# Patient Record
Sex: FEMALE | NOT HISPANIC OR LATINO | Employment: OTHER | URBAN - METROPOLITAN AREA
[De-identification: names, ages, dates, MRNs, and addresses within clinical notes are randomized per-mention and may not be internally consistent; named-entity substitution may affect disease eponyms.]

---

## 2017-01-06 ENCOUNTER — OFFICE VISIT (OUTPATIENT)
Dept: CARDIOLOGY | Facility: CLINIC | Age: 82
End: 2017-01-06

## 2017-01-06 VITALS
HEART RATE: 50 BPM | DIASTOLIC BLOOD PRESSURE: 82 MMHG | SYSTOLIC BLOOD PRESSURE: 142 MMHG | HEIGHT: 55 IN | BODY MASS INDEX: 42.35 KG/M2 | WEIGHT: 183 LBS

## 2017-01-06 DIAGNOSIS — I25.119 CORONARY ARTERY DISEASE INVOLVING NATIVE CORONARY ARTERY OF NATIVE HEART WITH ANGINA PECTORIS (HCC): ICD-10-CM

## 2017-01-06 DIAGNOSIS — I48.0 PAROXYSMAL ATRIAL FIBRILLATION (HCC): Primary | ICD-10-CM

## 2017-01-06 DIAGNOSIS — E78.49 OTHER HYPERLIPIDEMIA: ICD-10-CM

## 2017-01-06 PROCEDURE — 99214 OFFICE O/P EST MOD 30 MIN: CPT | Performed by: INTERNAL MEDICINE

## 2017-01-06 PROCEDURE — 93000 ELECTROCARDIOGRAM COMPLETE: CPT | Performed by: INTERNAL MEDICINE

## 2017-01-06 RX ORDER — AMIODARONE HYDROCHLORIDE 200 MG/1
200 TABLET ORAL DAILY
COMMUNITY
End: 2017-01-08

## 2017-01-06 NOTE — PROGRESS NOTES
Date of Office Visit: 17  Encounter Provider: Olvin Hernandez MD  Place of Service: [facility] CARDIOLOGY  Patient Name: Olena Myers  :1935      Chief Complaint   Patient presents with   • Coronary Artery Disease     History of Present Illness  HPI Comments: Mrs Myers is a very pleasant 81-year-old female who has a history of coronary artery disease with previous angioplasty bare-metal stent placement of the left anterior descending in  normal left her systolic function.  We saw her 2016 to clear her for shoulder surgery at that time she had a perfusion stress test that was normal.  She had her shoulder surgery in the hospital multiple times the last was with an episode of abrupt loss of vision in her left eye [facility] had a CTA and MRA that showed no real stenosis and no definite stroke however her neurologist felt that she probably did have a cerebrovascular event.  She then had atrial fibrillation.  Had multiple problems with recurrent palpitations despite medical therapy.  She presented to the hospital 1221 with symptomatic rapid atrial fibrillation we upped her beta blocker placed on IV diltiazem still tachycardic and symptomatic she was then placed on amiodarone unfortunately she converted back to sinus rhythm.  Should a little bit of heart failure from all that was short on some diuretics.  She was also seen by pulmonary and found to have severe COPD with her back on some inhalers.  She now comes back in for follow-up.  She's had no further palpitations.  She is still short of breath with activity.  She denies any near-syncope or syncope no orthopnea or PND.  No lower extremity edema.  No chest pain.  Of note she did have a CT scan performed  an elevated d-dimer it showed no evidence of pulmonary embolism since she had a moderate sized pericardial effusion.  But she's again had no fevers or chills and no cough.       Coronary Artery Disease   Pertinent negatives include no dizziness, muscle weakness or weight gain.   Atrial Fibrillation   Symptoms are negative for dizziness and weakness. Past medical history includes atrial fibrillation and CAD.         Past Medical History   Diagnosis Date   • Anemia      IRON DEFICIENCY   • Arthritis    • Asthma    • CAD (coronary artery disease)      HEART STENT   • Dilation of esophagus      DUE TO ULCER   • Gastric ulcer    • GIB (gastrointestinal bleeding)    • Hyperlipidemia    • Hypertension    • Lightheadedness    • LVH (left ventricular hypertrophy)    • Myasthenia    • Osteoporosis    • SOB (shortness of breath)      WALKING         Past Surgical History   Procedure Laterality Date   • Appendectomy     • Coronary angioplasty     • Coronary stent placement     • Hip bipolar replacement Bilateral    • Carpal tunnel release Bilateral    • Joint replacement Bilateral 2010, 2015     HIPS   • Total shoulder arthroplasty w/ distal clavicle excision Left 7/19/2016     Procedure: LT TOTAL SHOULDER REVERSE ARTHROPLASTY;  Surgeon: NAHOMI Solorzano MD;  Location: [facility] OR [facility];  Service:    • Cataract extraction Bilateral          Current Outpatient Prescriptions on File Prior to Visit   Medication Sig Dispense Refill   • albuterol (PROAIR HFA) 108 (90 BASE) MCG/ACT inhaler Inhale 2 puffs Every 6 (Six) Hours As Needed for wheezing or shortness of air.     • aspirin 81 MG EC tablet Take 81 mg by mouth Daily.     • DULoxetine (CYMBALTA) 60 MG capsule Take 60 mg by mouth 2 (two) times a day.     • ezetimibe-simvastatin (VYTORIN) 10-20 MG per tablet Take 1 tablet by mouth every night.     • fesoterodine fumarate (TOVIAZ ER) 8 MG tablet sustained-release 24 hour capsule Take 8 mg by mouth daily.     • Fluticasone Furoate-Vilanterol (BREO ELLIPTA) 100-25 MCG/INH aerosol powder  Inhale 1 puff Daily.     • Fluticasone Furoate-Vilanterol 100-25 MCG/INH aerosol powder  Inhale 1 puff.     • furosemide  (LASIX) 20 MG tablet Take 1 tablet by mouth Daily. 30 tablet 6   • metoprolol succinate XL (TOPROL XL) 25 MG 24 hr tablet Take 1 tablet by mouth 2 (Two) Times a Day. 60 tablet 11   • mycophenolate (CELLCEPT) 500 MG tablet Take 500 mg by mouth 2 (two) times a day. 2 tabs bid     • nitroglycerin (NITROSTAT) 0.4 MG SL tablet Place 0.4 mg under the tongue every 5 (five) minutes as needed for chest pain. Take no more than 3 doses in 15 minutes.     • omeprazole (PriLOSEC) 40 MG capsule Take 40 mg by mouth Daily.     • potassium chloride (K-DUR,KLOR-CON) 10 MEQ CR tablet Take 10 mEq by mouth daily.     • predniSONE (DELTASONE) 10 MG tablet Take 10 mg by mouth Daily.     • pyridostigmine (MESTINON) 180 MG CR tablet Take 180 mg by mouth 2 (Two) Times a Day.     • pyridostigmine (MESTINON) 60 MG tablet Take 120 mg by mouth 3 (Three) Times a Day.     • raloxifene (EVISTA) 60 MG tablet Take 60 mg by mouth daily.     • rivaroxaban (XARELTO) 15 MG tablet Take 1 tablet by mouth Daily With Dinner. 42 tablet 6   • valsartan (DIOVAN) 160 MG tablet Take 160 mg by mouth.     • vitamin D (ERGOCALCIFEROL) 83326 UNITS capsule capsule Take 50,000 Units by mouth Every 7 (Seven) Days. Takes on Wednesdays     • [DISCONTINUED] albuterol (PROVENTIL) (2.5 MG/3ML) 0.083% nebulizer solution Inhale 2.5 mg Every 6 (Six) Hours.     • [DISCONTINUED] amiodarone (PACERONE) 200 MG tablet Take 2 tablets bid for 1 week, then move to 1 tablet daily. (Patient not taking: Reported on 1/6/2017) 49 tablet 5   • [DISCONTINUED] azelastine (OPTIVAR) 0.05 % ophthalmic solution 1 drop 2 (Two) Times a Day.     • [DISCONTINUED] cyclobenzaprine (FLEXERIL) 5 MG tablet Take 5 mg by mouth 3 (Three) Times a Day.     • [DISCONTINUED] predniSONE (DELTASONE) 50 MG tablet Take 50 mg by mouth 1 (One) Time Per Week. Takes on Monday       No current facility-administered medications on file prior to visit.          Social History     Social History   • Marital status:       Spouse name: N/A   • Number of children: N/A   • Years of education: N/A     Occupational History   • Not on file.     Social History Main Topics   • Smoking status: Never Smoker   • Smokeless tobacco: Never Used   • Alcohol use No   • Drug use: No      Comment: caffine   • Sexual activity: Defer     Other Topics Concern   • Not on file     Social History Narrative       Family History   Problem Relation Age of Onset   • Heart disease Mother    • Hyperlipidemia Mother    • Stroke Mother    • Diabetes Mother    • Breast cancer Mother    • Heart disease Father    • Hyperlipidemia Father    • Stroke Father          Review of Systems   Constitution: Negative for decreased appetite, diaphoresis, fever, weakness, malaise/fatigue, weight gain and weight loss.   HENT: Negative for congestion, headaches, hearing loss, nosebleeds and tinnitus.    Eyes: Negative for blurred vision, double vision, vision loss in left eye, vision loss in right eye and visual disturbance.   Cardiovascular:        As noted in HPI   Respiratory:        As noted HPI   Endocrine: Negative for cold intolerance and heat intolerance.   Hematologic/Lymphatic: Negative for bleeding problem. Does not bruise/bleed easily.   Skin: Negative for color change, flushing, itching and rash.   Musculoskeletal: Negative for arthritis, back pain, joint pain, joint swelling, muscle weakness and myalgias.   Gastrointestinal: Negative for bloating, abdominal pain, constipation, diarrhea, dysphagia, heartburn, hematemesis, hematochezia, melena, nausea and vomiting.   Genitourinary: Negative for bladder incontinence, dysuria, frequency, nocturia and urgency.   Neurological: Negative for dizziness, focal weakness, light-headedness, loss of balance, numbness, paresthesias and vertigo.   Psychiatric/Behavioral: Negative for depression, memory loss and substance abuse.       Procedures      ECG 12 Lead  Date/Time: 1/6/2017 12:17 PM  Performed by: VIKTOR  "LESLIE  Authorized by: LESLIE HOANG   Comparison: compared with previous ECG   Similar to previous ECG  Rhythm: sinus rhythm  Rate: normal  T flattening: all  QRS axis: normal                 Objective:      Visit Vitals   • /82 (BP Location: Left arm)   • Pulse 50   • Ht (!) 5\" (12.7 cm)   • Wt 183 lb (83 kg)   • BMI 5,146.52 kg/m2          Physical Exam  Physical Exam   Constitutional: She is oriented to person, place, and time. She appears well-developed and well-nourished. No distress.   HENT:   Head: Normocephalic.   Eyes: Conjunctivae are normal. Pupils are equal, round, and reactive to light. No scleral icterus.   Neck: Normal carotid pulses, no hepatojugular reflux and no JVD present. Carotid bruit is not present. No tracheal deviation, no edema and no erythema present. No thyromegaly present.   Cardiovascular: Normal rate, regular rhythm, S1 normal, S2 normal and intact distal pulses.   No extrasystoles are present. PMI is not displaced.  Exam reveals no gallop, no distant heart sounds and no friction rub.    Murmur heard.   Systolic murmur is present with a grade of 3/6  at the upper left sternal border radiating to the neck  Pulses:       Carotid pulses are 2+ on the right side with bruit, and 2+ on the left side with bruit.       Radial pulses are 2+ on the right side, and 2+ on the left side.        Femoral pulses are 2+ on the right side, and 2+ on the left side.       Dorsalis pedis pulses are 2+ on the right side, and 2+ on the left side.        Posterior tibial pulses are 2+ on the right side, and 2+ on the left side.   Pulmonary/Chest: Effort normal. No respiratory distress. She has no decreased breath sounds. She has no wheezes. She has rhonchi (diffuse especially bases). She has no rales. She exhibits no tenderness.   Abdominal: Soft. Bowel sounds are normal. She exhibits no distension and no mass. There is no hepatosplenomegaly. There is no tenderness. There is no rebound and no " guarding.   Musculoskeletal: She exhibits no edema, tenderness or deformity.   Neurological: She is alert and oriented to person, place, and time.   Skin: Skin is warm and dry. No rash noted. She is not diaphoretic. No cyanosis or erythema. No pallor. Nails show no clubbing.   Psychiatric: She has a normal mood and affect. Her speech is normal and behavior is normal. Judgment and thought content normal.           Assessment:   1. 81-year-old female with history of severe coronary artery disease previous angioplasty bare-metal stent placement of the left anterior descending in 1997 normal left ventricular systolic function.  Normal perfusion stress test in June 2016.  Coronary Artery Disease  Assessment  • The patient has no angina  • On xarelto     Plan  • Lifestyle modifications discussed include adhering to a heart healthy diet, avoidance of tobacco products, maintenance of a healthy weight, medication compliance, regular exercise and regular monitoring of cholesterol and blood pressure   Subjective - Objective  • There has been a previous stent procedure using BMS  She has some dyspnea but no real angina continue the same.     2. Systolic murmur echocardiogram October 2016 showed mild to moderate aortic insufficiency mild/moderate aortic stenosis, mild to moderate mitral insufficiency and mild to moderate tricuspid insufficiency.  We'll continue to follow her clinically.  3. Hypertension blood pressure adequately controlled.  4. Hyperlipidemia on Vytorin and followed in PCP office.  5. Paroxysmal atrial fibrillation.  Atrial Fibrillation and Atrial Flutter  Assessment  • The patient has paroxysmal atrial fibrillation  • This is non-valvular in etiology  • The patient's CHADS2-VASc score is 8  • A [id number] score of 2 or more is considered a high risk for a thromboembolic event  • Rivaroxaban prescribed    Plan  • Attempt to maintain sinus rhythm  • Continue rivaroxaban for antithrombotic therapy, bleeding  issues discussed  • Continue amiodarone for rhythm control   she is much better on amiodarone.  We are going to cut the dose 100 mg a day.  She will see us in follow-up in 3 months if she remains in sinus rhythm we'll try to get her off of the Xarelto     6. Probable TIA. Most likely secondary to atrial fibrillation as outlined above.  7. Myasthenia gravis   8.  Heart failure preserved left ventricular ejection fraction most likely secondary to her atrial fibrillation do not believe that her current dyspnea is due to heart failure think it is most likely secondary to underlying lung disease.  9.  COPD she has a follow-up with pulmonary.         Plan:

## 2017-01-06 NOTE — MR AVS SNAPSHOT
Olena WELDON Myers   1/6/2017 11:40 AM   Office Visit    Dept Phone:  [phone]   Encounter #:  [id number]    Provider:  Olvin Hernandez MD   Department:  [facility] CARDIOLOGY                Your Full Care Plan              Today's Medication Changes          These changes are accurate as of: 1/6/17 12:20 PM.  If you have any questions, ask your nurse or doctor.               Medication(s)that have changed:     amiodarone 200 MG tablet   Commonly known as:  PACERONE   Take 200 mg by mouth Daily.   What changed:  Another medication with the same name was removed. Continue taking this medication, and follow the directions you see here.   Changed by:  Olvin Hernandez MD       predniSONE 10 MG tablet   Commonly known as:  DELTASONE   Take 10 mg by mouth Daily.   What changed:  Another medication with the same name was removed. Continue taking this medication, and follow the directions you see here.       PROAIR  (90 BASE) MCG/ACT inhaler   Generic drug:  albuterol   Inhale 2 puffs Every 6 (Six) Hours As Needed for wheezing or shortness of air.   What changed:  Another medication with the same name was removed. Continue taking this medication, and follow the directions you see here.         Stop taking medication(s)listed here:     azelastine 0.05 % ophthalmic solution   Commonly known as:  OPTIVAR   Stopped by:  Olvin Hernandez MD           cyclobenzaprine 5 MG tablet   Commonly known as:  FLEXERIL   Stopped by:  Olvin Hernandez MD                      Your Updated Medication List          This list is accurate as of: 1/6/17 12:20 PM.  Always use your most recent med list.                amiodarone 200 MG tablet   Commonly known as:  PACERONE       aspirin 81 MG EC tablet       * BREO ELLIPTA 100-25 MCG/INH aerosol powder    Generic drug:  Fluticasone Furoate-Vilanterol       * Fluticasone Furoate-Vilanterol 100-25 MCG/INH aerosol powder        DULoxetine 60 MG capsule   Commonly known as:  CYMBALTA       fesoterodine fumarate 8 MG tablet sustained-release 24 hour capsule   Commonly known as:  TOVIAZ ER       furosemide 20 MG tablet   Commonly known as:  LASIX   Take 1 tablet by mouth Daily.       metoprolol succinate XL 25 MG 24 hr tablet   Commonly known as:  TOPROL XL   Take 1 tablet by mouth 2 (Two) Times a Day.       mycophenolate 500 MG tablet   Commonly known as:  CELLCEPT       nitroglycerin 0.4 MG SL tablet   Commonly known as:  NITROSTAT       omeprazole 40 MG capsule   Commonly known as:  priLOSEC       potassium chloride 10 MEQ CR tablet   Commonly known as:  K-DUR,KLOR-CON       predniSONE 10 MG tablet   Commonly known as:  DELTASONE       PROAIR  (90 BASE) MCG/ACT inhaler   Generic drug:  albuterol       * pyridostigmine 60 MG tablet   Commonly known as:  MESTINON       * pyridostigmine 180 MG CR tablet   Commonly known as:  MESTINON       raloxifene 60 MG tablet   Commonly known as:  EVISTA       rivaroxaban 15 MG tablet   Commonly known as:  XARELTO   Take 1 tablet by mouth Daily With Dinner.       valsartan 160 MG tablet   Commonly known as:  DIOVAN       vitamin D 29628 UNITS capsule capsule   Commonly known as:  ERGOCALCIFEROL       VYTORIN 10-20 MG per tablet   Generic drug:  ezetimibe-simvastatin       * Notice:  This list has 4 medication(s) that are the same as other medications prescribed for you. Read the directions carefully, and ask your doctor or other care provider to review them with you.            We Performed the Following     ECG 12 Lead       You Were Diagnosed With        Codes Comments    Paroxysmal atrial fibrillation    -  Primary ICD-10-CM: I48.0  ICD-9-CM: 427.31     Coronary artery disease involving native coronary artery of native heart with angina pectoris     ICD-10-CM: I25.119  ICD-9-CM: 414.01, 413.9     Other hyperlipidemia     ICD-10-CM: E78.4  ICD-9-CM: 272.4       Instructions     None    Patient  "Instructions History      Upcoming Appointments     Visit Type Date Time Department    FO[facility]    FOLLOW UP 4/19/2017 10:30 AM [facility]      MyChart Signup     Our records indicate that you have declined [facility] sign[facility]. If you would like to sign up for [facility], please email [facility] [email or web address] or call [phone] to obtain an activation code.             Other Info from Your Visit           Your Appointments     Apr 19, 2017 10:30 AM EDT   Follow Up with Olvin Hernandez MD   [facility] CARDIOLOGY (--)    [address]  [facility] [address] [phone]           Arrive 15 minutes prior to appointment.              Allergies     Hydrocodone      Penicillins      Neomycin  Rash      Reason for Visit     Coronary Artery Disease           Vital Signs     Blood Pressure Pulse Height Weight Body Mass Index Smoking Status    142/82 (BP Location: Left arm) 50 5\" (12.7 cm) 183 lb (83 kg) 5,146.52 kg/m2 Never Smoker      Problems and Diagnoses Noted     Coronary heart disease    Atrial fibrillation (irregular heartbeat)    Other hyperlipidemia            "

## 2017-01-08 RX ORDER — AMIODARONE HYDROCHLORIDE 100 MG/1
100 TABLET ORAL DAILY
Qty: 90 TABLET | Refills: 1 | Status: SHIPPED | OUTPATIENT
Start: 2017-01-08 | End: 2017-07-06 | Stop reason: SDUPTHER

## 2017-02-07 ENCOUNTER — HOSPITAL ENCOUNTER (OUTPATIENT)
Dept: CT IMAGING | Facility: HOSPITAL | Age: 82
Discharge: HOME | End: 2017-02-07
Attending: INTERNAL MEDICINE | Admitting: INTERNAL MEDICINE

## 2017-02-07 DIAGNOSIS — J90 PLEURAL EFFUSION: ICD-10-CM

## 2017-02-07 PROCEDURE — 71250 CT THORAX DX C-: CPT

## 2017-03-14 ENCOUNTER — TRANSCRIBE ORDERS (OUTPATIENT)
Dept: ADMINISTRATIVE | Facility: HOSPITAL | Age: 82
End: 2017-03-14

## 2017-03-14 DIAGNOSIS — G45.3 AMAUROSIS FUGAX: Primary | ICD-10-CM

## 2017-03-15 ENCOUNTER — TRANSCRIBE ORDERS (OUTPATIENT)
Dept: ADMINISTRATIVE | Facility: HOSPITAL | Age: 82
End: 2017-03-15

## 2017-03-15 ENCOUNTER — LAB (OUTPATIENT)
Dept: LAB | Facility: HOSPITAL | Age: 82
End: 2017-03-15

## 2017-03-15 DIAGNOSIS — B96.89 GRAM-NEGATIVE BACTERIAL CELLULITIS: ICD-10-CM

## 2017-03-15 DIAGNOSIS — L03.90 GRAM-NEGATIVE BACTERIAL CELLULITIS: ICD-10-CM

## 2017-03-15 DIAGNOSIS — L03.90 GRAM-NEGATIVE BACTERIAL CELLULITIS: Primary | ICD-10-CM

## 2017-03-15 DIAGNOSIS — B96.89 GRAM-NEGATIVE BACTERIAL CELLULITIS: Primary | ICD-10-CM

## 2017-03-15 LAB
BASOPHILS # BLD AUTO: 0.07 10*3/MM3 (ref 0–0.2)
BASOPHILS NFR BLD AUTO: 0.8 % (ref 0–1.5)
CRP SERPL-MCNC: 0.15 MG/DL (ref 0–0.5)
DEPRECATED RDW RBC AUTO: 58.5 FL (ref 37–54)
EOSINOPHIL # BLD AUTO: 0.14 10*3/MM3 (ref 0–0.7)
EOSINOPHIL NFR BLD AUTO: 1.6 % (ref 0.3–6.2)
ERYTHROCYTE [DISTWIDTH] IN BLOOD BY AUTOMATED COUNT: 16.9 % (ref 11.7–13)
ERYTHROCYTE [SEDIMENTATION RATE] IN BLOOD: 65 MM/HR (ref 0–30)
HCT VFR BLD AUTO: 35 % (ref 35.6–45.5)
HGB BLD-MCNC: 11.1 G/DL (ref 11.9–15.5)
IMM GRANULOCYTES # BLD: 0 10*3/MM3 (ref 0–0.03)
IMM GRANULOCYTES NFR BLD: 0 % (ref 0–0.5)
LYMPHOCYTES # BLD AUTO: 0.7 10*3/MM3 (ref 0.9–4.8)
LYMPHOCYTES NFR BLD AUTO: 7.9 % (ref 19.6–45.3)
MCH RBC QN AUTO: 29.8 PG (ref 26.9–32)
MCHC RBC AUTO-ENTMCNC: 31.7 G/DL (ref 32.4–36.3)
MCV RBC AUTO: 94.1 FL (ref 80.5–98.2)
MONOCYTES # BLD AUTO: 0.64 10*3/MM3 (ref 0.2–1.2)
MONOCYTES NFR BLD AUTO: 7.2 % (ref 5–12)
NEUTROPHILS # BLD AUTO: 7.31 10*3/MM3 (ref 1.9–8.1)
NEUTROPHILS NFR BLD AUTO: 82.5 % (ref 42.7–76)
PLATELET # BLD AUTO: 254 10*3/MM3 (ref 140–500)
PMV BLD AUTO: 9.5 FL (ref 6–12)
RBC # BLD AUTO: 3.72 10*6/MM3 (ref 3.9–5.2)
WBC NRBC COR # BLD: 8.86 10*3/MM3 (ref 4.5–10.7)

## 2017-03-15 PROCEDURE — 36415 COLL VENOUS BLD VENIPUNCTURE: CPT

## 2017-03-15 PROCEDURE — 86140 C-REACTIVE PROTEIN: CPT

## 2017-03-15 PROCEDURE — 85025 COMPLETE CBC W/AUTO DIFF WBC: CPT

## 2017-03-15 PROCEDURE — 85652 RBC SED RATE AUTOMATED: CPT

## 2017-03-17 ENCOUNTER — HOSPITAL ENCOUNTER (OUTPATIENT)
Dept: CARDIOLOGY | Facility: HOSPITAL | Age: 82
Discharge: HOME | End: 2017-03-17
Admitting: OPHTHALMOLOGY

## 2017-03-17 DIAGNOSIS — G45.3 AMAUROSIS FUGAX: ICD-10-CM

## 2017-03-17 LAB
BH CV XLRA MEAS LEFT CCA RATIO VEL: 93.2 CM/SEC
BH CV XLRA MEAS LEFT DIST CCA EDV: 12.9 CM/SEC
BH CV XLRA MEAS LEFT DIST CCA PSV: 93.2 CM/SEC
BH CV XLRA MEAS LEFT DIST ICA EDV: -19.3 CM/SEC
BH CV XLRA MEAS LEFT DIST ICA PSV: -88.5 CM/SEC
BH CV XLRA MEAS LEFT ICA RATIO VEL: -88.5 CM/SEC
BH CV XLRA MEAS LEFT ICA/CCA RATIO: -0.95
BH CV XLRA MEAS LEFT MID ICA EDV: -18.2 CM/SEC
BH CV XLRA MEAS LEFT MID ICA PSV: -85.6 CM/SEC
BH CV XLRA MEAS LEFT PROX CCA EDV: 11.7 CM/SEC
BH CV XLRA MEAS LEFT PROX CCA PSV: 93.2 CM/SEC
BH CV XLRA MEAS LEFT PROX ECA EDV: -9.4 CM/SEC
BH CV XLRA MEAS LEFT PROX ECA PSV: -122 CM/SEC
BH CV XLRA MEAS LEFT PROX ICA EDV: 11.7 CM/SEC
BH CV XLRA MEAS LEFT PROX ICA PSV: 69.8 CM/SEC
BH CV XLRA MEAS LEFT PROX SCLA EDV: 12.4 CM/SEC
BH CV XLRA MEAS LEFT PROX SCLA PSV: 142 CM/SEC
BH CV XLRA MEAS LEFT VERTEBRAL A EDV: 14.1 CM/SEC
BH CV XLRA MEAS LEFT VERTEBRAL A PSV: 75.6 CM/SEC
BH CV XLRA MEAS RIGHT CCA RATIO VEL: 82.7 CM/SEC
BH CV XLRA MEAS RIGHT DIST CCA EDV: 15.2 CM/SEC
BH CV XLRA MEAS RIGHT DIST CCA PSV: 82.7 CM/SEC
BH CV XLRA MEAS RIGHT DIST ICA EDV: 20.4 CM/SEC
BH CV XLRA MEAS RIGHT DIST ICA PSV: 101 CM/SEC
BH CV XLRA MEAS RIGHT ICA RATIO VEL: 101 CM/SEC
BH CV XLRA MEAS RIGHT ICA/CCA RATIO: 1.2
BH CV XLRA MEAS RIGHT MID ICA EDV: -17 CM/SEC
BH CV XLRA MEAS RIGHT MID ICA PSV: -69.8 CM/SEC
BH CV XLRA MEAS RIGHT PROX CCA EDV: -9.4 CM/SEC
BH CV XLRA MEAS RIGHT PROX CCA PSV: -119 CM/SEC
BH CV XLRA MEAS RIGHT PROX ECA EDV: -11.8 CM/SEC
BH CV XLRA MEAS RIGHT PROX ECA PSV: -162 CM/SEC
BH CV XLRA MEAS RIGHT PROX ICA EDV: -13.8 CM/SEC
BH CV XLRA MEAS RIGHT PROX ICA PSV: -56.6 CM/SEC
BH CV XLRA MEAS RIGHT PROX SCLA PSV: -81.2 CM/SEC
BH CV XLRA MEAS RIGHT VERTEBRAL A EDV: 9.4 CM/SEC
BH CV XLRA MEAS RIGHT VERTEBRAL A PSV: 48.7 CM/SEC
LEFT ARM BP: NORMAL MMHG
RIGHT ARM BP: NORMAL MMHG

## 2017-03-17 PROCEDURE — 93880 EXTRACRANIAL BILAT STUDY: CPT

## 2017-04-19 ENCOUNTER — OFFICE VISIT (OUTPATIENT)
Dept: CARDIOLOGY | Facility: CLINIC | Age: 82
End: 2017-04-19

## 2017-04-19 VITALS
HEART RATE: 66 BPM | DIASTOLIC BLOOD PRESSURE: 70 MMHG | HEIGHT: 55 IN | WEIGHT: 148 LBS | SYSTOLIC BLOOD PRESSURE: 128 MMHG | BODY MASS INDEX: 34.25 KG/M2

## 2017-04-19 DIAGNOSIS — G45.3 AMAUROSIS FUGAX: ICD-10-CM

## 2017-04-19 DIAGNOSIS — I48.0 PAROXYSMAL ATRIAL FIBRILLATION (HCC): Primary | ICD-10-CM

## 2017-04-19 DIAGNOSIS — I25.119 CORONARY ARTERY DISEASE INVOLVING NATIVE CORONARY ARTERY OF NATIVE HEART WITH ANGINA PECTORIS (HCC): ICD-10-CM

## 2017-04-19 DIAGNOSIS — I10 ESSENTIAL HYPERTENSION: ICD-10-CM

## 2017-04-19 DIAGNOSIS — E78.49 OTHER HYPERLIPIDEMIA: ICD-10-CM

## 2017-04-19 PROCEDURE — 93000 ELECTROCARDIOGRAM COMPLETE: CPT | Performed by: INTERNAL MEDICINE

## 2017-04-19 PROCEDURE — 99214 OFFICE O/P EST MOD 30 MIN: CPT | Performed by: INTERNAL MEDICINE

## 2017-04-19 RX ORDER — VALSARTAN 80 MG/1
80 TABLET ORAL DAILY
COMMUNITY
End: 2018-10-19 | Stop reason: ALTCHOICE

## 2017-04-19 RX ORDER — MOMETASONE FUROATE 50 UG/1
2 SPRAY, METERED NASAL DAILY
COMMUNITY
End: 2021-07-14 | Stop reason: SDUPTHER

## 2017-04-19 NOTE — PROGRESS NOTES
Date of Office Visit: 17  Encounter Provider: Olvin Hernandez MD  Place of Service: [facility] CARDIOLOGY  Patient Name: Olena Myers  :1935      Chief Complaint   Patient presents with   • Coronary Artery Disease   • Atrial Fibrillation     History of Present Illness  HPI Comments: Mrs Myers is a very pleasant 81-year-old female who has a history of coronary artery disease with previous angioplasty bare-metal stent placement of the left anterior descending in  normal left her systolic function.  We saw her 2016 to clear her for shoulder surgery at that time she had a perfusion stress test that was normal.  She had her shoulder surgery in the hospital multiple times the last was with an episode of abrupt loss of vision in her left eye she [facility] was admitted had a CTA and MRA that showed no real stenosis and no definite stroke however her neurologist felt that she probably did have a cerebrovascular event.  She then had atrial fibrillation.  Had multiple problems with recurrent palpitations despite medical therapy.  She presented to the hospital 1221 with symptomatic rapid atrial fibrillation we upped her beta blocker placed on IV diltiazem still tachycardic and symptomatic she was then placed on amiodarone unfortunately she converted back to sinus rhythm.  Should a little bit of heart failure from all that was short on some diuretics.  She was also seen by pulmonary and found to have severe COPD with her back on some inhalers.  She now comes back in for follow-up.  Unfortunately she's had a lot more happen since she was last here she is now on IgG IV for her myasthenia.  She was switched from oxygen at night to CPAP.  She lost the vision in her left eye again completely had carotid ultrasounds performed again that just showed mild bilateral disease.  She had no paralysis paresthesias or dysarthria with that.  She has a part-time sleeping  with his CPAP.  Her biggest complaint is that she does tires easily and is fatigued.  Denies chest pain and pressure.  Does get shortness breath with activity but that's about the same.  Denies orthopnea or PND denies any palpitations, near-syncope or syncope.    Coronary Artery Disease   Pertinent negatives include no dizziness, muscle weakness or weight gain. There is no history of past myocardial infarction.   Atrial Fibrillation   Symptoms are negative for dizziness and weakness. Past medical history includes atrial fibrillation and CAD.         Past Medical History:   Diagnosis Date   • Anemia     IRON DEFICIENCY   • Arthritis    • Asthma    • Atrial fibrillation    • CAD (coronary artery disease)     HEART STENT   • 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   • Gastric ulcer    • GIB (gastrointestinal bleeding)    • Hyperlipidemia    • Hypertension    • Lightheadedness    • LVH (left ventricular hypertrophy)    • Myasthenia    • Osteoporosis    • SOB (shortness of breath)     WALKING         Past Surgical History:   Procedure Laterality Date   • APPENDECTOMY     • CARPAL TUNNEL RELEASE Bilateral    • CATARACT EXTRACTION Bilateral    • CORONARY ANGIOPLASTY     • CORONARY STENT PLACEMENT     • HIP BIPOLAR REPLACEMENT Bilateral    • JOINT REPLACEMENT Bilateral 2010, 2015    HIPS   • TOTAL SHOULDER ARTHROPLASTY W/ DISTAL CLAVICLE EXCISION Left 7/19/2016    Procedure: LT TOTAL SHOULDER REVERSE ARTHROPLASTY;  Surgeon: NAHOMI Solorzano MD;  Location: [facility] OR [facility];  Service:          Current Outpatient Prescriptions on File Prior to Visit   Medication Sig Dispense Refill   • albuterol (PROAIR HFA) 108 (90 BASE) MCG/ACT inhaler Inhale 2 puffs Every 6 (Six) Hours As Needed for wheezing or shortness of air.     • amiodarone (PACERONE) 100 MG tablet Take 1 tablet by mouth Daily. 90 tablet 1   • omeprazole (PriLOSEC) 40 MG capsule Take 40 mg by mouth Daily.     • potassium chloride (K-DUR,KLOR-CON) 10 MEQ CR tablet Take 10 mEq  by mouth daily.     • rivaroxaban (XARELTO) 15 MG tablet Take 1 tablet by mouth Daily With Dinner. 90 tablet 1   • vitamin D (ERGOCALCIFEROL) 87343 UNITS capsule capsule Take 50,000 Units by mouth Every 7 (Seven) Days. Takes on Wednesdays     • DULoxetine (CYMBALTA) 60 MG capsule Take 60 mg by mouth 2 (two) times a day.     • ezetimibe-simvastatin (VYTORIN) 10-20 MG per tablet Take 1 tablet by mouth every night.     • fesoterodine fumarate (TOVIAZ ER) 8 MG tablet sustained-release 24 hour capsule Take 8 mg by mouth daily.     • Fluticasone Furoate-Vilanterol (BREO ELLIPTA) 100-25 MCG/INH aerosol powder  Inhale 1 puff Daily.     • furosemide (LASIX) 20 MG tablet Take 1 tablet by mouth Daily. 30 tablet 6   • metoprolol succinate XL (TOPROL XL) 25 MG 24 hr tablet Take 1 tablet by mouth 2 (Two) Times a Day. 60 tablet 11   • mycophenolate (CELLCEPT) 500 MG tablet Take 500 mg by mouth 2 (two) times a day. 2 tabs bid     • predniSONE (DELTASONE) 10 MG tablet Take 10 mg by mouth 4 (Four) Times a Day.     • pyridostigmine (MESTINON) 180 MG CR tablet Take 180 mg by mouth Daily.     • pyridostigmine (MESTINON) 60 MG tablet Take 60 mg by mouth 3 (Three) Times a Day.     • raloxifene (EVISTA) 60 MG tablet Take 60 mg by mouth daily.     • [DISCONTINUED] Fluticasone Furoate-Vilanterol 100-25 MCG/INH aerosol powder  Inhale 1 puff.     • [DISCONTINUED] nitroglycerin (NITROSTAT) 0.4 MG SL tablet Place 0.4 mg under the tongue every 5 (five) minutes as needed for chest pain. Take no more than 3 doses in 15 minutes.     • [DISCONTINUED] valsartan (DIOVAN) 160 MG tablet Take 160 mg by mouth.       No current facility-administered medications on file prior to visit.          Social History     Social History   • Marital status:      Spouse name: N/A   • Number of children: N/A   • Years of education: N/A     Occupational History   • Not on file.     Social History Main Topics   • Smoking status: Never Smoker   • Smokeless tobacco:  Never Used   • Alcohol use No   • Drug use: No      Comment: caffine   • Sexual activity: Defer     Other Topics Concern   • Not on file     Social History Narrative       Family History   Problem Relation Age of Onset   • Heart disease Mother    • Hyperlipidemia Mother    • Stroke Mother    • Diabetes Mother    • Breast cancer Mother    • Heart disease Father    • Hyperlipidemia Father    • Stroke Father          Review of Systems   Constitution: Positive for malaise/fatigue. Negative for decreased appetite, diaphoresis, fever, weakness, weight gain and weight loss.   HENT: Negative for congestion, headaches, hearing loss, nosebleeds and tinnitus.    Eyes: Negative for blurred vision, double vision, vision loss in left eye, vision loss in right eye and visual disturbance.   Cardiovascular:        As noted in HPI   Respiratory:        As noted HPI   Endocrine: Negative for cold intolerance and heat intolerance.   Hematologic/Lymphatic: Negative for bleeding problem. Does not bruise/bleed easily.   Skin: Negative for color change, flushing, itching and rash.   Musculoskeletal: Negative for arthritis, back pain, joint pain, joint swelling, muscle weakness and myalgias.   Gastrointestinal: Negative for bloating, abdominal pain, constipation, diarrhea, dysphagia, heartburn, hematemesis, hematochezia, melena, nausea and vomiting.   Genitourinary: Negative for bladder incontinence, dysuria, frequency, nocturia and urgency.   Neurological: Negative for dizziness, focal weakness, light-headedness, loss of balance, numbness, paresthesias and vertigo.   Psychiatric/Behavioral: Positive for depression. Negative for memory loss and substance abuse. The patient is nervous/anxious.        Procedures      ECG 12 Lead  Date/Time: 4/19/2017 10:52 AM  Performed by: LESLIE HOANG  Authorized by: LESLIE HOANG   Comparison: compared with previous ECG   Similar to previous ECG  Rhythm: sinus rhythm  Rate: normal  QRS axis:  "normal  Other findings: PRWP               Objective:    /70 (BP Location: Left arm)  Pulse 66  Ht (!) 5\" (12.7 cm)  Wt 148 lb (67.1 kg)  BMI 4,162.21 kg/m2       Physical Exam  Physical Exam   Constitutional: She is oriented to person, place, and time. She appears well-developed and well-nourished. No distress.   IV in right wrist   HENT:   Head: Normocephalic.   Eyes: Conjunctivae are normal. Pupils are equal, round, and reactive to light. No scleral icterus.   Neck: Normal carotid pulses, no hepatojugular reflux and no JVD present. Carotid bruit is not present. No tracheal deviation, no edema and no erythema present. No thyromegaly present.   Cardiovascular: Normal rate, regular rhythm, S1 normal, S2 normal and intact distal pulses.   No extrasystoles are present. PMI is not displaced.  Exam reveals no gallop, no distant heart sounds and no friction rub.    Murmur heard.   Systolic murmur is present with a grade of 3/6  at the upper left sternal border  Pulses:       Carotid pulses are 2+ on the right side with bruit, and 2+ on the left side with bruit.       Radial pulses are 2+ on the right side, and 2+ on the left side.        Femoral pulses are 2+ on the right side, and 2+ on the left side.       Dorsalis pedis pulses are 2+ on the right side, and 2+ on the left side.        Posterior tibial pulses are 2+ on the right side, and 2+ on the left side.   Pulmonary/Chest: Effort normal and breath sounds normal. No respiratory distress. She has no decreased breath sounds. She has no wheezes. She has no rhonchi. She has no rales. She exhibits no tenderness.   Abdominal: Soft. Bowel sounds are normal. She exhibits no distension and no mass. There is no hepatosplenomegaly. There is no tenderness. There is no rebound and no guarding.   Musculoskeletal: She exhibits no edema, tenderness or deformity.   Neurological: She is alert and oriented to person, place, and time.   Skin: Skin is warm and dry. No rash " noted. She is not diaphoretic. No cyanosis or erythema. No pallor. Nails show no clubbing.   Psychiatric: She has a normal mood and affect. Her speech is normal and behavior is normal. Judgment and thought content normal.           Assessment:   1. 81-year-old female with history of severe coronary artery disease previous angioplasty bare-metal stent placement of the left anterior descending in 1997 normal left ventricular systolic function. Normal perfusion stress test in June 2016.  Coronary Artery Disease  Assessment  • The patient has no angina  • On xarelto    Plan  • Lifestyle modifications discussed include adhering to a heart healthy diet, avoidance of tobacco products, maintenance of a healthy weight, medication compliance, regular exercise and regular monitoring of cholesterol and blood pressure    Subjective - Objective  • There has been a previous stent procedure using BMS      She has some dyspnea which is unchanged but no real angina continue the same.      2. Systolic murmur echocardiogram October 2016 showed mild to moderate aortic insufficiency mild/moderate aortic stenosis, mild to moderate mitral insufficiency and mild to moderate tricuspid insufficiency. We'll continue to follow her clinically.  3. Hypertension blood pressure adequately controlled.  4. Hyperlipidemia on Vytorin and followed in PCP office.  5. Paroxysmal atrial fibrillation.  Atrial Fibrillation and Atrial Flutter  Assessment  • The patient has paroxysmal atrial fibrillation  • This is non-valvular in etiology  • The patient's CHADS2-VASc score is 8  • A [id number] score of 2 or more is considered a high risk for a thromboembolic event  • Rivaroxaban prescribed    Plan  • Attempt to maintain sinus rhythm  • Continue rivaroxaban for antithrombotic therapy, bleeding issues discussed  • Continue amiodarone for rhythm control  She seems to be staying in sinus rhythm but with this recent episode of loss of vision would be reluctant  to stop the Xarelto.     6. Probable TIA. Most likely secondary to atrial fibrillation as outlined above.  7. Myasthenia gravis now on IgG  8. Heart failure preserved left ventricular ejection fraction most likely secondary to her atrial fibrillation do not believe that her current dyspnea is due to heart failure think it is most likely secondary to underlying lung disease.  9. COPD/sleep apnea she has a follow-up with pulmonary.   10.  Amaurosis fugax.  With restrictive atrial fibrillation as above would be reluctant to discontinue the Xarelto.  She does have a follow-up appointment with the stroke team at [facility].  11.  Fatigue obviously multiple potential etiologies at this time would not plan any changes         Plan:

## 2017-07-03 ENCOUNTER — TELEPHONE (OUTPATIENT)
Dept: CARDIOLOGY | Facility: CLINIC | Age: 82
End: 2017-07-03

## 2017-07-03 NOTE — TELEPHONE ENCOUNTER
07/03/17  4:44 PM  Olena Myers  1935    Home Phone [phone]   Mobile [phone]     Dr. Holman Scales office calls to report that Ms. Myers recently had abnormal thyroid labs and they started her on synthroid. They are faxing this information to the office.    They wanted you to know since she is on amiodarone in case her medications need to be adjusted.    Kaia HIDALGO RN

## 2017-07-05 ENCOUNTER — TELEPHONE (OUTPATIENT)
Dept: CARDIOLOGY | Facility: CLINIC | Age: 82
End: 2017-07-05

## 2017-07-05 NOTE — TELEPHONE ENCOUNTER
Pt is scheduled for a colonoscopy w/Dr. Neri. He is recommending she hold her Xarelto x2 days. Please advise./amk

## 2017-07-07 RX ORDER — AMIODARONE HYDROCHLORIDE 100 MG/1
TABLET ORAL
Qty: 90 TABLET | Refills: 1 | Status: SHIPPED | OUTPATIENT
Start: 2017-07-07 | End: 2018-01-05 | Stop reason: SDUPTHER

## 2017-10-19 ENCOUNTER — OFFICE VISIT (OUTPATIENT)
Dept: CARDIOLOGY | Facility: CLINIC | Age: 82
End: 2017-10-19

## 2017-10-19 VITALS
SYSTOLIC BLOOD PRESSURE: 122 MMHG | HEIGHT: 55 IN | HEART RATE: 59 BPM | WEIGHT: 150 LBS | DIASTOLIC BLOOD PRESSURE: 78 MMHG | BODY MASS INDEX: 34.71 KG/M2

## 2017-10-19 DIAGNOSIS — I48.0 PAROXYSMAL ATRIAL FIBRILLATION (HCC): ICD-10-CM

## 2017-10-19 DIAGNOSIS — I25.119 CORONARY ARTERY DISEASE INVOLVING NATIVE CORONARY ARTERY OF NATIVE HEART WITH ANGINA PECTORIS (HCC): Primary | ICD-10-CM

## 2017-10-19 DIAGNOSIS — I10 ESSENTIAL HYPERTENSION: ICD-10-CM

## 2017-10-19 DIAGNOSIS — E78.2 MIXED HYPERLIPIDEMIA: ICD-10-CM

## 2017-10-19 PROCEDURE — 93000 ELECTROCARDIOGRAM COMPLETE: CPT | Performed by: INTERNAL MEDICINE

## 2017-10-19 PROCEDURE — 99214 OFFICE O/P EST MOD 30 MIN: CPT | Performed by: INTERNAL MEDICINE

## 2017-10-19 RX ORDER — CEFDINIR 300 MG/1
300 CAPSULE ORAL DAILY
COMMUNITY
End: 2018-10-19

## 2017-10-19 RX ORDER — ROSUVASTATIN CALCIUM 40 MG/1
40 TABLET, COATED ORAL DAILY
COMMUNITY
End: 2019-10-02

## 2017-10-19 RX ORDER — MONTELUKAST SODIUM 10 MG/1
10 TABLET ORAL NIGHTLY
COMMUNITY

## 2017-10-19 RX ORDER — DIPHENOXYLATE HYDROCHLORIDE AND ATROPINE SULFATE 2.5; .025 MG/1; MG/1
1 TABLET ORAL 4 TIMES DAILY PRN
Status: ON HOLD | COMMUNITY
End: 2022-02-16

## 2017-10-19 RX ORDER — LEVOTHYROXINE SODIUM 0.07 MG/1
75 TABLET ORAL DAILY
COMMUNITY
End: 2018-12-26

## 2017-10-19 RX ORDER — ASPIRIN 81 MG/1
81 TABLET ORAL DAILY
COMMUNITY
End: 2022-11-11 | Stop reason: HOSPADM

## 2017-10-19 NOTE — PROGRESS NOTES
Date of Office Visit: 10/19/17  Encounter Provider: Olvin Hernandez MD  Place of Service: [facility] CARDIOLOGY  Patient Name: Olena yMers  :1935      Chief Complaint   Patient presents with   • Coronary Artery Disease   • Atrial Fibrillation     History of Present Illness  HPI Comments: Mrs Myers is a very pleasant 82-year-old female who has a history of coronary artery disease with previous angioplasty bare-metal stent placement of the left anterior descending in  normal left her systolic function.  We saw her 2016 to clear her for shoulder surgery at that time she had a perfusion stress test that was normal.  She had her shoulder surgery in the hospital multiple times the last was with an episode of abrupt loss of vision in her left eye she [facility] was admitted had a CTA and MRA that showed no real stenosis and no definite stroke however her neurologist felt that she probably did have a cerebrovascular event.  She then had atrial fibrillation.  Had multiple problems with recurrent palpitations despite medical therapy.  She presented to the hospital 1221 with symptomatic rapid atrial fibrillation we upped her beta blocker placed on IV diltiazem still tachycardic and symptomatic she was then placed on amiodarone unfortunately she converted back to sinus rhythm.  Should a little bit of heart failure from all that was short on some diuretics.  She was also seen by pulmonary and found to have severe COPD with her back on some inhalers.  She now comes back in for follow-up.  The patient denies chest pain, pressure and heaviness. No shortness of breath, othopnea or PND. No palpitations, near syncope or syncope. No stroke type symptoms like paralysis, paresthesia, abrupt vision loss and dysarthria. No bleeding like blood in the stool or dark stools.   She has had a couple episodes of loss of vision in her left eye she also says that she went to the  eye doctor he saw the hemorrhage there she's had some problems with her thyroid being adjusted.  She is overall she feels she's doing okay.  Things at home are final little bit lonely at times she does live alone is not adjusted and moving to assisted living.    Coronary Artery Disease   Pertinent negatives include no dizziness, muscle weakness or weight gain. There is no history of past myocardial infarction.   Atrial Fibrillation   Symptoms are negative for dizziness and weakness. Past medical history includes atrial fibrillation and CAD.         Past Medical History:   Diagnosis Date   • Anemia     IRON DEFICIENCY   • Arthritis    • Asthma    • Atrial fibrillation    • CAD (coronary artery disease)     HEART STENT   • 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   • Gastric ulcer    • GIB (gastrointestinal bleeding)    • Hyperlipidemia    • Hypertension    • Lightheadedness    • LVH (left ventricular hypertrophy)    • Myasthenia    • Osteoporosis    • SOB (shortness of breath)     WALKING         Past Surgical History:   Procedure Laterality Date   • APPENDECTOMY     • CARPAL TUNNEL RELEASE Bilateral    • CATARACT EXTRACTION Bilateral    • CORONARY ANGIOPLASTY     • CORONARY STENT PLACEMENT     • HIP BIPOLAR REPLACEMENT Bilateral    • JOINT REPLACEMENT Bilateral 2010, 2015    HIPS   • TOTAL SHOULDER ARTHROPLASTY W/ DISTAL CLAVICLE EXCISION Left 7/19/2016    Procedure: LT TOTAL SHOULDER REVERSE ARTHROPLASTY;  Surgeon: NAHOMI Solorzano MD;  Location: [facility];  Service:          Current Outpatient Prescriptions on File Prior to Visit   Medication Sig Dispense Refill   • albuterol (PROAIR HFA) 108 (90 BASE) MCG/ACT inhaler Inhale 2 puffs Every 6 (Six) Hours As Needed for wheezing or shortness of air.     • amiodarone (PACERONE) 100 MG tablet TAKE 1 TABLET DAILY 90 tablet 1   • DULoxetine (CYMBALTA) 60 MG capsule Take 60 mg by mouth 2 (two) times a day.     • Fluticasone Furoate-Vilanterol (BREO ELLIPTA) 100-25 MCG/INH  aerosol powder  Inhale 1 puff Daily.     • metoprolol succinate XL (TOPROL XL) 25 MG 24 hr tablet Take 1 tablet by mouth 2 (Two) Times a Day. 60 tablet 11   • mometasone (NASONEX) 50 MCG/ACT nasal spray 2 sprays into each nostril Daily.     • mycophenolate (CELLCEPT) 500 MG tablet Take 500 mg by mouth Daily. 2 tabs bid      • pyridostigmine (MESTINON) 180 MG CR tablet Take 180 mg by mouth Daily.     • pyridostigmine (MESTINON) 60 MG tablet Take 60 mg by mouth 3 (Three) Times a Day.     • raloxifene (EVISTA) 60 MG tablet Take 60 mg by mouth daily.     • rivaroxaban (XARELTO) 15 MG tablet Take 1 tablet by mouth Daily With Dinner. 90 tablet 1   • valsartan (DIOVAN) 80 MG tablet Take 80 mg by mouth Daily.     • vitamin D (ERGOCALCIFEROL) 12803 UNITS capsule capsule Take 50,000 Units by mouth Every 7 (Seven) Days. Takes on Wednesdays     • [DISCONTINUED] ezetimibe-simvastatin (VYTORIN) 10-20 MG per tablet Take 1 tablet by mouth every night.     • [DISCONTINUED] fesoterodine fumarate (TOVIAZ ER) 8 MG tablet sustained-release 24 hour capsule Take 8 mg by mouth daily.     • [DISCONTINUED] furosemide (LASIX) 20 MG tablet Take 1 tablet by mouth Daily. 30 tablet 6   • [DISCONTINUED] omeprazole (PriLOSEC) 40 MG capsule Take 40 mg by mouth Daily.     • [DISCONTINUED] potassium chloride (K-DUR,KLOR-CON) 10 MEQ CR tablet Take 10 mEq by mouth daily.     • [DISCONTINUED] predniSONE (DELTASONE) 10 MG tablet Take 10 mg by mouth 4 (Four) Times a Day.       No current facility-administered medications on file prior to visit.          Social History     Social History   • Marital status:      Spouse name: N/A   • Number of children: N/A   • Years of education: N/A     Occupational History   • Not on file.     Social History Main Topics   • Smoking status: Never Smoker   • Smokeless tobacco: Never Used   • Alcohol use No   • Drug use: No      Comment: caffine   • Sexual activity: Defer     Other Topics Concern   • Not on file  "    Social History Narrative       Family History   Problem Relation Age of Onset   • Heart disease Mother    • Hyperlipidemia Mother    • Stroke Mother    • Diabetes Mother    • Breast cancer Mother    • Heart disease Father    • Hyperlipidemia Father    • Stroke Father          Review of Systems   Constitution: Positive for malaise/fatigue. Negative for decreased appetite, diaphoresis, fever, weakness, weight gain and weight loss.   HENT: Negative for congestion, hearing loss, nosebleeds and tinnitus.    Eyes: Positive for vision loss in left eye. Negative for blurred vision, double vision, vision loss in right eye and visual disturbance.   Cardiovascular:        As noted in HPI   Respiratory:        As noted HPI   Endocrine: Negative for cold intolerance and heat intolerance.   Hematologic/Lymphatic: Negative for bleeding problem. Does not bruise/bleed easily.   Skin: Negative for color change, flushing, itching and rash.   Musculoskeletal: Positive for joint pain. Negative for arthritis, back pain, joint swelling, muscle weakness and myalgias.   Gastrointestinal: Negative for bloating, abdominal pain, constipation, diarrhea, dysphagia, heartburn, hematemesis, hematochezia, melena, nausea and vomiting.   Genitourinary: Negative for bladder incontinence, dysuria, frequency, nocturia and urgency.   Neurological: Negative for dizziness, focal weakness, headaches, light-headedness, loss of balance, numbness, paresthesias and vertigo.   Psychiatric/Behavioral: Positive for depression. Negative for memory loss and substance abuse.       Procedures      ECG 12 Lead  Date/Time: 10/19/2017 11:23 AM  Performed by: LESLIE HOANG  Authorized by: LESLIE HOANG   Comparison: compared with previous ECG   Similar to previous ECG  Rate: normal  QRS axis: normal                 Objective:    /78 (BP Location: Right arm)  Pulse 59  Ht (!) 5\" (12.7 cm)  Wt 150 lb (68 kg)  BMI 4,218.46 kg/m2       Physical " Exam  Physical Exam   Constitutional: She is oriented to person, place, and time. She appears well-developed and well-nourished. No distress.   HENT:   Head: Normocephalic.   Eyes: Conjunctivae are normal. Pupils are equal, round, and reactive to light. No scleral icterus.   Neck: Normal carotid pulses, no hepatojugular reflux and no JVD present. Carotid bruit is not present. No tracheal deviation, no edema and no erythema present. No thyromegaly present.   Cardiovascular: Normal rate, regular rhythm, S1 normal, S2 normal and intact distal pulses.   No extrasystoles are present. PMI is not displaced.  Exam reveals no gallop, no distant heart sounds and no friction rub.    Murmur heard.   Systolic murmur is present with a grade of 2/6  at the upper right sternal border  Pulses:       Carotid pulses are 2+ on the right side with bruit, and 2+ on the left side with bruit.       Radial pulses are 2+ on the right side, and 2+ on the left side.        Femoral pulses are 2+ on the right side, and 2+ on the left side.       Dorsalis pedis pulses are 2+ on the right side, and 2+ on the left side.        Posterior tibial pulses are 2+ on the right side, and 2+ on the left side.   Pulmonary/Chest: Effort normal and breath sounds normal. No respiratory distress. She has no decreased breath sounds. She has no wheezes. She has no rhonchi. She has no rales. She exhibits no tenderness.   Abdominal: Soft. Bowel sounds are normal. She exhibits no distension and no mass. There is no hepatosplenomegaly. There is no tenderness. There is no rebound and no guarding.   Musculoskeletal: She exhibits no edema, tenderness or deformity.   Neurological: She is alert and oriented to person, place, and time.   Skin: Skin is warm and dry. No rash noted. She is not diaphoretic. No cyanosis or erythema. No pallor. Nails show no clubbing.   Psychiatric: She has a normal mood and affect. Her speech is normal and behavior is normal. Judgment and  thought content normal.           Assessment:   1. 82-year-old female with history of severe coronary artery disease previous angioplasty bare-metal stent placement of the left anterior descending in 1997 normal left ventricular systolic function. Normal perfusion stress test in June 2016.  Coronary Artery Disease  Assessment  • The patient has no angina  • On xarelto    Plan  • Lifestyle modifications discussed include adhering to a heart healthy diet, avoidance of tobacco products, maintenance of a healthy weight, medication compliance, regular exercise and regular monitoring of cholesterol and blood pressure    Subjective - Objective  • There has been a previous stent procedure using BMS      She has some dyspnea which is unchanged but no real angina continue the same.      2. Systolic murmur echocardiogram October 2016 showed mild to moderate aortic insufficiency mild/moderate aortic stenosis, mild to moderate mitral insufficiency and mild to moderate tricuspid insufficiency. We'll continue to follow her clinically.  3. Hypertension blood pressure adequately controlled.  4. Hyperlipidemia on Vytorin and followed in PCP office.  5. Paroxysmal atrial fibrillation.  Atrial Fibrillation and Atrial Flutter  Assessment  • The patient has paroxysmal atrial fibrillation  • This is non-valvular in etiology  • The patient's CHADS2-VASc score is 8  • A [id number] score of 2 or more is considered a high risk for a thromboembolic event  • Rivaroxaban prescribed    Plan  • Attempt to maintain sinus rhythm  • Continue rivaroxaban for antithrombotic therapy, bleeding issues discussed  • Continue amiodarone for rhythm control  She seems to be staying in sinus rhythm.      6. Probable TIA. Most likely secondary to atrial fibrillation as outlined above.  7. Myasthenia gravis now on IgG  8. Heart failure preserved left ventricular ejection fraction most likely secondary to her atrial fibrillation. No recurrence.  9. COPD/sleep  apnea she has a follow-up with pulmonary.   10.  Amaurosis fugax.  With paroxysmal atrial fibrillation as above would be reluctant to discontinue the Xarelto.    11.  Fatigue obviously multiple potential etiologies at this time would not plan any changes          Plan:

## 2018-01-08 RX ORDER — AMIODARONE HYDROCHLORIDE 100 MG/1
TABLET ORAL
Qty: 90 TABLET | Refills: 1 | Status: SHIPPED | OUTPATIENT
Start: 2018-01-08 | End: 2018-07-21 | Stop reason: SDUPTHER

## 2018-07-21 DIAGNOSIS — I48.0 PAROXYSMAL ATRIAL FIBRILLATION (HCC): Primary | ICD-10-CM

## 2018-07-30 RX ORDER — AMIODARONE HYDROCHLORIDE 100 MG/1
TABLET ORAL
Qty: 90 TABLET | Refills: 0 | Status: SHIPPED | OUTPATIENT
Start: 2018-07-30 | End: 2018-08-14 | Stop reason: SDUPTHER

## 2018-07-30 NOTE — TELEPHONE ENCOUNTER
Pt called back stating she is out of med.  Is it ok to order 1 mos supply until pt can get labs and cxr? [facility]

## 2018-07-31 NOTE — TELEPHONE ENCOUNTER
I just received this message today. Ok to refill x1, lov 10/19/17, next ov-10/19/18    Pt is taking Amiodarone 100 mg daily  LFT's-5/22, TSH-1/23 (care everywhere)   Called PCP to verify last CXR- 1/17    Would you like her to have the CXR now, or place the order and have it done prior to her appt in Oct?  Please advise [phone]/amk

## 2018-08-14 ENCOUNTER — HOSPITAL ENCOUNTER (OUTPATIENT)
Dept: GENERAL RADIOLOGY | Facility: HOSPITAL | Age: 83
Discharge: HOME | End: 2018-08-14
Attending: INTERNAL MEDICINE | Admitting: INTERNAL MEDICINE

## 2018-08-14 ENCOUNTER — TELEPHONE (OUTPATIENT)
Dept: CARDIOLOGY | Facility: CLINIC | Age: 83
End: 2018-08-14

## 2018-08-14 PROCEDURE — 71046 X-RAY EXAM CHEST 2 VIEWS: CPT

## 2018-08-14 RX ORDER — AMIODARONE HYDROCHLORIDE 100 MG/1
100 TABLET ORAL DAILY
Qty: 90 TABLET | Refills: 0 | Status: SHIPPED | OUTPATIENT
Start: 2018-08-14 | End: 2018-10-19 | Stop reason: SDUPTHER

## 2018-08-14 NOTE — TELEPHONE ENCOUNTER
Olena Myers  Female, 82 y.o., 1935  PCP:   Manda Keenan MD  Language:   English  Need Interp:   No  Last Weight:   68 kg (150 lb)  Phone:   H: [phone] M: [phone]  Allergies  Hydrocodone  Penicillins  Neomycin  Health Maintenance:   Due  FYI:   None  Primary Ins.:   [employer]  MRN:   [id number]  MyChart:   Code Exp  Pharmacy:   [facility] [address] - [phone] [facility] [phone] FX [[id number]] [facility] [place] [place] - [address] AT [address]AL TO [facility] - [phone] [facility] [phone] FX [[id number]]  Preferred Lab:   None  Next Appt with Me:   10/19/2018  Next Appt Date by Dept:   10/19/2018        PACS Images     Radiology Images   XR Chest 2 View   Order: [id number]   Status:  Final result   Visible to patient:  No (Not Released) Dx:  Paroxysmal atrial fibrillation (CMS/[facility])   Details     Reading Physician Reading Date Result Priority   Kb Alex MD 8/14/2018    Narrative     PA AND LATERAL CHEST X-RAY 08/14/2018     CLINICAL HISTORY: COPD, shortness breath, chronic cough and paroxysmal  atrial fibrillation.     This is correlated to prior chest x-ray 12/20/2016.     FINDINGS: There is total reversed left shoulder arthroplasty prosthesis  in place. The cardiomediastinal silhouette is upper limits of normal.  The pulmonary vasculature is within normal limits. The lungs are clear.  The costophrenic angles are sharp. There are marked compression  fractures seen in the mid thoracic spine.         Impression     No active disease is seen in the chest. There are marked old  compression fractures seen in the mid thoracic spinal and also seen on  prior chest CT 02/07/2017.     This report was finalized on 8/14/2018 2:38 PM by Dr. Kb Alex M.D.            Specimen Collected: 08/14/18 14:33 Last Resulted: 08/14/18 14:38                      Encounter Notes      All notes         Routing History     Priority Sent  On From To Message Type    8/14/2018  2:41 PM Interface, Rad Results Bernard [facility], MD Olvin Results

## 2018-10-19 ENCOUNTER — OFFICE VISIT (OUTPATIENT)
Dept: CARDIOLOGY | Facility: CLINIC | Age: 83
End: 2018-10-19

## 2018-10-19 VITALS
BODY MASS INDEX: 29.84 KG/M2 | SYSTOLIC BLOOD PRESSURE: 134 MMHG | WEIGHT: 152 LBS | DIASTOLIC BLOOD PRESSURE: 74 MMHG | HEIGHT: 60 IN | HEART RATE: 72 BPM

## 2018-10-19 DIAGNOSIS — I10 ESSENTIAL HYPERTENSION: ICD-10-CM

## 2018-10-19 DIAGNOSIS — I65.23 BILATERAL CAROTID ARTERY STENOSIS: ICD-10-CM

## 2018-10-19 DIAGNOSIS — I25.119 CORONARY ARTERY DISEASE INVOLVING NATIVE CORONARY ARTERY OF NATIVE HEART WITH ANGINA PECTORIS (HCC): ICD-10-CM

## 2018-10-19 DIAGNOSIS — I48.0 PAROXYSMAL ATRIAL FIBRILLATION (HCC): Primary | ICD-10-CM

## 2018-10-19 DIAGNOSIS — E78.2 MIXED HYPERLIPIDEMIA: ICD-10-CM

## 2018-10-19 PROCEDURE — 99214 OFFICE O/P EST MOD 30 MIN: CPT | Performed by: INTERNAL MEDICINE

## 2018-10-19 PROCEDURE — 93000 ELECTROCARDIOGRAM COMPLETE: CPT | Performed by: INTERNAL MEDICINE

## 2018-10-19 RX ORDER — METOPROLOL SUCCINATE 25 MG/1
25 TABLET, EXTENDED RELEASE ORAL 2 TIMES DAILY
Qty: 180 TABLET | Refills: 3 | Status: SHIPPED | OUTPATIENT
Start: 2018-10-19

## 2018-10-19 RX ORDER — NITROGLYCERIN 0.4 MG/1
0.4 TABLET SUBLINGUAL
COMMUNITY
Start: 2017-01-03 | End: 2021-01-11 | Stop reason: SDUPTHER

## 2018-10-19 RX ORDER — METOPROLOL SUCCINATE 25 MG/1
TABLET, EXTENDED RELEASE ORAL
COMMUNITY
Start: 2018-01-06 | End: 2018-10-19 | Stop reason: SDUPTHER

## 2018-10-19 RX ORDER — AMIODARONE HYDROCHLORIDE 100 MG/1
100 TABLET ORAL DAILY
Qty: 90 TABLET | Refills: 3 | Status: SHIPPED | OUTPATIENT
Start: 2018-10-19 | End: 2019-11-20 | Stop reason: SDUPTHER

## 2018-10-19 RX ORDER — IRBESARTAN 75 MG/1
75 TABLET ORAL NIGHTLY
COMMUNITY

## 2018-10-19 NOTE — PROGRESS NOTES
Date of Office Visit: 10/19/18  Encounter Provider: Olvin Hernandez MD  Place of Service: [facility] CARDIOLOGY  Patient Name: Olena Myers  :1935  Referral Provider:Olvin Hernandez MD      Chief Complaint   Patient presents with   • Coronary Artery Disease   • Atrial Fibrillation     History of Present Illness  Mrs Myers is a very pleasant 83-year-old female who has a history of coronary artery disease with previous angioplasty bare-metal stent placement of the left anterior descending in  normal left her systolic function.  We saw her 2016 to clear her for shoulder surgery at that time she had a perfusion stress test that was normal.  She had her shoulder surgery in the hospital multiple times the last was with an episode of abrupt loss of vision in her left eye she [facility] had a CTA and MRA that showed no real stenosis and no definite stroke however her neurologist felt that she probably did have a cerebrovascular event.  She then had atrial fibrillation.  Had multiple problems with recurrent palpitations despite medical therapy.  She presented to the hospital  with symptomatic rapid atrial fibrillation we upped her beta blocker placed on IV diltiazem still tachycardic and symptomatic she was then placed on amiodarone  she converted back to sinus rhythm.  Should a little bit of heart failure from all that was short on some diuretics.  She was also seen by pulmonary and found to have severe COPD with her back on some inhalers.  She now comes back in for follow-up.  The patient denies chest pain, pressure and heaviness. No shortness of breath, othopnea or PND. No palpitations, near syncope or syncope. No stroke type symptoms like paralysis, paresthesia, abrupt vision loss and dysarthria. No bleeding like blood in the stool or dark stools.  She did recently have a port placed to receive her IgG.  She's been having a lot of  problems with her right shoulder and may need surgery for that unfortunately 2 of her daughter-in-law's were just diagnosed with breast cancer.  Overall from a heart standpoint she does feel like she's doing well.      Coronary Artery Disease   Pertinent negatives include no dizziness, muscle weakness or weight gain. There is no history of past myocardial infarction.   Atrial Fibrillation   Symptoms are negative for dizziness and weakness. Past medical history includes atrial fibrillation and CAD.         Past Medical History:   Diagnosis Date   • Anemia     IRON DEFICIENCY   • Arthritis    • Asthma    • Atrial fibrillation (CMS/HCC)    • CAD (coronary artery disease)     HEART STENT   • 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   • Gastric ulcer    • GIB (gastrointestinal bleeding)    • Hyperlipidemia    • Hypertension    • Lightheadedness    • LVH (left ventricular hypertrophy)    • Myasthenia (CMS/HCC)    • Osteoporosis    • SOB (shortness of breath)     WALKING         Past Surgical History:   Procedure Laterality Date   • APPENDECTOMY     • CARPAL TUNNEL RELEASE Bilateral    • CATARACT EXTRACTION Bilateral    • CORONARY ANGIOPLASTY     • CORONARY STENT PLACEMENT     • HIP BIPOLAR REPLACEMENT Bilateral    • JOINT REPLACEMENT Bilateral 2010, 2015    HIPS   • TOTAL SHOULDER ARTHROPLASTY W/ DISTAL CLAVICLE EXCISION Left 7/19/2016    Procedure: LT TOTAL SHOULDER REVERSE ARTHROPLASTY;  Surgeon: NAHOMI Solorzano MD;  Location: [facility];  Service:          Current Outpatient Prescriptions on File Prior to Visit   Medication Sig Dispense Refill   • amiodarone (PACERONE) 100 MG tablet Take 1 tablet by mouth Daily. 90 tablet 0   • aspirin 81 MG EC tablet Take 81 mg by mouth Daily.     • diphenoxylate-atropine (LOMOTIL) 2.5-0.025 MG per tablet Take 1 tablet by mouth 4 (Four) Times a Day As Needed for Diarrhea.     • DULoxetine (CYMBALTA) 60 MG capsule Take 60 mg by mouth 2 (two) times a day.     • Fluticasone  Furoate-Vilanterol (BREO ELLIPTA) 100-25 MCG/INH aerosol powder  Inhale 1 puff Daily.     • levothyroxine (SYNTHROID, LEVOTHROID) 75 MCG tablet Take 75 mcg by mouth Daily.     • mometasone (NASONEX) 50 MCG/ACT nasal spray 2 sprays into each nostril Daily.     • montelukast (SINGULAIR) 10 MG tablet Take 10 mg by mouth Every Night.     • mycophenolate (CELLCEPT) 500 MG tablet Take 500 mg by mouth Daily. 2 tabs bid      • raloxifene (EVISTA) 60 MG tablet Take 60 mg by mouth daily.     • rosuvastatin (CRESTOR) 40 MG tablet Take 40 mg by mouth. Take 1/2 tablet daily     • vitamin D (ERGOCALCIFEROL) 98475 UNITS capsule capsule Take 50,000 Units by mouth Every 7 (Seven) Days. Takes on Wednesdays     • albuterol (PROAIR HFA) 108 (90 BASE) MCG/ACT inhaler Inhale 2 puffs Every 6 (Six) Hours As Needed for wheezing or shortness of air.     • pyridostigmine (MESTINON) 180 MG CR tablet Take 180 mg by mouth Daily.     • pyridostigmine (MESTINON) 60 MG tablet Take 60 mg by mouth 3 (Three) Times a Day.     • [DISCONTINUED] cefdinir (OMNICEF) 300 MG capsule Take 300 mg by mouth Daily.     • [DISCONTINUED] rivaroxaban (XARELTO) 15 MG tablet Take 1 tablet by mouth Daily With Dinner. 90 tablet 1   • [DISCONTINUED] valsartan (DIOVAN) 80 MG tablet Take 80 mg by mouth Daily.       No current facility-administered medications on file prior to visit.          Social History     Social History   • Marital status:      Spouse name: N/A   • Number of children: N/A   • Years of education: N/A     Occupational History   • Not on file.     Social History Main Topics   • Smoking status: Never Smoker   • Smokeless tobacco: Never Used   • Alcohol use No   • Drug use: No      Comment: caffine   • Sexual activity: Defer     Other Topics Concern   • Not on file     Social History Narrative   • No narrative on file       Family History   Problem Relation Age of Onset   • Heart disease Mother    • Hyperlipidemia Mother    • Stroke Mother    •  "Diabetes Mother    • Breast cancer Mother    • Heart disease Father    • Hyperlipidemia Father    • Stroke Father          Review of Systems   Constitution: Positive for malaise/fatigue. Negative for decreased appetite, diaphoresis, fever, weakness, weight gain and weight loss.   HENT: Negative for congestion, hearing loss, nosebleeds and tinnitus.    Eyes: Negative for blurred vision, double vision, vision loss in left eye, vision loss in right eye and visual disturbance.   Cardiovascular:        As noted in HPI   Respiratory:        As noted HPI   Endocrine: Negative for cold intolerance and heat intolerance.   Hematologic/Lymphatic: Negative for bleeding problem. Does not bruise/bleed easily.   Skin: Negative for color change, flushing, itching and rash.   Musculoskeletal: Positive for joint pain. Negative for arthritis, back pain, joint swelling, muscle weakness and myalgias.   Gastrointestinal: Negative for bloating, abdominal pain, constipation, diarrhea, dysphagia, heartburn, hematemesis, hematochezia, melena, nausea and vomiting.   Genitourinary: Negative for bladder incontinence, dysuria, frequency, nocturia and urgency.   Neurological: Negative for dizziness, focal weakness, headaches, light-headedness, loss of balance, numbness, paresthesias and vertigo.   Psychiatric/Behavioral: Positive for depression. Negative for memory loss and substance abuse. The patient is nervous/anxious.        Procedures      ECG 12 Lead  Date/Time: 10/19/2018 10:44 AM  Performed by: LESLIE HOANG  Authorized by: LESLIE HOANG   Comparison: compared with previous ECG   Similar to previous ECG  Rhythm: sinus rhythm  Rate: normal  Conduction: 1st degree  QRS axis: normal                  Objective:    Ht 152.4 cm (60\")   Wt 68.9 kg (152 lb)   BMI 29.69 kg/m²        Physical Exam  Physical Exam   Constitutional: She is oriented to person, place, and time. She appears well-developed and well-nourished. No distress. "   HENT:   Head: Normocephalic.   Eyes: Pupils are equal, round, and reactive to light. Conjunctivae are normal. No scleral icterus.   Neck: Normal carotid pulses, no hepatojugular reflux and no JVD present. Carotid bruit is not present. No tracheal deviation, no edema and no erythema present. No thyromegaly present.   Cardiovascular: Normal rate, regular rhythm, S1 normal, S2 normal and intact distal pulses.   No extrasystoles are present. PMI is not displaced.  Exam reveals no gallop, no distant heart sounds and no friction rub.    Murmur heard.   Systolic murmur is present with a grade of 2/6   Pulses:       Carotid pulses are 2+ on the right side with bruit, and 2+ on the left side with bruit.       Radial pulses are 2+ on the right side, and 2+ on the left side.        Femoral pulses are 2+ on the right side, and 2+ on the left side.       Dorsalis pedis pulses are 2+ on the right side, and 2+ on the left side.        Posterior tibial pulses are 2+ on the right side, and 2+ on the left side.   Pulmonary/Chest: Effort normal and breath sounds normal. No respiratory distress. She has no decreased breath sounds. She has no wheezes. She has no rhonchi. She has no rales. She exhibits no tenderness.   Port in left upper chest.   Abdominal: Soft. Bowel sounds are normal. She exhibits no distension and no mass. There is no hepatosplenomegaly. There is no tenderness. There is no rebound and no guarding.   Musculoskeletal: She exhibits no edema, tenderness or deformity.   Neurological: She is alert and oriented to person, place, and time.   Skin: Skin is warm and dry. No rash noted. She is not diaphoretic. No cyanosis or erythema. No pallor. Nails show no clubbing.   Psychiatric: She has a normal mood and affect. Her speech is normal and behavior is normal. Judgment and thought content normal.           Assessment:   1. 82-year-old female with history of severe coronary artery disease previous angioplasty bare-metal stent  placement of the left anterior descending in 1997 normal left ventricular systolic function. Normal perfusion stress test in June 2016.  Coronary Artery Disease  Assessment  • The patient has no angina  • On xarelto    Plan  • Lifestyle modifications discussed include adhering to a heart healthy diet, avoidance of tobacco products, maintenance of a healthy weight, medication compliance, regular exercise and regular monitoring of cholesterol and blood pressure    Subjective - Objective  • There has been a previous stent procedure using BMS      No angina or heart failure she will see us again in follow-up in a year and call back if problems.      2. Systolic murmur echocardiogram October 2016 showed mild to moderate aortic insufficiency mild/moderate aortic stenosis, mild to moderate mitral insufficiency and mild to moderate tricuspid insufficiency. We'll continue to follow her clinically.  If she does need right shoulder surgery would obtain repeat echo cardiac and prior.  3. Hypertension blood pressure adequately controlled.  4. Hyperlipidemia on Vytorin and followed in PCP office.  5. Paroxysmal atrial fibrillation.  Atrial Fibrillation and Atrial Flutter  Assessment  • The patient has paroxysmal atrial fibrillation  • This is non-valvular in etiology  • The patient's CHADS2-VASc score is 8  • A [id number] score of 2 or more is considered a high risk for a thromboembolic event  • Rivaroxaban prescribed    Plan  • Attempt to maintain sinus rhythm  • Continue rivaroxaban for antithrombotic therapy, bleeding issues discussed  • Continue amiodarone for rhythm control  Seems to be remaining in sinus rhythm chest x-ray in August was unremarkable TSH February of this year unremarkable continue the same.      6. Probable TIA. Most likely secondary to atrial fibrillation as outlined above.  7. Myasthenia gravis now on IgG with new port placement.  8. Heart failure preserved left ventricular ejection fraction most likely  secondary to her atrial fibrillation. No recurrence.  9. COPD/sleep apnea she has a follow-up with pulmonary.   10.  Amaurosis fugax.  With paroxysmal atrial fibrillation as above would be reluctant to discontinue the Xarelto.    11.  Carotid stenosis.  Carotid ultrasound in 2016 showed less than 50% bilateral.  Will consider follow-up carotid ultrasound when she returns.  12. Right shoulder with probable need for surgery.  She is Tom's revised moderate risk no cardiac symptoms ECG unchanged had a normal perfusion stress test done 2 years ago if needed would check an echocardiogram prior to her surgery but no other testing or treatment needed.    12/7/18: Echocardiogram showed normal left ventricular systolic function.  Mild to moderate aortic insufficiency, no significant aortic stenosis.  No further cardiovascular evaluation or treatment needed prior to surgery.       Plan:

## 2018-11-20 ENCOUNTER — TELEPHONE (OUTPATIENT)
Dept: CARDIOLOGY | Facility: CLINIC | Age: 83
End: 2018-11-20

## 2018-11-20 DIAGNOSIS — I35.0 NONRHEUMATIC AORTIC VALVE STENOSIS: Primary | ICD-10-CM

## 2018-11-20 NOTE — TELEPHONE ENCOUNTER
Patient notified Dr. Hernandez would like an echo prior to clearing her for surgery.  I informed patient someone from our scheduling department will be contacting her. She verbalized understanding.      Dorothy, can you please call patient and schedule her for an echo?  Dr. Hernandez has put the order in.  Thank you/ ASHOK

## 2018-11-20 NOTE — TELEPHONE ENCOUNTER
Patient called to ask if she will need to have an echo done prior to having shoulder surgery on 1/8/19.  In your note on 10/19/18 you state if needed would check an echo prior to surgery.  Please advise.     Patient's phone number is [phone]/ ASHOK

## 2018-12-06 ENCOUNTER — HOSPITAL ENCOUNTER (OUTPATIENT)
Dept: CARDIOLOGY | Facility: HOSPITAL | Age: 83
Discharge: HOME | End: 2018-12-06
Attending: INTERNAL MEDICINE | Admitting: INTERNAL MEDICINE

## 2018-12-06 VITALS
SYSTOLIC BLOOD PRESSURE: 150 MMHG | OXYGEN SATURATION: 98 % | DIASTOLIC BLOOD PRESSURE: 62 MMHG | HEART RATE: 75 BPM | BODY MASS INDEX: 29.84 KG/M2 | WEIGHT: 152 LBS | HEIGHT: 60 IN

## 2018-12-06 LAB
AORTIC ARCH: 2.7 CM
AORTIC DIMENSIONLESS INDEX: 0.5 (DI)
ASCENDING AORTA: 2.7 CM
BH CV ECHO MEAS - ACS: 1.3 CM
BH CV ECHO MEAS - AI DEC SLOPE: 242 CM/SEC^2
BH CV ECHO MEAS - AI MAX PG: 42.5 MMHG
BH CV ECHO MEAS - AI MAX VEL: 326 CM/SEC
BH CV ECHO MEAS - AI P1/2T: 394.6 MSEC
BH CV ECHO MEAS - AO MAX PG (FULL): 14.5 MMHG
BH CV ECHO MEAS - AO MAX PG: 19.9 MMHG
BH CV ECHO MEAS - AO MEAN PG (FULL): 6 MMHG
BH CV ECHO MEAS - AO MEAN PG: 10 MMHG
BH CV ECHO MEAS - AO ROOT AREA (BSA CORRECTED): 2.1
BH CV ECHO MEAS - AO ROOT AREA: 9.6 CM^2
BH CV ECHO MEAS - AO ROOT DIAM: 3.5 CM
BH CV ECHO MEAS - AO V2 MAX: 223 CM/SEC
BH CV ECHO MEAS - AO V2 MEAN: 145.5 CM/SEC
BH CV ECHO MEAS - AO V2 VTI: 44.2 CM
BH CV ECHO MEAS - AVA(I,A): 2.1 CM^2
BH CV ECHO MEAS - AVA(I,D): 2.1 CM^2
BH CV ECHO MEAS - AVA(V,A): 2 CM^2
BH CV ECHO MEAS - AVA(V,D): 2 CM^2
BH CV ECHO MEAS - BSA(HAYCOCK): 1.7 M^2
BH CV ECHO MEAS - BSA: 1.7 M^2
BH CV ECHO MEAS - BZI_BMI: 29.7 KILOGRAMS/M^2
BH CV ECHO MEAS - BZI_METRIC_HEIGHT: 152.4 CM
BH CV ECHO MEAS - BZI_METRIC_WEIGHT: 68.9 KG
BH CV ECHO MEAS - EDV(CUBED): 236 ML
BH CV ECHO MEAS - EDV(MOD-SP2): 66 ML
BH CV ECHO MEAS - EDV(MOD-SP4): 64 ML
BH CV ECHO MEAS - EDV(TEICH): 192.6 ML
BH CV ECHO MEAS - EF(CUBED): 74.1 %
BH CV ECHO MEAS - EF(MOD-BP): 68 %
BH CV ECHO MEAS - EF(MOD-SP2): 63.6 %
BH CV ECHO MEAS - EF(MOD-SP4): 67.2 %
BH CV ECHO MEAS - EF(TEICH): 64.9 %
BH CV ECHO MEAS - ESV(CUBED): 61.2 ML
BH CV ECHO MEAS - ESV(MOD-SP2): 24 ML
BH CV ECHO MEAS - ESV(MOD-SP4): 21 ML
BH CV ECHO MEAS - ESV(TEICH): 67.5 ML
BH CV ECHO MEAS - FS: 36.2 %
BH CV ECHO MEAS - IVS/LVPW: 1
BH CV ECHO MEAS - IVSD: 0.99 CM
BH CV ECHO MEAS - LAT PEAK E' VEL: 8 CM/SEC
BH CV ECHO MEAS - LV DIASTOLIC VOL/BSA (35-75): 38.5 ML/M^2
BH CV ECHO MEAS - LV MASS(C)D: 255 GRAMS
BH CV ECHO MEAS - LV MASS(C)DI: 153.5 GRAMS/M^2
BH CV ECHO MEAS - LV MAX PG: 5.4 MMHG
BH CV ECHO MEAS - LV MEAN PG: 4 MMHG
BH CV ECHO MEAS - LV SYSTOLIC VOL/BSA (12-30): 12.6 ML/M^2
BH CV ECHO MEAS - LV V1 MAX: 116 CM/SEC
BH CV ECHO MEAS - LV V1 MEAN: 90.6 CM/SEC
BH CV ECHO MEAS - LV V1 VTI: 24.5 CM
BH CV ECHO MEAS - LVIDD: 6.2 CM
BH CV ECHO MEAS - LVIDS: 3.9 CM
BH CV ECHO MEAS - LVLD AP2: 7.2 CM
BH CV ECHO MEAS - LVLD AP4: 6.5 CM
BH CV ECHO MEAS - LVLS AP2: 5.1 CM
BH CV ECHO MEAS - LVLS AP4: 5 CM
BH CV ECHO MEAS - LVOT AREA (M): 3.8 CM^2
BH CV ECHO MEAS - LVOT AREA: 3.8 CM^2
BH CV ECHO MEAS - LVOT DIAM: 2.2 CM
BH CV ECHO MEAS - LVPWD: 0.99 CM
BH CV ECHO MEAS - MED PEAK E' VEL: 6 CM/SEC
BH CV ECHO MEAS - MV A DUR: 0.11 SEC
BH CV ECHO MEAS - MV A MAX VEL: 104 CM/SEC
BH CV ECHO MEAS - MV DEC SLOPE: 368 CM/SEC^2
BH CV ECHO MEAS - MV DEC TIME: 0.27 SEC
BH CV ECHO MEAS - MV E MAX VEL: 104 CM/SEC
BH CV ECHO MEAS - MV E/A: 1
BH CV ECHO MEAS - MV MAX PG: 5.5 MMHG
BH CV ECHO MEAS - MV MEAN PG: 3 MMHG
BH CV ECHO MEAS - MV P1/2T MAX VEL: 119 CM/SEC
BH CV ECHO MEAS - MV P1/2T: 94.7 MSEC
BH CV ECHO MEAS - MV V2 MAX: 117 CM/SEC
BH CV ECHO MEAS - MV V2 MEAN: 77.5 CM/SEC
BH CV ECHO MEAS - MV V2 VTI: 35.4 CM
BH CV ECHO MEAS - MVA P1/2T LCG: 1.8 CM^2
BH CV ECHO MEAS - MVA(P1/2T): 2.3 CM^2
BH CV ECHO MEAS - MVA(VTI): 2.6 CM^2
BH CV ECHO MEAS - PA MAX PG (FULL): 2.7 MMHG
BH CV ECHO MEAS - PA MAX PG: 7.2 MMHG
BH CV ECHO MEAS - PA V2 MAX: 134 CM/SEC
BH CV ECHO MEAS - PULM A REVS DUR: 0.09 SEC
BH CV ECHO MEAS - PULM A REVS VEL: 26.3 CM/SEC
BH CV ECHO MEAS - PULM DIAS VEL: 51.5 CM/SEC
BH CV ECHO MEAS - PULM S/D: 1.3
BH CV ECHO MEAS - PULM SYS VEL: 64.7 CM/SEC
BH CV ECHO MEAS - PVA(V,A): 2.7 CM^2
BH CV ECHO MEAS - PVA(V,D): 2.7 CM^2
BH CV ECHO MEAS - QP/QS: 0.83
BH CV ECHO MEAS - RAP SYSTOLE: 3 MMHG
BH CV ECHO MEAS - RV MAX PG: 4.5 MMHG
BH CV ECHO MEAS - RV MEAN PG: 2 MMHG
BH CV ECHO MEAS - RV V1 MAX: 106 CM/SEC
BH CV ECHO MEAS - RV V1 MEAN: 75.1 CM/SEC
BH CV ECHO MEAS - RV V1 VTI: 22.2 CM
BH CV ECHO MEAS - RVOT AREA: 3.5 CM^2
BH CV ECHO MEAS - RVOT DIAM: 2.1 CM
BH CV ECHO MEAS - RVSP: 25 MMHG
BH CV ECHO MEAS - SI(AO): 256 ML/M^2
BH CV ECHO MEAS - SI(CUBED): 105.3 ML/M^2
BH CV ECHO MEAS - SI(LVOT): 56.1 ML/M^2
BH CV ECHO MEAS - SI(MOD-SP2): 25.3 ML/M^2
BH CV ECHO MEAS - SI(MOD-SP4): 25.9 ML/M^2
BH CV ECHO MEAS - SI(TEICH): 75.3 ML/M^2
BH CV ECHO MEAS - SUP REN AO DIAM: 1.7 CM
BH CV ECHO MEAS - SV(AO): 425.3 ML
BH CV ECHO MEAS - SV(CUBED): 174.9 ML
BH CV ECHO MEAS - SV(LVOT): 93.1 ML
BH CV ECHO MEAS - SV(MOD-SP2): 42 ML
BH CV ECHO MEAS - SV(MOD-SP4): 43 ML
BH CV ECHO MEAS - SV(RVOT): 76.9 ML
BH CV ECHO MEAS - SV(TEICH): 125 ML
BH CV ECHO MEAS - TAPSE (>1.6): 1.7 CM2
BH CV ECHO MEAS - TR MAX VEL: 235 CM/SEC
BH CV ECHO MEASUREMENTS AVERAGE E/E' RATIO: 14.86
BH CV VAS BP RIGHT ARM: NORMAL MMHG
BH CV XLRA - RV BASE: 2.5 CM
BH CV XLRA - TDI S': 6 CM/SEC
LEFT ATRIUM VOLUME INDEX: 39 ML/M2
SINUS: 3.1 CM
STJ: 3.1 CM

## 2018-12-06 PROCEDURE — 93306 TTE W/DOPPLER COMPLETE: CPT

## 2018-12-06 PROCEDURE — 93306 TTE W/DOPPLER COMPLETE: CPT | Performed by: INTERNAL MEDICINE

## 2018-12-07 ENCOUNTER — TELEPHONE (OUTPATIENT)
Dept: CARDIOLOGY | Facility: CLINIC | Age: 83
End: 2018-12-07

## 2018-12-26 ENCOUNTER — APPOINTMENT (OUTPATIENT)
Dept: PREADMISSION TESTING | Facility: HOSPITAL | Age: 83
End: 2018-12-26

## 2018-12-26 ENCOUNTER — HOSPITAL ENCOUNTER (OUTPATIENT)
Dept: GENERAL RADIOLOGY | Facility: HOSPITAL | Age: 83
Discharge: HOME | End: 2018-12-26
Admitting: ORTHOPAEDIC SURGERY

## 2018-12-26 ENCOUNTER — HOSPITAL ENCOUNTER (OUTPATIENT)
Dept: GENERAL RADIOLOGY | Facility: HOSPITAL | Age: 83
Discharge: HOME | End: 2018-12-26

## 2018-12-26 VITALS
TEMPERATURE: 97.5 F | DIASTOLIC BLOOD PRESSURE: 67 MMHG | SYSTOLIC BLOOD PRESSURE: 106 MMHG | BODY MASS INDEX: 29.12 KG/M2 | RESPIRATION RATE: 16 BRPM | HEIGHT: 60 IN | OXYGEN SATURATION: 99 % | WEIGHT: 148.3 LBS | HEART RATE: 66 BPM

## 2018-12-26 LAB
ALBUMIN SERPL-MCNC: 3.7 G/DL (ref 3.5–5.2)
ALBUMIN/GLOB SERPL: 0.9 G/DL
ALP SERPL-CCNC: 71 U/L (ref 39–117)
ALT SERPL W P-5'-P-CCNC: 8 U/L (ref 1–33)
ANION GAP SERPL CALCULATED.3IONS-SCNC: 11.9 MMOL/L
AST SERPL-CCNC: 18 U/L (ref 1–32)
BILIRUB SERPL-MCNC: 0.2 MG/DL (ref 0.1–1.2)
BILIRUB UR QL STRIP: NEGATIVE
BUN BLD-MCNC: 24 MG/DL (ref 8–23)
BUN/CREAT SERPL: 21.8 (ref 7–25)
CALCIUM SPEC-SCNC: 9.8 MG/DL (ref 8.6–10.5)
CHLORIDE SERPL-SCNC: 106 MMOL/L (ref 98–107)
CLARITY UR: CLEAR
CO2 SERPL-SCNC: 22.1 MMOL/L (ref 22–29)
COLOR UR: YELLOW
CREAT BLD-MCNC: 1.1 MG/DL (ref 0.57–1)
DEPRECATED RDW RBC AUTO: 53.4 FL (ref 37–54)
ERYTHROCYTE [DISTWIDTH] IN BLOOD BY AUTOMATED COUNT: 15.4 % (ref 11.7–13)
GFR SERPL CREATININE-BSD FRML MDRD: 48 ML/MIN/1.73
GLOBULIN UR ELPH-MCNC: 4 GM/DL
GLUCOSE BLD-MCNC: 101 MG/DL (ref 65–99)
GLUCOSE UR STRIP-MCNC: NEGATIVE MG/DL
HCT VFR BLD AUTO: 29.8 % (ref 35.6–45.5)
HGB BLD-MCNC: 9.3 G/DL (ref 11.9–15.5)
HGB UR QL STRIP.AUTO: NEGATIVE
KETONES UR QL STRIP: NEGATIVE
LEUKOCYTE ESTERASE UR QL STRIP.AUTO: NEGATIVE
MCH RBC QN AUTO: 29.4 PG (ref 26.9–32)
MCHC RBC AUTO-ENTMCNC: 31.2 G/DL (ref 32.4–36.3)
MCV RBC AUTO: 94.3 FL (ref 80.5–98.2)
NITRITE UR QL STRIP: NEGATIVE
PH UR STRIP.AUTO: <=5 [PH] (ref 5–8)
PLATELET # BLD AUTO: 327 10*3/MM3 (ref 140–500)
PMV BLD AUTO: 8.9 FL (ref 6–12)
POTASSIUM BLD-SCNC: 4.5 MMOL/L (ref 3.5–5.2)
PROT SERPL-MCNC: 7.7 G/DL (ref 6–8.5)
PROT UR QL STRIP: NEGATIVE
RBC # BLD AUTO: 3.16 10*6/MM3 (ref 3.9–5.2)
SODIUM BLD-SCNC: 140 MMOL/L (ref 136–145)
SP GR UR STRIP: 1.03 (ref 1–1.03)
UROBILINOGEN UR QL STRIP: NORMAL
WBC NRBC COR # BLD: 8.97 10*3/MM3 (ref 4.5–10.7)

## 2018-12-26 PROCEDURE — 80053 COMPREHEN METABOLIC PANEL: CPT | Performed by: ORTHOPAEDIC SURGERY

## 2018-12-26 PROCEDURE — 81003 URINALYSIS AUTO W/O SCOPE: CPT | Performed by: ORTHOPAEDIC SURGERY

## 2018-12-26 PROCEDURE — 36415 COLL VENOUS BLD VENIPUNCTURE: CPT

## 2018-12-26 PROCEDURE — 71046 X-RAY EXAM CHEST 2 VIEWS: CPT

## 2018-12-26 PROCEDURE — 73030 X-RAY EXAM OF SHOULDER: CPT

## 2018-12-26 PROCEDURE — 85027 COMPLETE CBC AUTOMATED: CPT | Performed by: ORTHOPAEDIC SURGERY

## 2018-12-26 RX ORDER — LEVOTHYROXINE SODIUM 0.07 MG/1
75 TABLET ORAL DAILY
Status: ON HOLD | COMMUNITY
End: 2021-07-05

## 2018-12-26 NOTE — DISCHARGE INSTRUCTIONS
Take the following medications the morning of surgery with a small sip of water:  BREO, AMIODARONE, METOPROLOL        General Instructions:  • Do not eat solid food after midnight the night before surgery.  • You may drink clear liquids day of surgery but must stop at least one hour before your hospital arrival time.   • It is beneficial for you to have a clear drink that contains carbohydrates the day of surgery.  We suggest a 12 to 20 ounce bottle of Gatorade or Powerade for non-diabetic patients or a 12 to 20 ounce bottle of G2 or Powerade Zero for diabetic patients. (Pediatric patients, are not advised to drink a 12 to 20 ounce carbohydrate drink)    Clear liquids are liquids you can see through.  Nothing red in color.     Plain water                               Sports drinks  Sodas                                   Gelatin (Jell-O)  Fruit juices without pulp such as white grape juice and apple juice  Popsicles that contain no fruit or yogurt  Tea or coffee (no cream or milk added)  Gatorade / Powerade  G2 / Powerade Zero    • Infants may have breast milk up to four hours before surgery.  • Infants drinking formula may drink formula up to six hours before surgery.   • Patients who avoid smoking, chewing tobacco and alcohol for 4 weeks prior to surgery have a reduced risk of post-operative complications.  Quit smoking as many days before surgery as you can.  • Do not smoke, use chewing tobacco or drink alcohol the day of surgery.   • If applicable bring your C-PAP/ BI-PAP machine.  • Bring any papers given to you in the doctor’s office.  • Wear clean comfortable clothes and socks.  • Do not wear contact lenses or make-up.  Bring a case for your glasses.   • Bring crutches or walker if applicable.  • Remove all piercings.  Leave jewelry and any other valuables at home.  • Hair extensions with metal clips must be removed prior to surgery.  • The Pre-Admission Testing nurse will instruct you to bring medications  if unable to obtain an accurate list in Pre-Admission Testing.            Preventing a Surgical Site Infection:  • For 2 to 3 days before surgery, avoid shaving with a razor because the razor can irritate skin and make it easier to develop an infection.    • Any areas of open skin can increase the risk of a post-operative wound infection by allowing bacteria to enter and travel throughout the body.  Notify your surgeon if you have any skin wounds / rashes even if it is not near the expected surgical site.  The area will need assessed to determine if surgery should be delayed until it is healed.  • The night prior to surgery sleep in a clean bed with clean clothing.  Do not allow pets to sleep with you.  • Shower on the morning of surgery using a fresh bar of anti-bacterial soap (such as Dial) and clean washcloth.  Dry with a clean towel and dress in clean clothing.  • Ask your surgeon if you will be receiving antibiotics prior to surgery.  • Make sure you, your family, and all healthcare providers clean their hands with soap and water or an alcohol based hand  before caring for you or your wound.    Day of surgery:  Upon arrival, a Pre-op nurse and Anesthesiologist will review your health history, obtain vital signs, and answer questions you may have.  The only belongings needed at this time will be your home medications and if applicable your C-PAP/BI-PAP machine.  If you are staying overnight your family can leave the rest of your belongings in the car and bring them to your room later.  A Pre-op nurse will start an IV and you may receive medication in preparation for surgery, including something to help you relax.  Your family will be able to see you in the Pre-op area.  While you are in surgery your family should notify the waiting room  if they leave the waiting room area and provide a contact phone number.    Please be aware that surgery does come with discomfort.  We want to make every  effort to control your discomfort so please discuss any uncontrolled symptoms with your nurse.   Your doctor will most likely have prescribed pain medications.      If you are going home after surgery you will receive individualized written care instructions before being discharged.  A responsible adult must drive you to and from the hospital on the day of your surgery and stay with you for 24 hours.    If you are staying overnight following surgery, you will be transported to your hospital room following the recovery period.  [facility] has all private rooms.    You have received a list of surgical assistants for your reference.  If you have any questions please call Pre-Admission Testing at [phone].  Deductibles and co-payments are collected on the day of service. Please be prepared to pay the required co-pay, deductible or deposit on the day of service as defined by your plan.    2% CHLORAHEXIDINE GLUCONATE* CLOTH  Preparing or “prepping” skin before surgery can reduce the risk of infection at the surgical site. To make the process easier, [facility] has chosen disposable cloths moistened with a rinse-free, 2% Chlorhexidine Gluconate (CHG) antiseptic solution. The steps below outline the prepping process and should be carefully followed.        Use the prep cloth on the area that is circled in the diagram             Directions Night before Surgery  1) Shower using a fresh bar of anti-bacterial soap (such as Dial) and clean washcloth.  Use a clean towel to completely dry your skin.  2) Do not use any lotions, oils or creams on your skin.  3) Open the package and remove 1 cloth, wipe your skin for 30 seconds in a circular motion.  Allow to dry for 3 minutes.  4) Repeat #3 with second cloth.  5) Do not touch your eyes, ears, or mouth with the prep cloth.  6) Allow the wet prep solution to air dry.  7) Discard the prep cloth and wash your hands with soap and water.   8) Dress in clean  bed clothes and sleep on fresh clean bed sheets.   9) You may experience some temporary itching after the prep.    Directions Day of Surgery  1) Repeat steps 1,2,3,4,5,6,7, and 9.   2) Dress in clean clothes before coming to the hospital.    BACTROBAN NASAL OINTMENT  There are many germs normally in your nose. Bactroban is an ointment that will help reduce these germs. Please follow these instructions for Bactroban use:      __1ST__The day before surgery in the morning  Date___1/7_____    _2ND___The day before surgery in the evening              Date____1/7____    _3RD___The day of surgery in the morning    Date___1/8_____    **Squirt ½ package of Bactroban Ointment onto a cotton applicator and apply to inside of 1st nostril.  Squirt the remaining Bactroban and apply to the inside of the other nostril.

## 2019-01-07 ENCOUNTER — ANESTHESIA EVENT (OUTPATIENT)
Dept: PERIOP | Facility: HOSPITAL | Age: 84
End: 2019-01-07

## 2019-01-08 ENCOUNTER — ANESTHESIA (OUTPATIENT)
Dept: PERIOP | Facility: HOSPITAL | Age: 84
End: 2019-01-08

## 2019-01-08 ENCOUNTER — HOSPITAL ENCOUNTER (INPATIENT)
Facility: HOSPITAL | Age: 84
LOS: 1 days | Discharge: HOME | End: 2019-01-09
Attending: ORTHOPAEDIC SURGERY | Admitting: ORTHOPAEDIC SURGERY

## 2019-01-08 ENCOUNTER — APPOINTMENT (OUTPATIENT)
Dept: GENERAL RADIOLOGY | Facility: HOSPITAL | Age: 84
End: 2019-01-08

## 2019-01-08 ENCOUNTER — DOCUMENTATION (OUTPATIENT)
Dept: OTHER | Facility: HOSPITAL | Age: 84
End: 2019-01-08

## 2019-01-08 PROBLEM — Z96.611 S/P REVERSE TOTAL SHOULDER ARTHROPLASTY, RIGHT: Status: ACTIVE | Noted: 2019-01-08

## 2019-01-08 PROCEDURE — 63710000001 MYCOPHENOLATE MOFETIL PER 250 MG: Performed by: ORTHOPAEDIC SURGERY

## 2019-01-08 PROCEDURE — 25010000002 PHENYLEPHRINE PER 1 ML: Performed by: NURSE ANESTHETIST, CERTIFIED REGISTERED

## 2019-01-08 PROCEDURE — 0RRJ00Z REPLACEMENT OF RIGHT SHOULDER JOINT WITH REVERSE BALL AND SOCKET SYNTHETIC SUBSTITUTE, OPEN APPROACH: ICD-10-PCS | Performed by: ORTHOPAEDIC SURGERY

## 2019-01-08 PROCEDURE — C9290 INJ, BUPIVACAINE LIPOSOME: HCPCS | Performed by: ORTHOPAEDIC SURGERY

## 2019-01-08 PROCEDURE — 73030 X-RAY EXAM OF SHOULDER: CPT

## 2019-01-08 PROCEDURE — 25010000002 MIDAZOLAM PER 1 MG: Performed by: ANESTHESIOLOGY

## 2019-01-08 PROCEDURE — 25010000003 BUPIVACAINE LIPOSOME 1.3 % SUSPENSION: Performed by: ANESTHESIOLOGY

## 2019-01-08 PROCEDURE — 25010000002 DEXAMETHASONE PER 1 MG: Performed by: NURSE ANESTHETIST, CERTIFIED REGISTERED

## 2019-01-08 PROCEDURE — C1713 ANCHOR/SCREW BN/BN,TIS/BN: HCPCS | Performed by: ORTHOPAEDIC SURGERY

## 2019-01-08 PROCEDURE — C9290 INJ, BUPIVACAINE LIPOSOME: HCPCS | Performed by: ANESTHESIOLOGY

## 2019-01-08 PROCEDURE — 25010000002 ONDANSETRON PER 1 MG: Performed by: NURSE ANESTHETIST, CERTIFIED REGISTERED

## 2019-01-08 PROCEDURE — 25010000002 FENTANYL CITRATE (PF) 100 MCG/2ML SOLUTION: Performed by: ANESTHESIOLOGY

## 2019-01-08 PROCEDURE — 25010000002 VANCOMYCIN PER 500 MG: Performed by: ORTHOPAEDIC SURGERY

## 2019-01-08 PROCEDURE — 25010000003 BUPIVACAINE LIPOSOME 1.3 % SUSPENSION 10 ML VIAL: Performed by: ORTHOPAEDIC SURGERY

## 2019-01-08 PROCEDURE — 63710000001 DIPHENHYDRAMINE PER 50 MG: Performed by: ORTHOPAEDIC SURGERY

## 2019-01-08 PROCEDURE — C1776 JOINT DEVICE (IMPLANTABLE): HCPCS | Performed by: ORTHOPAEDIC SURGERY

## 2019-01-08 PROCEDURE — 25010000002 PROPOFOL 10 MG/ML EMULSION: Performed by: NURSE ANESTHETIST, CERTIFIED REGISTERED

## 2019-01-08 DEVICE — INVERSE/REVERSE SCREW SYSTEM, 4.5-36
Type: IMPLANTABLE DEVICE | Site: SHOULDER | Status: FUNCTIONAL
Brand: INVERSE/REVERSE

## 2019-01-08 DEVICE — IMPLANTABLE DEVICE: Type: IMPLANTABLE DEVICE | Site: SHOULDER | Status: FUNCTIONAL

## 2019-01-08 DEVICE — STEM HUM/SHLDR TRABECULARMETAL REV 14X130MM: Type: IMPLANTABLE DEVICE | Site: SHOULDER | Status: FUNCTIONAL

## 2019-01-08 DEVICE — GLENSPHR TRABECULARMETAL REV 36MM: Type: IMPLANTABLE DEVICE | Site: SHOULDER | Status: FUNCTIONAL

## 2019-01-08 DEVICE — KT SHLDR DRL PIN SPG: Type: IMPLANTABLE DEVICE | Site: SHOULDER | Status: FUNCTIONAL

## 2019-01-08 DEVICE — BASEPLT GLEN TRABECULARMETAL REV 15MM: Type: IMPLANTABLE DEVICE | Site: SHOULDER | Status: FUNCTIONAL

## 2019-01-08 DEVICE — INVERSE/REVERSE SCREW SYSTEM, 4.5-18
Type: IMPLANTABLE DEVICE | Site: SHOULDER | Status: FUNCTIONAL
Brand: INVERSE/REVERSE

## 2019-01-08 DEVICE — INVERSE/REVERSE SCREW SYSTEM, 4.5-33
Type: IMPLANTABLE DEVICE | Site: SHOULDER | Status: FUNCTIONAL
Brand: INVERSE/REVERSE

## 2019-01-08 DEVICE — LINER HUM/SHLDR TRABECULARMETAL REV POLY 60DEG 36MM PLS0: Type: IMPLANTABLE DEVICE | Site: SHOULDER | Status: FUNCTIONAL

## 2019-01-08 RX ORDER — SODIUM CHLORIDE 0.9 % (FLUSH) 0.9 %
1-10 SYRINGE (ML) INJECTION AS NEEDED
Status: DISCONTINUED | OUTPATIENT
Start: 2019-01-08 | End: 2019-01-08 | Stop reason: HOSPADM

## 2019-01-08 RX ORDER — DIAZEPAM 5 MG/1
5 TABLET ORAL EVERY 6 HOURS PRN
Status: DISCONTINUED | OUTPATIENT
Start: 2019-01-08 | End: 2019-01-09 | Stop reason: HOSPADM

## 2019-01-08 RX ORDER — FENTANYL CITRATE 50 UG/ML
50 INJECTION, SOLUTION INTRAMUSCULAR; INTRAVENOUS
Status: DISCONTINUED | OUTPATIENT
Start: 2019-01-08 | End: 2019-01-08 | Stop reason: HOSPADM

## 2019-01-08 RX ORDER — ONDANSETRON 4 MG/1
4 TABLET, FILM COATED ORAL EVERY 6 HOURS PRN
Status: DISCONTINUED | OUTPATIENT
Start: 2019-01-08 | End: 2019-01-09 | Stop reason: HOSPADM

## 2019-01-08 RX ORDER — MIDAZOLAM HYDROCHLORIDE 1 MG/ML
2 INJECTION INTRAMUSCULAR; INTRAVENOUS
Status: DISCONTINUED | OUTPATIENT
Start: 2019-01-08 | End: 2019-01-08 | Stop reason: HOSPADM

## 2019-01-08 RX ORDER — ASPIRIN 81 MG/1
81 TABLET ORAL DAILY
Status: DISCONTINUED | OUTPATIENT
Start: 2019-01-08 | End: 2019-01-09 | Stop reason: HOSPADM

## 2019-01-08 RX ORDER — ZOLPIDEM TARTRATE 5 MG/1
5 TABLET ORAL NIGHTLY PRN
Status: DISCONTINUED | OUTPATIENT
Start: 2019-01-08 | End: 2019-01-09 | Stop reason: HOSPADM

## 2019-01-08 RX ORDER — METOPROLOL SUCCINATE 25 MG/1
25 TABLET, EXTENDED RELEASE ORAL 2 TIMES DAILY
Status: DISCONTINUED | OUTPATIENT
Start: 2019-01-08 | End: 2019-01-09 | Stop reason: HOSPADM

## 2019-01-08 RX ORDER — BUPIVACAINE HYDROCHLORIDE 2.5 MG/ML
INJECTION, SOLUTION EPIDURAL; INFILTRATION; INTRACAUDAL
Status: COMPLETED | OUTPATIENT
Start: 2019-01-08 | End: 2019-01-08

## 2019-01-08 RX ORDER — DIPHENHYDRAMINE HYDROCHLORIDE 50 MG/ML
12.5 INJECTION INTRAMUSCULAR; INTRAVENOUS
Status: DISCONTINUED | OUTPATIENT
Start: 2019-01-08 | End: 2019-01-08 | Stop reason: HOSPADM

## 2019-01-08 RX ORDER — PROMETHAZINE HYDROCHLORIDE 25 MG/1
25 SUPPOSITORY RECTAL ONCE AS NEEDED
Status: DISCONTINUED | OUTPATIENT
Start: 2019-01-08 | End: 2019-01-08 | Stop reason: HOSPADM

## 2019-01-08 RX ORDER — LOSARTAN POTASSIUM 25 MG/1
25 TABLET ORAL
Status: DISCONTINUED | OUTPATIENT
Start: 2019-01-08 | End: 2019-01-08

## 2019-01-08 RX ORDER — FLUMAZENIL 0.1 MG/ML
0.2 INJECTION INTRAVENOUS AS NEEDED
Status: DISCONTINUED | OUTPATIENT
Start: 2019-01-08 | End: 2019-01-08 | Stop reason: HOSPADM

## 2019-01-08 RX ORDER — ROSUVASTATIN CALCIUM 40 MG/1
40 TABLET, COATED ORAL DAILY
Status: DISCONTINUED | OUTPATIENT
Start: 2019-01-08 | End: 2019-01-09 | Stop reason: HOSPADM

## 2019-01-08 RX ORDER — HYDROMORPHONE HYDROCHLORIDE 1 MG/ML
0.5 INJECTION, SOLUTION INTRAMUSCULAR; INTRAVENOUS; SUBCUTANEOUS
Status: DISCONTINUED | OUTPATIENT
Start: 2019-01-08 | End: 2019-01-08 | Stop reason: HOSPADM

## 2019-01-08 RX ORDER — MIDAZOLAM HYDROCHLORIDE 1 MG/ML
1 INJECTION INTRAMUSCULAR; INTRAVENOUS
Status: DISCONTINUED | OUTPATIENT
Start: 2019-01-08 | End: 2019-01-08 | Stop reason: HOSPADM

## 2019-01-08 RX ORDER — HYDROCODONE BITARTRATE AND ACETAMINOPHEN 7.5; 325 MG/1; MG/1
1 TABLET ORAL ONCE AS NEEDED
Status: DISCONTINUED | OUTPATIENT
Start: 2019-01-08 | End: 2019-01-08 | Stop reason: HOSPADM

## 2019-01-08 RX ORDER — ACETAMINOPHEN 650 MG/1
650 SUPPOSITORY RECTAL ONCE AS NEEDED
Status: DISCONTINUED | OUTPATIENT
Start: 2019-01-08 | End: 2019-01-08 | Stop reason: HOSPADM

## 2019-01-08 RX ORDER — LIDOCAINE HYDROCHLORIDE 20 MG/ML
INJECTION, SOLUTION INFILTRATION; PERINEURAL AS NEEDED
Status: DISCONTINUED | OUTPATIENT
Start: 2019-01-08 | End: 2019-01-08 | Stop reason: SURG

## 2019-01-08 RX ORDER — SODIUM CHLORIDE, SODIUM LACTATE, POTASSIUM CHLORIDE, CALCIUM CHLORIDE 600; 310; 30; 20 MG/100ML; MG/100ML; MG/100ML; MG/100ML
9 INJECTION, SOLUTION INTRAVENOUS CONTINUOUS
Status: DISCONTINUED | OUTPATIENT
Start: 2019-01-08 | End: 2019-01-09 | Stop reason: HOSPADM

## 2019-01-08 RX ORDER — MONTELUKAST SODIUM 10 MG/1
10 TABLET ORAL NIGHTLY
Status: DISCONTINUED | OUTPATIENT
Start: 2019-01-08 | End: 2019-01-09 | Stop reason: HOSPADM

## 2019-01-08 RX ORDER — EPHEDRINE SULFATE 50 MG/ML
5 INJECTION, SOLUTION INTRAVENOUS ONCE AS NEEDED
Status: DISCONTINUED | OUTPATIENT
Start: 2019-01-08 | End: 2019-01-08 | Stop reason: HOSPADM

## 2019-01-08 RX ORDER — LIDOCAINE HYDROCHLORIDE 40 MG/ML
SOLUTION TOPICAL
Status: DISPENSED
Start: 2019-01-08 | End: 2019-01-08

## 2019-01-08 RX ORDER — ONDANSETRON 2 MG/ML
4 INJECTION INTRAMUSCULAR; INTRAVENOUS ONCE AS NEEDED
Status: DISCONTINUED | OUTPATIENT
Start: 2019-01-08 | End: 2019-01-08 | Stop reason: HOSPADM

## 2019-01-08 RX ORDER — NALOXONE HCL 0.4 MG/ML
0.4 VIAL (ML) INJECTION
Status: DISCONTINUED | OUTPATIENT
Start: 2019-01-08 | End: 2019-01-09 | Stop reason: HOSPADM

## 2019-01-08 RX ORDER — DIPHENHYDRAMINE HCL 25 MG
25 CAPSULE ORAL
Status: DISCONTINUED | OUTPATIENT
Start: 2019-01-08 | End: 2019-01-08 | Stop reason: HOSPADM

## 2019-01-08 RX ORDER — MORPHINE SULFATE 2 MG/ML
2 INJECTION, SOLUTION INTRAMUSCULAR; INTRAVENOUS EVERY 4 HOURS PRN
Status: DISCONTINUED | OUTPATIENT
Start: 2019-01-08 | End: 2019-01-09 | Stop reason: HOSPADM

## 2019-01-08 RX ORDER — HYDRALAZINE HYDROCHLORIDE 20 MG/ML
5 INJECTION INTRAMUSCULAR; INTRAVENOUS
Status: DISCONTINUED | OUTPATIENT
Start: 2019-01-08 | End: 2019-01-08 | Stop reason: HOSPADM

## 2019-01-08 RX ORDER — ACETAMINOPHEN 325 MG/1
650 TABLET ORAL ONCE AS NEEDED
Status: DISCONTINUED | OUTPATIENT
Start: 2019-01-08 | End: 2019-01-08 | Stop reason: HOSPADM

## 2019-01-08 RX ORDER — FAMOTIDINE 10 MG/ML
20 INJECTION, SOLUTION INTRAVENOUS ONCE
Status: COMPLETED | OUTPATIENT
Start: 2019-01-08 | End: 2019-01-08

## 2019-01-08 RX ORDER — LEVOTHYROXINE SODIUM 88 UG/1
88 TABLET ORAL DAILY
Status: DISCONTINUED | OUTPATIENT
Start: 2019-01-08 | End: 2019-01-09 | Stop reason: HOSPADM

## 2019-01-08 RX ORDER — IRBESARTAN 75 MG/1
75 TABLET ORAL DAILY
Status: DISCONTINUED | OUTPATIENT
Start: 2019-01-08 | End: 2019-01-08 | Stop reason: CLARIF

## 2019-01-08 RX ORDER — ALBUTEROL SULFATE 2.5 MG/3ML
2.5 SOLUTION RESPIRATORY (INHALATION) EVERY 6 HOURS PRN
Status: DISCONTINUED | OUTPATIENT
Start: 2019-01-08 | End: 2019-01-09 | Stop reason: HOSPADM

## 2019-01-08 RX ORDER — OXYCODONE AND ACETAMINOPHEN 10; 325 MG/1; MG/1
1 TABLET ORAL EVERY 4 HOURS PRN
Status: DISCONTINUED | OUTPATIENT
Start: 2019-01-08 | End: 2019-01-09 | Stop reason: HOSPADM

## 2019-01-08 RX ORDER — VANCOMYCIN HYDROCHLORIDE 1 G/200ML
1000 INJECTION, SOLUTION INTRAVENOUS EVERY 12 HOURS
Status: DISCONTINUED | OUTPATIENT
Start: 2019-01-08 | End: 2019-01-09 | Stop reason: HOSPADM

## 2019-01-08 RX ORDER — MYCOPHENOLATE MOFETIL 500 MG/1
500 TABLET ORAL 2 TIMES DAILY
Status: DISCONTINUED | OUTPATIENT
Start: 2019-01-08 | End: 2019-01-09 | Stop reason: HOSPADM

## 2019-01-08 RX ORDER — DIPHENHYDRAMINE HCL 25 MG
25 CAPSULE ORAL EVERY 6 HOURS PRN
Status: DISCONTINUED | OUTPATIENT
Start: 2019-01-08 | End: 2019-01-09 | Stop reason: HOSPADM

## 2019-01-08 RX ORDER — AMIODARONE HYDROCHLORIDE 200 MG/1
100 TABLET ORAL DAILY
Status: DISCONTINUED | OUTPATIENT
Start: 2019-01-08 | End: 2019-01-09 | Stop reason: HOSPADM

## 2019-01-08 RX ORDER — LIDOCAINE HYDROCHLORIDE 10 MG/ML
0.5 INJECTION, SOLUTION EPIDURAL; INFILTRATION; INTRACAUDAL; PERINEURAL ONCE AS NEEDED
Status: DISCONTINUED | OUTPATIENT
Start: 2019-01-08 | End: 2019-01-08 | Stop reason: HOSPADM

## 2019-01-08 RX ORDER — OXYCODONE AND ACETAMINOPHEN 7.5; 325 MG/1; MG/1
1 TABLET ORAL ONCE AS NEEDED
Status: DISCONTINUED | OUTPATIENT
Start: 2019-01-08 | End: 2019-01-08 | Stop reason: HOSPADM

## 2019-01-08 RX ORDER — ONDANSETRON 2 MG/ML
4 INJECTION INTRAMUSCULAR; INTRAVENOUS EVERY 6 HOURS PRN
Status: DISCONTINUED | OUTPATIENT
Start: 2019-01-08 | End: 2019-01-09 | Stop reason: HOSPADM

## 2019-01-08 RX ORDER — PROMETHAZINE HYDROCHLORIDE 25 MG/1
25 TABLET ORAL ONCE AS NEEDED
Status: DISCONTINUED | OUTPATIENT
Start: 2019-01-08 | End: 2019-01-08 | Stop reason: HOSPADM

## 2019-01-08 RX ORDER — LABETALOL HYDROCHLORIDE 5 MG/ML
5 INJECTION, SOLUTION INTRAVENOUS
Status: DISCONTINUED | OUTPATIENT
Start: 2019-01-08 | End: 2019-01-08 | Stop reason: HOSPADM

## 2019-01-08 RX ORDER — LOSARTAN POTASSIUM 25 MG/1
25 TABLET ORAL NIGHTLY
Status: DISCONTINUED | OUTPATIENT
Start: 2019-01-08 | End: 2019-01-09 | Stop reason: HOSPADM

## 2019-01-08 RX ORDER — DEXAMETHASONE SODIUM PHOSPHATE 10 MG/ML
INJECTION INTRAMUSCULAR; INTRAVENOUS AS NEEDED
Status: DISCONTINUED | OUTPATIENT
Start: 2019-01-08 | End: 2019-01-08 | Stop reason: SURG

## 2019-01-08 RX ORDER — DULOXETIN HYDROCHLORIDE 60 MG/1
60 CAPSULE, DELAYED RELEASE ORAL DAILY
Status: DISCONTINUED | OUTPATIENT
Start: 2019-01-08 | End: 2019-01-09 | Stop reason: HOSPADM

## 2019-01-08 RX ORDER — WOUND DRESSING ADHESIVE - LIQUID
LIQUID MISCELLANEOUS AS NEEDED
Status: DISCONTINUED | OUTPATIENT
Start: 2019-01-08 | End: 2019-01-08 | Stop reason: HOSPADM

## 2019-01-08 RX ORDER — BUDESONIDE AND FORMOTEROL FUMARATE DIHYDRATE 80; 4.5 UG/1; UG/1
2 AEROSOL RESPIRATORY (INHALATION) 2 TIMES DAILY
Status: DISCONTINUED | OUTPATIENT
Start: 2019-01-08 | End: 2019-01-09 | Stop reason: HOSPADM

## 2019-01-08 RX ORDER — NITROGLYCERIN 0.4 MG/1
0.4 TABLET SUBLINGUAL
Status: DISCONTINUED | OUTPATIENT
Start: 2019-01-08 | End: 2019-01-09 | Stop reason: HOSPADM

## 2019-01-08 RX ORDER — ONDANSETRON 2 MG/ML
INJECTION INTRAMUSCULAR; INTRAVENOUS AS NEEDED
Status: DISCONTINUED | OUTPATIENT
Start: 2019-01-08 | End: 2019-01-08 | Stop reason: SURG

## 2019-01-08 RX ORDER — PROPOFOL 10 MG/ML
VIAL (ML) INTRAVENOUS AS NEEDED
Status: DISCONTINUED | OUTPATIENT
Start: 2019-01-08 | End: 2019-01-08 | Stop reason: SURG

## 2019-01-08 RX ORDER — NALOXONE HCL 0.4 MG/ML
0.2 VIAL (ML) INJECTION AS NEEDED
Status: DISCONTINUED | OUTPATIENT
Start: 2019-01-08 | End: 2019-01-08 | Stop reason: HOSPADM

## 2019-01-08 RX ORDER — EPHEDRINE SULFATE 50 MG/ML
INJECTION, SOLUTION INTRAVENOUS AS NEEDED
Status: DISCONTINUED | OUTPATIENT
Start: 2019-01-08 | End: 2019-01-08 | Stop reason: SURG

## 2019-01-08 RX ORDER — CLINDAMYCIN PHOSPHATE 900 MG/50ML
900 INJECTION INTRAVENOUS ONCE
Status: DISCONTINUED | OUTPATIENT
Start: 2019-01-08 | End: 2019-01-08

## 2019-01-08 RX ORDER — DIPHENOXYLATE HYDROCHLORIDE AND ATROPINE SULFATE 2.5; .025 MG/1; MG/1
1 TABLET ORAL 4 TIMES DAILY PRN
Status: DISCONTINUED | OUTPATIENT
Start: 2019-01-08 | End: 2019-01-09 | Stop reason: HOSPADM

## 2019-01-08 RX ORDER — PROMETHAZINE HYDROCHLORIDE 25 MG/ML
12.5 INJECTION, SOLUTION INTRAMUSCULAR; INTRAVENOUS ONCE AS NEEDED
Status: DISCONTINUED | OUTPATIENT
Start: 2019-01-08 | End: 2019-01-08 | Stop reason: HOSPADM

## 2019-01-08 RX ORDER — RALOXIFENE HYDROCHLORIDE 60 MG/1
60 TABLET, FILM COATED ORAL NIGHTLY
Status: DISCONTINUED | OUTPATIENT
Start: 2019-01-08 | End: 2019-01-09 | Stop reason: HOSPADM

## 2019-01-08 RX ORDER — PROMETHAZINE HYDROCHLORIDE 25 MG/ML
12.5 INJECTION, SOLUTION INTRAMUSCULAR; INTRAVENOUS EVERY 4 HOURS PRN
Status: DISCONTINUED | OUTPATIENT
Start: 2019-01-08 | End: 2019-01-09 | Stop reason: HOSPADM

## 2019-01-08 RX ORDER — VANCOMYCIN HYDROCHLORIDE 1 G/200ML
1000 INJECTION, SOLUTION INTRAVENOUS ONCE
Status: COMPLETED | OUTPATIENT
Start: 2019-01-08 | End: 2019-01-08

## 2019-01-08 RX ADMIN — SODIUM CHLORIDE, POTASSIUM CHLORIDE, SODIUM LACTATE AND CALCIUM CHLORIDE 9 ML/HR: 600; 310; 30; 20 INJECTION, SOLUTION INTRAVENOUS at 13:37

## 2019-01-08 RX ADMIN — VANCOMYCIN HYDROCHLORIDE 1000 MG: 1 INJECTION, SOLUTION INTRAVENOUS at 11:09

## 2019-01-08 RX ADMIN — FENTANYL CITRATE 50 MCG: 50 INJECTION, SOLUTION INTRAMUSCULAR; INTRAVENOUS at 10:53

## 2019-01-08 RX ADMIN — DULOXETINE HYDROCHLORIDE 60 MG: 60 CAPSULE, DELAYED RELEASE ORAL at 23:00

## 2019-01-08 RX ADMIN — BUDESONIDE AND FORMOTEROL FUMARATE DIHYDRATE 2 PUFF: 80; 4.5 AEROSOL RESPIRATORY (INHALATION) at 22:00

## 2019-01-08 RX ADMIN — ROSUVASTATIN CALCIUM 40 MG: 40 TABLET, FILM COATED ORAL at 15:42

## 2019-01-08 RX ADMIN — BUPIVACAINE 10 ML: 13.3 INJECTION, SUSPENSION, LIPOSOMAL INFILTRATION at 10:38

## 2019-01-08 RX ADMIN — MIDAZOLAM HYDROCHLORIDE 1 MG: 2 INJECTION, SOLUTION INTRAMUSCULAR; INTRAVENOUS at 10:43

## 2019-01-08 RX ADMIN — RIVAROXABAN 20 MG: 20 TABLET, FILM COATED ORAL at 17:20

## 2019-01-08 RX ADMIN — DIPHENHYDRAMINE HYDROCHLORIDE 25 MG: 25 CAPSULE ORAL at 18:41

## 2019-01-08 RX ADMIN — METOPROLOL SUCCINATE 25 MG: 25 TABLET, FILM COATED, EXTENDED RELEASE ORAL at 21:32

## 2019-01-08 RX ADMIN — DEXAMETHASONE SODIUM PHOSPHATE 4 MG: 10 INJECTION INTRAMUSCULAR; INTRAVENOUS at 11:48

## 2019-01-08 RX ADMIN — FAMOTIDINE 20 MG: 10 INJECTION, SOLUTION INTRAVENOUS at 10:23

## 2019-01-08 RX ADMIN — MYCOPHENOLATE MOFETIL 500 MG: 500 TABLET, FILM COATED ORAL at 21:33

## 2019-01-08 RX ADMIN — OXYCODONE HYDROCHLORIDE AND ACETAMINOPHEN 1 TABLET: 10; 325 TABLET ORAL at 14:40

## 2019-01-08 RX ADMIN — LIDOCAINE HYDROCHLORIDE 60 MG: 20 INJECTION, SOLUTION INFILTRATION; PERINEURAL at 11:30

## 2019-01-08 RX ADMIN — SODIUM CHLORIDE, POTASSIUM CHLORIDE, SODIUM LACTATE AND CALCIUM CHLORIDE 9 ML/HR: 600; 310; 30; 20 INJECTION, SOLUTION INTRAVENOUS at 09:31

## 2019-01-08 RX ADMIN — VANCOMYCIN HYDROCHLORIDE 1000 MG: 1 INJECTION, SOLUTION INTRAVENOUS at 23:11

## 2019-01-08 RX ADMIN — BUPIVACAINE HYDROCHLORIDE 5 ML: 2.5 INJECTION, SOLUTION EPIDURAL; INFILTRATION; INTRACAUDAL; PERINEURAL at 10:38

## 2019-01-08 RX ADMIN — ONDANSETRON 4 MG: 2 INJECTION INTRAMUSCULAR; INTRAVENOUS at 12:35

## 2019-01-08 RX ADMIN — RALOXIFENE HYDROCHLORIDE 60 MG: 60 TABLET, FILM COATED ORAL at 21:32

## 2019-01-08 RX ADMIN — EPHEDRINE SULFATE 10 MG: 50 INJECTION INTRAMUSCULAR; INTRAVENOUS; SUBCUTANEOUS at 11:40

## 2019-01-08 RX ADMIN — ASPIRIN 81 MG: 81 TABLET, DELAYED RELEASE ORAL at 15:41

## 2019-01-08 RX ADMIN — EPHEDRINE SULFATE 10 MG: 50 INJECTION INTRAMUSCULAR; INTRAVENOUS; SUBCUTANEOUS at 12:08

## 2019-01-08 RX ADMIN — PHENYLEPHRINE HYDROCHLORIDE 0.5 MCG/KG/MIN: 10 INJECTION, SOLUTION INTRAMUSCULAR; INTRAVENOUS; SUBCUTANEOUS at 11:41

## 2019-01-08 RX ADMIN — MONTELUKAST SODIUM 10 MG: 10 TABLET, FILM COATED ORAL at 21:32

## 2019-01-08 RX ADMIN — PROPOFOL 200 MG: 10 INJECTION, EMULSION INTRAVENOUS at 11:30

## 2019-01-08 RX ADMIN — LEVOTHYROXINE SODIUM 88 MCG: 88 TABLET ORAL at 16:35

## 2019-01-08 RX ADMIN — OXYCODONE HYDROCHLORIDE AND ACETAMINOPHEN 1 TABLET: 10; 325 TABLET ORAL at 23:11

## 2019-01-08 RX ADMIN — OXYCODONE HYDROCHLORIDE AND ACETAMINOPHEN 1 TABLET: 10; 325 TABLET ORAL at 18:41

## 2019-01-08 RX ADMIN — MIDAZOLAM HYDROCHLORIDE 1 MG: 2 INJECTION, SOLUTION INTRAMUSCULAR; INTRAVENOUS at 10:45

## 2019-01-08 NOTE — PERIOPERATIVE NURSING NOTE
Dr. Solorzano informed of large bruise noted on right shoulder.  Stated he would evaluate before we proceed with regional block and surgery.

## 2019-01-08 NOTE — ANESTHESIA POSTPROCEDURE EVALUATION
Patient: Olena Myers    Procedure Summary     Date:  01/08/19 Room / Location:   [facility] [address] [place] [address] [facility]    Anesthesia Start:  1123 Anesthesia Stop:  1246    Procedure:  RIGHT TOTAL SHOULDER REVERSE ARTHROPLASTY (Right Shoulder) Diagnosis:      Surgeon:  Jovanny Solorzano MD Provider:  Wellington Kraus MD    Anesthesia Type:  general with block ASA Status:  3          Anesthesia Type: general with block  Last vitals  BP   141/75 (01/08/19 1300)   Temp   36.6 °C (97.8 °F) (01/08/19 0859)   Pulse   64 (01/08/19 1300)   Resp   16 (01/08/19 1300)     SpO2   100 % (01/08/19 1300)     Post Anesthesia Care and Evaluation    Patient location during evaluation: bedside  Patient participation: complete - patient participated  Level of consciousness: awake  Pain score: 2  Pain management: adequate  Airway patency: patent  Anesthetic complications: No anesthetic complications    Cardiovascular status: acceptable  Respiratory status: acceptable  Hydration status: acceptable    Comments: /75   Pulse 64   Temp 36.6 °C (97.8 °F) (Oral)   Resp 16   Wt 67.3 kg (148 lb 5.9 oz)   SpO2 100%   BMI 28.98 kg/m²

## 2019-01-08 NOTE — ANESTHESIA PROCEDURE NOTES
ANESTHESIA INTUBATION  Urgency: elective    Airway not difficult    General Information and Staff    Patient location during procedure: OR  Anesthesiologist: Wellington Kraus MD  CRNA: Enrique Richter CRNA    Indications and Patient Condition  Indications for airway management: airway protection    Preoxygenated: yes  MILS maintained throughout  Mask difficulty assessment: 2 - vent by mask + OA or adjuvant +/- NMBA    Final Airway Details  Final airway type: endotracheal airway      Successful airway: ETT  Cuffed: yes   Successful intubation technique: direct laryngoscopy  Facilitating devices/methods: intubating stylet  Endotracheal tube insertion site: oral  Blade: Carpio  Blade size: 2  ETT size (mm): 7.0  Cormack-Lehane Classification: grade I - full view of glottis  Placement verified by: chest auscultation and capnometry   Measured from: lips  ETT to lips (cm): 21  Number of attempts at approach: 1    Additional Comments  No damage. Atraumatic.

## 2019-01-08 NOTE — PLAN OF CARE
Problem: Patient Care Overview  Goal: Plan of Care Review  Outcome: Ongoing (interventions implemented as appropriate)   01/08/19 7246   Coping/Psychosocial   Plan of Care Reviewed With patient   Plan of Care Review   Progress improving   OTHER   Outcome Summary pain under control with block. patient educatede about htn and htn medications. patient educated to [facility] for asssistance. plan to get up today with assistance     Goal: Individualization and Mutuality  Outcome: Ongoing (interventions implemented as appropriate)    Goal: Discharge Needs Assessment  Outcome: Ongoing (interventions implemented as appropriate)    Goal: Interprofessional Rounds/Family Conf  Outcome: Ongoing (interventions implemented as appropriate)      Problem: Shoulder Arthroplasty (Adult)  Goal: Signs and Symptoms of Listed Potential Problems Will be Absent, Minimized or Managed (Shoulder Arthroplasty)  Outcome: Ongoing (interventions implemented as appropriate)    Goal: Anesthesia/Sedation Recovery  Outcome: Outcome(s) achieved Date Met: 01/08/19      Problem: Fall Risk (Adult)  Goal: Identify Related Risk Factors and Signs and Symptoms  Outcome: Outcome(s) achieved Date Met: 01/08/19    Goal: Absence of Fall  Outcome: Ongoing (interventions implemented as appropriate)

## 2019-01-08 NOTE — BRIEF OP NOTE
TOTAL SHOULDER REVERSE ARTHROPLASTY  Progress Note    Olena Myers  1/8/2019    Pre-op Diagnosis:   Rt rct, djd, bone loss       Post-Op Diagnosis Codes:   same    Procedure/CPT® Codes:      Procedure(s):  RIGHT TOTAL SHOULDER REVERSE ARTHROPLASTY    Surgeon(s):  Jovanny Solorzano MD    Anesthesia: General with Block    Staff:   Circulator: Ajit Sosa RN; Ghazala Gonzalez RN  Scrub Person: Chante Galindo  Vendor Representative: Majo Moise  Assistant: Jovanna Stewart    Estimated Blood Loss: minimal    Urine Voided: * No values recorded between 1/8/2019 11:23 AM and 1/8/2019 12:30 PM *    Specimens:                None      Drains:      Findings: above    Complications: none known      TYE Solorzano MD     Date: 1/8/2019  Time: 12:30 PM

## 2019-01-08 NOTE — ANESTHESIA PROCEDURE NOTES
Peripheral Block    Pre-sedation assessment completed: 1/8/2019 10:38 AM    Patient reassessed immediately prior to procedure    Patient location during procedure: holding area  Start time: 1/8/2019 10:38 AM  Stop time: 1/8/2019 10:52 AM  Reason for block: at surgeon's request and post-op pain management  Performed by  Anesthesiologist: Wellington Kraus MD  Preanesthetic Checklist  Completed: patient identified, site marked, surgical consent, pre-op evaluation, timeout performed, IV checked, risks and benefits discussed and monitors and equipment checked  Prep:  Pt Position: sitting  Sterile barriers:cap, gloves, mask and sterile barriers  Prep: ChloraPrep  Patient monitoring: blood pressure monitoring, continuous pulse oximetry and EKG  Procedure  Sedation:yes  Performed under: MAC  Guidance:ultrasound guided  ULTRASOUND INTERPRETATION. Using ultrasound guidance a 20 G gauge needle was placed in close proximity to the nerve, at which point, under ultrasound guidance anesthetic was injected in the area of the nerve and spread of the anesthesia was seen on ultrasound in close proximity thereto.  There were no abnormalities seen on ultrasound; a digital image was taken; and the patient tolerated the procedure with no complications. Images:still images obtained    Laterality:right  Block Type:interscalene  Injection Technique:single-shot  Needle Type:echogenic  Needle Gauge:20 G  Resistance on Injection: none  Medications Used: bupivacaine liposome (EXPAREL) 1.3 % injection, 10 mL  bupivacaine PF (MARCAINE) 0.25 % injection, 5 mL  Post Assessment  Injection Assessment: negative aspiration for heme, no paresthesia on injection and incremental injection  Patient Tolerance:comfortable throughout block  Complications:no

## 2019-01-08 NOTE — ANESTHESIA PREPROCEDURE EVALUATION
Anesthesia Evaluation     Patient summary reviewed and Nursing notes reviewed   NPO Solid Status: > 8 hours  NPO Liquid Status: > 2 hours           Airway   Mallampati: II  no difficulty expected  Dental - normal exam   (+) edentulous and upper dentures    Pulmonary     breath sounds clear to auscultation  (+) COPD, asthma, shortness of breath, sleep apnea,   Cardiovascular     ECG reviewed  Rhythm: regular  Rate: normal    (+) hypertension, CAD, cardiac stents Drug eluting stent dysrhythmias Atrial Fib, murmur, hyperlipidemia,     ROS comment: · (grade II w/high LAP) consistent with pseudonormalization.  · Left atrial cavity size is mild-to-moderately dilated.  · The aortic valve is abnormal in structure. The valve exhibits sclerosis. Mild to moderate aortic valve regurgitation is present.           Neuro/Psych  (+) TIA, numbness,       ROS Comment: Myasthenia Gravis  GI/Hepatic/Renal/Endo      Musculoskeletal     Abdominal    Substance History      OB/GYN          Other   (+) arthritis                   Anesthesia Plan    ASA 3     general with block     intravenous induction   Anesthetic plan, all risks, benefits, and alternatives have been provided, discussed and informed consent has been obtained with: patient.

## 2019-01-09 VITALS
TEMPERATURE: 97.9 F | RESPIRATION RATE: 16 BRPM | DIASTOLIC BLOOD PRESSURE: 50 MMHG | HEIGHT: 60 IN | WEIGHT: 148.37 LBS | SYSTOLIC BLOOD PRESSURE: 103 MMHG | BODY MASS INDEX: 29.13 KG/M2 | HEART RATE: 70 BPM | OXYGEN SATURATION: 90 %

## 2019-01-09 LAB
ANION GAP SERPL CALCULATED.3IONS-SCNC: 10.7 MMOL/L
BASOPHILS # BLD AUTO: 0.03 10*3/MM3 (ref 0–0.2)
BASOPHILS NFR BLD AUTO: 0.5 % (ref 0–1.5)
BUN BLD-MCNC: 16 MG/DL (ref 8–23)
BUN/CREAT SERPL: 17.6 (ref 7–25)
CALCIUM SPEC-SCNC: 9 MG/DL (ref 8.6–10.5)
CHLORIDE SERPL-SCNC: 104 MMOL/L (ref 98–107)
CO2 SERPL-SCNC: 21.3 MMOL/L (ref 22–29)
CREAT BLD-MCNC: 0.91 MG/DL (ref 0.57–1)
DEPRECATED RDW RBC AUTO: 54.5 FL (ref 37–54)
EOSINOPHIL # BLD AUTO: 0 10*3/MM3 (ref 0–0.7)
EOSINOPHIL NFR BLD AUTO: 0 % (ref 0.3–6.2)
ERYTHROCYTE [DISTWIDTH] IN BLOOD BY AUTOMATED COUNT: 15.6 % (ref 11.7–13)
GFR SERPL CREATININE-BSD FRML MDRD: 59 ML/MIN/1.73
GLUCOSE BLD-MCNC: 119 MG/DL (ref 65–99)
HCT VFR BLD AUTO: 24.6 % (ref 35.6–45.5)
HGB BLD-MCNC: 7.6 G/DL (ref 11.9–15.5)
IMM GRANULOCYTES # BLD AUTO: 0.01 10*3/MM3 (ref 0–0.03)
IMM GRANULOCYTES NFR BLD AUTO: 0.2 % (ref 0–0.5)
LYMPHOCYTES # BLD AUTO: 0.59 10*3/MM3 (ref 0.9–4.8)
LYMPHOCYTES NFR BLD AUTO: 9.1 % (ref 19.6–45.3)
MCH RBC QN AUTO: 29.6 PG (ref 26.9–32)
MCHC RBC AUTO-ENTMCNC: 30.9 G/DL (ref 32.4–36.3)
MCV RBC AUTO: 95.7 FL (ref 80.5–98.2)
MONOCYTES # BLD AUTO: 0.61 10*3/MM3 (ref 0.2–1.2)
MONOCYTES NFR BLD AUTO: 9.4 % (ref 5–12)
NEUTROPHILS # BLD AUTO: 5.25 10*3/MM3 (ref 1.9–8.1)
NEUTROPHILS NFR BLD AUTO: 81 % (ref 42.7–76)
PLATELET # BLD AUTO: 220 10*3/MM3 (ref 140–500)
PMV BLD AUTO: 9.4 FL (ref 6–12)
POTASSIUM BLD-SCNC: 4.4 MMOL/L (ref 3.5–5.2)
RBC # BLD AUTO: 2.57 10*6/MM3 (ref 3.9–5.2)
SODIUM BLD-SCNC: 136 MMOL/L (ref 136–145)
WBC NRBC COR # BLD: 6.48 10*3/MM3 (ref 4.5–10.7)

## 2019-01-09 PROCEDURE — 63710000001 MYCOPHENOLATE MOFETIL PER 250 MG: Performed by: ORTHOPAEDIC SURGERY

## 2019-01-09 PROCEDURE — 93010 ELECTROCARDIOGRAM REPORT: CPT | Performed by: INTERNAL MEDICINE

## 2019-01-09 PROCEDURE — 93005 ELECTROCARDIOGRAM TRACING: CPT | Performed by: ORTHOPAEDIC SURGERY

## 2019-01-09 PROCEDURE — 63710000001 DIPHENHYDRAMINE PER 50 MG: Performed by: ORTHOPAEDIC SURGERY

## 2019-01-09 PROCEDURE — 80048 BASIC METABOLIC PNL TOTAL CA: CPT | Performed by: ORTHOPAEDIC SURGERY

## 2019-01-09 PROCEDURE — 85025 COMPLETE CBC W/AUTO DIFF WBC: CPT | Performed by: ORTHOPAEDIC SURGERY

## 2019-01-09 RX ORDER — SODIUM CHLORIDE 0.9 % (FLUSH) 0.9 %
20 SYRINGE (ML) INJECTION AS NEEDED
Status: DISCONTINUED | OUTPATIENT
Start: 2019-01-09 | End: 2019-01-09 | Stop reason: HOSPADM

## 2019-01-09 RX ORDER — SODIUM CHLORIDE 0.9 % (FLUSH) 0.9 %
10 SYRINGE (ML) INJECTION AS NEEDED
Status: DISCONTINUED | OUTPATIENT
Start: 2019-01-09 | End: 2019-01-09 | Stop reason: HOSPADM

## 2019-01-09 RX ORDER — SODIUM CHLORIDE 0.9 % (FLUSH) 0.9 %
10 SYRINGE (ML) INJECTION EVERY 12 HOURS SCHEDULED
Status: DISCONTINUED | OUTPATIENT
Start: 2019-01-09 | End: 2019-01-09 | Stop reason: HOSPADM

## 2019-01-09 RX ADMIN — Medication 500 UNITS: at 13:30

## 2019-01-09 RX ADMIN — ASPIRIN 81 MG: 81 TABLET, DELAYED RELEASE ORAL at 08:41

## 2019-01-09 RX ADMIN — ROSUVASTATIN CALCIUM 40 MG: 40 TABLET, FILM COATED ORAL at 08:41

## 2019-01-09 RX ADMIN — OXYCODONE HYDROCHLORIDE AND ACETAMINOPHEN 1 TABLET: 10; 325 TABLET ORAL at 08:41

## 2019-01-09 RX ADMIN — OXYCODONE HYDROCHLORIDE AND ACETAMINOPHEN 1 TABLET: 10; 325 TABLET ORAL at 13:30

## 2019-01-09 RX ADMIN — LOSARTAN POTASSIUM 25 MG: 25 TABLET, FILM COATED ORAL at 00:42

## 2019-01-09 RX ADMIN — DIPHENHYDRAMINE HYDROCHLORIDE 25 MG: 25 CAPSULE ORAL at 00:42

## 2019-01-09 RX ADMIN — MYCOPHENOLATE MOFETIL 500 MG: 500 TABLET, FILM COATED ORAL at 08:41

## 2019-01-09 RX ADMIN — OXYCODONE HYDROCHLORIDE AND ACETAMINOPHEN 1 TABLET: 10; 325 TABLET ORAL at 04:02

## 2019-01-09 RX ADMIN — DIPHENHYDRAMINE HYDROCHLORIDE 25 MG: 25 CAPSULE ORAL at 14:00

## 2019-01-09 RX ADMIN — AMIODARONE HYDROCHLORIDE 100 MG: 200 TABLET ORAL at 08:41

## 2019-01-09 RX ADMIN — LEVOTHYROXINE SODIUM 88 MCG: 88 TABLET ORAL at 08:41

## 2019-01-09 RX ADMIN — Medication 10 ML: at 13:30

## 2019-01-09 RX ADMIN — DIPHENHYDRAMINE HYDROCHLORIDE 25 MG: 25 CAPSULE ORAL at 08:41

## 2019-01-09 NOTE — DISCHARGE SUMMARY
Orthopedic Discharge Summary      Patient: Olena Myers      YOB: 1935    Medical Record Number: [id number]    Attending Physician: No att. providers found  Consulting Physician(s):   Date of Admission: 1/8/2019  8:13 AM  Date of Discharge: 1/9/2019      Patient Active Problem List   Diagnosis   • Pre-operative cardiovascular examination   • S/p reverse total shoulder arthroplasty   • Myasthenia gravis (CMS/HCC)   • Paroxysmal atrial fibrillation (CMS/HCC)   • HX: long term anticoagulant use   • Essential hypertension   • CAD (coronary artery disease)   • Atrial fibrillation (CMS/HCC)   • S/P reverse total shoulder arthroplasty, right         Allergies:   Allergies   Allergen Reactions   • Neomycin Anaphylaxis   • Penicillins Anaphylaxis   • Hydrocodone Itching and Rash       Current Medications:     Discharge Medications      Changes to Medications      Instructions Start Date   amiodarone 100 MG tablet  Commonly known as:  PACERONE  What changed:  when to take this  Notes to patient:  1/10/19   100 mg, Oral, Daily      rivaroxaban 20 MG tablet  Commonly known as:  XARELTO  What changed:  additional instructions  Notes to patient:  1/9/19 PM   20 mg, Oral, Daily With Dinner         Continue These Medications      Instructions Start Date   aspirin 81 MG EC tablet  Notes to patient:  1/10/19   81 mg, Oral, Daily      BREO ELLIPTA 100-25 MCG/INH aerosol powder   Generic drug:  Fluticasone Furoate-Vilanterol  Notes to patient:  1/10/19    1 puff, Inhalation, Every Morning      diphenoxylate-atropine 2.5-0.025 MG per tablet  Commonly known as:  LOMOTIL   1 tablet, Oral, 4 Times Daily PRN      DULoxetine 60 MG capsule  Commonly known as:  CYMBALTA  Notes to patient:  1/9/19 PM   60 mg, Oral, 2 Times Daily      irbesartan 75 MG tablet  Commonly known as:  AVAPRO  Notes to patient:  1/9/19 PM   75 mg, Oral, Nightly      levothyroxine 88 MCG tablet  Commonly known as:  SYNTHROID, LEVOTHROID  Notes to  patient:  1/10/19   88 mcg, Oral, Daily      metoprolol succinate XL 25 MG 24 hr tablet  Commonly known as:  TOPROL-XL  Notes to patient:  1/9/19 PM   25 mg, Oral, 2 Times Daily      mometasone 50 MCG/ACT nasal spray  Commonly known as:  NASONEX  Notes to patient:  1/10/19   2 sprays, Nasal, Daily      montelukast 10 MG tablet  Commonly known as:  SINGULAIR  Notes to patient:  1/9/19 PM   10 mg, Oral, Nightly      mycophenolate 500 MG tablet  Commonly known as:  CELLCEPT  Notes to patient:  1/9/19 PM   500 mg, Oral, 2 Times Daily, 2 tabs bid      nitroglycerin 0.4 MG SL tablet  Commonly known as:  NITROSTAT   0.4 mg, Sublingual, Every 5 Minutes PRN      PROAIR  (90 Base) MCG/ACT inhaler  Generic drug:  albuterol sulfate HFA   2 puffs, Inhalation, Every 6 Hours PRN      raloxifene 60 MG tablet  Commonly known as:  EVISTA  Notes to patient:  1/9/19 PM   60 mg, Oral, Nightly      rosuvastatin 40 MG tablet  Commonly known as:  CRESTOR  Notes to patient:  1/10/19   40 mg, Oral, Daily, Take 1/2 tablet daily      vitamin D 36767 units capsule capsule  Commonly known as:  ERGOCALCIFEROL  Notes to patient:  Continue to take as previously prescribed.   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   • History of gastric ulcer    • History of GI bleed    • [place] (hard of hearing)    • Hyperlipidemia    • Hypertension    • Lightheadedness    • LVH (left ventricular hypertrophy)    • MG (myasthenia gravis) (CMS/[facility])    • Mini  stroke (CMS/[facility]) 10/2016, 3/2017   • OA (osteoarthritis)    • Osteoporosis    • Sleep apnea     USES CPAP   • SOB (shortness of breath)     WALKING        Past Surgical History:   Procedure Laterality Date   • APPENDECTOMY     • CARPAL TUNNEL RELEASE Bilateral    • CATARACT EXTRACTION Bilateral    • CORONARY STENT PLACEMENT     • TOTAL HIP ARTHROPLASTY Bilateral    • TOTAL SHOULDER ARTHROPLASTY W/ DISTAL CLAVICLE EXCISION Left 7/19/2016    Procedure: LT TOTAL SHOULDER REVERSE ARTHROPLASTY;  Surgeon: NAHOMI Solorzano MD;  Location: [facility] [place] [facility] [place];  Service:    • TOTAL SHOULDER ARTHROPLASTY W/ DISTAL CLAVICLE EXCISION Right 1/8/2019    Procedure: RIGHT TOTAL SHOULDER REVERSE ARTHROPLASTY;  Surgeon: Jovanny Solorzano MD;  Location: [facility] [place] [facility] [place];  Service: Orthopedics        Social History     Occupational History   • Not on file   Tobacco Use   • Smoking status: Never Smoker   • Smokeless tobacco: Never Used   Substance and Sexual Activity   • Alcohol use: No   • Drug use: No     Comment: caffine   • Sexual activity: Defer      Social History     Social History Narrative   • Not on file        Family History   Problem Relation Age of Onset   • Heart disease Mother    • Hyperlipidemia Mother    • Stroke Mother    • Diabetes Mother    • Breast cancer Mother    • Heart disease Father    • Hyperlipidemia Father    • Stroke Father    • Malig Hyperthermia Neg Hx          Physical Exam: 83 y.o. female  Awake and alert. Sitting up in the chair. Denies any dizziness or lightheadedness.  Pain managed. Still has some numbness from the block. Good pulses and capp refill.  Sling in place. Dressing dry and intact.  Discussed rehab and postop care. Patient concerned about getting to therapy- transportation.  Will assess for home therapy for a couple of weeks.    Hospital Course:  83 y.o. female admitted to [facility] to services of No att. providers found with S/P reverse total shoulder arthroplasty, right [Z96.611] on  1/8/2019. Antibiotic and VTE prophylaxis were per SCIP protocols. Post-operatively the patient transferred to the post-operative floor where the patient underwent mobilization therapy that included active as well as passive ROM exercises. Opioids were titrated to achieve appropriate pain management to allow for participation in mobilization exercises. Vital signs are now stable. The incision is intact without signs or symptoms of infection. Operative extremity neurovascular status remains intact.   Appropriate education re: incision care, activity levels, medications, and follow up visits was completed and all questions were answered. The patient is now deemed stable for discharge to Home.      DIAGNOSTIC TESTS:   hgb decreased to 7.6  Asymptomatic   preop 9.3      Discharge and Follow up Instructions:     Total Shoulder Joint Replacement Discharge Instructions:    I. ACTIVITIES:  1. Exercises:  ? Complete exercise program as taught by the hospital physical therapist 2 times per day  ? Wear sling when in bed and when out of bed (except when doing exercises)  ? Exercise program will be advanced by the physical therapist  ? During the day be up ambulating every 2 hours (while awake) for short distances  ? Complete the ankle pump exercises at least 10 times per hour (while awake)  ? Elevate operative arm when in bed and for at least 30 minutes during the day.   ? Use cold packs 20-30 minutes approximately 5 times per day. This should be done before and after completing your exercises and at any time you are experiencing pain/ stiffness in your operative extremity.      2. Activities of Daily Living:  ? No tub baths, hot tubs, or swimming pools for 4 weeks  ? May shower and let water run over the incision on post-operative day #5 if no drainage. Do not scrub or rub the incision. Simply let the water run over the incision and pat dry.    II. Restrictions  ? No pushing, pulling, lifting, or weight bearing on operative  extremity.  ? Continue to follow shoulder precautions as instructed by hospital physical therapist  ? Your surgeon will discuss with you when you will be able to drive again.  ? First week stay inside on even terrain. May go up and down stairs one stair at a time utilizing the hand rail  ? After one week, you may venture outside.    III. Precautions:  ? Everyone that comes near you should wash their hands  ? No elective dental, genital-urinary, or colon procedures or surgical procedures for 12 weeks after surgery unless absolutely necessary.  ?  If dental work or surgical procedure is deemed absolutely necessary, you will need to contact your surgeon as you will need to take antibiotics 1 hour prior to any dental work (including teeth cleanings).  ? Please discuss with your surgeon prophylactic antibiotics as the length of time this intervention will be necessary for you varies with each patient’s health history and situation.  ? Avoid sick people. If you must be around someone who is ill, they should wear a mask.  ? Avoid visits to the Emergency Room or Urgent Care unless you are having a life threatening event.     IV. INCISION CARE:  ? Wash your hands prior to dressing changes  ? Change the dressing as needed to keep incision clean and dry. Utilize dry gauze and paper tape. Avoid touching the side of the gauze that goes against the incision with your hands.  ? No creams or ointments to the incision  ? May remove dressing once the incision is free of drainage  ? Do not touch or pick at the incision  ? Check incision every day and notify surgeon immediately if any of the following signs or symptoms are noted:  o Increase in redness  o Increase in swelling around the incision and of the entire extremity  o Increase in pain  o Drainage oozing from the incision  o Pulling apart of the edges of the incision  o Increase in overall body temperature (greater than 100.5 degrees)  ? Your surgeon will instruct you regarding  suture or staple removal    V. Medications:     1. Stool Softeners: You will be at greater risk of constipation after surgery due to being less mobile and the pain medications.   ? Take stool softeners as instructed by your surgeon while on pain medications. Over the counter Colace 100 mg 1-2 capsules twice daily.   ? If stools become too loose or too frequent, please decreases the dosage or stop the stool softener.  ? If constipation occurs despite use of stool softeners, you are to continue the stool softeners and add a laxative (Milk of Magnesia 1 ounce daily as needed)  ? Drink plenty of fluids, and eat fruits and vegetables during your recovery time    2. Pain Medications utilized after surgery are narcotics and the law requires that the following information be given to all patients that are prescribed narcotics:  ? CLASSIFICATION: Pain medications are called Opioids and are narcotics  ? LEGALITIES: It is illegal to share narcotics with others and to drive within 24 hours of taking narcotics  ? POTENTIAL SIDE EFFECTS: Potential side effects of opioids include: nausea, vomiting, itching, dizziness, drowsiness, dry mouth, constipation, and difficulty urinating.  ?  POTENTIAL ADVERSE EFFECTS:   o Opioid tolerance can develop with use of pain medications and this simply means that it requires more and more of the medication to control pain; however, this is seen more in patients that use opioids for longer periods of time.  o Opioid dependence can develop with use of Opioids and this simply means that to stop the medication can cause withdrawal symptoms; however, this is seen with patients that use Opioids for longer periods of time.  o Opioid addiction can develop with use of Opioids and the incidence of this is very unlikely in patients who take the medications as ordered and stop the medications as instructed.  o Opioid overdose can be dangerous, but is unlikely when the medication is taken as ordered and  stopped when ordered. It is important not to mix opioids with alcohol or with and type of sedative such as Benadryl as this can lead to over sedation and respiratory difficulty.  ? DOSAGE:   o Pain medications will need to be taken consistently for the first week to decrease pain and promote adequate pain relief and participation in physical therapy.  o After the initial surgical pain begins to resolve, you may begin to decrease the pain medication. By the end of 6 weeks, you should be off of pain medications.  o Refills will not be given by the office during evening hours, on weekends, or after 6 weeks post-op.  o To seek refills on pain medications during the initial 6 week post-operative period, you must call the office 48 hours in advance to request the refill. The office will then notify you when to  the prescription. DO NOT wait until you are out of the medication to request a refill.    V. FOLLOW-UP VISITS:  ? You will need to follow up in the office with your surgeon in  10-14 days. Please call this number  to schedule this appointment.  ? You will need to  follow up with your primary care physician in 4 weeks.  ? If you have any concerns or suspected complications prior to your follow up visit, please call your surgeons office. Do not wait until your appointment time if you suspect complications. These will need to be addressed in the office promptly.    Recommend follow up with pcp regarding low hgb.    Date: 1/9/19  MILA Sanchez

## 2019-01-09 NOTE — PROGRESS NOTES
Continued Stay Note  [facility]     Patient Name: Olena Myers  MRN: [id number]  Today's Date: 1/9/2019    Admit Date: 1/8/2019    Discharge Plan     Row Name 01/09/19 1403       Plan    Final Discharge Disposition Code  01 - home or self-care    Final Note  Pt d/c'ed home with no needs.        Discharge Codes    No documentation.       Expected Discharge Date and Time     Expected Discharge Date Expected Discharge Time    Jan 9, 2019             Deedee Pagan RN

## 2019-01-09 NOTE — SIGNIFICANT NOTE
01/09/19 1105   Rehab Time/Intention   Evaluation Not Performed other (see comments)  (Instructed pt. in TSR protocol 5-6 times per day (HEP given in written, pictorial format for home use).  Educated pt. on proper UE sling positioning for optimal support. Pt. with no further questions/concerns regarding mobility or HEP. Will sign off.)   Rehab Treatment   Discipline physical therapist

## 2019-01-09 NOTE — PLAN OF CARE
Problem: Patient Care Overview  Goal: Plan of Care Review  Outcome: Ongoing (interventions implemented as appropriate)   01/09/19 9513   Coping/Psychosocial   Plan of Care Reviewed With patient   Plan of Care Review   Progress improving   OTHER   Outcome Summary Pt is a post op of a rt reverse total shoulder. Dressing is intact. Small amount of drainage noted. Pt continues with the bulky dressing. Pt still has some numbness to the arm. Pt is a little worried that the arm is still numb especially with her history of myasthenia gravis. Tried to reassure pt that the block will wear off. Pt ambulated to the bathroom with an assist x1. Pt has a implanted port to the left upper chest that has been accessed. Pt continues with the arm in a sling and propped up on a apillow. Pt educated on the importance of monitoring blood pressures related to comorbidity of HTN. Pt voiced understanding. Pt plans to be discharged in the AM. Pt is resting at this time, will continue to monitor.     Goal: Individualization and Mutuality  Outcome: Ongoing (interventions implemented as appropriate)    Goal: Discharge Needs Assessment  Outcome: Ongoing (interventions implemented as appropriate)    Goal: Interprofessional Rounds/Family Conf  Outcome: Ongoing (interventions implemented as appropriate)      Problem: Shoulder Arthroplasty (Adult)  Goal: Signs and Symptoms of Listed Potential Problems Will be Absent, Minimized or Managed (Shoulder Arthroplasty)  Outcome: Ongoing (interventions implemented as appropriate)      Problem: Fall Risk (Adult)  Goal: Absence of Fall  Outcome: Ongoing (interventions implemented as appropriate)      Problem: Hypertensive Disease/Crisis (Arterial) (Adult)  Goal: Signs and Symptoms of Listed Potential Problems Will be Absent, Minimized or Managed (Hypertensive Disease/Crisis)  Outcome: Ongoing (interventions implemented as appropriate)

## 2019-04-16 ENCOUNTER — HOSPITAL ENCOUNTER (OUTPATIENT)
Dept: OTHER | Facility: HOSPITAL | Age: 84
Discharge: HOME | End: 2019-04-16

## 2019-04-16 LAB
ALBUMIN SERPL-MCNC: 3.9 G/DL (ref 3.5–5)
ALBUMIN/GLOB SERPL: 1.2 {RATIO} (ref 1.4–2.6)
ALP SERPL-CCNC: 65 U/L (ref 43–160)
ALT SERPL-CCNC: 6 U/L (ref 10–40)
ANION GAP SERPL CALC-SCNC: 19 MMOL/L (ref 8–19)
AST SERPL-CCNC: 17 U/L (ref 15–50)
BASOPHILS # BLD AUTO: 0.04 10*3/UL (ref 0–0.2)
BASOPHILS NFR BLD AUTO: 0.5 % (ref 0–3)
BILIRUB SERPL-MCNC: 0.21 MG/DL (ref 0.2–1.3)
BUN SERPL-MCNC: 14 MG/DL (ref 5–25)
BUN/CREAT SERPL: 16 {RATIO} (ref 6–20)
CALCIUM SERPL-MCNC: 9 MG/DL (ref 8.7–10.4)
CHLORIDE SERPL-SCNC: 105 MMOL/L (ref 99–111)
CONV ABS IMM GRAN: 0.03 10*3/UL (ref 0–0.2)
CONV CO2: 21 MMOL/L (ref 22–32)
CONV IMMATURE GRAN: 0.4 % (ref 0–1.8)
CONV TOTAL PROTEIN: 7.1 G/DL (ref 6.3–8.2)
CREAT UR-MCNC: 0.88 MG/DL (ref 0.5–0.9)
CRP SERPL-MCNC: 10 MG/L (ref 0–5)
DEPRECATED RDW RBC AUTO: 53.7 FL (ref 36.4–46.3)
EOSINOPHIL # BLD AUTO: 0.02 10*3/UL (ref 0–0.7)
EOSINOPHIL # BLD AUTO: 0.3 % (ref 0–7)
ERYTHROCYTE [DISTWIDTH] IN BLOOD BY AUTOMATED COUNT: 14.9 % (ref 11.7–14.4)
ERYTHROCYTE [SEDIMENTATION RATE] IN BLOOD: 72 MM/H (ref 0–30)
GFR SERPLBLD BASED ON 1.73 SQ M-ARVRAT: >60 ML/MIN/{1.73_M2}
GLOBULIN UR ELPH-MCNC: 3.2 G/DL (ref 2–3.5)
GLUCOSE SERPL-MCNC: 104 MG/DL (ref 65–99)
HBA1C MFR BLD: 8.3 G/DL (ref 12–16)
HCT VFR BLD AUTO: 27.5 % (ref 37–47)
LYMPHOCYTES # BLD AUTO: 0.45 10*3/UL (ref 1–5)
MCH RBC QN AUTO: 29.6 PG (ref 27–31)
MCHC RBC AUTO-ENTMCNC: 30.2 G/DL (ref 33–37)
MCV RBC AUTO: 98.2 FL (ref 81–99)
MONOCYTES # BLD AUTO: 0.54 10*3/UL (ref 0.2–1.2)
MONOCYTES NFR BLD AUTO: 7 % (ref 3–10)
NEUTROPHILS # BLD AUTO: 6.68 10*3/UL (ref 2–8)
NEUTROPHILS NFR BLD AUTO: 86 % (ref 30–85)
NRBC CBCN: 0 % (ref 0–0.7)
OSMOLALITY SERPL CALC.SUM OF ELEC: 291 MOSM/KG (ref 273–304)
PLATELET # BLD AUTO: 230 10*3/UL (ref 130–400)
PMV BLD AUTO: 10.5 FL (ref 9.4–12.3)
POTASSIUM SERPL-SCNC: 4.7 MMOL/L (ref 3.5–5.3)
RBC # BLD AUTO: 2.8 10*6/UL (ref 4.2–5.4)
SODIUM SERPL-SCNC: 140 MMOL/L (ref 135–147)
VARIANT LYMPHS NFR BLD MANUAL: 5.8 % (ref 20–45)
WBC # BLD AUTO: 7.76 10*3/UL (ref 4.8–10.8)

## 2019-10-02 ENCOUNTER — OFFICE VISIT (OUTPATIENT)
Dept: CARDIOLOGY | Facility: CLINIC | Age: 84
End: 2019-10-02

## 2019-10-02 ENCOUNTER — HOSPITAL ENCOUNTER (OUTPATIENT)
Dept: CARDIOLOGY | Facility: HOSPITAL | Age: 84
Discharge: HOME | End: 2019-10-02
Attending: INTERNAL MEDICINE | Admitting: INTERNAL MEDICINE

## 2019-10-02 VITALS
DIASTOLIC BLOOD PRESSURE: 70 MMHG | HEIGHT: 60 IN | SYSTOLIC BLOOD PRESSURE: 130 MMHG | BODY MASS INDEX: 28.7 KG/M2 | WEIGHT: 146.2 LBS | HEART RATE: 73 BPM

## 2019-10-02 DIAGNOSIS — I65.23 BILATERAL CAROTID ARTERY STENOSIS: ICD-10-CM

## 2019-10-02 DIAGNOSIS — I35.0 NONRHEUMATIC AORTIC VALVE STENOSIS: ICD-10-CM

## 2019-10-02 DIAGNOSIS — I25.119 CORONARY ARTERY DISEASE INVOLVING NATIVE CORONARY ARTERY OF NATIVE HEART WITH ANGINA PECTORIS (HCC): Primary | ICD-10-CM

## 2019-10-02 DIAGNOSIS — I10 ESSENTIAL HYPERTENSION: ICD-10-CM

## 2019-10-02 DIAGNOSIS — I48.0 PAROXYSMAL ATRIAL FIBRILLATION (HCC): ICD-10-CM

## 2019-10-02 DIAGNOSIS — E78.2 MIXED HYPERLIPIDEMIA: ICD-10-CM

## 2019-10-02 LAB
BH CV XLRA MEAS LEFT DIST CCA EDV: 13.2 CM/SEC
BH CV XLRA MEAS LEFT DIST CCA PSV: 61.8 CM/SEC
BH CV XLRA MEAS LEFT DIST ICA EDV: -21.1 CM/SEC
BH CV XLRA MEAS LEFT DIST ICA PSV: -60.6 CM/SEC
BH CV XLRA MEAS LEFT ICA/CCA RATIO: 1.3
BH CV XLRA MEAS LEFT MID CCA EDV: 12.4 CM/SEC
BH CV XLRA MEAS LEFT MID CCA PSV: 61.3 CM/SEC
BH CV XLRA MEAS LEFT MID ICA EDV: -26.8 CM/SEC
BH CV XLRA MEAS LEFT MID ICA PSV: -83 CM/SEC
BH CV XLRA MEAS LEFT PROX CCA EDV: 11.1 CM/SEC
BH CV XLRA MEAS LEFT PROX CCA PSV: 62 CM/SEC
BH CV XLRA MEAS LEFT PROX ECA PSV: -82.3 CM/SEC
BH CV XLRA MEAS LEFT PROX ICA EDV: -13 CM/SEC
BH CV XLRA MEAS LEFT PROX ICA PSV: -44.3 CM/SEC
BH CV XLRA MEAS LEFT PROX SCLA PSV: 86.8 CM/SEC
BH CV XLRA MEAS LEFT VERTEBRAL A PSV: -46.6 CM/SEC
BH CV XLRA MEAS RIGHT DIST CCA EDV: 14.1 CM/SEC
BH CV XLRA MEAS RIGHT DIST CCA PSV: 56.6 CM/SEC
BH CV XLRA MEAS RIGHT DIST ICA EDV: -16.3 CM/SEC
BH CV XLRA MEAS RIGHT DIST ICA PSV: -66.7 CM/SEC
BH CV XLRA MEAS RIGHT ICA/CCA RATIO: 1.3
BH CV XLRA MEAS RIGHT MID CCA EDV: 12.1 CM/SEC
BH CV XLRA MEAS RIGHT MID CCA PSV: 48.5 CM/SEC
BH CV XLRA MEAS RIGHT MID ICA EDV: -18 CM/SEC
BH CV XLRA MEAS RIGHT MID ICA PSV: -70.3 CM/SEC
BH CV XLRA MEAS RIGHT PROX CCA EDV: 12.1 CM/SEC
BH CV XLRA MEAS RIGHT PROX CCA PSV: 53 CM/SEC
BH CV XLRA MEAS RIGHT PROX ECA PSV: -93.5 CM/SEC
BH CV XLRA MEAS RIGHT PROX ICA EDV: -15.4 CM/SEC
BH CV XLRA MEAS RIGHT PROX ICA PSV: -64.5 CM/SEC
BH CV XLRA MEAS RIGHT PROX SCLA PSV: 75.6 CM/SEC
BH CV XLRA MEAS RIGHT VERTEBRAL A PSV: -31.4 CM/SEC

## 2019-10-02 PROCEDURE — 99214 OFFICE O/P EST MOD 30 MIN: CPT | Performed by: INTERNAL MEDICINE

## 2019-10-02 PROCEDURE — 93880 EXTRACRANIAL BILAT STUDY: CPT

## 2019-10-02 PROCEDURE — 93880 EXTRACRANIAL BILAT STUDY: CPT | Performed by: INTERNAL MEDICINE

## 2019-10-02 PROCEDURE — 93000 ELECTROCARDIOGRAM COMPLETE: CPT | Performed by: INTERNAL MEDICINE

## 2019-10-02 RX ORDER — L. ACIDOPHILUS/L.BULGARICUS 1MM CELL
1 TABLET ORAL DAILY
COMMUNITY
Start: 2019-08-27 | End: 2021-07-14

## 2019-10-02 RX ORDER — AZELASTINE 1 MG/ML
2 SPRAY, METERED NASAL TAKE AS DIRECTED
COMMUNITY
Start: 2019-08-27 | End: 2019-10-02

## 2019-10-02 RX ORDER — PRAVASTATIN SODIUM 10 MG
10 TABLET ORAL DAILY
COMMUNITY
Start: 2019-09-05 | End: 2020-09-04

## 2019-10-02 NOTE — PROGRESS NOTES
Date of Office Visit: 10/02/19  Encounter Provider: Olvin Hernandez MD  Place of Service: [facility] CARDIOLOGY  Patient Name: Olena Myers  :1935  Referral Provider:No ref. provider found      Chief Complaint   Patient presents with   • Follow-up     History of Present Illness  Mrs Myers is a very pleasant 84-year-old female who has a history of coronary artery disease with previous angioplasty bare-metal stent placement of the left anterior descending in  normal left her systolic function.  We saw her 2016 to clear her for shoulder surgery at that time she had a perfusion stress test that was normal.  She had her shoulder surgery in the hospital multiple times the last was with an episode of abrupt loss of vision in her left eye she [facility] was admitted had a CTA and MRA that showed no real stenosis and no definite stroke however her neurologist felt that she probably did have a cerebrovascular event.  She then had atrial fibrillation.  Had multiple problems with recurrent palpitations despite medical therapy.  She presented to the hospital  with symptomatic rapid atrial fibrillation we upped her beta blocker placed on IV diltiazem still tachycardic and symptomatic she was then placed on amiodarone  she converted back to sinus rhythm.  Should a little bit of heart failure from all that was short on some diuretics.  She was also seen by pulmonary and found to have severe COPD with her back on some inhalers.  She now comes back in for follow-up.  She did have her shoulder surgery and seems to be doing better from that standpoint.  She does have a lot of diarrhea which is being evaluated.  But she denies chest pain or pressure.  No shortness of breath, orthopnea, or PND.  No palpitations, near-syncope or syncope.  She does have some fatigue but again overall feels like she is doing reasonably well.      Coronary Artery Disease   Pertinent  negatives include no dizziness, muscle weakness or weight gain. There is no history of past myocardial infarction.   Atrial Fibrillation   Symptoms are negative for dizziness and weakness. Past medical history includes atrial fibrillation and CAD.         Past Medical History:   Diagnosis Date   • Anemia     IRON DEFICIENCY   • Arthritis    • Asthma    • Atrial fibrillation (CMS/Formerly [facility])    • CAD (coronary artery disease)     HEART STENT   • COPD (chronic obstructive pulmonary disease) (CMS/Formerly [facility])    • 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   • History of gastric ulcer    • History of GI bleed    • [place] (hard of hearing)    • Hyperlipidemia    • Hypertension    • Lightheadedness    • LVH (left ventricular hypertrophy)    • MG (myasthenia gravis) (CMS/[facility])    • Mini stroke (CMS/Formerly [facility]) 10/2016, 3/2017   • OA (osteoarthritis)    • Osteoporosis    • Sleep apnea     USES CPAP   • SOB (shortness of breath)     WALKING         Past Surgical History:   Procedure Laterality Date   • APPENDECTOMY     • CARPAL TUNNEL RELEASE Bilateral    • CATARACT EXTRACTION Bilateral    • CORONARY STENT PLACEMENT     • TOTAL HIP ARTHROPLASTY Bilateral    • TOTAL SHOULDER ARTHROPLASTY W/ DISTAL CLAVICLE EXCISION Left 7/19/2016    Procedure: LT TOTAL SHOULDER REVERSE ARTHROPLASTY;  Surgeon: NAHOMI Solorzano MD;  Location[place] [facility] [place] [facility] [place]  Service:    • TOTAL SHOULDER ARTHROPLASTY W/ DISTAL CLAVICLE EXCISION Right 1/8/2019    Procedure: RIGHT TOTAL SHOULDER REVERSE ARTHROPLASTY;  Surgeon: Jovanny Solorzano MD;  Location: [facility] [place] [facility] [place];  Service: Orthopedics         Current Outpatient Medications on File Prior to Visit   Medication Sig Dispense Refill   • acidophilus (FLORANEX) tablet tablet Take 1 tablet by mouth 2 (Two) Times a Day.     • amiodarone (PACERONE) 100 MG tablet Take 1 tablet by mouth Daily. (Patient taking differently: Take 100 mg by mouth Every Morning.) 90 tablet 3   • aspirin 81 MG EC tablet Take 81 mg by mouth Daily.     •  diphenoxylate-atropine (LOMOTIL) 2.5-0.025 MG per tablet Take 1 tablet by mouth 4 (Four) Times a Day As Needed for Diarrhea.     • DULoxetine (CYMBALTA) 60 MG capsule Take 60 mg by mouth 2 (two) times a day.     • Fluticasone Furoate-Vilanterol (BREO ELLIPTA) 100-25 MCG/INH aerosol powder  Inhale 1 puff Every Morning.     • immune globulin, human, (PRIVIGEN) 20 GM/200ML solution infusion Infuse  into a venous catheter 1 (One) Time. Every 5 weeks     • irbesartan (AVAPRO) 75 MG tablet Take 75 mg by mouth Every Night.     • levothyroxine (SYNTHROID, LEVOTHROID) 88 MCG tablet Take 88 mcg by mouth Daily.     • metoprolol succinate XL (TOPROL-XL) 25 MG 24 hr tablet Take 1 tablet by mouth 2 (Two) Times a Day. 180 tablet 3   • mometasone (NASONEX) 50 MCG/ACT nasal spray 2 sprays into each nostril Daily.     • montelukast (SINGULAIR) 10 MG tablet Take 10 mg by mouth Every Night.     • mycophenolate (CELLCEPT) 500 MG tablet Take 500 mg by mouth 2 (Two) Times a Day. 2 tabs bid      • nitroglycerin (NITROSTAT) 0.4 MG SL tablet Place 0.4 mg under the tongue Every 5 (Five) Minutes As Needed for Chest Pain.     • pravastatin (PRAVACHOL) 10 MG tablet Take 10 mg by mouth Daily.     • raloxifene (EVISTA) 60 MG tablet Take 60 mg by mouth Every Night.     • rivaroxaban (XARELTO) 20 MG tablet Take 1 tablet by mouth Daily With Dinner. 90 tablet 3   • vitamin D (ERGOCALCIFEROL) 88426 UNITS capsule capsule Take 50,000 Units by mouth Every 7 (Seven) Days. Takes on Wednesdays     • [DISCONTINUED] azelastine (ASTELIN) 0.1 % nasal spray 2 sprays into the nostril(s) as directed by provider Take As Directed.     • albuterol (PROAIR HFA) 108 (90 BASE) MCG/ACT inhaler Inhale 2 puffs Every 6 (Six) Hours As Needed for wheezing or shortness of air.     • [DISCONTINUED] oxyCODONE-acetaminophen (PERCOCET)  MG per tablet Take 1-2 tablets by mouth Every 4 to 6 Hours As Needed. 50 tablet 0   • [DISCONTINUED] rosuvastatin (CRESTOR) 40 MG tablet Take  40 mg by mouth Daily. Take 1/2 tablet daily        No current facility-administered medications on file prior to visit.          Social History     Socioeconomic History   • Marital status:      Spouse name: Not on file   • Number of children: Not on file   • Years of education: Not on file   • Highest education level: Not on file   Tobacco Use   • Smoking status: Never Smoker   • Smokeless tobacco: Never Used   Substance and Sexual Activity   • Alcohol use: No   • Drug use: No     Comment: caffine   • Sexual activity: Defer   Lifestyle   • Physical activity:     Days per week: Patient refused     Minutes per session: Patient refused   • Stress: Not on file       Family History   Problem Relation Age of Onset   • Heart disease Mother    • Hyperlipidemia Mother    • Stroke Mother    • Diabetes Mother    • Breast cancer Mother    • Heart disease Father    • Hyperlipidemia Father    • Stroke Father    • Malig Hyperthermia Neg Hx          Review of Systems   Constitution: Negative for decreased appetite, diaphoresis, fever, weakness, malaise/fatigue, weight gain and weight loss.   HENT: Negative for congestion, hearing loss, nosebleeds and tinnitus.    Eyes: Negative for blurred vision, double vision, vision loss in left eye, vision loss in right eye and visual disturbance.   Cardiovascular:        As noted in HPI   Respiratory:        As noted HPI   Endocrine: Negative for cold intolerance and heat intolerance.   Hematologic/Lymphatic: Negative for bleeding problem. Does not bruise/bleed easily.   Skin: Negative for color change, flushing, itching and rash.   Musculoskeletal: Negative for arthritis, back pain, joint pain, joint swelling, muscle weakness and myalgias.   Gastrointestinal: Positive for diarrhea. Negative for bloating, abdominal pain, constipation, dysphagia, heartburn, hematemesis, hematochezia, melena, nausea and vomiting.   Genitourinary: Negative for bladder incontinence, dysuria, frequency,  "nocturia and urgency.   Neurological: Negative for dizziness, focal weakness, headaches, light-headedness, loss of balance, numbness, paresthesias and vertigo.   Psychiatric/Behavioral: Negative for depression, memory loss and substance abuse.       Procedures      ECG 12 Lead  Date/Time: 10/2/2019 10:24 AM  Performed by: Olvin Hernandez MD  Authorized by: Olvin Hernandez MD   Comparison: compared with previous ECG   Comparison to previous ECG: First degree AV block is new.  Rhythm: sinus rhythm  Ectopy: infrequent PVCs  Rate: normal  QRS axis: normal                  Objective:    /70 (BP Location: Right arm)   Pulse 73   Ht 152.4 cm (60\")   Wt 66.3 kg (146 lb 3.2 oz)   BMI 28.55 kg/m²        Physical Exam  Physical Exam   Constitutional: She is oriented to person, place, and time. She appears well-developed and well-nourished. No distress.   HENT:   Head: Normocephalic.   Eyes: Conjunctivae are normal. Pupils are equal, round, and reactive to light. No scleral icterus.   Neck: Normal carotid pulses, no hepatojugular reflux and no JVD present. Carotid bruit is not present. No tracheal deviation, no edema and no erythema present. No thyromegaly present.   Cardiovascular: Normal rate, regular rhythm, S1 normal, S2 normal and intact distal pulses.  No extrasystoles are present. PMI is not displaced. Exam reveals no gallop, no distant heart sounds and no friction rub.   Murmur heard.   Systolic murmur is present with a grade of 2/6 at the upper right sternal border.  Pulses:       Carotid pulses are 2+ on the right side with bruit, and 2+ on the left side with bruit.       Radial pulses are 2+ on the right side, and 2+ on the left side.        Femoral pulses are 2+ on the right side, and 2+ on the left side.       Dorsalis pedis pulses are 2+ on the right side, and 2+ on the left side.        Posterior tibial pulses are 2+ on the right side, and 2+ on the left side.   Pulmonary/Chest: Effort normal and " breath sounds normal. No respiratory distress. She has no decreased breath sounds. She has no wheezes. She has no rhonchi. She has no rales. She exhibits no tenderness.   Abdominal: Soft. Bowel sounds are normal. She exhibits no distension and no mass. There is no hepatosplenomegaly. There is no tenderness. There is no rebound and no guarding.   Musculoskeletal: She exhibits no edema, tenderness or deformity.   Neurological: She is alert and oriented to person, place, and time.   Skin: Skin is warm and dry. No rash noted. She is not diaphoretic. No cyanosis or erythema. No pallor. Nails show no clubbing.   Psychiatric: She has a normal mood and affect. Her speech is normal and behavior is normal. Judgment and thought content normal.           Assessment:   1. 82-year-old female with history of severe coronary artery disease previous angioplasty bare-metal stent placement of the left anterior descending in 1997 normal left ventricular systolic function. Normal perfusion stress test in June 2016.  Coronary Artery Disease  Assessment  • The patient has no angina  • On xarelto     Plan  • Lifestyle modifications discussed include adhering to a heart healthy diet, avoidance of tobacco products, maintenance of a healthy weight, medication compliance, regular exercise and regular monitoring of cholesterol and blood pressure     Subjective - Objective  • There has been a previous stent procedure using BMS   No angina or heart failure she will see us again in follow-up in a year and call back if problems.      2. Valvular Heart disease.  echocardiogram October 2018 showed mild to moderate aortic insufficiency mild/moderate aortic stenosis, mild to moderate mitral insufficiency and mild to moderate tricuspid insufficiency. We'll continue to follow her clinically.    3. Hypertension blood pressure adequately controlled.  4. Hyperlipidemia on Vytorin and followed in PCP office.  5. Paroxysmal atrial fibrillation.  Atrial  Fibrillation and Atrial Flutter  Assessment  • The patient has paroxysmal atrial fibrillation  • This is non-valvular in etiology  • The patient's CHADS2-VASc score is 8  • A [id number] score of 2 or more is considered a high risk for a thromboembolic event  • Rivaroxaban prescribed     Plan  • Attempt to maintain sinus rhythm  • Continue rivaroxaban for antithrombotic therapy, bleeding issues discussed  • Continue amiodarone for rhythm control  TSH and liver studies done August 2019 normal chest x-ray December 2018 unremarkable.      6. Probable TIA. Most likely secondary to atrial fibrillation as outlined above.  7. Myasthenia gravis now on IgG with new port placement.  8. Heart failure preserved left ventricular ejection fraction most likely secondary to her atrial fibrillation. No recurrence.  9. COPD/sleep apnea she has a follow-up with pulmonary.   10.  Amaurosis fugax.  With paroxysmal atrial fibrillation as above would be reluctant to discontinue the Xarelto.    11.  Carotid stenosis.  Carotid ultrasound in Oct 2019 showed mild bilateral disease unchanged.           Plan:

## 2019-11-20 RX ORDER — AMIODARONE HYDROCHLORIDE 100 MG/1
100 TABLET ORAL DAILY
Qty: 90 TABLET | Refills: 2 | Status: SHIPPED | OUTPATIENT
Start: 2019-11-20 | End: 2020-07-29

## 2020-01-20 ENCOUNTER — TELEPHONE (OUTPATIENT)
Dept: CARDIOLOGY | Facility: CLINIC | Age: 85
End: 2020-01-20

## 2020-01-20 NOTE — TELEPHONE ENCOUNTER
Faxed letter to att: Hannah Albert at [phone]([facility])    Phone# [phone]/ignacio

## 2020-07-29 RX ORDER — AMIODARONE HYDROCHLORIDE 100 MG/1
TABLET ORAL
Qty: 90 TABLET | Refills: 0 | Status: SHIPPED | OUTPATIENT
Start: 2020-07-29 | End: 2020-10-08 | Stop reason: SDUPTHER

## 2020-10-08 ENCOUNTER — OFFICE VISIT (OUTPATIENT)
Dept: CARDIOLOGY | Facility: CLINIC | Age: 85
End: 2020-10-08

## 2020-10-08 ENCOUNTER — HOSPITAL ENCOUNTER (OUTPATIENT)
Dept: GENERAL RADIOLOGY | Facility: HOSPITAL | Age: 85
Discharge: HOME | End: 2020-10-08
Admitting: INTERNAL MEDICINE

## 2020-10-08 VITALS
SYSTOLIC BLOOD PRESSURE: 130 MMHG | HEIGHT: 60 IN | BODY MASS INDEX: 29.06 KG/M2 | HEART RATE: 74 BPM | WEIGHT: 148 LBS | DIASTOLIC BLOOD PRESSURE: 70 MMHG

## 2020-10-08 DIAGNOSIS — I25.119 CORONARY ARTERY DISEASE INVOLVING NATIVE CORONARY ARTERY OF NATIVE HEART WITH ANGINA PECTORIS (HCC): ICD-10-CM

## 2020-10-08 DIAGNOSIS — E78.2 MIXED HYPERLIPIDEMIA: ICD-10-CM

## 2020-10-08 DIAGNOSIS — I10 ESSENTIAL HYPERTENSION: ICD-10-CM

## 2020-10-08 DIAGNOSIS — I48.0 PAROXYSMAL ATRIAL FIBRILLATION (HCC): ICD-10-CM

## 2020-10-08 DIAGNOSIS — I35.0 NONRHEUMATIC AORTIC VALVE STENOSIS: Primary | ICD-10-CM

## 2020-10-08 DIAGNOSIS — I65.23 BILATERAL CAROTID ARTERY STENOSIS: ICD-10-CM

## 2020-10-08 PROCEDURE — 71046 X-RAY EXAM CHEST 2 VIEWS: CPT

## 2020-10-08 PROCEDURE — 99214 OFFICE O/P EST MOD 30 MIN: CPT | Performed by: INTERNAL MEDICINE

## 2020-10-08 PROCEDURE — 93000 ELECTROCARDIOGRAM COMPLETE: CPT | Performed by: INTERNAL MEDICINE

## 2020-10-08 RX ORDER — AZELASTINE 1 MG/ML
2 SPRAY, METERED NASAL 2 TIMES DAILY
COMMUNITY

## 2020-10-08 RX ORDER — COLESEVELAM 180 1/1
1250 TABLET ORAL 2 TIMES DAILY
COMMUNITY
Start: 2020-10-06 | End: 2022-02-18 | Stop reason: HOSPADM

## 2020-10-08 RX ORDER — UBIDECARENONE 75 MG
50 CAPSULE ORAL DAILY
Status: ON HOLD | COMMUNITY
End: 2022-02-18 | Stop reason: SDUPTHER

## 2020-10-08 RX ORDER — AMIODARONE HYDROCHLORIDE 100 MG/1
100 TABLET ORAL DAILY
Qty: 90 TABLET | Refills: 3 | Status: SHIPPED | OUTPATIENT
Start: 2020-10-08 | End: 2020-10-09

## 2020-10-08 RX ORDER — BEPOTASTINE BESILATE 15 MG/ML
2 SOLUTION/ DROPS OPHTHALMIC 2 TIMES DAILY PRN
Status: ON HOLD | COMMUNITY
Start: 2020-05-21 | End: 2022-02-16

## 2020-10-08 RX ORDER — PANTOPRAZOLE SODIUM 40 MG/1
40 TABLET, DELAYED RELEASE ORAL DAILY
COMMUNITY

## 2020-10-08 RX ORDER — PRAVASTATIN SODIUM 20 MG
10 TABLET ORAL DAILY
COMMUNITY
Start: 2020-10-03 | End: 2021-10-03

## 2020-10-08 NOTE — PROGRESS NOTES
Date of Office Visit: 10/08/20  Encounter Provider: Olvin Hernandez MD  Place of Service: [facility] CARDIOLOGY  Patient Name: Olena Myers  :1935  Referral Provider:Olvin Hernandez MD      Chief Complaint   Patient presents with   • Coronary Artery Disease     History of Present Illness  Mrs Myers is a very pleasant 85-year-old female who has a history of coronary artery disease with previous angioplasty bare-metal stent placement of the left anterior descending in  normal left her systolic function.  We saw her 2016 to clear her for shoulder surgery at that time she had a perfusion stress test that was normal.  She had her shoulder surgery in the hospital multiple times the last was with an episode of abrupt loss of vision in her left eye she [facility] was [facility] had a CTA and MRA that showed no real stenosis and no definite stroke however her neurologist felt that she probably did have a cerebrovascular event.  She then had atrial fibrillation.  Had multiple problems with recurrent palpitations despite medical therapy.  She presented to the hospital  with symptomatic rapid atrial fibrillation we upped her beta blocker placed on IV diltiazem still tachycardic and symptomatic she was then placed on amiodarone  she converted back to sinus rhythm.  Should a little bit of heart failure from all that was short on some diuretics.  She was also seen by pulmonary and found to have severe COPD with her back on some inhalers.  She now comes back in for follow-up.  She has been doing reasonably well she does get some shortness of breath with exertional activity but no orthopnea or PND.  She also has some fatigue.  She just got a stationary bike and is going to start riding that.  But denies palpitations, near-syncope or syncope.  Denies any abrupt loss of vision, paralysis, paresthesia, or dysarthria.  No blood in her stool or black tarry stools.   She feels like she is doing reasonably well.    Coronary Artery Disease  Pertinent negatives include no dizziness, muscle weakness or weight gain. There is no history of past myocardial infarction.   Atrial Fibrillation  Symptoms are negative for dizziness and weakness. Past medical history includes atrial fibrillation and CAD.         Past Medical History:   Diagnosis Date   • Anemia     IRON DEFICIENCY   • Arthritis    • Asthma    • Atrial fibrillation (CMS/[facility])    • CAD (coronary artery disease)     HEART STENT   • COPD (chronic obstructive pulmonary disease) (CMS/[facility])    • 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   • History of gastric ulcer    • History of GI bleed    • [place] (hard of hearing)    • Hyperlipidemia    • Hypertension    • Lightheadedness    • LVH (left ventricular hypertrophy)    • MG (myasthenia gravis) (CMS/[facility])    • Mini stroke (CMS/[facility]) 10/2016, 3/2017   • OA (osteoarthritis)    • Osteoporosis    • Sleep apnea     USES CPAP   • SOB (shortness of breath)     WALKING         Past Surgical History:   Procedure Laterality Date   • APPENDECTOMY     • CARPAL TUNNEL RELEASE Bilateral    • CATARACT EXTRACTION Bilateral    • CORONARY STENT PLACEMENT     • TOTAL HIP ARTHROPLASTY Bilateral    • TOTAL SHOULDER ARTHROPLASTY W/ DISTAL CLAVICLE EXCISION Left 7/19/2016    Procedure: LT TOTAL SHOULDER REVERSE ARTHROPLASTY;  Surgeon: NAHOMI Solorzano MD;  Location: [facility] OR [facility];  Service:    • TOTAL SHOULDER ARTHROPLASTY W/ DISTAL CLAVICLE EXCISION Right 1/8/2019    Procedure: RIGHT TOTAL SHOULDER REVERSE ARTHROPLASTY;  Surgeon: Jovanny Solorzano MD;  Location: [facility] OR [facility];  Service: Orthopedics         Current Outpatient Medications on File Prior to Visit   Medication Sig Dispense Refill   • acidophilus (FLORANEX) tablet tablet Take 1 tablet by mouth 2 (Two) Times a Day.     • albuterol (PROAIR HFA) 108 (90 BASE) MCG/ACT inhaler Inhale 2 puffs Every 6 (Six) Hours As Needed for wheezing or shortness of air.      • aspirin 81 MG EC tablet Take 81 mg by mouth Daily.     • azelastine (ASTELIN) 0.1 % nasal spray 2 sprays into the nostril(s) as directed by provider 2 (Two) Times a Day. Use in each nostril as directed     • Bepotastine Besilate (Bepreve) 1.5 % solution Take As Directed.     • colesevelam (WELCHOL) 625 MG tablet Take 1,875 mg by mouth 2 (two) times a day.     • diphenoxylate-atropine (LOMOTIL) 2.5-0.025 MG per tablet Take 1 tablet by mouth 4 (Four) Times a Day As Needed for Diarrhea.     • DULoxetine (CYMBALTA) 60 MG capsule Take 60 mg by mouth 2 (two) times a day.     • Epoetin Miguel (EPOGEN IJ) Every 14 (Fourteen) Days.     • Fluticasone Furoate-Vilanterol (BREO ELLIPTA) 100-25 MCG/INH aerosol powder  Inhale 1 puff Every Morning.     • immune globulin, human, (PRIVIGEN) 20 GM/200ML solution infusion Infuse  into a venous catheter 1 (One) Time. Every 5 weeks     • irbesartan (AVAPRO) 75 MG tablet Take 75 mg by mouth Every Night.     • levothyroxine (SYNTHROID, LEVOTHROID) 75 MCG tablet Take 75 mcg by mouth Daily.     • metoprolol succinate XL (TOPROL-XL) 25 MG 24 hr tablet Take 1 tablet by mouth 2 (Two) Times a Day. 180 tablet 3   • mometasone (NASONEX) 50 MCG/ACT nasal spray 2 sprays into each nostril Daily.     • montelukast (SINGULAIR) 10 MG tablet Take 10 mg by mouth Every Night.     • mycophenolate (CELLCEPT) 500 MG tablet Take 500 mg by mouth 2 (Two) Times a Day. 2 tabs bid      • nitroglycerin (NITROSTAT) 0.4 MG SL tablet Place 0.4 mg under the tongue Every 5 (Five) Minutes As Needed for Chest Pain.     • PACERONE 100 MG tablet TAKE ONE TABLET BY MOUTH DAILY 90 tablet 0   • pantoprazole (PROTONIX) 20 MG EC tablet Take 20 mg by mouth Daily.     • pravastatin (PRAVACHOL) 20 MG tablet Take 10 mg by mouth Daily.     • raloxifene (EVISTA) 60 MG tablet Take 60 mg by mouth Every Night.     • rivaroxaban (XARELTO) 20 MG tablet Take 1 tablet by mouth Daily With Dinner. 90 tablet 3   • vitamin B-12 (CYANOCOBALAMIN)  100 MCG tablet Take 50 mcg by mouth Daily.     • vitamin D (ERGOCALCIFEROL) 00279 UNITS capsule capsule Take 50,000 Units by mouth Every 7 (Seven) Days. Takes on Wednesdays       No current facility-administered medications on file prior to visit.          Social History     Socioeconomic History   • Marital status:      Spouse name: Not on file   • Number of children: Not on file   • Years of education: Not on file   • Highest education level: Not on file   Tobacco Use   • Smoking status: Never Smoker   • Smokeless tobacco: Never Used   Substance and Sexual Activity   • Alcohol use: No     Comment: Daily caffeine use   • Drug use: No     Comment: caffine   • Sexual activity: Defer   Lifestyle   • Physical activity     Days per week: Patient refused     Minutes per session: Patient refused   • Stress: Not on file       Family History   Problem Relation Age of Onset   • Heart disease Mother    • Hyperlipidemia Mother    • Stroke Mother    • Diabetes Mother    • Breast cancer Mother    • Heart disease Father    • Hyperlipidemia Father    • Stroke Father    • Malig Hyperthermia Neg Hx          Review of Systems   Constitution: Negative for decreased appetite, diaphoresis, fever, malaise/fatigue, weight gain and weight loss.   HENT: Negative for congestion, hearing loss, nosebleeds and tinnitus.    Eyes: Negative for blurred vision, double vision, vision loss in left eye, vision loss in right eye and visual disturbance.   Cardiovascular:        As noted in HPI   Respiratory:        As noted HPI   Endocrine: Negative for cold intolerance and heat intolerance.   Hematologic/Lymphatic: Negative for bleeding problem. Does not bruise/bleed easily.   Skin: Negative for color change, flushing, itching and rash.   Musculoskeletal: Negative for arthritis, back pain, joint pain, joint swelling, muscle weakness and myalgias.   Gastrointestinal: Negative for bloating, abdominal pain, constipation, diarrhea, dysphagia,  "heartburn, hematemesis, hematochezia, melena, nausea and vomiting.   Genitourinary: Negative for bladder incontinence, dysuria, frequency, nocturia and urgency.   Neurological: Negative for dizziness, focal weakness, headaches, light-headedness, loss of balance, numbness, paresthesias, vertigo and weakness.   Psychiatric/Behavioral: Negative for depression, memory loss and substance abuse.       Procedures      ECG 12 Lead    Date/Time: 10/8/2020 12:49 PM  Performed by: Olvin Hernandez MD  Authorized by: Olvin Hernandez MD   Comparison: compared with previous ECG   Similar to previous ECG  Rhythm: sinus rhythm  Rate: normal  Conduction: left anterior fascicular block and 1st degree AV block  QRS axis: left                  Objective:    /70   Pulse 74   Ht 152.4 cm (60\")   Wt 67.1 kg (148 lb)   BMI 28.90 kg/m²        Physical Exam  Vitals signs reviewed.   Constitutional:       General: Not in acute distress.     Appearance: Well-developed. Not diaphoretic.   Eyes:      General: No scleral icterus.     Conjunctiva/sclera: Conjunctivae normal.      Pupils: Pupils are equal, round, and reactive to light.   HENT:      Head: Normocephalic.   Neck:      Musculoskeletal: No edema or erythema.      Thyroid: No thyromegaly.      Vascular: Normal carotid pulses. No carotid bruit, hepatojugular reflux or JVD.      Trachea: No tracheal deviation.   Pulmonary:      Effort: Pulmonary effort is normal. No respiratory distress.      Breath sounds: Normal breath sounds. No decreased breath sounds. No wheezing. No rhonchi. No rales.   Chest:      Chest wall: Not tender to palpatation.   Cardiovascular:      PMI at left midclavicular line. Normal rate. Regular rhythm. S1 with normal intensity. S2 with normal intensity.      Murmurs: There is a grade 2/6 systolic murmur at the URSB, radiating to the neck.      No gallop. No click. No rub.   Pulses:     Carotid: with bruit bilaterally.  Edema:     Peripheral edema " absent.   Abdominal:      General: Bowel sounds are normal. There is no distension.      Palpations: Abdomen is soft. There is no abdominal mass.      Tenderness: There is no abdominal tenderness. There is no guarding or rebound.   Musculoskeletal:         General: No tenderness or deformity.      Extremities: No clubbing present.  Skin:     General: Skin is warm and dry. There is no cyanosis.      Coloration: Skin is not pale.      Findings: No erythema or rash.   Neurological:      Mental Status: Alert and oriented to person, place, and time.   Psychiatric:         Speech: Speech normal.         Behavior: Behavior normal.         Thought Content: Thought content normal.         Judgment: Judgment normal.             Assessment:   1. 85-year-old female with history of severe coronary artery disease previous angioplasty bare-metal stent placement of the left anterior descending in 1997 normal left ventricular systolic function. Normal perfusion stress test in June 2016.  No angina or heart failure continue the same see her again in follow-up in 1 year and call if problems.  2. Valvular Heart disease.  echocardiogram October 2018 showed mild to moderate aortic insufficiency mild/moderate aortic stenosis, mild to moderate mitral insufficiency and mild to moderate tricuspid insufficiency. We'll continue to follow her clinically.    3. Hypertension blood pressure adequately controlled.  4. Hyperlipidemia al off therapy per PCP.    5. Paroxysmal atrial fibrillation.  The patient's CHADS2-VASc score is 8.  On amiodarone and Xarelto.  Remaining in sinus rhythm.  She has a slightly elevated TSH at 5.2 followed by PCP.  Her liver studies were normal.  She is not had a chest x-ray worsening her for a chest x-ray today if that is unremarkable we will see her in a year.  6. Probable TIA. Most likely secondary to atrial fibrillation as outlined above.  7. Myasthenia gravis now on IgG with new port placement.  8. Heart failure  preserved left ventricular ejection fraction most likely secondary to her atrial fibrillation. No recurrence.  9. COPD/sleep apnea she has a follow-up with pulmonary.   10.  Amaurosis fugax.  With paroxysmal atrial fibrillation as above would be reluctant to discontinue the Xarelto.    11.  Carotid stenosis.  Carotid ultrasound in Oct 2019 showed mild bilateral disease unchanged.  When she returns in a year we will recheck a carotid ultrasound.         Plan:

## 2020-10-09 ENCOUNTER — TELEPHONE (OUTPATIENT)
Dept: CARDIOLOGY | Facility: CLINIC | Age: 85
End: 2020-10-09

## 2020-10-09 NOTE — TELEPHONE ENCOUNTER
Reading Physician Reading Date Result Priority   Kenneth Pretty Jr., MD  [phone] 10/8/2020       Narrative & Impression     TWO VIEWS OF THE CHEST     HISTORY: 85-year-old female with a history of amiodarone therapy  undergoing follow-up evaluation.     FINDINGS: PA and lateral chest radiographs were obtained. Comparison is  made to a chest radiograph dated 12/26/2018. The lungs are normal in  volume and are clear. There is mild interstitial prominence in both lung  bases. Moderate atheromatous calcification of the aortic arch is noted.  Osteopenia is noted. A left internal jugular Mediport catheter is  positioned with the tip in the SVC at the level of the cavoatrial  junction.  Evidence of prior bilateral glenohumeral joint arthroplasty  is noted. Stable rounded thoracic spine kyphosis is noted.     IMPRESSION:  There has been interval development of a slightly greater  degree of mild interstitial opacification at both lung bases which may  represent an element of interstitial fibrosis. This may be seen in the  setting of amiodarone use.     This report was finalized on 10/8/2020 8:49 PM by Dr. Kenneth Pretty,

## 2021-01-05 ENCOUNTER — HOSPITAL ENCOUNTER (EMERGENCY)
Facility: HOSPITAL | Age: 86
Discharge: HOME | End: 2021-01-05
Attending: EMERGENCY MEDICINE | Admitting: EMERGENCY MEDICINE

## 2021-01-05 ENCOUNTER — APPOINTMENT (OUTPATIENT)
Dept: GENERAL RADIOLOGY | Facility: HOSPITAL | Age: 86
End: 2021-01-05

## 2021-01-05 VITALS
TEMPERATURE: 98.8 F | SYSTOLIC BLOOD PRESSURE: 134 MMHG | DIASTOLIC BLOOD PRESSURE: 57 MMHG | HEART RATE: 110 BPM | OXYGEN SATURATION: 95 % | RESPIRATION RATE: 16 BRPM

## 2021-01-05 DIAGNOSIS — M94.0 COSTOCHONDRITIS: Primary | ICD-10-CM

## 2021-01-05 LAB
ALBUMIN SERPL-MCNC: 3.3 G/DL (ref 3.5–5.2)
ALBUMIN/GLOB SERPL: 1 G/DL
ALP SERPL-CCNC: 136 U/L (ref 39–117)
ALT SERPL W P-5'-P-CCNC: 19 U/L (ref 1–33)
ANION GAP SERPL CALCULATED.3IONS-SCNC: 11.3 MMOL/L (ref 5–15)
AST SERPL-CCNC: 50 U/L (ref 1–32)
B PARAPERT DNA SPEC QL NAA+PROBE: NOT DETECTED
B PERT DNA SPEC QL NAA+PROBE: NOT DETECTED
BASOPHILS # BLD AUTO: 0.01 10*3/MM3 (ref 0–0.2)
BASOPHILS NFR BLD AUTO: 0.2 % (ref 0–1.5)
BILIRUB SERPL-MCNC: 0.5 MG/DL (ref 0–1.2)
BUN SERPL-MCNC: 16 MG/DL (ref 8–23)
BUN/CREAT SERPL: 16.7 (ref 7–25)
C PNEUM DNA NPH QL NAA+NON-PROBE: NOT DETECTED
CALCIUM SPEC-SCNC: 8.5 MG/DL (ref 8.6–10.5)
CHLORIDE SERPL-SCNC: 105 MMOL/L (ref 98–107)
CO2 SERPL-SCNC: 18.7 MMOL/L (ref 22–29)
CREAT SERPL-MCNC: 0.96 MG/DL (ref 0.57–1)
DEPRECATED RDW RBC AUTO: 45.7 FL (ref 37–54)
EOSINOPHIL # BLD AUTO: 0 10*3/MM3 (ref 0–0.4)
EOSINOPHIL NFR BLD AUTO: 0 % (ref 0.3–6.2)
ERYTHROCYTE [DISTWIDTH] IN BLOOD BY AUTOMATED COUNT: 14.7 % (ref 12.3–15.4)
FLUAV SUBTYP SPEC NAA+PROBE: NOT DETECTED
FLUBV RNA ISLT QL NAA+PROBE: NOT DETECTED
GFR SERPL CREATININE-BSD FRML MDRD: 56 ML/MIN/1.73
GLOBULIN UR ELPH-MCNC: 3.2 GM/DL
GLUCOSE SERPL-MCNC: 106 MG/DL (ref 65–99)
HADV DNA SPEC NAA+PROBE: NOT DETECTED
HCOV 229E RNA SPEC QL NAA+PROBE: NOT DETECTED
HCOV HKU1 RNA SPEC QL NAA+PROBE: NOT DETECTED
HCOV NL63 RNA SPEC QL NAA+PROBE: NOT DETECTED
HCOV OC43 RNA SPEC QL NAA+PROBE: NOT DETECTED
HCT VFR BLD AUTO: 32.1 % (ref 34–46.6)
HGB BLD-MCNC: 10 G/DL (ref 12–15.9)
HMPV RNA NPH QL NAA+NON-PROBE: NOT DETECTED
HOLD SPECIMEN: NORMAL
HOLD SPECIMEN: NORMAL
HPIV1 RNA SPEC QL NAA+PROBE: NOT DETECTED
HPIV2 RNA SPEC QL NAA+PROBE: NOT DETECTED
HPIV3 RNA NPH QL NAA+PROBE: NOT DETECTED
HPIV4 P GENE NPH QL NAA+PROBE: NOT DETECTED
IMM GRANULOCYTES # BLD AUTO: 0.05 10*3/MM3 (ref 0–0.05)
IMM GRANULOCYTES NFR BLD AUTO: 0.9 % (ref 0–0.5)
LYMPHOCYTES # BLD AUTO: 0.04 10*3/MM3 (ref 0.7–3.1)
LYMPHOCYTES NFR BLD AUTO: 0.8 % (ref 19.6–45.3)
M PNEUMO IGG SER IA-ACNC: NOT DETECTED
MCH RBC QN AUTO: 26.8 PG (ref 26.6–33)
MCHC RBC AUTO-ENTMCNC: 31.2 G/DL (ref 31.5–35.7)
MCV RBC AUTO: 86.1 FL (ref 79–97)
MONOCYTES # BLD AUTO: 0.01 10*3/MM3 (ref 0.1–0.9)
MONOCYTES NFR BLD AUTO: 0.2 % (ref 5–12)
NEUTROPHILS NFR BLD AUTO: 5.21 10*3/MM3 (ref 1.7–7)
NEUTROPHILS NFR BLD AUTO: 97.9 % (ref 42.7–76)
NRBC BLD AUTO-RTO: 0 /100 WBC (ref 0–0.2)
NT-PROBNP SERPL-MCNC: 1482 PG/ML (ref 0–1800)
PLATELET # BLD AUTO: 145 10*3/MM3 (ref 140–450)
PMV BLD AUTO: 10.8 FL (ref 6–12)
POTASSIUM SERPL-SCNC: 3.4 MMOL/L (ref 3.5–5.2)
PROT SERPL-MCNC: 6.5 G/DL (ref 6–8.5)
QT INTERVAL: 332 MS
RBC # BLD AUTO: 3.73 10*6/MM3 (ref 3.77–5.28)
RHINOVIRUS RNA SPEC NAA+PROBE: NOT DETECTED
RSV RNA NPH QL NAA+NON-PROBE: NOT DETECTED
SARS-COV-2 RNA NPH QL NAA+NON-PROBE: NOT DETECTED
SODIUM SERPL-SCNC: 135 MMOL/L (ref 136–145)
TROPONIN T SERPL-MCNC: <0.01 NG/ML (ref 0–0.03)
WBC # BLD AUTO: 5.32 10*3/MM3 (ref 3.4–10.8)
WHOLE BLOOD HOLD SPECIMEN: NORMAL
WHOLE BLOOD HOLD SPECIMEN: NORMAL

## 2021-01-05 PROCEDURE — 80053 COMPREHEN METABOLIC PANEL: CPT | Performed by: PHYSICIAN ASSISTANT

## 2021-01-05 PROCEDURE — 84484 ASSAY OF TROPONIN QUANT: CPT | Performed by: PHYSICIAN ASSISTANT

## 2021-01-05 PROCEDURE — 85025 COMPLETE CBC W/AUTO DIFF WBC: CPT | Performed by: PHYSICIAN ASSISTANT

## 2021-01-05 PROCEDURE — 71045 X-RAY EXAM CHEST 1 VIEW: CPT

## 2021-01-05 PROCEDURE — 93005 ELECTROCARDIOGRAM TRACING: CPT

## 2021-01-05 PROCEDURE — 0202U NFCT DS 22 TRGT SARS-COV-2: CPT | Performed by: PHYSICIAN ASSISTANT

## 2021-01-05 PROCEDURE — 99283 EMERGENCY DEPT VISIT LOW MDM: CPT

## 2021-01-05 PROCEDURE — 93010 ELECTROCARDIOGRAM REPORT: CPT | Performed by: INTERNAL MEDICINE

## 2021-01-05 PROCEDURE — 83880 ASSAY OF NATRIURETIC PEPTIDE: CPT | Performed by: PHYSICIAN ASSISTANT

## 2021-01-05 PROCEDURE — 93005 ELECTROCARDIOGRAM TRACING: CPT | Performed by: EMERGENCY MEDICINE

## 2021-01-05 NOTE — ED PROVIDER NOTES
EMERGENCY DEPARTMENT ENCOUNTER    Room Number:  08/08  Date seen:  1/5/2021  Time seen: 17:21 EST  PCP: Mnada Keenan MD  Historian: Patient    HPI:  Chief complaint: Chest pain  A complete HPI/ROS/PMH/PSH/SH/FH are unobtainable due to: None  Context:Olena Myers is a 85 y.o. female, hx of myasthenia gravis, who presents to the ED with c/o putting down her [place] decorations yesterday when she developed right-sided chest pain last night when she was in bed.  She describes it as a dull pain that has been constant since then, she thinks it is due to her taking down her [place] decorations yesterday.  Patient said it occasionally radiates to her right shoulder.  Patient denies nausea, vomiting, cough.  In addition, patient has a history of COPD and says that her chronic shortness of breath is usual for, denies any worsening shortness of breath.  In addition, patient said that she had a temperature of 100.4 earlier today.    Patient was placed in face mask in first look. Patient was wearing facemask when I entered the room and throughout our encounter. I wore full protective equipment throughout this patient encounter including a face mask, goggles, and gloves. Hand hygiene was performed before donning protective equipment and after removal when leaving the room.      MEDICAL RECORD REVIEW      ALLERGIES  Neomycin, Penicillins, Hydrocodone, and Rosuvastatin    PAST MEDICAL HISTORY  Active Ambulatory Problems     Diagnosis Date Noted   • Pre-operative cardiovascular examination 06/10/2016   • S/p reverse total shoulder arthroplasty 07/19/2016   • Myasthenia gravis (CMS/[facility]) 11/23/2016   • Paroxysmal atrial fibrillation (CMS/[facility]) 11/23/2016   • HX: long term anticoagulant use 11/23/2016   • Essential hypertension 11/23/2016   • CAD (coronary artery disease) 11/23/2016   • Atrial fibrillation (CMS/[facility]) 12/21/2016   • S/P reverse total shoulder arthroplasty, right 01/08/2019     Resolved Ambulatory Problems      Diagnosis Date Noted   • Pneumonia 11/22/2016   • Loculated left pleural effusion 11/27/2016   • Rhinovirus infection 11/27/2016     Past Medical History:   Diagnosis Date   • Anemia    • Arthritis    • Asthma    • COPD (chronic obstructive pulmonary disease) (CMS/[facility])    • Dilation of esophagus    • History of gastric ulcer    • History of GI bleed    • [place] (hard of hearing)    • Hyperlipidemia    • Hypertension    • Lightheadedness    • LVH (left ventricular hypertrophy)    • MG (myasthenia gravis) (CMS/HCC)    • Mini stroke (CMS/HCC) 10/2016, 3/2017   • OA (osteoarthritis)    • Osteoporosis    • Sleep apnea    • SOB (shortness of breath)        PAST SURGICAL HISTORY  Past Surgical History:   Procedure Laterality Date   • APPENDECTOMY     • CARPAL TUNNEL RELEASE Bilateral    • CATARACT EXTRACTION Bilateral    • CORONARY STENT PLACEMENT     • TOTAL HIP ARTHROPLASTY Bilateral    • TOTAL SHOULDER ARTHROPLASTY W/ DISTAL CLAVICLE EXCISION Left 7/19/2016    Procedure: LT TOTAL SHOULDER REVERSE ARTHROPLASTY;  Surgeon: NAHOMI Solorzano MD;  Location: [facility] OR [facility] [place];  Service:    • TOTAL SHOULDER ARTHROPLASTY W/ DISTAL CLAVICLE EXCISION Right 1/8/2019    Procedure: RIGHT TOTAL SHOULDER REVERSE ARTHROPLASTY;  Surgeon: Jovanny Solorzano MD;  Location: [facility] OR [facility] [place];  Service: Orthopedics       FAMILY HISTORY  Family History   Problem Relation Age of Onset   • Heart disease Mother    • Hyperlipidemia Mother    • Stroke Mother    • Diabetes Mother    • Breast cancer Mother    • Heart disease Father    • Hyperlipidemia Father    • Stroke Father    • Malig Hyperthermia Neg Hx        SOCIAL HISTORY  Social History     Socioeconomic History   • Marital status:      Spouse name: Not on file   • Number of children: Not on file   • Years of education: Not on file   • Highest education level: Not on file   Tobacco Use   • Smoking status: Never Smoker   • Smokeless tobacco: Never Used   Substance and Sexual Activity   • Alcohol  use: No     Comment: Daily caffeine use   • Drug use: No     Comment: caffine   • Sexual activity: Defer   Lifestyle   • Physical activity     Days per week: Patient refused     Minutes per session: Patient refused   • Stress: Not on file       REVIEW OF SYSTEMS  Review of Systems    All systems reviewed and negative except for those discussed in HPI.     PHYSICAL EXAM    ED Triage Vitals   Temp Heart Rate Resp BP SpO2   01/05/21 1558 01/05/21 1558 01/05/21 1558 01/05/21 1558 01/05/21 1605   98.8 °F (37.1 °C) 92 22 133/98 93 %      Temp src Heart Rate Source Patient Position BP Location FiO2 (%)   01/05/21 1558 -- -- -- --   Tympanic         Physical Exam    I have reviewed the triage vital signs and nursing notes.      GENERAL: not distressed  HENT: nares patent  EYES: no scleral icterus  NECK: no ROM limitations  CV: regular rhythm, regular rate  RESPIRATORY: normal effort; clear to auscultation bilaterally  ABDOMEN: soft; nontender  MUSCULOSKELETAL: Reproducible right-sided chest tenderness  NEURO: alert, moves all extremities, follows commands  SKIN: warm, dry    LAB RESULTS  Recent Results (from the past 24 hour(s))   ECG 12 Lead    Collection Time: 01/05/21  4:24 PM   Result Value Ref Range    QT Interval 332 ms   Light Blue Top    Collection Time: 01/05/21  5:16 PM   Result Value Ref Range    Extra Tube hold for add-on    Green Top (Gel)    Collection Time: 01/05/21  5:16 PM   Result Value Ref Range    Extra Tube Hold for add-ons.    Lavender Top    Collection Time: 01/05/21  5:16 PM   Result Value Ref Range    Extra Tube hold for add-on    Gold Top - SST    Collection Time: 01/05/21  5:16 PM   Result Value Ref Range    Extra Tube Hold for add-ons.    Comprehensive Metabolic Panel    Collection Time: 01/05/21  5:16 PM    Specimen: Blood   Result Value Ref Range    Glucose 106 (H) 65 - 99 mg/dL    BUN 16 8 - 23 mg/dL    Creatinine 0.96 0.57 - 1.00 mg/dL    Sodium 135 (L) 136 - 145 mmol/L    Potassium 3.4 (L)  3.5 - 5.2 mmol/L    Chloride 105 98 - 107 mmol/L    CO2 18.7 (L) 22.0 - 29.0 mmol/L    Calcium 8.5 (L) 8.6 - 10.5 mg/dL    Total Protein 6.5 6.0 - 8.5 g/dL    Albumin 3.30 (L) 3.50 - 5.20 g/dL    ALT (SGPT) 19 1 - 33 U/L    AST (SGOT) 50 (H) 1 - 32 U/L    Alkaline Phosphatase 136 (H) 39 - 117 U/L    Total Bilirubin 0.5 0.0 - 1.2 mg/dL    eGFR Non African Amer 56 (L) >60 mL/min/1.73    Globulin 3.2 gm/dL    A/G Ratio 1.0 g/dL    BUN/Creatinine Ratio 16.7 7.0 - 25.0    Anion Gap 11.3 5.0 - 15.0 mmol/L   Troponin    Collection Time: 01/05/21  5:16 PM    Specimen: Blood   Result Value Ref Range    Troponin T <0.010 0.000 - 0.030 ng/mL   BNP    Collection Time: 01/05/21  5:16 PM    Specimen: Blood   Result Value Ref Range    proBNP 1,482.0 0.0-1,800.0 pg/mL   CBC Auto Differential    Collection Time: 01/05/21  5:16 PM    Specimen: Blood   Result Value Ref Range    WBC 5.32 3.40 - 10.80 10*3/mm3    RBC 3.73 (L) 3.77 - 5.28 10*6/mm3    Hemoglobin 10.0 (L) 12.0 - 15.9 g/dL    Hematocrit 32.1 (L) 34.0 - 46.6 %    MCV 86.1 79.0 - 97.0 fL    MCH 26.8 26.6 - 33.0 pg    MCHC 31.2 (L) 31.5 - 35.7 g/dL    RDW 14.7 12.3 - 15.4 %    RDW-SD 45.7 37.0 - 54.0 fl    MPV 10.8 6.0 - 12.0 fL    Platelets 145 140 - 450 10*3/mm3    Neutrophil % 97.9 (H) 42.7 - 76.0 %    Lymphocyte % 0.8 (L) 19.6 - 45.3 %    Monocyte % 0.2 (L) 5.0 - 12.0 %    Eosinophil % 0.0 (L) 0.3 - 6.2 %    Basophil % 0.2 0.0 - 1.5 %    Immature Grans % 0.9 (H) 0.0 - 0.5 %    Neutrophils, Absolute 5.21 1.70 - 7.00 10*3/mm3    Lymphocytes, Absolute 0.04 (L) 0.70 - 3.10 10*3/mm3    Monocytes, Absolute 0.01 (L) 0.10 - 0.90 10*3/mm3    Eosinophils, Absolute 0.00 0.00 - 0.40 10*3/mm3    Basophils, Absolute 0.01 0.00 - 0.20 10*3/mm3    Immature Grans, Absolute 0.05 0.00 - 0.05 10*3/mm3    nRBC 0.0 0.0 - 0.2 /100 WBC   Respiratory Panel PCR w/COVID-19(SARS-CoV-2) BISI/TREY/DAVI/PAD/COR/MAD/MARY In-House, NP Swab in [facility], 3-4 HR TAT - Swab, Nasopharynx    Collection Time: 01/05/21   5:53 PM    Specimen: Nasopharynx; Swab   Result Value Ref Range    ADENOVIRUS, PCR Not Detected Not Detected    Coronavirus 229E Not Detected Not Detected    Coronavirus HKU1 Not Detected Not Detected    Coronavirus NL63 Not Detected Not Detected    Coronavirus OC43 Not Detected Not Detected    COVID19 Not Detected Not Detected - Ref. Range    Human Metapneumovirus Not Detected Not Detected    Human Rhinovirus/Enterovirus Not Detected Not Detected    Influenza A PCR Not Detected Not Detected    Influenza B PCR Not Detected Not Detected    Parainfluenza Virus 1 Not Detected Not Detected    Parainfluenza Virus 2 Not Detected Not Detected    Parainfluenza Virus 3 Not Detected Not Detected    Parainfluenza Virus 4 Not Detected Not Detected    RSV, PCR Not Detected Not Detected    Bordetella pertussis pcr Not Detected Not Detected    Bordetella parapertussis PCR Not Detected Not Detected    Chlamydophila pneumoniae PCR Not Detected Not Detected    Mycoplasma pneumo by PCR Not Detected Not Detected         RADIOLOGY RESULTS  XR Chest 1 View   Final Result            PROGRESS, DATA ANALYSIS, CONSULTS AND MEDICAL DECISION MAKING  All labs have been independently reviewed by me.  All radiology studies have been reviewed by me and discussed with radiologist dictating the report. Discussion below represents my analysis of pertinent findings related to patient's condition, differential diagnosis, treatment plan and final disposition.     ED Course as of Jan 05 2300   Tue Jan 05, 2021   1950 The EKG does show Q waves in lead III and aVF.  Does not show a STEMI.  Patient's chest pain is reproducible.  The chest pain started last night and the troponin today is negative.  The chest discomfort started after she was taking out [place] decorations.  Believe this is more musculoskeletal.  I did inform her of the EKG and advised her to follow-up with her cardiologist.  She says that she is to see Dr. Hernandez but was recently  informed that he is retiring and was given information for a new cardiologist which she has a contact info for.  I educated her to return to the ER with any concerning or worsening symptoms.  She expressed understanding.    [SS]      ED Course User Index  [SS] Clau Bassett PA-C       The differential diagnosis include but are not limited to ACS, costochondritis, fracture.         Reviewed pt's history and workup with Dr. Guy.  After a bedside evaluation, Dr. Guy agrees with the plan of care.    (FOR DISCHARGE)The patient's history, physical exam, and lab findings were discussed with the physician, who also performed a face to face history and physical exam.  I discussed all results and noted any abnormalities with patient.  Discussed absoute need to recheck abnormalities with their family physician.  I answered any of the patient's questions.  Discussed plan for discharge, as there is no emergent indication for admission.  Pt is agreeable and understands need for follow up and repeat testing.  Pt is aware that discharge does not mean that nothing is wrong but it indicates no emergency is present and they must continue care with their family physician.  Pt is discharged with instructions to follow up with primary care doctor to have their blood pressure rechecked.       Disposition vitals:  /57   Pulse 110   Temp 98.8 °F (37.1 °C) (Tympanic)   Resp 16   SpO2 95%       DIAGNOSIS  Final diagnoses:   Costochondritis       FOLLOW UP   Nghia Mayberry III, MD  [address] [id number] [phone]    Call in 1 day  For close follow-up on your chest pain    [facility] Emergency Department  [address] [facility] [address] [phone]    As needed, If symptoms worsen         Clau Bassett PA-C  01/05/21 1538

## 2021-01-06 ENCOUNTER — TELEPHONE (OUTPATIENT)
Dept: CARDIOLOGY | Facility: CLINIC | Age: 86
End: 2021-01-06

## 2021-01-06 NOTE — ED NOTES
Pt alert and oriented x 4. Educated on discharge instructions. PT verbalized understanding. PT escorted out in wheel chair. Pt in no distress at this time.          Shanda Meza, RN  01/05/21 2033

## 2021-01-06 NOTE — DISCHARGE INSTRUCTIONS
Please take Tylenol for your pain.  Follow-up with Dr. Nghia Mayberry tomorrow or your cardiologist for a follow-up on your chest pain.  Return to the ER if you develop any concerning or worsening symptoms.

## 2021-01-06 NOTE — TELEPHONE ENCOUNTER
Patient called to report she was seen in the ER last night.  She had cxr and labs.  She has an appt with you on Monday 1/11/21.  She would like to make this a telephone visit if possible.  Please advise. / ASHOK     Patient's phone number is [phone]

## 2021-01-06 NOTE — ED PROVIDER NOTES
Pt presents to the ED c/o  reproducible right-sided chest pain which started yesterday after she was reaching up in her closet to put away [place] decorations.  She is chronically short of breath, this is not worse recently.  Palpation aggravates the pain.  No fevers or chills.  No nausea or vomiting.     On exam,   Awake, alert, no acute distress  Lungs-clear to auscultation bilaterally  CV-regular rhythm and rate     Plan: COVID-19 testing is negative.  Troponin is normal.  BMP is normal.  EKG performed at 1624 and interpreted by me shows sinus tachycardia with a heart rate of 112 bpm.  KY intervals, are unremarkable.  There are Q waves in lead III and aVF.  There are nonspecific ST-T changes.  No significant change when compared to 10/8/2020.    Chest pain does not sound cardiac in nature.  Her troponin is negative.  Pain is reproducible with palpation is started after stretching to reach up in her closet.  She does have new Q waves inferiorly-this can be followed as an outpatient.    Appropriate PPE was worn by myself and the patient throughout entire interaction.       Attestation:  The DAYAMI and I have discussed this patient's history, physical exam, and treatment plan.  I have reviewed the documentation and personally had a face to face interaction with the patient. I affirm the documentation and agree with the treatment and plan.  The attached note describes my personal findings.            Jaquan Guy MD  01/05/21 1943

## 2021-01-06 NOTE — TELEPHONE ENCOUNTER
Called patient to inform visit changed to telephone visit rather than in office.  She verbalized understanding.  She will try to borrow a BP cuff in order to obtain her BP that morning.   I changed appt type in Epic./ ASHOK

## 2021-01-11 ENCOUNTER — OFFICE VISIT (OUTPATIENT)
Dept: CARDIOLOGY | Facility: CLINIC | Age: 86
End: 2021-01-11

## 2021-01-11 VITALS — WEIGHT: 144 LBS | BODY MASS INDEX: 28.27 KG/M2 | HEIGHT: 60 IN

## 2021-01-11 DIAGNOSIS — I25.119 CORONARY ARTERY DISEASE INVOLVING NATIVE CORONARY ARTERY OF NATIVE HEART WITH ANGINA PECTORIS (HCC): Primary | ICD-10-CM

## 2021-01-11 DIAGNOSIS — E78.2 MIXED HYPERLIPIDEMIA: ICD-10-CM

## 2021-01-11 DIAGNOSIS — I65.23 BILATERAL CAROTID ARTERY STENOSIS: ICD-10-CM

## 2021-01-11 DIAGNOSIS — I10 ESSENTIAL HYPERTENSION: ICD-10-CM

## 2021-01-11 DIAGNOSIS — I48.0 PAROXYSMAL ATRIAL FIBRILLATION (HCC): ICD-10-CM

## 2021-01-11 PROCEDURE — 99441 PR PHYS/QHP TELEPHONE EVALUATION 5-10 MIN: CPT | Performed by: NURSE PRACTITIONER

## 2021-01-11 RX ORDER — NITROGLYCERIN 0.4 MG/1
0.4 TABLET SUBLINGUAL
Qty: 20 TABLET | Refills: 1 | Status: ON HOLD | OUTPATIENT
Start: 2021-01-11 | End: 2022-02-16

## 2021-01-11 NOTE — PROGRESS NOTES
Date of Office Visit: 21  Encounter Provider: MILA Macario  Place of Service: [facility]  Patient Name: Olena Myers  :1935    Chief Complaint   Patient presents with   • Paroxysmal atrial fibrillation   • Coronary artery disease involving native coronary artery of    • Nonrheumatic aortic valve stenosis   • Shortness of Breath   • Palpitations   • Follow-up   :     HPI: Olena Myers is a 85 y.o. female  with history of hypertension, hyperlipidemia, carotid artery stenosis, paroxysmal atrial fibrillation, aortic valve stenosis, and coronary artery disease.      She has been followed by Dr. Olvin Hernandez.  I will visit with her for the first time today and have reviewed her medical record.    She has history of previous angioplasty and bare-metal stent of the LAD.  Normal left ventricular systolic function at that time.  She had a follow-up perfusion stress test for surgery clearance and that was normal.  She then had abrupt loss of vision in her left eye.  She had a CTA in [facility] which showed no real stenoses.  Neurology felt that she had a cerebrovascular event.  Was later found to have atrial fibrillation and placed on beta-blocker.  She converted with amiodarone but then had some increased COPD and evidence of fibrosis so amiodarone was later stopped.    She then was treated for costochondritis in the emergency department 2021.  She had no further chest pain the following day.  We visit today via telephone.  She gets infusion for myasthenia gravis every 5 weeks.  This weekend when she had her infusion her blood pressure was checked by the nurse and it was 110/66 reportedly.  She had issues with frequent diarrhea for several years.  She said prior GI evaluation and was told everything okay.  She takes Lomotil as needed.  She has shortness of breath on exertion which is stable which she relates to chronic lung disease.  She has  "occasional palpitations no near syncope or syncope, dizziness or lightheadedness.  She denies edema.  She denies any further chest pain since the emergency department visit.  She has chronic fatigue unchanged.  Overall, she reports doing well.    Allergies   Allergen Reactions   • Neomycin Anaphylaxis   • Penicillins Anaphylaxis   • Hydrocodone Itching and Rash   • Rosuvastatin Other (See Comments)     Muscle cramps           Family and social history reviewed.     ROS  All other systems were reviewed and are negative          Objective:     Vitals:    01/11/21 1431   Weight: 65.3 kg (144 lb)   Height: 152.4 cm (60\")     Body mass index is 28.12 kg/m².    PHYSICAL EXAM:  Physical Exam    Procedures      Current Outpatient Medications   Medication Sig Dispense Refill   • acidophilus (FLORANEX) tablet tablet Take 1 tablet by mouth 2 (Two) Times a Day.     • albuterol (PROAIR HFA) 108 (90 BASE) MCG/ACT inhaler Inhale 2 puffs Every 6 (Six) Hours As Needed for wheezing or shortness of air.     • aspirin 81 MG EC tablet Take 81 mg by mouth Daily.     • azelastine (ASTELIN) 0.1 % nasal spray 2 sprays into the nostril(s) as directed by provider 2 (Two) Times a Day. Use in each nostril as directed     • Bepotastine Besilate (Bepreve) 1.5 % solution Take As Directed.     • colesevelam (WELCHOL) 625 MG tablet Take 1,875 mg by mouth 2 (two) times a day.     • diphenoxylate-atropine (LOMOTIL) 2.5-0.025 MG per tablet Take 1 tablet by mouth 4 (Four) Times a Day As Needed for Diarrhea.     • DULoxetine (CYMBALTA) 60 MG capsule Take 60 mg by mouth 2 (two) times a day.     • Epoetin Miguel (EPOGEN IJ) Every 14 (Fourteen) Days.     • Fluticasone Furoate-Vilanterol (BREO ELLIPTA) 100-25 MCG/INH aerosol powder  Inhale 1 puff Every Morning.     • immune globulin, human, (PRIVIGEN) 20 GM/200ML solution infusion Infuse  into a venous catheter 1 (One) Time. Every 5 weeks     • irbesartan (AVAPRO) 75 MG tablet Take 75 mg by mouth Every Night.  "    • levothyroxine (SYNTHROID, LEVOTHROID) 75 MCG tablet Take 75 mcg by mouth Daily.     • metoprolol succinate XL (TOPROL-XL) 25 MG 24 hr tablet Take 1 tablet by mouth 2 (Two) Times a Day. 180 tablet 3   • mometasone (NASONEX) 50 MCG/ACT nasal spray 2 sprays into each nostril Daily.     • montelukast (SINGULAIR) 10 MG tablet Take 10 mg by mouth Every Night.     • mycophenolate (CELLCEPT) 500 MG tablet Take 1,000 mg by mouth 2 (Two) Times a Day. 2 tabs bid      • nitroglycerin (NITROSTAT) 0.4 MG SL tablet Place 1 tablet under the tongue Every 5 (Five) Minutes As Needed for Chest Pain. 20 tablet 1   • pantoprazole (PROTONIX) 20 MG EC tablet Take 20 mg by mouth Daily.     • pravastatin (PRAVACHOL) 20 MG tablet Take 10 mg by mouth Daily.     • raloxifene (EVISTA) 60 MG tablet Take 60 mg by mouth Every Night.     • rivaroxaban (XARELTO) 20 MG tablet Take 1 tablet by mouth Daily With Dinner. 90 tablet 3   • vitamin B-12 (CYANOCOBALAMIN) 100 MCG tablet Take 50 mcg by mouth Daily.     • vitamin D (ERGOCALCIFEROL) 89973 UNITS capsule capsule Take 50,000 Units by mouth Every 7 (Seven) Days. Takes on Wednesdays       No current facility-administered medications for this visit.      Assessment:       Diagnosis Plan   1. Coronary artery disease involving native coronary artery of native heart with angina pectoris (CMS/HCC)     2. Paroxysmal atrial fibrillation (CMS/HCC)     3. Essential hypertension     4. Mixed hyperlipidemia     5. Bilateral carotid artery stenosis          No orders of the defined types were placed in this encounter.        Plan:     1. 85-year-old female with history of severe coronary artery disease and previous bare-metal stent and angioplasty of the LAD in 1997.  Last stress test June 2016 was negative for ischemia  2.  Hypertension blood pressure 110/66 over the weekend  3.  Hyperlipidemia.  She is no longer on statin therapy per PCP  4.  History of  aortic stenoses with mild to moderate  regurgitation-last echo was October 2018 need to consider follow-up when she returns  5.  Paroxysmal atrial fibrillation.  She has a [id number] score of 8.  She is anticoagulated with Xarelto.  Had fibrotic changes on the lungs with amiodarone so that was discontinued  6 history of COPD follows with pulmonary  7.  Carotid artery stenosis she is to have follow-up duplex and she returns  8.  History of probable TIA  9.  Myasthenia gravis follows with neurology    This patient has consented to a telehealth visit via telephone. The visit was scheduled as a telephone visit to comply with patient safety concerns in accordance with CDC recommendations.  All vitals recorded within this visit are reported by the patient.  I spent  15  minutes in total including but not limited to the 8 minutes spent in direct conversation with this patient.           Keep follow-up appointment scheduled October 2021 and call with any questions or concerns          It has been a pleasure to participate in this patient's care.      Thank you,  MILA Macario      **I used Dragon to dictate this note:**

## 2021-07-05 ENCOUNTER — HOSPITAL ENCOUNTER (INPATIENT)
Facility: HOSPITAL | Age: 86
LOS: 5 days | Discharge: HOME HEALTH | End: 2021-07-10
Attending: EMERGENCY MEDICINE | Admitting: STUDENT IN AN ORGANIZED HEALTH CARE EDUCATION/TRAINING PROGRAM

## 2021-07-05 ENCOUNTER — APPOINTMENT (OUTPATIENT)
Dept: CARDIOLOGY | Facility: HOSPITAL | Age: 86
End: 2021-07-05

## 2021-07-05 ENCOUNTER — APPOINTMENT (OUTPATIENT)
Dept: GENERAL RADIOLOGY | Facility: HOSPITAL | Age: 86
End: 2021-07-05

## 2021-07-05 DIAGNOSIS — N39.0 ACUTE UTI: Primary | ICD-10-CM

## 2021-07-05 DIAGNOSIS — E87.1 HYPONATREMIA: ICD-10-CM

## 2021-07-05 DIAGNOSIS — D64.9 ANEMIA, UNSPECIFIED TYPE: ICD-10-CM

## 2021-07-05 DIAGNOSIS — K92.2 GASTROINTESTINAL HEMORRHAGE, UNSPECIFIED GASTROINTESTINAL HEMORRHAGE TYPE: ICD-10-CM

## 2021-07-05 PROBLEM — R06.00 DYSPNEA: Status: ACTIVE | Noted: 2021-07-05

## 2021-07-05 PROBLEM — I50.33 ACUTE ON CHRONIC DIASTOLIC CHF (CONGESTIVE HEART FAILURE) (HCC): Status: ACTIVE | Noted: 2021-07-05

## 2021-07-05 PROBLEM — A41.9 SEPSIS (HCC): Status: ACTIVE | Noted: 2021-07-05

## 2021-07-05 LAB
ALBUMIN SERPL-MCNC: 3.6 G/DL (ref 3.5–5.2)
ALBUMIN/GLOB SERPL: 1.5 G/DL
ALP SERPL-CCNC: 64 U/L (ref 39–117)
ALT SERPL W P-5'-P-CCNC: 9 U/L (ref 1–33)
ANION GAP SERPL CALCULATED.3IONS-SCNC: 12.3 MMOL/L (ref 5–15)
AST SERPL-CCNC: 14 U/L (ref 1–32)
BACTERIA UR QL AUTO: ABNORMAL /HPF
BASOPHILS # BLD AUTO: 0.04 10*3/MM3 (ref 0–0.2)
BASOPHILS NFR BLD AUTO: 0.3 % (ref 0–1.5)
BILIRUB SERPL-MCNC: 0.6 MG/DL (ref 0–1.2)
BILIRUB UR QL STRIP: NEGATIVE
BUN SERPL-MCNC: 11 MG/DL (ref 8–23)
BUN/CREAT SERPL: 12.4 (ref 7–25)
CALCIUM SPEC-SCNC: 8.8 MG/DL (ref 8.6–10.5)
CHLORIDE SERPL-SCNC: 91 MMOL/L (ref 98–107)
CHLORIDE UR-SCNC: 38 MMOL/L
CLARITY UR: ABNORMAL
CO2 SERPL-SCNC: 19.7 MMOL/L (ref 22–29)
COLOR UR: YELLOW
CREAT SERPL-MCNC: 0.89 MG/DL (ref 0.57–1)
CREAT UR-MCNC: 59.7 MG/DL
D DIMER PPP FEU-MCNC: 3 MCGFEU/ML (ref 0–0.49)
D-LACTATE SERPL-SCNC: 0.8 MMOL/L (ref 0.5–2)
DEPRECATED RDW RBC AUTO: 42.5 FL (ref 37–54)
EOSINOPHIL # BLD AUTO: 0 10*3/MM3 (ref 0–0.4)
EOSINOPHIL NFR BLD AUTO: 0 % (ref 0.3–6.2)
ERYTHROCYTE [DISTWIDTH] IN BLOOD BY AUTOMATED COUNT: 13.5 % (ref 12.3–15.4)
EXPIRATION DATE: ABNORMAL
FECAL OCCULT BLOOD SCREEN, POC: POSITIVE
FERRITIN SERPL-MCNC: 390 NG/ML (ref 13–150)
GFR SERPL CREATININE-BSD FRML MDRD: 61 ML/MIN/1.73
GFR SERPL CREATININE-BSD FRML MDRD: 73 ML/MIN/1.73
GLOBULIN UR ELPH-MCNC: 2.4 GM/DL
GLUCOSE SERPL-MCNC: 131 MG/DL (ref 65–99)
GLUCOSE UR STRIP-MCNC: NEGATIVE MG/DL
GRAN CASTS URNS QL MICRO: ABNORMAL /LPF
HCT VFR BLD AUTO: 26.4 % (ref 34–46.6)
HCT VFR BLD AUTO: 26.7 % (ref 34–46.6)
HGB BLD-MCNC: 8.5 G/DL (ref 12–15.9)
HGB BLD-MCNC: 8.7 G/DL (ref 12–15.9)
HGB UR QL STRIP.AUTO: ABNORMAL
HYALINE CASTS UR QL AUTO: ABNORMAL /LPF
IMM GRANULOCYTES # BLD AUTO: 0.09 10*3/MM3 (ref 0–0.05)
IMM GRANULOCYTES NFR BLD AUTO: 0.6 % (ref 0–0.5)
IRON 24H UR-MRATE: 21 MCG/DL (ref 37–145)
IRON SATN MFR SERPL: 7 % (ref 20–50)
KETONES UR QL STRIP: NEGATIVE
LEUKOCYTE ESTERASE UR QL STRIP.AUTO: ABNORMAL
LYMPHOCYTES # BLD AUTO: 0.38 10*3/MM3 (ref 0.7–3.1)
LYMPHOCYTES NFR BLD AUTO: 2.6 % (ref 19.6–45.3)
Lab: 164
MCH RBC QN AUTO: 28.9 PG (ref 26.6–33)
MCHC RBC AUTO-ENTMCNC: 33 G/DL (ref 31.5–35.7)
MCV RBC AUTO: 87.7 FL (ref 79–97)
MONOCYTES # BLD AUTO: 1.13 10*3/MM3 (ref 0.1–0.9)
MONOCYTES NFR BLD AUTO: 7.7 % (ref 5–12)
NEGATIVE CONTROL: NEGATIVE
NEUTROPHILS NFR BLD AUTO: 13 10*3/MM3 (ref 1.7–7)
NEUTROPHILS NFR BLD AUTO: 88.8 % (ref 42.7–76)
NITRITE UR QL STRIP: POSITIVE
NRBC BLD AUTO-RTO: 0 /100 WBC (ref 0–0.2)
NT-PROBNP SERPL-MCNC: 2029 PG/ML (ref 0–1800)
OSMOLALITY SERPL: 260 MOSM/KG (ref 280–301)
OSMOLALITY UR: 255 MOSM/KG
PH UR STRIP.AUTO: <=5 [PH] (ref 5–8)
PHOSPHATE SERPL-MCNC: 2.1 MG/DL (ref 2.5–4.5)
PLATELET # BLD AUTO: 215 10*3/MM3 (ref 140–450)
PMV BLD AUTO: 9.1 FL (ref 6–12)
POSITIVE CONTROL: POSITIVE
POTASSIUM SERPL-SCNC: 3.5 MMOL/L (ref 3.5–5.2)
PROCALCITONIN SERPL-MCNC: 0.9 NG/ML (ref 0–0.25)
PROT SERPL-MCNC: 6 G/DL (ref 6–8.5)
PROT UR QL STRIP: ABNORMAL
QT INTERVAL: 316 MS
RBC # BLD AUTO: 3.01 10*6/MM3 (ref 3.77–5.28)
RBC # UR: ABNORMAL /HPF
REF LAB TEST METHOD: ABNORMAL
SARS-COV-2 RNA RESP QL NAA+PROBE: NOT DETECTED
SODIUM SERPL-SCNC: 123 MMOL/L (ref 136–145)
SODIUM SERPL-SCNC: 126 MMOL/L (ref 136–145)
SP GR UR STRIP: 1.01 (ref 1–1.03)
SQUAMOUS #/AREA URNS HPF: ABNORMAL /HPF
T4 FREE SERPL-MCNC: 1.14 NG/DL (ref 0.93–1.7)
TIBC SERPL-MCNC: 320 MCG/DL (ref 298–536)
TRANSFERRIN SERPL-MCNC: 215 MG/DL (ref 200–360)
TROPONIN T SERPL-MCNC: <0.01 NG/ML (ref 0–0.03)
TSH SERPL DL<=0.05 MIU/L-ACNC: 0.68 UIU/ML (ref 0.27–4.2)
URATE SERPL-MCNC: 5 MG/DL (ref 2.4–5.7)
UROBILINOGEN UR QL STRIP: ABNORMAL
WBC # BLD AUTO: 14.64 10*3/MM3 (ref 3.4–10.8)
WBC UR QL AUTO: ABNORMAL /HPF

## 2021-07-05 PROCEDURE — 84300 ASSAY OF URINE SODIUM: CPT | Performed by: INTERNAL MEDICINE

## 2021-07-05 PROCEDURE — 93306 TTE W/DOPPLER COMPLETE: CPT | Performed by: INTERNAL MEDICINE

## 2021-07-05 PROCEDURE — 82570 ASSAY OF URINE CREATININE: CPT | Performed by: INTERNAL MEDICINE

## 2021-07-05 PROCEDURE — 36415 COLL VENOUS BLD VENIPUNCTURE: CPT | Performed by: NURSE PRACTITIONER

## 2021-07-05 PROCEDURE — 84484 ASSAY OF TROPONIN QUANT: CPT | Performed by: NURSE PRACTITIONER

## 2021-07-05 PROCEDURE — 63710000001 MYCOPHENOLATE MOFETIL PER 250 MG: Performed by: STUDENT IN AN ORGANIZED HEALTH CARE EDUCATION/TRAINING PROGRAM

## 2021-07-05 PROCEDURE — 84550 ASSAY OF BLOOD/URIC ACID: CPT | Performed by: NURSE PRACTITIONER

## 2021-07-05 PROCEDURE — 80053 COMPREHEN METABOLIC PANEL: CPT | Performed by: NURSE PRACTITIONER

## 2021-07-05 PROCEDURE — 84443 ASSAY THYROID STIM HORMONE: CPT | Performed by: INTERNAL MEDICINE

## 2021-07-05 PROCEDURE — 85025 COMPLETE CBC W/AUTO DIFF WBC: CPT | Performed by: NURSE PRACTITIONER

## 2021-07-05 PROCEDURE — 84145 PROCALCITONIN (PCT): CPT | Performed by: NURSE PRACTITIONER

## 2021-07-05 PROCEDURE — 87150 DNA/RNA AMPLIFIED PROBE: CPT | Performed by: NURSE PRACTITIONER

## 2021-07-05 PROCEDURE — 85018 HEMOGLOBIN: CPT | Performed by: NURSE PRACTITIONER

## 2021-07-05 PROCEDURE — 84439 ASSAY OF FREE THYROXINE: CPT | Performed by: STUDENT IN AN ORGANIZED HEALTH CARE EDUCATION/TRAINING PROGRAM

## 2021-07-05 PROCEDURE — 93005 ELECTROCARDIOGRAM TRACING: CPT | Performed by: NURSE PRACTITIONER

## 2021-07-05 PROCEDURE — 82436 ASSAY OF URINE CHLORIDE: CPT | Performed by: INTERNAL MEDICINE

## 2021-07-05 PROCEDURE — 25010000002 MORPHINE PER 10 MG: Performed by: NURSE PRACTITIONER

## 2021-07-05 PROCEDURE — 83880 ASSAY OF NATRIURETIC PEPTIDE: CPT | Performed by: NURSE PRACTITIONER

## 2021-07-05 PROCEDURE — 87040 BLOOD CULTURE FOR BACTERIA: CPT | Performed by: NURSE PRACTITIONER

## 2021-07-05 PROCEDURE — 71045 X-RAY EXAM CHEST 1 VIEW: CPT

## 2021-07-05 PROCEDURE — 87186 SC STD MICRODIL/AGAR DIL: CPT | Performed by: NURSE PRACTITIONER

## 2021-07-05 PROCEDURE — 82728 ASSAY OF FERRITIN: CPT | Performed by: NURSE PRACTITIONER

## 2021-07-05 PROCEDURE — 84100 ASSAY OF PHOSPHORUS: CPT | Performed by: STUDENT IN AN ORGANIZED HEALTH CARE EDUCATION/TRAINING PROGRAM

## 2021-07-05 PROCEDURE — 85379 FIBRIN DEGRADATION QUANT: CPT | Performed by: NURSE PRACTITIONER

## 2021-07-05 PROCEDURE — U0003 INFECTIOUS AGENT DETECTION BY NUCLEIC ACID (DNA OR RNA); SEVERE ACUTE RESPIRATORY SYNDROME CORONAVIRUS 2 (SARS-COV-2) (CORONAVIRUS DISEASE [COVID-19]), AMPLIFIED PROBE TECHNIQUE, MAKING USE OF HIGH THROUGHPUT TECHNOLOGIES AS DESCRIBED BY CMS-2020-01-R: HCPCS | Performed by: NURSE PRACTITIONER

## 2021-07-05 PROCEDURE — 25010000002 ONDANSETRON PER 1 MG: Performed by: NURSE PRACTITIONER

## 2021-07-05 PROCEDURE — 83605 ASSAY OF LACTIC ACID: CPT | Performed by: NURSE PRACTITIONER

## 2021-07-05 PROCEDURE — 93306 TTE W/DOPPLER COMPLETE: CPT

## 2021-07-05 PROCEDURE — 81001 URINALYSIS AUTO W/SCOPE: CPT | Performed by: NURSE PRACTITIONER

## 2021-07-05 PROCEDURE — 99284 EMERGENCY DEPT VISIT MOD MDM: CPT

## 2021-07-05 PROCEDURE — 93010 ELECTROCARDIOGRAM REPORT: CPT | Performed by: INTERNAL MEDICINE

## 2021-07-05 PROCEDURE — 83540 ASSAY OF IRON: CPT | Performed by: NURSE PRACTITIONER

## 2021-07-05 PROCEDURE — 83930 ASSAY OF BLOOD OSMOLALITY: CPT | Performed by: INTERNAL MEDICINE

## 2021-07-05 PROCEDURE — 87086 URINE CULTURE/COLONY COUNT: CPT | Performed by: STUDENT IN AN ORGANIZED HEALTH CARE EDUCATION/TRAINING PROGRAM

## 2021-07-05 PROCEDURE — 84466 ASSAY OF TRANSFERRIN: CPT | Performed by: NURSE PRACTITIONER

## 2021-07-05 PROCEDURE — 83935 ASSAY OF URINE OSMOLALITY: CPT | Performed by: INTERNAL MEDICINE

## 2021-07-05 PROCEDURE — 84295 ASSAY OF SERUM SODIUM: CPT | Performed by: INTERNAL MEDICINE

## 2021-07-05 PROCEDURE — P9612 CATHETERIZE FOR URINE SPEC: HCPCS

## 2021-07-05 PROCEDURE — 85014 HEMATOCRIT: CPT | Performed by: NURSE PRACTITIONER

## 2021-07-05 PROCEDURE — 82270 OCCULT BLOOD FECES: CPT | Performed by: NURSE PRACTITIONER

## 2021-07-05 RX ORDER — RALOXIFENE HYDROCHLORIDE 60 MG/1
60 TABLET, FILM COATED ORAL NIGHTLY
Status: DISCONTINUED | OUTPATIENT
Start: 2021-07-05 | End: 2021-07-10 | Stop reason: HOSPADM

## 2021-07-05 RX ORDER — SODIUM CHLORIDE 0.9 % (FLUSH) 0.9 %
10 SYRINGE (ML) INJECTION EVERY 12 HOURS SCHEDULED
Status: DISCONTINUED | OUTPATIENT
Start: 2021-07-05 | End: 2021-07-10 | Stop reason: HOSPADM

## 2021-07-05 RX ORDER — ONDANSETRON 2 MG/ML
4 INJECTION INTRAMUSCULAR; INTRAVENOUS EVERY 6 HOURS PRN
Status: DISCONTINUED | OUTPATIENT
Start: 2021-07-05 | End: 2021-07-10 | Stop reason: HOSPADM

## 2021-07-05 RX ORDER — SODIUM CHLORIDE 9 MG/ML
75 INJECTION, SOLUTION INTRAVENOUS CONTINUOUS
Status: DISCONTINUED | OUTPATIENT
Start: 2021-07-05 | End: 2021-07-05

## 2021-07-05 RX ORDER — SODIUM CHLORIDE 0.9 % (FLUSH) 0.9 %
10 SYRINGE (ML) INJECTION AS NEEDED
Status: DISCONTINUED | OUTPATIENT
Start: 2021-07-05 | End: 2021-07-10 | Stop reason: HOSPADM

## 2021-07-05 RX ORDER — PRAVASTATIN SODIUM 10 MG
10 TABLET ORAL DAILY
Status: DISCONTINUED | OUTPATIENT
Start: 2021-07-05 | End: 2021-07-06

## 2021-07-05 RX ORDER — LOSARTAN POTASSIUM 25 MG/1
25 TABLET ORAL
Status: DISCONTINUED | OUTPATIENT
Start: 2021-07-05 | End: 2021-07-10 | Stop reason: HOSPADM

## 2021-07-05 RX ORDER — PANTOPRAZOLE SODIUM 40 MG/10ML
40 INJECTION, POWDER, LYOPHILIZED, FOR SOLUTION INTRAVENOUS
Status: DISCONTINUED | OUTPATIENT
Start: 2021-07-05 | End: 2021-07-07

## 2021-07-05 RX ORDER — METOPROLOL SUCCINATE 25 MG/1
25 TABLET, EXTENDED RELEASE ORAL 2 TIMES DAILY
Status: DISCONTINUED | OUTPATIENT
Start: 2021-07-05 | End: 2021-07-10 | Stop reason: HOSPADM

## 2021-07-05 RX ORDER — MYCOPHENOLATE MOFETIL 500 MG/1
1000 TABLET ORAL 2 TIMES DAILY
Status: DISCONTINUED | OUTPATIENT
Start: 2021-07-05 | End: 2021-07-10 | Stop reason: HOSPADM

## 2021-07-05 RX ORDER — SODIUM CHLORIDE 9 MG/ML
75 INJECTION, SOLUTION INTRAVENOUS CONTINUOUS
Status: DISCONTINUED | OUTPATIENT
Start: 2021-07-05 | End: 2021-07-06

## 2021-07-05 RX ORDER — ERGOCALCIFEROL 1.25 MG/1
50000 CAPSULE ORAL
Status: DISCONTINUED | OUTPATIENT
Start: 2021-07-05 | End: 2021-07-10 | Stop reason: HOSPADM

## 2021-07-05 RX ORDER — DULOXETIN HYDROCHLORIDE 60 MG/1
60 CAPSULE, DELAYED RELEASE ORAL 2 TIMES DAILY
Status: DISCONTINUED | OUTPATIENT
Start: 2021-07-05 | End: 2021-07-10 | Stop reason: HOSPADM

## 2021-07-05 RX ORDER — NITROGLYCERIN 0.4 MG/1
0.4 TABLET SUBLINGUAL
Status: DISCONTINUED | OUTPATIENT
Start: 2021-07-05 | End: 2021-07-10 | Stop reason: HOSPADM

## 2021-07-05 RX ORDER — ACETAMINOPHEN 500 MG
1000 TABLET ORAL ONCE
Status: COMPLETED | OUTPATIENT
Start: 2021-07-05 | End: 2021-07-05

## 2021-07-05 RX ORDER — DULOXETIN HYDROCHLORIDE 60 MG/1
60 CAPSULE, DELAYED RELEASE ORAL 2 TIMES DAILY
COMMUNITY

## 2021-07-05 RX ORDER — MORPHINE SULFATE 2 MG/ML
4 INJECTION, SOLUTION INTRAMUSCULAR; INTRAVENOUS ONCE
Status: COMPLETED | OUTPATIENT
Start: 2021-07-05 | End: 2021-07-05

## 2021-07-05 RX ORDER — UBIDECARENONE 75 MG
50 CAPSULE ORAL DAILY
Status: DISCONTINUED | OUTPATIENT
Start: 2021-07-05 | End: 2021-07-10 | Stop reason: HOSPADM

## 2021-07-05 RX ORDER — ASPIRIN 81 MG/1
81 TABLET ORAL NIGHTLY
Status: DISCONTINUED | OUTPATIENT
Start: 2021-07-05 | End: 2021-07-10 | Stop reason: HOSPADM

## 2021-07-05 RX ORDER — L.ACID,PARA/B.BIFIDUM/S.THERM 8B CELL
1 CAPSULE ORAL DAILY
Status: DISCONTINUED | OUTPATIENT
Start: 2021-07-05 | End: 2021-07-10 | Stop reason: HOSPADM

## 2021-07-05 RX ORDER — DIPHENOXYLATE HYDROCHLORIDE AND ATROPINE SULFATE 2.5; .025 MG/1; MG/1
1 TABLET ORAL 4 TIMES DAILY PRN
Status: DISCONTINUED | OUTPATIENT
Start: 2021-07-05 | End: 2021-07-10 | Stop reason: HOSPADM

## 2021-07-05 RX ORDER — LEVOTHYROXINE SODIUM 0.1 MG/1
75 TABLET ORAL DAILY
COMMUNITY
Start: 2021-07-14 | End: 2021-10-06 | Stop reason: DRUGHIGH

## 2021-07-05 RX ORDER — LEVOTHYROXINE SODIUM 0.07 MG/1
75 TABLET ORAL DAILY
Status: DISCONTINUED | OUTPATIENT
Start: 2021-07-05 | End: 2021-07-10 | Stop reason: HOSPADM

## 2021-07-05 RX ORDER — ONDANSETRON 2 MG/ML
4 INJECTION INTRAMUSCULAR; INTRAVENOUS ONCE
Status: COMPLETED | OUTPATIENT
Start: 2021-07-05 | End: 2021-07-05

## 2021-07-05 RX ADMIN — PRAVASTATIN SODIUM 10 MG: 10 TABLET ORAL at 21:33

## 2021-07-05 RX ADMIN — ONDANSETRON 4 MG: 2 INJECTION INTRAMUSCULAR; INTRAVENOUS at 11:14

## 2021-07-05 RX ADMIN — SODIUM CHLORIDE 75 ML/HR: 9 INJECTION, SOLUTION INTRAVENOUS at 21:39

## 2021-07-05 RX ADMIN — Medication 1 CAPSULE: at 21:32

## 2021-07-05 RX ADMIN — LEVOTHYROXINE SODIUM 75 MCG: 0.07 TABLET ORAL at 21:32

## 2021-07-05 RX ADMIN — LOSARTAN POTASSIUM 25 MG: 25 TABLET, FILM COATED ORAL at 21:32

## 2021-07-05 RX ADMIN — PANTOPRAZOLE SODIUM 40 MG: 40 INJECTION, POWDER, FOR SOLUTION INTRAVENOUS at 16:43

## 2021-07-05 RX ADMIN — ACETAMINOPHEN 1000 MG: 500 TABLET, FILM COATED ORAL at 09:35

## 2021-07-05 RX ADMIN — AZTREONAM 2 G: 2 INJECTION, POWDER, LYOPHILIZED, FOR SOLUTION INTRAMUSCULAR; INTRAVENOUS at 11:24

## 2021-07-05 RX ADMIN — AZTREONAM 1 G: 1 INJECTION, POWDER, LYOPHILIZED, FOR SOLUTION INTRAMUSCULAR; INTRAVENOUS at 16:43

## 2021-07-05 RX ADMIN — DULOXETINE HYDROCHLORIDE 60 MG: 60 CAPSULE, DELAYED RELEASE ORAL at 21:32

## 2021-07-05 RX ADMIN — SODIUM CHLORIDE, POTASSIUM CHLORIDE, SODIUM LACTATE AND CALCIUM CHLORIDE 1000 ML: 600; 310; 30; 20 INJECTION, SOLUTION INTRAVENOUS at 09:31

## 2021-07-05 RX ADMIN — SODIUM CHLORIDE, PRESERVATIVE FREE 10 ML: 5 INJECTION INTRAVENOUS at 21:33

## 2021-07-05 RX ADMIN — MYCOPHENOLATE MOFETIL 1000 MG: 500 TABLET ORAL at 21:33

## 2021-07-05 RX ADMIN — ASPIRIN 81 MG: 81 TABLET, COATED ORAL at 21:33

## 2021-07-05 RX ADMIN — METOPROLOL SUCCINATE 25 MG: 25 TABLET, EXTENDED RELEASE ORAL at 21:33

## 2021-07-05 RX ADMIN — ERGOCALCIFEROL 50000 UNITS: 1.25 CAPSULE ORAL at 21:32

## 2021-07-05 RX ADMIN — RALOXIFENE 60 MG: 60 TABLET ORAL at 21:38

## 2021-07-05 RX ADMIN — MORPHINE SULFATE 4 MG: 2 INJECTION, SOLUTION INTRAMUSCULAR; INTRAVENOUS at 11:13

## 2021-07-05 RX ADMIN — Medication 50 MCG: at 21:33

## 2021-07-05 NOTE — ED TRIAGE NOTES
Pt called ems for slipping down in her chair - she called twice.  The 2nd time ems noted that she had wet herself and it had a strong odor.  She had temp 100.4.  Pt has no complaints    Patient was placed in face mask during first look triage.  Patient was wearing a face mask throughout encounter.  I wore personal protective equipment throughout the encounter.  Hand hygiene was performed before and after patient encounter.

## 2021-07-05 NOTE — H&P
Patient Name:  Olena Myers  YOB: 1935  MRN:  [id number]  Admit Date:  7/5/2021  Patient Care Team:  Manda Keenan MD as PCP - General (General Practice)  Olvin Hernandez MD as Consulting Physician (Cardiology)      Subjective   History Present Illness     Chief Complaint   Patient presents with   • Fever       Ms. Myers is a 85 y.o.with a history of HTN, HLD, carotid artery stenosis, paroxysmal atrial fibrillation on Eliquis, history of TIA, aortic valve stenosis, CAD prior angioplasty and bare-metal stent of LAD 1997, myasthenia gravis, COPD that presents to [facility] with family for altered mental status and fever.  She is alert and oriented x3 but a vague historian but family is at bedside to assist with history.  Tmax at home 102 and 101.4 in the ER.  Her main complaint is generalized weakness but no focal weakness.  Initially she denies dysuria but then states that she has some discomfort with urination and feels that she has to pee all the time but then will not produce urine.  She denies pelvic pain, abdominal pain, flank pain.  Also reports chronic shortness of breath and cough that has been worse in the last few days.  She denies chest pain.  Shortness of breath is worse with exertion.      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   • History of gastric ulcer    • History of GI bleed    • Circle (hard of hearing)    • Hyperlipidemia    • Hypertension    • Lightheadedness    • LVH (left ventricular hypertrophy)    • MG (myasthenia gravis) (CMS/[facility])    • Mini stroke  (CMS/[facility]) 10/2016, 3/2017   • OA (osteoarthritis)    • Osteoporosis    • Sleep apnea     USES CPAP   • SOB (shortness of breath)     WALKING     Past Surgical History:   Procedure Laterality Date   • APPENDECTOMY     • CARPAL TUNNEL RELEASE Bilateral    • CATARACT EXTRACTION Bilateral    • CORONARY STENT PLACEMENT     • TOTAL HIP ARTHROPLASTY Bilateral    • TOTAL SHOULDER ARTHROPLASTY W/ DISTAL CLAVICLE EXCISION Left 7/19/2016    Procedure: LT TOTAL SHOULDER REVERSE ARTHROPLASTY;  Surgeon: NAHOMI Solorzano MD;  Location: [facility] OR [facility];  Service:    • TOTAL SHOULDER ARTHROPLASTY W/ DISTAL CLAVICLE EXCISION Right 1/8/2019    Procedure: RIGHT TOTAL SHOULDER REVERSE ARTHROPLASTY;  Surgeon: Jovanny Solorzano MD;  Location: [facility] OR [facility];  Service: Orthopedics     Family History   Problem Relation Age of Onset   • Heart disease Mother    • Hyperlipidemia Mother    • Stroke Mother    • Diabetes Mother    • Breast cancer Mother    • Heart disease Father    • Hyperlipidemia Father    • Stroke Father    • Malig Hyperthermia Neg Hx      Social History     Tobacco Use   • Smoking status: Never Smoker   • Smokeless tobacco: Never Used   Substance Use Topics   • Alcohol use: No     Comment: Daily caffeine use   • Drug use: No     Comment: caffine     No current facility-administered medications on file prior to encounter.     Current Outpatient Medications on File Prior to Encounter   Medication Sig Dispense Refill   • acidophilus (FLORANEX) tablet tablet Take 1 tablet by mouth 2 (Two) Times a Day.     • albuterol (PROAIR HFA) 108 (90 BASE) MCG/ACT inhaler Inhale 2 puffs Every 6 (Six) Hours As Needed for wheezing or shortness of air.     • aspirin 81 MG EC tablet Take 81 mg by mouth Daily.     • azelastine (ASTELIN) 0.1 % nasal spray 2 sprays into the nostril(s) as directed by provider 2 (Two) Times a Day. Use in each nostril as directed     • Bepotastine Besilate (Bepreve) 1.5 % solution Take As Directed.     • colesevelam  (WELCHOL) 625 MG tablet Take 1,875 mg by mouth 2 (two) times a day.     • diphenoxylate-atropine (LOMOTIL) 2.5-0.025 MG per tablet Take 1 tablet by mouth 4 (Four) Times a Day As Needed for Diarrhea.     • DULoxetine (CYMBALTA) 60 MG capsule Take 60 mg by mouth 2 (two) times a day.     • Epoetin Miguel (EPOGEN IJ) Every 14 (Fourteen) Days.     • Fluticasone Furoate-Vilanterol (BREO ELLIPTA) 100-25 MCG/INH aerosol powder  Inhale 1 puff Every Morning.     • immune globulin, human, (PRIVIGEN) 20 GM/200ML solution infusion Infuse  into a venous catheter 1 (One) Time. Every 5 weeks     • irbesartan (AVAPRO) 75 MG tablet Take 75 mg by mouth Every Night.     • levothyroxine (SYNTHROID, LEVOTHROID) 75 MCG tablet Take 75 mcg by mouth Daily.     • metoprolol succinate XL (TOPROL-XL) 25 MG 24 hr tablet Take 1 tablet by mouth 2 (Two) Times a Day. 180 tablet 3   • mometasone (NASONEX) 50 MCG/ACT nasal spray 2 sprays into each nostril Daily.     • montelukast (SINGULAIR) 10 MG tablet Take 10 mg by mouth Every Night.     • mycophenolate (CELLCEPT) 500 MG tablet Take 1,000 mg by mouth 2 (Two) Times a Day. 2 tabs bid      • nitroglycerin (NITROSTAT) 0.4 MG SL tablet Place 1 tablet under the tongue Every 5 (Five) Minutes As Needed for Chest Pain. 20 tablet 1   • pantoprazole (PROTONIX) 20 MG EC tablet Take 20 mg by mouth Daily.     • pravastatin (PRAVACHOL) 20 MG tablet Take 10 mg by mouth Daily.     • raloxifene (EVISTA) 60 MG tablet Take 60 mg by mouth Every Night.     • rivaroxaban (XARELTO) 20 MG tablet Take 1 tablet by mouth Daily With Dinner. 90 tablet 3   • vitamin B-12 (CYANOCOBALAMIN) 100 MCG tablet Take 50 mcg by mouth Daily.     • vitamin D (ERGOCALCIFEROL) 63118 UNITS capsule capsule Take 50,000 Units by mouth Every 7 (Seven) Days. Takes on Wednesdays       Allergies   Allergen Reactions   • Neomycin Anaphylaxis   • Penicillins Anaphylaxis   • Hydrocodone Itching and Rash   • Rosuvastatin Other (See Comments)     Muscle  cramps       Objective    Objective     Vital Signs  Temp:  [99.2 °F (37.3 °C)-101.4 °F (38.6 °C)] 99.2 °F (37.3 °C)  Heart Rate:  [95-98] 95  Resp:  [18-20] 20  BP: (109-144)/(81-88) 109/81  SpO2:  [94 %-98 %] 94 %  on   ;   Device (Oxygen Therapy): room air  There is no height or weight on file to calculate BMI.    Physical Exam    Results Review:  I reviewed the patient's new clinical results.  I reviewed the patient's new imaging results and agree with the interpretation.  I reviewed the patient's other test results and agree with the interpretation  I personally viewed and interpreted the patient's EKG/Telemetry data  Discussed with ED provider.    Lab Results (last 24 hours)     Procedure Component Value Units Date/Time    CBC & Differential [[id number]]  (Abnormal) Collected: 07/05/21 0929    Specimen: Blood Updated: 07/05/21 0945    Narrative:      The following orders were created for panel order CBC & Differential.  Procedure                               Abnormality         Status                     ---------                               -----------         ------                     CBC Auto Differential[[id number]]        Abnormal            Final result                 Please view results for these tests on the individual orders.    Comprehensive Metabolic Panel [[id number]]  (Abnormal) Collected: 07/05/21 0929    Specimen: Blood Updated: 07/05/21 1004     Glucose 131 mg/dL      BUN 11 mg/dL      Creatinine 0.89 mg/dL      Sodium 123 mmol/L      Potassium 3.5 mmol/L      Chloride 91 mmol/L      CO2 19.7 mmol/L      Calcium 8.8 mg/dL      Total Protein 6.0 g/dL      Albumin 3.60 g/dL      ALT (SGPT) 9 U/L      AST (SGOT) 14 U/L      Alkaline Phosphatase 64 U/L      Total Bilirubin 0.6 mg/dL      eGFR Non African Amer 61 mL/min/1.73      eGFR  African Amer 73 mL/min/1.73      Globulin 2.4 gm/dL      A/G Ratio 1.5 g/dL      BUN/Creatinine Ratio 12.4     Anion Gap 12.3 mmol/L     Narrative:      GFR Normal  ">60  Chronic Kidney Disease <60  Kidney Failure <15      Procalcitonin [[id number]]  (Abnormal) Collected: 07/05/21 0929    Specimen: Blood Updated: 07/05/21 1017     Procalcitonin 0.90 ng/mL     Narrative:      As a Marker for Sepsis (Non-Neonates):     1. <0.5 ng/mL represents a low risk of severe sepsis and/or septic shock.  2. >2 ng/mL represents a high risk of severe sepsis and/or septic shock.    As a Marker for Lower Respiratory Tract Infections that require antibiotic therapy:  PCT on Admission     Antibiotic Therapy             6-12 Hrs later  >0.5                          Strongly Recommended            >0.25 - <0.5             Recommended  0.1 - 0.25                  Discouraged                       Remeasure/reassess PCT  <0.1                         Strongly Discouraged         Remeasure/reassess PCT      As 28 day mortality risk marker: \"Change in Procalcitonin Result\" (>80% or <=80%) if Day 0 (or Day 1) and Day 4 values are available. Refer to http://www.[facility]-pct-calculator.com/    Change in PCT <=80 %   A decrease of PCT levels below or equal to 80% defines a positive change in PCT test result representing a higher risk for 28-day all-cause mortality of patients diagnosed with severe sepsis or septic shock.    Change in PCT >80 %   A decrease of PCT levels of more than 80% defines a negative change in PCT result representing a lower risk for 28-day all-cause mortality of patients diagnosed with severe sepsis or septic shock.              Results may be falsely decreased if patient taking Biotin.     Lactic Acid, Plasma [[id number]]  (Normal) Collected: 07/05/21 0929    Specimen: Blood Updated: 07/05/21 1002     Lactate 0.8 mmol/L     Blood Culture - Blood, Arm, Left [[id number]] Collected: 07/05/21 0929    Specimen: Blood from Arm, Left Updated: 07/05/21 0935    CBC Auto Differential [[id number]]  (Abnormal) Collected: 07/05/21 0929    Specimen: Blood Updated: 07/05/21 0945     WBC 14.64 10*3/mm3      " RBC 3.01 10*6/mm3      Hemoglobin 8.7 g/dL      Hematocrit 26.4 %      MCV 87.7 fL      MCH 28.9 pg      MCHC 33.0 g/dL      RDW 13.5 %      RDW-SD 42.5 fl      MPV 9.1 fL      Platelets 215 10*3/mm3      Neutrophil % 88.8 %      Lymphocyte % 2.6 %      Monocyte % 7.7 %      Eosinophil % 0.0 %      Basophil % 0.3 %      Immature Grans % 0.6 %      Neutrophils, Absolute 13.00 10*3/mm3      Lymphocytes, Absolute 0.38 10*3/mm3      Monocytes, Absolute 1.13 10*3/mm3      Eosinophils, Absolute 0.00 10*3/mm3      Basophils, Absolute 0.04 10*3/mm3      Immature Grans, Absolute 0.09 10*3/mm3      nRBC 0.0 /100 WBC     Ferritin [[id number]]  (Abnormal) Collected: 07/05/21 0929    Specimen: Blood Updated: 07/05/21 1408     Ferritin 390.00 ng/mL     Narrative:      Results may be falsely decreased if patient taking Biotin.      Iron Profile [[id number]]  (Abnormal) Collected: 07/05/21 0929    Specimen: Blood Updated: 07/05/21 1403     Iron 21 mcg/dL      Iron Saturation 7 %      Transferrin 215 mg/dL      TIBC 320 mcg/dL     BNP [[id number]]  (Abnormal) Collected: 07/05/21 0929    Specimen: Blood Updated: 07/05/21 1429     proBNP 2,029.0 pg/mL     Narrative:      Among patients with dyspnea, NT-proBNP is highly sensitive for the detection of acute congestive heart failure. In addition NT-proBNP of <300 pg/ml effectively rules out acute congestive heart failure with 99% negative predictive value.    Results may be falsely decreased if patient taking Biotin.      Uric Acid [[id number]]  (Normal) Collected: 07/05/21 0929    Specimen: Blood Updated: 07/05/21 1430     Uric Acid 5.0 mg/dL     TSH [[id number]] Collected: 07/05/21 0929    Specimen: Blood Updated: 07/05/21 1439    COVID PRE-OP / PRE-PROCEDURE SCREENING ORDER (NO ISOLATION) - Swab, Nasopharynx [[id number]]  (Normal) Collected: 07/05/21 0939    Specimen: Swab from Nasopharynx Updated: 07/05/21 1036    Narrative:      The following orders were created for panel order COVID  PRE-OP / PRE-PROCEDURE SCREENING ORDER (NO ISOLATION) - Swab, Nasopharynx.  Procedure                               Abnormality         Status                     ---------                               -----------         ------                     COVID-19,[facility]HOUSE...[[id number]]  Normal              Final result                 Please view results for these tests on the individual orders.    COVID-19,[facility]HOU[facility] CEPHEID/STEVEN NP SWAB IN TRANSPORT MEDIA 8-12 HR TAT - Swab, Nasopharynx [[id number]]  (Normal) Collected: 07/05/21 0939    Specimen: Swab from Nasopharynx Updated: 07/05/21 1036     COVID19 Not Detected    Narrative:      Fact sheet for providers: https://www.fda.gov/media/578155/download     Fact sheet for patients: https://www.fda.gov/media/122316/download    Urinalysis With Microscopic If Indicated (No Culture) - Urine, Catheter In/Out [[id number]]  (Abnormal) Collected: 07/05/21 1020    Specimen: Urine, Catheter In/Out Updated: 07/05/21 1059     Color, UA Yellow     Appearance, UA Cloudy     pH, UA <=5.0     Specific Gravity, UA 1.009     Glucose, UA Negative     Ketones, UA Negative     Bilirubin, UA Negative     Blood, UA Small (1+)     Protein, UA 30 mg/dL (1+)     Leuk Esterase, UA Moderate (2+)     Nitrite, UA Positive     Urobilinogen, UA 0.2 E.U./dL    Urinalysis, Microscopic Only - Urine, Catheter In/Out [[id number]]  (Abnormal) Collected: 07/05/21 1020    Specimen: Urine, Catheter In/Out Updated: 07/05/21 1059     RBC, UA None Seen /HPF      WBC, UA 13-20 /HPF      Bacteria, UA 2+ /HPF      Squamous Epithelial Cells, UA 0-2 /HPF      Hyaline Casts, UA None Seen /LPF      Granular Casts, UA 0-2 /LPF      Methodology Manual Light Microscopy    Osmolality, Urine - Urine, Catheter In/Out [[id number]] Collected: 07/05/21 1020    Specimen: Urine, Catheter In/Out Updated: 07/05/21 1445    Creatinine, Urine, Random - Urine, Catheter In/Out [[id number]] Collected: 07/05/21 1020    Specimen:  Urine, Catheter In/Out Updated: 07/05/21 1444    Chloride, Urine, Random - Urine, Catheter In/Out [[id number]] Collected: 07/05/21 1020    Specimen: Urine, Catheter In/Out Updated: 07/05/21 1444    POCT Occult Blood Stool [[id number]]  (Abnormal) Collected: 07/05/21 1036    Specimen: Stool from Per Rectum Updated: 07/05/21 1037     Fecal Occult Blood Positive     Lot Number [id number]     Expiration Date 11/30/2021     Positive Control Positive     Negative Control Negative    Blood Culture - Blood, Arm, Left [[id number]] Collected: 07/05/21 1107    Specimen: Blood from Arm, Left Updated: 07/05/21 1111          Imaging Results (Last 24 Hours)     Procedure Component Value Units Date/Time    XR Chest 1 View [[id number]] Collected: 07/05/21 0953     Updated: 07/05/21 1020    Narrative:      ONE VIEW PORTABLE CHEST     HISTORY: Fever and weakness.     FINDINGS: The lungs are well-expanded and clear. There is mild  cardiomegaly unchanged from 01/05/2021. A left-sided MediPort catheter  ends in the SVC without change.     This report was finalized on 7/5/2021 10:17 AM by Dr. Crow Solomon M.D.             Results for orders placed in visit on 11/20/18    Adult Transthoracic Echo Complete W/ Cont if Necessary Per Protocol    Interpretation Summary  · Left ventricular systolic function is normal. Calculated EF = 68%. Estimated EF was in agreement with the calculated EF. Normal left ventricular cavity size and wall thickness noted. All left ventricular wall segments contract normally. Left ventricular diastolic dysfunction is noted (grade II w/high LAP) consistent with pseudonormalization.  · Left atrial cavity size is mild-to-moderately dilated.  · The aortic valve is abnormal in structure. The valve exhibits sclerosis. Mild to moderate aortic valve regurgitation is present.      No orders to display        Assessment/Plan     Active Hospital Problems    Diagnosis  POA   • **Sepsis (CMS/HCC) [A41.9]  Yes   • Acute UTI [N39.0]  Yes    • Dyspnea [R06.00]  Unknown   • Acute on chronic diastolic CHF (congestive heart failure) (CMS/[facility]) [I50.33]  Unknown   • COPD (chronic obstructive pulmonary disease) (CMS/[facility]) [J44.9]  Yes   • Hyponatremia [E87.1]  Yes   • Metabolic encephalopathy [G93.41]  Yes   • Sleep apnea [G47.30]  Yes   • Normocytic anemia [D64.9]  Yes   • Atrial fibrillation (CMS/[facility]) [I48.91]  Yes   • CAD (coronary artery disease) [I25.10]  Yes   • Essential hypertension [I10]  Yes   • HX: long term anticoagulant use [Z92.29]  Not Applicable      Resolved Hospital Problems   No resolved problems to display.       Ms. Myers is a 85 y.o. admitted with fever, confusion, urinary frequency, dyspnea, hyponatremia, weakness, and anemia.      Sepsis/ Metabolic encephalopathy/ hyponatremia   Etiology of symptoms not clearly defined and likely multifactorial with hyponatremia, sepsis, dCHF, myasthenia gravis, anemia.  WBC 14.64, Procalcitonin 0.90, lactic acid normal, blood cultures are pending. UA positive with very mild urinary complaints.  Chest x-ray unrevealing but BNP is elevated and complains of dyspnea.  Patient given Azactam in the ER.  Penicillin allergy with anaphylaxis. No wheezing on exam with very mild increase work of breathing. Stable on room air w/ mild tachycardia  Na is 123, consult nephrology to assist with electrolytes and volume management. Check urine studies   Urinalysis positive. Await Urine and blood culture pending. Continue antibiotics (PCN allergy)    Diastolic CHF, atrial fibrillation, valvular disease, CAD, carotid artery stenosis  Dyspnea but denies CP. BP labile in ER.   Cardiology consult.   angioplasty bare-metal stent placement of the left anterior descending in 1997.  Last echo 2018 above. afib rate low 100s.   Check troponin, EKG, echocardiogram. Holding xarelto. daily weights IOs.   Check CT chest given dyspnea. Renal function stable. She is on xarelto   Duonbs and continue home breo ellipta     Normocytic  anemia   On OAC, No overt bleeding but occult stool postitive.  Hemoglobin baseline mid 10s currently 8.7. check anemia study and hold Xarelto.   She reports colonoscopy 2 yrs ago with Dr. Neri. Hx GERD but no PUD or worsening GI symptoms. Followed by hematology Dr. Mancia, previously prescribed epogen  PPI IV BID      Myasthenia gravis/ immunocompromised   followed by [facility] neurology. She is on cellcept and privigen  Weakness is generalized but infection could be precipitating a crisis. Monitor closely and consider specialist consult pending clinical course and workup.     I discussed the patient's findings and my recommendations with patient, family, nursing staff and Dr. Sahu.    Await home medication reconciliation by RN staff to continue home meds.   VTE Prophylaxis - SCDs.  Code Status - Full code.       MILA Gamboa  [facility]  07/05/21  14:49 EDT

## 2021-07-05 NOTE — PROGRESS NOTES
Pharmacokinetic Consult - Aztreonam Dosing    Olena Myers is a 85 y.o. female who is on day 1 pharmacy to dose aztreonam for sepsis.  Pharmacy dosing aztreonam per MILA Jones's request.   Other antimicrobials: N/A      Relvant clinical data and objective history reviewed:        There is no height or weight on file to calculate BMI.     She has a past medical history of Anemia, Arthritis, Asthma, Atrial fibrillation (CMS/[facility]), CAD (coronary artery disease), COPD (chronic obstructive pulmonary disease) (CMS/[facility]), Dilation of esophagus, History of gastric ulcer, History of GI bleed, [place] (hard of hearing), Hyperlipidemia, Hypertension, Lightheadedness, LVH (left ventricular hypertrophy), MG (myasthenia gravis) (CMS/[facility]), Mini stroke (CMS/[facility]) (10/2016, 3/2017), OA (osteoarthritis), Osteoporosis, Sleep apnea, and SOB (shortness of breath).    Allergies as of 07/05/2021 - Reviewed 07/05/2021   Allergen Reaction Noted   • Neomycin Anaphylaxis 07/12/2016   • Penicillins Anaphylaxis 06/10/2016   • Hydrocodone Itching and Rash 11/18/2016   • Rosuvastatin Other (See Comments) 08/28/2019     Vital Signs (last 24 hours)       07/04 0700  -  07/05 0659 07/05 0700  -  07/05 1538   Most Recent    Temp (°F)   98.3 -  101.4     98.3 (36.8)    Heart Rate   95 -  116     116    Resp   18 -  20     18    BP   109/81 -  176/95     176/95    SpO2 (%)   94 -  98     94        CrCl cannot be calculated (Unknown ideal weight.).  Results from last 7 days   Lab Units 07/05/21  0929   CREATININE mg/dL 0.89     Results from last 7 days   Lab Units 07/05/21  0929   WBC 10*3/mm3 14.64*     There is no height or weight on file to calculate BMI.    Baseline culture/source/susceptibility:   Microbiology Results (last 10 days)     Procedure Component Value - Date/Time    COVID PRE-OP / PRE-PROCEDURE SCREENING ORDER (NO ISOLATION) - Swab, Nasopharynx [[id number]]  (Normal) Collected: 07/05/21 0939    Lab Status: Final result Specimen:  Swab from Nasopharynx Updated: 07/05/21 1036    Narrative:      The following orders were created for panel order COVID PRE-OP / PRE-PROCEDURE SCREENING ORDER (NO ISOLATION) - Swab, Nasopharynx.  Procedure                               Abnormality         Status                     ---------                               -----------         ------                     COVID-19,[facility]...[[id number]]  Normal              Final result                 Please view results for these tests on the individual orders.    COVID-19,[facility] CEPHEID/STEVEN NP SWAB IN TRANSPORT MEDIA 8-12 HR TAT - Swab, Nasopharynx [[id number]]  (Normal) Collected: 07/05/21 0939    Lab Status: Final result Specimen: Swab from Nasopharynx Updated: 07/05/21 1036     COVID19 Not Detected    Narrative:      Fact sheet for providers: https://www.fda.gov/media/082744/download     Fact sheet for patients: https://www.fda.gov/media/272480/download            Imaging:  XR Chest 1 View    Result Date: 7/5/2021  ONE VIEW PORTABLE CHEST  HISTORY: Fever and weakness.  FINDINGS: The lungs are well-expanded and clear. There is mild cardiomegaly unchanged from 01/05/2021. A left-sided MediPort catheter ends in the SVC without change.  This report was finalized on 7/5/2021 10:17 AM by Dr. Crow Solomon M.D.        Anti-Infectives (From admission, onward)    Ordered     Dose/Rate Route Frequency Start Stop    07/05/21 1116  aztreonam (AZACTAM) 2 g/100 mL 0.9% NS (mbp)     Ordering Provider: Kaia Balderrama APRN    2 g  over 30 Minutes Intravenous Once 07/05/21 1118 07/05/21 1204         · Patient has received her first dose of aztreonam 2 gram in the ED at 1116. Patient presented to the ED today with c/o generalized weakness for several days. Noted tMax of 102 at home, chronic cough and nausea.   · WBC 14.64  · Procal 0.9  · UA with +2 bacteria, 0-2 squamous cells, nitrite positive so likely a gram negative bug.   · Tmax 101.4 this morning in  ED and 98.3 now s/p 1000 mg of APAP and one dose of aztreonam.   Assessment/Plan  • Aztreonam 1 gram IV q8h per [facility] dosing guidelines as estimated CrCl 41.2 mL/min at this time  • Second dose to start 6 hours after the initial 30 minute infusion and will run over 4 hours to optimize T>JULIEN. Subsequent doses will segue to be administered q8h  • Pharmacy will continue to follow daily while on aztreonam and adjust as needed.     Abdiaziz Frausto, PharmD  15:38 EDT

## 2021-07-05 NOTE — ED PROVIDER NOTES
EMERGENCY DEPARTMENT ENCOUNTER    Room Number:  03/03  Date of encounter:  7/5/2021  PCP: Manda Keenan MD  Historian: Patient and son      PPE    Patient was placed in face mask in first look. Patient was wearing facemask when I entered the room and throughout our encounter. I wore full protective equipment throughout this patient encounter including a face mask, and gloves. Hand hygiene was performed before donning protective equipment and after removal when leaving the room.        HPI:  Chief Complaint: Generalized weakness  A complete HPI/ROS/PMH/PSH/SH/FH are unobtainable due to: Nothing    Context: Olena Myers is a 85 y.o. female who arrives to the ED via private vehicle with her son at bedside.  Patient presents with c/o feeling weak and rundown for the past couple of days.   Patient also complains of having a temp of 102 at home, chronic cough, nausea.  Patient son states that he was with her yesterday and noticed her to be slightly confused.  He states his other brother was there this morning and stated the same thing.  Patient denies dysuria, vomiting, diarrhea, chest pain, shortness of breath.  Patient just states that she is still weak to the point she can get out of her chair and get anything done.  Patient states that nothing makes the symptoms better and nothing worsens symptoms.          PAST MEDICAL HISTORY  Active Ambulatory Problems     Diagnosis Date Noted   • Pre-operative cardiovascular examination 06/10/2016   • S/p reverse total shoulder arthroplasty 07/19/2016   • Myasthenia gravis (CMS/HCC) 11/23/2016   • Paroxysmal atrial fibrillation (CMS/HCC) 11/23/2016   • HX: long term anticoagulant use 11/23/2016   • Essential hypertension 11/23/2016   • CAD (coronary artery disease) 11/23/2016   • Atrial fibrillation (CMS/HCC) 12/21/2016   • S/P reverse total shoulder arthroplasty, right 01/08/2019     Resolved Ambulatory Problems     Diagnosis Date Noted   • Pneumonia 11/22/2016   • Loculated  left pleural effusion 11/27/2016   • Rhinovirus infection 11/27/2016     Past Medical History:   Diagnosis Date   • Anemia    • Arthritis    • Asthma    • COPD (chronic obstructive pulmonary disease) (CMS/Formerly [facility])    • Dilation of esophagus    • History of gastric ulcer    • History of GI bleed    • [place] (hard of hearing)    • Hyperlipidemia    • Hypertension    • Lightheadedness    • LVH (left ventricular hypertrophy)    • MG (myasthenia gravis) (CMS/Formerly [facility])    • Mini stroke (CMS/[facility]ly [facility]) 10/2016, 3/2017   • OA (osteoarthritis)    • Osteoporosis    • Sleep apnea    • SOB (shortness of breath)          PAST SURGICAL HISTORY  Past Surgical History:   Procedure Laterality Date   • APPENDECTOMY     • CARPAL TUNNEL RELEASE Bilateral    • CATARACT EXTRACTION Bilateral    • CORONARY STENT PLACEMENT     • TOTAL HIP ARTHROPLASTY Bilateral    • TOTAL SHOULDER ARTHROPLASTY W/ DISTAL CLAVICLE EXCISION Left 7/19/2016    Procedure: LT TOTAL SHOULDER REVERSE ARTHROPLASTY;  Surgeon: NAHOMI Solorzano MD;  Location: [facility];  Service:    • TOTAL SHOULDER ARTHROPLASTY W/ DISTAL CLAVICLE EXCISION Right 1/8/2019    Procedure: RIGHT TOTAL SHOULDER REVERSE ARTHROPLASTY;  Surgeon: Jovanny Solorzano MD;  Location: [facility];  Service: Orthopedics         FAMILY HISTORY  Family History   Problem Relation Age of Onset   • Heart disease Mother    • Hyperlipidemia Mother    • Stroke Mother    • Diabetes Mother    • Breast cancer Mother    • Heart disease Father    • Hyperlipidemia Father    • Stroke Father    • Malig Hyperthermia Neg Hx          SOCIAL HISTORY  Social History     Socioeconomic History   • Marital status:      Spouse name: Not on file   • Number of children: Not on file   • Years of education: Not on file   • Highest education level: Not on file   Tobacco Use   • Smoking status: Never Smoker   • Smokeless tobacco: Never Used   Substance and Sexual Activity   • Alcohol use: No     Comment: Daily caffeine use   • Drug use:  No     Comment: caffine   • Sexual activity: Defer         ALLERGIES  Neomycin, Penicillins, Hydrocodone, and Rosuvastatin        REVIEW OF SYSTEMS  Review of Systems     All systems reviewed and negative except for those discussed in HPI.        PHYSICAL EXAM    ED Triage Vitals [07/05/21 0735]   Temp Heart Rate Resp BP SpO2   (!) 101.4 °F (38.6 °C) 98 18 144/88 98 %       Physical Exam  GENERAL: Ill-appearing, non-toxic appearing, not distressed  HENT: normocephalic, atraumatic, dry mucous membranes  EYES: no scleral icterus, PERRL  CV: regular rhythm, regular rate, no murmur  RESPIRATORY: normal effort, CTAB  ABDOMEN: soft, normal bowel sounds, nontender  Rectal exam performed.  Chaperone present throughout exam, ISMAEL Talley  External exam normal.  No visualized external hemorrhoids, no palpated internal hemorrhoids.  No visible anal fissures.  Heme positive, brown stool noted.  passed.    MUSCULOSKELETAL: no deformity  NEURO: alert, moves all extremities, follows commands, mental status normal/baseline  SKIN: warm, dry, no rash   Psych: Appropriate mood and affect  Nursing notes and vital signs reviewed      LAB RESULTS  Recent Results (from the past 24 hour(s))   Comprehensive Metabolic Panel    Collection Time: 07/05/21  9:29 AM    Specimen: Blood   Result Value Ref Range    Glucose 131 (H) 65 - 99 mg/dL    BUN 11 8 - 23 mg/dL    Creatinine 0.89 0.57 - 1.00 mg/dL    Sodium 123 (L) 136 - 145 mmol/L    Potassium 3.5 3.5 - 5.2 mmol/L    Chloride 91 (L) 98 - 107 mmol/L    CO2 19.7 (L) 22.0 - 29.0 mmol/L    Calcium 8.8 8.6 - 10.5 mg/dL    Total Protein 6.0 6.0 - 8.5 g/dL    Albumin 3.60 3.50 - 5.20 g/dL    ALT (SGPT) 9 1 - 33 U/L    AST (SGOT) 14 1 - 32 U/L    Alkaline Phosphatase 64 39 - 117 U/L    Total Bilirubin 0.6 0.0 - 1.2 mg/dL    eGFR Non African Amer 61 >60 mL/min/1.73    eGFR  African Amer 73 >60 mL/min/1.73    Globulin 2.4 gm/dL    A/G Ratio 1.5 g/dL    BUN/Creatinine Ratio 12.4 7.0 -  25.0    Anion Gap 12.3 5.0 - 15.0 mmol/L   Procalcitonin    Collection Time: 07/05/21  9:29 AM    Specimen: Blood   Result Value Ref Range    Procalcitonin 0.90 (H) 0.00 - 0.25 ng/mL   Lactic Acid, Plasma    Collection Time: 07/05/21  9:29 AM    Specimen: Blood   Result Value Ref Range    Lactate 0.8 0.5 - 2.0 mmol/L   CBC Auto Differential    Collection Time: 07/05/21  9:29 AM    Specimen: Blood   Result Value Ref Range    WBC 14.64 (H) 3.40 - 10.80 10*3/mm3    RBC 3.01 (L) 3.77 - 5.28 10*6/mm3    Hemoglobin 8.7 (L) 12.0 - 15.9 g/dL    Hematocrit 26.4 (L) 34.0 - 46.6 %    MCV 87.7 79.0 - 97.0 fL    MCH 28.9 26.6 - 33.0 pg    MCHC 33.0 31.5 - 35.7 g/dL    RDW 13.5 12.3 - 15.4 %    RDW-SD 42.5 37.0 - 54.0 fl    MPV 9.1 6.0 - 12.0 fL    Platelets 215 140 - 450 10*3/mm3    Neutrophil % 88.8 (H) 42.7 - 76.0 %    Lymphocyte % 2.6 (L) 19.6 - 45.3 %    Monocyte % 7.7 5.0 - 12.0 %    Eosinophil % 0.0 (L) 0.3 - 6.2 %    Basophil % 0.3 0.0 - 1.5 %    Immature Grans % 0.6 (H) 0.0 - 0.5 %    Neutrophils, Absolute 13.00 (H) 1.70 - 7.00 10*3/mm3    Lymphocytes, Absolute 0.38 (L) 0.70 - 3.10 10*3/mm3    Monocytes, Absolute 1.13 (H) 0.10 - 0.90 10*3/mm3    Eosinophils, Absolute 0.00 0.00 - 0.40 10*3/mm3    Basophils, Absolute 0.04 0.00 - 0.20 10*3/mm3    Immature Grans, Absolute 0.09 (H) 0.00 - 0.05 10*3/mm3    nRBC 0.0 0.0 - 0.2 /100 WBC   COVID-19,[facility] IN-HOUSE CEPHEID/STEVEN NP SWAB IN TRANSPORT MEDIA 8-12 HR TAT - Swab, Nasopharynx    Collection Time: 07/05/21  9:39 AM    Specimen: Nasopharynx; Swab   Result Value Ref Range    COVID19 Not Detected Not Detected - Ref. Range   Urinalysis With Microscopic If Indicated (No Culture) - Urine, Catheter In/Out    Collection Time: 07/05/21 10:20 AM    Specimen: Urine, Catheter In/Out   Result Value Ref Range    Color, UA Yellow Yellow, Straw    Appearance, UA Cloudy (A) Clear    pH, UA <=5.0 5.0 - 8.0    Specific Gravity, UA 1.009 1.005 - 1.030    Glucose, UA Negative Negative     Ketones, UA Negative Negative    Bilirubin, UA Negative Negative    Blood, UA Small (1+) (A) Negative    Protein, UA 30 mg/dL (1+) (A) Negative    Leuk Esterase, UA Moderate (2+) (A) Negative    Nitrite, UA Positive (A) Negative    Urobilinogen, UA 0.2 E.U./dL 0.2 - 1.0 E.U./dL   Urinalysis, Microscopic Only - Urine, Catheter In/Out    Collection Time: 07/05/21 10:20 AM    Specimen: Urine, Catheter In/Out   Result Value Ref Range    RBC, UA None Seen None Seen, 0-2 /HPF    WBC, UA 13-20 (A) None Seen, 0-2 /HPF    Bacteria, UA 2+ (A) None Seen /HPF    Squamous Epithelial Cells, UA 0-2 None Seen, 0-2 /HPF    Hyaline Casts, UA None Seen None Seen /LPF    Granular Casts, UA 0-2 None Seen /LPF    Methodology Manual Light Microscopy    POCT Occult Blood Stool    Collection Time: 07/05/21 10:36 AM    Specimen: Per Rectum; Stool   Result Value Ref Range    Fecal Occult Blood Positive (A) Negative    Lot Number [id number]     Expiration Date 11/30/2021     Positive Control Positive Positive    Negative Control Negative Negative       Ordered the above labs and independently reviewed the results.      RADIOLOGY  XR Chest 1 View    Result Date: 7/5/2021  ONE VIEW PORTABLE CHEST  HISTORY: Fever and weakness.  FINDINGS: The lungs are well-expanded and clear. There is mild cardiomegaly unchanged from 01/05/2021. A left-sided MediPort catheter ends in the SVC without change.  This report was finalized on 7/5/2021 10:17 AM by Dr. Crow Solomon M.D.        I ordered the above noted radiological studies and viewed the images on the PACS system.           MEDICAL RECORD REVIEW  Medical records reviewed in epic, patient was last in this ER in January 2021 for chest pain at that time.      PROCEDURES    Procedures        DIFFERENTIAL DIAGNOSIS  Differential Diagnosis for Weakness include but not limited to the following:  Generalized weakness, CVA, TIA, Acute MI, GI Bleed, UTI, Sepsis,       PROGRESS, DATA ANALYSIS, CONSULTS, AND MEDICAL  DECISION MAKING        ED Course as of Jul 05 1214   Mon Jul 05, 2021   0911 Discussed pertinent information from history and physical exam with patient.  Discussed differential diagnosis and plan for ED evaluation/work-up and treatment including labs, IV fluids, Tylenol to treat the temperature and a chest x-ray.  All questions answered.  Patient is agreeable with this plan.        [MS]   0946 WBC(!): 14.64 [MS]   0946 Hemoglobin(!): 8.7 [MS]   0946 Hematocrit(!): 26.4 [MS]   1007 Sodium(!): 123 [MS]   1026 Reviewed pt's history and workup with Dr. Melendez.  After a bedside evaluation, they agree with the plan of care.          [MS]   1026 Procalcitonin(!): 0.90 [MS]   1026 Patient's hemoglobin 1 month ago was 10.3, and is currently 8.7.  When asked patient about black stools she did say she has noticed them recently.  Rectal exam was performed.    [MS]   1106 Nitrite, UA(!): Positive [MS]   1107 WBC, UA(!): 13-20 [MS]   1107 Bacteria, UA(!): 2+ [MS]   1211 Consult Note    Discussed care with ALISHA Ahn  Reviewed patient's history, exam, results and need for admission secondary to urinary tract infection, anemia, hyponatremia, GI bleed  Dr. Sahu accepts the patient to be admitted to telemetry inpatient bed.        [MS]      ED Course User Index  [MS] Kaia Balderrama, APRN     ADMISSION    Discussed treatment plan and reason for admission with pt/family and admitting physician.  Pt/family voiced understanding of the plan for admission for further testing/treatment as needed.      DIAGNOSIS  Final diagnoses:   Acute UTI   Anemia, unspecified type   Hyponatremia   Gastrointestinal hemorrhage, unspecified gastrointestinal hemorrhage type         MEDICATIONS GIVEN IN ED    Medications   sodium chloride 0.9 % flush 10 mL (has no administration in time range)   acetaminophen (TYLENOL) tablet 1,000 mg (1,000 mg Oral Given 7/5/21 0935)   morphine injection 4 mg (4 mg Intravenous Given 7/5/21 1113)    ondansetron (ZOFRAN) injection 4 mg (4 mg Intravenous Given 7/5/21 1114)   aztreonam (AZACTAM) 2 g/100 mL 0.9% NS (mbp) (0 g Intravenous Stopped 7/5/21 1204)           COURSE & MEDICAL DECISION MAKING  Any/All labs and Any/All Imaging studies that were ordered were reviewed and are noted above.  Results were reviewed/discussed with the patient and they were also made aware of online access.    Pt also made aware that some labs, such as cultures, will not be resulted during ER visit and follow up with PMD is necessary.        Kaia Balderrama, APRN  07/05/21 7802

## 2021-07-05 NOTE — ED PROVIDER NOTES
Pt presents to the ED c/o  generalized weakness for the last couple of days.  Her son also reports that she has been a little confused.  Her speech has been normal but she was not really making sense last night.  In addition she had a fever of 102 at home today.  She denies dysuria.  She is currently complaining of a headache without associated sensitivity to light, neck pain, neck stiffness.     On exam,   Awake and alert, no acute distress.  Normal work of breathing.  Abdomen soft, nondistended, nontender throughout.     Plan: Labs remarkable for hyponatremia, acute on chronic anemia, mild leukocytosis.  Urinalysis is pending however I suspect she may have urinary tract infection.  Anticipate admission for correction of her laboratory abnormalities that are contributing to her weakness and also for treatment of presumed urinary tract infection.      I wore a mask, face shield, and gloves during this patient encounter.  Patient also wearing a surgical mask.  Hand hygeine performed before and after seeing the patient.     Attestation:  The DAYAMI and I have discussed this patient's history, physical exam, and treatment plan.  I have reviewed the documentation and personally had a face to face interaction with the patient. I affirm the documentation and agree with the treatment and plan.  The attached note describes my personal findings.            Osmar Melendez MD  07/05/21 1030

## 2021-07-05 NOTE — PLAN OF CARE
Continue GLP1 as above. Continue SGLT2 as above. Discussed dietary modification, refer to DM education   Goal Outcome Evaluation:     86 yo female admitted today with UTI, hyponatremia and anemia. Occult blood positive. Echo and CT angiogram ordered r/t SOA. Voiding via Pure[place]. Hypertensive on arrival to floor with systolic in 170s. Room air. A&Ox4 with some intermittent confusion. IV abx initiated.

## 2021-07-06 ENCOUNTER — APPOINTMENT (OUTPATIENT)
Dept: CT IMAGING | Facility: HOSPITAL | Age: 86
End: 2021-07-06

## 2021-07-06 LAB
ANION GAP SERPL CALCULATED.3IONS-SCNC: 10 MMOL/L (ref 5–15)
AORTIC DIMENSIONLESS INDEX: 0.6 (DI)
BACTERIA BLD CULT: ABNORMAL
BH CV ECHO MEAS - ACS: 1.3 CM
BH CV ECHO MEAS - AI DEC SLOPE: 216 CM/SEC^2
BH CV ECHO MEAS - AI MAX PG: 40.7 MMHG
BH CV ECHO MEAS - AI MAX VEL: 319 CM/SEC
BH CV ECHO MEAS - AI P1/2T: 432.6 MSEC
BH CV ECHO MEAS - AO MAX PG (FULL): 11.3 MMHG
BH CV ECHO MEAS - AO MAX PG: 16.5 MMHG
BH CV ECHO MEAS - AO MEAN PG (FULL): 6 MMHG
BH CV ECHO MEAS - AO MEAN PG: 8 MMHG
BH CV ECHO MEAS - AO ROOT AREA (BSA CORRECTED): 1.5
BH CV ECHO MEAS - AO ROOT AREA: 4.5 CM^2
BH CV ECHO MEAS - AO ROOT DIAM: 2.4 CM
BH CV ECHO MEAS - AO V2 MAX: 203 CM/SEC
BH CV ECHO MEAS - AO V2 MEAN: 129 CM/SEC
BH CV ECHO MEAS - AO V2 VTI: 26.3 CM
BH CV ECHO MEAS - AVA(I,A): 2.2 CM^2
BH CV ECHO MEAS - AVA(I,D): 2.2 CM^2
BH CV ECHO MEAS - AVA(V,A): 1.9 CM^2
BH CV ECHO MEAS - AVA(V,D): 1.9 CM^2
BH CV ECHO MEAS - BSA(HAYCOCK): 1.7 M^2
BH CV ECHO MEAS - BSA: 1.6 M^2
BH CV ECHO MEAS - BZI_BMI: 28.1 KILOGRAMS/M^2
BH CV ECHO MEAS - BZI_METRIC_HEIGHT: 152.4 CM
BH CV ECHO MEAS - BZI_METRIC_WEIGHT: 65.3 KG
BH CV ECHO MEAS - EDV(CUBED): 54.9 ML
BH CV ECHO MEAS - EDV(MOD-SP2): 51 ML
BH CV ECHO MEAS - EDV(MOD-SP4): 51 ML
BH CV ECHO MEAS - EDV(TEICH): 62 ML
BH CV ECHO MEAS - EF(CUBED): 74.8 %
BH CV ECHO MEAS - EF(MOD-BP): 69.5 %
BH CV ECHO MEAS - EF(MOD-SP2): 70.6 %
BH CV ECHO MEAS - EF(MOD-SP4): 72.5 %
BH CV ECHO MEAS - EF(TEICH): 67.5 %
BH CV ECHO MEAS - ESV(CUBED): 13.8 ML
BH CV ECHO MEAS - ESV(MOD-SP2): 15 ML
BH CV ECHO MEAS - ESV(MOD-SP4): 14 ML
BH CV ECHO MEAS - ESV(TEICH): 20.2 ML
BH CV ECHO MEAS - FS: 36.8 %
BH CV ECHO MEAS - IVS/LVPW: 0.83
BH CV ECHO MEAS - IVSD: 1 CM
BH CV ECHO MEAS - LAT PEAK E' VEL: 9 CM/SEC
BH CV ECHO MEAS - LV DIASTOLIC VOL/BSA (35-75): 31.4 ML/M^2
BH CV ECHO MEAS - LV MASS(C)D: 134.7 GRAMS
BH CV ECHO MEAS - LV MASS(C)DI: 82.9 GRAMS/M^2
BH CV ECHO MEAS - LV MAX PG: 5.2 MMHG
BH CV ECHO MEAS - LV MEAN PG: 2 MMHG
BH CV ECHO MEAS - LV SYSTOLIC VOL/BSA (12-30): 8.6 ML/M^2
BH CV ECHO MEAS - LV V1 MAX: 114 CM/SEC
BH CV ECHO MEAS - LV V1 MEAN: 69.1 CM/SEC
BH CV ECHO MEAS - LV V1 VTI: 16.7 CM
BH CV ECHO MEAS - LVIDD: 3.8 CM
BH CV ECHO MEAS - LVIDS: 2.4 CM
BH CV ECHO MEAS - LVLD AP2: 6.8 CM
BH CV ECHO MEAS - LVLD AP4: 6.7 CM
BH CV ECHO MEAS - LVLS AP2: 4.6 CM
BH CV ECHO MEAS - LVLS AP4: 5.3 CM
BH CV ECHO MEAS - LVOT AREA (M): 3.5 CM^2
BH CV ECHO MEAS - LVOT AREA: 3.5 CM^2
BH CV ECHO MEAS - LVOT DIAM: 2.1 CM
BH CV ECHO MEAS - LVPWD: 1.2 CM
BH CV ECHO MEAS - MED PEAK E' VEL: 5.1 CM/SEC
BH CV ECHO MEAS - MV A MAX VEL: 119 CM/SEC
BH CV ECHO MEAS - MV DEC SLOPE: 454 CM/SEC^2
BH CV ECHO MEAS - MV DEC TIME: 264 SEC
BH CV ECHO MEAS - MV E MAX VEL: 96.8 CM/SEC
BH CV ECHO MEAS - MV E/A: 0.81
BH CV ECHO MEAS - MV MAX PG: 6 MMHG
BH CV ECHO MEAS - MV MEAN PG: 3 MMHG
BH CV ECHO MEAS - MV P1/2T MAX VEL: 114 CM/SEC
BH CV ECHO MEAS - MV P1/2T: 73.5 MSEC
BH CV ECHO MEAS - MV V2 MAX: 122 CM/SEC
BH CV ECHO MEAS - MV V2 MEAN: 84 CM/SEC
BH CV ECHO MEAS - MV V2 VTI: 22.6 CM
BH CV ECHO MEAS - MVA P1/2T LCG: 1.9 CM^2
BH CV ECHO MEAS - MVA(P1/2T): 3 CM^2
BH CV ECHO MEAS - MVA(VTI): 2.6 CM^2
BH CV ECHO MEAS - PA ACC TIME: 0.09 SEC
BH CV ECHO MEAS - PA MAX PG (FULL): 1.6 MMHG
BH CV ECHO MEAS - PA MAX PG: 10.1 MMHG
BH CV ECHO MEAS - PA PR(ACCEL): 39.9 MMHG
BH CV ECHO MEAS - PA V2 MAX: 159 CM/SEC
BH CV ECHO MEAS - PVA(V,A): 4.2 CM^2
BH CV ECHO MEAS - PVA(V,D): 4.2 CM^2
BH CV ECHO MEAS - QP/QS: 1.6
BH CV ECHO MEAS - RAP SYSTOLE: 8 MMHG
BH CV ECHO MEAS - RV MAX PG: 8.5 MMHG
BH CV ECHO MEAS - RV MEAN PG: 3 MMHG
BH CV ECHO MEAS - RV V1 MAX: 146 CM/SEC
BH CV ECHO MEAS - RV V1 MEAN: 83.2 CM/SEC
BH CV ECHO MEAS - RV V1 VTI: 20.9 CM
BH CV ECHO MEAS - RVOT AREA: 4.5 CM^2
BH CV ECHO MEAS - RVOT DIAM: 2.4 CM
BH CV ECHO MEAS - RVSP: 35 MMHG
BH CV ECHO MEAS - SI(AO): 73.3 ML/M^2
BH CV ECHO MEAS - SI(CUBED): 25.3 ML/M^2
BH CV ECHO MEAS - SI(LVOT): 35.6 ML/M^2
BH CV ECHO MEAS - SI(MOD-SP2): 22.2 ML/M^2
BH CV ECHO MEAS - SI(MOD-SP4): 22.8 ML/M^2
BH CV ECHO MEAS - SI(TEICH): 25.7 ML/M^2
BH CV ECHO MEAS - SV(AO): 119 ML
BH CV ECHO MEAS - SV(CUBED): 41 ML
BH CV ECHO MEAS - SV(LVOT): 57.8 ML
BH CV ECHO MEAS - SV(MOD-SP2): 36 ML
BH CV ECHO MEAS - SV(MOD-SP4): 37 ML
BH CV ECHO MEAS - SV(RVOT): 94.5 ML
BH CV ECHO MEAS - SV(TEICH): 41.8 ML
BH CV ECHO MEAS - TAPSE (>1.6): 1.5 CM
BH CV ECHO MEAS - TR MAX VEL: 261 CM/SEC
BH CV ECHO MEASUREMENTS AVERAGE E/E' RATIO: 13.73
BH CV XLRA - TDI S': 15.1 CM/SEC
BOTTLE TYPE: ABNORMAL
BUN SERPL-MCNC: 8 MG/DL (ref 8–23)
BUN/CREAT SERPL: 9.4 (ref 7–25)
CALCIUM SPEC-SCNC: 8.5 MG/DL (ref 8.6–10.5)
CHLORIDE SERPL-SCNC: 98 MMOL/L (ref 98–107)
CO2 SERPL-SCNC: 22 MMOL/L (ref 22–29)
CREAT SERPL-MCNC: 0.85 MG/DL (ref 0.57–1)
DEPRECATED RDW RBC AUTO: 42.8 FL (ref 37–54)
ERYTHROCYTE [DISTWIDTH] IN BLOOD BY AUTOMATED COUNT: 13.3 % (ref 12.3–15.4)
FOLATE SERPL-MCNC: 9.12 NG/ML (ref 4.78–24.2)
GFR SERPL CREATININE-BSD FRML MDRD: 64 ML/MIN/1.73
GFR SERPL CREATININE-BSD FRML MDRD: 77 ML/MIN/1.73
GLUCOSE SERPL-MCNC: 109 MG/DL (ref 65–99)
HCT VFR BLD AUTO: 26.4 % (ref 34–46.6)
HGB BLD-MCNC: 8.5 G/DL (ref 12–15.9)
LEFT ATRIUM VOLUME INDEX: 45 ML/M2
LV EF 2D ECHO EST: 70 %
MAXIMAL PREDICTED HEART RATE: 137 BPM
MCH RBC QN AUTO: 28.3 PG (ref 26.6–33)
MCHC RBC AUTO-ENTMCNC: 32.2 G/DL (ref 31.5–35.7)
MCV RBC AUTO: 88 FL (ref 79–97)
PLATELET # BLD AUTO: 185 10*3/MM3 (ref 140–450)
PMV BLD AUTO: 9 FL (ref 6–12)
POTASSIUM SERPL-SCNC: 3.7 MMOL/L (ref 3.5–5.2)
RBC # BLD AUTO: 3 10*6/MM3 (ref 3.77–5.28)
SINUS: 3.2 CM
SODIUM SERPL-SCNC: 123 MMOL/L (ref 136–145)
SODIUM SERPL-SCNC: 127 MMOL/L (ref 136–145)
SODIUM SERPL-SCNC: 130 MMOL/L (ref 136–145)
SODIUM UR-SCNC: 42 MMOL/L
STRESS TARGET HR: 116 BPM
VIT B12 BLD-MCNC: >2000 PG/ML (ref 211–946)
WBC # BLD AUTO: 11.95 10*3/MM3 (ref 3.4–10.8)

## 2021-07-06 PROCEDURE — 85027 COMPLETE CBC AUTOMATED: CPT | Performed by: NURSE PRACTITIONER

## 2021-07-06 PROCEDURE — 80048 BASIC METABOLIC PNL TOTAL CA: CPT | Performed by: INTERNAL MEDICINE

## 2021-07-06 PROCEDURE — 0 IOPAMIDOL PER 1 ML: Performed by: STUDENT IN AN ORGANIZED HEALTH CARE EDUCATION/TRAINING PROGRAM

## 2021-07-06 PROCEDURE — 71275 CT ANGIOGRAPHY CHEST: CPT

## 2021-07-06 PROCEDURE — 99221 1ST HOSP IP/OBS SF/LOW 40: CPT | Performed by: NURSE PRACTITIONER

## 2021-07-06 PROCEDURE — 84295 ASSAY OF SERUM SODIUM: CPT | Performed by: INTERNAL MEDICINE

## 2021-07-06 PROCEDURE — 25010000002 FUROSEMIDE PER 20 MG: Performed by: INTERNAL MEDICINE

## 2021-07-06 PROCEDURE — 63710000001 MYCOPHENOLATE MOFETIL PER 250 MG: Performed by: STUDENT IN AN ORGANIZED HEALTH CARE EDUCATION/TRAINING PROGRAM

## 2021-07-06 PROCEDURE — 25010000003 CEFTRIAXONE PER 250 MG: Performed by: STUDENT IN AN ORGANIZED HEALTH CARE EDUCATION/TRAINING PROGRAM

## 2021-07-06 PROCEDURE — 82607 VITAMIN B-12: CPT | Performed by: NURSE PRACTITIONER

## 2021-07-06 PROCEDURE — 94640 AIRWAY INHALATION TREATMENT: CPT

## 2021-07-06 PROCEDURE — 94799 UNLISTED PULMONARY SVC/PX: CPT

## 2021-07-06 PROCEDURE — 82746 ASSAY OF FOLIC ACID SERUM: CPT | Performed by: NURSE PRACTITIONER

## 2021-07-06 PROCEDURE — 36415 COLL VENOUS BLD VENIPUNCTURE: CPT | Performed by: INTERNAL MEDICINE

## 2021-07-06 RX ORDER — POTASSIUM CHLORIDE 1.5 G/1.77G
40 POWDER, FOR SOLUTION ORAL AS NEEDED
Status: DISCONTINUED | OUTPATIENT
Start: 2021-07-06 | End: 2021-07-10 | Stop reason: HOSPADM

## 2021-07-06 RX ORDER — PREDNISONE 10 MG/1
10 TABLET ORAL DAILY
COMMUNITY
End: 2022-02-18 | Stop reason: HOSPADM

## 2021-07-06 RX ORDER — PRAVASTATIN SODIUM 10 MG
10 TABLET ORAL NIGHTLY
Status: DISCONTINUED | OUTPATIENT
Start: 2021-07-06 | End: 2021-07-10 | Stop reason: HOSPADM

## 2021-07-06 RX ORDER — MAGNESIUM SULFATE HEPTAHYDRATE 40 MG/ML
2 INJECTION, SOLUTION INTRAVENOUS AS NEEDED
Status: DISCONTINUED | OUTPATIENT
Start: 2021-07-06 | End: 2021-07-10 | Stop reason: HOSPADM

## 2021-07-06 RX ORDER — FUROSEMIDE 10 MG/ML
20 INJECTION INTRAMUSCULAR; INTRAVENOUS ONCE
Status: COMPLETED | OUTPATIENT
Start: 2021-07-06 | End: 2021-07-06

## 2021-07-06 RX ORDER — CEFTRIAXONE SODIUM 2 G/50ML
2 INJECTION, SOLUTION INTRAVENOUS EVERY 24 HOURS
Status: DISCONTINUED | OUTPATIENT
Start: 2021-07-06 | End: 2021-07-10 | Stop reason: HOSPADM

## 2021-07-06 RX ORDER — POTASSIUM CHLORIDE 750 MG/1
40 TABLET, FILM COATED, EXTENDED RELEASE ORAL AS NEEDED
Status: DISCONTINUED | OUTPATIENT
Start: 2021-07-06 | End: 2021-07-10 | Stop reason: HOSPADM

## 2021-07-06 RX ORDER — MAGNESIUM SULFATE HEPTAHYDRATE 40 MG/ML
4 INJECTION, SOLUTION INTRAVENOUS AS NEEDED
Status: DISCONTINUED | OUTPATIENT
Start: 2021-07-06 | End: 2021-07-10 | Stop reason: HOSPADM

## 2021-07-06 RX ORDER — ALBUTEROL SULFATE 2.5 MG/3ML
2.5 SOLUTION RESPIRATORY (INHALATION) EVERY 6 HOURS PRN
Status: DISCONTINUED | OUTPATIENT
Start: 2021-07-06 | End: 2021-07-10 | Stop reason: HOSPADM

## 2021-07-06 RX ORDER — ACETAMINOPHEN 325 MG/1
650 TABLET ORAL EVERY 6 HOURS PRN
Status: DISCONTINUED | OUTPATIENT
Start: 2021-07-06 | End: 2021-07-10 | Stop reason: HOSPADM

## 2021-07-06 RX ADMIN — PANTOPRAZOLE SODIUM 40 MG: 40 INJECTION, POWDER, FOR SOLUTION INTRAVENOUS at 08:22

## 2021-07-06 RX ADMIN — RALOXIFENE 60 MG: 60 TABLET ORAL at 21:27

## 2021-07-06 RX ADMIN — RIVAROXABAN 20 MG: 20 TABLET, FILM COATED ORAL at 17:14

## 2021-07-06 RX ADMIN — MYCOPHENOLATE MOFETIL 1000 MG: 500 TABLET ORAL at 09:28

## 2021-07-06 RX ADMIN — IOPAMIDOL 85 ML: 755 INJECTION, SOLUTION INTRAVENOUS at 00:40

## 2021-07-06 RX ADMIN — MYCOPHENOLATE MOFETIL 1000 MG: 500 TABLET ORAL at 21:27

## 2021-07-06 RX ADMIN — METOPROLOL SUCCINATE 25 MG: 25 TABLET, EXTENDED RELEASE ORAL at 09:28

## 2021-07-06 RX ADMIN — CEFTRIAXONE SODIUM 2 G: 2 INJECTION, SOLUTION INTRAVENOUS at 17:14

## 2021-07-06 RX ADMIN — PANTOPRAZOLE SODIUM 40 MG: 40 INJECTION, POWDER, FOR SOLUTION INTRAVENOUS at 17:14

## 2021-07-06 RX ADMIN — SODIUM CHLORIDE, PRESERVATIVE FREE 10 ML: 5 INJECTION INTRAVENOUS at 15:01

## 2021-07-06 RX ADMIN — DULOXETINE HYDROCHLORIDE 60 MG: 60 CAPSULE, DELAYED RELEASE ORAL at 09:27

## 2021-07-06 RX ADMIN — METOPROLOL SUCCINATE 25 MG: 25 TABLET, EXTENDED RELEASE ORAL at 21:26

## 2021-07-06 RX ADMIN — AZTREONAM 1 G: 1 INJECTION, POWDER, LYOPHILIZED, FOR SOLUTION INTRAMUSCULAR; INTRAVENOUS at 02:18

## 2021-07-06 RX ADMIN — LOSARTAN POTASSIUM 25 MG: 25 TABLET, FILM COATED ORAL at 09:27

## 2021-07-06 RX ADMIN — LEVOTHYROXINE SODIUM 75 MCG: 0.07 TABLET ORAL at 08:22

## 2021-07-06 RX ADMIN — ALBUTEROL SULFATE 2.5 MG: 2.5 SOLUTION RESPIRATORY (INHALATION) at 12:34

## 2021-07-06 RX ADMIN — AZTREONAM 2 G: 2 INJECTION, POWDER, LYOPHILIZED, FOR SOLUTION INTRAMUSCULAR; INTRAVENOUS at 09:59

## 2021-07-06 RX ADMIN — ACETAMINOPHEN 650 MG: 325 TABLET, FILM COATED ORAL at 21:25

## 2021-07-06 RX ADMIN — FUROSEMIDE 20 MG: 10 INJECTION, SOLUTION INTRAMUSCULAR; INTRAVENOUS at 09:39

## 2021-07-06 RX ADMIN — DULOXETINE HYDROCHLORIDE 60 MG: 60 CAPSULE, DELAYED RELEASE ORAL at 21:26

## 2021-07-06 RX ADMIN — ACETAMINOPHEN 650 MG: 325 TABLET, FILM COATED ORAL at 15:00

## 2021-07-06 RX ADMIN — ASPIRIN 81 MG: 81 TABLET, COATED ORAL at 21:26

## 2021-07-06 RX ADMIN — PRAVASTATIN SODIUM 10 MG: 10 TABLET ORAL at 21:27

## 2021-07-06 RX ADMIN — SODIUM CHLORIDE, PRESERVATIVE FREE 10 ML: 5 INJECTION INTRAVENOUS at 21:27

## 2021-07-06 NOTE — CONSULTS
[facility]                                                                                             Nephrology Initial Consult Note    Patient Identification:  Name: Olena Myers MRN: [id number]  Age: 85 y.o. : 1935  Sex: female  Date:2021    Requesting Physician: As per consult order.  Reason for Consultation: Hyponatremia  Information from:patient/ family/ chart      History of Present Illness: This is a 85 y.o. year old female with no prior history of hyponatremia or CKD, history of hypertension aortic stenosis coronary artery disease hyperlipidemia with previous coronary stent placement.  Previous echo showed normal EF, diastolic dysfunction aortic valve disease.  Also history of myasthenia gravis presented to the ER yesterday with weakness cough nausea some mild confusion along with fevers work-up significant for UTI worsening anemia and hyponatremia with sodium 123.  She was started on IV fluids serial labs were obtained and her sodium is up to 130 today.  She still does not feel well but is alert and no obvious confusion, she is oriented x3.  Remains on IV antibiotics does complain of some mild dyspnea, CT chest showed no PE but some cardiomegaly and possible developing CHF is seen.    The following medical history and medications personally reviewed by me:    Problem List:   Patient Active Problem List    Diagnosis    • *Sepsis (CMS/[facility], located within [facility]) [A41.9]    • Acute UTI [N39.0]    • Dyspnea [R06.00]    • Acute on chronic diastolic CHF (congestive heart failure) (CMS/[facility], located within [facility]) [I50.33]    • COPD (chronic obstructive pulmonary disease) (CMS/[facility], located within [facility]) [J44.9]    • Hyponatremia [E87.1]    • Metabolic encephalopathy [G93.41]    • Sleep apnea [G47.30]    • Normocytic anemia [D64.9]    • S/P reverse total shoulder arthroplasty, right [Z96.611]    • Atrial fibrillation (CMS/[facility], located within [facility]) [I48.91]    • Myasthenia gravis  (CMS/HCC) [G70.00]    • Paroxysmal atrial fibrillation (CMS/HCC) [I48.0]    • HX: long term anticoagulant use [Z92.29]    • Essential hypertension [I10]    • CAD (coronary artery disease) [I25.10]    • S/p reverse total shoulder arthroplasty [Z96.619]    • Pre-operative cardiovascular examination [Z01.810]        Past Medical History:  Past Medical History:   Diagnosis Date   • Anemia     IRON DEFICIENCY   • Arthritis    • Asthma    • Atrial fibrillation (CMS/HCC)    • CAD (coronary artery disease)     HEART STENT   • COPD (chronic obstructive pulmonary disease) (CMS/HCC)    • 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   • History of gastric ulcer    • History of GI bleed    • [place] (hard of hearing)    • Hyperlipidemia    • Hypertension    • Lightheadedness    • LVH (left ventricular hypertrophy)    • MG (myasthenia gravis) (CMS/HCC)    • Mini stroke (CMS/HCC) 10/2016, 3/2017   • OA (osteoarthritis)    • Osteoporosis    • Sleep apnea     USES CPAP   • SOB (shortness of breath)     WALKING       Past Surgical History:  Past Surgical History:   Procedure Laterality Date   • APPENDECTOMY     • CARPAL TUNNEL RELEASE Bilateral    • CATARACT EXTRACTION Bilateral    • CORONARY STENT PLACEMENT     • TOTAL HIP ARTHROPLASTY Bilateral    • TOTAL SHOULDER ARTHROPLASTY W/ DISTAL CLAVICLE EXCISION Left 7/19/2016    Procedure: LT TOTAL SHOULDER REVERSE ARTHROPLASTY;  Surgeon: NAHOMI Solorzano MD;  Location: [facility] OR [facility] [place];  Service:    • TOTAL SHOULDER ARTHROPLASTY W/ DISTAL CLAVICLE EXCISION Right 1/8/2019    Procedure: RIGHT TOTAL SHOULDER REVERSE ARTHROPLASTY;  Surgeon: Jovanny Solorzano MD;  Location: [facility] OR [facility] [place];  Service: Orthopedics        Home Meds:   Medications Prior to Admission   Medication Sig Dispense Refill Last Dose   • acidophilus (FLORANEX) tablet tablet Take 1 tablet by mouth Daily.   7/4/2021 at Unknown time   • aspirin 81 MG EC tablet Take 81 mg by mouth Every Night.   7/4/2021 at Unknown time   •  diphenoxylate-atropine (LOMOTIL) 2.5-0.025 MG per tablet Take 1 tablet by mouth 4 (Four) Times a Day As Needed for Diarrhea.   Past Month at Unknown time   • DULoxetine (CYMBALTA) 60 MG capsule Take 60 mg by mouth 2 (Two) Times a Day.   7/4/2021 at Unknown time   • irbesartan (AVAPRO) 75 MG tablet Take 75 mg by mouth Every Night.   7/4/2021 at Unknown time   • levothyroxine (SYNTHROID, LEVOTHROID) 100 MCG tablet Take 75 mcg by mouth Daily.   7/4/2021 at Unknown time   • metoprolol succinate XL (TOPROL-XL) 25 MG 24 hr tablet Take 1 tablet by mouth 2 (Two) Times a Day. 180 tablet 3 7/4/2021 at Unknown time   • montelukast (SINGULAIR) 10 MG tablet Take 10 mg by mouth Every Night.   7/4/2021 at Unknown time   • mycophenolate (CELLCEPT) 500 MG tablet Take 1,000 mg by mouth 2 (Two) Times a Day. 2 tabs bid    7/4/2021 at Unknown time   • pantoprazole (PROTONIX) 20 MG EC tablet Take 20 mg by mouth Daily.   7/4/2021 at Unknown time   • pravastatin (PRAVACHOL) 20 MG tablet Take 10 mg by mouth Daily.   7/4/2021 at Unknown time   • raloxifene (EVISTA) 60 MG tablet Take 60 mg by mouth Every Night.   7/4/2021 at Unknown time   • rivaroxaban (XARELTO) 20 MG tablet Take 1 tablet by mouth Daily With Dinner. 90 tablet 3 Past Week at Unknown time   • vitamin B-12 (CYANOCOBALAMIN) 100 MCG tablet Take 50 mcg by mouth Daily.   7/4/2021 at Unknown time   • vitamin D (ERGOCALCIFEROL) 16639 UNITS capsule capsule Take 50,000 Units by mouth Every 7 (Seven) Days. Takes on Wednesdays 7/4/2021 at Unknown time   • albuterol (PROAIR HFA) 108 (90 BASE) MCG/ACT inhaler Inhale 2 puffs Every 6 (Six) Hours As Needed for wheezing or shortness of air.      • azelastine (ASTELIN) 0.1 % nasal spray 2 sprays into the nostril(s) as directed by provider 2 (Two) Times a Day. Use in each nostril as directed      • Bepotastine Besilate (Bepreve) 1.5 % solution Take As Directed.      • colesevelam (WELCHOL) 625 MG tablet Take 1,875 mg by mouth 2 (two) times a  day.      • Epoetin Miguel (EPOGEN IJ) Every 14 (Fourteen) Days.      • Fluticasone Furoate-Vilanterol (BREO ELLIPTA) 100-25 MCG/INH aerosol powder  Inhale 1 puff Every Morning.      • immune globulin, human, (PRIVIGEN) 20 GM/200ML solution infusion Infuse  into a venous catheter 1 (One) Time. Every 5 weeks      • mometasone (NASONEX) 50 MCG/ACT nasal spray 2 sprays into each nostril Daily.      • nitroglycerin (NITROSTAT) 0.4 MG SL tablet Place 1 tablet under the tongue Every 5 (Five) Minutes As Needed for Chest Pain. 20 tablet 1        Current Meds:   Current Facility-Administered Medications   Medication Dose Route Frequency Provider Last Rate Last Admin   • aspirin EC tablet 81 mg  81 mg Oral Nightly Wilbert Sahu MD   81 mg at 07/05/21 2133   • aztreonam (AZACTAM) 2 g/100 mL 0.9% NS (mbp)  2 g Intravenous Q8H Antonia Carpio APRN       • diphenoxylate-atropine (LOMOTIL) 2.5-0.025 MG per tablet 1 tablet  1 tablet Oral 4x Daily PRN Wilbert Sahu MD       • DULoxetine (CYMBALTA) DR capsule 60 mg  60 mg Oral BID Wilbert Sahu MD   60 mg at 07/05/21 2132   • furosemide (LASIX) injection 20 mg  20 mg Intravenous Once Yovany Mccormick MD       • lactobacillus acidophilus (RISAQUAD) capsule 1 capsule  1 capsule Oral Daily Wilbert Sahu MD   1 capsule at 07/05/21 2132   • levothyroxine (SYNTHROID, LEVOTHROID) tablet 75 mcg  75 mcg Oral Daily Wilbert Sahu MD   75 mcg at 07/06/21 0822   • losartan (COZAAR) tablet 25 mg  25 mg Oral Q24H Wilbert Sahu MD   25 mg at 07/05/21 2132   • metoprolol succinate XL (TOPROL-XL) 24 hr tablet 25 mg  25 mg Oral BID Wilbert Sahu MD   25 mg at 07/05/21 2133   • mycophenolate (CELLCEPT) tablet 1,000 mg  1,000 mg Oral BID Wilbert Sahu MD   1,000 mg at 07/05/21 2133   • nitroglycerin (NITROSTAT) SL tablet 0.4 mg  0.4 mg Sublingual Q5 Min PRN Antonia Carpio, APRN       • ondansetron (ZOFRAN) injection 4 mg  4 mg Intravenous Q6H PRN Antonia Carpio, APRN       •  pantoprazole (PROTONIX) injection 40 mg  40 mg Intravenous BID AC Antonia Carpio APRN   40 mg at 07/06/21 0822   • Pharmacy Consult   Does not apply Continuous PRN Antonai Carpio APRN       • pravastatin (PRAVACHOL) tablet 10 mg  10 mg Oral Daily Wilbert Sahu MD   10 mg at 07/05/21 2133   • raloxifene (EVISTA) tablet 60 mg  60 mg Oral Nightly Wilbert Sahu MD   60 mg at 07/05/21 2138   • sodium chloride 0.9 % flush 10 mL  10 mL Intravenous PRN Kaia Balderrama APRN       • sodium chloride 0.9 % flush 10 mL  10 mL Intravenous Q12H Antonia Carpio APRN   10 mL at 07/05/21 2133   • sodium chloride 0.9 % flush 10 mL  10 mL Intravenous PRN Antonia Carpio APRN       • vitamin B-12 (CYANOCOBALAMIN) tablet 50 mcg  50 mcg Oral Daily Wilbert Sahu MD   50 mcg at 07/05/21 2133   • vitamin D (ERGOCALCIFEROL) capsule 50,000 Units  50,000 Units Oral Q7 Days Wilbert Sahu MD   50,000 Units at 07/05/21 2132       Allergies:  Allergies   Allergen Reactions   • Neomycin Anaphylaxis   • Penicillins Anaphylaxis   • Hydrocodone Itching and Rash   • Rosuvastatin Other (See Comments)     Muscle cramps       Social History:   Social History     Socioeconomic History   • Marital status:      Spouse name: Not on file   • Number of children: Not on file   • Years of education: Not on file   • Highest education level: Not on file   Tobacco Use   • Smoking status: Never Smoker   • Smokeless tobacco: Never Used   Substance and Sexual Activity   • Alcohol use: No     Comment: Daily caffeine use   • Drug use: No     Comment: caffine   • Sexual activity: Defer        Family History:  Family History   Problem Relation Age of Onset   • Heart disease Mother    • Hyperlipidemia Mother    • Stroke Mother    • Diabetes Mother    • Breast cancer Mother    • Heart disease Father    • Hyperlipidemia Father    • Stroke Father    • Malig Hyperthermia Neg Hx         Review of Systems: as per HPI, in addition:    General:   "    Complains of weakness / fatigue,                       No fevers / chills                       no weight loss  HEENT:       no dysphagia / odynophagia  Neck:           normal range of motion, no swelling  Respiratory: no cough / congestion                     Mild shortness of air                       No wheezing  CV:              No chest pain                       No palpitations  Abdomen/GI: no nausea / vomiting                      No diarrhea / constipation                      No abdominal pain  :             no dysuria / urinary frequency                       No urgency, normal output  Endocrine:   no polyuria / polydipsia,                      No heat or cold intolerance  Skin:           no rashes or skin breakdown   Vascular:   No edema                     No claudication  Psych:        no depression/ anxiety  Neuro:        no focal weakness, no seizures  Musculoskeletal: no joint pain or deformities      Physical Exam:  Vitals:   Temp (24hrs), Av.1 °F (37.3 °C), Min:97.4 °F (36.3 °C), Max:100.4 °F (38 °C)    /68 (BP Location: Left arm, Patient Position: Lying)   Pulse 90   Temp 97.4 °F (36.3 °C) (Oral)   Resp 16   Ht 152.4 cm (60\")   Wt 67.6 kg (149 lb)   SpO2 94%   BMI 29.10 kg/m²   Intake/Output:     Intake/Output Summary (Last 24 hours) at 2021 0915  Last data filed at 2021 0826  Gross per 24 hour   Intake 2100 ml   Output --   Net 2100 ml        Wt Readings from Last 1 Encounters:   21 1628 67.6 kg (149 lb)   21 1507 68 kg (149 lb 14.6 oz)       Exam:    General Appearance:  Awake, alert, oriented x3, no acute distress  Mildly ill appearing   Head and Face:  Normocephalic, atraumatic, mucus membranes moist, oropharynx clear   Eyes:  No icterus, pupils equal round and reactive to light, extraocular movements intact    ENMT: Moist mucosa, tongue symmetric    Neck: Supple  no jugular venous distention  no thyromegaly   Pulmonary:  Respiratory effort: " Normal  Auscultation of lungs: Clear bilaterally  No wheezes  No rhonchi  Good air movement, good expansion, diminished at the bases   Chest wall:  No tenderness or deformity   Cardiovascular:  Auscultation of the heart: Normal rhythm, no murmurs  Trace to 1+ edema of bilateral lower extremities   Abdomen:  Abdomen: soft, non-tender, normal bowel sounds all four quadrants, no masses   Liver and spleen: no hepatosplenomegaly   Musculoskeletal: Digits and nails: normal  Normal range of motion  No joint swelling or gross deformities    Skin: Skin inspection: color normal, no visible rashes or lesions  Skin palpation: texture, turgor normal, no palpable lesions   Lymphatic:  no cervical lymphadenopathy    Psychiatric: Judgement and insight: normal  Orientation to person place and time: normal  Mood and affect: normal       DATA:  Radiology and Labs:  The following labs independently reviewed by me, additional AM labs ordered  Old records independently reviewed showing normal baseline creatinine, history of diastolic CHF  The following radiologic studies independently viewed by me, findings CT chest as described above  Interval notes, chart personally reviewed by me.  I have reviewed and summarized old records as detailed above  Plan of care discussed with patient  New problems include severe hyponatremia with symptoms on presentation      Risk/ complexity of medical care/ medical decision making: Moderate  Chronic illness with severe exacerbation or progression      Labs:   Recent Results (from the past 24 hour(s))   Comprehensive Metabolic Panel    Collection Time: 07/05/21  9:29 AM    Specimen: Blood   Result Value Ref Range    Glucose 131 (H) 65 - 99 mg/dL    BUN 11 8 - 23 mg/dL    Creatinine 0.89 0.57 - 1.00 mg/dL    Sodium 123 (L) 136 - 145 mmol/L    Potassium 3.5 3.5 - 5.2 mmol/L    Chloride 91 (L) 98 - 107 mmol/L    CO2 19.7 (L) 22.0 - 29.0 mmol/L    Calcium 8.8 8.6 - 10.5 mg/dL    Total Protein 6.0 6.0 - 8.5  g/dL    Albumin 3.60 3.50 - 5.20 g/dL    ALT (SGPT) 9 1 - 33 U/L    AST (SGOT) 14 1 - 32 U/L    Alkaline Phosphatase 64 39 - 117 U/L    Total Bilirubin 0.6 0.0 - 1.2 mg/dL    eGFR Non African Amer 61 >60 mL/min/1.73    eGFR  African Amer 73 >60 mL/min/1.73    Globulin 2.4 gm/dL    A/G Ratio 1.5 g/dL    BUN/Creatinine Ratio 12.4 7.0 - 25.0    Anion Gap 12.3 5.0 - 15.0 mmol/L   Procalcitonin    Collection Time: 07/05/21  9:29 AM    Specimen: Blood   Result Value Ref Range    Procalcitonin 0.90 (H) 0.00 - 0.25 ng/mL   Lactic Acid, Plasma    Collection Time: 07/05/21  9:29 AM    Specimen: Blood   Result Value Ref Range    Lactate 0.8 0.5 - 2.0 mmol/L   CBC Auto Differential    Collection Time: 07/05/21  9:29 AM    Specimen: Blood   Result Value Ref Range    WBC 14.64 (H) 3.40 - 10.80 10*3/mm3    RBC 3.01 (L) 3.77 - 5.28 10*6/mm3    Hemoglobin 8.7 (L) 12.0 - 15.9 g/dL    Hematocrit 26.4 (L) 34.0 - 46.6 %    MCV 87.7 79.0 - 97.0 fL    MCH 28.9 26.6 - 33.0 pg    MCHC 33.0 31.5 - 35.7 g/dL    RDW 13.5 12.3 - 15.4 %    RDW-SD 42.5 37.0 - 54.0 fl    MPV 9.1 6.0 - 12.0 fL    Platelets 215 140 - 450 10*3/mm3    Neutrophil % 88.8 (H) 42.7 - 76.0 %    Lymphocyte % 2.6 (L) 19.6 - 45.3 %    Monocyte % 7.7 5.0 - 12.0 %    Eosinophil % 0.0 (L) 0.3 - 6.2 %    Basophil % 0.3 0.0 - 1.5 %    Immature Grans % 0.6 (H) 0.0 - 0.5 %    Neutrophils, Absolute 13.00 (H) 1.70 - 7.00 10*3/mm3    Lymphocytes, Absolute 0.38 (L) 0.70 - 3.10 10*3/mm3    Monocytes, Absolute 1.13 (H) 0.10 - 0.90 10*3/mm3    Eosinophils, Absolute 0.00 0.00 - 0.40 10*3/mm3    Basophils, Absolute 0.04 0.00 - 0.20 10*3/mm3    Immature Grans, Absolute 0.09 (H) 0.00 - 0.05 10*3/mm3    nRBC 0.0 0.0 - 0.2 /100 WBC   Ferritin    Collection Time: 07/05/21  9:29 AM    Specimen: Blood   Result Value Ref Range    Ferritin 390.00 (H) 13.00 - 150.00 ng/mL   Iron Profile    Collection Time: 07/05/21  9:29 AM    Specimen: Blood   Result Value Ref Range    Iron 21 (L) 37 - 145 mcg/dL     Iron Saturation 7 (L) 20 - 50 %    Transferrin 215 200 - 360 mg/dL    TIBC 320 298 - 536 mcg/dL   BNP    Collection Time: 07/05/21  9:29 AM    Specimen: Blood   Result Value Ref Range    proBNP 2,029.0 (H) 0.0-1,800.0 pg/mL   Uric Acid    Collection Time: 07/05/21  9:29 AM    Specimen: Blood   Result Value Ref Range    Uric Acid 5.0 2.4 - 5.7 mg/dL   TSH    Collection Time: 07/05/21  9:29 AM    Specimen: Blood   Result Value Ref Range    TSH 0.679 0.270 - 4.200 uIU/mL   Troponin    Collection Time: 07/05/21  9:29 AM    Specimen: Blood   Result Value Ref Range    Troponin T <0.010 0.000 - 0.030 ng/mL   COVID-19,[facility] IN-HOUSE CEPHEID/STEVEN NP SWAB IN TRANSPORT MEDIA 8-12 HR TAT - Swab, Nasopharynx    Collection Time: 07/05/21  9:39 AM    Specimen: Nasopharynx; Swab   Result Value Ref Range    COVID19 Not Detected Not Detected - Ref. Range   Urinalysis With Microscopic If Indicated (No Culture) - Urine, Catheter In/Out    Collection Time: 07/05/21 10:20 AM    Specimen: Urine, Catheter In/Out   Result Value Ref Range    Color, UA Yellow Yellow, Straw    Appearance, UA Cloudy (A) Clear    pH, UA <=5.0 5.0 - 8.0    Specific Gravity, UA 1.009 1.005 - 1.030    Glucose, UA Negative Negative    Ketones, UA Negative Negative    Bilirubin, UA Negative Negative    Blood, UA Small (1+) (A) Negative    Protein, UA 30 mg/dL (1+) (A) Negative    Leuk Esterase, UA Moderate (2+) (A) Negative    Nitrite, UA Positive (A) Negative    Urobilinogen, UA 0.2 E.U./dL 0.2 - 1.0 E.U./dL   Urinalysis, Microscopic Only - Urine, Catheter In/Out    Collection Time: 07/05/21 10:20 AM    Specimen: Urine, Catheter In/Out   Result Value Ref Range    RBC, UA None Seen None Seen, 0-2 /HPF    WBC, UA 13-20 (A) None Seen, 0-2 /HPF    Bacteria, UA 2+ (A) None Seen /HPF    Squamous Epithelial Cells, UA 0-2 None Seen, 0-2 /HPF    Hyaline Casts, UA None Seen None Seen /LPF    Granular Casts, UA 0-2 None Seen /LPF    Methodology Manual Light Microscopy     Osmolality, Urine - Urine, Catheter In/Out    Collection Time: 07/05/21 10:20 AM    Specimen: Urine, Catheter In/Out   Result Value Ref Range    Osmolality, Urine 255 mOsm/kg   Creatinine, Urine, Random - Urine, Catheter In/Out    Collection Time: 07/05/21 10:20 AM    Specimen: Urine, Catheter In/Out   Result Value Ref Range    Creatinine, Urine 59.7 mg/dL   Chloride, Urine, Random - Urine, Catheter In/Out    Collection Time: 07/05/21 10:20 AM    Specimen: Urine, Catheter In/Out   Result Value Ref Range    Chloride, Urine 38 mmol/L   POCT Occult Blood Stool    Collection Time: 07/05/21 10:36 AM    Specimen: Per Rectum; Stool   Result Value Ref Range    Fecal Occult Blood Positive (A) Negative    Lot Number [id number]     Expiration Date 11/30/2021     Positive Control Positive Positive    Negative Control Negative Negative   Blood Culture - Blood, Arm, Left    Collection Time: 07/05/21 11:07 AM    Specimen: Arm, Left; Blood   Result Value Ref Range    Blood Culture Abnormal Stain (C)     Gram Stain Anaerobic Bottle Gram negative bacilli (C)     Gram Stain Aerobic Bottle Gram negative bacilli (C)    Blood Culture ID, PCR - Blood, Arm, Left    Collection Time: 07/05/21 11:07 AM    Specimen: Arm, Left; Blood   Result Value Ref Range    BCID, PCR Escherichia coli. Identification by BCID PCR. (C) Negative by BCID PCR. Culture to Follow.    BOTTLE TYPE Anaerobic Bottle    ECG 12 Lead    Collection Time: 07/05/21  3:21 PM   Result Value Ref Range    QT Interval 316 ms   Adult Transthoracic Echo Complete W/ Cont if Necessary Per Protocol    Collection Time: 07/05/21  4:28 PM   Result Value Ref Range    BSA 1.6 m^2    IVSd 1.0 cm    LVIDd 3.8 cm    LVIDs 2.4 cm    LVPWd 1.2 cm    IVS/LVPW 0.83     FS 36.8 %    EDV(Teich) 62.0 ml    ESV(Teich) 20.2 ml    EF(Teich) 67.5 %    EDV(cubed) 54.9 ml    ESV(cubed) 13.8 ml    EF(cubed) 74.8 %    LV mass(C)d 134.7 grams    LV mass(C)dI 82.9 grams/m^2    SV(Teich) 41.8 ml    SI(Teich) 25.7  ml/m^2    SV(cubed) 41.0 ml    SI(cubed) 25.3 ml/m^2    Ao root diam 2.4 cm    Ao root area 4.5 cm^2    ACS 1.3 cm    LVOT diam 2.1 cm    LVOT area 3.5 cm^2    LVOT area(traced) 3.5 cm^2    RVOT diam 2.4 cm    RVOT area 4.5 cm^2    LVLd ap4 6.7 cm    EDV(MOD-sp4) 51.0 ml    LVLs ap4 5.3 cm    ESV(MOD-sp4) 14.0 ml    EF(MOD-sp4) 72.5 %    LVLd ap2 6.8 cm    EDV(MOD-sp2) 51.0 ml    LVLs ap2 4.6 cm    ESV(MOD-sp2) 15.0 ml    EF(MOD-sp2) 70.6 %    SV(MOD-sp4) 37.0 ml    SI(MOD-sp4) 22.8 ml/m^2    SV(MOD-sp2) 36.0 ml    SI(MOD-sp2) 22.2 ml/m^2    Ao root area (BSA corrected) 1.5     LV Workman Vol (BSA corrected) 31.4 ml/m^2    LV Sys Vol (BSA corrected) 8.6 ml/m^2    EF(MOD-bp) 69.5 %    MV E max seven 96.8 cm/sec    MV A max seven 119.0 cm/sec    MV E/A 0.81     MV V2 max 122.0 cm/sec    MV max PG 6.0 mmHg    MV V2 mean 84.0 cm/sec    MV mean PG 3.0 mmHg    MV V2 VTI 22.6 cm    MVA(VTI) 2.6 cm^2    MV P1/2t max seven 114.0 cm/sec    MV P1/2t 73.5 msec    MVA(P1/2t) 3.0 cm^2    MV dec slope 454.0 cm/sec^2    MV dec time 264 sec    Ao pk seven 203.0 cm/sec    Ao max PG 16.5 mmHg    Ao max PG (full) 11.3 mmHg    Ao V2 mean 129.0 cm/sec    Ao mean PG 8.0 mmHg    Ao mean PG (full) 6.0 mmHg    Ao V2 VTI 26.3 cm    BEATA(I,A) 2.2 cm^2    BEATA(I,D) 2.2 cm^2    BEATA(V,A) 1.9 cm^2    BEATA(V,D) 1.9 cm^2    AI max seven 319.0 cm/sec    AI max PG 40.7 mmHg    AI dec slope 216.0 cm/sec^2    AI P1/2t 432.6 msec    LV V1 max PG 5.2 mmHg    LV V1 mean PG 2.0 mmHg    LV V1 max 114.0 cm/sec    LV V1 mean 69.1 cm/sec    LV V1 VTI 16.7 cm    SV(Ao) 119.0 ml    SI(Ao) 73.3 ml/m^2    SV(LVOT) 57.8 ml    SV(RVOT) 94.5 ml    SI(LVOT) 35.6 ml/m^2    PA V2 max 159.0 cm/sec    PA max PG 10.1 mmHg    PA max PG (full) 1.6 mmHg    BH CV ECHO NICHOLAS - PVA(V,A) 4.2 cm^2    BH CV ECHO NICHOLAS - PVA(V,D) 4.2 cm^2    PA acc time 0.09 sec    RV V1 max PG 8.5 mmHg    RV V1 mean PG 3.0 mmHg    RV V1 max 146.0 cm/sec    RV V1 mean 83.2 cm/sec    RV V1 VTI 20.9 cm    TR max seven  261.0 cm/sec    PA pr(Accel) 39.9 mmHg    Qp/Qs 1.6     MVA P1/2T LCG 1.9 cm^2    Lat Peak E' Brayden 9.0 cm/sec    Med Peak E' Brayden 5.1 cm/sec    RV S' 15.1 cm/sec     CV ECHO NICHOLAS - BZI_BMI 28.1 kilograms/m^2     CV ECHO NICHOLAS - BSA(HAYCOCK) 1.7 m^2     CV ECHO NICHOLAS - BZI_METRIC_WEIGHT 65.3 kg     CV ECHO NICHOLAS - BZI_METRIC_HEIGHT 152.4 cm    Avg E/e' ratio 13.73     Target HR (85%) 116 bpm    Max. Pred. HR (100%) 137 bpm    Sinus 3.2 cm    Dimensionless Index 0.60 (DI)    LA Volume Index 45.0 mL/m2    RAP systole 8 mmHg    RVSP(TR) 35 mmHg    TAPSE (>1.6) 1.50 cm   D-dimer, Quantitative    Collection Time: 07/05/21  8:41 PM    Specimen: Blood   Result Value Ref Range    D-Dimer, Quantitative 3.00 (H) 0.00 - 0.49 MCGFEU/mL   Sodium    Collection Time: 07/05/21  8:41 PM    Specimen: Blood   Result Value Ref Range    Sodium 126 (L) 136 - 145 mmol/L   Osmolality, Serum    Collection Time: 07/05/21  8:41 PM    Specimen: Blood   Result Value Ref Range    Osmolality 260 (L) 280 - 301 mOsm/kg   Hemoglobin & Hematocrit, Blood    Collection Time: 07/05/21  8:41 PM    Specimen: Blood   Result Value Ref Range    Hemoglobin 8.5 (L) 12.0 - 15.9 g/dL    Hematocrit 26.7 (L) 34.0 - 46.6 %   T4, Free    Collection Time: 07/05/21  8:41 PM    Specimen: Blood   Result Value Ref Range    Free T4 1.14 0.93 - 1.70 ng/dL   Phosphorus    Collection Time: 07/05/21  8:41 PM    Specimen: Blood   Result Value Ref Range    Phosphorus 2.1 (L) 2.5 - 4.5 mg/dL   Sodium, Urine, Random - Urine, Random Void    Collection Time: 07/05/21  9:51 PM    Specimen: Urine, Random Void   Result Value Ref Range    Sodium, Urine 42 mmol/L   Sodium    Collection Time: 07/06/21 12:05 AM    Specimen: Blood   Result Value Ref Range    Sodium 123 (L) 136 - 145 mmol/L   Vitamin B12    Collection Time: 07/06/21  7:59 AM    Specimen: Blood   Result Value Ref Range    Vitamin B-12 >2,000 (H) 211 - 946 pg/mL   Folate    Collection Time: 07/06/21  7:59 AM    Specimen:  Blood   Result Value Ref Range    Folate 9.12 4.78 - 24.20 ng/mL   CBC (No Diff)    Collection Time: 07/06/21  8:00 AM    Specimen: Blood   Result Value Ref Range    WBC 11.95 (H) 3.40 - 10.80 10*3/mm3    RBC 3.00 (L) 3.77 - 5.28 10*6/mm3    Hemoglobin 8.5 (L) 12.0 - 15.9 g/dL    Hematocrit 26.4 (L) 34.0 - 46.6 %    MCV 88.0 79.0 - 97.0 fL    MCH 28.3 26.6 - 33.0 pg    MCHC 32.2 31.5 - 35.7 g/dL    RDW 13.3 12.3 - 15.4 %    RDW-SD 42.8 37.0 - 54.0 fl    MPV 9.0 6.0 - 12.0 fL    Platelets 185 140 - 450 10*3/mm3   Basic Metabolic Panel    Collection Time: 07/06/21  8:00 AM    Specimen: Blood   Result Value Ref Range    Glucose 109 (H) 65 - 99 mg/dL    BUN 8 8 - 23 mg/dL    Creatinine 0.85 0.57 - 1.00 mg/dL    Sodium 130 (L) 136 - 145 mmol/L    Potassium 3.7 3.5 - 5.2 mmol/L    Chloride 98 98 - 107 mmol/L    CO2 22.0 22.0 - 29.0 mmol/L    Calcium 8.5 (L) 8.6 - 10.5 mg/dL    eGFR  African Amer 77 >60 mL/min/1.73    eGFR Non African Amer 64 >60 mL/min/1.73    BUN/Creatinine Ratio 9.4 7.0 - 25.0    Anion Gap 10.0 5.0 - 15.0 mmol/L       Radiology:  Imaging Results (Last 24 Hours)     Procedure Component Value Units Date/Time    CT Angiogram Chest With & Without Contrast [[id number]] Collected: 07/06/21 0207     Updated: 07/06/21 0207    Narrative:        Patient: ANN ANN  Time Out: 02:06  Exam(s): CTA CHEST W WO Contrast     EXAM:    CT Angiography Chest Without and With Intravenous Contrast    CLINICAL HISTORY:     Reason for exam: PE suspected, low intermediate prob, neg D-dimer.    TECHNIQUE:    Axial computed tomographic angiography images of the chest without and   with intravenous contrast.  CTDI is 14.80 mGy and DLP is 533.80 mGy-cm.    This CT exam was performed according to the principle of ALARA (As Low As   Reasonably Achievable) by using one or more of the following dose   reduction techniques: automated exposure control, adjustment of the mA   and or kV according to patient size, and or use of iterative    reconstruction technique.    3D and MIP reconstructed images were created and reviewed.    COMPARISON:    February 7, 2017    FINDINGS:    Pulmonary arteries:  The pulmonary arterial tree is well-opacified with   contrast.  No pulmonary emboli are identified.    Aorta:  The thoracic aorta is heavily calcified but nondilated.  There   is no aneurysm or dissection.    Lungs:  See below.      Pleural space:  There is a small right pleural effusion measuring 1.9   cm with a small amount of bibasilar atelectasis, 1 greatest on the right.    No pneumothorax.    Heart:  The heart is moderately enlarged.  Severe coronary   calcification is present.  No pericardial effusion.  No evidence of RV   dysfunction.    Bones joints:  Severe compression fracture with vertebra plana at T6,   unchanged.  There is also a compression fracture T4, unchanged.  No   dislocation.    Soft tissues:  Unremarkable.    Lymph nodes:  Unremarkable.  No enlarged lymph nodes.    IMPRESSION:       Cardiomegaly and severe calcification of the thoracic aorta.  No aneurysm   or dissection.    No evidence of pulmonary embolism.    Small right pleural effusion and mild bibasilar atelectasis.  Mild CHF   cannot be excluded.    Chronic compression fractures in the upper thoracic spine at T4 and T6,   unchanged.    Impression:          Electronically signed by Yovany Espinosa MD on 07-06-21 at 0206               ASSESSMENT:   Acute hyponatremia, symptomatic initially but improved overnight.  Current sodium 130.  Was started on IV fluids and does appear mildly volume overloaded today on exam with history of diastolic CHF    Sepsis (CMS/HCC)    HX: long term anticoagulant use    Essential hypertension    CAD (coronary artery disease)    Atrial fibrillation (CMS/HCC)    Acute UTI    COPD (chronic obstructive pulmonary disease) (CMS/HCC)    Hyponatremia    Metabolic encephalopathy    Sleep apnea    Normocytic anemia    Dyspnea    Acute on chronic diastolic CHF  (congestive heart failure) (CMS/[facility])        PLAN:   Sodium level has improved overnight, currently asymptomatic  IV fluids were discontinued earlier today  Does look mildly volume overloaded with history of diastolic CHF  We will give Lasix IV x1 and start fluid restriction  Urine osmolality was only 255 so this does not really look like SIADH at this point, electrolytes were also not prerenal appearing    Continue to monitor electrolytes and volume closely  I appreciate the consult request, please call if any questions      Yovany Mccormick M.D  [facility]  Office phone number: [phone]  Answering service phone number: [phone]        7/6/2021        Dictation via Dragon dictation software

## 2021-07-06 NOTE — PROGRESS NOTES
Discharge Planning Assessment  [facility]     Patient Name: Olena Myers  MRN: [id number]  Today's Date: 7/6/2021    Admit Date: 7/5/2021    Discharge Needs Assessment     Row Name 07/06/21 1631       Living Environment    Lives With  alone    Current Living Arrangements  home/apartment/condo    Primary Care Provided by  self    Provides Primary Care For  no one    Family Caregiver if Needed  child(lorenza), adult    Quality of Family Relationships  supportive       Resource/Environmental Concerns    Resource/Environmental Concerns  none       Transition Planning    Patient/Family Anticipates Transition to  home;inpatient rehabilitation facility    Patient/Family Anticipated Services at Transition  none    Transportation Anticipated  family or friend will provide       Discharge Needs Assessment    Equipment Currently Used at Home  cpap;respiratory supplies;cane, straight    Provided Post Acute Provider List?  Yes    Post Acute Provider List  Nursing Home    Provided Post Acute Provider Quality & Resource List?  Yes    Post Acute Provider Quality and Resource List  Nursing Home    Delivered To  Patient;Support Person    Method of Delivery  In person        Discharge Plan     Row Name 07/06/21 1632       Plan    Plan  Assessing for Home vs SNF    Plan Comments  Spoke with pt and her son at bed side to screen for DCP/needs.  Pt confirmed that she does reside at [facility] alone. Pt denies ever using HH in the past but has been to [place] [facility] and [place] in the past.  Pt stated that MD had mention to her this morning that she will probably need a SNF stay at [facility].  Pt stated that she would like referrals made to [facility] and [place].  Referral placed in [facility].  Pt also currently on O2 and reports that she is not on O2 at home.  CCP will follow up on SNF referrals and PT eval to finialize DCP.        Continued Care and Services - Admitted Since 7/5/2021     Destination     Service Provider Request  Status Selected Services Address Phone Fax Patient Preferred    [facility]  Pending - Request Sent N/A [address] [facility] [address] [phone] --    [facility]  Pending - Request Sent N/A [address] [facility] [address] [phone] --                Demographic Summary     Row Name 07/06/21 1630       General Information    Admission Type  inpatient    Arrived From  home    Referral Source  admission list    Reason for Consult  discharge planning    Preferred Language  English        Functional Status     Row Name 07/06/21 1631       Functional Status    Usual Activity Tolerance  moderate    Current Activity Tolerance  fair       Functional Status, IADL    Medications  independent    Meal Preparation  independent    Housekeeping  independent    Laundry  independent    Shopping  independent       Mental Status    General Appearance WDL  WDL       Mental Status Summary    Recent Changes in Mental Status/Cognitive Functioning  no changes        Psychosocial    No documentation.       Abuse/Neglect    No documentation.       Legal    No documentation.       Substance Abuse    No documentation.       Patient Forms    No documentation.           DANITA Giraldo

## 2021-07-06 NOTE — PROGRESS NOTES
"Pharmacokinetic Consult - Aztreonam Dosing    Olena Myers is a 85 y.o. female who is on day 2 pharmacy to dose aztreonam for sepsis.  Pharmacy dosing aztreonam per MILA Jones's request.   Other antimicrobials: N/A      Relvant clinical data and objective history reviewed:  152.4 cm (60\")  67.6 kg (149 lb)  Body mass index is 29.1 kg/m².     She has a past medical history of Anemia, Arthritis, Asthma, Atrial fibrillation (CMS/[facility]), CAD (coronary artery disease), COPD (chronic obstructive pulmonary disease) (CMS/[facility]), Dilation of esophagus, History of gastric ulcer, History of GI bleed, [place] (hard of hearing), Hyperlipidemia, Hypertension, Lightheadedness, LVH (left ventricular hypertrophy), MG (myasthenia gravis) (CMS/[facility]), Mini stroke (CMS/[facility]) (10/2016, 3/2017), OA (osteoarthritis), Osteoporosis, Sleep apnea, and SOB (shortness of breath).    Allergies as of 07/05/2021 - Reviewed 07/05/2021   Allergen Reaction Noted   • Neomycin Anaphylaxis 07/12/2016   • Penicillins Anaphylaxis 06/10/2016   • Hydrocodone Itching and Rash 11/18/2016   • Rosuvastatin Other (See Comments) 08/28/2019     Vital Signs (last 24 hours)       07/04 0700  -  07/05 0659 07/05 0700  -  07/05 1538   Most Recent    Temp (°F)   98.3 -  101.4     98.3 (36.8)    Heart Rate   95 -  116     116    Resp   18 -  20     18    BP   109/81 -  176/95     176/95    SpO2 (%)   94 -  98     94        Estimated Creatinine Clearance: 43 mL/min (by C-G formula based on SCr of 0.85 mg/dL).  Results from last 7 days   Lab Units 07/06/21  0800 07/05/21  0929   CREATININE mg/dL 0.85 0.89     Results from last 7 days   Lab Units 07/06/21  0800 07/05/21  0929   WBC 10*3/mm3 11.95* 14.64*     Body mass index is 29.1 kg/m².    Baseline culture/source/susceptibility:   Microbiology Results (last 10 days)     Procedure Component Value - Date/Time    Blood Culture - Blood, Arm, Left [[id number]]  (Abnormal) Collected: 07/05/21 1107    Lab Status: Preliminary " result Specimen: Blood from Arm, Left Updated: 07/06/21 0027     Blood Culture Abnormal Stain     Gram Stain Anaerobic Bottle Gram negative bacilli      Aerobic Bottle Gram negative bacilli    Blood Culture ID, PCR - Blood, Arm, Left [[id number]]  (Abnormal) Collected: 07/05/21 1107    Lab Status: Final result Specimen: Blood from Arm, Left Updated: 07/06/21 0124     BCID, PCR Escherichia coli. Identification by BCID PCR.     BOTTLE TYPE Anaerobic Bottle    COVID PRE-OP / PRE-PROCEDURE SCREENING ORDER (NO ISOLATION) - Swab, Nasopharynx [[id number]]  (Normal) Collected: 07/05/21 0939    Lab Status: Final result Specimen: Swab from Nasopharynx Updated: 07/05/21 1036    Narrative:      The following orders were created for panel order COVID PRE-OP / PRE-PROCEDURE SCREENING ORDER (NO ISOLATION) - Swab, Nasopharynx.  Procedure                               Abnormality         Status                     ---------                               -----------         ------                     COVID-19,[facility] IN-HOUSE...[[id number]]  Normal              Final result                 Please view results for these tests on the individual orders.    COVID-19,[facility]-HOUSE CEPHEID/STEVEN NP SWAB IN TRANSPORT MEDIA 8-12 HR TAT - Swab, Nasopharynx [[id number]]  (Normal) Collected: 07/05/21 0939    Lab Status: Final result Specimen: Swab from Nasopharynx Updated: 07/05/21 1036     COVID19 Not Detected    Narrative:      Fact sheet for providers: https://www.fda.gov/media/272066/download     Fact sheet for patients: https://www.fda.gov/media/118485/download            Imaging:  XR Chest 1 View    Result Date: 7/5/2021  ONE VIEW PORTABLE CHEST  HISTORY: Fever and weakness.  FINDINGS: The lungs are well-expanded and clear. There is mild cardiomegaly unchanged from 01/05/2021. A left-sided MediPort catheter ends in the SVC without change.  This report was finalized on 7/5/2021 10:17 AM by Dr. Crow Solomon M.D.        Anti-Infectives (From  admission, onward)    Ordered     Dose/Rate Route Frequency Start Stop    07/06/21 0802  aztreonam (AZACTAM) 2 g/100 mL 0.9% NS (mbp)     Ordering Provider: Antonia Carpio APRN    2 g  over 4 Hours Intravenous Every 8 Hours 07/06/21 0930 07/10/21 1729    07/05/21 1116  aztreonam (AZACTAM) 2 g/100 mL 0.9% NS (mbp)     Ordering Provider: Kaia Balderrama APRN    2 g  over 30 Minutes Intravenous Once 07/05/21 1118 07/05/21 1204         · WBC 14.64 --> 11.95  · 7/5/21 Procal 0.9   · UA with +2 bacteria, 0-2 squamous cells, nitrite positive so likely a gram negative bug.   · 7/5/21 Tmax 101.4; down to 97.4  · Noted positive blood culture this morning growing E.Coli as identified by BCID    Assessment/Plan  • Optimize Aztreonam dosing to 2 gram IV q8h per [facility] dosing guidelines as estimated CrCl 43 mL/min at this time  • Administer Aztreonam over 4 hours to maximize T>JULIEN  • Pharmacy will continue to follow daily while on aztreonam and adjust as needed.     Abdiaziz Frausto, PharmD  15:38 EDT

## 2021-07-06 NOTE — PLAN OF CARE
Goal Outcome Evaluation:  Plan of Care Reviewed With: patient           Outcome Summary: VSS. Complains of pain in her side., treated with pillows and positioning.  STAT CT done to rule out PE, results in chart. Put in 1L of O2 while sleeping. Given IV antibiotics. Positive Blood Cultures and positive occult stool.  WCM.

## 2021-07-06 NOTE — DISCHARGE PLACEMENT REQUEST
"Olena Myers (85 y.o. Female)     Date of Birth Social Security Number Address Home Phone MRN    1935  [address]  [facility] [id number] [phone] [id number]    Orthodoxy Marital Status          Gnosticism        Admission Date Admission Type Admitting Provider Attending Provider Department, Room/Bed    7/5/21 Emergency Wilbert Sahu MD Shah, Aakash, MD [address], [address]1    Discharge Date Discharge Disposition Discharge Destination                       Attending Provider: Wilbert Sahu MD    Allergies: Neomycin, Penicillins, Hydrocodone, Rosuvastatin    Isolation: None   Infection: None   Code Status: CPR    Ht: 152.4 cm (60\")   Wt: 67.6 kg (149 lb)    Admission Cmt: None   Principal Problem: Sepsis (CMS/HCC) [A41.9]                 Active Insurance as of 7/5/2021     Primary Coverage     Payor Plan Insurance Group Employer/Plan Group    [facility] MEDICARE REPLACEMENT [facility] MEDICARE REPLACEMENT [id number]     Payor Plan Address Payor Plan Phone Number Payor Plan Fax Number Effective Dates    [address] [address]   3/1/2018 - None Entered    [facility] [id number]       Subscriber Name Subscriber Birth Date Member ID       OLENA MYERS 1935 [id number]           Secondary Coverage     Payor Plan Insurance Group Employer/Plan Group    HUMANA HUMANA  SUP              [id number]     Payor Plan Address Payor Plan Phone Number Payor Plan Fax Number Effective Dates    [address] [address]   1/1/2013 - None Entered    [facility] [id number]       Subscriber Name Subscriber Birth Date Member ID       OLENA MYERS 1935 [id number]                 Emergency Contacts      (Rel.) Home Phone Work Phone Mobile Phone    Nazanin Pierson (Sister) [phone] -- [phone]    Olvin Myers (Son) [phone] -- [phone]    Teddy Myers (Son) [phone] -- --            "

## 2021-07-06 NOTE — PROGRESS NOTES
Name: Olena Myers ADMIT: 2021   : 1935  PCP: Manda Keenan MD    MRN: [id number] LOS: 1 days   AGE/SEX: 85 y.o. female  ROOM: [facility]     Subjective   Subjective   No acute events overnight.  Patient denies any signs of GI bleeding.  Denies hemoptysis as well.    Review of Systems   Constitutional: Positive for chills and fatigue. Negative for fever.   Respiratory: Negative.    Cardiovascular: Negative.    Gastrointestinal: Negative for blood in stool and diarrhea.   Genitourinary: Negative for difficulty urinating.        Objective   Objective   Vital Signs  Temp:  [97.4 °F (36.3 °C)-100.4 °F (38 °C)] 97.4 °F (36.3 °C)  Heart Rate:  [] 95  Resp:  [16-30] 16  BP: (118-176)/(55-95) 118/68  SpO2:  [93 %-94 %] 93 %  on  Flow (L/min):  [1] 1;   Device (Oxygen Therapy): nasal cannula  Body mass index is 29.1 kg/m².  Physical Exam  Constitutional:       Appearance: Normal appearance.      Comments: Chronically ill-appearing   HENT:      Head: Normocephalic and atraumatic.   Cardiovascular:      Rate and Rhythm: Normal rate and regular rhythm.   Pulmonary:      Effort: Pulmonary effort is normal. No respiratory distress.   Abdominal:      General: There is no distension.      Palpations: Abdomen is soft.      Tenderness: There is no abdominal tenderness.   Musculoskeletal:         General: No swelling.   Skin:     General: Skin is warm and dry.   Neurological:      General: No focal deficit present.      Mental Status: She is alert and oriented to person, place, and time.         Results Review     I reviewed the patient's new clinical results.  Results from last 7 days   Lab Units 21  0800 21  0929   WBC 10*3/mm3 11.95*  --  14.64*   HEMOGLOBIN g/dL 8.5* 8.5* 8.7*   PLATELETS 10*3/mm3 185  --  215     Results from last 7 days   Lab Units 21  0800 21  0005 21  0929   SODIUM mmol/L 130* 123* 126* 123*   POTASSIUM mmol/L 3.7  --   --  3.5    CHLORIDE mmol/L 98  --   --  91*   CO2 mmol/L 22.0  --   --  19.7*   BUN mg/dL 8  --   --  11   CREATININE mg/dL 0.85  --   --  0.89   GLUCOSE mg/dL 109*  --   --  131*   Estimated Creatinine Clearance: 43 mL/min (by C-G formula based on SCr of 0.85 mg/dL).  Results from last 7 days   Lab Units 07/05/21  0929   ALBUMIN g/dL 3.60   BILIRUBIN mg/dL 0.6   ALK PHOS U/L 64   AST (SGOT) U/L 14   ALT (SGPT) U/L 9     Results from last 7 days   Lab Units 07/06/21  0800 07/05/21 2041 07/05/21  0929   CALCIUM mg/dL 8.5*  --  8.8   ALBUMIN g/dL  --   --  3.60   PHOSPHORUS mg/dL  --  2.1*  --      Results from last 7 days   Lab Units 07/05/21  0929   PROCALCITONIN ng/mL 0.90*   LACTATE mmol/L 0.8     COVID19   Date Value Ref Range Status   07/05/2021 Not Detected Not Detected - Ref. Range Final   01/05/2021 Not Detected Not Detected - Ref. Range Final     No results found for: HGBA1C, POCGLU    Adult Transthoracic Echo Complete W/ Cont if Necessary Per Protocol  · Calculated left ventricular EF = 69.5% Estimated left ventricular EF =   70% Estimated left ventricular EF was in agreement with the calculated   left ventricular EF. Left ventricular systolic function is normal. The   left ventricular cavity is small in size. Left ventricular wall thickness   is consistent with moderate concentric hypertrophy. All left ventricular   wall segments contract normally. Left ventricular diastolic function is   consistent with (grade II w/high LAP) pseudonormalization.  · Left atrial volume is severely increased. The right atrial cavity is   moderate to severely dilated.  · There is severe calcification of the aortic valve. The aortic valve   appears trileaflet. Mild to moderate aortic valve regurgitation is   present. No aortic valve stenosis is present.  · No mitral valve regurgitation or significant stenosis is present. Severe   mitral annular calcification is present. There is mild, bileaflet mitral   valve thickening present.  ·  Mild tricuspid valve regurgitation is present. Calculated right   ventricular systolic pressure from tricuspid regurgitation is 35 mmHg.   There is no significant pulmonary hypertension     CT Angiogram Chest With & Without Contrast  Narrative: Patient: ANN ANN  Time Out: 02:06  Exam(s): CTA CHEST W WO Contrast     EXAM:    CT Angiography Chest Without and With Intravenous Contrast    CLINICAL HISTORY:     Reason for exam: PE suspected, low intermediate prob, neg D-dimer.    TECHNIQUE:    Axial computed tomographic angiography images of the chest without and   with intravenous contrast.  CTDI is 14.80 mGy and DLP is 533.80 mGy-cm.    This CT exam was performed according to the principle of ALARA (As Low As   Reasonably Achievable) by using one or more of the following dose   reduction techniques: automated exposure control, adjustment of the mA   and or kV according to patient size, and or use of iterative   reconstruction technique.    3D and MIP reconstructed images were created and reviewed.    COMPARISON:    February 7, 2017    FINDINGS:    Pulmonary arteries:  The pulmonary arterial tree is well-opacified with   contrast.  No pulmonary emboli are identified.    Aorta:  The thoracic aorta is heavily calcified but nondilated.  There   is no aneurysm or dissection.    Lungs:  See below.      Pleural space:  There is a small right pleural effusion measuring 1.9   cm with a small amount of bibasilar atelectasis, 1 greatest on the right.    No pneumothorax.    Heart:  The heart is moderately enlarged.  Severe coronary   calcification is present.  No pericardial effusion.  No evidence of RV   dysfunction.    Bones joints:  Severe compression fracture with vertebra plana at T6,   unchanged.  There is also a compression fracture T4, unchanged.  No   dislocation.    Soft tissues:  Unremarkable.    Lymph nodes:  Unremarkable.  No enlarged lymph nodes.    IMPRESSION:       Cardiomegaly and severe calcification of the  thoracic aorta.  No aneurysm   or dissection.    No evidence of pulmonary embolism.    Small right pleural effusion and mild bibasilar atelectasis.  Mild CHF   cannot be excluded.    Chronic compression fractures in the upper thoracic spine at T4 and T6,   unchanged.  Impression: Electronically signed by Yovany Espinosa MD on 07-06-21 at 0206    Scheduled Medications  aspirin, 81 mg, Oral, Nightly  cefTRIAXone, 2 g, Intravenous, Q24H  DULoxetine, 60 mg, Oral, BID  lactobacillus acidophilus, 1 capsule, Oral, Daily  levothyroxine, 75 mcg, Oral, Daily  losartan, 25 mg, Oral, Q24H  metoprolol succinate XL, 25 mg, Oral, BID  mycophenolate, 1,000 mg, Oral, BID  pantoprazole, 40 mg, Intravenous, BID AC  pravastatin, 10 mg, Oral, Nightly  raloxifene, 60 mg, Oral, Nightly  sodium chloride, 10 mL, Intravenous, Q12H  vitamin B-12, 50 mcg, Oral, Daily  vitamin D, 50,000 Units, Oral, Q7 Days    Infusions   Diet  Diet Regular; Cardiac, Daily Fluid Restriction; 1500 mL Fluid Per Day       Assessment/Plan     Active Hospital Problems    Diagnosis  POA   • **Sepsis (CMS/[facility]) [A41.9]  Yes   • Acute UTI [N39.0]  Yes   • Dyspnea [R06.00]  Unknown   • Acute on chronic diastolic CHF (congestive heart failure) (CMS/[facility]) [I50.33]  Unknown   • COPD (chronic obstructive pulmonary disease) (CMS/[facility]) [J44.9]  Yes   • Hyponatremia [E87.1]  Yes   • Metabolic encephalopathy [G93.41]  Yes   • Sleep apnea [G47.30]  Yes   • Normocytic anemia [D64.9]  Yes   • Atrial fibrillation (CMS/[facility]) [I48.91]  Yes   • CAD (coronary artery disease) [I25.10]  Yes   • Essential hypertension [I10]  Yes   • HX: long term anticoagulant use [Z92.29]  Not Applicable      Resolved Hospital Problems   No resolved problems to display.       85 y.o. female admitted with Sepsis (CMS/HCC).    Sepsis  Metabolic encephalopathy  UA with UTI. On Aztreonam due to hx anaphalaxis with penicillins, however has tolerated Kephlex in the past, so will transition to ceftriaxone   CXR  clear  CT chest ordered to eval for PE. No PE identified, but small pleural effusions noted      Generalized weakness/fatigue  TSH, Ft4 wnl  Phos a little low, will replace    Hyponatremia  Check urine Osm, Serum Osm, Urine Na  Endorses poor appetite over last several weeks  Drastically improved overnight, patient is asymptomatic        Diastolic heart failure  Atrial Fibrillation  Coronary artery disease  HTN  Cardiology consulted  Resume home Bb, ARB  IV Lasix x1 fluid restriction started by nephrology     Anemia  Hb stable. No signs of overt bleeding.   Anemia studies consistent with JOSÉ IMGUEL + ACD  Was previously prescribed Epogen     Myasthenia gravis  Currently on CellCept and privigen at home     Xarelto (home med) for DVT prophylaxis.  Full code.  Discussed with patient.  Anticipate discharge to SNU facility later this week.      Wilbert Sahu MD  [facility]  07/06/21  14:03 EDT    Patient was wearing facemask when I entered the room and throughout our encounter.  I wore protective equipment throughout this patient encounter including a face mask, gloves and protective eyewear.  Hand hygiene was performed before donning protective equipment and after removal when leaving the room.

## 2021-07-06 NOTE — PLAN OF CARE
Goal Outcome Evaluation:     84 yo female admitted 7/5 with UTI, hyponatremia and anemia. Na improved, Hgb stable. CT angiogram negative for PE, shows pleural effusion. IV lasix given. Voiding via [place]. Pt still with shortness of air, wheezing; albuterol PRN ordered. 1L O2 NC PRN. VS stable, A&Ox4. IV abx continued.

## 2021-07-06 NOTE — CONSULTS
Patient Name: Olena Myers  :1935  85 y.o.    Date of Admission: 2021  Date of Consultation:  21  Encounter Provider: Keanu Araujo RN  Place of Service: [facility]  Referring Provider: Wilbert Sahu MD  Patient Care Team:  Manda Keenan MD as PCP - General (General Practice)  Olvin Hernandez MD as Consulting Physician (Cardiology)      Chief complaint: Generalized weakness    Reason for consult: Dyspnea on Exertion    History of Present Illness:    Ms. Myers is an 85 year old female patient who previously followed with Dr. Hernandez with a history of hypertension, hyperlipidemia, carotid artery stenosis, PAF, aortic valve stenosis and coronary artery disease. She has history of previous angioplasty and bare-metal stent to the LAD. She had a follow-up perfusion stress test for surgery clearance and that was normal.  She then had abrupt loss of vision in her left eye.  She had a CTA in [facility] which showed no real stenoses.  Neurology felt that she had a cerebrovascular event.  Was later found to have atrial fibrillation and placed on beta-blocker.  She converted with amiodarone but then had some increased COPD and evidence of fibrosis so amiodarone was later stopped. Her last echo of 2018 showed EF 68%, grade II w/high LAP left ventricular diastolic dysfunction, abnormal aortic valve with sclerosis and mild to moderate regurgitation.     I last saw her in telehealth visit on 2021 in follow up to ED visit on 2021 for costochondritis. She has chronic shortness of breath on exertion which she attributes to her chronic lung disease. She is anticoagulated with Xarelto. She has myasthenia gravis and gets infusions every 5 weeks per her neurologist.     She presented to the [facility] ED on 2021 with complaints of weakness, cough, nausea, confusion and fever. ED workup revealed positive sepsis screen with a  UTI, possible GI bleed with hemoglobin 8.7 and positive occult stool, and hyponatremia with sodium 123. Blood cultures have shown positive for E. Coli. She was started on IV antibiotics and admitted for further evaluation.     We are being asked to see the patient due to her dyspnea on exertion, GI bleed on OAC and cardiac history. Troponin negative, proBNP 2029.0, TSH 0.679, elevated D-dimer 3.00, CTA chest negative for PE but did show cardiomegaly, severe calcification of the thoracic aorta, small right pleural effusion and mild bibasilar atelectasis. Repeat echocardiogram has been performed and is pending read.     Previous Cardiac Testing:     Echocardiogram 07/05/2021 pending    Echocardiogram 12/06/2018:  · Left ventricular systolic function is normal. Calculated EF = 68%. Estimated EF was in agreement with the calculated EF. Normal left ventricular cavity size and wall thickness noted. All left ventricular wall segments contract normally. Left ventricular diastolic dysfunction is noted (grade II w/high LAP) consistent with pseudonormalization.  · Left atrial cavity size is mild-to-moderately dilated.  · The aortic valve is abnormal in structure. The valve exhibits sclerosis. Mild to moderate aortic valve regurgitation is present.    Stress Test 06/17/2016:  · Myocardial perfusion imaging indicates a normal myocardial perfusion study with no evidence of ischemia.  · Left ventricular ejection fraction is hyperdynamic (Calculated EF > 70%).  · There is no prior study available for comparison          Past Medical History:   Diagnosis Date   • Anemia     IRON DEFICIENCY   • Arthritis    • Asthma    • Atrial fibrillation (CMS/HCC)    • CAD (coronary artery disease)     HEART STENT   • COPD (chronic obstructive pulmonary disease) (CMS/[facility])    • 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   • History of gastric ulcer    • History of GI bleed    • Robinson (hard of hearing)    • Hyperlipidemia    • Hypertension    •  Lightheadedness    • LVH (left ventricular hypertrophy)    • MG (myasthenia gravis) (CMS/[facility])    • Mini stroke (CMS/[facility]) 10/2016, 3/2017   • OA (osteoarthritis)    • Osteoporosis    • Sleep apnea     USES CPAP   • SOB (shortness of breath)     WALKING       Past Surgical History:   Procedure Laterality Date   • APPENDECTOMY     • CARPAL TUNNEL RELEASE Bilateral    • CATARACT EXTRACTION Bilateral    • CORONARY STENT PLACEMENT     • TOTAL HIP ARTHROPLASTY Bilateral    • TOTAL SHOULDER ARTHROPLASTY W/ DISTAL CLAVICLE EXCISION Left 7/19/2016    Procedure: LT TOTAL SHOULDER REVERSE ARTHROPLASTY;  Surgeon: NAHOMI Solorzano MD;  [facility]: [facility] OR [facility];  Service:    • TOTAL SHOULDER ARTHROPLASTY W/ DISTAL CLAVICLE EXCISION Right 1/8/2019    Procedure: RIGHT TOTAL SHOULDER REVERSE ARTHROPLASTY;  Surgeon: Jovanny Solorzano MD;  Location: [facility] OR [facility];  Service: Orthopedics         Prior to Admission medications    Medication Sig Start Date End Date Taking? Authorizing Provider   acidophilus (FLORANEX) tablet tablet Take 1 tablet by mouth Daily. 8/27/19  Yes Jacque Colón MD   aspirin 81 MG EC tablet Take 81 mg by mouth Every Night.   Yes Jacque Colón MD   diphenoxylate-atropine (LOMOTIL) 2.5-0.025 MG per tablet Take 1 tablet by mouth 4 (Four) Times a Day As Needed for Diarrhea.   Yes Jacque Colón MD   DULoxetine (CYMBALTA) 60 MG capsule Take 60 mg by mouth 2 (Two) Times a Day.   Yes Jacque Colón MD   irbesartan (AVAPRO) 75 MG tablet Take 75 mg by mouth Every Night. 8/13/18  Yes Jacque Colón MD   levothyroxine (SYNTHROID, LEVOTHROID) 100 MCG tablet Take 75 mcg by mouth Daily.   Yes Jacque Colón MD   metoprolol succinate XL (TOPROL-XL) 25 MG 24 hr tablet Take 1 tablet by mouth 2 (Two) Times a Day. 10/19/18  Yes Olvin Hernandez MD   montelukast (SINGULAIR) 10 MG tablet Take 10 mg by mouth Every Night.   Yes Jacque Colón MD   mycophenolate (CELLCEPT) 500 MG tablet  Take 1,000 mg by mouth 2 (Two) Times a Day. 2 tabs bid    Yes Jacque Colón MD   pantoprazole (PROTONIX) 20 MG EC tablet Take 20 mg by mouth Daily.   Yes Jacque Colón MD   pravastatin (PRAVACHOL) 20 MG tablet Take 10 mg by mouth Daily. 10/3/20 10/3/21 Yes Jacque Colón MD   raloxifene (EVISTA) 60 MG tablet Take 60 mg by mouth Every Night.   Yes Jacque Colón MD   rivaroxaban (XARELTO) 20 MG tablet Take 1 tablet by mouth Daily With Dinner. 1/11/21  Yes Aimee Montalvo APRN   vitamin B-12 (CYANOCOBALAMIN) 100 MCG tablet Take 50 mcg by mouth Daily.   Yes Jacque Colón MD   vitamin D (ERGOCALCIFEROL) 72917 UNITS capsule capsule Take 50,000 Units by mouth Every 7 (Seven) Days. Takes on Wednesdays   Yes Jacque Colón MD   albuterol (PROAIR HFA) 108 (90 BASE) MCG/ACT inhaler Inhale 2 puffs Every 6 (Six) Hours As Needed for wheezing or shortness of air. 5/4/16   Jacque Colón MD   azelastine (ASTELIN) 0.1 % nasal spray 2 sprays into the nostril(s) as directed by provider 2 (Two) Times a Day. Use in each nostril as directed    Jacque Colón MD   Bepotastine Besilate (Bepreve) 1.5 % solution Take As Directed. 5/21/20   Jacque Colón MD   colesevelam (WELCHOL) 625 MG tablet Take 1,875 mg by mouth 2 (two) times a day. 10/6/20 9/26/22  Jacque Colón MD   Epoetin Miguel (EPOGEN IJ) Every 14 (Fourteen) Days.    Jacque Colón MD   Fluticasone Furoate-Vilanterol (BREO ELLIPTA) 100-25 MCG/INH aerosol powder  Inhale 1 puff Every Morning.    Jacque Colón MD   immune globulin, human, (PRIVIGEN) 20 GM/200ML solution infusion Infuse  into a venous catheter 1 (One) Time. Every 5 weeks    Jacque Colón MD   mometasone (NASONEX) 50 MCG/ACT nasal spray 2 sprays into each nostril Daily.    Jacque Colón MD   nitroglycerin (NITROSTAT) 0.4 MG SL tablet Place 1 tablet under the tongue Every 5 (Five) Minutes As Needed for Chest Pain.  "1/11/21   Aimee Montalvo APRN       Allergies   Allergen Reactions   • Neomycin Anaphylaxis   • Penicillins Anaphylaxis   • Hydrocodone Itching and Rash   • Rosuvastatin Other (See Comments)     Muscle cramps       Social History     Socioeconomic History   • Marital status:      Spouse name: Not on file   • Number of children: Not on file   • Years of education: Not on file   • Highest education level: Not on file   Tobacco Use   • Smoking status: Never Smoker   • Smokeless tobacco: Never Used   Substance and Sexual Activity   • Alcohol use: No     Comment: Daily caffeine use   • Drug use: No     Comment: caffine   • Sexual activity: Defer       Family History   Problem Relation Age of Onset   • Heart disease Mother    • Hyperlipidemia Mother    • Stroke Mother    • Diabetes Mother    • Breast cancer Mother    • Heart disease Father    • Hyperlipidemia Father    • Stroke Father    • Malig Hyperthermia Neg Hx        REVIEW OF SYSTEMS:   All systems reviewed.  Pertinent positives identified in HPI.  All other systems are negative.      Objective:     Vitals:    07/05/21 1507 07/05/21 1628 07/05/21 2004 07/05/21 2308   BP: 176/95 176/95 146/67 140/55   BP Location: Left arm  Right arm Right arm   Patient Position: Lying  Sitting Lying   Pulse: 116  120 99   Resp: 18  26 (!) 30   Temp: 98.3 °F (36.8 °C)  100 °F (37.8 °C) 100.4 °F (38 °C)   TempSrc: Oral  Oral Oral   SpO2:   94% 94%   Weight: 68 kg (149 lb 14.6 oz) 67.6 kg (149 lb)     Height:  152.4 cm (60\")       Body mass index is 29.1 kg/m².    General Appearance:    Alert, cooperative, in no acute distress   Head:    Normocephalic, without obvious abnormality, atraumatic   Eyes:            Lids and lashes normal, conjunctivae and sclerae normal, no icterus, no pallor, corneas clear, PERRLA   Ears:    Ears appear intact with no abnormalities noted   Throat:   No oral lesions, no thrush, oral mucosa moist   Neck:   No adenopathy, supple, trachea midline, no " thyromegaly, no carotid bruit, no JVD   Back:     No kyphosis present, no scoliosis present, no skin lesions, erythema or scars, no tenderness to percussion or palpation, range of motion normal   Lungs:     Clear to auscultation, respirations regular, even and unlabored    Heart:    Regular rhythm and normal rate, normal S1 and S2, no murmur, no gallop, no rub, no click   Chest Wall:    No abnormalities observed   Abdomen:     Normal bowel sounds, no masses, no organomegaly, soft, nontender, nondistended, no guarding, no rebound  tenderness   Extremities:   Moves all extremities well, no edema, no cyanosis, no redness   Pulses:   Pulses palpable and equal bilaterally. Normal radial, carotid, femoral, dorsalis pedis and posterior tibial pulses bilaterally. Normal abdominal aorta   Skin:  Psychiatric:   No bleeding, bruising or rash    Alert and oriented x 3, normal mood and affect   Lab Review:     Results from last 7 days   Lab Units 07/06/21  0005 07/05/21  0929   SODIUM mmol/L 123* 123*   POTASSIUM mmol/L  --  3.5   CHLORIDE mmol/L  --  91*   CO2 mmol/L  --  19.7*   BUN mg/dL  --  11   CREATININE mg/dL  --  0.89   CALCIUM mg/dL  --  8.8   BILIRUBIN mg/dL  --  0.6   ALK PHOS U/L  --  64   ALT (SGPT) U/L  --  9   AST (SGOT) U/L  --  14   GLUCOSE mg/dL  --  131*     Results from last 7 days   Lab Units 07/05/21  0929   TROPONIN T ng/mL <0.010     Results from last 7 days   Lab Units 07/05/21 2041 07/05/21  0929   WBC 10*3/mm3  --  14.64*   HEMOGLOBIN g/dL 8.5* 8.7*   HEMATOCRIT % 26.7* 26.4*   PLATELETS 10*3/mm3  --  215                            Admission EKG 07/05/2021:      Previous EKG 01/05/2021:      I personally viewed and interpreted the patient's EKG/Telemetry data.        Assessment and Plan:     1. 85-year-old female with history of severe coronary artery disease and previous bare-metal stent and angioplasty of the LAD in 1997.  Last stress test June 2016 was negative for ischemia  2.  Hypertension  follow blood pressure   3.  Hyperlipidemia.  She is no longer on statin therapy per PCP  4.  History of  aortic stenoses with mild to moderate regurgitation-last echo was October 2018  repeat echo this admission pending final interpretation  5.  Paroxysmal atrial fibrillation.  She has a [id number] score of 8 high risk for embolic event .  Xarelto currently on hold but would like to resume this ASAP.   6. history of COPD/ fibrosis-  She had recent PNA. Follows with pulmonary outpatient. Off amio for lung disease  7.  Carotid artery stenosis she is to have follow-up duplex and she returns  8.  acute CHF- probnp elevated. She has some wheezing. Will stop IV fluids   9.  Myasthenia gravis follows with neurology  10. Aortic plaque  11. Sepsis- per admitting   12. Anemia- GI  Following. Occult stool was positive. Hematology following for IV Venofer. If hemoglobin drops may consider repeat EGD    -I have stopped IV fluids. She may benefit from low dose IV lasix but will hold off for now and follow her.  Heme occult was positive. GI is following. receiving IV Venofer      Keanu Araujo RN  07/06/21  07:08 EDT

## 2021-07-07 LAB
ANION GAP SERPL CALCULATED.3IONS-SCNC: 10.8 MMOL/L (ref 5–15)
BACTERIA SPEC AEROBE CULT: NO GROWTH
BUN SERPL-MCNC: 10 MG/DL (ref 8–23)
BUN/CREAT SERPL: 12.8 (ref 7–25)
CALCIUM SPEC-SCNC: 8.4 MG/DL (ref 8.6–10.5)
CHLORIDE SERPL-SCNC: 98 MMOL/L (ref 98–107)
CO2 SERPL-SCNC: 24.2 MMOL/L (ref 22–29)
CREAT SERPL-MCNC: 0.78 MG/DL (ref 0.57–1)
DEPRECATED RDW RBC AUTO: 43.1 FL (ref 37–54)
ERYTHROCYTE [DISTWIDTH] IN BLOOD BY AUTOMATED COUNT: 13.4 % (ref 12.3–15.4)
GFR SERPL CREATININE-BSD FRML MDRD: 71 ML/MIN/1.73
GFR SERPL CREATININE-BSD FRML MDRD: 85 ML/MIN/1.73
GLUCOSE SERPL-MCNC: 90 MG/DL (ref 65–99)
HCT VFR BLD AUTO: 27.8 % (ref 34–46.6)
HGB BLD-MCNC: 8.7 G/DL (ref 12–15.9)
MCH RBC QN AUTO: 27.5 PG (ref 26.6–33)
MCHC RBC AUTO-ENTMCNC: 31.3 G/DL (ref 31.5–35.7)
MCV RBC AUTO: 88 FL (ref 79–97)
PLATELET # BLD AUTO: 202 10*3/MM3 (ref 140–450)
PMV BLD AUTO: 9.3 FL (ref 6–12)
POTASSIUM SERPL-SCNC: 3.5 MMOL/L (ref 3.5–5.2)
RBC # BLD AUTO: 3.16 10*6/MM3 (ref 3.77–5.28)
SODIUM SERPL-SCNC: 129 MMOL/L (ref 136–145)
SODIUM SERPL-SCNC: 133 MMOL/L (ref 136–145)
WBC # BLD AUTO: 9.11 10*3/MM3 (ref 3.4–10.8)

## 2021-07-07 PROCEDURE — 63710000001 PREDNISONE PER 5 MG: Performed by: STUDENT IN AN ORGANIZED HEALTH CARE EDUCATION/TRAINING PROGRAM

## 2021-07-07 PROCEDURE — 99232 SBSQ HOSP IP/OBS MODERATE 35: CPT | Performed by: INTERNAL MEDICINE

## 2021-07-07 PROCEDURE — 85027 COMPLETE CBC AUTOMATED: CPT | Performed by: STUDENT IN AN ORGANIZED HEALTH CARE EDUCATION/TRAINING PROGRAM

## 2021-07-07 PROCEDURE — 97110 THERAPEUTIC EXERCISES: CPT

## 2021-07-07 PROCEDURE — 94799 UNLISTED PULMONARY SVC/PX: CPT

## 2021-07-07 PROCEDURE — 97162 PT EVAL MOD COMPLEX 30 MIN: CPT

## 2021-07-07 PROCEDURE — 80048 BASIC METABOLIC PNL TOTAL CA: CPT | Performed by: INTERNAL MEDICINE

## 2021-07-07 PROCEDURE — 25010000003 CEFTRIAXONE PER 250 MG: Performed by: STUDENT IN AN ORGANIZED HEALTH CARE EDUCATION/TRAINING PROGRAM

## 2021-07-07 PROCEDURE — 63710000001 MYCOPHENOLATE MOFETIL PER 250 MG: Performed by: STUDENT IN AN ORGANIZED HEALTH CARE EDUCATION/TRAINING PROGRAM

## 2021-07-07 RX ORDER — FERROUS SULFATE 325(65) MG
325 TABLET ORAL
Status: DISCONTINUED | OUTPATIENT
Start: 2021-07-08 | End: 2021-07-10 | Stop reason: HOSPADM

## 2021-07-07 RX ORDER — PANTOPRAZOLE SODIUM 40 MG/1
40 TABLET, DELAYED RELEASE ORAL
Status: DISCONTINUED | OUTPATIENT
Start: 2021-07-07 | End: 2021-07-10 | Stop reason: HOSPADM

## 2021-07-07 RX ORDER — PREDNISONE 10 MG/1
10 TABLET ORAL
Status: DISCONTINUED | OUTPATIENT
Start: 2021-07-07 | End: 2021-07-10 | Stop reason: HOSPADM

## 2021-07-07 RX ADMIN — MYCOPHENOLATE MOFETIL 1000 MG: 500 TABLET ORAL at 19:54

## 2021-07-07 RX ADMIN — PREDNISONE 10 MG: 10 TABLET ORAL at 17:15

## 2021-07-07 RX ADMIN — LOSARTAN POTASSIUM 25 MG: 25 TABLET, FILM COATED ORAL at 09:25

## 2021-07-07 RX ADMIN — PRAVASTATIN SODIUM 10 MG: 10 TABLET ORAL at 19:54

## 2021-07-07 RX ADMIN — ACETAMINOPHEN 650 MG: 325 TABLET, FILM COATED ORAL at 12:31

## 2021-07-07 RX ADMIN — ACETAMINOPHEN 650 MG: 325 TABLET, FILM COATED ORAL at 19:54

## 2021-07-07 RX ADMIN — SODIUM CHLORIDE, PRESERVATIVE FREE 10 ML: 5 INJECTION INTRAVENOUS at 19:54

## 2021-07-07 RX ADMIN — Medication 2 PACKET: at 12:31

## 2021-07-07 RX ADMIN — RALOXIFENE 60 MG: 60 TABLET ORAL at 19:54

## 2021-07-07 RX ADMIN — SODIUM CHLORIDE, PRESERVATIVE FREE 10 ML: 5 INJECTION INTRAVENOUS at 06:11

## 2021-07-07 RX ADMIN — METOPROLOL SUCCINATE 25 MG: 25 TABLET, EXTENDED RELEASE ORAL at 19:54

## 2021-07-07 RX ADMIN — ALBUTEROL SULFATE 2.5 MG: 2.5 SOLUTION RESPIRATORY (INHALATION) at 13:54

## 2021-07-07 RX ADMIN — ASPIRIN 81 MG: 81 TABLET, COATED ORAL at 19:54

## 2021-07-07 RX ADMIN — DULOXETINE HYDROCHLORIDE 60 MG: 60 CAPSULE, DELAYED RELEASE ORAL at 19:54

## 2021-07-07 RX ADMIN — ALBUTEROL SULFATE 2.5 MG: 2.5 SOLUTION RESPIRATORY (INHALATION) at 19:28

## 2021-07-07 RX ADMIN — ACETAMINOPHEN 650 MG: 325 TABLET, FILM COATED ORAL at 06:10

## 2021-07-07 RX ADMIN — LEVOTHYROXINE SODIUM 75 MCG: 0.07 TABLET ORAL at 08:17

## 2021-07-07 RX ADMIN — PANTOPRAZOLE SODIUM 40 MG: 40 INJECTION, POWDER, FOR SOLUTION INTRAVENOUS at 06:11

## 2021-07-07 RX ADMIN — DULOXETINE HYDROCHLORIDE 60 MG: 60 CAPSULE, DELAYED RELEASE ORAL at 09:25

## 2021-07-07 RX ADMIN — PANTOPRAZOLE SODIUM 40 MG: 40 TABLET, DELAYED RELEASE ORAL at 17:15

## 2021-07-07 RX ADMIN — CEFTRIAXONE SODIUM 2 G: 2 INJECTION, SOLUTION INTRAVENOUS at 17:15

## 2021-07-07 RX ADMIN — SODIUM CHLORIDE, PRESERVATIVE FREE 10 ML: 5 INJECTION INTRAVENOUS at 08:17

## 2021-07-07 RX ADMIN — METOPROLOL SUCCINATE 25 MG: 25 TABLET, EXTENDED RELEASE ORAL at 09:25

## 2021-07-07 RX ADMIN — RIVAROXABAN 20 MG: 20 TABLET, FILM COATED ORAL at 17:15

## 2021-07-07 RX ADMIN — MYCOPHENOLATE MOFETIL 1000 MG: 500 TABLET ORAL at 09:25

## 2021-07-07 RX ADMIN — Medication 50 MCG: at 09:28

## 2021-07-07 NOTE — PLAN OF CARE
Goal Outcome Evaluation:  Plan of Care Reviewed With: patient           Outcome Summary: VSS. Back pain treated with PO meds, with good result. No signs of distress, afrebrile. Patient's demeanor was calm and cooperative. Fluid restriction in place (1500 ml). Overall, an uneventful shift. WCM

## 2021-07-07 NOTE — PROGRESS NOTES
Name: Olena Myers ADMIT: 2021   : 1935  PCP: Manda Keenan MD    MRN: [id number] LOS: 2 days   AGE/SEX: 85 y.o. female  ROOM: [facility]     Subjective   Subjective   No acute events overnight.  States that she has a cough and is wondering whether or not she should see her pulmonologist.  She is been afebrile and on room air.  Hemoglobin stable    Review of Systems   Constitutional: Negative for chills, fatigue and fever.   Respiratory: Negative.    Cardiovascular: Negative.    Gastrointestinal: Negative for blood in stool and diarrhea.   Genitourinary: Negative for difficulty urinating.        Objective   Objective   Vital Signs  Temp:  [98.2 °F (36.8 °C)-98.8 °F (37.1 °C)] 98.7 °F (37.1 °C)  Heart Rate:  [83-95] 83  Resp:  [16-22] 22  BP: (109-128)/(52-68) 128/68  SpO2:  [92 %-96 %] 96 %  on  Flow (L/min):  [1] 1;   Device (Oxygen Therapy): nasal cannula  Body mass index is 29.1 kg/m².  Physical Exam  Constitutional:       Appearance: Normal appearance.      Comments: Chronically ill-appearing   HENT:      Head: Normocephalic and atraumatic.   Cardiovascular:      Rate and Rhythm: Normal rate and regular rhythm.   Pulmonary:      Effort: Pulmonary effort is normal. No respiratory distress.      Comments: Occasional wheezes  Abdominal:      General: There is no distension.      Palpations: Abdomen is soft.      Tenderness: There is no abdominal tenderness.   Musculoskeletal:         General: No swelling.   Skin:     General: Skin is warm and dry.   Neurological:      General: No focal deficit present.      Mental Status: She is alert and oriented to person, place, and time.         Results Review     I reviewed the patient's new clinical results.  Results from last 7 days   Lab Units 21  0607 21  0800 21  2041 21  0929   WBC 10*3/mm3 9.11 11.95*  --  14.64*   HEMOGLOBIN g/dL 8.7* 8.5* 8.5* 8.7*   PLATELETS 10*3/mm3 202 185  --  215     Results from last 7 days   Lab Units  07/07/21  0607 07/06/21  2349 07/06/21  1607 07/06/21  0800 07/05/21  0929   SODIUM mmol/L 133* 129* 127* 130* 123*   POTASSIUM mmol/L 3.5  --   --  3.7 3.5   CHLORIDE mmol/L 98  --   --  98 91*   CO2 mmol/L 24.2  --   --  22.0 19.7*   BUN mg/dL 10  --   --  8 11   CREATININE mg/dL 0.78  --   --  0.85 0.89   GLUCOSE mg/dL 90  --   --  109* 131*   Estimated Creatinine Clearance: 45.7 mL/min (by C-G formula based on SCr of 0.78 mg/dL).  Results from last 7 days   Lab Units 07/05/21  0929   ALBUMIN g/dL 3.60   BILIRUBIN mg/dL 0.6   ALK PHOS U/L 64   AST (SGOT) U/L 14   ALT (SGPT) U/L 9     Results from last 7 days   Lab Units 07/07/21  0607 07/06/21  0800 07/05/21 2041 07/05/21  0929   CALCIUM mg/dL 8.4* 8.5*  --  8.8   ALBUMIN g/dL  --   --   --  3.60   PHOSPHORUS mg/dL  --   --  2.1*  --      Results from last 7 days   Lab Units 07/05/21  0929   PROCALCITONIN ng/mL 0.90*   LACTATE mmol/L 0.8     COVID19   Date Value Ref Range Status   07/05/2021 Not Detected Not Detected - Ref. Range Final   01/05/2021 Not Detected Not Detected - Ref. Range Final     No results found for: HGBA1C, POCGLU    Adult Transthoracic Echo Complete W/ Cont if Necessary Per Protocol  · Calculated left ventricular EF = 69.5% Estimated left ventricular EF =   70% Estimated left ventricular EF was in agreement with the calculated   left ventricular EF. Left ventricular systolic function is normal. The   left ventricular cavity is small in size. Left ventricular wall thickness   is consistent with moderate concentric hypertrophy. All left ventricular   wall segments contract normally. Left ventricular diastolic function is   consistent with (grade II w/high LAP) pseudonormalization.  · Left atrial volume is severely increased. The right atrial cavity is   moderate to severely dilated.  · There is severe calcification of the aortic valve. The aortic valve   appears trileaflet. Mild to moderate aortic valve regurgitation is   present. No aortic  valve stenosis is present.  · No mitral valve regurgitation or significant stenosis is present. Severe   mitral annular calcification is present. There is mild, bileaflet mitral   valve thickening present.  · Mild tricuspid valve regurgitation is present. Calculated right   ventricular systolic pressure from tricuspid regurgitation is 35 mmHg.   There is no significant pulmonary hypertension     CT Angiogram Chest With & Without Contrast  Narrative: Patient: ANN ANN  Time Out: 02:06  Exam(s): CTA CHEST W WO Contrast     EXAM:    CT Angiography Chest Without and With Intravenous Contrast    CLINICAL HISTORY:     Reason for exam: PE suspected, low intermediate prob, neg D-dimer.    TECHNIQUE:    Axial computed tomographic angiography images of the chest without and   with intravenous contrast.  CTDI is 14.80 mGy and DLP is 533.80 mGy-cm.    This CT exam was performed according to the principle of ALARA (As Low As   Reasonably Achievable) by using one or more of the following dose   reduction techniques: automated exposure control, adjustment of the mA   and or kV according to patient size, and or use of iterative   reconstruction technique.    3D and MIP reconstructed images were created and reviewed.    COMPARISON:    February 7, 2017    FINDINGS:    Pulmonary arteries:  The pulmonary arterial tree is well-opacified with   contrast.  No pulmonary emboli are identified.    Aorta:  The thoracic aorta is heavily calcified but nondilated.  There   is no aneurysm or dissection.    Lungs:  See below.      Pleural space:  There is a small right pleural effusion measuring 1.9   cm with a small amount of bibasilar atelectasis, 1 greatest on the right.    No pneumothorax.    Heart:  The heart is moderately enlarged.  Severe coronary   calcification is present.  No pericardial effusion.  No evidence of RV   dysfunction.    Bones joints:  Severe compression fracture with vertebra plana at T6,   unchanged.  There is also a  compression fracture T4, unchanged.  No   dislocation.    Soft tissues:  Unremarkable.    Lymph nodes:  Unremarkable.  No enlarged lymph nodes.    IMPRESSION:       Cardiomegaly and severe calcification of the thoracic aorta.  No aneurysm   or dissection.    No evidence of pulmonary embolism.    Small right pleural effusion and mild bibasilar atelectasis.  Mild CHF   cannot be excluded.    Chronic compression fractures in the upper thoracic spine at T4 and T6,   unchanged.  Impression: Electronically signed by Yovany Espinosa MD on 07-06-21 at 0206    Scheduled Medications  aspirin, 81 mg, Oral, Nightly  cefTRIAXone, 2 g, Intravenous, Q24H  DULoxetine, 60 mg, Oral, BID  lactobacillus acidophilus, 1 capsule, Oral, Daily  levothyroxine, 75 mcg, Oral, Daily  losartan, 25 mg, Oral, Q24H  metoprolol succinate XL, 25 mg, Oral, BID  mycophenolate, 1,000 mg, Oral, BID  pantoprazole, 40 mg, Intravenous, BID AC  pravastatin, 10 mg, Oral, Nightly  predniSONE, 10 mg, Oral, Daily With Breakfast  raloxifene, 60 mg, Oral, Nightly  rivaroxaban, 20 mg, Oral, Daily With Dinner  sodium chloride, 10 mL, Intravenous, Q12H  vitamin B-12, 50 mcg, Oral, Daily  vitamin D, 50,000 Units, Oral, Q7 Days    Infusions   Diet  Diet Regular; Cardiac, Daily Fluid Restriction; 1500 mL Fluid Per Day       Assessment/Plan     Active Hospital Problems    Diagnosis  POA   • **Sepsis (CMS/[facility]) [A41.9]  Yes   • Acute UTI [N39.0]  Yes   • Dyspnea [R06.00]  Unknown   • Acute on chronic diastolic CHF (congestive heart failure) (CMS/[facility]) [I50.33]  Unknown   • COPD (chronic obstructive pulmonary disease) (CMS/[facility]) [J44.9]  Yes   • Hyponatremia [E87.1]  Yes   • Metabolic encephalopathy [G93.41]  Yes   • Sleep apnea [G47.30]  Yes   • Normocytic anemia [D64.9]  Yes   • Atrial fibrillation (CMS/[facility]) [I48.91]  Yes   • CAD (coronary artery disease) [I25.10]  Yes   • Essential hypertension [I10]  Yes   • HX: long term anticoagulant use [Z92.29]  Not Applicable       Resolved Hospital Problems   No resolved problems to display.       85 y.o. female admitted with Sepsis (CMS/HCC).    Sepsis  Metabolic encephalopathy  UA with UTI. On Aztreonam due to hx anaphalaxis with penicillins, however has tolerated Kephlex in the past, so will transition to ceftriaxone   CXR clear  CT chest ordered to eval for PE. No PE identified, but small pleural effusions noted  Encephalopathy resolved, now oriented x3  We will continue treating with IV antibiotics while she is in the hospital     Generalized weakness/fatigue  TSH, Ft4 wnl  Phos a little low, will replace    Hyponatremia  Check urine Osm, Serum Osm, Urine Na  Endorses poor appetite over last several weeks  Drastically improved overnight, patient is asymptomatic     Diastolic heart failure  Atrial Fibrillation  Coronary artery disease  HTN  Cardiology consulted  Resume home Bb, ARB  IV Lasix x1,  fluid restriction started by nephrology     Anemia  Hb stable. No signs of overt bleeding.   Anemia studies consistent with JOSÉ MIGUEL + ACD  Was previously prescribed Epogen     Myasthenia gravis  Currently on CellCept and privigen at home     Xarelto (home med) for DVT prophylaxis.  Full code.  Discussed with patient.  Anticipate discharge to SNU facility later this week.      Wilbert Sahu MD  [facility]  07/07/21  12:15 EDT    Patient was wearing facemask when I entered the room and throughout our encounter.  I wore protective equipment throughout this patient encounter including a face mask, gloves and protective eyewear.  Hand hygiene was performed before donning protective equipment and after removal when leaving the room.

## 2021-07-07 NOTE — PROGRESS NOTES
"[facility]    Patient Name: Olena Myers  :1935  85 y.o.  LOS: 2  Encounter Provider: Carlin Zhang Jr, MD      Patient Care Team:  Manda Keenan MD as PCP - General (General Practice)  Olvin Hernandez MD as Consulting Physician (Cardiology)    Chief Complaint: Follow-up GI bleed, aortic stenosis, paroxysmal atrial fibrillation    Interval History: No acute issues overnight.  Echo images pending.       Objective   Vital Signs  Temp:  [98.2 °F (36.8 °C)-98.8 °F (37.1 °C)] 98.7 °F (37.1 °C)  Heart Rate:  [83-84] 83  Resp:  [16-22] 22  BP: (109-128)/(52-68) 128/68    Intake/Output Summary (Last 24 hours) at 2021 1317  Last data filed at 2021 1000  Gross per 24 hour   Intake 1220 ml   Output 500 ml   Net 720 ml     Flowsheet Rows      First Filed Value   Admission Height  152.4 cm (60\") Documented at 2021 1628   Admission Weight  68 kg (149 lb 14.6 oz) Documented at 2021 1507            Vitals reviewed.   Constitutional:       Appearance: Healthy appearance. Not in distress.   Neck:      Vascular: No JVR. JVD normal.   Pulmonary:      Effort: Pulmonary effort is normal.      Breath sounds: No wheezing. No rhonchi. No rales.      Comments: Minimal bibasilar crackles  Chest:      Chest wall: Not tender to palpatation.   Cardiovascular:      PMI at left midclavicular line. Normal rate. Regular rhythm. Normal S1. Normal S2.      Murmurs: There is a systolic murmur.      No gallop. No click. No rub.   Pulses:     Intact distal pulses.   Edema:     Peripheral edema absent.   Abdominal:      General: Bowel sounds are normal.      Palpations: Abdomen is soft.      Tenderness: There is no abdominal tenderness.   Musculoskeletal: Normal range of motion.         General: No tenderness. Skin:     General: Skin is warm and dry.   Neurological:      General: No focal deficit present.      Mental Status: Alert and oriented to person, place and time.           Pertinent Test " Results:  Results from last 7 days   Lab Units 07/07/21  0607 07/06/21  2349 07/06/21  1607 07/06/21  0800 07/06/21  0005 07/05/21 2041 07/05/21  0929   SODIUM mmol/L 133* 129* 127* 130* 123* 126* 123*   POTASSIUM mmol/L 3.5  --   --  3.7  --   --  3.5   CHLORIDE mmol/L 98  --   --  98  --   --  91*   CO2 mmol/L 24.2  --   --  22.0  --   --  19.7*   BUN mg/dL 10  --   --  8  --   --  11   CREATININE mg/dL 0.78  --   --  0.85  --   --  0.89   GLUCOSE mg/dL 90  --   --  109*  --   --  131*   CALCIUM mg/dL 8.4*  --   --  8.5*  --   --  8.8   AST (SGOT) U/L  --   --   --   --   --   --  14   ALT (SGPT) U/L  --   --   --   --   --   --  9     Results from last 7 days   Lab Units 07/05/21  0929   TROPONIN T ng/mL <0.010     Results from last 7 days   Lab Units 07/07/21  0607 07/06/21  0800 07/05/21 2041 07/05/21  0929   WBC 10*3/mm3 9.11 11.95*  --  14.64*   HEMOGLOBIN g/dL 8.7* 8.5* 8.5* 8.7*   HEMATOCRIT % 27.8* 26.4* 26.7* 26.4*   PLATELETS 10*3/mm3 202 185  --  215                   Invalid input(s): LDLCALC  Results from last 7 days   Lab Units 07/05/21  0929   PROBNP pg/mL 2,029.0*     Results from last 7 days   Lab Units 07/05/21  0929   TSH uIU/mL 0.679           Medication Review:   aspirin, 81 mg, Oral, Nightly  cefTRIAXone, 2 g, Intravenous, Q24H  DULoxetine, 60 mg, Oral, BID  lactobacillus acidophilus, 1 capsule, Oral, Daily  levothyroxine, 75 mcg, Oral, Daily  losartan, 25 mg, Oral, Q24H  metoprolol succinate XL, 25 mg, Oral, BID  mycophenolate, 1,000 mg, Oral, BID  pantoprazole, 40 mg, Oral, BID AC  pravastatin, 10 mg, Oral, Nightly  predniSONE, 10 mg, Oral, Daily With Breakfast  raloxifene, 60 mg, Oral, Nightly  rivaroxaban, 20 mg, Oral, Daily With Dinner  sodium chloride, 10 mL, Intravenous, Q12H  vitamin B-12, 50 mcg, Oral, Daily  vitamin D, 50,000 Units, Oral, Q7 Days              Assessment/Plan   1. Coronary artery disease and previous bare-metal stent and angioplasty of the LAD in 1997.  Last stress  test June 2016 was negative for ischemia  2.  Hypertension  well controlled.  Sodium restricted diet.  3.  History of  aortic stenoses with mild to moderate regurgitation-last echo was October 2018  repeat echo this admission   5.  Paroxysmal atrial fibrillation.  She has a [id number] score of 8 high risk for embolic event .    Back on rivaroxaban.  Monitor closely.  6. history of COPD/ fibrosis-  She had recent PNA. Follows with pulmonary outpatient. Off amio for lung disease  7.  Carotid artery stenosis she is to have follow-up duplex and she returns  8.  CHF- probnp elevated. She has some wheezing. Will stop IV fluids   9.  Myasthenia gravis follows with neurology  10. Aortic plaque  11. Sepsis- per admitting   12. Anemia- acute on chronic.  Hemoccult positive.  History of ulcerative esophagitis and peptic ulcer disease.  Monitor closely on anticoagulation.  She is on erythropoietin as outpatient and follows with [facility].  She has received IV Venofer in the past currently on oral iron replacement    Carlin Zhang Jr, MD  [facility]  07/07/21  13:17 EDT

## 2021-07-07 NOTE — PROGRESS NOTES
LOS: 2 days     Chief Complaint/ Reason for encounter: Hyponatremia  Chief Complaint   Patient presents with   • Fever         Subjective   No complaints feels well  Denies shortness of breath or chest pain  Good appetite with no nausea vomiting  No edema  She states she feels much better today      Medical history reviewed:  History of Present Illness    Subjective    History taken from: Patient and chart    Vital Signs  Temp:  [98.3 °F (36.8 °C)-98.8 °F (37.1 °C)] 98.5 °F (36.9 °C)  Heart Rate:  [77-84] 80  Resp:  [20-22] 20  BP: (112-128)/(56-68) 112/63       Wt Readings from Last 1 Encounters:   07/05/21 1628 67.6 kg (149 lb)   07/05/21 1507 68 kg (149 lb 14.6 oz)       Objective    Objective:  General Appearance:  Comfortable, well-appearing, in no acute distress and not in pain.  Awake, alert, oriented  HEENT: Mucous membranes moist, no injury, oropharynx clear  Lungs:  Normal effort and normal respiratory rate.  Breath sounds clear to auscultation.  No  respiratory distress.  No rales, decreased breath sounds or rhonchi.    Heart: Normal rate.  Regular rhythm.  S1 normal.  No murmur.   Abdomen: Abdomen is soft.  Bowel sounds are normal, no abdominal tenderness.  There is no rebound or guarding  Extremities: Normal range of motion.  No edema of bilateral lower extremities, distal pulses intact  Neurological: No focal motor or sensory deficits, pupils reactive  Skin:  Warm and dry.  No rash or cyanosis.       Results Review:    Intake/Output:     Intake/Output Summary (Last 24 hours) at 7/7/2021 1531  Last data filed at 7/7/2021 1300  Gross per 24 hour   Intake 1220 ml   Output 500 ml   Net 720 ml         DATA:  Radiology and Labs:  The following labs independently reviewed by me. Additional labs ordered for tomorrow a.m.  Interval notes, chart personally reviewed by me.   Old records independently reviewed showing normal baseline creatinine and sodium levels  The following radiologic studies independently  viewed by me, findings CT angiogram showed cardiomegaly severe aortic calcification no aneurysm no pulmonary embolus small pleural effusion mild CHF  New problems include anemia  Discussed with patient/family at bedside    Risk/ complexity of medical care/ medical decision making moderate risk    Labs:   Recent Results (from the past 24 hour(s))   Sodium    Collection Time: 07/06/21  4:07 PM    Specimen: Blood   Result Value Ref Range    Sodium 127 (L) 136 - 145 mmol/L   Sodium    Collection Time: 07/06/21 11:49 PM    Specimen: Blood   Result Value Ref Range    Sodium 129 (L) 136 - 145 mmol/L   Basic Metabolic Panel    Collection Time: 07/07/21  6:07 AM    Specimen: Blood   Result Value Ref Range    Glucose 90 65 - 99 mg/dL    BUN 10 8 - 23 mg/dL    Creatinine 0.78 0.57 - 1.00 mg/dL    Sodium 133 (L) 136 - 145 mmol/L    Potassium 3.5 3.5 - 5.2 mmol/L    Chloride 98 98 - 107 mmol/L    CO2 24.2 22.0 - 29.0 mmol/L    Calcium 8.4 (L) 8.6 - 10.5 mg/dL    eGFR  African Amer 85 >60 mL/min/1.73    eGFR Non African Amer 71 >60 mL/min/1.73    BUN/Creatinine Ratio 12.8 7.0 - 25.0    Anion Gap 10.8 5.0 - 15.0 mmol/L   CBC (No Diff)    Collection Time: 07/07/21  6:07 AM    Specimen: Blood   Result Value Ref Range    WBC 9.11 3.40 - 10.80 10*3/mm3    RBC 3.16 (L) 3.77 - 5.28 10*6/mm3    Hemoglobin 8.7 (L) 12.0 - 15.9 g/dL    Hematocrit 27.8 (L) 34.0 - 46.6 %    MCV 88.0 79.0 - 97.0 fL    MCH 27.5 26.6 - 33.0 pg    MCHC 31.3 (L) 31.5 - 35.7 g/dL    RDW 13.4 12.3 - 15.4 %    RDW-SD 43.1 37.0 - 54.0 fl    MPV 9.3 6.0 - 12.0 fL    Platelets 202 140 - 450 10*3/mm3       Radiology:  Imaging Results (Last 24 Hours)     ** No results found for the last 24 hours. **             Medications have been reviewed:  Current Facility-Administered Medications   Medication Dose Route Frequency Provider Last Rate Last Admin   • acetaminophen (TYLENOL) tablet 650 mg  650 mg Oral Q6H PRN Wilbert Sahu MD   650 mg at 07/07/21 1231   • albuterol  (PROVENTIL) nebulizer solution 0.083% 2.5 mg/3mL  2.5 mg Nebulization Q6H PRN Wilbert Sahu MD   2.5 mg at 07/07/21 1354   • aspirin EC tablet 81 mg  81 mg Oral Nightly Wilbert Sahu MD   81 mg at 07/06/21 2126   • cefTRIAXone (ROCEPHIN) IVPB 2 g  2 g Intravenous Q24H Wilbert Sahu  mL/hr at 07/06/21 1714 2 g at 07/06/21 1714   • diphenoxylate-atropine (LOMOTIL) 2.5-0.025 MG per tablet 1 tablet  1 tablet Oral 4x Daily PRN Wilbert Sahu MD       • DULoxetine (CYMBALTA) DR capsule 60 mg  60 mg Oral BID Wilbert Sahu MD   60 mg at 07/07/21 0925   • lactobacillus acidophilus (RISAQUAD) capsule 1 capsule  1 capsule Oral Daily Wilbert Sahu MD   1 capsule at 07/05/21 2132   • levothyroxine (SYNTHROID, LEVOTHROID) tablet 75 mcg  75 mcg Oral Daily Wilbert Sahu MD   75 mcg at 07/07/21 0817   • losartan (COZAAR) tablet 25 mg  25 mg Oral Q24H Wilbert Sahu MD   25 mg at 07/07/21 0925   • Magnesium Sulfate 2 gram Bolus, followed by 8 gram infusion (total Mg dose 10 grams)- Mg less than or equal to 1mg/dL  2 g Intravenous PRN Wilbert Sahu MD        Or   • Magnesium Sulfate 2 gram / 50mL Infusion (GIVE X 3 BAGS TO EQUAL 6GM TOTAL DOSE) - Mg 1.1 - 1.5 mg/dl  2 g Intravenous PRN Wilbert Sahu MD        Or   • Magnesium Sulfate 4 gram infusion- Mg 1.6-1.9 mg/dL  4 g Intravenous PRN Wilbert Sahu MD       • metoprolol succinate XL (TOPROL-XL) 24 hr tablet 25 mg  25 mg Oral BID Wilbert Sahu MD   25 mg at 07/07/21 0925   • mycophenolate (CELLCEPT) tablet 1,000 mg  1,000 mg Oral BID Wilbert Sahu MD   1,000 mg at 07/07/21 0925   • nitroglycerin (NITROSTAT) SL tablet 0.4 mg  0.4 mg Sublingual Q5 Min PRN Antonia Carpio APRN       • ondansetron (ZOFRAN) injection 4 mg  4 mg Intravenous Q6H PRN Antonia Carpio APRN       • pantoprazole (PROTONIX) EC tablet 40 mg  40 mg Oral BID AC Wilbert Sahu MD       • potassium & sodium phosphates (PHOS-NAK) 280-160-250 MG packet - for Phosphorus less than 1.25 mg/dL  2 packet Oral  Q6H PRN Wilbert Sahu MD        Or   • potassium & sodium phosphates (PHOS-NAK) 280-160-250 MG packet - for Phosphorus 1.25 - 2.5 mg/dL  2 packet Oral Q6H PRN Wilbert Sahu MD   2 packet at 07/07/21 1231   • potassium chloride (K-DUR,KLOR-CON) ER tablet 40 mEq  40 mEq Oral PRN Wilbert Sahu MD       • potassium chloride (KLOR-CON) packet 40 mEq  40 mEq Oral PRN Wilbert Sahu MD       • pravastatin (PRAVACHOL) tablet 10 mg  10 mg Oral Nightly Wilbert Sahu MD   10 mg at 07/06/21 2127   • predniSONE (DELTASONE) tablet 10 mg  10 mg Oral Daily With Breakfast Wilbert Sahu MD       • raloxifene (EVISTA) tablet 60 mg  60 mg Oral Nightly Wilbert Sahu MD   60 mg at 07/06/21 2127   • rivaroxaban (XARELTO) tablet 20 mg  20 mg Oral Daily With Dinner Wilbert Sahu MD   20 mg at 07/06/21 1714   • sodium chloride 0.9 % flush 10 mL  10 mL Intravenous PRN Kaia Balderrama, APRN       • sodium chloride 0.9 % flush 10 mL  10 mL Intravenous Q12H Antonia Carpio APRN   10 mL at 07/07/21 0817   • sodium chloride 0.9 % flush 10 mL  10 mL Intravenous PRN Antonia Carpio APRN   10 mL at 07/07/21 0611   • vitamin B-12 (CYANOCOBALAMIN) tablet 50 mcg  50 mcg Oral Daily Wilbert Sahu MD   50 mcg at 07/07/21 0928   • vitamin D (ERGOCALCIFEROL) capsule 50,000 Units  50,000 Units Oral Q7 Days Wilbert Sahu MD   50,000 Units at 07/05/21 2132       ASSESSMENT:  Acute hyponatremia, symptomatic initially but improved overnight.  Current sodium 133.    She looks euvolemic on exam, hold further diuretics at this time  Sepsis, improved with antibiotics    HX: long term anticoagulant use  UTI    Essential hypertension    CAD (coronary artery disease)    Atrial fibrillation (CMS/HCC)    Acute UTI    COPD (chronic obstructive pulmonary disease) (CMS/HCC)    Hyponatremia    Metabolic encephalopathy    Sleep apnea    Normocytic anemia with some iron deficiency    Dyspnea    Acute on chronic diastolic CHF (congestive heart failure)  (CMS/[facility])  Hypophosphatemia        PLAN:   Sodium level continues to slowly improve and is approaching her baseline levels  She looks euvolemic on exam today  Hold additional diuretics at this time  Maintain current fluid restriction  No evidence of SIADH  Recheck electrolyte panel in the morning  IV antibiotics per medicine team     Continue to monitor electrolytes and volume closely    Yovany Mccormick MD   [facility]   Office phone number: [phone]  Answering service phone number: [phone]    07/07/21  15:31 EDT    Dictation performed using Dragon dictation software

## 2021-07-07 NOTE — PLAN OF CARE
Goal Outcome Evaluation:      84 yo female admitted 7/5 with UTI, hyponatremia and anemia. Na improved, Hgb stable. Voiding via [place]. Pt still with some shortness of air, wheezing; albuterol PRN. 1L O2 NC PRN. VS stable, A&Ox4. IV abx continued. Plan to discharge to SNF when placement secured.

## 2021-07-07 NOTE — THERAPY EVALUATION
Patient Name: Olena Myers  : 1935    MRN: [id number]                              Today's Date: 2021       Admit Date: 2021    Visit Dx:     ICD-10-CM ICD-9-CM   1. Acute UTI  N39.0 599.0   2. Anemia, unspecified type  D64.9 285.9   3. Hyponatremia  E87.1 276.1   4. Gastrointestinal hemorrhage, unspecified gastrointestinal hemorrhage type  K92.2 578.9     Patient Active Problem List   Diagnosis   • Pre-operative cardiovascular examination   • S/p reverse total shoulder arthroplasty   • Myasthenia gravis (CMS/HCC)   • Paroxysmal atrial fibrillation (CMS/[facility])   • HX: long term anticoagulant use   • Essential hypertension   • CAD (coronary artery disease)   • Atrial fibrillation (CMS/[facility])   • S/P reverse total shoulder arthroplasty, right   • Acute UTI   • COPD (chronic obstructive pulmonary disease) (CMS/[facility])   • Hyponatremia   • Metabolic encephalopathy   • Sleep apnea   • Normocytic anemia   • Sepsis (CMS/[facility])   • Dyspnea   • Acute on chronic diastolic CHF (congestive heart failure) (CMS/[facility])     Past Medical History:   Diagnosis Date   • Anemia     IRON DEFICIENCY   • Arthritis    • Asthma    • Atrial fibrillation (CMS/[facility])    • CAD (coronary artery disease)     HEART STENT   • COPD (chronic obstructive pulmonary disease) (CMS/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)    • Mini stroke (CMS/[facility]) 10/2016, 3/2017   • OA (osteoarthritis)    • Osteoporosis    • Sleep apnea     USES CPAP   • SOB (shortness of breath)     WALKING     Past Surgical History:   Procedure Laterality Date   • APPENDECTOMY     • CARPAL TUNNEL RELEASE Bilateral    • CATARACT EXTRACTION Bilateral    • CORONARY STENT PLACEMENT     • TOTAL HIP ARTHROPLASTY Bilateral    • TOTAL SHOULDER ARTHROPLASTY W/ DISTAL  CLAVICLE EXCISION Left 7/19/2016    Procedure: LT TOTAL SHOULDER REVERSE ARTHROPLASTY;  Surgeon: NAHOMI Solorzano MD;  Location: [facility] [place] [facility] [place];  Service:    • TOTAL SHOULDER ARTHROPLASTY W/ DISTAL CLAVICLE EXCISION Right 1/8/2019    Procedure: RIGHT TOTAL SHOULDER REVERSE ARTHROPLASTY;  Surgeon: Jovanny Solorzano MD;  Location: [facility] [place] [facility] [place];  Service: Orthopedics     General Information     Row Name 07/07/21 1153          Physical Therapy Time and Intention    Document Type  evaluation  -DJ     Mode of Treatment  individual therapy;physical therapy  -DJ     Row Name 07/07/21 1153          General Information    Patient Profile Reviewed  yes  -DJ     Prior Level of Function  independent:;ADL's;community mobility;all household mobility  -DJ     Existing Precautions/Restrictions  fall  -DJ     Barriers to Rehab  medically complex  -DJ     Row Name 07/07/21 1153          Living Environment    Lives With  alone  -DJ     Row Name 07/07/21 1153          Home Main Entrance    Number of Stairs, Main Entrance  none  -DJ     Row Name 07/07/21 1153          Stairs Within Home, Primary    Number of Stairs, Within Home, Primary  none  -DJ     Row Name 07/07/21 1153          Cognition    Orientation Status (Cognition)  oriented x 3 Pleasant and cooperative  -DJ     Row Name 07/07/21 1153          Safety Issues, Functional Mobility    Impairments Affecting Function (Mobility)  balance;endurance/activity tolerance;strength  -DJ     Comment, Safety Issues/Impairments (Mobility)  gt belt, grippy socks  -DJ       User Key  (r) = Recorded By, (t) = Taken By, (c) = Cosigned By    Initials Name Provider Type    DJ Carol Montejo, PT Physical Therapist        Mobility     Row Name 07/07/21 1155          Bed Mobility    Bed Mobility  bed mobility (all) activities  -DJ     All Activities, [place] (Bed Mobility)  not tested  -DJ     Comment (Bed Mobility)  Pt UIC  -DJ     Row Name 07/07/21 1155          Transfers    Comment (Transfers)   sit/stand from recliner  -DJ     Row Name 07/07/21 1155          Bed-Chair Transfer    Bed-Chair [place] (Transfers)  not tested  -DJ     Row Name 07/07/21 1155          Sit-Stand Transfer    Sit-Stand [place] (Transfers)  minimum assist (75% patient effort);verbal cues  -DJ     Assistive Device (Sit-Stand Transfers)  walker, front-wheeled  -DJ     Row Name 07/07/21 1155          Gait/Stairs (Locomotion)    [place] Level (Gait)  contact guard;verbal cues  -DJ     Assistive Device (Gait)  walker, front-wheeled  -DJ     Distance in Feet (Gait)  100'  -DJ     Deviations/Abnormal Patterns (Gait)  festinating/shuffling;gait speed decreased;stride length decreased;michael decreased  -DJ     Bilateral Gait Deviations  forward flexed posture  -DJ     [place] Level (Stairs)  not tested  -DJ     Comment (Gait/Stairs)  110' slowly and cautiously, fatigues quickly due to myastheia gravis, flexed posture, fair balance. Uses cane at home  -DJ       User Key  (r) = Recorded By, (t) = Taken By, (c) = Cosigned By    Initials Name Provider Type    Carol Lewis, PT Physical Therapist        Obj/Interventions     Row Name 07/07/21 1158          Range of Motion Comprehensive    Comment, General Range of Motion  R sh AROM limited, grossly 75% nl passively  -DJ     Row Name 07/07/21 1158          Strength Comprehensive (MMT)    Comment, General Manual Muscle Testing (MMT) Assessment  R sh <3/5, elsewhere extremities grossly 4-/5  -DJ     Row Name 07/07/21 1158          Motor Skills    Motor Skills  functional endurance  -DJ     Functional Endurance  fatigues quickly with amb short distances  -DJ     Therapeutic Exercise  other (see comments)  -DJ     Row Name 07/07/21 1158          Balance    Balance Assessment  standing static balance  -DJ     Static Standing Balance  mild impairment;supported;standing  -DJ     Row Name 07/07/21 1158          Sensory Assessment (Somatosensory)    Sensory Assessment (Somatosensory)   not tested  -DJ       User Key  (r) = Recorded By, (t) = Taken By, (c) = Cosigned By    Initials Name Provider Type    Carol Lewis, PT Physical Therapist        Goals/Plan     Row Name 07/07/21 1207          Bed Mobility Goal 1 (PT)    Activity/Assistive Device (Bed Mobility Goal 1, PT)  sit to supine;supine to sit  -DJ     [place] Level/Cues Needed (Bed Mobility Goal 1, PT)  supervision required;verbal cues required  -DJ     Time Frame (Bed Mobility Goal 1, PT)  1 week  -DJ     Row Name 07/07/21 1207          Transfer Goal 1 (PT)    Activity/Assistive Device (Transfer Goal 1, PT)  sit-to-stand/stand-to-sit  -DJ     [place] Level/Cues Needed (Transfer Goal 1, PT)  supervision required;verbal cues required  -DJ     Time Frame (Transfer Goal 1, PT)  1 week  -DJ     [facility] Name 07/07/21 1207          Gait Training Goal 1 (PT)    Activity/Assistive Device (Gait Training Goal 1, PT)  gait (walking locomotion);assistive device use;improve balance and speed;increase endurance/gait distance;increase energy conservation;walker, rolling  -DJ     [place] Level (Gait Training Goal 1, PT)  supervision required  -DJ     Distance (Gait Training Goal 1, PT)  300'  -DJ     Time Frame (Gait Training Goal 1, PT)  1 week  -DJ     Row Name 07/07/21 1207          Patient Education Goal (PT)    Activity (Patient Education Goal, PT)  HEP  -DJ     [place]/Cues/Accuracy (Memory Goal 2, PT)  demonstrates adequately;verbalizes understanding  -DJ     Time Frame (Patient Education Goal, PT)  short term goal (STG);4 days  -DJ       User Key  (r) = Recorded By, (t) = Taken By, (c) = Cosigned By    Initials Name Provider Type    Carol Lewis, PT Physical Therapist        Clinical Impression     Row Name 07/07/21 1159          Pain    Additional Documentation  Pain Scale: Numbers Pre/Post-Treatment (Group)  -DJ     Row Name 07/07/21 1159          Pain Scale: Numbers Pre/Post-Treatment    Pretreatment Pain Rating  0/10 - no  pain  -DJ     Pre/Posttreatment Pain Comment  c/c is fatigue and cough  -DJ     Row Name 07/07/21 1159          Plan of Care Review    Plan of Care Reviewed With  patient  -DJ     Outcome Summary  84yo white female admitted 7/5/21 due to generalized weakness. Found to have UTI and sepsis. PMH Includes hbp, myesthenia gravis, long use of blood thinners, metabolic brain disease, coronary heart disease, a fib, sleep apnea, SOB, heart failure, R sh replacement. PLOF: Pt lives alone at em with 0 GHAZALA, was independent [facility] ADLs and used a cane for mobility. She is primariy limited at this time by generalized weakness and decreased activity tolerance that limit her functional mobility. Today, she was sittin [facility] - she req min A for sit/stand from recliner. she was able to amb 100' slowly with r wx and CGA with flexed posture and fair balance. She fatigues quickly with amb. She would certainy benefit from skilled PT for ther ex, gt/transfer training, bed mobility, balance, and endurance as appropriate. Recommend SNF prior to returning home.  -DJ     Row Name 07/07/21 1159          Therapy Assessment/Plan (PT)    Patient/Family Therapy Goals Statement (PT)  eventual return home  -DJ     Rehab Potential (PT)  good, to achieve stated therapy goals  -DJ     Criteria for Skilled Interventions Met (PT)  yes;meets criteria;skilled treatment is necessary  -DJ     Row Name 07/07/21 1159          Vital Signs    O2 Delivery Pre Treatment  supplemental O2  -DJ     O2 Delivery Intra Treatment  room air  -DJ     O2 Delivery Post Treatment  supplemental O2  -DJ     Pre Patient Position  Sitting  -DJ     Intra Patient Position  Standing  -DJ     Post Patient Position  Sitting  -DJ     Row Name 07/07/21 1159          Positioning and Restraints    Pre-Treatment Position  sitting in chair/recliner  -DJ     Post Treatment Position  chair  -DJ     In Chair  reclined;call light within reach;encouraged to call for assist;exit alarm on  -DJ        User Key  (r) = Recorded By, (t) = Taken By, (c) = Cosigned By    Initials Name Provider Type    Carol Lewis PT Physical Therapist        Outcome Measures     Row Name 07/07/21 1208          How much help from another person do you currently need...    Turning from your back to your side while in flat bed without using bedrails?  3  -DJ     Moving from lying on back to sitting on the side of a flat bed without bedrails?  3  -DJ     Moving to and from a bed to a chair (including a wheelchair)?  3  -DJ     Standing up from a chair using your arms (e.g., wheelchair, bedside chair)?  3  -DJ     Climbing 3-5 steps with a railing?  2  -DJ     To walk in hospital room?  3  -DJ     AM-PAC 6 Clicks Score (PT)  17  -DJ     Row Name 07/07/21 1208          Functional Assessment    Outcome Measure Options  AM-PAC 6 Clicks Basic Mobility (PT)  -DJ       User Key  (r) = Recorded By, (t) = Taken By, (c) = Cosigned By    Initials Name Provider Type    Carol Lewis PT Physical Therapist        Physical Therapy Education                 Title: PT OT SLP Therapies (In Progress)     Topic: Physical Therapy (In Progress)     Point: Mobility training (Done)     Learning Progress Summary           Patient Acceptance, E, VU by CESAR at 7/7/2021 1208                   Point: Home exercise program (Not Started)     Learner Progress:  Not documented in this visit.          Point: Body mechanics (Done)     Learning Progress Summary           Patient Acceptance, E, VU by CESAR at 7/7/2021 1208                   Point: Precautions (Done)     Learning Progress Summary           Patient Acceptance, E, VU by  at 7/7/2021 1208                               User Key     Initials Effective Dates Name Provider Type Discipline    CESAR 10/25/19 -  Carol Montejo PT Physical Therapist PT              PT Recommendation and Plan  Planned Therapy Interventions (PT): balance training, bed mobility training, gait training, home exercise program,  strengthening, postural re-education, patient/family education, transfer training  Plan of Care Reviewed With: patient  Outcome Summary: 86yo white female admitted 7/5/21 due to generalized weakness. Found to have UTI and sepsis. PMH Includes hbp, myesthenia gravis, long use of blood thinners, metabolic brain disease, coronary heart disease, a fib, sleep apnea, SOB, heart failure, R sh replacement. PLOF: Pt lives alone at em with 0 GHAZALA, was independent iw ADLs and used a cane for mobility. She is primariy limited at this time by generalized weakness and decreased activity tolerance that limit her functional mobility. Today, she was sittin [facility] - she req min A for sit/stand from recliner. she was able to amb 100' slowly with r wx and CGA with flexed posture and fair balance. She fatigues quickly with amb. She would certainy benefit from skilled PT for ther ex, gt/transfer training, bed mobility, balance, and endurance as appropriate. Recommend SNF prior to returning home.     Time Calculation:   PT Charges     Row Name 07/07/21 1209             Time Calculation    Start Time  1025  -DJ      Stop Time  1048  -DJ      Time Calculation (min)  23 min  -DJ      PT Non-Billable Time (min)  15 min  -DJ      PT Received On  07/07/21  -DJ      PT - Next Appointment  07/08/21  -DJ      PT Goal Re-Cert Due Date  07/14/21  -DJ        User Key  (r) = Recorded By, (t) = Taken By, (c) = Cosigned By    Initials Name Provider Type    Carol Lewis PT Physical Therapist        Therapy Charges for Today     Code Description Service Date Service Provider Modifiers Qty    29278523566 HC PT EVAL MOD COMPLEXITY 2 7/7/2021 Carol Montejo, PT GP 1    83310322909 HC PT THER PROC EA 15 MIN 7/7/2021 Carol Montejo, PT GP 2          PT G-Codes  Outcome Measure Options: AM-PAC 6 Clicks Basic Mobility (PT)  AM-PAC 6 Clicks Score (PT): 17    Carol Montejo PT  7/7/2021

## 2021-07-07 NOTE — PLAN OF CARE
Goal Outcome Evaluation:  Plan of Care Reviewed With: patient           Outcome Summary: 86yo white female admitted 7/5/21 due to generalized weakness. Found to have UTI and sepsis. PMH Includes hbp, myesthenia gravis, long use of blood thinners, metabolic brain disease, coronary heart disease, a fib, sleep apnea, SOB, heart failure, R sh replacement. PLOF: Pt lives alone at [facility] with 0 GHAZALA, was independent [facility] ADLs and used a cane for mobility. She is primariy limited at this time by generalized weakness and decreased activity tolerance that limit her functional mobility. Today, she was sittin [facility] - she req min A for sit/stand from recliner. she was able to amb 100' slowly with r wx and CGA with flexed posture and fair balance. She fatigues quickly with amb. She would certainy benefit from skilled PT for ther ex, gt/transfer training, bed mobility, balance, and endurance as appropriate. Recommend SNF prior to returning home.  Patient was intermittently wearing a face mask during this therapy encounter. Therapist used appropriate personal protective equipment including eye protection, mask, and gloves.  Mask used was standard procedure mask. Appropriate PPE was worn during the entire therapy session. Hand hygiene was completed before and after therapy session. Patient is not in enhanced droplet precautions.

## 2021-07-08 LAB
ALBUMIN SERPL-MCNC: 3 G/DL (ref 3.5–5.2)
ANION GAP SERPL CALCULATED.3IONS-SCNC: 9.8 MMOL/L (ref 5–15)
BUN SERPL-MCNC: 11 MG/DL (ref 8–23)
BUN/CREAT SERPL: 15.3 (ref 7–25)
CALCIUM SPEC-SCNC: 8.6 MG/DL (ref 8.6–10.5)
CHLORIDE SERPL-SCNC: 101 MMOL/L (ref 98–107)
CO2 SERPL-SCNC: 25.2 MMOL/L (ref 22–29)
CREAT SERPL-MCNC: 0.72 MG/DL (ref 0.57–1)
GFR SERPL CREATININE-BSD FRML MDRD: 77 ML/MIN/1.73
GFR SERPL CREATININE-BSD FRML MDRD: 94 ML/MIN/1.73
GLUCOSE SERPL-MCNC: 103 MG/DL (ref 65–99)
PHOSPHATE SERPL-MCNC: 3.3 MG/DL (ref 2.5–4.5)
POTASSIUM SERPL-SCNC: 3.9 MMOL/L (ref 3.5–5.2)
SODIUM SERPL-SCNC: 136 MMOL/L (ref 136–145)

## 2021-07-08 PROCEDURE — 63710000001 MYCOPHENOLATE MOFETIL PER 250 MG: Performed by: STUDENT IN AN ORGANIZED HEALTH CARE EDUCATION/TRAINING PROGRAM

## 2021-07-08 PROCEDURE — 80069 RENAL FUNCTION PANEL: CPT | Performed by: INTERNAL MEDICINE

## 2021-07-08 PROCEDURE — 63710000001 PREDNISONE PER 5 MG: Performed by: STUDENT IN AN ORGANIZED HEALTH CARE EDUCATION/TRAINING PROGRAM

## 2021-07-08 PROCEDURE — 97530 THERAPEUTIC ACTIVITIES: CPT

## 2021-07-08 PROCEDURE — 94799 UNLISTED PULMONARY SVC/PX: CPT

## 2021-07-08 PROCEDURE — 99232 SBSQ HOSP IP/OBS MODERATE 35: CPT | Performed by: INTERNAL MEDICINE

## 2021-07-08 PROCEDURE — 25010000003 CEFTRIAXONE PER 250 MG: Performed by: STUDENT IN AN ORGANIZED HEALTH CARE EDUCATION/TRAINING PROGRAM

## 2021-07-08 RX ORDER — CEFDINIR 300 MG/1
300 CAPSULE ORAL 2 TIMES DAILY
Qty: 6 CAPSULE | Refills: 0 | Status: SHIPPED | OUTPATIENT
Start: 2021-07-08 | End: 2021-07-10 | Stop reason: HOSPADM

## 2021-07-08 RX ORDER — ALBUTEROL SULFATE 2.5 MG/3ML
2.5 SOLUTION RESPIRATORY (INHALATION)
Status: DISCONTINUED | OUTPATIENT
Start: 2021-07-08 | End: 2021-07-10 | Stop reason: HOSPADM

## 2021-07-08 RX ADMIN — MYCOPHENOLATE MOFETIL 1000 MG: 500 TABLET ORAL at 08:06

## 2021-07-08 RX ADMIN — SODIUM CHLORIDE, PRESERVATIVE FREE 10 ML: 5 INJECTION INTRAVENOUS at 08:06

## 2021-07-08 RX ADMIN — METOPROLOL SUCCINATE 25 MG: 25 TABLET, EXTENDED RELEASE ORAL at 20:05

## 2021-07-08 RX ADMIN — DULOXETINE HYDROCHLORIDE 60 MG: 60 CAPSULE, DELAYED RELEASE ORAL at 20:05

## 2021-07-08 RX ADMIN — FERROUS SULFATE TAB 325 MG (65 MG ELEMENTAL FE) 325 MG: 325 (65 FE) TAB at 08:06

## 2021-07-08 RX ADMIN — SODIUM CHLORIDE, PRESERVATIVE FREE 10 ML: 5 INJECTION INTRAVENOUS at 20:06

## 2021-07-08 RX ADMIN — CEFTRIAXONE SODIUM 2 G: 2 INJECTION, SOLUTION INTRAVENOUS at 17:37

## 2021-07-08 RX ADMIN — PRAVASTATIN SODIUM 10 MG: 10 TABLET ORAL at 20:06

## 2021-07-08 RX ADMIN — MYCOPHENOLATE MOFETIL 1000 MG: 500 TABLET ORAL at 20:05

## 2021-07-08 RX ADMIN — ACETAMINOPHEN 650 MG: 325 TABLET, FILM COATED ORAL at 05:53

## 2021-07-08 RX ADMIN — LOSARTAN POTASSIUM 25 MG: 25 TABLET, FILM COATED ORAL at 08:06

## 2021-07-08 RX ADMIN — METOPROLOL SUCCINATE 25 MG: 25 TABLET, EXTENDED RELEASE ORAL at 08:06

## 2021-07-08 RX ADMIN — ALBUTEROL SULFATE 2.5 MG: 2.5 SOLUTION RESPIRATORY (INHALATION) at 15:31

## 2021-07-08 RX ADMIN — ALBUTEROL SULFATE 2.5 MG: 2.5 SOLUTION RESPIRATORY (INHALATION) at 20:26

## 2021-07-08 RX ADMIN — PANTOPRAZOLE SODIUM 40 MG: 40 TABLET, DELAYED RELEASE ORAL at 08:06

## 2021-07-08 RX ADMIN — Medication 50 MCG: at 08:05

## 2021-07-08 RX ADMIN — PREDNISONE 10 MG: 10 TABLET ORAL at 08:06

## 2021-07-08 RX ADMIN — RALOXIFENE 60 MG: 60 TABLET ORAL at 20:05

## 2021-07-08 RX ADMIN — DULOXETINE HYDROCHLORIDE 60 MG: 60 CAPSULE, DELAYED RELEASE ORAL at 08:06

## 2021-07-08 RX ADMIN — LEVOTHYROXINE SODIUM 75 MCG: 0.07 TABLET ORAL at 08:06

## 2021-07-08 RX ADMIN — RIVAROXABAN 20 MG: 20 TABLET, FILM COATED ORAL at 17:37

## 2021-07-08 RX ADMIN — PANTOPRAZOLE SODIUM 40 MG: 40 TABLET, DELAYED RELEASE ORAL at 17:37

## 2021-07-08 RX ADMIN — ASPIRIN 81 MG: 81 TABLET, COATED ORAL at 20:05

## 2021-07-08 NOTE — PLAN OF CARE
Goal Outcome Evaluation:    Problem: Adult Inpatient Plan of Care  Goal: Plan of Care Review  Outcome: Ongoing, Progressing  Flowsheets (Taken 7/8/2021 1616)  Progress: improving  Plan of Care Reviewed With: patient  Outcome Summary: Vitals stable. Up to chair. Antibiotics continued. +cough. Possible d/c to SNF tomorrow. Family visited. Will continue to monitor.

## 2021-07-08 NOTE — THERAPY TREATMENT NOTE
Patient Name: Olena Myers  : 1935    MRN: [id number]                              Today's Date: 2021       Admit Date: 2021    Visit Dx:     ICD-10-CM ICD-9-CM   1. Acute UTI  N39.0 599.0   2. Anemia, unspecified type  D64.9 285.9   3. Hyponatremia  E87.1 276.1   4. Gastrointestinal hemorrhage, unspecified gastrointestinal hemorrhage type  K92.2 578.9     Patient Active Problem List   Diagnosis   • Pre-operative cardiovascular examination   • S/p reverse total shoulder arthroplasty   • Myasthenia gravis (CMS/HCC)   • Paroxysmal atrial fibrillation (CMS/Formerly [facility])   • HX: long term anticoagulant use   • Essential hypertension   • CAD (coronary artery disease)   • Atrial fibrillation (CMS/Formerly [facility])   • S/P reverse total shoulder arthroplasty, right   • Acute UTI   • COPD (chronic obstructive pulmonary disease) (CMS/Formerly [facility])   • Hyponatremia   • Metabolic encephalopathy   • Sleep apnea   • Normocytic anemia   • Sepsis (CMS/Formerly [facility])   • Dyspnea   • Acute on chronic diastolic CHF (congestive heart failure) (CMS/Formerly [facility])     Past Medical History:   Diagnosis Date   • Anemia     IRON DEFICIENCY   • Arthritis    • Asthma    • Atrial fibrillation (CMS/Formerly [facility])    • CAD (coronary artery disease)     HEART STENT   • COPD (chronic obstructive pulmonary disease) (CMS/Formerly [facility])    • 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   • History of gastric ulcer    • History of GI bleed    • [place] (hard of hearing)    • Hyperlipidemia    • Hypertension    • Lightheadedness    • LVH (left ventricular hypertrophy)    • MG (myasthenia gravis) (CMS/[facility]ly [facility])    • Mini stroke (CMS/[facility]ly [facility]) 10/2016, 3/2017   • OA (osteoarthritis)    • Osteoporosis    • Sleep apnea     USES CPAP   • SOB (shortness of breath)     WALKING     Past Surgical History:   Procedure Laterality Date   • APPENDECTOMY     • CARPAL TUNNEL RELEASE Bilateral    • CATARACT EXTRACTION Bilateral    • CORONARY STENT PLACEMENT     • TOTAL HIP ARTHROPLASTY Bilateral    • TOTAL SHOULDER ARTHROPLASTY W/ DISTAL  CLAVICLE EXCISION Left 7/19/2016    Procedure: LT TOTAL SHOULDER REVERSE ARTHROPLASTY;  Surgeon: NAHOMI Solorzano MD;  Location: [facility] OR [facility] [place];  Service:    • TOTAL SHOULDER ARTHROPLASTY W/ DISTAL CLAVICLE EXCISION Right 1/8/2019    Procedure: RIGHT TOTAL SHOULDER REVERSE ARTHROPLASTY;  Surgeon: Jovanny Solorzano MD;  Location: [facility] OR [facility] [place];  Service: Orthopedics     General Information     Row Name 07/08/21 1433          Physical Therapy Time and Intention    Document Type  therapy note (daily note)  -CF     Mode of Treatment  physical therapy;individual therapy  -CF     Row Name 07/08/21 1433          General Information    Patient Profile Reviewed  yes  -CF     Existing Precautions/Restrictions  fall  -CF     Row Name 07/08/21 1433          Cognition    Orientation Status (Cognition)  oriented x 4  -CF     Row Name 07/08/21 1433          Safety Issues, Functional Mobility    Impairments Affecting Function (Mobility)  balance;endurance/activity tolerance;strength;shortness of breath  -CF       User Key  (r) = Recorded By, (t) = Taken By, (c) = Cosigned By    Initials Name Provider Type    CF Jessica iTan PT Physical Therapist        Mobility     Row Name 07/08/21 1507          Bed Mobility    Comment (Bed Mobility)  NT [facility] at arrival and departure  -CF     Row Name 07/08/21 1507          Sit-Stand Transfer    Sit-Stand [place] (Transfers)  verbal cues;contact guard;minimum assist (75% patient effort);1 person assist;1 person to manage equipment  -CF     Assistive Device (Sit-Stand Transfers)  walker, front-wheeled  -CF     Row Name 07/08/21 1507          Gait/Stairs (Locomotion)    Pierce Level (Gait)  contact guard;verbal cues;1 person to manage equipment  -CF     Assistive Device (Gait)  walker, front-wheeled  -CF     Distance in Feet (Gait)  80' x2 reps  -CF     Deviations/Abnormal Patterns (Gait)  festinating/shuffling;gait speed decreased;stride length decreased;michael decreased  -CF      Bilateral Gait Deviations  forward flexed posture  -CF     Comment (Gait/Stairs)  Limited by fatigue, slow pace.  -CF       User Key  (r) = Recorded By, (t) = Taken By, (c) = Cosigned By    Initials Name Provider Type    Jessica Slater, ABI Physical Therapist        Obj/Interventions    No documentation.       Goals/Plan    No documentation.       Clinical Impression     Row Name 07/08/21 1509          Pain    Additional Documentation  Pain Scale: Numbers Pre/Post-Treatment (Group)  -CF     Row Name 07/08/21 1509          Pain Scale: Numbers Pre/Post-Treatment    Pretreatment Pain Rating  0/10 - no pain  -CF     Posttreatment Pain Rating  0/10 - no pain  -CF     Row Name 07/08/21 1509          Plan of Care Review    Plan of Care Reviewed With  patient;grandchild(lorenza)  -CF     Outcome Summary  Pt up in chair at arrival and agreeable to PT treatment. Pt increased ambulation distance but continues to be limited by fatigue and feeling short of breath. Pt amb without O2 and O2 sats reading 90% afterwards. Plan is to d/c to SNF once medically cleared.  -CF     Row Name 07/08/21 1509          Vital Signs    O2 Delivery Pre Treatment  supplemental O2 1L  -CF     Intra SpO2 (%)  90  -CF     O2 Delivery Intra Treatment  room air  -CF     Post SpO2 (%)  91  -CF     O2 Delivery Post Treatment  supplemental O2  -CF     Row Name 07/08/21 1509          Positioning and Restraints    Pre-Treatment Position  sitting in chair/recliner  -CF     Post Treatment Position  chair  -CF     In Chair  sitting;call light within reach;encouraged to call for assist;exit alarm on;with family/caregiver;notified nsg  -CF       User Key  (r) = Recorded By, (t) = Taken By, (c) = Cosigned By    Initials Name Provider Type    Jessica Slater, ABI Physical Therapist        Outcome Measures     Row Name 07/08/21 1511          How much help from another person do you currently need...    Turning from your back to your side while in flat bed  without using bedrails?  3  -CF     Moving from lying on back to sitting on the side of a flat bed without bedrails?  3  -CF     Moving to and from a bed to a chair (including a wheelchair)?  3  -CF     Standing up from a chair using your arms (e.g., wheelchair, bedside chair)?  3  -CF     Climbing 3-5 steps with a railing?  2  -CF     To walk in hospital room?  3  -CF     AM-PAC 6 Clicks Score (PT)  17  -CF     Row Name 07/08/21 1511          Functional Assessment    Outcome Measure Options  AM-PAC 6 Clicks Basic Mobility (PT)  -CF       User Key  (r) = Recorded By, (t) = Taken By, (c) = Cosigned By    Initials Name Provider Type    CF Jessica Tian, PT Physical Therapist        Physical Therapy Education                 Title: PT OT SLP Therapies (In Progress)     Topic: Physical Therapy (In Progress)     Point: Mobility training (Done)     Learning Progress Summary           Patient Acceptance, E, VU by CF at 7/8/2021 1511    Acceptance, E, VU by DJ at 7/7/2021 1208                   Point: Home exercise program (Not Started)     Learner Progress:  Not documented in this visit.          Point: Body mechanics (Done)     Learning Progress Summary           Patient Acceptance, E, VU by CF at 7/8/2021 1511    Acceptance, E, VU by DJ at 7/7/2021 1208                   Point: Precautions (Done)     Learning Progress Summary           Patient Acceptance, E, VU by DJ at 7/7/2021 1208                               User Key     Initials Effective Dates Name Provider Type Discipline    DJ 10/25/19 -  Carol Montejo, PT Physical Therapist PT     06/16/21 -  Jessica Tian, ABI Physical Therapist PT              PT Recommendation and Plan     Plan of Care Reviewed With: patient, grandchild(lorenza)  Outcome Summary: Pt up in chair at arrival and agreeable to PT treatment. Pt increased ambulation distance but continues to be limited by fatigue and feeling short of breath. Pt amb without O2 and O2 sats reading 90% afterwards.  Plan is to d/c to SNF once medically cleared.     Time Calculation:   PT Charges     Row Name 07/08/21 1433             Time Calculation    Start Time  1334  -CF      Stop Time  1348  -CF      Time Calculation (min)  14 min  -CF      PT Received On  07/08/21  -CF      PT - Next Appointment  07/09/21  -CF         Timed Charges    82481 - PT Therapeutic Activity Minutes  14  -CF         Total Minutes    Timed Charges Total Minutes  14  -CF       Total Minutes  14  -CF        User Key  (r) = Recorded By, (t) = Taken By, (c) = Cosigned By    Initials Name Provider Type    CF Jessica Tian, PT Physical Therapist        Therapy Charges for Today     Code Description Service Date Service Provider Modifiers Qty    16557867635 HC PT THERAPEUTIC ACT EA 15 MIN 7/8/2021 Jessica Tian, PT GP 1    50107507176 HC PT THER SUPP EA 15 MIN 7/8/2021 Jessica Tian, PT GP 1    03802716236 HC PT THER SUPP EA 15 MIN 7/8/2021 Jessica Tian, PT GP 1          PT G-Codes  Outcome Measure Options: AM-PAC 6 Clicks Basic Mobility (PT)  AM-PAC 6 Clicks Score (PT): 17    Jessica Tian PT  7/8/2021

## 2021-07-08 NOTE — PROGRESS NOTES
"[facility]    Patient Name: Olena Myers  :1935  85 y.o.  LOS: 3  Encounter Provider: Carlin Zhang Jr, MD      Patient Care Team:  Manda Keenan MD as PCP - General (General Practice)  Olvin Hernandez MD as Consulting Physician (Cardiology)    Chief Complaint: Follow-up GI bleed, aortic regurgitation, paroxysmal atrial fibrillation    Interval History: No acute issues overnight.  Echocardiographic images reviewed.       Objective   Vital Signs  Temp:  [97.7 °F (36.5 °C)-98.2 °F (36.8 °C)] 97.7 °F (36.5 °C)  Heart Rate:  [77-91] 77  Resp:  [16-20] 16  BP: (112-143)/(58-84) 143/84    Intake/Output Summary (Last 24 hours) at 2021 1424  Last data filed at 2021 0806  Gross per 24 hour   Intake 420 ml   Output 700 ml   Net -280 ml     Flowsheet Rows      First Filed Value   Admission Height  152.4 cm (60\") Documented at 2021 1628   Admission Weight  68 kg (149 lb 14.6 oz) Documented at 2021 1507            Physical Exam  Vitals reviewed.   Constitutional:       Appearance: Healthy appearance. Not in distress.   Neck:      Vascular: No JVR. JVD normal.   Pulmonary:      Effort: Pulmonary effort is normal.      Breath sounds: No wheezing. No rhonchi. No rales.      Comments: Minimal bibasilar crackles  Chest:      Chest wall: Not tender to palpatation.   Cardiovascular:      PMI at left midclavicular line. Normal rate. Regular rhythm. Normal S1. Normal S2.      Murmurs: There is a systolic murmur.      No gallop. No click. No rub.   Pulses:     Intact distal pulses.   Edema:     Peripheral edema absent.   Abdominal:      General: Bowel sounds are normal.      Palpations: Abdomen is soft.      Tenderness: There is no abdominal tenderness.   Musculoskeletal: Normal range of motion.         General: No tenderness. Skin:     General: Skin is warm and dry.   Neurological:      General: No focal deficit present.      Mental Status: Alert and oriented to person, place and " time.     Physical exam was reviewed, updated and amended when necessary.    Pertinent Test Results:  Results from last 7 days   Lab Units 07/08/21  0458 07/07/21  0607 07/06/21  2349 07/06/21  1607 07/06/21  0800 07/06/21  0005 07/05/21 2041 07/05/21  0929   SODIUM mmol/L 136 133* 129* 127* 130* 123* 126* 123*   POTASSIUM mmol/L 3.9 3.5  --   --  3.7  --   --  3.5   CHLORIDE mmol/L 101 98  --   --  98  --   --  91*   CO2 mmol/L 25.2 24.2  --   --  22.0  --   --  19.7*   BUN mg/dL 11 10  --   --  8  --   --  11   CREATININE mg/dL 0.72 0.78  --   --  0.85  --   --  0.89   GLUCOSE mg/dL 103* 90  --   --  109*  --   --  131*   CALCIUM mg/dL 8.6 8.4*  --   --  8.5*  --   --  8.8   AST (SGOT) U/L  --   --   --   --   --   --   --  14   ALT (SGPT) U/L  --   --   --   --   --   --   --  9     Results from last 7 days   Lab Units 07/05/21  0929   TROPONIN T ng/mL <0.010     Results from last 7 days   Lab Units 07/07/21  0607 07/06/21  0800 07/05/21 2041 07/05/21  0929   WBC 10*3/mm3 9.11 11.95*  --  14.64*   HEMOGLOBIN g/dL 8.7* 8.5* 8.5* 8.7*   HEMATOCRIT % 27.8* 26.4* 26.7* 26.4*   PLATELETS 10*3/mm3 202 185  --  215                   Invalid input(s): LDLCALC  Results from last 7 days   Lab Units 07/05/21  0929   PROBNP pg/mL 2,029.0*     Results from last 7 days   Lab Units 07/05/21  0929   TSH uIU/mL 0.679           Medication Review:   aspirin, 81 mg, Oral, Nightly  cefTRIAXone, 2 g, Intravenous, Q24H  DULoxetine, 60 mg, Oral, BID  ferrous sulfate, 325 mg, Oral, Daily With Breakfast  lactobacillus acidophilus, 1 capsule, Oral, Daily  levothyroxine, 75 mcg, Oral, Daily  losartan, 25 mg, Oral, Q24H  metoprolol succinate XL, 25 mg, Oral, BID  mycophenolate, 1,000 mg, Oral, BID  pantoprazole, 40 mg, Oral, BID AC  pravastatin, 10 mg, Oral, Nightly  predniSONE, 10 mg, Oral, Daily With Breakfast  raloxifene, 60 mg, Oral, Nightly  rivaroxaban, 20 mg, Oral, Daily With Dinner  sodium chloride, 10 mL, Intravenous,  Q12H  vitamin B-12, 50 mcg, Oral, Daily  vitamin D, 50,000 Units, Oral, Q7 Days              Assessment/Plan   1. Coronary artery disease and previous bare-metal stent and angioplasty of the LAD in 1997.  Last stress test June 2016 was negative for ischemia  2.  Hypertension fairly well controlled.  Sodium restricted diet.  3.  History of  aortic stenoses with mild to moderate regurgitation-last echo was October 2018  repeat echo this admission with normal left-ventricular ejection fraction grade 2 diastolic dysfunction.  Severely calcified valve with no significant stenosis but mild to moderate aortic regurgitation similar from previous.  5.  Paroxysmal atrial fibrillation.  She has a [id number] score of 8 high risk for embolic event .  Back on rivaroxaban.  Monitor closely.  6. history of COPD/ fibrosis-  She had recent PNA. Follows with pulmonary outpatient. Off amio for lung disease  7.  Carotid artery stenosis she is to have follow-up duplex as outpatient  8.  CHF- probnp elevated.  Fairly euvolemic.  Hyponatremia resolved.  Holding diuretics.  9.  Myasthenia gravis follows with neurology  10. Aortic plaque  11. Sepsis- per admitting   12. Anemia - acute on chronic.  Hemoccult positive.  History of ulcerative esophagitis and peptic ulcer disease.  Monitor closely on anticoagulation.  She is on erythropoietin as outpatient and follows with [place] [facility].  She has received IV Venofer in the past currently on oral iron replacement.    Carlin Zhang Jr, MD  [facility]  07/08/21  14:24 EDT

## 2021-07-08 NOTE — PLAN OF CARE
Goal Outcome Evaluation:  Plan of Care Reviewed With: patient, grandchild(lorenza)           Outcome Summary: Pt up in chair at arrival and agreeable to PT treatment. Pt increased ambulation distance but continues to be limited by fatigue and feeling short of breath. Pt amb without O2 and O2 sats reading 90% afterwards. Plan is to d/c to SNF once medically cleared.    Patient was intermittently wearing a face mask during this therapy encounter. Therapist used appropriate personal protective equipment including eye protection, mask, and gloves.  Mask used was standard procedure mask. Appropriate PPE was worn during the entire therapy session. Hand hygiene was completed before and after therapy session. Patient is not in enhanced droplet precautions.

## 2021-07-08 NOTE — PLAN OF CARE
Goal Outcome Evaluation:  Plan of Care Reviewed With: patient        Progress: no change  Outcome Summary: no acute changes overnight. pt rested well. requiring 1L NC when sleeping. awaiting d/c planning. VSS. continue to monitor

## 2021-07-08 NOTE — DISCHARGE SUMMARY
Patient Name: Olena Myers  : 1935  MRN: [id number]    Date of Admission: 2021  Date of Discharge:  2021  Primary Care Physician: Manda Keenan MD      Chief Complaint:   Fever      Discharge Diagnoses     Active Hospital Problems    Diagnosis  POA   • **Sepsis (CMS/[facility]) [A41.9]  Yes   • Acute UTI [N39.0]  Yes   • Dyspnea [R06.00]  Unknown   • Acute on chronic diastolic CHF (congestive heart failure) (CMS/[facility]) [I50.33]  Unknown   • COPD (chronic obstructive pulmonary disease) (CMS/[facility]) [J44.9]  Yes   • Hyponatremia [E87.1]  Yes   • Metabolic encephalopathy [G93.41]  Yes   • Sleep apnea [G47.30]  Yes   • Normocytic anemia [D64.9]  Yes   • Atrial fibrillation (CMS/[facility]) [I48.91]  Yes   • CAD (coronary artery disease) [I25.10]  Yes   • Essential hypertension [I10]  Yes   • HX: long term anticoagulant use [Z92.29]  Not Applicable      Resolved Hospital Problems   No resolved problems to display.        Hospital Course       This is an 85-year-old female with a past medical history of paroxysmal atrial fibrillation on Xarelto, myasthenia gravis, CAD status post PCI, history of TIA, carotid artery stenosis who presented to the hospital with altered mental status and fever, T-max at home was 102.    She also endorses some dysuria as well as shortness of air and cough that has been worsening last several days.  A UA showed a urinary tract infection.  Chest x-ray was clear.  CT angiogram was done to evaluate for pulmonary embolism, which was not detected.  She was treated for urine tract infection and her fatigue and lethargy and weakness significantly improved.    PT/OT recommend SNF and placement is pending.       Day of Discharge     Physical Exam:  Temp:  [97.7 °F (36.5 °C)-98.2 °F (36.8 °C)] 97.7 °F (36.5 °C)  Heart Rate:  [77-91] 77  Resp:  [16-20] 16  BP: (112-143)/(58-84) 143/84  Body mass index is 30.27 kg/m².  Physical Exam  General: Alert and oriented x3, no acute distress.  HEENT:  Normocephalic, atraumatic  Eyes: PERRL, EOMI, anicteric sclera  Lungs: Clear to auscultation bilaterally, no crackles or wheezes  CV: Regular rate and rhythm, no murmurs rubs or gallops  Abdomen: Soft, nontender, nondistended.  Normoactive bowel sounds  Extremities: No significant peripheral edema  Skin: Clean/dry/intact, no rashes  Neuro: Cranial nerves II through XII intact, no gross focal neurological deficits appreciated  Psych: Appropriate mood and affect    Consultants     Consult Orders (all) (From admission, onward)     Start     Ordered    07/05/21 1428  Inpatient Nephrology Consult  Once     Specialty:  Nephrology  Provider:  Yovany Mccormick MD    07/05/21 1428    07/05/21 1407  Inpatient Cardiology Consult  Once     Specialty:  Cardiology  Provider:  Nghia Mayberry III, MD    07/05/21 1409    07/05/21 1117  [facility] (on-call MD unless specified) Details  Once     Specialty:  Hospitalist  Provider:  (Not yet assigned)    07/05/21 1116              Procedures     Imaging Results (All)     Procedure Component Value Units Date/Time    CT Angiogram Chest With & Without Contrast [[id number]] Collected: 07/06/21 0207     Updated: 07/06/21 0207    Narrative:        Patient: ANN ANN  Time Out: 02:06  Exam(s): CTA CHEST W WO Contrast     EXAM:    CT Angiography Chest Without and With Intravenous Contrast    CLINICAL HISTORY:     Reason for exam: PE suspected, low intermediate prob, neg D-dimer.    TECHNIQUE:    Axial computed tomographic angiography images of the chest without and   with intravenous contrast.  CTDI is 14.80 mGy and DLP is 533.80 mGy-cm.    This CT exam was performed according to the principle of ALARA (As Low As   Reasonably Achievable) by using one or more of the following dose   reduction techniques: automated exposure control, adjustment of the mA   and or kV according to patient size, and or use of iterative   reconstruction technique.    3D and MIP reconstructed images were created and  reviewed.    COMPARISON:    February 7, 2017    FINDINGS:    Pulmonary arteries:  The pulmonary arterial tree is well-opacified with   contrast.  No pulmonary emboli are identified.    Aorta:  The thoracic aorta is heavily calcified but nondilated.  There   is no aneurysm or dissection.    Lungs:  See below.      Pleural space:  There is a small right pleural effusion measuring 1.9   cm with a small amount of bibasilar atelectasis, 1 greatest on the right.    No pneumothorax.    Heart:  The heart is moderately enlarged.  Severe coronary   calcification is present.  No pericardial effusion.  No evidence of RV   dysfunction.    Bones joints:  Severe compression fracture with vertebra plana at T6,   unchanged.  There is also a compression fracture T4, unchanged.  No   dislocation.    Soft tissues:  Unremarkable.    Lymph nodes:  Unremarkable.  No enlarged lymph nodes.    IMPRESSION:       Cardiomegaly and severe calcification of the thoracic aorta.  No aneurysm   or dissection.    No evidence of pulmonary embolism.    Small right pleural effusion and mild bibasilar atelectasis.  Mild CHF   cannot be excluded.    Chronic compression fractures in the upper thoracic spine at T4 and T6,   unchanged.    Impression:          Electronically signed by Yovany Espinosa MD on 07-06-21 at 0206    XR Chest 1 View [[id number]] Collected: 07/05/21 0953     Updated: 07/05/21 1020    Narrative:      ONE VIEW PORTABLE CHEST     HISTORY: Fever and weakness.     FINDINGS: The lungs are well-expanded and clear. There is mild  cardiomegaly unchanged from 01/05/2021. A left-sided MediPort catheter  ends in the SVC without change.     This report was finalized on 7/5/2021 10:17 AM by Dr. Crow Solomon M.D.             Pertinent Labs     Results from last 7 days   Lab Units 07/07/21  0607 07/06/21  0800 07/05/21  2041 07/05/21  0929   WBC 10*3/mm3 9.11 11.95*  --  14.64*   HEMOGLOBIN g/dL 8.7* 8.5* 8.5* 8.7*   PLATELETS 10*3/mm3 202 185  --   215     Results from last 7 days   Lab Units 07/08/21 0458 07/07/21  0607 07/06/21  2349 07/06/21  1607 07/06/21  0800 07/05/21  0929   SODIUM mmol/L 136 133* 129* 127* 130* 123*   POTASSIUM mmol/L 3.9 3.5  --   --  3.7 3.5   CHLORIDE mmol/L 101 98  --   --  98 91*   CO2 mmol/L 25.2 24.2  --   --  22.0 19.7*   BUN mg/dL 11 10  --   --  8 11   CREATININE mg/dL 0.72 0.78  --   --  0.85 0.89   GLUCOSE mg/dL 103* 90  --   --  109* 131*   Estimated Creatinine Clearance: 46.6 mL/min (by C-G formula based on SCr of 0.72 mg/dL).  Results from last 7 days   Lab Units 07/08/21 0458 07/05/21  0929   ALBUMIN g/dL 3.00* 3.60   BILIRUBIN mg/dL  --  0.6   ALK PHOS U/L  --  64   AST (SGOT) U/L  --  14   ALT (SGPT) U/L  --  9     Results from last 7 days   Lab Units 07/08/21 0458 07/07/21  0607 07/06/21  0800 07/05/21 2041 07/05/21  0929   CALCIUM mg/dL 8.6 8.4* 8.5*  --  8.8   ALBUMIN g/dL 3.00*  --   --   --  3.60   PHOSPHORUS mg/dL 3.3  --   --  2.1*  --        Results from last 7 days   Lab Units 07/05/21 2041 07/05/21  0929   TROPONIN T ng/mL  --  <0.010   PROBNP pg/mL  --  2,029.0*   D DIMER QUANT MCGFEU/mL 3.00*  --      Results from last 7 days   Lab Units 07/05/21 2151 07/05/21  1020 07/05/21  0929   SODIUM UR mmol/L 42  --   --    CREATININE UR mg/dL  --  59.7  --    CHLORIDE UR mmol/L  --  38  --    OSMOLALITY UR mOsm/kg  --  255  --    URIC ACID mg/dL  --   --  5.0         Invalid input(s): LDLCALC  Results from last 7 days   Lab Units 07/05/21  2151 07/05/21  1107 07/05/21  0929   BLOODCX   --  Escherichia coli* No growth at 3 days   URINECX  No growth  --   --    BCIDPCR   --  Escherichia coli. Identification by BCID PCR.*  --        Test Results Pending at Discharge     Pending Labs     Order Current Status    Blood Culture - Blood, Arm, Left Preliminary result          Discharge Details        Discharge Medications      ASK your doctor about these medications      Instructions Start Date   acidophilus tablet  tablet   1 tablet, Oral, Daily      aspirin 81 MG EC tablet   81 mg, Oral, Nightly      azelastine 0.1 % nasal spray  Commonly known as: ASTELIN   2 sprays, Nasal, 2 Times Daily, Use in each nostril as directed       Bepreve 1.5 % solution  Generic drug: Bepotastine Besilate   Take As Directed      Breo Ellipta 100-25 MCG/INH inhaler  Generic drug: Fluticasone Furoate-Vilanterol   1 puff, Inhalation, Every Morning      colesevelam 625 MG tablet  Commonly known as: WELCHOL   1,875 mg, Oral, 2 times daily      diphenoxylate-atropine 2.5-0.025 MG per tablet  Commonly known as: LOMOTIL   1 tablet, Oral, 4 Times Daily PRN      DULoxetine 60 MG capsule  Commonly known as: CYMBALTA  Ask about: Which instructions should I use?   60 mg, Oral, 2 Times Daily      EPOGEN IJ   Every 14 Days      irbesartan 75 MG tablet  Commonly known as: AVAPRO   75 mg, Oral, Nightly      levothyroxine 100 MCG tablet  Commonly known as: SYNTHROID, LEVOTHROID  Ask about: Which instructions should I use?   75 mcg, Oral, Daily      metoprolol succinate XL 25 MG 24 hr tablet  Commonly known as: TOPROL-XL   25 mg, Oral, 2 Times Daily      mometasone 50 MCG/ACT nasal spray  Commonly known as: NASONEX   2 sprays, Nasal, Daily      montelukast 10 MG tablet  Commonly known as: SINGULAIR   10 mg, Oral, Nightly      mycophenolate 500 MG tablet  Commonly known as: CELLCEPT   1,000 mg, Oral, 2 Times Daily, 2 tabs bid      nitroglycerin 0.4 MG SL tablet  Commonly known as: NITROSTAT   0.4 mg, Sublingual, Every 5 Minutes PRN      pantoprazole 20 MG EC tablet  Commonly known as: PROTONIX   20 mg, Oral, Daily      pravastatin 20 MG tablet  Commonly known as: PRAVACHOL   10 mg, Oral, Daily      predniSONE 10 MG tablet  Commonly known as: DELTASONE   10 mg, Oral, Daily      Privigen 20 GM/200ML solution infusion  Generic drug: immune globulin (human)   Intravenous, Once, Every 5 weeks       ProAir  (90 Base) MCG/ACT inhaler  Generic drug: albuterol sulfate  HFA   2 puffs, Inhalation, Every 6 Hours PRN      raloxifene 60 MG tablet  Commonly known as: EVISTA   60 mg, Oral, Nightly      rivaroxaban 20 MG tablet  Commonly known as: XARELTO   20 mg, Oral, Daily With Dinner      vitamin B-12 100 MCG tablet  Commonly known as: CYANOCOBALAMIN   50 mcg, Oral, Daily      vitamin D 1.25 MG (87711 UT) capsule capsule  Commonly known as: ERGOCALCIFEROL   50,000 Units, Oral, Every 7 Days, Takes on Wednesdays             Allergies   Allergen Reactions   • Neomycin Anaphylaxis   • Penicillins Swelling and Rash     Patient reports tolerating cephalexin on multiple occasions, most recently in November 2020. Tolerated ceftriaxone July 2021.  Patient reports that initial reaction to penicillin was 30-40 years ago, but she was unsure which penicillin drug it was.    • Hydrocodone Itching and Rash   • Rosuvastatin Other (See Comments)     Muscle cramps         Discharge Disposition: FLAKO      Discharge Diet:  Diet Order   Procedures   • Diet Regular; Cardiac       Discharge Activity: as tolerated     Notify your primary care provider if you experience chest pain, difficulty breathing, fevers/chills, nausea or vomiting, bleeding in stool, excessive diarrhea, numbness or weakness or tingling in any part of your body.        CODE STATUS:    Code Status and Medical Interventions:   Ordered at: 07/05/21 1443     Code Status:    CPR     Medical Interventions (Level of Support Prior to Arrest):    Full       Future Appointments   Date Time Provider Department Center   10/6/2021 10:00 AM [facility] ECHO/VAS BACK [facility]   10/6/2021 11:00 AM Aimee Montalvo APRN [facility]       Time Spent on Discharge:  Greater than 30 minutes      Wilbert Sahu MD  [facility]  07/08/21  14:31 EDT

## 2021-07-08 NOTE — PROGRESS NOTES
LOS: 3 days     Chief Complaint/ Reason for encounter: Hyponatremia  Chief Complaint   Patient presents with   • Fever         Subjective   No complaints feels well  Denies shortness of breath or chest pain  Good appetite with no nausea vomiting  No edema  She states she feels much better today      Medical history reviewed:  History of Present Illness    Subjective    History taken from: Patient and chart    Vital Signs  Temp:  [97.7 °F (36.5 °C)-98.2 °F (36.8 °C)] 97.7 °F (36.5 °C)  Heart Rate:  [77-91] 77  Resp:  [16-20] 16  BP: (112-143)/(58-84) 143/84       Wt Readings from Last 1 Encounters:   07/08/21 0626 70.3 kg (154 lb 15.7 oz)   07/05/21 1628 67.6 kg (149 lb)   07/05/21 1507 68 kg (149 lb 14.6 oz)       Objective    Objective:  General Appearance:  Comfortable, well-appearing, in no acute distress and not in pain.  Awake, alert, oriented  HEENT: Mucous membranes moist, no injury, oropharynx clear  Lungs:  Normal effort and normal respiratory rate.  Breath sounds clear to auscultation.  No  respiratory distress.  No rales, decreased breath sounds or rhonchi.    Heart: Normal rate.  Regular rhythm.  S1 normal.  No murmur.   Abdomen: Abdomen is soft.  Bowel sounds are normal, no abdominal tenderness.  There is no rebound or guarding  Extremities: Normal range of motion.  No edema of bilateral lower extremities, distal pulses intact  Neurological: No focal motor or sensory deficits, pupils reactive  Skin:  Warm and dry.  No rash or cyanosis.       Results Review:    Intake/Output:     Intake/Output Summary (Last 24 hours) at 7/8/2021 1356  Last data filed at 7/8/2021 0806  Gross per 24 hour   Intake 420 ml   Output 700 ml   Net -280 ml         DATA:  Radiology and Labs:  The following labs independently reviewed by me. Additional labs ordered for tomorrow a.m.  Interval notes, chart personally reviewed by me.   Old records independently reviewed showing normal baseline creatinine and sodium levels  The  following radiologic studies independently viewed by me, findings CT angiogram showed cardiomegaly severe aortic calcification no aneurysm no pulmonary embolus small pleural effusion mild CHF  New problems include anemia  Discussed with patient/family at bedside    Risk/ complexity of medical care/ medical decision making moderate risk    Labs:   Recent Results (from the past 24 hour(s))   Renal Function Panel    Collection Time: 07/08/21  4:58 AM    Specimen: Blood   Result Value Ref Range    Glucose 103 (H) 65 - 99 mg/dL    BUN 11 8 - 23 mg/dL    Creatinine 0.72 0.57 - 1.00 mg/dL    Sodium 136 136 - 145 mmol/L    Potassium 3.9 3.5 - 5.2 mmol/L    Chloride 101 98 - 107 mmol/L    CO2 25.2 22.0 - 29.0 mmol/L    Calcium 8.6 8.6 - 10.5 mg/dL    Albumin 3.00 (L) 3.50 - 5.20 g/dL    Phosphorus 3.3 2.5 - 4.5 mg/dL    Anion Gap 9.8 5.0 - 15.0 mmol/L    BUN/Creatinine Ratio 15.3 7.0 - 25.0    eGFR Non African Amer 77 >60 mL/min/1.73    eGFR  African Amer 94 >60 mL/min/1.73       Radiology:  Imaging Results (Last 24 Hours)     ** No results found for the last 24 hours. **             Medications have been reviewed:  Current Facility-Administered Medications   Medication Dose Route Frequency Provider Last Rate Last Admin   • acetaminophen (TYLENOL) tablet 650 mg  650 mg Oral Q6H PRN Wilbert Sahu MD   650 mg at 07/08/21 0553   • albuterol (PROVENTIL) nebulizer solution 0.083% 2.5 mg/3mL  2.5 mg Nebulization Q6H PRN Wilbert Sahu MD   2.5 mg at 07/07/21 1928   • aspirin EC tablet 81 mg  81 mg Oral Nightly Wilbert Sahu MD   81 mg at 07/07/21 1954   • cefTRIAXone (ROCEPHIN) IVPB 2 g  2 g Intravenous Q24H Wilbert Sahu  mL/hr at 07/07/21 1715 2 g at 07/07/21 1715   • diphenoxylate-atropine (LOMOTIL) 2.5-0.025 MG per tablet 1 tablet  1 tablet Oral 4x Daily PRN Wilbert Sahu MD       • DULoxetine (CYMBALTA) DR capsule 60 mg  60 mg Oral BID Wilbert Sahu MD   60 mg at 07/08/21 0806   • ferrous sulfate tablet 325 mg  325 mg  Oral Daily With Breakfast Yovany Mccormick MD   325 mg at 07/08/21 0806   • lactobacillus acidophilus (RISAQUAD) capsule 1 capsule  1 capsule Oral Daily Wilbert Sahu MD   1 capsule at 07/05/21 2132   • levothyroxine (SYNTHROID, LEVOTHROID) tablet 75 mcg  75 mcg Oral Daily Wilbert Sahu MD   75 mcg at 07/08/21 0806   • losartan (COZAAR) tablet 25 mg  25 mg Oral Q24H Wilbert Sahu MD   25 mg at 07/08/21 0806   • Magnesium Sulfate 2 gram Bolus, followed by 8 gram infusion (total Mg dose 10 grams)- Mg less than or equal to 1mg/dL  2 g Intravenous PRN Wilbert Sahu MD        Or   • Magnesium Sulfate 2 gram / 50mL Infusion (GIVE X 3 BAGS TO EQUAL 6GM TOTAL DOSE) - Mg 1.1 - 1.5 mg/dl  2 g Intravenous PRN Wilbert Sahu MD        Or   • Magnesium Sulfate 4 gram infusion- Mg 1.6-1.9 mg/dL  4 g Intravenous PRN Wilbert Sahu MD       • metoprolol succinate XL (TOPROL-XL) 24 hr tablet 25 mg  25 mg Oral BID Wilbert Sahu MD   25 mg at 07/08/21 0806   • mycophenolate (CELLCEPT) tablet 1,000 mg  1,000 mg Oral BID Wilbert Sahu MD   1,000 mg at 07/08/21 0806   • nitroglycerin (NITROSTAT) SL tablet 0.4 mg  0.4 mg Sublingual Q5 Min PRN Antnoia Carpio APRN       • ondansetron (ZOFRAN) injection 4 mg  4 mg Intravenous Q6H PRN Antonia Carpio APRN       • pantoprazole (PROTONIX) EC tablet 40 mg  40 mg Oral BID AC Wilbert Sahu MD   40 mg at 07/08/21 0806   • potassium & sodium phosphates (PHOS-NAK) 280-160-250 MG packet - for Phosphorus less than 1.25 mg/dL  2 packet Oral Q6H PRN Wilbert Sahu MD        Or   • potassium & sodium phosphates (PHOS-NAK) 280-160-250 MG packet - for Phosphorus 1.25 - 2.5 mg/dL  2 packet Oral Q6H PRN Wlibert Sahu MD   2 packet at 07/07/21 1231   • potassium chloride (K-DUR,KLOR-CON) ER tablet 40 mEq  40 mEq Oral PRN Wilbert Sahu MD       • potassium chloride (KLOR-CON) packet 40 mEq  40 mEq Oral PRN Wilbert Sahu MD       • pravastatin (PRAVACHOL) tablet 10 mg  10 mg Oral Nightly Adelina  MD Wilbert   10 mg at 07/07/21 1954   • predniSONE (DELTASONE) tablet 10 mg  10 mg Oral Daily With Breakfast Wilbert Sahu MD   10 mg at 07/08/21 0806   • raloxifene (EVISTA) tablet 60 mg  60 mg Oral Nightly Wilbert Sahu MD   60 mg at 07/07/21 1954   • rivaroxaban (XARELTO) tablet 20 mg  20 mg Oral Daily With Dinner Wilbert Sahu MD   20 mg at 07/07/21 1715   • sodium chloride 0.9 % flush 10 mL  10 mL Intravenous PRN Kaia Balderrama, APRN       • sodium chloride 0.9 % flush 10 mL  10 mL Intravenous Q12H Antonia Carpio APRN   10 mL at 07/08/21 0806   • sodium chloride 0.9 % flush 10 mL  10 mL Intravenous PRN Antonia Carpio APRN   10 mL at 07/07/21 0611   • vitamin B-12 (CYANOCOBALAMIN) tablet 50 mcg  50 mcg Oral Daily Wilbert Sahu MD   50 mcg at 07/08/21 0805   • vitamin D (ERGOCALCIFEROL) capsule 50,000 Units  50,000 Units Oral Q7 Days Wilbert Sahu MD   50,000 Units at 07/05/21 2132       ASSESSMENT:  Acute hyponatremia, improved, euvolemic.  Stable today  Sepsis, improved with antibiotics    HX: long term anticoagulant use  UTI    Essential hypertension    CAD (coronary artery disease)    Atrial fibrillation (CMS/[facility])    Acute UTI    COPD (chronic obstructive pulmonary disease) (CMS/[facility])    Hyponatremia    Metabolic encephalopathy    Sleep apnea    Normocytic anemia with some iron deficiency    Dyspnea    Acute on chronic diastolic CHF (congestive heart failure) (CMS/[facility])  Hypophosphatemia        PLAN:   Sodium level continues to improve and is back within normal range today, 136  Euvolemic on exam, no need for diuretics  Can discontinue fluid restriction    Recheck electrolyte panel in the morning  IV antibiotics per medicine team  Await rehab bed.  Stable for discharge anytime from renal standpoint      Yovany Mccormick MD   [facility]   Office phone number: [phone]  Answering service phone number: [phone]    07/08/21  13:56 EDT    Dictation performed using Dragon  dictation software

## 2021-07-08 NOTE — PROGRESS NOTES
Continued Stay Note  [facility]     Patient Name: Olena Myers  MRN: [id number]  Today's Date: 7/8/2021    Admit Date: 7/5/2021    Discharge Plan     Row Name 07/08/21 1420       Plan    [facility]    Plan Comments  Spoke with pt this morning who is agreeable to [facility] for skilled care at [place].  Call placed to Gretchen with [employer] who stated pt is approved pending insurnace precert which has been started today. Pt's transportation to [facility] will be pending O2 need upon D/C.  Pt was not on O2 at home PTA and would need medical transport service if O2 is required for transport.         Discharge Codes    No documentation.             DANITA Giraldo

## 2021-07-09 PROBLEM — Z79.52 CURRENT CHRONIC USE OF SYSTEMIC STEROIDS: Chronic | Status: ACTIVE | Noted: 2021-07-09

## 2021-07-09 PROBLEM — B96.20 E COLI BACTEREMIA: Status: ACTIVE | Noted: 2021-07-09

## 2021-07-09 PROBLEM — I50.32 CHRONIC DIASTOLIC CHF (CONGESTIVE HEART FAILURE) (HCC): Chronic | Status: ACTIVE | Noted: 2021-07-09

## 2021-07-09 PROBLEM — R19.5 HEME POSITIVE STOOL: Status: ACTIVE | Noted: 2021-07-09

## 2021-07-09 PROBLEM — R78.81 E COLI BACTEREMIA: Status: ACTIVE | Noted: 2021-07-09

## 2021-07-09 PROBLEM — D50.9 IRON DEFICIENCY ANEMIA: Status: ACTIVE | Noted: 2021-07-09

## 2021-07-09 LAB
ALBUMIN SERPL-MCNC: 3.1 G/DL (ref 3.5–5.2)
ANION GAP SERPL CALCULATED.3IONS-SCNC: 7.9 MMOL/L (ref 5–15)
BACTERIA SPEC AEROBE CULT: ABNORMAL
BUN SERPL-MCNC: 16 MG/DL (ref 8–23)
BUN/CREAT SERPL: 21.1 (ref 7–25)
CALCIUM SPEC-SCNC: 8.9 MG/DL (ref 8.6–10.5)
CHLORIDE SERPL-SCNC: 103 MMOL/L (ref 98–107)
CO2 SERPL-SCNC: 26.1 MMOL/L (ref 22–29)
CREAT SERPL-MCNC: 0.76 MG/DL (ref 0.57–1)
GFR SERPL CREATININE-BSD FRML MDRD: 73 ML/MIN/1.73
GFR SERPL CREATININE-BSD FRML MDRD: 88 ML/MIN/1.73
GLUCOSE SERPL-MCNC: 97 MG/DL (ref 65–99)
GRAM STN SPEC: ABNORMAL
GRAM STN SPEC: ABNORMAL
ISOLATED FROM: ABNORMAL
PHOSPHATE SERPL-MCNC: 3.1 MG/DL (ref 2.5–4.5)
POTASSIUM SERPL-SCNC: 3.9 MMOL/L (ref 3.5–5.2)
SODIUM SERPL-SCNC: 137 MMOL/L (ref 136–145)

## 2021-07-09 PROCEDURE — 97110 THERAPEUTIC EXERCISES: CPT

## 2021-07-09 PROCEDURE — 94799 UNLISTED PULMONARY SVC/PX: CPT

## 2021-07-09 PROCEDURE — 99232 SBSQ HOSP IP/OBS MODERATE 35: CPT | Performed by: INTERNAL MEDICINE

## 2021-07-09 PROCEDURE — 80069 RENAL FUNCTION PANEL: CPT | Performed by: INTERNAL MEDICINE

## 2021-07-09 PROCEDURE — 99222 1ST HOSP IP/OBS MODERATE 55: CPT | Performed by: INTERNAL MEDICINE

## 2021-07-09 PROCEDURE — 87040 BLOOD CULTURE FOR BACTERIA: CPT | Performed by: HOSPITALIST

## 2021-07-09 PROCEDURE — 25010000003 CEFTRIAXONE PER 250 MG: Performed by: INTERNAL MEDICINE

## 2021-07-09 PROCEDURE — 63710000001 PREDNISONE PER 5 MG: Performed by: STUDENT IN AN ORGANIZED HEALTH CARE EDUCATION/TRAINING PROGRAM

## 2021-07-09 PROCEDURE — 63710000001 MYCOPHENOLATE MOFETIL PER 250 MG: Performed by: STUDENT IN AN ORGANIZED HEALTH CARE EDUCATION/TRAINING PROGRAM

## 2021-07-09 RX ADMIN — SODIUM CHLORIDE, PRESERVATIVE FREE 10 ML: 5 INJECTION INTRAVENOUS at 08:40

## 2021-07-09 RX ADMIN — ALBUTEROL SULFATE 2.5 MG: 2.5 SOLUTION RESPIRATORY (INHALATION) at 15:31

## 2021-07-09 RX ADMIN — ACETAMINOPHEN 650 MG: 325 TABLET, FILM COATED ORAL at 23:26

## 2021-07-09 RX ADMIN — CEFTRIAXONE SODIUM 2 G: 2 INJECTION, SOLUTION INTRAVENOUS at 17:16

## 2021-07-09 RX ADMIN — ACETAMINOPHEN 650 MG: 325 TABLET, FILM COATED ORAL at 02:48

## 2021-07-09 RX ADMIN — DULOXETINE HYDROCHLORIDE 60 MG: 60 CAPSULE, DELAYED RELEASE ORAL at 08:39

## 2021-07-09 RX ADMIN — DULOXETINE HYDROCHLORIDE 60 MG: 60 CAPSULE, DELAYED RELEASE ORAL at 20:48

## 2021-07-09 RX ADMIN — METOPROLOL SUCCINATE 25 MG: 25 TABLET, EXTENDED RELEASE ORAL at 08:39

## 2021-07-09 RX ADMIN — PRAVASTATIN SODIUM 10 MG: 10 TABLET ORAL at 20:48

## 2021-07-09 RX ADMIN — FERROUS SULFATE TAB 325 MG (65 MG ELEMENTAL FE) 325 MG: 325 (65 FE) TAB at 08:40

## 2021-07-09 RX ADMIN — ASPIRIN 81 MG: 81 TABLET, COATED ORAL at 20:48

## 2021-07-09 RX ADMIN — LEVOTHYROXINE SODIUM 75 MCG: 0.07 TABLET ORAL at 08:39

## 2021-07-09 RX ADMIN — ACETAMINOPHEN 650 MG: 325 TABLET, FILM COATED ORAL at 14:15

## 2021-07-09 RX ADMIN — ALBUTEROL SULFATE 2.5 MG: 2.5 SOLUTION RESPIRATORY (INHALATION) at 19:29

## 2021-07-09 RX ADMIN — RALOXIFENE 60 MG: 60 TABLET ORAL at 20:48

## 2021-07-09 RX ADMIN — MYCOPHENOLATE MOFETIL 1000 MG: 500 TABLET ORAL at 20:48

## 2021-07-09 RX ADMIN — ALBUTEROL SULFATE 2.5 MG: 2.5 SOLUTION RESPIRATORY (INHALATION) at 06:54

## 2021-07-09 RX ADMIN — PANTOPRAZOLE SODIUM 40 MG: 40 TABLET, DELAYED RELEASE ORAL at 08:40

## 2021-07-09 RX ADMIN — PANTOPRAZOLE SODIUM 40 MG: 40 TABLET, DELAYED RELEASE ORAL at 17:16

## 2021-07-09 RX ADMIN — SODIUM CHLORIDE, PRESERVATIVE FREE 10 ML: 5 INJECTION INTRAVENOUS at 20:48

## 2021-07-09 RX ADMIN — RIVAROXABAN 20 MG: 20 TABLET, FILM COATED ORAL at 17:21

## 2021-07-09 RX ADMIN — METOPROLOL SUCCINATE 25 MG: 25 TABLET, EXTENDED RELEASE ORAL at 20:48

## 2021-07-09 RX ADMIN — Medication 1 CAPSULE: at 08:39

## 2021-07-09 RX ADMIN — PREDNISONE 10 MG: 10 TABLET ORAL at 08:39

## 2021-07-09 RX ADMIN — MYCOPHENOLATE MOFETIL 1000 MG: 500 TABLET ORAL at 08:40

## 2021-07-09 RX ADMIN — Medication 50 MCG: at 08:39

## 2021-07-09 RX ADMIN — LOSARTAN POTASSIUM 25 MG: 25 TABLET, FILM COATED ORAL at 08:40

## 2021-07-09 NOTE — PLAN OF CARE
Problem: Adult Inpatient Plan of Care  Goal: Plan of Care Review  Outcome: Ongoing, Progressing  Flowsheets (Taken 7/9/2021 1655)  Progress: improving  Plan of Care Reviewed With: patient  Outcome Summary: VSS, room air, up with assist x1, encouraged patient to consider not using purewick and to use BSC, ID and GI consulted today, continue IV abx.  Goal: Patient-Specific Goal (Individualized)  Outcome: Ongoing, Progressing  Goal: Absence of Hospital-Acquired Illness or Injury  Outcome: Ongoing, Progressing  Intervention: Identify and Manage Fall Risk  Recent Flowsheet Documentation  Taken 7/9/2021 1600 by Kylie Simmons RN  Safety Promotion/Fall Prevention:   clutter free environment maintained   assistive device/personal items within reach   activity supervised   fall prevention program maintained   nonskid shoes/slippers when out of bed   room organization consistent   safety round/check completed  Taken 7/9/2021 1356 by Kylie Simmons RN  Safety Promotion/Fall Prevention:   clutter free environment maintained   assistive device/personal items within reach   activity supervised   fall prevention program maintained   nonskid shoes/slippers when out of bed   room organization consistent   safety round/check completed  Taken 7/9/2021 1213 by Kylie Simmons RN  Safety Promotion/Fall Prevention:   assistive device/personal items within reach   activity supervised   clutter free environment maintained  Taken 7/9/2021 1000 by Kylie Simmons RN  Safety Promotion/Fall Prevention:   clutter free environment maintained   assistive device/personal items within reach   activity supervised   nonskid shoes/slippers when out of bed   room organization consistent   safety round/check completed  Taken 7/9/2021 0800 by Kylie Simmons RN  Safety Promotion/Fall Prevention:   clutter free environment maintained   assistive device/personal items within reach   activity supervised   fall prevention program  maintained   nonskid shoes/slippers when out of bed   room organization consistent   safety round/check completed  Intervention: Prevent Skin Injury  Recent Flowsheet Documentation  Taken 7/9/2021 1415 by Kylie Simmons RN  Body Position: supine  Goal: Optimal Comfort and Wellbeing  Outcome: Ongoing, Progressing  Goal: Readiness for Transition of Care  Outcome: Ongoing, Progressing     Problem: Skin Injury Risk Increased  Goal: Skin Health and Integrity  Outcome: Ongoing, Progressing  Intervention: Optimize Skin Protection  Recent Flowsheet Documentation  Taken 7/9/2021 1415 by Kylie Simmons RN  Head of Bed (HOB): HOB at 30-45 degrees  Taken 7/9/2021 0800 by Kylie Simmons RN  Pressure Reduction Techniques: frequent weight shift encouraged     Problem: Fall Injury Risk  Goal: Absence of Fall and Fall-Related Injury  Outcome: Ongoing, Progressing  Intervention: Promote Injury-Free Environment  Recent Flowsheet Documentation  Taken 7/9/2021 1600 by Kylie Simmons RN  Safety Promotion/Fall Prevention:   clutter free environment maintained   assistive device/personal items within reach   activity supervised   fall prevention program maintained   nonskid shoes/slippers when out of bed   room organization consistent   safety round/check completed  Taken 7/9/2021 1356 by Kylie Simmons RN  Safety Promotion/Fall Prevention:   clutter free environment maintained   assistive device/personal items within reach   activity supervised   fall prevention program maintained   nonskid shoes/slippers when out of bed   room organization consistent   safety round/check completed  Taken 7/9/2021 1213 by Kylie Simmons RN  Safety Promotion/Fall Prevention:   assistive device/personal items within reach   activity supervised   clutter free environment maintained  Taken 7/9/2021 1000 by Kylie Simmons RN  Safety Promotion/Fall Prevention:   clutter free environment maintained   assistive device/personal  items within reach   activity supervised   nonskid shoes/slippers when out of bed   room organization consistent   safety round/check completed  Taken 7/9/2021 0800 by Kylie Simmons RN  Safety Promotion/Fall Prevention:   clutter free environment maintained   assistive device/personal items within reach   activity supervised   fall prevention program maintained   nonskid shoes/slippers when out of bed   room organization consistent   safety round/check completed   Goal Outcome Evaluation:  Plan of Care Reviewed With: patient        Progress: improving  Outcome Summary: VSS, room air, up with assist x1, encouraged patient to consider not using purewick and to use BSC, ID and GI consulted today, continue IV abx.

## 2021-07-09 NOTE — CONSULTS
[facility]  Initial Inpatient Consult Note    Referring Provider: Dr Marquez    Reason for Consultation: Anemia    Subjective     History of present illness:    85 y.o. female admitted with E. coli bacteremia secondary to UTI.  GI consultation has been requested for evaluation of anemia.  Patient appears to have long standing iron deficiency anemia, followed [facility].  Her baseline hemoglobin seems to be around 10-1/2 over the last year or so noted to have a hemoglobin of 8.7 on admission which has been stable.  She denies any evidence of overt GI bleeding.  Last colonoscopy 2017 with diverticulosis  Last EGD 2020 with mild esophagitis and hiatal hernia.  Patient on Xarelto for hx of Afib.  Also has hx of myasthenia gravis.      Past Medical History:  Past Medical History:   Diagnosis Date   • Anemia     IRON DEFICIENCY   • Arthritis    • Asthma    • Atrial fibrillation (CMS/[facility])    • CAD (coronary artery disease)     HEART STENT   • COPD (chronic obstructive pulmonary disease) (CMS/[facility])    • 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   • History of gastric ulcer    • History of GI bleed    • [place] (hard of hearing)    • Hyperlipidemia    • Hypertension    • Lightheadedness    • LVH (left ventricular hypertrophy)    • MG (myasthenia gravis) (CMS/HCC)    • Mini stroke (CMS/HCC) 10/2016, 3/2017   • OA (osteoarthritis)    • Osteoporosis    • Sleep apnea     USES CPAP   • SOB (shortness of breath)     WALKING     Past Surgical History:  Past Surgical History:   Procedure Laterality Date   • APPENDECTOMY     • CARPAL TUNNEL RELEASE Bilateral    • CATARACT EXTRACTION Bilateral    • CORONARY STENT PLACEMENT     • TOTAL HIP ARTHROPLASTY Bilateral    • TOTAL SHOULDER ARTHROPLASTY W/ DISTAL CLAVICLE EXCISION Left 7/19/2016    Procedure: LT TOTAL SHOULDER REVERSE ARTHROPLASTY;  Surgeon: NAHOMI Solorzano MD;  [facility]: [facility] [place];  Service:    • TOTAL SHOULDER ARTHROPLASTY W/ DISTAL  CLAVICLE EXCISION Right 1/8/2019    Procedure: RIGHT TOTAL SHOULDER REVERSE ARTHROPLASTY;  Surgeon: Jovanny Solorzano MD;  [place] [facility] [place] [facility] [place];  Service: Orthopedics      Social History:   Social History     Tobacco Use   • Smoking status: Never Smoker   • Smokeless tobacco: Never Used   Substance Use Topics   • Alcohol use: No     Comment: Daily caffeine use      Family History:  Family History   Problem Relation Age of Onset   • Heart disease Mother    • Hyperlipidemia Mother    • Stroke Mother    • Diabetes Mother    • Breast cancer Mother    • Heart disease Father    • Hyperlipidemia Father    • Stroke Father    • [place] Hyperthermia Neg Hx        Home Meds:  Medications Prior to Admission   Medication Sig Dispense Refill Last Dose   • acidophilus (FLORANEX) tablet tablet Take 1 tablet by mouth Daily.   7/4/2021 at Unknown time   • aspirin 81 MG EC tablet Take 81 mg by mouth Every Night.   7/4/2021 at Unknown time   • diphenoxylate-atropine (LOMOTIL) 2.5-0.025 MG per tablet Take 1 tablet by mouth 4 (Four) Times a Day As Needed for Diarrhea.   Past Month at Unknown time   • DULoxetine (CYMBALTA) 60 MG capsule Take 60 mg by mouth 2 (Two) Times a Day.   7/4/2021 at Unknown time   • irbesartan (AVAPRO) 75 MG tablet Take 75 mg by mouth Every Night.   7/4/2021 at Unknown time   • levothyroxine (SYNTHROID, LEVOTHROID) 100 MCG tablet Take 75 mcg by mouth Daily.   7/4/2021 at Unknown time   • metoprolol succinate XL (TOPROL-XL) 25 MG 24 hr tablet Take 1 tablet by mouth 2 (Two) Times a Day. 180 tablet 3 7/4/2021 at Unknown time   • montelukast (SINGULAIR) 10 MG tablet Take 10 mg by mouth Every Night.   7/4/2021 at Unknown time   • mycophenolate (CELLCEPT) 500 MG tablet Take 1,000 mg by mouth 2 (Two) Times a Day. 2 tabs bid    7/4/2021 at Unknown time   • pantoprazole (PROTONIX) 20 MG EC tablet Take 20 mg by mouth Daily.   7/4/2021 at Unknown time   • pravastatin (PRAVACHOL) 20 MG tablet Take 10 mg by mouth Daily.    7/4/2021 at Unknown time   • predniSONE (DELTASONE) 10 MG tablet Take 10 mg by mouth Daily.      • raloxifene (EVISTA) 60 MG tablet Take 60 mg by mouth Every Night.   7/4/2021 at Unknown time   • rivaroxaban (XARELTO) 20 MG tablet Take 1 tablet by mouth Daily With Dinner. 90 tablet 3 Past Week at Unknown time   • vitamin B-12 (CYANOCOBALAMIN) 100 MCG tablet Take 50 mcg by mouth Daily.   7/4/2021 at Unknown time   • vitamin D (ERGOCALCIFEROL) 42280 UNITS capsule capsule Take 50,000 Units by mouth Every 7 (Seven) Days. Takes on Wednesdays 7/4/2021 at Unknown time   • albuterol (PROAIR HFA) 108 (90 BASE) MCG/ACT inhaler Inhale 2 puffs Every 6 (Six) Hours As Needed for wheezing or shortness of air.      • azelastine (ASTELIN) 0.1 % nasal spray 2 sprays into the nostril(s) as directed by provider 2 (Two) Times a Day. Use in each nostril as directed      • Bepotastine Besilate (Bepreve) 1.5 % solution Take As Directed.      • colesevelam (WELCHOL) 625 MG tablet Take 1,875 mg by mouth 2 (two) times a day.      • Fluticasone Furoate-Vilanterol (BREO ELLIPTA) 100-25 MCG/INH aerosol powder  Inhale 1 puff Every Morning.      • immune globulin, human, (PRIVIGEN) 20 GM/200ML solution infusion Infuse  into a venous catheter 1 (One) Time. Every 5 weeks      • mometasone (NASONEX) 50 MCG/ACT nasal spray 2 sprays into each nostril Daily.      • nitroglycerin (NITROSTAT) 0.4 MG SL tablet Place 1 tablet under the tongue Every 5 (Five) Minutes As Needed for Chest Pain. 20 tablet 1      Current Meds:   albuterol, 2.5 mg, Nebulization, TID - RT  aspirin, 81 mg, Oral, Nightly  cefTRIAXone, 2 g, Intravenous, Q24H  DULoxetine, 60 mg, Oral, BID  ferrous sulfate, 325 mg, Oral, Daily With Breakfast  lactobacillus acidophilus, 1 capsule, Oral, Daily  levothyroxine, 75 mcg, Oral, Daily  losartan, 25 mg, Oral, Q24H  metoprolol succinate XL, 25 mg, Oral, BID  mycophenolate, 1,000 mg, Oral, BID  pantoprazole, 40 mg, Oral, BID AC  pravastatin, 10  mg, Oral, Nightly  predniSONE, 10 mg, Oral, Daily With Breakfast  raloxifene, 60 mg, Oral, Nightly  rivaroxaban, 20 mg, Oral, Daily With Dinner  sodium chloride, 10 mL, Intravenous, Q12H  vitamin B-12, 50 mcg, Oral, Daily  vitamin D, 50,000 Units, Oral, Q7 Days      Allergies:  Allergies   Allergen Reactions   • Neomycin Anaphylaxis   • Penicillins Swelling and Rash     Patient reports tolerating cephalexin on multiple occasions, most recently in November 2020. Tolerated ceftriaxone July 2021.  Patient reports that initial reaction to penicillin was 30-40 years ago, but she was unsure which penicillin drug it was.    • Hydrocodone Itching and Rash   • Rosuvastatin Other (See Comments)     Muscle cramps     Review of Systems  All systems were reviewed and negative except for:  Constitution:  positive for fatigue     Objective     Vital Signs  Temp:  [97.5 °F (36.4 °C)-97.8 °F (36.6 °C)] 97.5 °F (36.4 °C)  Heart Rate:  [78-86] 82  Resp:  [16] 16  BP: (125-164)/(55-75) 138/59  Physical Exam:  General Appearance:     Alert, cooperative, in no acute distress   Head:    Normocephalic, without obvious abnormality, atraumatic   Eyes:            Lids and lashes normal, conjunctivae and sclerae normal, no icterus   Throat:   No oral lesions, no thrush, oral mucosa moist   Neck:   No adenopathy, supple, trachea midline, no thyromegaly, no carotid bruit, no JVD   Lungs:     Clear to auscultation,respirations regular, even and            unlabored    Heart:    Regular rhythm and normal rate, normal S1 and S2, no        murmur, no gallop, no rub, no click   Chest Wall:    No abnormalities observed   Abdomen:     Normal bowel sounds, no masses, no organomegaly, soft     nontender, nondistended, no guarding, no rebound                 tenderness   Rectal:     Deferred   Extremities:   no edema, no cyanosis, no redness   Skin:   No bleeding, bruising or rash   Lymph nodes:   No palpable adenopathy   Psychiatric:  Judgement and  insight: normal   Orientation to person place and time: normal   Mood and affect: normal   Results Review:   I reviewed the patient's new clinical results.    Results from last 7 days   Lab Units 07/07/21  0607 07/06/21  0800 07/05/21 2041 07/05/21  0929   WBC 10*3/mm3 9.11 11.95*  --  14.64*   HEMOGLOBIN g/dL 8.7* 8.5* 8.5* 8.7*   HEMATOCRIT % 27.8* 26.4* 26.7* 26.4*   PLATELETS 10*3/mm3 202 185  --  215     Results from last 7 days   Lab Units 07/09/21  0433 07/08/21  0458 07/07/21  0607 07/05/21 2041 07/05/21  0929   SODIUM mmol/L 137 136 133*  --  123*   POTASSIUM mmol/L 3.9 3.9 3.5   < > 3.5   CHLORIDE mmol/L 103 101 98   < > 91*   CO2 mmol/L 26.1 25.2 24.2   < > 19.7*   BUN mg/dL 16 11 10   < > 11   CREATININE mg/dL 0.76 0.72 0.78   < > 0.89   CALCIUM mg/dL 8.9 8.6 8.4*   < > 8.8   BILIRUBIN mg/dL  --   --   --   --  0.6   ALK PHOS U/L  --   --   --   --  64   ALT (SGPT) U/L  --   --   --   --  9   AST (SGOT) U/L  --   --   --   --  14   GLUCOSE mg/dL 97 103* 90   < > 131*    < > = values in this interval not displayed.         No results found for: LIPASE    Radiology:  CT Angiogram Chest With & Without Contrast   Final Result         Electronically signed by Yovany Espinosa MD on 07-06-21 at 0206      XR Chest 1 View   Final Result          Assessment/Plan   Assessment:   1.  Anemia, chronic  2.  Afib on Xarelto  3.  E coli bacteremia    Plan:   This patient has longstanding iron deficiency anemia previously followed by the [place] [facility] at has not been seen by them in some time.  She does have an element of iron deficiency by labs.  She has no evidence of overt bleeding.  I would consider an infusion of IV iron.  Would recommend she complete therapy for her E. coli bacteremia and she could probably follow-up with Dr. Neri as an outpatient to determine if she needs to have updated endoscopic evaluation although again her anemia seems to be quite chronic in nature.  In the absence of overt  bleeding I do not see an indication to discontinue her Xarelto at this time.    I discussed the patients findings and my recommendations with patient.         Charlie Barton M.D.  [facility]  [address] [place] [address]  Office: [phone]

## 2021-07-09 NOTE — PROGRESS NOTES
"[facility]    Patient Name: Olena Myers  :1935  85 y.o.  LOS: 4  Encounter Provider: Carlin Zhang Jr, MD      Patient Care Team:  Manda Keenan MD as PCP - General (General Practice)  Olvin Hernandez MD as Consulting Physician (Cardiology)    Chief Complaint: Follow-up acute on chronic anemia, Hemoccult positive, aortic regurgitation, paroxysmal atrial fibrillation    Interval History: No acute issues overnight.  Echocardiogram reviewed yesterday.       Objective   Vital Signs  Temp:  [97.6 °F (36.4 °C)-98.2 °F (36.8 °C)] 97.8 °F (36.6 °C)  Heart Rate:  [78-86] 80  Resp:  [16] 16  BP: (124-164)/(55-75) 164/75    Intake/Output Summary (Last 24 hours) at 2021 1234  Last data filed at 2021 0734  Gross per 24 hour   Intake 1120 ml   Output 800 ml   Net 320 ml     Flowsheet Rows      First Filed Value   Admission Height  152.4 cm (60\") Documented at 2021 1628   Admission Weight  68 kg (149 lb 14.6 oz) Documented at 2021 1507            Physical Exam  Vitals reviewed.   Constitutional:       Appearance: Healthy appearance. Not in distress.   Neck:      Vascular: No JVR. JVD normal.   Pulmonary:      Effort: Pulmonary effort is normal.      Breath sounds: No wheezing. No rhonchi. No rales.      Comments: Minimal bibasilar crackles  Chest:      Chest wall: Not tender to palpatation.   Cardiovascular:      PMI at left midclavicular line. Normal rate. Regular rhythm. Normal S1. Normal S2.      Murmurs: There is a systolic murmur.      No gallop. No click. No rub.   Pulses:     Intact distal pulses.   Edema:     Peripheral edema absent.   Abdominal:      General: Bowel sounds are normal.      Palpations: Abdomen is soft.      Tenderness: There is no abdominal tenderness.   Musculoskeletal: Normal range of motion.         General: No tenderness. Skin:     General: Skin is warm and dry.   Neurological:      General: No focal deficit present.      Mental Status: Alert and " oriented to person, place and time.     Physical exam was reviewed, updated and amended when necessary.    Pertinent Test Results:  Results from last 7 days   Lab Units 07/09/21  0433 07/08/21  0458 07/07/21  0607 07/06/21  2349 07/06/21  1607 07/06/21  0800 07/06/21  0005 07/05/21  0929   SODIUM mmol/L 137 136 133* 129* 127* 130* 123* 123*   POTASSIUM mmol/L 3.9 3.9 3.5  --   --  3.7  --  3.5   CHLORIDE mmol/L 103 101 98  --   --  98  --  91*   CO2 mmol/L 26.1 25.2 24.2  --   --  22.0  --  19.7*   BUN mg/dL 16 11 10  --   --  8  --  11   CREATININE mg/dL 0.76 0.72 0.78  --   --  0.85  --  0.89   GLUCOSE mg/dL 97 103* 90  --   --  109*  --  131*   CALCIUM mg/dL 8.9 8.6 8.4*  --   --  8.5*  --  8.8   AST (SGOT) U/L  --   --   --   --   --   --   --  14   ALT (SGPT) U/L  --   --   --   --   --   --   --  9     Results from last 7 days   Lab Units 07/05/21  0929   TROPONIN T ng/mL <0.010     Results from last 7 days   Lab Units 07/07/21  0607 07/06/21  0800 07/05/21  2041 07/05/21  0929   WBC 10*3/mm3 9.11 11.95*  --  14.64*   HEMOGLOBIN g/dL 8.7* 8.5* 8.5* 8.7*   HEMATOCRIT % 27.8* 26.4* 26.7* 26.4*   PLATELETS 10*3/mm3 202 185  --  215                   Invalid input(s): LDLCALC  Results from last 7 days   Lab Units 07/05/21  0929   PROBNP pg/mL 2,029.0*     Results from last 7 days   Lab Units 07/05/21  0929   TSH uIU/mL 0.679           Medication Review:   albuterol, 2.5 mg, Nebulization, TID - RT  aspirin, 81 mg, Oral, Nightly  cefTRIAXone, 2 g, Intravenous, Q24H  DULoxetine, 60 mg, Oral, BID  ferrous sulfate, 325 mg, Oral, Daily With Breakfast  lactobacillus acidophilus, 1 capsule, Oral, Daily  levothyroxine, 75 mcg, Oral, Daily  losartan, 25 mg, Oral, Q24H  metoprolol succinate XL, 25 mg, Oral, BID  mycophenolate, 1,000 mg, Oral, BID  pantoprazole, 40 mg, Oral, BID AC  pravastatin, 10 mg, Oral, Nightly  predniSONE, 10 mg, Oral, Daily With Breakfast  raloxifene, 60 mg, Oral, Nightly  rivaroxaban, 20 mg, Oral,  Daily With Dinner  sodium chloride, 10 mL, Intravenous, Q12H  vitamin B-12, 50 mcg, Oral, Daily  vitamin D, 50,000 Units, Oral, Q7 Days              Assessment/Plan   1. Coronary artery disease and previous bare-metal stent and angioplasty of the LAD in 1997.  Last stress test June 2016 was negative for ischemia  2.  Hypertension fairly well controlled.  Sodium restricted diet.  3.  History of mild to moderate aortic regurgitation-last echo was October 2018  repeat echo this admission with normal left-ventricular ejection fraction grade 2 diastolic dysfunction.  Severely calcified valve with no significant stenosis but mild to moderate aortic regurgitation similar from previous.  5.  Paroxysmal atrial fibrillation.  She has a [id number] score of 8 high risk for embolic event .  On xarelto. Monitor closely.  6. history of COPD/ fibrosis-  She had recent PNA. Follows with pulmonary outpatient. Off amio for lung disease  7.  Carotid artery stenosis she is to have follow-up duplex as outpatient  8.  CHF- probnp elevated.  Fairly euvolemic.  Hyponatremia resolved.  Holding diuretics.  9.  Myasthenia gravis follows with neurology  10. Aortic plaque  11. Sepsis-E. coli bacteremia.  Antibiotics per admitting   12. Anemia - acute on chronic.  Hemoccult positive.  History of ulcerative esophagitis and peptic ulcer disease.  Monitor closely on anticoagulation.  She is on erythropoietin as outpatient and follows with [facility].  She has received IV Venofer in the past currently on oral iron replacement.    Carlin Zhang Jr, MD  [facility]  07/09/21  12:34 EDT

## 2021-07-09 NOTE — THERAPY TREATMENT NOTE
Patient Name: Olena Myers  : 1935    MRN: [id number]                              Today's Date: 2021       Admit Date: 2021    Visit Dx:     ICD-10-CM ICD-9-CM   1. Acute UTI  N39.0 599.0   2. Anemia, unspecified type  D64.9 285.9   3. Hyponatremia  E87.1 276.1   4. Gastrointestinal hemorrhage, unspecified gastrointestinal hemorrhage type  K92.2 578.9     Patient Active Problem List   Diagnosis   • Pre-operative cardiovascular examination   • S/p reverse total shoulder arthroplasty   • Myasthenia gravis (CMS/HCC)   • Paroxysmal atrial fibrillation (CMS/[facility])   • HX: long term anticoagulant use   • Essential hypertension   • CAD (coronary artery disease)   • Atrial fibrillation (CMS/[facility])   • S/P reverse total shoulder arthroplasty, right   • Acute UTI   • COPD (chronic obstructive pulmonary disease) (CMS/[facility])   • Hyponatremia   • Metabolic encephalopathy   • Sleep apnea   • Normocytic anemia   • Sepsis (CMS/[facility])   • Dyspnea   • Acute on chronic diastolic CHF (congestive heart failure) (CMS/[facility])     Past Medical History:   Diagnosis Date   • Anemia     IRON DEFICIENCY   • Arthritis    • Asthma    • Atrial fibrillation (CMS/[facility])    • CAD (coronary artery disease)     HEART STENT   • COPD (chronic obstructive pulmonary disease) (CMS/[facility])    • 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   • History of gastric ulcer    • History of GI bleed    • [place] (hard of hearing)    • Hyperlipidemia    • Hypertension    • Lightheadedness    • LVH (left ventricular hypertrophy)    • MG (myasthenia gravis) (CMS/[facility])    • Mini stroke (CMS/[facility]) 10/2016, 3/2017   • OA (osteoarthritis)    • Osteoporosis    • Sleep apnea     USES CPAP   • SOB (shortness of breath)     WALKING     Past Surgical History:   Procedure Laterality Date   • APPENDECTOMY     • CARPAL TUNNEL RELEASE Bilateral    • CATARACT EXTRACTION Bilateral    • CORONARY STENT PLACEMENT     • TOTAL HIP ARTHROPLASTY Bilateral    • TOTAL SHOULDER ARTHROPLASTY W/ DISTAL  CLAVICLE EXCISION Left 7/19/2016    Procedure: LT TOTAL SHOULDER REVERSE ARTHROPLASTY;  Surgeon: NAHOMI Solorzano MD;  Location: [facility] OR [facility];  Service:    • TOTAL SHOULDER ARTHROPLASTY W/ DISTAL CLAVICLE EXCISION Right 1/8/2019    Procedure: RIGHT TOTAL SHOULDER REVERSE ARTHROPLASTY;  Surgeon: Jovanny Solorzano MD;  Location: [facility] OR [facility];  Service: Orthopedics     General Information     Row Name 07/09/21 1001          Physical Therapy Time and Intention    Document Type  therapy note (daily note)  -     Mode of Treatment  physical therapy  -     Row Name 07/09/21 1001          General Information    Existing Precautions/Restrictions  fall  -     Row Name 07/09/21 1001          Cognition    Orientation Status (Cognition)  oriented x 4  -       User Key  (r) = Recorded By, (t) = Taken By, (c) = Cosigned By    Initials Name Provider Type     Katrin Carpio PTA Physical Therapy Assistant        Mobility     Row Name 07/09/21 1002          Bed Mobility    Comment (Bed Mobility)  up in chair  -     Row Name 07/09/21 1002          Sit-Stand Transfer    Sit-Stand [place] (Transfers)  contact guard  -     Assistive Device (Sit-Stand Transfers)  walker, front-wheeled  -     Row Name 07/09/21 1002          Gait/Stairs (Locomotion)    [place] Level (Gait)  contact guard  -     Assistive Device (Gait)  walker, front-wheeled  -     Distance in Feet (Gait)  80ft x2  -     Deviations/Abnormal Patterns (Gait)  michael decreased;stride length decreased  -     Bilateral Gait Deviations  forward flexed posture  -     Comment (Gait/Stairs)  limited d/t SOA  -       User Key  (r) = Recorded By, (t) = Taken By, (c) = Cosigned By    Initials Name Provider Type     Katrin Carpio PTA Physical Therapy Assistant        Obj/Interventions     Row Name 07/09/21 1003          Motor Skills    Therapeutic Exercise  -- seated AP, LAQ, marches, hip abd/add x10 reps  -       User Key  (r) =  Recorded By, (t) = Taken By, (c) = Cosigned By    Initials Name Provider Type    Katrin Sanchez PTA Physical Therapy Assistant        Goals/Plan    No documentation.       Clinical Impression     [facility] Name 07/09/21 1003          Pain    Additional Documentation  Pain Scale: Numbers Pre/Post-Treatment (Group)  -SM     Row Name 07/09/21 1003          Pain Scale: Numbers Pre/Post-Treatment    Pretreatment Pain Rating  0/10 - no pain  -     Posttreatment Pain Rating  0/10 - no pain  -SM     Row Name 07/09/21 1003          Positioning and Restraints    Pre-Treatment Position  sitting in chair/recliner  -     Post Treatment Position  chair  -SM     In Chair  reclined;call light within reach;encouraged to call for assist;exit alarm on  -       User Key  (r) = Recorded By, (t) = Taken By, (c) = Cosigned By    Initials Name Provider Type    Katrin Sanchez PTA Physical Therapy Assistant        Outcome Measures     [facility] Name 07/09/21 1004          How much help from another person do you currently need...    Turning from your back to your side while in flat bed without using bedrails?  3  -SM     Moving from lying on back to sitting on the side of a flat bed without bedrails?  3  -SM     Moving to and from a bed to a chair (including a wheelchair)?  3  -SM     Standing up from a chair using your arms (e.g., wheelchair, bedside chair)?  3  -SM     Climbing 3-5 steps with a railing?  2  -SM     To walk in hospital room?  3  -SM     AM-PAC 6 Clicks Score (PT)  17  -SM     Row Name 07/09/21 1004          Functional Assessment    Outcome Measure Options  AM-PAC 6 Clicks Basic Mobility (PT)  -       User Key  (r) = Recorded By, (t) = Taken By, (c) = Cosigned By    Initials Name Provider Type    Katrin Sanchez PTA Physical Therapy Assistant        Physical Therapy Education                 Title: PT OT SLP Therapies (Done)     Topic: Physical Therapy (Done)     Point: Mobility training (Done)      Learning Progress Summary           Patient Acceptance, E,TB,D, VU,NR by  at 7/9/2021 1004    Acceptance, E, VU by CF at 7/8/2021 1511    Acceptance, E, VU by  at 7/7/2021 1208                   Point: Home exercise program (Done)     Learning Progress Summary           Patient Acceptance, E,TB,D, VU,NR by  at 7/9/2021 1004                   Point: Body mechanics (Done)     Learning Progress Summary           Patient Acceptance, E,TB,D, VU,NR by  at 7/9/2021 1004    Acceptance, E, VU by CF at 7/8/2021 1511    Acceptance, E, VU by DJ at 7/7/2021 1208                   Point: Precautions (Done)     Learning Progress Summary           Patient Acceptance, E,TB,D, VU,NR by  at 7/9/2021 1004    Acceptance, E, VU by  at 7/7/2021 1208                               User Key     Initials Effective Dates Name Provider Type Discipline     03/07/18 -  Katrin Carpio PTA Physical Therapy Assistant PT     10/25/19 -  Carol Montejo, PT Physical Therapist PT     06/16/21 -  Jessica Tian, PT Physical Therapist PT              PT Recommendation and Plan     Plan of Care Reviewed With: patient  Progress: improving  Outcome Summary: Pt tolerated treatment well this date. Ambulated 80ft x2 w/ Rw and CGA. Limited d/t SOA and fatigue. Encouraged pt to ambulate w/ nsg during the day.     Time Calculation:   PT Charges     Row Name 07/09/21 1006             Time Calculation    Start Time  0920  -      Stop Time  0943  -      Time Calculation (min)  23 min  -      PT Received On  07/09/21  -      PT - Next Appointment  07/10/21  -        User Key  (r) = Recorded By, (t) = Taken By, (c) = Cosigned By    Initials Name Provider Type     Katrin Carpio PTA Physical Therapy Assistant        Therapy Charges for Today     Code Description Service Date Service Provider Modifiers Qty    [id number] HC PT THER PROC EA 15 MIN 7/9/2021 Katrin Carpio PTA GP 2          PT G-Codes  Outcome Measure  Options: AM-PAC 6 Clicks Basic Mobility (PT)  AM-PAC 6 Clicks Score (PT): 17    Katrin Carpio, PTA  7/9/2021

## 2021-07-09 NOTE — PROGRESS NOTES
Continued Stay Note  [facility]     Patient Name: Olena Myers  MRN: [id number]  Today's Date: 7/9/2021    Admit Date: 7/5/2021    Discharge Plan     Row Name 07/09/21 1320       Plan    Plan  [place] skilled vs Home if feels strong enough    Plan Comments  Followe dup with pt this A.M. Pt stated that she is still interested in going to [facility] at [place] however if she feels strong enough at DC she would prefer to go directly home.  CCP will follow pt's progress to asssit with DCP.        Discharge Codes    No documentation.             DANITA Giraldo

## 2021-07-09 NOTE — PLAN OF CARE
Goal Outcome Evaluation:  Plan of Care Reviewed With: patient        Progress: improving  Outcome Summary: no acute changes overnight. plan for d/c to SNF. VSS. continue to monitor

## 2021-07-09 NOTE — PROGRESS NOTES
Name: Olena Myers ADMIT: 2021   : 1935  PCP: Manda Keenan MD    MRN: [id number] LOS: 4 days   AGE/SEX: 85 y.o. female  ROOM: [facility]/     Subjective   Subjective   Pt feeling okay today. Tolerating diet. Voiding well. No SOA or CP. She has chronically loose stool that is at baseline.     Review of Systems   Constitutional: Positive for fatigue. Negative for appetite change, chills, diaphoresis and fever.   HENT: Negative for congestion, ear pain, nosebleeds, rhinorrhea, sore throat, trouble swallowing and voice change.    Eyes: Negative for pain and visual disturbance.   Respiratory: Negative for cough, choking, chest tightness, shortness of breath and stridor.    Cardiovascular: Negative for chest pain, palpitations and leg swelling.   Gastrointestinal: Positive for diarrhea. Negative for abdominal pain, blood in stool, nausea and vomiting.   Endocrine: Negative for cold intolerance and heat intolerance.   Genitourinary: Negative for decreased urine volume, difficulty urinating, dysuria and hematuria.   Musculoskeletal: Negative for back pain and neck pain.   Skin: Negative for color change, pallor, rash and wound.   Allergic/Immunologic: Negative for environmental allergies and food allergies.   Neurological: Negative for tremors, seizures, syncope, facial asymmetry, speech difficulty and headaches.   Hematological: Negative for adenopathy. Does not bruise/bleed easily.   Psychiatric/Behavioral: Negative for behavioral problems, confusion and hallucinations.        Objective   Objective   Vital Signs  Temp:  [97.6 °F (36.4 °C)-98.2 °F (36.8 °C)] 97.8 °F (36.6 °C)  Heart Rate:  [78-86] 80  Resp:  [16] 16  BP: (124-164)/(55-75) 164/75  SpO2:  [90 %-100 %] 93 %  on  Flow (L/min):  [1] 1;   Device (Oxygen Therapy): nasal cannula  Body mass index is 30.18 kg/m².  Physical Exam  Vitals and nursing note reviewed.   Constitutional:       General: She is not in acute distress.     Appearance: She is  obese. She is ill-appearing (chronically). She is not toxic-appearing or diaphoretic.   HENT:      Head: Normocephalic and atraumatic.      Right Ear: External ear normal.      Left Ear: External ear normal.      Nose: Nose normal.      Mouth/Throat:      Mouth: Mucous membranes are moist.      Pharynx: Oropharynx is clear.   Eyes:      General: No scleral icterus.        Right eye: No discharge.         Left eye: No discharge.      Conjunctiva/sclera: Conjunctivae normal.   Cardiovascular:      Rate and Rhythm: Normal rate and regular rhythm.      Pulses: Normal pulses.   Pulmonary:      Effort: Pulmonary effort is normal. No respiratory distress.      Breath sounds: Normal breath sounds.   Abdominal:      General: Bowel sounds are normal. There is no distension.      Palpations: Abdomen is soft.      Tenderness: There is no abdominal tenderness.   Musculoskeletal:         General: No swelling or deformity. Normal range of motion.      Cervical back: Neck supple. No rigidity.   Lymphadenopathy:      Cervical: No cervical adenopathy.   Skin:     General: Skin is warm and dry.      Capillary Refill: Capillary refill takes less than 2 seconds.      Coloration: Skin is not jaundiced.      Findings: No rash.   Neurological:      General: No focal deficit present.      Mental Status: She is alert and oriented to person, place, and time. Mental status is at baseline.      Cranial Nerves: No cranial nerve deficit.      Coordination: Coordination normal.   Psychiatric:         Mood and Affect: Mood normal.         Behavior: Behavior normal.         Thought Content: Thought content normal.         Results Review     I reviewed the patient's new clinical results.  Results from last 7 days   Lab Units 07/07/21  0607 07/06/21  0800 07/05/21  2041 07/05/21  0929   WBC 10*3/mm3 9.11 11.95*  --  14.64*   HEMOGLOBIN g/dL 8.7* 8.5* 8.5* 8.7*   PLATELETS 10*3/mm3 202 185  --  215     Results from last 7 days   Lab Units  07/09/21  0433 07/08/21  0458 07/07/21  0607 07/06/21  2349 07/06/21  0800   SODIUM mmol/L 137 136 133* 129* 130*   POTASSIUM mmol/L 3.9 3.9 3.5  --  3.7   CHLORIDE mmol/L 103 101 98  --  98   CO2 mmol/L 26.1 25.2 24.2  --  22.0   BUN mg/dL 16 11 10  --  8   CREATININE mg/dL 0.76 0.72 0.78  --  0.85   GLUCOSE mg/dL 97 103* 90  --  109*   Estimated Creatinine Clearance: 46.5 mL/min (by C-G formula based on SCr of 0.76 mg/dL).  Results from last 7 days   Lab Units 07/09/21 0433 07/08/21 0458 07/05/21  0929   ALBUMIN g/dL 3.10* 3.00* 3.60   BILIRUBIN mg/dL  --   --  0.6   ALK PHOS U/L  --   --  64   AST (SGOT) U/L  --   --  14   ALT (SGPT) U/L  --   --  9     Results from last 7 days   Lab Units 07/09/21 0433 07/08/21  0458 07/07/21  0607 07/06/21  0800 07/05/21 2041 07/05/21  0929   CALCIUM mg/dL 8.9 8.6 8.4* 8.5*  --  8.8   ALBUMIN g/dL 3.10* 3.00*  --   --   --  3.60   PHOSPHORUS mg/dL 3.1 3.3  --   --  2.1*  --      Results from last 7 days   Lab Units 07/05/21  0929   PROCALCITONIN ng/mL 0.90*   LACTATE mmol/L 0.8     COVID19   Date Value Ref Range Status   07/05/2021 Not Detected Not Detected - Ref. Range Final   01/05/2021 Not Detected Not Detected - Ref. Range Final     No results found for: HGBA1C, POCGLU    Adult Transthoracic Echo Complete W/ Cont if Necessary Per Protocol  · Calculated left ventricular EF = 69.5% Estimated left ventricular EF =   70% Estimated left ventricular EF was in agreement with the calculated   left ventricular EF. Left ventricular systolic function is normal. The   left ventricular cavity is small in size. Left ventricular wall thickness   is consistent with moderate concentric hypertrophy. All left ventricular   wall segments contract normally. Left ventricular diastolic function is   consistent with (grade II w/high LAP) pseudonormalization.  · Left atrial volume is severely increased. The right atrial cavity is   moderate to severely dilated.  · There is severe calcification  of the aortic valve. The aortic valve   appears trileaflet. Mild to moderate aortic valve regurgitation is   present. No aortic valve stenosis is present.  · No mitral valve regurgitation or significant stenosis is present. Severe   mitral annular calcification is present. There is mild, bileaflet mitral   valve thickening present.  · Mild tricuspid valve regurgitation is present. Calculated right   ventricular systolic pressure from tricuspid regurgitation is 35 mmHg.   There is no significant pulmonary hypertension     CT Angiogram Chest With & Without Contrast  Narrative: Patient: ANN ANN  Time Out: 02:06  Exam(s): CTA CHEST W WO Contrast     EXAM:    CT Angiography Chest Without and With Intravenous Contrast    CLINICAL HISTORY:     Reason for exam: PE suspected, low intermediate prob, neg D-dimer.    TECHNIQUE:    Axial computed tomographic angiography images of the chest without and   with intravenous contrast.  CTDI is 14.80 mGy and DLP is 533.80 mGy-cm.    This CT exam was performed according to the principle of ALARA (As Low As   Reasonably Achievable) by using one or more of the following dose   reduction techniques: automated exposure control, adjustment of the mA   and or kV according to patient size, and or use of iterative   reconstruction technique.    3D and MIP reconstructed images were created and reviewed.    COMPARISON:    February 7, 2017    FINDINGS:    Pulmonary arteries:  The pulmonary arterial tree is well-opacified with   contrast.  No pulmonary emboli are identified.    Aorta:  The thoracic aorta is heavily calcified but nondilated.  There   is no aneurysm or dissection.    Lungs:  See below.      Pleural space:  There is a small right pleural effusion measuring 1.9   cm with a small amount of bibasilar atelectasis, 1 greatest on the right.    No pneumothorax.    Heart:  The heart is moderately enlarged.  Severe coronary   calcification is present.  No pericardial effusion.  No  evidence of RV   dysfunction.    Bones joints:  Severe compression fracture with vertebra plana at T6,   unchanged.  There is also a compression fracture T4, unchanged.  No   dislocation.    Soft tissues:  Unremarkable.    Lymph nodes:  Unremarkable.  No enlarged lymph nodes.    IMPRESSION:       Cardiomegaly and severe calcification of the thoracic aorta.  No aneurysm   or dissection.    No evidence of pulmonary embolism.    Small right pleural effusion and mild bibasilar atelectasis.  Mild CHF   cannot be excluded.    Chronic compression fractures in the upper thoracic spine at T4 and T6,   unchanged.  Impression: Electronically signed by Yovany Espinosa MD on 07-06-21 at 0206    Scheduled Medications  albuterol, 2.5 mg, Nebulization, TID - RT  aspirin, 81 mg, Oral, Nightly  cefTRIAXone, 2 g, Intravenous, Q24H  DULoxetine, 60 mg, Oral, BID  ferrous sulfate, 325 mg, Oral, Daily With Breakfast  lactobacillus acidophilus, 1 capsule, Oral, Daily  levothyroxine, 75 mcg, Oral, Daily  losartan, 25 mg, Oral, Q24H  metoprolol succinate XL, 25 mg, Oral, BID  mycophenolate, 1,000 mg, Oral, BID  pantoprazole, 40 mg, Oral, BID AC  pravastatin, 10 mg, Oral, Nightly  predniSONE, 10 mg, Oral, Daily With Breakfast  raloxifene, 60 mg, Oral, Nightly  rivaroxaban, 20 mg, Oral, Daily With Dinner  sodium chloride, 10 mL, Intravenous, Q12H  vitamin B-12, 50 mcg, Oral, Daily  vitamin D, 50,000 Units, Oral, Q7 Days    Infusions   Diet  Diet Regular; Cardiac       Assessment/Plan     Active Hospital Problems    Diagnosis  POA   • **Acute UTI [N39.0]  Yes   • Heme positive stool [R19.5]  Yes   • E coli bacteremia [R78.81, B96.20]  Yes   • Iron deficiency anemia [D50.9]  Yes   • Current chronic use of systemic steroids [Z79.52]  Not Applicable   • Sepsis (CMS/HCC) [A41.9]  Yes   • Dyspnea [R06.00]  Yes   • Acute on chronic diastolic CHF (congestive heart failure) (CMS/HCC) [I50.33]  Yes   • COPD (chronic obstructive pulmonary disease)  (CMS/[facility]) [J44.9]  Yes   • Hyponatremia [E87.1]  Yes   • Metabolic encephalopathy [G93.41]  Yes   • Sleep apnea [G47.30]  Yes   • CAD (coronary artery disease) [I25.10]  Yes   • Essential hypertension [I10]  Yes   • HX: long term anticoagulant use [Z92.29]  Not Applicable   • Paroxysmal atrial fibrillation (CMS/[facility]) [I48.0]  Yes   • Myasthenia gravis (CMS/[facility]) [G70.00]  Yes      Resolved Hospital Problems   No resolved problems to display.       84yo woman who presented to ER with c/o weakness, confusion, and temp of 102. Admitted to our service with diagnosis of sepsis and acute UTI.    Sepsis/UTI/Ecoli bacteremia  -continue Rocephin  -urine culture neg  -Echo okay  -repeat blood cultures today  -ask ID to see  -?CT A&P  -overall much improved  -chronically on steroids for years    Metabolic Encephalopathy  -due to sepsis  -resolved, at baseline now    Iron deficiency anemia with heme+ stool, chronic AC for AFib  -will ask GI to see here  -Hgb stable here on AC  -had C/S 2 years ago with Dr. Neri that was reportedly normal    Hyponatremia  -appreciate Renal attention to pt  -improved after a dose of Lasix and fluid restriction  -Na+ now normal/stable off fluid restriction    Acute on chronic diastolic CHF  -present on admission with elevated BNP and some evidence of volume excess on chest imaging  -at baseline now  -appreciate Card attention to pt    PAF on Xarelto  -currently in NSR on Metoprolol    Myasthenia Gravis  -continue home meds of Cellcept and Prednisone    COPD  -on Breo Ellipta at home  -continue Albuterol nebs here, scheduled and PRN    HypoT4  -TSH wnl  -continue L-T4    Xarelto (home med) for DVT prophylaxis.  Full code.  Discussed with patient and CCP. D/w Dr. Zhang.  Anticipate discharge to SNU facility, timing yet to be determined. Waiting on precert.      Carlin Marquez MD  [facility]  07/09/21  12:31 EDT

## 2021-07-09 NOTE — CONSULTS
Referring Provider: Wilbert Sahu MD  [address] [facility]   [address]  [place] [address]  Reason for Consultation: E. coli bacteremia    Subjective   History of present illness: This is a 89-year-old female with myasthenia gravis, stroke, COPD, coronary artery disease and hypertension who was admitted on July 5 with fever.  The patient reported generalized malaise for the past couple of days prior to admission.  Per the family she was a little bit more confused than normal.  The day prior to admission she did have a fever of 102.  She reported some dysuria and urinary hesitancy.  Admission blood work revealed a white blood cell count of 14,000 with a procalcitonin of 0.9.  Chest x-ray was unremarkable.  Urinalysis showed 13-20 white blood cells with 2+ bacteria.  She was empirically started on aztreonam for presumed urinary tract infection.  She was switched to ceftriaxone the next day.  Urine cultures came back negative.  Admission blood cultures grew E. coli in 1 out of 2 sets.  Currently the patient states she feels a lot better.  She denies any more urinary symptoms.  She denies any abdominal pain nausea or vomiting.  Patient states she has chronic diarrhea for many years and uses Lomotil.  She reports up to 5 loose bowel movements per day.  She states in the hospital she has not had this problem and reports normal bowel movements.  She is tolerating antibiotics without a rash    Past Medical History:   Diagnosis Date   • Anemia     IRON DEFICIENCY   • Arthritis    • Atrial fibrillation (CMS/[facility])    • CAD (coronary artery disease)    • COPD (chronic obstructive pulmonary disease) (CMS/[facility])    • Hyperlipidemia    • Hypertension    • MG (myasthenia gravis) (CMS/[facility])    • stroke (CMS/[facility]) 10/2016, 3/2017   • OA (osteoarthritis)    • Sleep apnea        Past Surgical History:   Procedure Laterality Date   • APPENDECTOMY     • CARPAL TUNNEL RELEASE Bilateral    • CATARACT EXTRACTION Bilateral    • CORONARY STENT  PLACEMENT     • TOTAL HIP ARTHROPLASTY Bilateral    • TOTAL SHOULDER ARTHROPLASTY W/ DISTAL CLAVICLE EXCISION Left 7/19/2016    Procedure: LT TOTAL SHOULDER REVERSE ARTHROPLASTY;  Surgeon: NAHOMI Solorzano MD;  Location: [facility] OR [facility] [place];  Service:    • TOTAL SHOULDER ARTHROPLASTY W/ DISTAL CLAVICLE EXCISION Right 1/8/2019        reports that she has never smoked. She has never used smokeless tobacco. She reports that she does not drink alcohol and does not use drugs.    family history includes Breast cancer in her mother; Diabetes in her mother; Heart disease in her father and mother; Hyperlipidemia in her father and mother; Stroke in her father and mother.    Allergies   Allergen Reactions   • Neomycin Anaphylaxis   • Penicillins Swelling and Rash     Patient reports tolerating cephalexin on multiple occasions, most recently in November 2020. Tolerated ceftriaxone July 2021.  Patient reports that initial reaction to penicillin was 30-40 years ago, but she was unsure which penicillin drug it was.    • Hydrocodone Itching and Rash   • Rosuvastatin Other (See Comments)     Muscle cramps       Medication:  Antibiotics:  Ceftriaxone 2 g IV every 24 hours  Please refer to the medical record for a full medication list    Review of Systems  Pertinent items are noted in HPI, all other systems reviewed and negative    Objective   Vital Signs   Temp:  [97.6 °F (36.4 °C)-98.2 °F (36.8 °C)] 97.8 °F (36.6 °C)  Heart Rate:  [78-86] 80  Resp:  [16] 16  BP: (124-164)/(55-75) 164/75    Physical Exam:   General: In no acute distress  HEENT: Normocephalic, atraumatic, PERRL,no scleral icterus. Oropharynx is clear and moist  Neck: Supple, trachea is midline  Cardiovascular: Normal rate, regular rhythm, normal S1 and S2, no murmurs, rubs, or gallops   Respiratory: Lungs are clear to auscultation bilaterally, no wheezing  GI: Abdomen is soft, nontender, nondistended, positive bowel sounds bilaterally  Musculoskeletal: no edema, tenderness or  deformity  Skin: No rashes   Extremities: No E/C/C  Neurological: Alert and oriented, moving all 4 extremities  Psychiatric: Normal mood and affect     Results Review:   I reviewed the patient's new clinical results.  I reviewed the patient's new imaging results and agree with the interpretation.    Lab Results   Component Value Date    WBC 9.11 07/07/2021    HGB 8.7 (L) 07/07/2021    HCT 27.8 (L) 07/07/2021    MCV 88.0 07/07/2021     07/07/2021       Lab Results   Component Value Date    GLUCOSE 97 07/09/2021    BUN 16 07/09/2021    CREATININE 0.76 07/09/2021    EGFRIFNONA 73 07/09/2021    EGFRIFAFRI 88 07/09/2021    BCR 21.1 07/09/2021    CO2 26.1 07/09/2021    CALCIUM 8.9 07/09/2021    ALBUMIN 3.10 (L) 07/09/2021    LABIL2 1.7 05/12/2021    AST 14 07/05/2021    ALT 9 07/05/2021     Procalcitonin 0.09  Urinalysis positive protein 13-20 white blood cells 2+ bacteria    Microbiology:  7/5 UCx neg  7/5 BCx E coli 1/2 7/5 COVID neg    Radiology:  Admission chest x-ray personally reviewed by me shows cardiomegaly.  Left-sided port in place.  No infiltrates or pleural effusion    CT of the chest personally viewed by me shows no evidence of PE.  Right-sided pleural effusion is small.  No infiltrates.  Chronic compression fractures in T4 and T6.  Remain unchanged.    Assessment/Plan   Fever  E. coli septicemia  Leukocytosis  Myasthenia gravis complicating above    Source of E. coli is most likely  (even though her urine cultures were negative) given the patient's urinary symptoms.  She does not have any abdominal complaints therefore I do not feel strongly obtaining a CAT scan of the abdomen and pelvis her chronic diarrhea will need to be worked up.  She defervesced quickly with resolution of her fever and return of her white blood cell count to normal.  I would like to obtain repeat blood cultures x2 today.  The patient can be discharged on a 10-day course of antibiotics with an antibiotic stop date of 1 July  15.  Continue ceftriaxone 2 g IV every 24 hours while in the hospital.  At discharge can transition to Levaquin 750 mg p.o. daily to complete the 10-day course.  QT interval on EKG from July 5 is 316    Thank you for this consult.  We will follow-up her repeat blood cultures.  Please call us with further questions or concerns    I discussed the patient's findings and my recommendations with patient and nursing staff

## 2021-07-09 NOTE — PLAN OF CARE
Goal Outcome Evaluation:  Plan of Care Reviewed With: patient        Progress: improving  Outcome Summary: Pt tolerated treatment well this date. Ambulated 80ft x2 w/ Rw and CGA. Limited d/t SOA and fatigue. Encouraged pt to ambulate w/ nsg during the day.

## 2021-07-09 NOTE — PROGRESS NOTES
"   LOS: 4 days     Chief Complaint/ Reason for encounter: Hyponatremia  Chief Complaint   Patient presents with   • Fever         Subjective   No complaints feels well  Denies shortness of breath or chest pain  Good appetite with no nausea vomiting  No edema  She states she feels much better today      Medical history reviewed:  History of Present Illness    Subjective    History taken from: Patient and chart    Vital Signs  Temp:  [97.6 °F (36.4 °C)-98.2 °F (36.8 °C)] 97.8 °F (36.6 °C)  Heart Rate:  [78-86] 80  Resp:  [16] 16  BP: (124-164)/(55-75) 164/75       Wt Readings from Last 1 Encounters:   07/09/21 0544 70.1 kg (154 lb 8.7 oz)   07/08/21 0626 70.3 kg (154 lb 15.7 oz)   07/05/21 1628 67.6 kg (149 lb)   07/05/21 1507 68 kg (149 lb 14.6 oz)       Objective:  Vital signs: (most recent): Blood pressure 164/75, pulse 80, temperature 97.8 °F (36.6 °C), temperature source Oral, resp. rate 16, height 152.4 cm (60\"), weight 70.1 kg (154 lb 8.7 oz), SpO2 93 %.  Fever.              Objective:  General Appearance:  Comfortable, well-appearing, in no acute distress and not in pain.  Awake, alert, oriented  HEENT: Mucous membranes moist, no injury, oropharynx clear  Lungs:  Normal effort and normal respiratory rate.  Breath sounds clear to auscultation.  No  respiratory distress.  No rales, decreased breath sounds or rhonchi.    Heart: Normal rate.  Regular rhythm.  S1 normal.  No murmur.   Abdomen: Abdomen is soft.  Bowel sounds are normal, no abdominal tenderness.  There is no rebound or guarding  Extremities: Normal range of motion.  No edema of bilateral lower extremities, distal pulses intact  Neurological: No focal motor or sensory deficits, pupils reactive  Skin:  Warm and dry.  No rash or cyanosis.       Results Review:    Intake/Output:     Intake/Output Summary (Last 24 hours) at 7/9/2021 1209  Last data filed at 7/9/2021 0734  Gross per 24 hour   Intake 1120 ml   Output 800 ml   Net 320 ml "         DATA:  Radiology and Labs:  The following labs independently reviewed by me. Additional labs ordered for tomorrow a.m.  Interval notes, chart personally reviewed by me.   Old records independently reviewed showing normal baseline creatinine and sodium levels  The following radiologic studies independently viewed by me, findings CT angiogram showed cardiomegaly severe aortic calcification no aneurysm no pulmonary embolus small pleural effusion mild CHF  New problems include anemia  Discussed with patient/family at bedside    Risk/ complexity of medical care/ medical decision making moderate risk    Labs:   Recent Results (from the past 24 hour(s))   Renal Function Panel    Collection Time: 07/09/21  4:33 AM    Specimen: Blood   Result Value Ref Range    Glucose 97 65 - 99 mg/dL    BUN 16 8 - 23 mg/dL    Creatinine 0.76 0.57 - 1.00 mg/dL    Sodium 137 136 - 145 mmol/L    Potassium 3.9 3.5 - 5.2 mmol/L    Chloride 103 98 - 107 mmol/L    CO2 26.1 22.0 - 29.0 mmol/L    Calcium 8.9 8.6 - 10.5 mg/dL    Albumin 3.10 (L) 3.50 - 5.20 g/dL    Phosphorus 3.1 2.5 - 4.5 mg/dL    Anion Gap 7.9 5.0 - 15.0 mmol/L    BUN/Creatinine Ratio 21.1 7.0 - 25.0    eGFR Non African Amer 73 >60 mL/min/1.73    eGFR  African Amer 88 >60 mL/min/1.73       Radiology:  Imaging Results (Last 24 Hours)     ** No results found for the last 24 hours. **             Medications have been reviewed:  Current Facility-Administered Medications   Medication Dose Route Frequency Provider Last Rate Last Admin   • acetaminophen (TYLENOL) tablet 650 mg  650 mg Oral Q6H PRN Wilbert Sahu MD   650 mg at 07/09/21 0248   • albuterol (PROVENTIL) nebulizer solution 0.083% 2.5 mg/3mL  2.5 mg Nebulization Q6H PRN Wilbert Sahu MD   2.5 mg at 07/08/21 1531   • albuterol (PROVENTIL) nebulizer solution 0.083% 2.5 mg/3mL  2.5 mg Nebulization TID - RT Wilbert Sahu MD   2.5 mg at 07/09/21 0654   • aspirin EC tablet 81 mg  81 mg Oral Nightly Wilbert Sahu MD   81 mg  at 07/08/21 2005   • cefTRIAXone (ROCEPHIN) IVPB 2 g  2 g Intravenous Q24H Wilbert Sahu  mL/hr at 07/08/21 1737 2 g at 07/08/21 1737   • diphenoxylate-atropine (LOMOTIL) 2.5-0.025 MG per tablet 1 tablet  1 tablet Oral 4x Daily PRN Wilbert aShu MD       • DULoxetine (CYMBALTA) DR capsule 60 mg  60 mg Oral BID Wilbert Sahu MD   60 mg at 07/09/21 0839   • ferrous sulfate tablet 325 mg  325 mg Oral Daily With Breakfast Yovany Mccormick MD   325 mg at 07/09/21 0840   • lactobacillus acidophilus (RISAQUAD) capsule 1 capsule  1 capsule Oral Daily Wilbert Sahu MD   1 capsule at 07/09/21 0839   • levothyroxine (SYNTHROID, LEVOTHROID) tablet 75 mcg  75 mcg Oral Daily Wilbert Sahu MD   75 mcg at 07/09/21 0839   • losartan (COZAAR) tablet 25 mg  25 mg Oral Q24H Wilbert Sahu MD   25 mg at 07/09/21 0840   • Magnesium Sulfate 2 gram Bolus, followed by 8 gram infusion (total Mg dose 10 grams)- Mg less than or equal to 1mg/dL  2 g Intravenous PRN Wilbert Sahu MD        Or   • Magnesium Sulfate 2 gram / 50mL Infusion (GIVE X 3 BAGS TO EQUAL 6GM TOTAL DOSE) - Mg 1.1 - 1.5 mg/dl  2 g Intravenous PRN Wilbert Sahu MD        Or   • Magnesium Sulfate 4 gram infusion- Mg 1.6-1.9 mg/dL  4 g Intravenous PRN Wilbert Sahu MD       • metoprolol succinate XL (TOPROL-XL) 24 hr tablet 25 mg  25 mg Oral BID Wilbert Sahu MD   25 mg at 07/09/21 0839   • mycophenolate (CELLCEPT) tablet 1,000 mg  1,000 mg Oral BID Wilbert Sahu MD   1,000 mg at 07/09/21 0840   • nitroglycerin (NITROSTAT) SL tablet 0.4 mg  0.4 mg Sublingual Q5 Min PRN Antonia Carpio APRN       • ondansetron (ZOFRAN) injection 4 mg  4 mg Intravenous Q6H PRN Antonia Carpio APRN       • pantoprazole (PROTONIX) EC tablet 40 mg  40 mg Oral BID AC Wilbert Sahu MD   40 mg at 07/09/21 0840   • potassium & sodium phosphates (PHOS-NAK) 280-160-250 MG packet - for Phosphorus less than 1.25 mg/dL  2 packet Oral Q6H PRN Wilbert Sahu MD        Or   • potassium &  sodium phosphates (PHOS-NAK) 280-160-250 MG packet - for Phosphorus 1.25 - 2.5 mg/dL  2 packet Oral Q6H PRN Wilbert Sahu MD   2 packet at 07/07/21 1231   • potassium chloride (K-DUR,KLOR-CON) ER tablet 40 mEq  40 mEq Oral PRN Wilbert Sahu MD       • potassium chloride (KLOR-CON) packet 40 mEq  40 mEq Oral PRN Wilbert Sahu MD       • pravastatin (PRAVACHOL) tablet 10 mg  10 mg Oral Nightly Wilbert Sahu MD   10 mg at 07/08/21 2006   • predniSONE (DELTASONE) tablet 10 mg  10 mg Oral Daily With Breakfast Wilbert Sahu MD   10 mg at 07/09/21 0839   • raloxifene (EVISTA) tablet 60 mg  60 mg Oral Nightly Wilbert Sahu MD   60 mg at 07/08/21 2005   • rivaroxaban (XARELTO) tablet 20 mg  20 mg Oral Daily With Dinner Wilbert Sahu MD   20 mg at 07/08/21 1737   • sodium chloride 0.9 % flush 10 mL  10 mL Intravenous PRN Kaia Balderrama, APRN       • sodium chloride 0.9 % flush 10 mL  10 mL Intravenous Q12H Antonia Carpio APRN   10 mL at 07/09/21 0840   • sodium chloride 0.9 % flush 10 mL  10 mL Intravenous PRN Antonia Carpio APRN   10 mL at 07/07/21 0611   • vitamin B-12 (CYANOCOBALAMIN) tablet 50 mcg  50 mcg Oral Daily Wilbert Sahu MD   50 mcg at 07/09/21 0839   • vitamin D (ERGOCALCIFEROL) capsule 50,000 Units  50,000 Units Oral Q7 Days Wilbert Sahu MD   50,000 Units at 07/05/21 2132       ASSESSMENT:  Acute hyponatremia, improved, euvolemic.  Stable today  Sepsis, improved with antibiotics    HX: long term anticoagulant use  UTI    Essential hypertension    CAD (coronary artery disease)    Atrial fibrillation (CMS/HCC)    Acute UTI    COPD (chronic obstructive pulmonary disease) (CMS/[facility])    Hyponatremia    Metabolic encephalopathy    Sleep apnea    Normocytic anemia with some iron deficiency    Dyspnea    Acute on chronic diastolic CHF (congestive heart failure) (CMS/[facility])  Hypophosphatemia        PLAN:   Sodium level continues to improve and is back within normal range today, 137  Euvolemic on exam,  no need for diuretics  Sodium stable off fluid restriction which was stopped yesterday  Electrolytes and volume otherwise stable    IV antibiotics per medicine team  Await rehab bed.  Stable for discharge anytime from renal standpoint      Yovany Mccormick MD   [facility]   Office phone number: [phone]  Answering service phone number: [phone]    07/09/21  12:09 EDT    Dictation performed using Dragon dictation software

## 2021-07-10 VITALS
TEMPERATURE: 97.3 F | DIASTOLIC BLOOD PRESSURE: 77 MMHG | RESPIRATION RATE: 16 BRPM | OXYGEN SATURATION: 98 % | HEIGHT: 60 IN | WEIGHT: 154.1 LBS | HEART RATE: 80 BPM | SYSTOLIC BLOOD PRESSURE: 138 MMHG | BODY MASS INDEX: 30.25 KG/M2

## 2021-07-10 LAB
ALBUMIN SERPL-MCNC: 3.2 G/DL (ref 3.5–5.2)
ALBUMIN/GLOB SERPL: 1.2 G/DL
ALP SERPL-CCNC: 63 U/L (ref 39–117)
ALT SERPL W P-5'-P-CCNC: 8 U/L (ref 1–33)
ANION GAP SERPL CALCULATED.3IONS-SCNC: 11.8 MMOL/L (ref 5–15)
AST SERPL-CCNC: 15 U/L (ref 1–32)
BACTERIA SPEC AEROBE CULT: NORMAL
BASOPHILS # BLD AUTO: 0.04 10*3/MM3 (ref 0–0.2)
BASOPHILS NFR BLD AUTO: 0.5 % (ref 0–1.5)
BILIRUB SERPL-MCNC: <0.2 MG/DL (ref 0–1.2)
BUN SERPL-MCNC: 15 MG/DL (ref 8–23)
BUN/CREAT SERPL: 18.5 (ref 7–25)
CALCIUM SPEC-SCNC: 9.1 MG/DL (ref 8.6–10.5)
CHLORIDE SERPL-SCNC: 102 MMOL/L (ref 98–107)
CO2 SERPL-SCNC: 25.2 MMOL/L (ref 22–29)
CREAT SERPL-MCNC: 0.81 MG/DL (ref 0.57–1)
DEPRECATED RDW RBC AUTO: 45.9 FL (ref 37–54)
EOSINOPHIL # BLD AUTO: 0.12 10*3/MM3 (ref 0–0.4)
EOSINOPHIL NFR BLD AUTO: 1.5 % (ref 0.3–6.2)
ERYTHROCYTE [DISTWIDTH] IN BLOOD BY AUTOMATED COUNT: 13.4 % (ref 12.3–15.4)
GFR SERPL CREATININE-BSD FRML MDRD: 68 ML/MIN/1.73
GFR SERPL CREATININE-BSD FRML MDRD: 82 ML/MIN/1.73
GLOBULIN UR ELPH-MCNC: 2.6 GM/DL
GLUCOSE SERPL-MCNC: 87 MG/DL (ref 65–99)
HCT VFR BLD AUTO: 28.5 % (ref 34–46.6)
HGB BLD-MCNC: 8.9 G/DL (ref 12–15.9)
IMM GRANULOCYTES # BLD AUTO: 0.12 10*3/MM3 (ref 0–0.05)
IMM GRANULOCYTES NFR BLD AUTO: 1.5 % (ref 0–0.5)
LYMPHOCYTES # BLD AUTO: 0.91 10*3/MM3 (ref 0.7–3.1)
LYMPHOCYTES NFR BLD AUTO: 11.3 % (ref 19.6–45.3)
MAGNESIUM SERPL-MCNC: 2.3 MG/DL (ref 1.6–2.4)
MCH RBC QN AUTO: 28.8 PG (ref 26.6–33)
MCHC RBC AUTO-ENTMCNC: 31.2 G/DL (ref 31.5–35.7)
MCV RBC AUTO: 92.2 FL (ref 79–97)
MONOCYTES # BLD AUTO: 0.33 10*3/MM3 (ref 0.1–0.9)
MONOCYTES NFR BLD AUTO: 4.1 % (ref 5–12)
NEUTROPHILS NFR BLD AUTO: 6.56 10*3/MM3 (ref 1.7–7)
NEUTROPHILS NFR BLD AUTO: 81.1 % (ref 42.7–76)
NRBC BLD AUTO-RTO: 0 /100 WBC (ref 0–0.2)
PHOSPHATE SERPL-MCNC: 3 MG/DL (ref 2.5–4.5)
PLATELET # BLD AUTO: 383 10*3/MM3 (ref 140–450)
PMV BLD AUTO: 8.8 FL (ref 6–12)
POTASSIUM SERPL-SCNC: 4 MMOL/L (ref 3.5–5.2)
PROT SERPL-MCNC: 5.8 G/DL (ref 6–8.5)
RBC # BLD AUTO: 3.09 10*6/MM3 (ref 3.77–5.28)
SODIUM SERPL-SCNC: 139 MMOL/L (ref 136–145)
WBC # BLD AUTO: 8.08 10*3/MM3 (ref 3.4–10.8)

## 2021-07-10 PROCEDURE — 94799 UNLISTED PULMONARY SVC/PX: CPT

## 2021-07-10 PROCEDURE — 84100 ASSAY OF PHOSPHORUS: CPT | Performed by: INTERNAL MEDICINE

## 2021-07-10 PROCEDURE — 83735 ASSAY OF MAGNESIUM: CPT | Performed by: HOSPITALIST

## 2021-07-10 PROCEDURE — 63710000001 PREDNISONE PER 5 MG: Performed by: STUDENT IN AN ORGANIZED HEALTH CARE EDUCATION/TRAINING PROGRAM

## 2021-07-10 PROCEDURE — 80053 COMPREHEN METABOLIC PANEL: CPT | Performed by: HOSPITALIST

## 2021-07-10 PROCEDURE — 99232 SBSQ HOSP IP/OBS MODERATE 35: CPT | Performed by: NURSE PRACTITIONER

## 2021-07-10 PROCEDURE — 97530 THERAPEUTIC ACTIVITIES: CPT

## 2021-07-10 PROCEDURE — 63710000001 MYCOPHENOLATE MOFETIL PER 250 MG: Performed by: STUDENT IN AN ORGANIZED HEALTH CARE EDUCATION/TRAINING PROGRAM

## 2021-07-10 PROCEDURE — 85025 COMPLETE CBC W/AUTO DIFF WBC: CPT | Performed by: HOSPITALIST

## 2021-07-10 RX ORDER — LANOLIN ALCOHOL/MO/W.PET/CERES
325 CREAM (GRAM) TOPICAL EVERY OTHER DAY
Qty: 30 TABLET | Refills: 0 | Status: SHIPPED | OUTPATIENT
Start: 2021-07-10 | End: 2021-10-06 | Stop reason: ALTCHOICE

## 2021-07-10 RX ORDER — LEVOFLOXACIN 750 MG/1
750 TABLET ORAL DAILY
Qty: 5 TABLET | Refills: 0 | Status: SHIPPED | OUTPATIENT
Start: 2021-07-10 | End: 2021-07-15

## 2021-07-10 RX ADMIN — MYCOPHENOLATE MOFETIL 1000 MG: 500 TABLET ORAL at 08:44

## 2021-07-10 RX ADMIN — ALBUTEROL SULFATE 2.5 MG: 2.5 SOLUTION RESPIRATORY (INHALATION) at 07:24

## 2021-07-10 RX ADMIN — PANTOPRAZOLE SODIUM 40 MG: 40 TABLET, DELAYED RELEASE ORAL at 05:19

## 2021-07-10 RX ADMIN — FERROUS SULFATE TAB 325 MG (65 MG ELEMENTAL FE) 325 MG: 325 (65 FE) TAB at 08:45

## 2021-07-10 RX ADMIN — DULOXETINE HYDROCHLORIDE 60 MG: 60 CAPSULE, DELAYED RELEASE ORAL at 08:45

## 2021-07-10 RX ADMIN — LOSARTAN POTASSIUM 25 MG: 25 TABLET, FILM COATED ORAL at 08:45

## 2021-07-10 RX ADMIN — LEVOTHYROXINE SODIUM 75 MCG: 0.07 TABLET ORAL at 08:45

## 2021-07-10 RX ADMIN — SODIUM CHLORIDE, PRESERVATIVE FREE 10 ML: 5 INJECTION INTRAVENOUS at 08:45

## 2021-07-10 RX ADMIN — PREDNISONE 10 MG: 10 TABLET ORAL at 08:45

## 2021-07-10 RX ADMIN — METOPROLOL SUCCINATE 25 MG: 25 TABLET, EXTENDED RELEASE ORAL at 08:45

## 2021-07-10 RX ADMIN — Medication 50 MCG: at 08:45

## 2021-07-10 NOTE — PROGRESS NOTES
"CC: paf    Interval History: no events. Sitting in chair. She has discharge orders.       Vital Signs  Temp:  [97.3 °F (36.3 °C)-98.1 °F (36.7 °C)] 97.3 °F (36.3 °C)  Heart Rate:  [78-84] 80  Resp:  [16] 16  BP: (129-144)/(63-93) 138/77    Intake/Output Summary (Last 24 hours) at 7/10/2021 1431  Last data filed at 7/10/2021 0856  Gross per 24 hour   Intake 830 ml   Output 1300 ml   Net -470 ml     Flowsheet Rows      First Filed Value   Admission Height  152.4 cm (60\") Documented at 07/05/2021 1628   Admission Weight  68 kg (149 lb 14.6 oz) Documented at 07/05/2021 1507          PHYSICAL EXAM:  General: No acute distress  Resp:NL Rate, unlabored, clear  CV:NL rate and rhythm, NL PMI, Nl S1 and S2, no Murmur, no gallop, no rub, No JVD. Normal pedal pulses  ABD:Nl sounds, no masses or tenderness, nondistended, no guarding or rebound  Neuro: alert,cooperative and oriented  Extr: No edema or cyanosis, moves all extremities      Results Review:    Results from last 7 days   Lab Units 07/10/21  0304   SODIUM mmol/L 139   POTASSIUM mmol/L 4.0   CHLORIDE mmol/L 102   CO2 mmol/L 25.2   BUN mg/dL 15   CREATININE mg/dL 0.81   GLUCOSE mg/dL 87   CALCIUM mg/dL 9.1     Results from last 7 days   Lab Units 07/05/21  0929   TROPONIN T ng/mL <0.010     Results from last 7 days   Lab Units 07/10/21  0304   WBC 10*3/mm3 8.08   HEMOGLOBIN g/dL 8.9*   HEMATOCRIT % 28.5*   PLATELETS 10*3/mm3 383             Results from last 7 days   Lab Units 07/10/21  0304   MAGNESIUM mg/dL 2.3         I reviewed the patient's new clinical results.  I personally viewed and interpreted the patient's EKG/Telemetry data        Medication Review:   Meds reviewed         Assessment/Plan  1. Coronary artery disease and previous bare-metal stent and angioplasty of the LAD in 1997.  Last stress test June 2016 was negative for ischemia  2.  Hypertension fairly well controlled.  Sodium restricted diet.  3.  History of mild to moderate aortic regurgitation-last " echo was October 2018  repeat echo this admission with normal left-ventricular ejection fraction grade 2 diastolic dysfunction.  Severely calcified valve with no significant stenosis but mild to moderate aortic regurgitation similar from previous.  5.  Paroxysmal atrial fibrillation.  She has a [id number] score of 8 high risk for embolic event .  On xarelto. Monitor closely.  6. history of COPD/ fibrosis-  She had recent PNA. Follows with pulmonary outpatient. Off amio for lung disease  7.  Carotid artery stenosis she is to have follow-up duplex as outpatient  8.  CHF- probnp elevated.  Fairly euvolemic.  Hyponatremia resolved.    9.  Myasthenia gravis follows with neurology  10. Aortic plaque  11. Sepsis-E. coli bacteremia.  Antibiotics per admitting   12. Anemia - acute on chronic.  Hemoccult positive.  History of ulcerative esophagitis and peptic ulcer disease.  Monitor closely on anticoagulation.  She is on erythropoietin as outpatient and follows with [place] [facility].  She has received IV Venofer in the past currently on oral iron replacement.       May resume diuretic at discharge.     MILA Macario  07/10/21  14:31 EDT

## 2021-07-10 NOTE — THERAPY TREATMENT NOTE
Patient Name: Olena Myers  : 1935    MRN: [id number]                              Today's Date: 7/10/2021       Admit Date: 2021    Visit Dx:     ICD-10-CM ICD-9-CM   1. Acute UTI  N39.0 599.0   2. Anemia, unspecified type  D64.9 285.9   3. Hyponatremia  E87.1 276.1   4. Gastrointestinal hemorrhage, unspecified gastrointestinal hemorrhage type  K92.2 578.9     Patient Active Problem List   Diagnosis   • Pre-operative cardiovascular examination   • S/p reverse total shoulder arthroplasty   • Myasthenia gravis (CMS/HCC)   • Paroxysmal atrial fibrillation (CMS/[facility])   • HX: long term anticoagulant use   • Essential hypertension   • CAD (coronary artery disease)   • Atrial fibrillation (CMS/[facility])   • S/P reverse total shoulder arthroplasty, right   • Acute UTI   • COPD (chronic obstructive pulmonary disease) (CMS/[facility])   • Hyponatremia   • Metabolic encephalopathy   • Sleep apnea   • Sepsis (CMS/[facility])   • Dyspnea   • Acute on chronic diastolic CHF (congestive heart failure) (CMS/[facility])   • Chronic diastolic CHF (congestive heart failure) (CMS/[facility])   • Heme positive stool   • E coli bacteremia   • Iron deficiency anemia   • Current chronic use of systemic steroids     Past Medical History:   Diagnosis Date   • Anemia     IRON DEFICIENCY   • Arthritis    • Asthma    • Atrial fibrillation (CMS/[facility])    • CAD (coronary artery disease)     HEART STENT   • COPD (chronic obstructive pulmonary disease) (CMS/[facility])    • 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   • History of gastric ulcer    • History of GI bleed    • [place] (hard of hearing)    • Hyperlipidemia    • Hypertension    • Lightheadedness    • LVH (left ventricular hypertrophy)    • MG (myasthenia gravis) (CMS/[facility])    • Mini stroke (CMS/[facility]) 10/2016, 3/2017   • OA (osteoarthritis)    • Osteoporosis    • Sleep apnea     USES CPAP   • SOB (shortness of breath)     WALKING     Past Surgical History:   Procedure Laterality Date   • APPENDECTOMY     • CARPAL TUNNEL RELEASE  Bilateral    • CATARACT EXTRACTION Bilateral    • CORONARY STENT PLACEMENT     • TOTAL HIP ARTHROPLASTY Bilateral    • TOTAL SHOULDER ARTHROPLASTY W/ DISTAL CLAVICLE EXCISION Left 7/19/2016    Procedure: LT TOTAL SHOULDER REVERSE ARTHROPLASTY;  Surgeon: NAHOMI Solorzano MD;  [place]  [facility] [place] [facility] [place]:    • TOTAL SHOULDER ARTHROPLASTY W/ DISTAL CLAVICLE EXCISION Right 1/8/2019    Procedure: RIGHT TOTAL SHOULDER REVERSE ARTHROPLASTY;  Surgeon: Jovanny Solorzano MD;  Location:  [facility];  Service: Orthopedics     General Information     Row Name 07/10/21 1341          Physical Therapy Time and Intention    Document Type  therapy note (daily note)  -     Mode of Treatment  physical therapy;individual therapy  -     Row Name 07/10/21 1341          General Information    Existing Precautions/Restrictions  fall  -     Row Name 07/10/21 1341          Cognition    Orientation Status (Cognition)  oriented x 4  -     Row Name 07/10/21 1341          Safety Issues, Functional Mobility    Impairments Affecting Function (Mobility)  balance;endurance/activity tolerance;strength;shortness of breath  -       User Key  (r) = Recorded By, (t) = Taken By, (c) = Cosigned By    Initials Name Provider Type     Swetha Hurst Physical Therapist        Mobility     Row Name 07/10/21 1341          Bed Mobility    Bed Mobility  bed mobility (all) activities  -     All Activities, [place] (Bed Mobility)  not tested  -     Comment (Bed Mobility)  [facility]  -     Row Name 07/10/21 1341          Sit-Stand Transfer    Sit-Stand [place] (Transfers)  standby assist  [facility]     Assistive Device (Sit-Stand Transfers)  walker, front-wheeled  -     Row Name 07/10/21 1341          Gait/Stairs (Locomotion)    [place] Level (Gait)  contact guard;standby assist  [facility]     Assistive Device (Gait)  walker, front-wheeled  -     Distance in Feet (Gait)  80ft + 80ft  -     Deviations/Abnormal Patterns (Gait)  michael  decreased;stride length decreased  -     Bilateral Gait Deviations  forward flexed posture  -     [place] Level (Stairs)  not tested  -     Comment (Gait/Stairs)  Pt tolerated ambulation well today with minimal complaints of fatigue/SOA. Pt required 1 brief standing rest break.  -       User Key  (r) = Recorded By, (t) = Taken By, (c) = Cosigned By    Initials Name Provider Type     Swetha Hurst Physical Therapist        Obj/Interventions     Row Name 07/10/21 1343          Motor Skills    Therapeutic Exercise  -- 10 reps B AP/LAQ/seated marches  -       User Key  (r) = Recorded By, (t) = Taken By, (c) = Cosigned By    Initials Name Provider Type     Swetha Hurst Physical Therapist        Goals/Plan    No documentation.       Clinical Impression     [facility] Name 07/10/21 1342          Pain    Additional Documentation  Pain Scale: Numbers Pre/Post-Treatment (Group)  [facility] Name 07/10/21 6678          Pain Scale: Numbers Pre/Post-Treatment    Pretreatment Pain Rating  0/10 - no pain  -     Posttreatment Pain Rating  0/10 - no pain  -     Row Name 07/10/21 134          Plan of Care Review    Plan of Care Reviewed With  patient  -     Progress  improving  -     Outcome Summary  Pt agreeable to PT today and tolerated all activity well. Pt completed bed mobility and STS, both with SBA. Ambulated a total of 160ft with a brief standing rest break after 80ft d/t minimal SOA. However, pt did not complain of fatigue during ambulation and demonstrated improved overall gait mechanics. PT discussed removal of purewick with pt as she is able to get up an ambulate with little assist - this would increase pt activity, RN aware. Pt will continue to benefit from skilled PT to improve strength and endurance in order to progress gait distance and improve overall functional mobility. PT recommending D/C home with HHPT.  -     Row Name 07/10/21 1347          Positioning and Restraints    Pre-Treatment  Position  sitting in chair/recliner  -     Post Treatment Position  chair  -BH     In Chair  reclined;call light within reach;encouraged to call for assist;exit alarm on  -       User Key  (r) = Recorded By, (t) = Taken By, (c) = Cosigned By    Initials Name Provider Type    Swetha Pearce Physical Therapist        Outcome Measures     Row Name 07/10/21 1346          How much help from another person do you currently need...    Turning from your back to your side while in flat bed without using bedrails?  3  -BH     Moving from lying on back to sitting on the side of a flat bed without bedrails?  3  -BH     Moving to and from a bed to a chair (including a wheelchair)?  3  -BH     Standing up from a chair using your arms (e.g., wheelchair, bedside chair)?  3  -BH     Climbing 3-5 steps with a railing?  3  -BH     To walk in hospital room?  3  -BH     AM-PAC 6 Clicks Score (PT)  18  -     Row Name 07/10/21 1346          Functional Assessment    Outcome Measure Options  AM-PAC 6 Clicks Basic Mobility (PT)  -       User Key  (r) = Recorded By, (t) = Taken By, (c) = Cosigned By    Initials Name Provider Type    Swetha Pearce Physical Therapist        Physical Therapy Education                 Title: PT OT SLP Therapies (Done)     Topic: Physical Therapy (Done)     Point: Mobility training (Done)     Learning Progress Summary           Patient Acceptance, E,TB,D, VU,NR by  at 7/10/2021 1346    Acceptance, E,TB,D, VU,NR by  at 7/9/2021 1004    Acceptance, E, VU by CF at 7/8/2021 1511    Acceptance, E, VU by DJ at 7/7/2021 1208                   Point: Home exercise program (Done)     Learning Progress Summary           Patient Acceptance, E,TB,D, VU,NR by  at 7/10/2021 1346    Acceptance, E,TB,D, VU,NR by  at 7/9/2021 1004                   Point: Body mechanics (Done)     Learning Progress Summary           Patient Acceptance, E,TB,D, VU,NR by  at 7/10/2021 1346    Acceptance, E,TB,D, VU,NR by   at 7/9/2021 1004    Acceptance, E, VU by  at 7/8/2021 1511    Acceptance, E, VU by  at 7/7/2021 1208                   Point: Precautions (Done)     Learning Progress Summary           Patient Acceptance, E,TB,D, VU,NR by  at 7/10/2021 1346    Acceptance, E,TB,D, VU,NR by  at 7/9/2021 1004    Acceptance, E, VU by  at 7/7/2021 1208                               User Key     Initials Effective Dates Name Provider Type Discipline     03/07/18 -  Katrin Carpio, PTA Physical Therapy Assistant PT     10/25/19 -  Carol Montejo, PT Physical Therapist PT     06/16/21 -  Jessica Tian, PT Physical Therapist PT     05/10/21 -  Swetha Hurst Physical Therapist PT              PT Recommendation and Plan     Plan of Care Reviewed With: patient  Progress: improving  Outcome Summary: Pt agreeable to PT today and tolerated all activity well. Pt completed bed mobility and STS, both with SBA. Ambulated a total of 160ft with a brief standing rest break after 80ft d/t minimal SOA. However, pt did not complain of fatigue during ambulation and demonstrated improved overall gait mechanics. PT discussed removal of purewick with pt as she is able to get up an ambulate with little assist - this would increase pt activity, RN aware. Pt will continue to benefit from skilled PT to improve strength and endurance in order to progress gait distance and improve overall functional mobility. PT recommending D/C home with HHPT.     Time Calculation:   PT Charges     Row Name 07/10/21 1347             Time Calculation    Start Time  0931  -      Stop Time  0949  -      Time Calculation (min)  18 min  -      PT Received On  07/10/21  -      PT - Next Appointment  07/12/21  -         Time Calculation- PT    Total Timed Code Minutes- PT  18 minute(s)  -         Timed Charges    90058 - Gait Training Minutes   8  -      35470 - PT Therapeutic Activity Minutes  10  -         Total Minutes    Timed Charges Total  Minutes  18  -BH       Total Minutes  18  -BH        User Key  (r) = Recorded By, (t) = Taken By, (c) = Cosigned By    Initials Name Provider Type     Swetha Hurst Physical Therapist        Therapy Charges for Today     Code Description Service Date Service Provider Modifiers Qty    58717193658 HC PT THERAPEUTIC ACT EA 15 MIN 7/10/2021 Swetha Hurst GP 1          PT G-Codes  Outcome Measure Options: AM-PAC 6 Clicks Basic Mobility (PT)  AM-PAC 6 Clicks Score (PT): 18    SWETHA HURST  7/10/2021

## 2021-07-10 NOTE — PLAN OF CARE
Goal Outcome Evaluation:  Plan of Care Reviewed With: patient        Progress: improving  Outcome Summary: Pt agreeable to PT today and tolerated all activity well. Pt completed bed mobility and STS, both with SBA. Ambulated a total of 160ft with a brief standing rest break after 80ft d/t minimal SOA. However, pt did not complain of fatigue during ambulation and demonstrated improved overall gait mechanics. PT discussed removal of purewick with pt as she is able to get up an ambulate with little assist - this would increase pt activity, RN aware. Pt will continue to benefit from skilled PT to improve strength and endurance in order to progress gait distance and improve overall functional mobility. PT recommending D/C home with HHPT.    Patient was intermittently wearing a face mask during this therapy encounter. Therapist used appropriate personal protective equipment including eye protection, mask, and gloves.  Mask used was standard procedure mask. Appropriate PPE was worn during the entire therapy session. Hand hygiene was completed before and after therapy session. Patient is not in enhanced droplet precautions.

## 2021-07-10 NOTE — PLAN OF CARE
Goal Outcome Evaluation:  Plan of Care Reviewed With: patient        Progress: improving  Outcome Summary: Vitals stable. pt discharged to home with HH

## 2021-07-10 NOTE — PROGRESS NOTES
[facility]     Progress Note    Patient Name: Olena Myers  : 1935  MRN: [id number]  Primary Care Physician:  Manda Keenan MD  Date of admission: 2021    Subjective   Subjective     Chief Complaint: hyponatremia    History of Present Illness  Patient with hyponatremia    Review of Systems    Objective   Objective     Vitals:   Temp:  [97.4 °F (36.3 °C)-98.1 °F (36.7 °C)] 98.1 °F (36.7 °C)  Heart Rate:  [78-84] 78  Resp:  [16] 16  BP: (129-144)/(59-93) 144/63    Physical Exam   General Appearance:  In no acute distress.    HEENT: Normal HEENT exam.     Extremities: .  There is no deformity, effusion or dependent edema.    Neurological: Patient is alert .    Skin:  Warm and dry.  No rash.     Result Review    Result Review:  I have personally reviewed the results from the time of this admission to 7/10/2021 09:21 EDT and agree with these findings:  []  Laboratory  []  Microbiology  []  Radiology  []  EKG/Telemetry   []  Cardiology/Vascular   []  Pathology  []  Old records  []  Other:      Assessment/Plan   Assessment / Plan     Brief Patient Summary:  Olena Myers is a 85 y.o. female who has hyponatremia    Active Hospital Problems:  Active Hospital Problems    Diagnosis    • **Acute UTI    • Heme positive stool    • E coli bacteremia    • Iron deficiency anemia    • Current chronic use of systemic steroids    • Sepsis (CMS/HCC)    • Dyspnea    • Acute on chronic diastolic CHF (congestive heart failure) (CMS/HCC)    • COPD (chronic obstructive pulmonary disease) (CMS/HCC)    • Hyponatremia    • Metabolic encephalopathy    • Sleep apnea      USES CPAP     • CAD (coronary artery disease)    • Essential hypertension    • HX: long term anticoagulant use    • Paroxysmal atrial fibrillation (CMS/HCC)    • Myasthenia gravis (CMS/HCC)      Plan:   1. Hyponatremia  2. UTI  3. Copd  4. A. fib    Patient seen and examined. Labs reviewed. Renal function and sodium at goal. Tolerating po. Off fluid restriction.  Will follow as needed    Electronically signed by Aga Barbosa MD, 07/10/21, 9:21 AM EDT.

## 2021-07-10 NOTE — DISCHARGE SUMMARY
Patient Name: Olena Myers  : 1935  MRN: [id number]    Date of Admission: 2021  Date of Discharge:  7/10/2021  Primary Care Physician: Manda Keenan MD      Chief Complaint:   Fever      Discharge Diagnoses     Active Hospital Problems    Diagnosis  POA   • **Acute UTI [N39.0]  Yes   • Heme positive stool [R19.5]  Yes   • E coli bacteremia [R78.81, B96.20]  Yes   • Iron deficiency anemia [D50.9]  Yes   • Current chronic use of systemic steroids [Z79.52]  Not Applicable   • Sepsis (CMS/[facility]) [A41.9]  Yes   • Dyspnea [R06.00]  Yes   • Acute on chronic diastolic CHF (congestive heart failure) (CMS/[facility]) [I50.33]  Yes   • COPD (chronic obstructive pulmonary disease) (CMS/[facility]) [J44.9]  Yes   • Hyponatremia [E87.1]  Yes   • Metabolic encephalopathy [G93.41]  Yes   • Sleep apnea [G47.30]  Yes   • CAD (coronary artery disease) [I25.10]  Yes   • Essential hypertension [I10]  Yes   • HX: long term anticoagulant use [Z92.29]  Not Applicable   • Paroxysmal atrial fibrillation (CMS/[facility]) [I48.0]  Yes   • Myasthenia gravis (CMS/[facility]) [G70.00]  Yes      Resolved Hospital Problems   No resolved problems to display.        Hospital Course     86yo woman who presented to ER with c/o weakness, confusion, and temp of 102. Admitted to our service with diagnosis of sepsis and acute UTI. Please see below for details of admission:     Sepsis/UTI/Ecoli bacteremia  -treated with Rocephin here  -urine culture neg  -Echo okay  -repeat blood cultures NGTD  -asked ID to see regarding bacteremia, appreciate Dr. Hurst's attention to pt, she recommends home on oral LQN to complete 10d total abx course  -overall much improved     Metabolic Encephalopathy  -due to sepsis  -resolved, at baseline now     Iron deficiency anemia with heme+ stool, chronic AC for AFib  -asked GI to see here  -Hgb stable here on AC  -had C/S 2 years ago with Dr. Neri that was reportedly normal  -given all the above and no evidence of active bleeding GI has  recommended pt f/u with Dr. Neri as outpt to discuss need for re-evaluation of colon     Hyponatremia  -appreciate Renal attention to pt  -improved after a dose of Lasix and fluid restriction  -Na+ now normal/stable off fluid restriction     Acute on chronic diastolic CHF  -present on admission with elevated BNP and some evidence of volume excess on chest imaging  -at baseline now  -appreciate Card attention to pt     PAF on Xarelto  -currently in NSR on Metoprolol     Myasthenia Gravis  -continue home meds of Cellcept and Prednisone     COPD  -on Breo Ellipta at home  -continue Albuterol nebs here, scheduled and PRN  -no O2 requirement     HypoT4  -TSH wnl  -continue L-T4     Xarelto (home med) for DVT prophylaxis here.  Full code confirmed.  Discussed with patient and Dr. Zhang.  Anticipated discharge to SNU facility however PT notes that pt is much improved and should be okay to go home at dc today. Pt prefers this as we are still waiting on precert for rehab.    Day of Discharge     Subjective:  Pt feeling okay today. Tolerating diet. Voiding well. No SOA or CP. She has chronically loose stool that is at baseline. Eager to go home. PT told her this AM that she has progressed nicely and is safe for dc home rather than [facility] (RN confirms).    Physical Exam:  Temp:  [97.4 °F (36.3 °C)-98.1 °F (36.7 °C)] 98.1 °F (36.7 °C)  Heart Rate:  [78-84] 78  Resp:  [16] 16  BP: (129-144)/(63-93) 144/63  Body mass index is 30.1 kg/m².  Physical Exam  Vitals and nursing note reviewed.   Constitutional:       General: She is not in acute distress.     Appearance: She is obese. She is not toxic-appearing or diaphoretic.   HENT:      Head: Normocephalic and atraumatic.      Right Ear: External ear normal.      Left Ear: External ear normal.      Nose: Nose normal.      Mouth/Throat:      Mouth: Mucous membranes are moist.      Pharynx: Oropharynx is clear.   Eyes:      General: No scleral icterus.        Right eye: No discharge.          Left eye: No discharge.      Conjunctiva/sclera: Conjunctivae normal.   Cardiovascular:      Rate and Rhythm: Normal rate and regular rhythm.      Pulses: Normal pulses.   Pulmonary:      Effort: Pulmonary effort is normal. No respiratory distress.      Breath sounds: Normal breath sounds.   Abdominal:      General: Bowel sounds are normal. There is no distension.      Palpations: Abdomen is soft.      Tenderness: There is no abdominal tenderness.   Musculoskeletal:         General: No swelling or deformity. Normal range of motion.      Cervical back: Neck supple. No rigidity.   Lymphadenopathy:      Cervical: No cervical adenopathy.   Skin:     General: Skin is warm and dry.      Capillary Refill: Capillary refill takes less than 2 seconds.      Coloration: Skin is not jaundiced.      Findings: No rash.   Neurological:      General: No focal deficit present.      Mental Status: She is alert and oriented to person, place, and time. Mental status is at baseline.      Cranial Nerves: No cranial nerve deficit.      Coordination: Coordination normal.   Psychiatric:         Mood and Affect: Mood normal.         Behavior: Behavior normal.         Thought Content: Thought content normal.         Consultants     Consult Orders (all) (From admission, onward)     Start     Ordered    07/09/21 1238  Inpatient Gastroenterology Consult  Once     Specialty:  Gastroenterology  Provider:  Charlie Barton MD    07/09/21 1238    07/09/21 1222  Inpatient Infectious Diseases Consult  Once     Specialty:  Infectious Diseases  Provider:  Yannick Lee MD    07/09/21 1221    07/05/21 1428  Inpatient Nephrology Consult  Once     Specialty:  Nephrology  Provider:  Yovany Mccormick MD    07/05/21 1428    07/05/21 1407  Inpatient Cardiology Consult  Once     Specialty:  Cardiology  Provider:  Nghia Mayberry III, MD    07/05/21 1409    07/05/21 1117  [facility] (on-call MD unless specified) Details  Once      Specialty:  Hospitalist  Provider:  (Not yet assigned)    07/05/21 1116              Procedures     * Surgery not found *      Imaging Results (All)     Procedure Component Value Units Date/Time    CT Angiogram Chest With & Without Contrast [[id number]] Collected: 07/06/21 0207     Updated: 07/06/21 0207    Narrative:        Patient: ANN ANN  Time Out: 02:06  Exam(s): CTA CHEST W WO Contrast     EXAM:    CT Angiography Chest Without and With Intravenous Contrast    CLINICAL HISTORY:     Reason for exam: PE suspected, low intermediate prob, neg D-dimer.    TECHNIQUE:    Axial computed tomographic angiography images of the chest without and   with intravenous contrast.  CTDI is 14.80 mGy and DLP is 533.80 mGy-cm.    This CT exam was performed according to the principle of ALARA (As Low As   Reasonably Achievable) by using one or more of the following dose   reduction techniques: automated exposure control, adjustment of the mA   and or kV according to patient size, and or use of iterative   reconstruction technique.    3D and MIP reconstructed images were created and reviewed.    COMPARISON:    February 7, 2017    FINDINGS:    Pulmonary arteries:  The pulmonary arterial tree is well-opacified with   contrast.  No pulmonary emboli are identified.    Aorta:  The thoracic aorta is heavily calcified but nondilated.  There   is no aneurysm or dissection.    Lungs:  See below.      Pleural space:  There is a small right pleural effusion measuring 1.9   cm with a small amount of bibasilar atelectasis, 1 greatest on the right.    No pneumothorax.    Heart:  The heart is moderately enlarged.  Severe coronary   calcification is present.  No pericardial effusion.  No evidence of RV   dysfunction.    Bones joints:  Severe compression fracture with vertebra plana at T6,   unchanged.  There is also a compression fracture T4, unchanged.  No   dislocation.    Soft tissues:  Unremarkable.    Lymph nodes:  Unremarkable.  No  enlarged lymph nodes.    IMPRESSION:       Cardiomegaly and severe calcification of the thoracic aorta.  No aneurysm   or dissection.    No evidence of pulmonary embolism.    Small right pleural effusion and mild bibasilar atelectasis.  Mild CHF   cannot be excluded.    Chronic compression fractures in the upper thoracic spine at T4 and T6,   unchanged.    Impression:          Electronically signed by Yovany Espinosa MD on 07-06-21 at 0206    XR Chest 1 View [[id number]] Collected: 07/05/21 0953     Updated: 07/05/21 1020    Narrative:      ONE VIEW PORTABLE CHEST     HISTORY: Fever and weakness.     FINDINGS: The lungs are well-expanded and clear. There is mild  cardiomegaly unchanged from 01/05/2021. A left-sided MediPort catheter  ends in the SVC without change.     This report was finalized on 7/5/2021 10:17 AM by Dr. Crow Solomon M.D.           Results for orders placed during the hospital encounter of 10/02/19    Duplex Carotid Ultrasound CAR    Interpretation Summary  · Right ICA Prox: Imaging indicates 1%-15% stenosis.  · Left ICA Prox: Imaging indicates 1-15% stenosis.    Results for orders placed during the hospital encounter of 07/05/21    Adult Transthoracic Echo Complete W/ Cont if Necessary Per Protocol    Interpretation Summary  · Calculated left ventricular EF = 69.5% Estimated left ventricular EF = 70% Estimated left ventricular EF was in agreement with the calculated left ventricular EF. Left ventricular systolic function is normal. The left ventricular cavity is small in size. Left ventricular wall thickness is consistent with moderate concentric hypertrophy. All left ventricular wall segments contract normally. Left ventricular diastolic function is consistent with (grade II w/high LAP) pseudonormalization.  · Left atrial volume is severely increased. The right atrial cavity is moderate to severely dilated.  · There is severe calcification of the aortic valve. The aortic valve appears  trileaflet. Mild to moderate aortic valve regurgitation is present. No aortic valve stenosis is present.  · No mitral valve regurgitation or significant stenosis is present. Severe mitral annular calcification is present. There is mild, bileaflet mitral valve thickening present.  · Mild tricuspid valve regurgitation is present. Calculated right ventricular systolic pressure from tricuspid regurgitation is 35 mmHg. There is no significant pulmonary hypertension    Pertinent Labs     Results from last 7 days   Lab Units 07/10/21  0304 07/07/21  0607 07/06/21  0800 07/05/21 2041 07/05/21  0929   WBC 10*3/mm3 8.08 9.11 11.95*  --  14.64*   HEMOGLOBIN g/dL 8.9* 8.7* 8.5* 8.5* 8.7*   PLATELETS 10*3/mm3 383 202 185  --  215     Results from last 7 days   Lab Units 07/10/21  0304 07/09/21  0433 07/08/21  0458 07/07/21  0607   SODIUM mmol/L 139 137 136 133*   POTASSIUM mmol/L 4.0 3.9 3.9 3.5   CHLORIDE mmol/L 102 103 101 98   CO2 mmol/L 25.2 26.1 25.2 24.2   BUN mg/dL 15 16 11 10   CREATININE mg/dL 0.81 0.76 0.72 0.78   GLUCOSE mg/dL 87 97 103* 90   Estimated Creatinine Clearance: 45.9 mL/min (by C-G formula based on SCr of 0.81 mg/dL).  Results from last 7 days   Lab Units 07/10/21  0304 07/09/21  0433 07/08/21  0458 07/05/21  0929   ALBUMIN g/dL 3.20* 3.10* 3.00* 3.60   BILIRUBIN mg/dL <0.2  --   --  0.6   ALK PHOS U/L 63  --   --  64   AST (SGOT) U/L 15  --   --  14   ALT (SGPT) U/L 8  --   --  9     Results from last 7 days   Lab Units 07/10/21  0304 07/09/21 0433 07/08/21 0458 07/07/21  0607 07/05/21 2041 07/05/21  0929   CALCIUM mg/dL 9.1 8.9 8.6 8.4*  --  8.8   ALBUMIN g/dL 3.20* 3.10* 3.00*  --   --  3.60   MAGNESIUM mg/dL 2.3  --   --   --   --   --    PHOSPHORUS mg/dL 3.0 3.1 3.3  --  2.1*  --        Results from last 7 days   Lab Units 07/05/21 2041 07/05/21  0929   TROPONIN T ng/mL  --  <0.010   PROBNP pg/mL  --  2,029.0*   D DIMER QUANT MCGFEU/mL 3.00*  --      Results from last 7 days   Lab Units  07/05/21 2151 07/05/21  1020 07/05/21  0929   SODIUM UR mmol/L 42  --   --    CREATININE UR mg/dL  --  59.7  --    CHLORIDE UR mmol/L  --  38  --    OSMOLALITY UR mOsm/kg  --  255  --    URIC ACID mg/dL  --   --  5.0         Invalid input(s): LDLCALC  Results from last 7 days   Lab Units 07/05/21 2151 07/05/21  1107 07/05/21  0929   BLOODCX   --  Escherichia coli* No growth at 5 days   URINECX  No growth  --   --    BCIDPCR   --  Escherichia coli. Identification by BCID PCR.*  --      Results from last 7 days   Lab Units 07/05/21  0939   COVID19  Not Detected       Test Results Pending at Discharge     Pending Labs     Order Current Status    Blood Culture - Blood, Arm, Left In process    Blood Culture - Blood, Arm, Right In process          Discharge Details        Discharge Medications      New Medications      Instructions Start Date   ferrous sulfate 325 (65 FE) MG tablet   325 mg, Oral, Every Other Day      levoFLOXacin 750 MG tablet  Commonly known as: Levaquin   750 mg, Oral, Daily         Continue These Medications      Instructions Start Date   acidophilus tablet tablet   1 tablet, Oral, Daily      aspirin 81 MG EC tablet   81 mg, Oral, Nightly      azelastine 0.1 % nasal spray  Commonly known as: ASTELIN   2 sprays, Nasal, 2 Times Daily, Use in each nostril as directed       Bepreve 1.5 % solution  Generic drug: Bepotastine Besilate   Take As Directed      Breo Ellipta 100-25 MCG/INH inhaler  Generic drug: Fluticasone Furoate-Vilanterol   1 puff, Inhalation, Every Morning      colesevelam 625 MG tablet  Commonly known as: WELCHOL   1,875 mg, Oral, 2 times daily      diphenoxylate-atropine 2.5-0.025 MG per tablet  Commonly known as: LOMOTIL   1 tablet, Oral, 4 Times Daily PRN      DULoxetine 60 MG capsule  Commonly known as: CYMBALTA   60 mg, Oral, 2 Times Daily      irbesartan 75 MG tablet  Commonly known as: AVAPRO   75 mg, Oral, Nightly      levothyroxine 100 MCG tablet  Commonly known as: SYNTHROID,  LEVOTHROID   75 mcg, Oral, Daily      metoprolol succinate XL 25 MG 24 hr tablet  Commonly known as: TOPROL-XL   25 mg, Oral, 2 Times Daily      mometasone 50 MCG/ACT nasal spray  Commonly known as: NASONEX   2 sprays, Nasal, Daily      montelukast 10 MG tablet  Commonly known as: SINGULAIR   10 mg, Oral, Nightly      mycophenolate 500 MG tablet  Commonly known as: CELLCEPT   1,000 mg, Oral, 2 Times Daily, 2 tabs bid      nitroglycerin 0.4 MG SL tablet  Commonly known as: NITROSTAT   0.4 mg, Sublingual, Every 5 Minutes PRN      pantoprazole 20 MG EC tablet  Commonly known as: PROTONIX   20 mg, Oral, Daily      pravastatin 20 MG tablet  Commonly known as: PRAVACHOL   10 mg, Oral, Daily      predniSONE 10 MG tablet  Commonly known as: DELTASONE   10 mg, Oral, Daily      Privigen 20 GM/200ML solution infusion  Generic drug: immune globulin (human)   Intravenous, Once, Every 5 weeks       ProAir  (90 Base) MCG/ACT inhaler  Generic drug: albuterol sulfate HFA   2 puffs, Inhalation, Every 6 Hours PRN      raloxifene 60 MG tablet  Commonly known as: EVISTA   60 mg, Oral, Nightly      rivaroxaban 20 MG tablet  Commonly known as: XARELTO   20 mg, Oral, Daily With Dinner      vitamin B-12 100 MCG tablet  Commonly known as: CYANOCOBALAMIN   50 mcg, Oral, Daily      vitamin D 1.25 MG (64425 UT) capsule capsule  Commonly known as: ERGOCALCIFEROL   50,000 Units, Oral, Every 7 Days, Takes on Wednesdays             Allergies   Allergen Reactions   • Neomycin Anaphylaxis   • Penicillins Swelling and Rash     Patient reports tolerating cephalexin on multiple occasions, most recently in November 2020. Tolerated ceftriaxone July 2021.  Patient reports that initial reaction to penicillin was 30-40 years ago, but she was unsure which penicillin drug it was.    • Hydrocodone Itching and Rash   • Rosuvastatin Other (See Comments)     Muscle cramps         Discharge Disposition:  Home or Self Care    Discharge Diet:  Diet Order    Procedures   • Diet Regular; Cardiac       Discharge Activity:   as tolerated    CODE STATUS:    Code Status and Medical Interventions:   Ordered at: 07/05/21 1443     Code Status:    CPR     Medical Interventions (Level of Support Prior to Arrest):    Full       Future Appointments   Date Time Provider Department Center   10/6/2021 10:00 AM [facility] ECHO/VAS BACK [facility] ECHO [facility]   10/6/2021 11:00 AM Katia, Aimee, APRN [facility]     Additional Instructions for the Follow-ups that You Need to Schedule     Discharge Follow-up with PCP   As directed       Currently Documented PCP:    Manda Keenan MD    PCP Phone Number:    [phone]     Follow Up Details: Dr. Keenan (PCP) in 1 week         Discharge Follow-up with Specified Provider: Dr. Neri (); 2 Weeks   As directed      To: Dr. Neri ()    Follow Up: 2 Weeks         Discharge Follow-up with Specified Provider: Katia NP ([facility]); 1 Month   As directed      To: Katia NP ([facility])    Follow Up: 1 Month           Follow-up Information     Manda Keenan MD .    Specialty: Pediatrics  Why: Dr. Keenan (PCP) in 1 week  Contact information:  [address] [place] [address]  [phone]                   Additional Instructions for the Follow-ups that You Need to Schedule     Discharge Follow-up with PCP   As directed       Currently Documented PCP:    Manda Keenan MD    PCP Phone Number:    [phone]     Follow Up Details: Dr. Keenan (PCP) in 1 week         Discharge Follow-up with Specified Provider: Dr. Neri (); 2 Weeks   As directed      To: Dr. Neri ()    Follow Up: 2 Weeks         Discharge Follow-up with Specified Provider: Katia NP ([employer] [employer]); 1 Month   As directed      To: Katia NP ([facility])    Follow Up: 1 Month           Time Spent on Discharge:  Greater than 30 minutes      Carlin Marquez MD  [facility]  07/10/21  13:38 EDT

## 2021-07-11 ENCOUNTER — READMISSION MANAGEMENT (OUTPATIENT)
Dept: CALL CENTER | Facility: HOSPITAL | Age: 86
End: 2021-07-11

## 2021-07-11 NOTE — OUTREACH NOTE
Prep Survey      Responses   [facility] facility patient discharged from?  [facility]   Is LACE score < 7 ?  No   Emergency Room discharge w/ pulse ox?  No   Eligibility  Readm Mgmt   Discharge diagnosis  Sepsis/UTI/Ecoli bacteremia   Does the patient have one of the following disease processes/diagnoses(primary or secondary)?  Sepsis   Does the patient have Home health ordered?  Yes   What is the Home health agency?   SNF vs Home but not listed   Is there a DME ordered?  No   Prep survey completed?  Yes          Mellisa Nguyen RN

## 2021-07-12 NOTE — CASE MANAGEMENT/SOCIAL WORK
Case Management Discharge Note      Final Note: Home with [facility] to see. Transport by private auto.    Provided Post Acute Provider List?: Yes  Post Acute Provider List: Nursing Home  Provided Post Acute Provider Quality & Resource List?: Yes  Post Acute Provider Quality and Resource List: Nursing Home  Delivered To: Patient, Support Person  Method of Delivery: In person    Selected Continued Care - Discharged on 7/10/2021 Admission date: 7/5/2021 - Discharge disposition: Home or Self Care    Destination    No services have been selected for the patient.              Durable Medical Equipment    No services have been selected for the patient.              Dialysis/Infusion    No services have been selected for the patient.              Home Medical Care Coordination complete    Service Provider Selected Services Address Phone Fax Patient Preferred    [facility]  Home Health Services [address] [place] [id number] [phone] --          Therapy    No services have been selected for the patient.              Community Resources    No services have been selected for the patient.              Community & DME    No services have been selected for the patient.                  Transportation Services  Private: Car    Final Discharge Disposition Code: 06 - home with home health care

## 2021-07-13 ENCOUNTER — TRANSCRIBE ORDERS (OUTPATIENT)
Dept: HOME HEALTH SERVICES | Facility: HOME HEALTHCARE | Age: 86
End: 2021-07-13

## 2021-07-13 ENCOUNTER — HOME HEALTH ADMISSION (OUTPATIENT)
Dept: HOME HEALTH SERVICES | Facility: HOME HEALTHCARE | Age: 86
End: 2021-07-13

## 2021-07-13 DIAGNOSIS — I50.43 ACUTE ON CHRONIC COMBINED SYSTOLIC AND DIASTOLIC CHF (CONGESTIVE HEART FAILURE) (HCC): Primary | ICD-10-CM

## 2021-07-13 DIAGNOSIS — N39.0 URINARY TRACT INFECTION WITHOUT HEMATURIA, SITE UNSPECIFIED: ICD-10-CM

## 2021-07-14 ENCOUNTER — HOME CARE VISIT (OUTPATIENT)
Dept: HOME HEALTH SERVICES | Facility: HOME HEALTHCARE | Age: 86
End: 2021-07-14

## 2021-07-14 ENCOUNTER — READMISSION MANAGEMENT (OUTPATIENT)
Dept: CALL CENTER | Facility: HOSPITAL | Age: 86
End: 2021-07-14

## 2021-07-14 VITALS
WEIGHT: 148 LBS | HEART RATE: 84 BPM | TEMPERATURE: 97.3 F | DIASTOLIC BLOOD PRESSURE: 66 MMHG | SYSTOLIC BLOOD PRESSURE: 122 MMHG | HEIGHT: 60 IN | RESPIRATION RATE: 20 BRPM | OXYGEN SATURATION: 97 % | BODY MASS INDEX: 29.06 KG/M2

## 2021-07-14 LAB
BACTERIA SPEC AEROBE CULT: NORMAL
BACTERIA SPEC AEROBE CULT: NORMAL

## 2021-07-14 PROCEDURE — G0299 HHS/HOSPICE OF RN EA 15 MIN: HCPCS

## 2021-07-14 NOTE — OUTREACH NOTE
Case Management Call Center Follow-up      Responses   [facility] Call Center Tracking Reason?  HH Delay: [facility]   Has the Call Center Case Management Follow-up issue been resolved?  Yes   Follow-up Comments  Call placed to [facility] to discuss.  They state that pt will be seen today and they will call her to discuss.  Call placed later to pt to make sure this had occurred and she reports that they have called and will be there this afternoon.  ISMAEL Morley RN    7/14/2021, 12:15 EDT

## 2021-07-14 NOTE — OUTREACH NOTE
Sepsis Week 1 Survey      Responses   [facility] patient discharged from?  [facility]   Does the patient have one of the following disease processes/diagnoses(primary or secondary)?  Sepsis   Week 1 attempt successful?  Yes   Call start time  1105   Call end time  1109   Discharge diagnosis  Sepsis/UTI/Ecoli bacteremia   What is the Home health agency?   [facility] called her Monday but she has not heard from them again.    Has home health visited the patient within 72 hours of discharge?  No   Home health interventions  Other [emailed CM]   Psychosocial issues?  No   Comments  Pt c/o fatigue but reports no dysuria, fever or other issues at this time. She is aggravated that HH said that they would be here within 48 hrs but did not show up.    Did the patient receive a copy of their discharge instructions?  Yes   Nursing interventions  Reviewed instructions with patient   What is the patient's perception of their health status since discharge?  Improving   Nursing interventions  Nurse provided patient education   Is the patient/caregiver able to teach back Sepsis?  S - Shivering,fever or very cold, P - Pale or discolored skin, I -   I feel like I might die-a feeling of hopelessness, E - Extreme pain or generalized discomfort (worst ever,especially abdomen), S - Sleepy, difficult to arouse,confused, S - Short of breath   Nursing interventions  Nurse provided patient education   Is patient/caregiver able to teach back steps to recovery at home?  Set small, achievable goals for return to baseline health, Rest and regain strength, Eat a balanced diet   Is the patient/caregiver able to teach back signs and symptoms of worsening condition:  Fever, Rapid heart rate (>90), Altered mental status(confusion/coma)   Is the patient/caregiver able to teach back the hierarchy of who to call/visit for symptoms/problems? PCP, Specialist, Home health nurse, Urgent Care, ED, 911  Yes   Week 1 call completed?  Yes          Rhona OLIVAS  ISMAEL Javier

## 2021-07-15 ENCOUNTER — HOME CARE VISIT (OUTPATIENT)
Dept: HOME HEALTH SERVICES | Facility: HOME HEALTHCARE | Age: 86
End: 2021-07-15

## 2021-07-15 VITALS
SYSTOLIC BLOOD PRESSURE: 116 MMHG | HEART RATE: 87 BPM | TEMPERATURE: 97.4 F | OXYGEN SATURATION: 91 % | DIASTOLIC BLOOD PRESSURE: 62 MMHG | RESPIRATION RATE: 20 BRPM

## 2021-07-15 PROCEDURE — G0151 HHCP-SERV OF PT,EA 15 MIN: HCPCS

## 2021-07-15 NOTE — HOME HEALTH
Patient up walking with walker upon arrival. Reported getting dc'd from [facility] with UTI on 7/10/21. Patient denies pain complaints.    Skilled Physical Therapy necessary to address decreased functional mobility, limited ambulation, muscle weakness from recent hospitalization for UTI. Without skilled physical therapy, patient at risk for falls, increased burden of care, rehospitalization.     PLAN FOR NEXT VISIT:  --Continue progressing home exercise program to improve muscle weakness  --Continue gait training to improve ambulation tolerance

## 2021-07-16 ENCOUNTER — HOME CARE VISIT (OUTPATIENT)
Dept: HOME HEALTH SERVICES | Facility: HOME HEALTHCARE | Age: 86
End: 2021-07-16

## 2021-07-16 VITALS
OXYGEN SATURATION: 100 % | TEMPERATURE: 95.7 F | DIASTOLIC BLOOD PRESSURE: 58 MMHG | SYSTOLIC BLOOD PRESSURE: 116 MMHG | RESPIRATION RATE: 20 BRPM | HEART RATE: 88 BPM

## 2021-07-16 PROCEDURE — G0299 HHS/HOSPICE OF RN EA 15 MIN: HCPCS

## 2021-07-17 NOTE — PROGRESS NOTES
Enter Query Response Below      Query Response: Unable to determine              If applicable, please update the problem list.           Patient: Olena Myers        : 1935  Account: [id number]           Admit Date:          Options to Respond to Query:    1. Access the Encounter     a. From the To-Do Side bar, click Respond With Note.     b. Click New Note     c. Answer query within the yellow box.                d. Update the Problem List if applicable.     Dr. Sahu,    Patient with documented metabolic encephalopathy. Patient's sons noticed yesterday and the morning of admission patient to be slightly confused. During the physical exam per ED MD patient noted to be alert, moving all extremities, following commands and mental status normal/baseline. GCS 15 per nursing flowsheet throughout hospital stay. Patient diagnosed with hyponatremia, sepsis, diastolic CHF, myasthenia gravis and anemia. Treated with IV fluids, antibiotics and monitoring neuro status and labs.    After study, was metabolic encephalopathy clinically supported during this admission?    Yes, please include additional clinical indicators:____________  No  Other- specify________  Unable to determine       By submitting this query, we are merely seeking further clarification of documentation to accurately reflect all conditions that you are monitoring, evaluating, treating or that extend the hospitalization or utilize additional resources of care. Please utilize your independent clinical judgment when addressing the question(s) above.     This query and your response, once completed, will be entered into the legal medical record.    Sincerely,  [email or web address]  Clinical Documentation Integrity Program

## 2021-07-19 ENCOUNTER — HOME CARE VISIT (OUTPATIENT)
Dept: HOME HEALTH SERVICES | Facility: HOME HEALTHCARE | Age: 86
End: 2021-07-19

## 2021-07-19 VITALS
HEART RATE: 72 BPM | OXYGEN SATURATION: 96 % | TEMPERATURE: 96.5 F | SYSTOLIC BLOOD PRESSURE: 116 MMHG | RESPIRATION RATE: 20 BRPM | DIASTOLIC BLOOD PRESSURE: 68 MMHG

## 2021-07-19 PROCEDURE — G0155 HHCP-SVS OF CSW,EA 15 MIN: HCPCS

## 2021-07-19 PROCEDURE — G0299 HHS/HOSPICE OF RN EA 15 MIN: HCPCS

## 2021-07-19 NOTE — HOME HEALTH
DANITA duvall with patient. Patient interested in meal delivery, Spoke with  at [employer] in [facility]. There is a 2 week period before being able to further assess and start. Provided patient information on [employer] for information on lunch program. Provided resources [employer] for additional care in home. Patient has medical alert and reports that [employer] is grocery shopping and assisting. Provided information and contact information on [employer] program for transportation to medical appts.

## 2021-07-20 ENCOUNTER — HOME CARE VISIT (OUTPATIENT)
Dept: HOME HEALTH SERVICES | Facility: HOME HEALTHCARE | Age: 86
End: 2021-07-20

## 2021-07-20 VITALS
SYSTOLIC BLOOD PRESSURE: 122 MMHG | OXYGEN SATURATION: 94 % | DIASTOLIC BLOOD PRESSURE: 62 MMHG | TEMPERATURE: 96.8 F | HEART RATE: 85 BPM

## 2021-07-20 PROCEDURE — G0151 HHCP-SERV OF PT,EA 15 MIN: HCPCS

## 2021-07-22 ENCOUNTER — HOME CARE VISIT (OUTPATIENT)
Dept: HOME HEALTH SERVICES | Facility: HOME HEALTHCARE | Age: 86
End: 2021-07-22

## 2021-07-22 VITALS
OXYGEN SATURATION: 97 % | RESPIRATION RATE: 20 BRPM | TEMPERATURE: 97.7 F | HEART RATE: 80 BPM | DIASTOLIC BLOOD PRESSURE: 60 MMHG | SYSTOLIC BLOOD PRESSURE: 118 MMHG

## 2021-07-22 PROCEDURE — G0299 HHS/HOSPICE OF RN EA 15 MIN: HCPCS

## 2021-07-23 ENCOUNTER — HOME CARE VISIT (OUTPATIENT)
Dept: HOME HEALTH SERVICES | Facility: HOME HEALTHCARE | Age: 86
End: 2021-07-23

## 2021-07-27 ENCOUNTER — HOME CARE VISIT (OUTPATIENT)
Dept: HOME HEALTH SERVICES | Facility: HOME HEALTHCARE | Age: 86
End: 2021-07-27

## 2021-07-27 VITALS
HEART RATE: 92 BPM | OXYGEN SATURATION: 98 % | SYSTOLIC BLOOD PRESSURE: 130 MMHG | TEMPERATURE: 97.7 F | RESPIRATION RATE: 18 BRPM | DIASTOLIC BLOOD PRESSURE: 68 MMHG

## 2021-07-27 PROCEDURE — G0151 HHCP-SERV OF PT,EA 15 MIN: HCPCS

## 2021-07-27 NOTE — HOME HEALTH
Patient walking inside to door upon arrival. Reported feeling like she is getting stronger    ASSESSMENT: Patient progressing towards goals, planned discharge next visit.     PLAN FOR NEXT VISIT  --Ensure independence with home exercise program  --Ensure independence with ambulation   --Ensure independence with transfers

## 2021-07-28 ENCOUNTER — HOME CARE VISIT (OUTPATIENT)
Dept: HOME HEALTH SERVICES | Facility: HOME HEALTHCARE | Age: 86
End: 2021-07-28

## 2021-07-28 VITALS
SYSTOLIC BLOOD PRESSURE: 124 MMHG | OXYGEN SATURATION: 96 % | HEART RATE: 88 BPM | DIASTOLIC BLOOD PRESSURE: 60 MMHG | TEMPERATURE: 96.9 F

## 2021-07-28 PROCEDURE — G0299 HHS/HOSPICE OF RN EA 15 MIN: HCPCS

## 2021-07-29 ENCOUNTER — HOME CARE VISIT (OUTPATIENT)
Dept: HOME HEALTH SERVICES | Facility: HOME HEALTHCARE | Age: 86
End: 2021-07-29

## 2021-07-29 VITALS
OXYGEN SATURATION: 93 % | TEMPERATURE: 97.4 F | RESPIRATION RATE: 18 BRPM | DIASTOLIC BLOOD PRESSURE: 58 MMHG | SYSTOLIC BLOOD PRESSURE: 106 MMHG | HEART RATE: 87 BPM

## 2021-07-29 PROCEDURE — G0151 HHCP-SERV OF PT,EA 15 MIN: HCPCS

## 2021-07-29 NOTE — HOME HEALTH
Patient up answering door upon arrival. Reported feeling mild pain in left knee knowing the rain was coming

## 2021-08-03 ENCOUNTER — HOME CARE VISIT (OUTPATIENT)
Dept: HOME HEALTH SERVICES | Facility: HOME HEALTHCARE | Age: 86
End: 2021-08-03

## 2021-10-06 ENCOUNTER — OFFICE VISIT (OUTPATIENT)
Dept: CARDIOLOGY | Facility: CLINIC | Age: 86
End: 2021-10-06

## 2021-10-06 ENCOUNTER — HOSPITAL ENCOUNTER (OUTPATIENT)
Dept: CARDIOLOGY | Facility: HOSPITAL | Age: 86
Discharge: HOME | End: 2021-10-06
Admitting: INTERNAL MEDICINE

## 2021-10-06 VITALS
DIASTOLIC BLOOD PRESSURE: 64 MMHG | HEART RATE: 79 BPM | BODY MASS INDEX: 31.41 KG/M2 | HEIGHT: 60 IN | SYSTOLIC BLOOD PRESSURE: 120 MMHG | WEIGHT: 160 LBS

## 2021-10-06 DIAGNOSIS — I35.1 NONRHEUMATIC AORTIC VALVE INSUFFICIENCY: ICD-10-CM

## 2021-10-06 DIAGNOSIS — I65.23 BILATERAL CAROTID ARTERY STENOSIS: ICD-10-CM

## 2021-10-06 DIAGNOSIS — I48.0 PAROXYSMAL ATRIAL FIBRILLATION (HCC): Primary | ICD-10-CM

## 2021-10-06 DIAGNOSIS — I10 ESSENTIAL HYPERTENSION: ICD-10-CM

## 2021-10-06 PROCEDURE — 93880 EXTRACRANIAL BILAT STUDY: CPT | Performed by: INTERNAL MEDICINE

## 2021-10-06 PROCEDURE — 99214 OFFICE O/P EST MOD 30 MIN: CPT | Performed by: NURSE PRACTITIONER

## 2021-10-06 PROCEDURE — 93880 EXTRACRANIAL BILAT STUDY: CPT

## 2021-10-06 RX ORDER — LEVOTHYROXINE SODIUM 88 UG/1
88 TABLET ORAL DAILY
COMMUNITY

## 2021-10-06 NOTE — PROGRESS NOTES
Date of Office Visit: 10/06/21  Encounter Provider: MILA Macario  Place of Service: [facility] CARDIOLOGY  Patient Name: Olena Myers  :1935    Chief Complaint   Patient presents with   • Coronary artery disease involving native coronary artery of    • Nonrheumatic aortic valve stenosis   • Paroxysmal atrial fibrillation (   • Edema   • Shortness of Breath   • Follow-up   :     HPI: Olena Myers is a 86 y.o. female  with history of hypertension, hyperlipidemia, carotid artery stenosis, paroxysmal atrial fibrillation, aortic valve stenosis, and coronary artery disease.        She has been followed by Dr. Olvin Hernandez.  I will visit with her for the first time today and have reviewed her medical record.     She has history of previous angioplasty and bare-metal stent of the LAD.  Normal left ventricular systolic function at that time.  She had a follow-up perfusion stress test for surgery clearance and that was normal.  She then had abrupt loss of vision in her left eye.  She had a CTA in [facility] which showed no real stenoses.  Neurology felt that she had a cerebrovascular event.  Was later found to have atrial fibrillation and placed on beta-blocker.  She converted with amiodarone but then had some increased COPD and evidence of fibrosis so amiodarone was later stopped.  In 2021 she had positive occult stool, nausea, cough, confusion and fever.  She was diagnosed with UTI and blood cultures were positive for E. coli.  She treated with antibiotics.         She presents today for reassessment.  She reports fatigue which is chronic for she believes is from myasthenia gravis.  Had some left knee and left ankle swelling on occasion but no bilateral lower extremity swelling.  She denies chest pain tightness pressure, palpitation, near-syncope or syncope.  She ambulates with a cane.  She is compliant with CPAP.      Allergies   Allergen Reactions   •  "Neomycin Anaphylaxis   • Penicillins Swelling and Rash     Patient reports tolerating cephalexin on multiple occasions, most recently in November 2020. Tolerated ceftriaxone July 2021.  Patient reports that initial reaction to penicillin was 30-40 years ago, but she was unsure which penicillin drug it was.    • Hydrocodone Itching and Rash   • Rosuvastatin Other (See Comments)     Muscle cramps           Family and social history reviewed.     ROS  All other systems were reviewed and are negative          Objective:     Vitals:    10/06/21 1044   BP: 120/64   BP Location: Left arm   Patient Position: Sitting   Pulse: 79   Weight: 72.6 kg (160 lb)   Height: 152.4 cm (60\")     Body mass index is 31.25 kg/m².    PHYSICAL EXAM:  Physical Exam    Procedures      Current Outpatient Medications   Medication Sig Dispense Refill   • albuterol (PROAIR HFA) 108 (90 BASE) MCG/ACT inhaler Inhale 2 puffs Every 6 (Six) Hours As Needed for wheezing or shortness of air.     • aspirin 81 MG EC tablet Take 81 mg by mouth Every Night.     • azelastine (ASTELIN) 0.1 % nasal spray 2 sprays into the nostril(s) as directed by provider 2 (Two) Times a Day. Use in each nostril as directed     • busPIRone (BUSPAR) 10 MG tablet Take 10 mg by mouth 2 (Two) Times a Day.     • colesevelam (WELCHOL) 625 MG tablet Take 1,250 mg by mouth 2 (two) times a day.     • diphenoxylate-atropine (LOMOTIL) 2.5-0.025 MG per tablet Take 1 tablet by mouth 4 (Four) Times a Day As Needed for Diarrhea.     • DULoxetine (CYMBALTA) 60 MG capsule Take 60 mg by mouth 2 (Two) Times a Day.     • Fluticasone-Umeclidin-Vilant (Trelegy Ellipta) 100-62.5-25 MCG/INH inhaler Inhale 1 puff Daily.     • irbesartan (AVAPRO) 75 MG tablet Take 75 mg by mouth Every Night.     • levothyroxine (SYNTHROID, LEVOTHROID) 88 MCG tablet Take 88 mcg by mouth Daily.     • metoprolol succinate XL (TOPROL-XL) 25 MG 24 hr tablet Take 1 tablet by mouth 2 (Two) Times a Day. 180 tablet 3   • " montelukast (SINGULAIR) 10 MG tablet Take 10 mg by mouth Every Night.     • mycophenolate (CELLCEPT) 500 MG tablet Take 1,000 mg by mouth 2 (Two) Times a Day. 2 tabs bid      • nitroglycerin (NITROSTAT) 0.4 MG SL tablet Place 1 tablet under the tongue Every 5 (Five) Minutes As Needed for Chest Pain. 20 tablet 1   • pantoprazole (PROTONIX) 20 MG EC tablet Take 40 mg by mouth Daily.     • predniSONE (DELTASONE) 10 MG tablet Take 10 mg by mouth Daily.     • raloxifene (EVISTA) 60 MG tablet Take 60 mg by mouth Every Night.     • rivaroxaban (XARELTO) 20 MG tablet Take 1 tablet by mouth Daily With Dinner. 90 tablet 3   • triamcinolone (KENALOG) 0.1 % ointment Apply 1 application topically to the appropriate area as directed 2 (Two) Times a Day.     • vitamin B-12 (CYANOCOBALAMIN) 100 MCG tablet Take 50 mcg by mouth Daily.     • vitamin D (ERGOCALCIFEROL) 44837 UNITS capsule capsule Take 50,000 Units by mouth Every 7 (Seven) Days. Takes on Wednesdays     • Bepotastine Besilate (Bepreve) 1.5 % solution Administer 2 drops to both eyes 2 (Two) Times a Day As Needed.       No current facility-administered medications for this visit.     Assessment:       Diagnosis Plan   1. Paroxysmal atrial fibrillation (HCC)     2. Bilateral carotid artery stenosis     3. Essential hypertension     4. Nonrheumatic aortic valve insufficiency          No orders of the defined types were placed in this encounter.        Plan:    1. 86-year-old female with history of severe coronary artery disease and previous bare-metal stent and angioplasty of the LAD in 1997.  Last stress test June 2016 was negative for ischemia  2.  Hypertension blood pressure 110/66 over the weekend  3.  Hyperlipidemia.  She is no longer on statin therapy per PCP  4.  History of  aortic stenoses with mild to moderate regurgitation-last echo 07/2021 unchanged from prior  5.  Paroxysmal atrial fibrillation.  She has a [id number] score of 8.  She is anticoagulated with  Xarelto.  Had fibrotic changes on the lungs with amiodarone so that was discontinued  6 history of COPD follows with pulmonary  7.  Carotid artery stenosis she is to have follow-up duplex and she returns  8.  History of probable TIA  9.  Myasthenia gravis follows with neurology  10.  She has aortic plaque  11.  Status post E. coli sepsis July 2021  12.  Chronic anemia with history of ulcerative esophagitis and peptic ulcer disease.  Follows with [facility]  13.  Diastolic congestive heart failure secondary to diastolic dysfunction and valvular heart disease.  She appears euvolemic on examination today.                It has been a pleasure to participate in this patient's care.      Thank you,  MILA Macario      **I used Dragon to dictate this note:**

## 2021-10-07 LAB
BH CV XLRA MEAS LEFT DIST CCA EDV: 15.9 CM/SEC
BH CV XLRA MEAS LEFT DIST CCA PSV: 56.8 CM/SEC
BH CV XLRA MEAS LEFT DIST ICA EDV: -27.8 CM/SEC
BH CV XLRA MEAS LEFT DIST ICA PSV: -92.5 CM/SEC
BH CV XLRA MEAS LEFT ICA/CCA RATIO: 1.6
BH CV XLRA MEAS LEFT MID CCA EDV: 20.8 CM/SEC
BH CV XLRA MEAS LEFT MID CCA PSV: 71.3 CM/SEC
BH CV XLRA MEAS LEFT MID ICA EDV: -19.2 CM/SEC
BH CV XLRA MEAS LEFT MID ICA PSV: -73.4 CM/SEC
BH CV XLRA MEAS LEFT PROX ECA PSV: -75.4 CM/SEC
BH CV XLRA MEAS LEFT PROX ICA EDV: -18.5 CM/SEC
BH CV XLRA MEAS LEFT PROX ICA PSV: -68.7 CM/SEC
BH CV XLRA MEAS LEFT PROX SCLA PSV: 75 CM/SEC
BH CV XLRA MEAS LEFT VERTEBRAL A PSV: -33.7 CM/SEC
BH CV XLRA MEAS RIGHT DIST CCA EDV: 16.2 CM/SEC
BH CV XLRA MEAS RIGHT DIST CCA PSV: 65.9 CM/SEC
BH CV XLRA MEAS RIGHT DIST ICA EDV: -22 CM/SEC
BH CV XLRA MEAS RIGHT DIST ICA PSV: -77.7 CM/SEC
BH CV XLRA MEAS RIGHT ICA/CCA RATIO: 1.18
BH CV XLRA MEAS RIGHT MID CCA EDV: 17.4 CM/SEC
BH CV XLRA MEAS RIGHT MID CCA PSV: 80.8 CM/SEC
BH CV XLRA MEAS RIGHT MID ICA EDV: -15.8 CM/SEC
BH CV XLRA MEAS RIGHT MID ICA PSV: -53.8 CM/SEC
BH CV XLRA MEAS RIGHT PROX ECA PSV: -116.1 CM/SEC
BH CV XLRA MEAS RIGHT PROX ICA EDV: -12.5 CM/SEC
BH CV XLRA MEAS RIGHT PROX ICA PSV: -49.2 CM/SEC
BH CV XLRA MEAS RIGHT PROX SCLA PSV: 135 CM/SEC
BH CV XLRA MEAS RIGHT VERTEBRAL A PSV: -47.9 CM/SEC

## 2021-10-11 ENCOUNTER — TELEPHONE (OUTPATIENT)
Dept: CARDIOLOGY | Facility: CLINIC | Age: 86
End: 2021-10-11

## 2021-10-11 NOTE — TELEPHONE ENCOUNTER
Patient calling for the results of the Carotid U/S done 10/06/21.  Patient can be reached at [phone]./ ASHOK

## 2021-10-11 NOTE — TELEPHONE ENCOUNTER
Patient notified of results and verbalized understanding    Pt will need a 1yr fu, please call  Mally Ellington RN  Triage nurse

## 2022-02-15 ENCOUNTER — HOSPITAL ENCOUNTER (OUTPATIENT)
Facility: HOSPITAL | Age: 87
Setting detail: OBSERVATION
Discharge: HOME | End: 2022-02-18
Attending: EMERGENCY MEDICINE | Admitting: HOSPITALIST

## 2022-02-15 ENCOUNTER — APPOINTMENT (OUTPATIENT)
Dept: GENERAL RADIOLOGY | Facility: HOSPITAL | Age: 87
End: 2022-02-15

## 2022-02-15 DIAGNOSIS — R09.02 HYPOXIA: ICD-10-CM

## 2022-02-15 DIAGNOSIS — B34.8 RHINOVIRUS INFECTION: ICD-10-CM

## 2022-02-15 DIAGNOSIS — J44.1 COPD WITH ACUTE EXACERBATION: Primary | ICD-10-CM

## 2022-02-15 LAB
ALBUMIN SERPL-MCNC: 4.1 G/DL (ref 3.5–5.2)
ALBUMIN/GLOB SERPL: 1.8 G/DL
ALP SERPL-CCNC: 59 U/L (ref 39–117)
ALT SERPL W P-5'-P-CCNC: 15 U/L (ref 1–33)
ANION GAP SERPL CALCULATED.3IONS-SCNC: 10.7 MMOL/L (ref 5–15)
AST SERPL-CCNC: 22 U/L (ref 1–32)
B PARAPERT DNA SPEC QL NAA+PROBE: NOT DETECTED
B PERT DNA SPEC QL NAA+PROBE: NOT DETECTED
BASOPHILS # BLD AUTO: 0.03 10*3/MM3 (ref 0–0.2)
BASOPHILS NFR BLD AUTO: 0.3 % (ref 0–1.5)
BILIRUB SERPL-MCNC: 0.2 MG/DL (ref 0–1.2)
BUN SERPL-MCNC: 15 MG/DL (ref 8–23)
BUN/CREAT SERPL: 14.7 (ref 7–25)
C PNEUM DNA NPH QL NAA+NON-PROBE: NOT DETECTED
CALCIUM SPEC-SCNC: 9.7 MG/DL (ref 8.6–10.5)
CHLORIDE SERPL-SCNC: 104 MMOL/L (ref 98–107)
CO2 SERPL-SCNC: 23.3 MMOL/L (ref 22–29)
CREAT SERPL-MCNC: 1.02 MG/DL (ref 0.57–1)
DEPRECATED RDW RBC AUTO: 46.5 FL (ref 37–54)
EOSINOPHIL # BLD AUTO: 0 10*3/MM3 (ref 0–0.4)
EOSINOPHIL NFR BLD AUTO: 0 % (ref 0.3–6.2)
ERYTHROCYTE [DISTWIDTH] IN BLOOD BY AUTOMATED COUNT: 14.2 % (ref 12.3–15.4)
FLUAV SUBTYP SPEC NAA+PROBE: NOT DETECTED
FLUBV RNA ISLT QL NAA+PROBE: NOT DETECTED
GFR SERPL CREATININE-BSD FRML MDRD: 52 ML/MIN/1.73
GFR SERPL CREATININE-BSD FRML MDRD: 63 ML/MIN/1.73
GLOBULIN UR ELPH-MCNC: 2.3 GM/DL
GLUCOSE SERPL-MCNC: 127 MG/DL (ref 65–99)
HADV DNA SPEC NAA+PROBE: NOT DETECTED
HCOV 229E RNA SPEC QL NAA+PROBE: NOT DETECTED
HCOV HKU1 RNA SPEC QL NAA+PROBE: NOT DETECTED
HCOV NL63 RNA SPEC QL NAA+PROBE: NOT DETECTED
HCOV OC43 RNA SPEC QL NAA+PROBE: NOT DETECTED
HCT VFR BLD AUTO: 31 % (ref 34–46.6)
HGB BLD-MCNC: 9.7 G/DL (ref 12–15.9)
HMPV RNA NPH QL NAA+NON-PROBE: NOT DETECTED
HPIV1 RNA ISLT QL NAA+PROBE: NOT DETECTED
HPIV2 RNA SPEC QL NAA+PROBE: NOT DETECTED
HPIV3 RNA NPH QL NAA+PROBE: NOT DETECTED
HPIV4 P GENE NPH QL NAA+PROBE: NOT DETECTED
IMM GRANULOCYTES # BLD AUTO: 0.08 10*3/MM3 (ref 0–0.05)
IMM GRANULOCYTES NFR BLD AUTO: 0.7 % (ref 0–0.5)
LYMPHOCYTES # BLD AUTO: 0.44 10*3/MM3 (ref 0.7–3.1)
LYMPHOCYTES NFR BLD AUTO: 4 % (ref 19.6–45.3)
M PNEUMO IGG SER IA-ACNC: NOT DETECTED
MCH RBC QN AUTO: 28.4 PG (ref 26.6–33)
MCHC RBC AUTO-ENTMCNC: 31.3 G/DL (ref 31.5–35.7)
MCV RBC AUTO: 90.6 FL (ref 79–97)
MONOCYTES # BLD AUTO: 0.52 10*3/MM3 (ref 0.1–0.9)
MONOCYTES NFR BLD AUTO: 4.8 % (ref 5–12)
NEUTROPHILS NFR BLD AUTO: 9.82 10*3/MM3 (ref 1.7–7)
NEUTROPHILS NFR BLD AUTO: 90.2 % (ref 42.7–76)
NRBC BLD AUTO-RTO: 0 /100 WBC (ref 0–0.2)
NT-PROBNP SERPL-MCNC: 622 PG/ML (ref 0–1800)
PLATELET # BLD AUTO: 319 10*3/MM3 (ref 140–450)
PMV BLD AUTO: 8.8 FL (ref 6–12)
POTASSIUM SERPL-SCNC: 4.5 MMOL/L (ref 3.5–5.2)
PROCALCITONIN SERPL-MCNC: 0.21 NG/ML (ref 0–0.25)
PROT SERPL-MCNC: 6.4 G/DL (ref 6–8.5)
QT INTERVAL: 352 MS
RBC # BLD AUTO: 3.42 10*6/MM3 (ref 3.77–5.28)
RHINOVIRUS RNA SPEC NAA+PROBE: DETECTED
RSV RNA NPH QL NAA+NON-PROBE: NOT DETECTED
SARS-COV-2 RNA NPH QL NAA+NON-PROBE: NOT DETECTED
SODIUM SERPL-SCNC: 138 MMOL/L (ref 136–145)
TROPONIN T SERPL-MCNC: <0.01 NG/ML (ref 0–0.03)
WBC NRBC COR # BLD: 10.89 10*3/MM3 (ref 3.4–10.8)

## 2022-02-15 PROCEDURE — 93010 ELECTROCARDIOGRAM REPORT: CPT | Performed by: INTERNAL MEDICINE

## 2022-02-15 PROCEDURE — 93005 ELECTROCARDIOGRAM TRACING: CPT | Performed by: EMERGENCY MEDICINE

## 2022-02-15 PROCEDURE — 84145 PROCALCITONIN (PCT): CPT | Performed by: EMERGENCY MEDICINE

## 2022-02-15 PROCEDURE — 94799 UNLISTED PULMONARY SVC/PX: CPT

## 2022-02-15 PROCEDURE — 99284 EMERGENCY DEPT VISIT MOD MDM: CPT

## 2022-02-15 PROCEDURE — 0202U NFCT DS 22 TRGT SARS-COV-2: CPT | Performed by: EMERGENCY MEDICINE

## 2022-02-15 PROCEDURE — 94640 AIRWAY INHALATION TREATMENT: CPT

## 2022-02-15 PROCEDURE — 84484 ASSAY OF TROPONIN QUANT: CPT | Performed by: EMERGENCY MEDICINE

## 2022-02-15 PROCEDURE — 85025 COMPLETE CBC W/AUTO DIFF WBC: CPT | Performed by: EMERGENCY MEDICINE

## 2022-02-15 PROCEDURE — 96375 TX/PRO/DX INJ NEW DRUG ADDON: CPT

## 2022-02-15 PROCEDURE — 25010000002 METHYLPREDNISOLONE PER 125 MG: Performed by: EMERGENCY MEDICINE

## 2022-02-15 PROCEDURE — 80053 COMPREHEN METABOLIC PANEL: CPT | Performed by: EMERGENCY MEDICINE

## 2022-02-15 PROCEDURE — G0378 HOSPITAL OBSERVATION PER HR: HCPCS

## 2022-02-15 PROCEDURE — 83880 ASSAY OF NATRIURETIC PEPTIDE: CPT | Performed by: EMERGENCY MEDICINE

## 2022-02-15 PROCEDURE — 71045 X-RAY EXAM CHEST 1 VIEW: CPT

## 2022-02-15 RX ORDER — SODIUM CHLORIDE 0.9 % (FLUSH) 0.9 %
10 SYRINGE (ML) INJECTION AS NEEDED
Status: DISCONTINUED | OUTPATIENT
Start: 2022-02-15 | End: 2022-02-18 | Stop reason: HOSPADM

## 2022-02-15 RX ORDER — PRAVASTATIN SODIUM 20 MG
20 TABLET ORAL NIGHTLY
COMMUNITY

## 2022-02-15 RX ORDER — METHYLPREDNISOLONE SODIUM SUCCINATE 125 MG/2ML
125 INJECTION, POWDER, LYOPHILIZED, FOR SOLUTION INTRAMUSCULAR; INTRAVENOUS ONCE
Status: COMPLETED | OUTPATIENT
Start: 2022-02-15 | End: 2022-02-15

## 2022-02-15 RX ORDER — IPRATROPIUM BROMIDE AND ALBUTEROL SULFATE 2.5; .5 MG/3ML; MG/3ML
3 SOLUTION RESPIRATORY (INHALATION) ONCE
Status: COMPLETED | OUTPATIENT
Start: 2022-02-15 | End: 2022-02-15

## 2022-02-15 RX ADMIN — IPRATROPIUM BROMIDE AND ALBUTEROL SULFATE 3 ML: .5; 3 SOLUTION RESPIRATORY (INHALATION) at 17:27

## 2022-02-15 RX ADMIN — METHYLPREDNISOLONE SODIUM SUCCINATE 125 MG: 125 INJECTION, POWDER, FOR SOLUTION INTRAMUSCULAR; INTRAVENOUS at 17:25

## 2022-02-15 NOTE — ED NOTES
Ambulated pt 02 stayed at 90% without 02 but dropped to 86% once sat down but back to 98% back in bed     Kacie Salamanca, PCT  02/15/22 [id number]

## 2022-02-15 NOTE — ED PROVIDER NOTES
EMERGENCY DEPARTMENT ENCOUNTER    Room Number:  112/1  Date of encounter:  2/16/2022  PCP: Manda Keenan MD  Historian: Patient      HPI:  Chief Complaint: Shortness of breath  A complete HPI/ROS/PMH/PSH/SH/FH are unobtainable due to: Nothing    Context: Olena Myers is a 86 y.o. female who presents to the ED c/o moderate severity shortness of breath is been worsening over the last 2 to 3 days.  Patient's had a nonproductive cough, fever and chills, and thought maybe it was allergies.  She went to the [facility] prior to this and was referred to the ER because of the severity of her dyspnea.  No other work-up was done.    Patient has history of COPD but is not oxygen dependent.  She does take 10 mg of prednisone daily but is for another condition.  She is also been using her nebulizer treatments with little effect      PAST MEDICAL HISTORY  Active Ambulatory Problems     Diagnosis Date Noted   • Pre-operative cardiovascular examination 06/10/2016   • S/p reverse total shoulder arthroplasty 07/19/2016   • Myasthenia gravis (HCC) 11/23/2016   • Paroxysmal atrial fibrillation ([facility]) 11/23/2016   • HX: long term anticoagulant use 11/23/2016   • Essential hypertension 11/23/2016   • CAD (coronary artery disease) 11/23/2016   • Atrial fibrillation ([facility]) 12/21/2016   • S/P reverse total shoulder arthroplasty, right 01/08/2019   • Acute UTI 07/05/2021   • COPD (chronic obstructive pulmonary disease) ([facility])    • Hyponatremia    • Metabolic encephalopathy    • Sleep apnea    • Sepsis ([facility]) 07/05/2021   • Dyspnea 07/05/2021   • Acute on chronic diastolic CHF (congestive heart failure) ([facility]) 07/05/2021   • Chronic diastolic CHF (congestive heart failure) ([facility]) 07/09/2021   • Heme positive stool 07/09/2021   • E coli bacteremia 07/09/2021   • Iron deficiency anemia 07/09/2021   • Current chronic use of systemic steroids 07/09/2021     Resolved Ambulatory Problems     Diagnosis Date Noted   • Pneumonia 11/22/2016   •  Loculated left pleural effusion 11/27/2016   • Rhinovirus infection 11/27/2016     Past Medical History:   Diagnosis Date   • Anemia    • Arthritis    • Asthma    • Dilation of esophagus    • Frequent urinary tract infections    • History of gastric ulcer    • History of GI bleed    • Jackson (hard of hearing)    • Hyperlipidemia    • Hypertension    • Lightheadedness    • LVH (left ventricular hypertrophy)    • MG (myasthenia gravis) ([facility])    • Mini stroke ([facility]) 10/2016, 3/2017   • OA (osteoarthritis)    • Osteoporosis    • SOB (shortness of breath)          PAST SURGICAL HISTORY  Past Surgical History:   Procedure Laterality Date   • APPENDECTOMY     • CARPAL TUNNEL RELEASE Bilateral    • CATARACT EXTRACTION Bilateral    • CORONARY STENT PLACEMENT     • TOTAL HIP ARTHROPLASTY Bilateral    • TOTAL SHOULDER ARTHROPLASTY W/ DISTAL CLAVICLE EXCISION Left 7/19/2016    Procedure: LT TOTAL SHOULDER REVERSE ARTHROPLASTY;  Surgeon: NAHOMI Solorzano MD;  Location: [facility];  Service:    • TOTAL SHOULDER ARTHROPLASTY W/ DISTAL CLAVICLE EXCISION Right 1/8/2019    Procedure: RIGHT TOTAL SHOULDER REVERSE ARTHROPLASTY;  Surgeon: Jovanny Solorzano MD;  [place] [facility] [place] [facility] [place]  Service: Orthopedics         FAMILY HISTORY  Family History   Problem Relation Age of Onset   • Heart disease Mother    • Hyperlipidemia Mother    • Stroke Mother    • Diabetes Mother    • Breast cancer Mother    • Heart disease Father    • Hyperlipidemia Father    • Stroke Father    • Malig Hyperthermia Neg Hx          SOCIAL HISTORY  Social History     Socioeconomic History   • Marital status:    Tobacco Use   • Smoking status: Never Smoker   • Smokeless tobacco: Never Used   • Tobacco comment: caffeine - coffee and coke caff. free, tea    Substance and Sexual Activity   • Alcohol use: No   • Drug use: No     Comment: caffine   • Sexual activity: Defer         ALLERGIES  Neomycin, Penicillins, Hydrocodone, and Rosuvastatin        REVIEW OF  SYSTEMS  Review of Systems     All systems reviewed and negative except for those discussed in HPI.       PHYSICAL EXAM    I have reviewed the triage vital signs and nursing notes.    ED Triage Vitals [02/15/22 1631]   Temp Heart Rate Resp BP SpO2   98 °F (36.7 °C) 82 16 142/72 94 %      Temp src Heart Rate Source Patient Position BP Location FiO2 (%)   -- -- -- -- --       Physical Exam  GENERAL: Awake and alert, mild respiratory distress  HENT: nares patent  EYES: no scleral icterus  CV: regular rhythm, regular rate  RESPIRATORY: Tachypneic, diffuse inspiratory and expiratory wheezes with diminished air exchange.  There is no accessory muscle use at this time, but I would say mild distress  ABDOMEN: soft  MUSCULOSKELETAL: no deformity, no calf tenderness or pedal edema  NEURO: alert, moves all extremities, follows commands  SKIN: warm, dry        LAB RESULTS  Recent Results (from the past 24 hour(s))   Comprehensive Metabolic Panel    Collection Time: 02/15/22  4:59 PM    Specimen: Blood   Result Value Ref Range    Glucose 127 (H) 65 - 99 mg/dL    BUN 15 8 - 23 mg/dL    Creatinine 1.02 (H) 0.57 - 1.00 mg/dL    Sodium 138 136 - 145 mmol/L    Potassium 4.5 3.5 - 5.2 mmol/L    Chloride 104 98 - 107 mmol/L    CO2 23.3 22.0 - 29.0 mmol/L    Calcium 9.7 8.6 - 10.5 mg/dL    Total Protein 6.4 6.0 - 8.5 g/dL    Albumin 4.10 3.50 - 5.20 g/dL    ALT (SGPT) 15 1 - 33 U/L    AST (SGOT) 22 1 - 32 U/L    Alkaline Phosphatase 59 39 - 117 U/L    Total Bilirubin 0.2 0.0 - 1.2 mg/dL    eGFR Non African Amer 52 (L) >60 mL/min/1.73    eGFR  African Amer 63 >60 mL/min/1.73    Globulin 2.3 gm/dL    A/G Ratio 1.8 g/dL    BUN/Creatinine Ratio 14.7 7.0 - 25.0    Anion Gap 10.7 5.0 - 15.0 mmol/L   Troponin    Collection Time: 02/15/22  4:59 PM    Specimen: Blood   Result Value Ref Range    Troponin T <0.010 0.000 - 0.030 ng/mL   BNP    Collection Time: 02/15/22  4:59 PM    Specimen: Blood   Result Value Ref Range    proBNP 622.0 0.0-1,800.0  pg/mL   Procalcitonin    Collection Time: 02/15/22  4:59 PM    Specimen: Blood   Result Value Ref Range    Procalcitonin 0.21 0.00 - 0.25 ng/mL   CBC Auto Differential    Collection Time: 02/15/22  4:59 PM    Specimen: Blood   Result Value Ref Range    WBC 10.89 (H) 3.40 - 10.80 10*3/mm3    RBC 3.42 (L) 3.77 - 5.28 10*6/mm3    Hemoglobin 9.7 (L) 12.0 - 15.9 g/dL    Hematocrit 31.0 (L) 34.0 - 46.6 %    MCV 90.6 79.0 - 97.0 fL    MCH 28.4 26.6 - 33.0 pg    MCHC 31.3 (L) 31.5 - 35.7 g/dL    RDW 14.2 12.3 - 15.4 %    RDW-SD 46.5 37.0 - 54.0 fl    MPV 8.8 6.0 - 12.0 fL    Platelets 319 140 - 450 10*3/mm3    Neutrophil % 90.2 (H) 42.7 - 76.0 %    Lymphocyte % 4.0 (L) 19.6 - 45.3 %    Monocyte % 4.8 (L) 5.0 - 12.0 %    Eosinophil % 0.0 (L) 0.3 - 6.2 %    Basophil % 0.3 0.0 - 1.5 %    Immature Grans % 0.7 (H) 0.0 - 0.5 %    Neutrophils, Absolute 9.82 (H) 1.70 - 7.00 10*3/mm3    Lymphocytes, Absolute 0.44 (L) 0.70 - 3.10 10*3/mm3    Monocytes, Absolute 0.52 0.10 - 0.90 10*3/mm3    Eosinophils, Absolute 0.00 0.00 - 0.40 10*3/mm3    Basophils, Absolute 0.03 0.00 - 0.20 10*3/mm3    Immature Grans, Absolute 0.08 (H) 0.00 - 0.05 10*3/mm3    nRBC 0.0 0.0 - 0.2 /100 WBC   ECG 12 Lead    Collection Time: 02/15/22  4:59 PM   Result Value Ref Range    QT Interval 352 ms   Respiratory Panel PCR w/COVID-19(SARS-CoV-2) BISI/TREY/DAVI/PAD/COR/MAD/MARY In-House, NP Swab in UTM/VTM, 3-4 HR TAT - Swab, Nasopharynx    Collection Time: 02/15/22  5:00 PM    Specimen: Nasopharynx; Swab   Result Value Ref Range    ADENOVIRUS, PCR Not Detected Not Detected    Coronavirus 229E Not Detected Not Detected    Coronavirus HKU1 Not Detected Not Detected    Coronavirus NL63 Not Detected Not Detected    Coronavirus OC43 Not Detected Not Detected    COVID19 Not Detected Not Detected - Ref. Range    Human Metapneumovirus Not Detected Not Detected    Human Rhinovirus/Enterovirus Detected (A) Not Detected    Influenza A PCR Not Detected Not Detected    Influenza B  PCR Not Detected Not Detected    Parainfluenza Virus 1 Not Detected Not Detected    Parainfluenza Virus 2 Not Detected Not Detected    Parainfluenza Virus 3 Not Detected Not Detected    Parainfluenza Virus 4 Not Detected Not Detected    RSV, PCR Not Detected Not Detected    Bordetella pertussis pcr Not Detected Not Detected    Bordetella parapertussis PCR Not Detected Not Detected    Chlamydophila pneumoniae PCR Not Detected Not Detected    Mycoplasma pneumo by PCR Not Detected Not Detected   CBC (No Diff)    Collection Time: 02/16/22  5:04 AM    Specimen: Blood   Result Value Ref Range    WBC 13.17 (H) 3.40 - 10.80 10*3/mm3    RBC 3.25 (L) 3.77 - 5.28 10*6/mm3    Hemoglobin 9.1 (L) 12.0 - 15.9 g/dL    Hematocrit 28.6 (L) 34.0 - 46.6 %    MCV 88.0 79.0 - 97.0 fL    MCH 28.0 26.6 - 33.0 pg    MCHC 31.8 31.5 - 35.7 g/dL    RDW 14.2 12.3 - 15.4 %    RDW-SD 45.6 37.0 - 54.0 fl    MPV 8.9 6.0 - 12.0 fL    Platelets 285 140 - 450 10*3/mm3   Basic Metabolic Panel    Collection Time: 02/16/22  5:04 AM    Specimen: Blood   Result Value Ref Range    Glucose 137 (H) 65 - 99 mg/dL    BUN 14 8 - 23 mg/dL    Creatinine 0.96 0.57 - 1.00 mg/dL    Sodium 140 136 - 145 mmol/L    Potassium 4.4 3.5 - 5.2 mmol/L    Chloride 106 98 - 107 mmol/L    CO2 19.0 (L) 22.0 - 29.0 mmol/L    Calcium 9.2 8.6 - 10.5 mg/dL    eGFR  African Amer 67 >60 mL/min/1.73    eGFR Non African Amer 55 (L) >60 mL/min/1.73    BUN/Creatinine Ratio 14.6 7.0 - 25.0    Anion Gap 15.0 5.0 - 15.0 mmol/L       Ordered the above labs and independently reviewed the results.        RADIOLOGY  XR Chest 1 View    Result Date: 2/15/2022  PORTABLE CHEST 02/15/2022 AT 5:00 PM  CLINICAL HISTORY: Dyspnea  The lungs appear fairly well-expanded and appear free of infiltrates. The heart is moderately enlarged. The pulmonary vasculature is within normal limits. A Mediport device is in place in satisfactory position in the left internal jugular vein. A left shoulder arthroplasty is  partially imaged.  IMPRESSIONS: Moderate cardiomegaly. No evidence of acute disease within the chest.  This report was finalized on 2/15/2022 5:17 PM by Dr. Jacob Baldwin M.D.        I ordered the above noted radiological studies. Reviewed by me and discussed with radiologist.  See dictation for official radiology interpretation.      PROCEDURES    Procedures      MEDICATIONS GIVEN IN ER    Medications   sodium chloride 0.9 % flush 10 mL (has no administration in time range)   nitroglycerin (NITROSTAT) SL tablet 0.4 mg (has no administration in time range)   sodium chloride 0.9 % flush 10 mL (10 mL Intravenous Given 2/16/22 0906)   sodium chloride 0.9 % flush 10 mL (has no administration in time range)   aspirin EC tablet 81 mg (has no administration in time range)   busPIRone (BUSPAR) tablet 10 mg (10 mg Oral Given 2/16/22 1108)   DULoxetine (CYMBALTA) DR capsule 60 mg (60 mg Oral Given 2/16/22 1108)   losartan (COZAAR) tablet 25 mg (25 mg Oral Given 2/16/22 0049)   levothyroxine (SYNTHROID, LEVOTHROID) tablet 88 mcg (88 mcg Oral Given 2/16/22 0905)   metoprolol succinate XL (TOPROL-XL) 24 hr tablet 25 mg (25 mg Oral Given 2/16/22 1108)   montelukast (SINGULAIR) tablet 10 mg (10 mg Oral Given 2/16/22 0049)   pantoprazole (PROTONIX) EC tablet 40 mg (40 mg Oral Given 2/16/22 0906)   pravastatin (PRAVACHOL) tablet 20 mg (20 mg Oral Given 2/16/22 0905)   raloxifene (EVISTA) tablet 60 mg (60 mg Oral Not Given 2/16/22 0053)   rivaroxaban (XARELTO) tablet 20 mg (has no administration in time range)   ipratropium-albuterol (DUO-NEB) nebulizer solution 3 mL (3 mL Nebulization Given 2/16/22 1032)   methylPREDNISolone sodium succinate (SOLU-Medrol) injection 60 mg (60 mg Intravenous Given 2/16/22 1000)   mycophenolate (CELLCEPT) tablet 500 mg (has no administration in time range)   methylPREDNISolone sodium succinate (SOLU-Medrol) injection 125 mg (125 mg Intravenous Given 2/15/22 1279)   ipratropium-albuterol (DUO-NEB)  nebulizer solution 3 mL (3 mL Nebulization Given 2/15/22 1727)         PROGRESS, DATA ANALYSIS, CONSULTS, AND MEDICAL DECISION MAKING    All labs have been independently reviewed by me.  All radiology studies have been reviewed by me and discussed with radiologist dictating the report.   EKG's independently viewed and interpreted by me.  Discussion below represents my analysis of pertinent findings related to patient's condition, differential diagnosis, treatment plan and final disposition.        ED Course as of 02/16/22 1150   Wed Feb 16, 2022   1148 CBC shows mild leukocytosis and chronic appearing anemia compared to previous.  Chemistries unremarkable [DP]   1149 Troponin and proBNP are normal, and along with an unremarkable EKG makes cardiac etiology unlikely [DP]   1149 Procalcitonin negative so I do not think pneumonia is high on the list [DP]   1149 Chest x-ray showed no obvious focal infiltrates with some chronic interstitial changes.  Respiratory viral panel was positive for human rhinovirus. [DP]   1149 EKG on arrival  Normal sinus rhythm  Normal QRS and QT  No acute ST segment changes are seen to suggest ischemia in this case, and there is no significant deviation from the most recent previous dated October 6, 2021 [DP]   1149 Patient did receive a dose of IV Solu-Medrol and DuoNeb which helped her somewhat.  She was breathing fairly comfortably at rest, but when she got up to just walk around the room she became very tachypneic and tachycardic along with dropping her O2 sats into the mid to upper 80s on room air. [DP]   1150 At this point I decided to admit the patient to the observation unit.  I spoke with the nurse practitioner who agrees to accept the patient [DP]      ED Course User Index  [DP] Kush Szymanski MD           PPE: The patient wore a surgical mask throughout the entire patient encounter. I wore an N95.    AS OF 11:50 EST VITALS:    BP - 99/56  HR - 94  TEMP - 98.5 °F (36.9 °C)  (Oral)  O2 SATS - 92%        DIAGNOSIS  Final diagnoses:   COPD with acute exacerbation (HCC)   Rhinovirus infection   Hypoxia         DISPOSITION  Discharge           Kush Szymanski MD  02/16/22 [id number]

## 2022-02-15 NOTE — ED TRIAGE NOTES
Patient to ER via ems for SOA. Patient was walking to her Dr's office to get an allergy shot when she became sSOA and went into the [facility] instead. Patient was given breathing treatment and oxygen by [facility] before EMS arrival Patient is 90-92 on 2L. Patient was recently dx with COPD. This RN in PPE patient wearing mask.

## 2022-02-16 PROBLEM — E66.9 OBESITY (BMI 30-39.9): Status: ACTIVE | Noted: 2022-02-16

## 2022-02-16 LAB
ANION GAP SERPL CALCULATED.3IONS-SCNC: 15 MMOL/L (ref 5–15)
BUN SERPL-MCNC: 14 MG/DL (ref 8–23)
BUN/CREAT SERPL: 14.6 (ref 7–25)
CALCIUM SPEC-SCNC: 9.2 MG/DL (ref 8.6–10.5)
CHLORIDE SERPL-SCNC: 106 MMOL/L (ref 98–107)
CO2 SERPL-SCNC: 19 MMOL/L (ref 22–29)
CREAT SERPL-MCNC: 0.96 MG/DL (ref 0.57–1)
DEPRECATED RDW RBC AUTO: 45.6 FL (ref 37–54)
ERYTHROCYTE [DISTWIDTH] IN BLOOD BY AUTOMATED COUNT: 14.2 % (ref 12.3–15.4)
GFR SERPL CREATININE-BSD FRML MDRD: 55 ML/MIN/1.73
GFR SERPL CREATININE-BSD FRML MDRD: 67 ML/MIN/1.73
GLUCOSE SERPL-MCNC: 137 MG/DL (ref 65–99)
HCT VFR BLD AUTO: 28.6 % (ref 34–46.6)
HGB BLD-MCNC: 9.1 G/DL (ref 12–15.9)
MCH RBC QN AUTO: 28 PG (ref 26.6–33)
MCHC RBC AUTO-ENTMCNC: 31.8 G/DL (ref 31.5–35.7)
MCV RBC AUTO: 88 FL (ref 79–97)
PLATELET # BLD AUTO: 285 10*3/MM3 (ref 140–450)
PMV BLD AUTO: 8.9 FL (ref 6–12)
POTASSIUM SERPL-SCNC: 4.4 MMOL/L (ref 3.5–5.2)
RBC # BLD AUTO: 3.25 10*6/MM3 (ref 3.77–5.28)
SODIUM SERPL-SCNC: 140 MMOL/L (ref 136–145)
WBC NRBC COR # BLD: 13.17 10*3/MM3 (ref 3.4–10.8)

## 2022-02-16 PROCEDURE — 94799 UNLISTED PULMONARY SVC/PX: CPT

## 2022-02-16 PROCEDURE — 63710000001 MYCOPHENOLATE MOFETIL PER 250 MG: Performed by: STUDENT IN AN ORGANIZED HEALTH CARE EDUCATION/TRAINING PROGRAM

## 2022-02-16 PROCEDURE — 85027 COMPLETE CBC AUTOMATED: CPT | Performed by: NURSE PRACTITIONER

## 2022-02-16 PROCEDURE — G0378 HOSPITAL OBSERVATION PER HR: HCPCS

## 2022-02-16 PROCEDURE — 25010000002 METHYLPREDNISOLONE PER 125 MG: Performed by: STUDENT IN AN ORGANIZED HEALTH CARE EDUCATION/TRAINING PROGRAM

## 2022-02-16 PROCEDURE — 94760 N-INVAS EAR/PLS OXIMETRY 1: CPT

## 2022-02-16 PROCEDURE — 96376 TX/PRO/DX INJ SAME DRUG ADON: CPT

## 2022-02-16 PROCEDURE — 80048 BASIC METABOLIC PNL TOTAL CA: CPT | Performed by: NURSE PRACTITIONER

## 2022-02-16 PROCEDURE — 94761 N-INVAS EAR/PLS OXIMETRY MLT: CPT

## 2022-02-16 PROCEDURE — 63710000001 PREDNISONE PER 1 MG: Performed by: NURSE PRACTITIONER

## 2022-02-16 PROCEDURE — 25010000002 CEFTRIAXONE PER 250 MG: Performed by: INTERNAL MEDICINE

## 2022-02-16 PROCEDURE — 96365 THER/PROPH/DIAG IV INF INIT: CPT

## 2022-02-16 RX ORDER — RALOXIFENE HYDROCHLORIDE 60 MG/1
60 TABLET, FILM COATED ORAL NIGHTLY
Status: DISCONTINUED | OUTPATIENT
Start: 2022-02-16 | End: 2022-02-18 | Stop reason: HOSPADM

## 2022-02-16 RX ORDER — METOPROLOL SUCCINATE 25 MG/1
25 TABLET, EXTENDED RELEASE ORAL 2 TIMES DAILY
Status: DISCONTINUED | OUTPATIENT
Start: 2022-02-16 | End: 2022-02-18 | Stop reason: HOSPADM

## 2022-02-16 RX ORDER — IPRATROPIUM BROMIDE AND ALBUTEROL SULFATE 2.5; .5 MG/3ML; MG/3ML
3 SOLUTION RESPIRATORY (INHALATION)
Status: DISCONTINUED | OUTPATIENT
Start: 2022-02-16 | End: 2022-02-18 | Stop reason: HOSPADM

## 2022-02-16 RX ORDER — LOSARTAN POTASSIUM 25 MG/1
25 TABLET ORAL NIGHTLY
Status: DISCONTINUED | OUTPATIENT
Start: 2022-02-16 | End: 2022-02-18 | Stop reason: HOSPADM

## 2022-02-16 RX ORDER — DULOXETIN HYDROCHLORIDE 60 MG/1
60 CAPSULE, DELAYED RELEASE ORAL 2 TIMES DAILY
Status: DISCONTINUED | OUTPATIENT
Start: 2022-02-16 | End: 2022-02-18 | Stop reason: HOSPADM

## 2022-02-16 RX ORDER — NITROGLYCERIN 0.4 MG/1
0.4 TABLET SUBLINGUAL
Status: DISCONTINUED | OUTPATIENT
Start: 2022-02-16 | End: 2022-02-18 | Stop reason: HOSPADM

## 2022-02-16 RX ORDER — PANTOPRAZOLE SODIUM 40 MG/1
40 TABLET, DELAYED RELEASE ORAL DAILY
Status: DISCONTINUED | OUTPATIENT
Start: 2022-02-16 | End: 2022-02-18 | Stop reason: HOSPADM

## 2022-02-16 RX ORDER — PRAVASTATIN SODIUM 20 MG
20 TABLET ORAL DAILY
Status: DISCONTINUED | OUTPATIENT
Start: 2022-02-16 | End: 2022-02-18 | Stop reason: HOSPADM

## 2022-02-16 RX ORDER — MONTELUKAST SODIUM 10 MG/1
10 TABLET ORAL NIGHTLY
Status: DISCONTINUED | OUTPATIENT
Start: 2022-02-16 | End: 2022-02-18 | Stop reason: HOSPADM

## 2022-02-16 RX ORDER — LEVOTHYROXINE SODIUM 88 UG/1
88 TABLET ORAL DAILY
Status: DISCONTINUED | OUTPATIENT
Start: 2022-02-16 | End: 2022-02-18 | Stop reason: HOSPADM

## 2022-02-16 RX ORDER — MYCOPHENOLATE MOFETIL 500 MG/1
500 TABLET ORAL EVERY 12 HOURS SCHEDULED
Status: DISCONTINUED | OUTPATIENT
Start: 2022-02-16 | End: 2022-02-18 | Stop reason: HOSPADM

## 2022-02-16 RX ORDER — PREDNISONE 20 MG/1
10 TABLET ORAL DAILY
Status: DISCONTINUED | OUTPATIENT
Start: 2022-02-16 | End: 2022-02-16

## 2022-02-16 RX ORDER — ASPIRIN 81 MG/1
81 TABLET ORAL NIGHTLY
Status: DISCONTINUED | OUTPATIENT
Start: 2022-02-16 | End: 2022-02-18 | Stop reason: HOSPADM

## 2022-02-16 RX ORDER — SODIUM CHLORIDE 0.9 % (FLUSH) 0.9 %
10 SYRINGE (ML) INJECTION EVERY 12 HOURS SCHEDULED
Status: DISCONTINUED | OUTPATIENT
Start: 2022-02-16 | End: 2022-02-18 | Stop reason: HOSPADM

## 2022-02-16 RX ORDER — SODIUM CHLORIDE 0.9 % (FLUSH) 0.9 %
10 SYRINGE (ML) INJECTION AS NEEDED
Status: DISCONTINUED | OUTPATIENT
Start: 2022-02-16 | End: 2022-02-18 | Stop reason: HOSPADM

## 2022-02-16 RX ORDER — METHYLPREDNISOLONE SODIUM SUCCINATE 125 MG/2ML
60 INJECTION, POWDER, LYOPHILIZED, FOR SOLUTION INTRAMUSCULAR; INTRAVENOUS EVERY 8 HOURS
Status: DISCONTINUED | OUTPATIENT
Start: 2022-02-16 | End: 2022-02-17

## 2022-02-16 RX ORDER — BUSPIRONE HYDROCHLORIDE 10 MG/1
10 TABLET ORAL 2 TIMES DAILY
Status: DISCONTINUED | OUTPATIENT
Start: 2022-02-16 | End: 2022-02-18 | Stop reason: HOSPADM

## 2022-02-16 RX ORDER — METHYLPREDNISOLONE SODIUM SUCCINATE 125 MG/2ML
60 INJECTION, POWDER, LYOPHILIZED, FOR SOLUTION INTRAMUSCULAR; INTRAVENOUS EVERY 12 HOURS
Status: DISCONTINUED | OUTPATIENT
Start: 2022-02-16 | End: 2022-02-16

## 2022-02-16 RX ORDER — IPRATROPIUM BROMIDE AND ALBUTEROL SULFATE 2.5; .5 MG/3ML; MG/3ML
3 SOLUTION RESPIRATORY (INHALATION) EVERY 4 HOURS PRN
Status: DISCONTINUED | OUTPATIENT
Start: 2022-02-16 | End: 2022-02-18 | Stop reason: HOSPADM

## 2022-02-16 RX ADMIN — LOSARTAN POTASSIUM 25 MG: 25 TABLET, FILM COATED ORAL at 00:49

## 2022-02-16 RX ADMIN — IPRATROPIUM BROMIDE AND ALBUTEROL SULFATE 3 ML: 2.5; .5 SOLUTION RESPIRATORY (INHALATION) at 10:32

## 2022-02-16 RX ADMIN — DULOXETINE HYDROCHLORIDE 60 MG: 60 CAPSULE, DELAYED RELEASE ORAL at 11:08

## 2022-02-16 RX ADMIN — BUSPIRONE HYDROCHLORIDE 10 MG: 10 TABLET ORAL at 00:49

## 2022-02-16 RX ADMIN — RIVAROXABAN 20 MG: 20 TABLET, FILM COATED ORAL at 18:06

## 2022-02-16 RX ADMIN — METHYLPREDNISOLONE SODIUM SUCCINATE 60 MG: 125 INJECTION, POWDER, FOR SOLUTION INTRAMUSCULAR; INTRAVENOUS at 18:06

## 2022-02-16 RX ADMIN — MYCOPHENOLATE MOFETIL 500 MG: 500 TABLET ORAL at 13:09

## 2022-02-16 RX ADMIN — PANTOPRAZOLE SODIUM 40 MG: 40 TABLET, DELAYED RELEASE ORAL at 09:06

## 2022-02-16 RX ADMIN — SODIUM CHLORIDE, PRESERVATIVE FREE 10 ML: 5 INJECTION INTRAVENOUS at 09:06

## 2022-02-16 RX ADMIN — SODIUM CHLORIDE, PRESERVATIVE FREE 10 ML: 5 INJECTION INTRAVENOUS at 00:52

## 2022-02-16 RX ADMIN — LOSARTAN POTASSIUM 25 MG: 25 TABLET, FILM COATED ORAL at 20:24

## 2022-02-16 RX ADMIN — CEFTRIAXONE 1 G: 1 INJECTION, POWDER, FOR SOLUTION INTRAMUSCULAR; INTRAVENOUS at 21:57

## 2022-02-16 RX ADMIN — BUSPIRONE HYDROCHLORIDE 10 MG: 10 TABLET ORAL at 11:08

## 2022-02-16 RX ADMIN — MYCOPHENOLATE MOFETIL 500 MG: 500 TABLET ORAL at 21:57

## 2022-02-16 RX ADMIN — RALOXIFENE 60 MG: 60 TABLET ORAL at 21:57

## 2022-02-16 RX ADMIN — ASPIRIN 81 MG: 81 TABLET, COATED ORAL at 20:24

## 2022-02-16 RX ADMIN — METOPROLOL SUCCINATE 25 MG: 25 TABLET, EXTENDED RELEASE ORAL at 11:08

## 2022-02-16 RX ADMIN — DULOXETINE HYDROCHLORIDE 60 MG: 60 CAPSULE, DELAYED RELEASE ORAL at 00:49

## 2022-02-16 RX ADMIN — MONTELUKAST SODIUM 10 MG: 10 TABLET, FILM COATED ORAL at 00:49

## 2022-02-16 RX ADMIN — SODIUM CHLORIDE, PRESERVATIVE FREE 10 ML: 5 INJECTION INTRAVENOUS at 20:26

## 2022-02-16 RX ADMIN — LEVOTHYROXINE SODIUM 88 MCG: 0.09 TABLET ORAL at 09:05

## 2022-02-16 RX ADMIN — MONTELUKAST SODIUM 10 MG: 10 TABLET, FILM COATED ORAL at 20:24

## 2022-02-16 RX ADMIN — PRAVASTATIN SODIUM 20 MG: 40 TABLET ORAL at 09:05

## 2022-02-16 RX ADMIN — IPRATROPIUM BROMIDE AND ALBUTEROL SULFATE 3 ML: 2.5; .5 SOLUTION RESPIRATORY (INHALATION) at 20:04

## 2022-02-16 RX ADMIN — PREDNISONE 10 MG: 20 TABLET ORAL at 09:05

## 2022-02-16 RX ADMIN — METOPROLOL SUCCINATE 25 MG: 25 TABLET, EXTENDED RELEASE ORAL at 00:49

## 2022-02-16 RX ADMIN — METHYLPREDNISOLONE SODIUM SUCCINATE 60 MG: 125 INJECTION, POWDER, FOR SOLUTION INTRAMUSCULAR; INTRAVENOUS at 10:00

## 2022-02-16 NOTE — CASE MANAGEMENT/SOCIAL WORK
Discharge Planning Assessment  [facility]     Patient Name: Olena Myers  MRN: [id number]  Today's Date: 2/15/2022    Admit Date: 2/15/2022     Discharge Needs Assessment     Row Name 02/15/22 2028       Living Environment    Lives With alone    Current Living Arrangements home/apartment/condo    Primary Care Provided by self    Provides Primary Care For no one    Family Caregiver if Needed child(lorenza), adult; sibling(s)    Quality of Family Relationships helpful; involved; supportive    Able to Return to Prior Arrangements yes    Living Arrangement Comments Patient resides alone in private residence. Denies evironmental concerns.       Resource/Environmental Concerns    Resource/Environmental Concerns none    Transportation Concerns car, none       Transition Planning    Patient/Family Anticipates Transition to home    Patient/Family Anticipated Services at Transition none    Transportation Anticipated family or friend will provide       Discharge Needs Assessment    Readmission Within the Last 30 Days no previous admission in last 30 days    Equipment Currently Used at Home none    Concerns to be Addressed no discharge needs identified; denies needs/concerns at this time    Anticipated Changes Related to Illness none    Equipment Needed After Discharge none               Discharge Plan     Row Name 02/15/22 2029       Plan    Plan Patient plans to return home upon discharge, where she lives alone in private residence. Denies environmental concerns. Owns/uses no DME. Son can provide transportation upon DC. No DC needs identified.    Patient/Family in Agreement with Plan yes    Plan Comments No DC needs identified. Can arrange own transportation. Home without services.              Continued Care and Services - Admitted Since 2/15/2022    Coordination has not been started for this encounter.          Demographic Summary     Row Name 02/15/22 2027       General Information    Admission Type observation    Arrived  From home    Required Notices Provided Observation Status Notice    Expected Length of Stay (LOS) Less than 24 hours. Admitted to ED Observation Unit.    Referral Source admission list; emergency department    Reason for Consult discharge planning    Preferred Language English     Used During This Interaction no    General Information Comments Facesheet verified. Role of CCP explained. Appropriate PPE worn at all times.       Contact Information    Permission Granted to Share Info With family/designee    Contact Information Obtained for other (see comments)    Contact Information Comments Verified emergency contact information.               Functional Status     Row Name 02/15/22 2028       Functional Status    Usual Activity Tolerance moderate    Current Activity Tolerance fair       Functional Status, IADL    Medications independent    Meal Preparation independent    Housekeeping independent    Laundry independent    Shopping independent       Mental Status    General Appearance WDL WDL       Mental Status Summary    Recent Changes in Mental Status/Cognitive Functioning no changes               Psychosocial     Row Name 02/15/22 2028       Values/Beliefs    Spiritual, Cultural Beliefs, Oriental orthodox Practices, Values that Affect Care no       Behavior WDL    Behavior WDL WDL       Emotion Mood WDL    Emotion/Mood/Affect WDL WDL       Speech WDL    Speech WDL WDL       Perceptual State WDL    Perceptual State WDL WDL       Thought Process WDL    Thought Process WDL WDL       Intellectual Performance WDL    Intellectual Performance WDL WDL    Level of Consciousness Alert       Coping/Stress    Major Change/Loss/Stressor none    Patient Personal Strengths able to adapt; expressive of emotions; expressive of needs; strong support system    Sources of Support adult child(lorenza); sibling(s)    Reaction to Health Status accepting; adjusting    Understanding of Condition and Treatment partial understanding of  medical condition; partial understanding of treatment; needs additional information       Developmental Stage (Eriksson's)    Developmental Stage Stage 8 (65 years-death/Late Adulthood) Integrity vs. Despair               Abuse/Neglect    No documentation.                Legal    No documentation.                Substance Abuse    No documentation.                Patient Forms    No documentation.                   Olvin Contreras RN

## 2022-02-16 NOTE — PROGRESS NOTES
The DAYAMI and I have discussed this patients history, physical exam, and treatment plan. I have reviewed the documentation and personally had a face to face interaction with the patient. I affirm the documentation and agree with the treatment and plan.  The following note describes my personal findings    I provided a substantive portion of the care of this patient.  I personally performed the physical exam in its entirety for this encounter.      This patient is an 86-year-old female with a history of COPD  admitted to the observation unit secondary to a significant COPD exacerbation.  She is currently oxygen dependent however is not normally on oxygen at home.  Throughout the evening, she states that she continues to have significant shortness of breath with any type of exertion but fairly comfortable at rest.    Physical Exam  Vitals and nursing note reviewed.   Constitutional:       General: She is not in acute distress.     Appearance: She is well-developed.   HENT:      Head: Normocephalic.   Eyes:      Pupils: Pupils are equal, round, and reactive to light.   Cardiovascular:      Rate and Rhythm: Normal rate and regular rhythm.   Pulmonary:      Effort: Pulmonary effort is normal. No respiratory distress.      Breath sounds: Wheezing present.      Comments: Diffuse expiratory wheezes  Abdominal:      General: Bowel sounds are normal.      Palpations: Abdomen is soft.      Tenderness: There is no abdominal tenderness. There is no guarding or rebound.   Musculoskeletal:         General: Normal range of motion.      Cervical back: Normal range of motion and neck supple.   Skin:     General: Skin is warm and dry.      Findings: No rash.   Neurological:      Mental Status: She is alert and oriented to person, place, and time.       Plan: The patient is certainly not yet back to her baseline.  We will continue on nebulizer treatments as well as intravenous steroid therapy.  We will monitor closely and reevaluate  following.      The patient was wearing a facemask upon entrance into the room and remained in such throughout their visit.  I was wearing PPE including a facemask, eye protection, as well as gloves at any point entering the room and throughout the visit

## 2022-02-16 NOTE — ED NOTES
Ambulated pt to the bathroom with a portable pulse ox. SpO2 dropped to 88% when pt initially got up. SpO2 increased to 94% after approximately 20 steps, but pt appeared to be out of breath and weak, voicing she was unsteady.      Soledad North, RN  02/15/22 [id number]

## 2022-02-16 NOTE — ED NOTES
.  Nursing report ED to floor  Olena Myers  86 y.o.  female    HPI :   Chief Complaint   Patient presents with   • Shortness of Breath       Admitting doctor:   Jaquan Reynolds MD    Admitting diagnosis:   The primary encounter diagnosis was COPD with acute exacerbation (HCC). Diagnoses of Rhinovirus infection and Hypoxia were also pertinent to this visit.    Code status:   Current Code Status     Date Active Code Status Order ID Comments User Context       Prior    Advance Care Planning Activity          Allergies:   Neomycin, Penicillins, Hydrocodone, and Rosuvastatin    Intake and Output  No intake or output data in the 24 hours ending 02/15/22 2118    Weight:   There were no vitals filed for this visit.    Most recent vitals:   Vitals:    02/15/22 1910 02/15/22 1928 02/15/22 1940 02/15/22 2000   BP:  162/82     Pulse: 98  98 95   Resp:       Temp:       SpO2: 98%  95% 95%       Active LDAs/IV Access:   Lines, Drains & Airways     Active LDAs     Name Placement date Placement time Site Days    Peripheral IV 02/15/22 1658 Right Wrist 02/15/22  1658  Wrist  less than 1    Single Lumen Implantable Port 01/08/19 Left Subclavian 01/08/19  0910  Subclavian  1134                Labs (abnormal labs have a star):   Labs Reviewed   RESPIRATORY PANEL PCR W/ COVID-19 (SARS-COV-2) BISI/TREY/DAVI/PAD/COR/MAD/MARY IN-HOUSE, NP SWAB IN UTM/VTP, 3-4 HR TAT - Abnormal; Notable for the following components:       Result Value    Human Rhinovirus/Enterovirus Detected (*)     All other components within normal limits    Narrative:     In the setting of a positive respiratory panel with a viral infection PLUS a negative procalcitonin without other underlying concern for bacterial infection, consider observing off antibiotics or discontinuation of antibiotics and continue supportive care. If the respiratory panel is positive for atypical bacterial infection (Bordetella pertussis, Chlamydophila pneumoniae, or Mycoplasma pneumoniae),  consider antibiotic de-escalation to target atypical bacterial infection.   COMPREHENSIVE METABOLIC PANEL - Abnormal; Notable for the following components:    Glucose 127 (*)     Creatinine 1.02 (*)     eGFR Non  Amer 52 (*)     All other components within normal limits    Narrative:     GFR Normal >60  Chronic Kidney Disease <60  Kidney Failure <15     CBC WITH AUTO DIFFERENTIAL - Abnormal; Notable for the following components:    WBC 10.89 (*)     RBC 3.42 (*)     Hemoglobin 9.7 (*)     Hematocrit 31.0 (*)     MCHC 31.3 (*)     Neutrophil % 90.2 (*)     Lymphocyte % 4.0 (*)     Monocyte % 4.8 (*)     Eosinophil % 0.0 (*)     Immature Grans % 0.7 (*)     Neutrophils, Absolute 9.82 (*)     Lymphocytes, Absolute 0.44 (*)     Immature Grans, Absolute 0.08 (*)     All other components within normal limits   TROPONIN (IN-HOUSE) - Normal    Narrative:     Troponin T Reference Range:  <= 0.03 ng/mL-   Negative for AMI  >0.03 ng/mL-     Abnormal for myocardial necrosis.  Clinicians would have to utilize clinical acumen, EKG, Troponin and serial changes to determine if it is an Acute Myocardial Infarction or myocardial injury due to an underlying chronic condition.       Results may be falsely decreased if patient taking Biotin.     BNP (IN-HOUSE) - Normal    Narrative:     Among patients with dyspnea, NT-proBNP is highly sensitive for the detection of acute congestive heart failure. In addition NT-proBNP of <300 pg/ml effectively rules out acute congestive heart failure with 99% negative predictive value.    Results may be falsely decreased if patient taking Biotin.     PROCALCITONIN - Normal    Narrative:     As a Marker for Sepsis (Non-Neonates):     1. <0.5 ng/mL represents a low risk of severe sepsis and/or septic shock.  2. >2 ng/mL represents a high risk of severe sepsis and/or septic shock.    As a Marker for Lower Respiratory Tract Infections that require antibiotic therapy:  PCT on Admission     Antibiotic  "Therapy             6-12 Hrs later  >0.5                          Strongly Recommended            >0.25 - <0.5             Recommended  0.1 - 0.25                  Discouraged                       Remeasure/reassess PCT  <0.1                         Strongly Discouraged         Remeasure/reassess PCT      As 28 day mortality risk marker: \"Change in Procalcitonin Result\" (>80% or <=80%) if Day 0 (or Day 1) and Day 4 values are available. Refer to http://[email or web address]-pct-calculator.com/    Change in PCT <=80 %   A decrease of PCT levels below or equal to 80% defines a positive change in PCT test result representing a higher risk for 28-day all-cause mortality of patients diagnosed with severe sepsis or septic shock.    Change in PCT >80 %   A decrease of PCT levels of more than 80% defines a negative change in PCT result representing a lower risk for 28-day all-cause mortality of patients diagnosed with severe sepsis or septic shock.               CBC AND DIFFERENTIAL    Narrative:     The following orders were created for panel order CBC & Differential.  Procedure                               Abnormality         Status                     ---------                               -----------         ------                     CBC Auto Differential[[id number]]        Abnormal            Final result                 Please view results for these tests on the individual orders.       EKG:   ECG 12 Lead   Final Result   HEART RATE= 95  bpm   RR Interval= 628  ms   MN Interval= 177  ms   P Horizontal Axis=   deg   P Front Axis= 10  deg   QRSD Interval= 83  ms   QT Interval= 352  ms   QRS Axis= -28  deg   T Wave Axis= 16  deg   - ABNORMAL ECG -   Sinus rhythm   LVH by voltage   Inferior infarct, old Q waves new   Anterior Q waves, possibly due to LVH   Electronically Signed By: Renzo Betancur ([facility]) 15-Feb-2022 19:55:38   Date and Time of Study: 2022-02-15 16:59:01          Meds given in ED:   Medications   sodium chloride " 0.9 % flush 10 mL (has no administration in time range)   methylPREDNISolone sodium succinate (SOLU-Medrol) injection 125 mg (125 mg Intravenous Given 2/15/22 1725)   ipratropium-albuterol (DUO-NEB) nebulizer solution 3 mL (3 mL Nebulization Given 2/15/22 1727)       Imaging results:  No radiology results for the last day    Ambulatory status:   -     Social issues:   Social History     Socioeconomic History   • Marital status:    Tobacco Use   • Smoking status: Never Smoker   • Smokeless tobacco: Never Used   • Tobacco comment: caffeine - coffee and coke caff. free, tea    Substance and Sexual Activity   • Alcohol use: No   • Drug use: No     Comment: caffine   • Sexual activity: Defer       NIH Stroke Scale:        Nursing report ED to floor:       Radha Bennett RN  02/15/22 9681

## 2022-02-16 NOTE — H&P
.   [facility]   HISTORY AND PHYSICAL    Patient Name: Olena Myers  : 1935  MRN: [id number]  Primary Care Physician:  Manda Keenan MD  Date of admission: 2/15/2022    Subjective   Subjective     Chief Complaint: Shortness of breath    HPI:    Olena Myers is a 86 y.o. female with past medical history including but not limited to iron deficiency anemia, arthritis, asthma, A. fib, CAD, COPD, hypertension, hyperlipidemia, and myasthenia gravis presents [facility] for shortness of breath.  Patient report shortness of breath for the past 3 to 4 days that has gradually gotten worse. Patient reports she has used her nebulizer treatments with little effect. Patient report associated productive cough.  Denies any associated headache, dizziness, or chest pain/palpitation.  Denies fever or lower extremity edema.  Reports that she was seen in urgent care and was referred to the ER for further evaluation.  Denies abdominal pain, nausea, vomiting, or diarrhea.  Denies dysuria, hematuria, polyuria, blurred vision, or urinary frequency/urgency.    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   • Frequent urinary tract infections    • History of gastric ulcer    • History of GI bleed    • [place] (hard of hearing)    • Hyperlipidemia    • Hypertension    • Lightheadedness    • LVH (left ventricular hypertrophy)    • MG (myasthenia gravis) ([facility])    • Mini stroke ([facility]) 10/2016, 3/2017   • OA (osteoarthritis)    • Osteoporosis    • Sleep apnea     USES CPAP   • SOB (shortness of breath)     WALKING       Past  Surgical History:   Procedure Laterality Date   • APPENDECTOMY     • CARPAL TUNNEL RELEASE Bilateral    • CATARACT EXTRACTION Bilateral    • CORONARY STENT PLACEMENT     • TOTAL HIP ARTHROPLASTY Bilateral    • TOTAL SHOULDER ARTHROPLASTY W/ DISTAL CLAVICLE EXCISION Left 7/19/2016    Procedure: LT TOTAL SHOULDER REVERSE ARTHROPLASTY;  Surgeon: NAHOMI Solorzano MD;  Location: [facility], [place], operated by [facility];  Service:    • TOTAL SHOULDER ARTHROPLASTY W/ DISTAL CLAVICLE EXCISION Right 1/8/2019    Procedure: RIGHT TOTAL SHOULDER REVERSE ARTHROPLASTY;  Surgeon: Jovanny Solorzano MD;  Location: [facility], [place], operated by [facility];  Service: Orthopedics       Family History: family history includes Breast cancer in her mother; Diabetes in her mother; Heart disease in her father and mother; Hyperlipidemia in her father and mother; Stroke in her father and mother. Otherwise pertinent FHx was reviewed and not pertinent to current issue.    Social History:  reports that she has never smoked. She has never used smokeless tobacco. She reports that she does not drink alcohol and does not use drugs.    Home Medications:  Bepotastine Besilate, DULoxetine, Fluticasone-Umeclidin-Vilant, albuterol sulfate HFA, aspirin, azelastine, busPIRone, colesevelam, diphenoxylate-atropine, irbesartan, levothyroxine, metoprolol succinate XL, montelukast, mycophenolate, nitroglycerin, pantoprazole, pravastatin, predniSONE, raloxifene, rivaroxaban, triamcinolone, vitamin B-12, and vitamin D    Allergies:  Allergies   Allergen Reactions   • Neomycin Anaphylaxis   • Penicillins Swelling and Rash     Patient reports tolerating cephalexin on multiple occasions, most recently in November 2020. Tolerated ceftriaxone July 2021.  Patient reports that initial reaction to penicillin was 30-40 years ago, but she was unsure which penicillin drug it was.    • Hydrocodone Itching and Rash   • Rosuvastatin Other (See Comments)     Muscle cramps       Objective   Objective     Vitals:   Temp:  [98 °F  (36.7 °C)-98.3 °F (36.8 °C)] 98.3 °F (36.8 °C)  Heart Rate:  [82-98] 89  Resp:  [16-22] 20  BP: (127-162)/(68-82) 127/68  Flow (L/min):  [2] 2  Physical Exam    Constitutional: Awake, alert   Eyes: PERRLA, sclerae anicteric, no conjunctival injection   HENT: NCAT, mucous membranes moist   Neck: Supple, no thyromegaly, no lymphadenopathy, trachea midline   Respiratory: Clear to auscultation bilaterally, nonlabored respirations    Cardiovascular: RRR, no murmurs, rubs, or gallops, palpable pedal pulses bilaterally   Gastrointestinal: Positive bowel sounds, soft, nontender, nondistended   Musculoskeletal: No bilateral ankle edema, no clubbing or cyanosis to extremities   Psychiatric: Appropriate affect, cooperative   Neurologic: Oriented x 3, strength symmetric in all extremities, Cranial Nerves grossly intact to confrontation, speech clear   Skin: No rashes     Result Review    Result Review:  I have personally reviewed the results from the time of this admission to 2/16/2022 00:32 EST and agree with these findings:  [x]  Laboratory  []  Microbiology  [x]  Radiology  []  EKG/Telemetry   []  Cardiology/Vascular   []  Pathology  []  Old records  []  Other:  Most notable findings include:  Troponin <.010, , glucose 127, potassium 4.5, creatinine 1.02, GFR 52, albumin 4.10, pro-Pranay 0.21, WBC 10.89, hematocrit 31 and hemoglobin 9.7  Negative for acute infiltration  EKG shows sinus rhythm with a heart rate of 95    Assessment/Plan   Assessment / Plan     Brief Patient Summary:  Olena Myers is a 86 y.o. female who was seen and evaluated the ED for shortness of breath.  Patient is being admitted to the observation unit for further evaluation and neurology consult    Active Hospital Problems:  Active Hospital Problems    Diagnosis    • Obesity (BMI 30-39.9)    • COPD exacerbation (HCC)      Plan:   COPD exacerbation  -Pulmonology consult  -Chest x-ray negative for any acute infiltration  -Patient has received 125 mg of  Solu-Medrol and DuoNeb breathing treatment  -DuoNeb every 6 hours  -Continue home medication  -Repeat CBC BMP in a.m.      Hyperlipidemia and hyperlipidemia  Chronic conditions, continue home medications    CAD  Chronic condition, continue medication    Hypothyroidism  Continue home medication    Depression  Continue home medication    DVT prophylaxis:  Medical and mechanical DVT prophylaxis orders are present.    CODE STATUS:    Level Of Support Discussed With: Patient  Code Status (Patient has no pulse and is not breathing): CPR (Attempt to Resuscitate)  Medical Interventions (Patient has pulse or is breathing): Full Support    Admission Status:  I believe this patient meets observation status.    Electronically signed by MILA Pérez, 02/16/22, 12:32 AM EST.

## 2022-02-16 NOTE — CONSULTS
"Adult Nutrition  Assessment/PES    Patient Name:  Olena Myers  YOB: 1935  MRN: [id number]  Admit Date:  2/15/2022    Assessment Date:  2/16/2022    Comments:  Nutrition consult from COPD exac protocol. BMI 34, moderate obesity.  Potential weight gain of 15 lbs since October. Will follow and see if pt interested in diet education.     Reason for Assessment     Row Name 02/16/22 1452          Reason for Assessment    Reason For Assessment physician consult     Diagnosis pulmonary disease  COPD, Obesity, hx Myasthenia gravis, SEDA, CHF     Identified At Risk by Screening Criteria need for education                  Anthropometrics     Row Name 02/16/22 1453          Anthropometrics    Height 152.4 cm (60\")            Admit Weight    Admit Weight 79.4 kg (175 lb)            Ideal Body Weight (IBW)    Ideal Body Weight (IBW) (kg) 45.86     % of Ideal Body Weight Assessment --  173% IBW            Body Mass Index (BMI)    BMI Assessment BMI 30-34.9: obesity grade I  BMI 34                Labs/Tests/Procedures/Meds     Row Name 02/16/22 1454          Labs/Procedures/Meds    Lab Results Reviewed reviewed, pertinent     Lab Results Comments Glu, wbc, h/h            Diagnostic Tests/Procedures    Diagnostic Test/Procedure Reviewed reviewed            Medications    Pertinent Medications Reviewed reviewed, pertinent     Pertinent Medications Comments asa, synthroid, solumedrol, protonix, pravachol, xarelto                Physical Findings     Row Name 02/16/22 1459          Physical Findings    Overall Physical Appearance obese; on oxygen therapy     Skin other (see comments)  intact                  Nutrition Prescription Ordered     Row Name 02/16/22 1457          Nutrition Prescription PO    Current PO Diet Regular                Evaluation of Received Nutrient/Fluid Intake     Row Name 02/16/22 1453          PO Evaluation    Number of Days PO Intake Evaluated Insufficient Data           "     Problem/Interventions:   Problem 1     Row Name 02/16/22 1458          Nutrition Diagnoses Problem 1    Problem 1 Overweight/Obesity     Signs/Symptoms (evidenced by) BMI     BMI 30 - 34.9                Intervention Goal     Row Name 02/16/22 1458          Intervention Goal    General Maintain nutrition; Disease management/therapy; Improved nutrition related lab(s); Reduce/improve symptoms; Meet nutritional needs for age/condition     PO Tolerate PO     Weight Appropriate weight loss                Nutrition Intervention     Row Name 02/16/22 1458          Nutrition Intervention    RD/Tech Action Follow Tx progress; Care plan reviewd                  Education/Evaluation     Row Name 02/16/22 1458          Education    Education Will Instruct as appropriate            Monitor/Evaluation    Monitor Per protocol; Skin status; Symptoms; I&O; PO intake; Pertinent labs; Weight                 Electronically signed by:  Madison Dumont RD  02/16/22 15:00 EST

## 2022-02-16 NOTE — PROGRESS NOTES
Clinical Pharmacy Services: Medication History    Olena Myers is a 86 y.o. female presenting to [facility] for   Chief Complaint   Patient presents with   • Shortness of Breath       She  has a past medical history of Anemia, Arthritis, Asthma, Atrial fibrillation ([facility]), CAD (coronary artery disease), COPD (chronic obstructive pulmonary disease) ([facility]), Dilation of esophagus, Frequent urinary tract infections, History of gastric ulcer, History of GI bleed, [place] (hard of hearing), Hyperlipidemia, Hypertension, Lightheadedness, LVH (left ventricular hypertrophy), MG (myasthenia gravis) ([facility]), Mini stroke ([facility]) (10/2016, 3/2017), OA (osteoarthritis), Osteoporosis, Sleep apnea, and SOB (shortness of breath).    Allergies as of 02/15/2022 - Reviewed 02/15/2022   Allergen Reaction Noted   • Neomycin Anaphylaxis 07/12/2016   • Penicillins Swelling and Rash 06/10/2016   • Hydrocodone Itching and Rash 11/18/2016   • Rosuvastatin Other (See Comments) 08/28/2019       Medication information was obtained from: Patient  Pharmacy and Phone Number:   [facility] [place], [place] - [address] - [phone]  - [phone]   [address] [facility] [address]  Phone: [phone] Fax: [phone]    [facility] - [place], [place] - [address] AT [address] [facility] - [phone]  - [phone] FX  [address]  [facility] [address]  Phone: [phone] Fax: [phone]    [facility] - [address] [address]  Phone: [phone] Fax: [phone]        Prior to Admission Medications     Prescriptions Last Dose Informant Patient Reported? Taking?    albuterol (PROAIR HFA) 108 (90 BASE) MCG/ACT inhaler Past Week Self Yes Yes    Inhale 2 puffs Every 6 (Six) Hours As Needed for wheezing or shortness of air.    aspirin 81 MG EC tablet Past Week Self Yes Yes    Take 81 mg by mouth Every Night.    azelastine  (ASTELIN) 0.1 % nasal spray Past Week Self Yes Yes    2 sprays into the nostril(s) as directed by provider 2 (Two) Times a Day. Use in each nostril as directed    busPIRone (BUSPAR) 10 MG tablet Past Week Self Yes Yes    Take 10 mg by mouth 2 (Two) Times a Day.    colesevelam (WELCHOL) 625 MG tablet Past Week Self Yes Yes    Take 1,250 mg by mouth 2 (two) times a day.    DULoxetine (CYMBALTA) 60 MG capsule Past Week Self Yes Yes    Take 60 mg by mouth 2 (Two) Times a Day.    Fluticasone-Umeclidin-Vilant (Trelegy Ellipta) 100-62.5-25 MCG/INH inhaler Past Week Self Yes Yes    Inhale 1 puff Daily.    irbesartan (AVAPRO) 75 MG tablet Past Week Self Yes Yes    Take 75 mg by mouth Every Night.    levothyroxine (SYNTHROID, LEVOTHROID) 88 MCG tablet Past Week Self Yes Yes    Take 88 mcg by mouth Daily.    metoprolol succinate XL (TOPROL-XL) 25 MG 24 hr tablet Past Week Self No Yes    Take 1 tablet by mouth 2 (Two) Times a Day.    montelukast (SINGULAIR) 10 MG tablet Past Week Self Yes Yes    Take 10 mg by mouth Every Night.    mycophenolate (CELLCEPT) 500 MG tablet Past Week Self Yes Yes    Take 2,500 mg by mouth Daily.    pantoprazole (PROTONIX) 20 MG EC tablet Past Week Self Yes Yes    Take 40 mg by mouth Daily.    pravastatin (PRAVACHOL) 20 MG tablet Past Week Self Yes Yes    Take 20 mg by mouth Daily.    predniSONE (DELTASONE) 10 MG tablet Past Week Self Yes Yes    Take 10 mg by mouth Daily.    raloxifene (EVISTA) 60 MG tablet Past Week Self Yes Yes    Take 60 mg by mouth Every Night.    rivaroxaban (XARELTO) 20 MG tablet Past Week Self No Yes    Take 1 tablet by mouth Daily With Dinner.    vitamin B-12 (CYANOCOBALAMIN) 100 MCG tablet Past Week Self Yes Yes    Take 50 mcg by mouth Daily.    vitamin D (ERGOCALCIFEROL) 86711 UNITS capsule capsule Past Week Self Yes Yes    Take 50,000 Units by mouth Every 7 (Seven) Days. Takes on Wednesdays            Medication notes: The patient stated she had taken all of her  medications Monday 2/14. The patient takes Bepreve seasonally during the spring time. The patient takes 5 tablets of Mycophenolate 500 mg QD.    This medication list is complete to the best of my knowledge as of 2/16/2022    Please call if questions.    Shalini Powers  Medication History Technician  [phone]    2/16/2022 16:04 EST

## 2022-02-16 NOTE — CONSULTS
CONSULT NOTE    Patient Identification:  Olena Myers  86 y.o.  female  1935  [id number]            Requesting physician: Observation unit NP  Reason for Consultation: COPD exacerbation dyspnea    CC:     History of Present Illness:  86-year-old female with history of COPD follows Dr. Tirado myasthenia gravis significant cardiac history of aortic stenosis presented to the emergency room with shortness of breath for the last 3 to 4 days.  Per patient she has been progressively getting worse.  Also reported cough but no purulent sputum.  No fevers or chills were reported.  No chest pain was reported.  Patient initiated on steroids and bronchodilators and at the time of my evaluation she feels better but still reports dyspnea with minimal activity.  She tells me she quit smoking many years ago.  Currently she is on room air maintaining sats well.      Review of Systems    Past Medical History:  Past Medical History:   Diagnosis Date   • Anemia     IRON DEFICIENCY   • Arthritis    • Asthma    • Atrial fibrillation ([facility])    • CAD (coronary artery disease)     HEART STENT   • COPD (chronic obstructive pulmonary disease) ([facility])    • 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   • Frequent urinary tract infections    • History of gastric ulcer    • History of GI bleed    • [place] (hard of hearing)    • Hyperlipidemia    • Hypertension    • Lightheadedness    • LVH (left ventricular hypertrophy)    • MG (myasthenia gravis) ([facility])    • Mini stroke ([facility]) 10/2016, 3/2017   • OA (osteoarthritis)    • Osteoporosis    • Sleep apnea     USES CPAP   • SOB (shortness of breath)     WALKING       Past Surgical History:  Past Surgical History:   Procedure Laterality Date   • APPENDECTOMY     • CARPAL TUNNEL RELEASE Bilateral    • CATARACT EXTRACTION Bilateral    • CORONARY STENT PLACEMENT     • TOTAL HIP ARTHROPLASTY Bilateral    • TOTAL SHOULDER ARTHROPLASTY W/ DISTAL CLAVICLE EXCISION Left 7/19/2016    Procedure: LT TOTAL SHOULDER REVERSE  ARTHROPLASTY;  Surgeon: NAHOMI Solorzano MD;  Location: [facility] OR [facility];  Service:    • TOTAL SHOULDER ARTHROPLASTY W/ DISTAL CLAVICLE EXCISION Right 1/8/2019    Procedure: RIGHT TOTAL SHOULDER REVERSE ARTHROPLASTY;  Surgeon: Jovanny Solorzano MD;  Location: [facility] OR [facility];  Service: Orthopedics        Home Meds:  Medications Prior to Admission   Medication Sig Dispense Refill Last Dose   • albuterol (PROAIR HFA) 108 (90 BASE) MCG/ACT inhaler Inhale 2 puffs Every 6 (Six) Hours As Needed for wheezing or shortness of air.   2/15/2022 at Unknown time   • aspirin 81 MG EC tablet Take 81 mg by mouth Every Night.   2/15/2022 at Unknown time   • busPIRone (BUSPAR) 10 MG tablet Take 10 mg by mouth 2 (Two) Times a Day.   2/15/2022 at Unknown time   • colesevelam (WELCHOL) 625 MG tablet Take 1,250 mg by mouth 2 (two) times a day.   2/15/2022 at Unknown time   • DULoxetine (CYMBALTA) 60 MG capsule Take 60 mg by mouth 2 (Two) Times a Day.   2/15/2022 at Unknown time   • irbesartan (AVAPRO) 75 MG tablet Take 75 mg by mouth Every Night.   2/14/2022 at Unknown time   • levothyroxine (SYNTHROID, LEVOTHROID) 88 MCG tablet Take 88 mcg by mouth Daily.   2/15/2022 at Unknown time   • metoprolol succinate XL (TOPROL-XL) 25 MG 24 hr tablet Take 1 tablet by mouth 2 (Two) Times a Day. 180 tablet 3 2/15/2022 at Unknown time   • montelukast (SINGULAIR) 10 MG tablet Take 10 mg by mouth Every Night.   2/14/2022 at Unknown time   • mycophenolate (CELLCEPT) 500 MG tablet Take 1,000 mg by mouth 2 (Two) Times a Day. 2 tabs bid    2/15/2022 at Unknown time   • pantoprazole (PROTONIX) 20 MG EC tablet Take 40 mg by mouth Daily.   2/15/2022 at Unknown time   • pravastatin (PRAVACHOL) 20 MG tablet Take 20 mg by mouth Daily.   2/14/2022 at Unknown time   • predniSONE (DELTASONE) 10 MG tablet Take 10 mg by mouth Daily.   2/15/2022 at Unknown time   • raloxifene (EVISTA) 60 MG tablet Take 60 mg by mouth Every Night.   2/15/2022 at Unknown time   •  rivaroxaban (XARELTO) 20 MG tablet Take 1 tablet by mouth Daily With Dinner. 90 tablet 3 2/15/2022 at Unknown time   • triamcinolone (KENALOG) 0.1 % ointment Apply 1 application topically to the appropriate area as directed 2 (Two) Times a Day.   2/15/2022 at Unknown time   • vitamin B-12 (CYANOCOBALAMIN) 100 MCG tablet Take 50 mcg by mouth Daily.   2/15/2022 at Unknown time   • vitamin D (ERGOCALCIFEROL) 63710 UNITS capsule capsule Take 50,000 Units by mouth Every 7 (Seven) Days. Takes on Wednesdays   2/15/2022 at Unknown time   • azelastine (ASTELIN) 0.1 % nasal spray 2 sprays into the nostril(s) as directed by provider 2 (Two) Times a Day. Use in each nostril as directed      • Bepotastine Besilate (Bepreve) 1.5 % solution Administer 2 drops to both eyes 2 (Two) Times a Day As Needed.      • diphenoxylate-atropine (LOMOTIL) 2.5-0.025 MG per tablet Take 1 tablet by mouth 4 (Four) Times a Day As Needed for Diarrhea.      • Fluticasone-Umeclidin-Vilant (Trelegy Ellipta) 100-62.5-25 MCG/INH inhaler Inhale 1 puff Daily.      • nitroglycerin (NITROSTAT) 0.4 MG SL tablet Place 1 tablet under the tongue Every 5 (Five) Minutes As Needed for Chest Pain. 20 tablet 1        Allergies:  Allergies   Allergen Reactions   • Neomycin Anaphylaxis   • Penicillins Swelling and Rash     Patient reports tolerating cephalexin on multiple occasions, most recently in November 2020. Tolerated ceftriaxone July 2021.  Patient reports that initial reaction to penicillin was 30-40 years ago, but she was unsure which penicillin drug it was.    • Hydrocodone Itching and Rash   • Rosuvastatin Other (See Comments)     Muscle cramps       Social History:   Social History     Socioeconomic History   • Marital status:    Tobacco Use   • Smoking status: Never Smoker   • Smokeless tobacco: Never Used   • Tobacco comment: caffeine - coffee and coke caff. free, tea    Substance and Sexual Activity   • Alcohol use: No   • Drug use: No     Comment:  caffine   • Sexual activity: Defer       Family History:  Family History   Problem Relation Age of Onset   • Heart disease Mother    • Hyperlipidemia Mother    • Stroke Mother    • Diabetes Mother    • Breast cancer Mother    • Heart disease Father    • Hyperlipidemia Father    • Stroke Father    • Malig Hyperthermia Neg Hx        Physical Exam:  /50 (BP Location: Right arm, Patient Position: Lying) Comment (Patient Position): PT AMBULATED TO RR  Pulse 89   Temp 98.4 °F (36.9 °C) (Oral)   Resp 20   Wt 79.4 kg (175 lb)   SpO2 97%   BMI 34.18 kg/m²  Body mass index is 34.18 kg/m². 97% 79.4 kg (175 lb)  Physical Exam  Patient is examined using the personal protective equipment as per guidelines from infection control for this particular patient as enacted.  Hand hygiene was performed before and after patient interaction.  Well-developed normal body habitus  Eyes normal conjunctivae and pupils reactive to light  ENT Mallampati between 3 and 4 normal nasal exam  Neck midline trachea no thyromegaly  Chest diminished breath sounds but no wheezing or crackles  CVS regular rate and rhythm no lower extremity edema  Abdomen soft nontender no hepatosplenomegaly  CNS intact normal sensory exam  Skin no rashes no nodules  Psych oriented to time place and person normal memory  Musculoskeletal no cyanosis no clubbing normal range of motion        LABS:  Lab Results   Component Value Date    CALCIUM 9.7 02/15/2022    PHOS 3.0 07/10/2021     Results from last 7 days   Lab Units 02/16/22  0504 02/15/22  1659   SODIUM mmol/L  --  138   POTASSIUM mmol/L  --  4.5   CHLORIDE mmol/L  --  104   CO2 mmol/L  --  23.3   BUN mg/dL  --  15   CREATININE mg/dL  --  1.02*   GLUCOSE mg/dL  --  127*   CALCIUM mg/dL  --  9.7   WBC 10*3/mm3 13.17* 10.89*   HEMOGLOBIN g/dL 9.1* 9.7*   PLATELETS 10*3/mm3 285 319   ALT (SGPT) U/L  --  15   AST (SGOT) U/L  --  22   PROBNP pg/mL  --  622.0   PROCALCITONIN ng/mL  --  0.21     Lab Results    Component Value Date    CKTOTAL 70 06/20/2019    TROPONINT <0.010 02/15/2022     Results from last 7 days   Lab Units 02/15/22  1659   TROPONIN T ng/mL <0.010         Results from last 7 days   Lab Units 02/15/22  1659   PROCALCITONIN ng/mL 0.21         Results from last 7 days   Lab Units 02/15/22  1700   ADENOVIRUS DETECTION BY PCR  Not Detected   CORONAVIRUS 229E  Not Detected   CORONAVIRUS HKU1  Not Detected   CORONAVIRUS NL63  Not Detected   CORONAVIRUS OC43  Not Detected   HUMAN METAPNEUMOVIRUS  Not Detected   HUMAN RHINOVIRUS/ENTEROVIRUS  Detected*   INFLUENZA B PCR  Not Detected   PARAINFLUENZA 1  Not Detected   PARAINFLUENZA VIRUS 2  Not Detected   PARAINFLUENZA VIRUS 3  Not Detected   PARAINFLUENZA VIRUS 4  Not Detected   BORDETELLA PERTUSSIS PCR  Not Detected   BORDETELLA PARAPERTUSSIS PCR  Not Detected   CHLAMYDOPHILA PNEUMONIAE PCR  Not Detected   MYCOPLAMA PNEUMO PCR  Not Detected   RSV, PCR  Not Detected             Lab Results   Component Value Date    TSH 0.679 07/05/2021     Estimated Creatinine Clearance: 38.3 mL/min (A) (by C-G formula based on SCr of 1.02 mg/dL (H)).         Imaging: I personally visualized the images of scans/x-rays performed within last 3 days.      Assessment:  COPD exacerbation  Human rhinovirus  History of coronary artery disease  Obesity  Hypothyroidism  Paroxysmal A. fib on anticoagulation  Aortic valve stenosis  Myasthenia gravis      Recommendations:  Elderly female with known history of COPD follows Dr. Tirado.  Patient admitted with COPD exacerbation currently much improved but still dyspnea on exertion.  On exam does not have any active wheezing at this time.  Agree with current management with oral steroids and bronchodilators.  Chest x-ray does not show any active infiltrate and there is no clinical evidence to suggest any pulmonary infection as such.  Recommend mobilize and ambulate  Underlying cardiac history and suspected cardiac etiology contributing to  current dyspnea.  Long-term weight loss will be beneficial  May need O2 evaluation prior to discharge.                Marisela Garcia MD  2/16/2022  06:27 EST      Much of this encounter note is an electronic transcription/translation of spoken language to printed text using Dragon Software.

## 2022-02-16 NOTE — PROGRESS NOTES
ED OBSERVATION PROGRESS/DISCHARGE SUMMARY    Date of Admission: 2/15/2022   LOS: 0 days   PCP: Manda Keenan MD        Grazyna Myers is an 86-year-old female with a history of COPD/asthma, A. fib on Xarelto, CAD, hypertension, hypothyroidism and myasthenia gravis on long-term steroids who presents to the ER with worsening shortness of breath for 3 to 4 days.   This morning on exam patient reports she still getting short of breath when getting up to go to the bathroom.  She reports a mildly productive cough.  Denies chest pain and palpitations.  Denies fevers and chills.  She reports she used her home inhalers but got little relief from her nebulizer treatments.  She states she has never been a smoker.       ROS:  General: no fevers, chills  Respiratory:+cough, dyspnea  Cardiovascular: no chest pain, palpitations  Abdomen: No abdominal pain, nausea, vomiting, or diarrhea  Neurologic: No focal weakness    Objective   Physical Exam:  I have reviewed the vital signs.  Temp:  [98 °F (36.7 °C)-98.5 °F (36.9 °C)] 98 °F (36.7 °C)  Heart Rate:  [52-98] 52  Resp:  [16-22] 16  BP: ()/(50-95) 106/67  General Appearance:  86-year-old female, well-nourished, in no acute distress on room air  Head:    Normocephalic, atraumatic  Eyes:    Sclerae anicteric  Neck:   Supple, no mass  Lungs: Diffuse expiratory wheezing bilaterally, no crackles, respirations unlabored at rest  Heart: Regular rate and rhythm, S1 and S2 normal, no murmur, rub or gallop  Abdomen:  Soft, non-tender, bowel sounds active, nondistended  Extremities: No clubbing, cyanosis, or edema to lower extremities  Pulses:  2+ and symmetric in distal lower extremities  Skin: Skin is warm and dry, no rashes   Neurologic: Alert and oriented x3, Normal strength to extremities    Results Review:    I have reviewed the labs, radiology results and diagnostic studies.    Results from last 7 days   Lab Units 02/16/22  0504   WBC 10*3/mm3 13.17*   HEMOGLOBIN g/dL  9.1*   HEMATOCRIT % 28.6*   PLATELETS 10*3/mm3 285     Results from last 7 days   Lab Units 02/16/22  0504 02/15/22  1659   SODIUM mmol/L 140 138   POTASSIUM mmol/L 4.4 4.5   CHLORIDE mmol/L 106 104   CO2 mmol/L 19.0* 23.3   BUN mg/dL 14 15   CREATININE mg/dL 0.96 1.02*   CALCIUM mg/dL 9.2 9.7   BILIRUBIN mg/dL  --  0.2   ALK PHOS U/L  --  59   ALT (SGPT) U/L  --  15   AST (SGOT) U/L  --  22   GLUCOSE mg/dL 137* 127*     Imaging Results (Last 24 Hours)     Procedure Component Value Units Date/Time    XR Chest 1 View [[id number]] Collected: 02/15/22 1715     Updated: 02/15/22 1720    Narrative:      PORTABLE CHEST 02/15/2022 AT 5:00 PM     CLINICAL HISTORY: Dyspnea     The lungs appear fairly well-expanded and appear free of infiltrates.  The heart is moderately enlarged. The pulmonary vasculature is within  normal limits. A Mediport device is in place in satisfactory position in  the left internal jugular vein. A left shoulder arthroplasty is  partially imaged.     IMPRESSIONS: Moderate cardiomegaly. No evidence of acute disease within  the chest.     This report was finalized on 2/15/2022 5:17 PM by Dr. Jacob Baldwin M.D.             I have reviewed the medications.  ---------------------------------------------------------------------------------------------  Assessment/Plan   Assessment/Problem List    COPD exacerbation (HCC)    Obesity (BMI 30-39.9)      Plan:    COPD exacerbation  -Chest x-ray negative for acute disease.  -Patient afebrile, white blood cell count 10, pro-Pranay 0.21  -Given 125 IV Solu-Medrol in ER  -Continued IV steroids 60 mg twice daily  -DuoNeb treatments every 6 hours  -Patient with increased work of breathing and O2 sat of 94 on ambulation with no improvement of diffuse expiratory wheezing on exam I believe she could benefit from inpatient treatment with further IV steroids and closer monitoring with pulmonology.  Spoke with Dr. Faust, [facility], who agreed and accepted patient for transfer to  inpatient admission.  Recommended increasing IV steroids to 60 mg every 8 hours.     A. Fib  -Rate controlled  -Continue Xarelto    Hypertension  -Stable  -Continue home medications    Myasthenia gravis  -Hold home prednisone at this time  -Continue CellCept      Disposition: Transfer to inpatient, [facility]      This note will serve as as a transfer summary.     Shalonda Mccann PA-C 02/16/22 15:45 EST

## 2022-02-16 NOTE — PLAN OF CARE
Goal Outcome Evaluation:  Plan of Care Reviewed With: patient        Progress: no change  Outcome Summary: Pt to OBS from ED w/cc soa x2-3 days.  CXR neg, EKG neg, trop neg.  Pulmonary consult, repeat labs and q 6 duo nebs.  VSS.  Will continue to monitor,

## 2022-02-16 NOTE — PLAN OF CARE
Problem: Adult Inpatient Plan of Care  Goal: Plan of Care Review  Outcome: Ongoing, Progressing  Flowsheets (Taken 2/16/2022 1843)  Progress: no change  Plan of Care Reviewed With: patient  Outcome Summary: Pt vitals stable. No c/o pain. SOA with activity. IV steroids and duo nebs. WIll continued to monitor   Goal Outcome Evaluation:  Plan of Care Reviewed With: patient        Progress: no change  Outcome Summary: Pt vitals stable. No c/o pain. SOA with activity. IV steroids and duo nebs. WIll continued to monitor

## 2022-02-17 PROBLEM — I50.32 DIASTOLIC CHF, CHRONIC (HCC): Status: ACTIVE | Noted: 2021-07-09

## 2022-02-17 PROCEDURE — 25010000002 CEFTRIAXONE PER 250 MG: Performed by: INTERNAL MEDICINE

## 2022-02-17 PROCEDURE — 97162 PT EVAL MOD COMPLEX 30 MIN: CPT

## 2022-02-17 PROCEDURE — 63710000001 MYCOPHENOLATE MOFETIL PER 250 MG: Performed by: STUDENT IN AN ORGANIZED HEALTH CARE EDUCATION/TRAINING PROGRAM

## 2022-02-17 PROCEDURE — 25010000002 METHYLPREDNISOLONE PER 125 MG: Performed by: STUDENT IN AN ORGANIZED HEALTH CARE EDUCATION/TRAINING PROGRAM

## 2022-02-17 PROCEDURE — 97530 THERAPEUTIC ACTIVITIES: CPT

## 2022-02-17 PROCEDURE — G0378 HOSPITAL OBSERVATION PER HR: HCPCS

## 2022-02-17 PROCEDURE — 96366 THER/PROPH/DIAG IV INF ADDON: CPT

## 2022-02-17 PROCEDURE — 96376 TX/PRO/DX INJ SAME DRUG ADON: CPT

## 2022-02-17 PROCEDURE — 94799 UNLISTED PULMONARY SVC/PX: CPT

## 2022-02-17 PROCEDURE — 63710000001 PREDNISONE PER 1 MG: Performed by: HOSPITALIST

## 2022-02-17 RX ORDER — PREDNISONE 20 MG/1
40 TABLET ORAL
Status: DISCONTINUED | OUTPATIENT
Start: 2022-02-17 | End: 2022-02-18 | Stop reason: HOSPADM

## 2022-02-17 RX ADMIN — BUSPIRONE HYDROCHLORIDE 10 MG: 10 TABLET ORAL at 12:12

## 2022-02-17 RX ADMIN — METHYLPREDNISOLONE SODIUM SUCCINATE 60 MG: 125 INJECTION, POWDER, FOR SOLUTION INTRAMUSCULAR; INTRAVENOUS at 01:24

## 2022-02-17 RX ADMIN — MONTELUKAST SODIUM 10 MG: 10 TABLET, FILM COATED ORAL at 20:53

## 2022-02-17 RX ADMIN — LOSARTAN POTASSIUM 25 MG: 25 TABLET, FILM COATED ORAL at 20:53

## 2022-02-17 RX ADMIN — IPRATROPIUM BROMIDE AND ALBUTEROL SULFATE 3 ML: 2.5; .5 SOLUTION RESPIRATORY (INHALATION) at 20:04

## 2022-02-17 RX ADMIN — DULOXETINE HYDROCHLORIDE 60 MG: 60 CAPSULE, DELAYED RELEASE ORAL at 22:56

## 2022-02-17 RX ADMIN — IPRATROPIUM BROMIDE AND ALBUTEROL SULFATE 3 ML: 2.5; .5 SOLUTION RESPIRATORY (INHALATION) at 11:57

## 2022-02-17 RX ADMIN — METHYLPREDNISOLONE SODIUM SUCCINATE 60 MG: 125 INJECTION, POWDER, FOR SOLUTION INTRAMUSCULAR; INTRAVENOUS at 09:58

## 2022-02-17 RX ADMIN — METOPROLOL SUCCINATE 25 MG: 25 TABLET, EXTENDED RELEASE ORAL at 12:12

## 2022-02-17 RX ADMIN — RALOXIFENE 60 MG: 60 TABLET ORAL at 20:53

## 2022-02-17 RX ADMIN — METOPROLOL SUCCINATE 25 MG: 25 TABLET, EXTENDED RELEASE ORAL at 01:24

## 2022-02-17 RX ADMIN — SODIUM CHLORIDE, PRESERVATIVE FREE 10 ML: 5 INJECTION INTRAVENOUS at 21:00

## 2022-02-17 RX ADMIN — PANTOPRAZOLE SODIUM 40 MG: 40 TABLET, DELAYED RELEASE ORAL at 09:58

## 2022-02-17 RX ADMIN — MYCOPHENOLATE MOFETIL 500 MG: 500 TABLET ORAL at 22:56

## 2022-02-17 RX ADMIN — PRAVASTATIN SODIUM 20 MG: 40 TABLET ORAL at 09:58

## 2022-02-17 RX ADMIN — ASPIRIN 81 MG: 81 TABLET, COATED ORAL at 20:54

## 2022-02-17 RX ADMIN — BUSPIRONE HYDROCHLORIDE 10 MG: 10 TABLET ORAL at 01:24

## 2022-02-17 RX ADMIN — SODIUM CHLORIDE, PRESERVATIVE FREE 10 ML: 5 INJECTION INTRAVENOUS at 09:59

## 2022-02-17 RX ADMIN — RIVAROXABAN 20 MG: 20 TABLET, FILM COATED ORAL at 17:27

## 2022-02-17 RX ADMIN — METOPROLOL SUCCINATE 25 MG: 25 TABLET, EXTENDED RELEASE ORAL at 22:56

## 2022-02-17 RX ADMIN — DULOXETINE HYDROCHLORIDE 60 MG: 60 CAPSULE, DELAYED RELEASE ORAL at 01:24

## 2022-02-17 RX ADMIN — PREDNISONE 40 MG: 20 TABLET ORAL at 17:26

## 2022-02-17 RX ADMIN — DULOXETINE HYDROCHLORIDE 60 MG: 60 CAPSULE, DELAYED RELEASE ORAL at 12:11

## 2022-02-17 RX ADMIN — CEFTRIAXONE 1 G: 1 INJECTION, POWDER, FOR SOLUTION INTRAMUSCULAR; INTRAVENOUS at 20:54

## 2022-02-17 RX ADMIN — MYCOPHENOLATE MOFETIL 500 MG: 500 TABLET ORAL at 09:58

## 2022-02-17 RX ADMIN — LEVOTHYROXINE SODIUM 88 MCG: 0.09 TABLET ORAL at 09:58

## 2022-02-17 NOTE — PLAN OF CARE
Goal Outcome Evaluation:  Plan of Care Reviewed With: patient           Outcome Summary: Pt is an 87 yo male admitted from home with SOA, fever and cough. Workup revealed acute COPD exacerbation and rhinovirus. Pt reports living alone, independent with ADLs and mobility and uses cane for community amb only. Does own a walker also if needed. Upon exam today, pt demonstrates minor balance deficits, shortness of air with activity and overall decreased activity tolerance. Pt completed transfers and ambulated 120' with cga and no AD on room air. Pt did c/o feeling SOA however O2 sats 93% upon return to room. PT will continue to follow and progress as able. Anticipate return home with increased assist, may benefit from HH however pt states she is not interested in HH.

## 2022-02-17 NOTE — PROGRESS NOTES
[facility] INPATIENT PROGRESS NOTE         [address]    2022      PATIENT IDENTIFICATION:  Name: Olena Myers ADMIT: 2/15/2022   : 1935  PCP: Manda Keenan MD    MRN: [id number] LOS: 0 days   AGE/SEX: 86 y.o. female  ROOM: [facility]                     LOS 0    Reason for visit: Acute exacerbation of COPD      SUBJECTIVE:      Complains of wheezing and nonproductive cough.  No chest pain or nausea.  Diminished breath sounds on auscultation.  Desaturates to the 80s while talking on room air but saturations come back up into the low 90s at rest.  I am seeing the patient for the first time today.  All patient problems are new to me.      Objective   OBJECTIVE:    Vital Sign Min/Max for last 24 hours  Temp  Min: 98 °F (36.7 °C)  Max: 99.1 °F (37.3 °C)   BP  Min: 99/56  Max: 160/83   Pulse  Min: 52  Max: 96   Resp  Min: 16  Max: 16   SpO2  Min: 92 %  Max: 96 %   No data recorded   Weight  Min: 77.4 kg (170 lb 11.2 oz)  Max: 77.4 kg (170 lb 11.2 oz)                         Body mass index is 33.34 kg/m².    Intake/Output Summary (Last 24 hours) at 2022 0916  Last data filed at 2022 0759  Gross per 24 hour   Intake 580 ml   Output --   Net 580 ml         Exam:  GEN:  No distress, appears stated age  EYES:   PERRL, anicteric sclerae  ENT:    External ears/nose normal, OP clear  NECK:  No adenopathy, midline trachea  LUNGS: Normal chest on inspection, palpation and Diminished at bases on auscultation  CV:  Normal S1S2, without murmur  ABD:  Nontender, nondistended, no hepatosplenomegaly, +BS  EXT:  No edema.  No cyanosis or clubbing.  No mottling and normal cap refill.    Assessment     Scheduled meds:  aspirin, 81 mg, Oral, Nightly  busPIRone, 10 mg, Oral, BID  cefTRIAXone, 1 g, Intravenous, Q24H  DULoxetine, 60 mg, Oral, BID  ipratropium-albuterol, 3 mL, Nebulization, Q6H While Awake - RT  levothyroxine, 88 mcg, Oral, Daily  losartan, 25 mg, Oral, Nightly  methylPREDNISolone sodium  succinate, 60 mg, Intravenous, Q8H  metoprolol succinate XL, 25 mg, Oral, BID  montelukast, 10 mg, Oral, Nightly  mycophenolate, 500 mg, Oral, Q12H  pantoprazole, 40 mg, Oral, Daily  pravastatin, 20 mg, Oral, Daily  raloxifene, 60 mg, Oral, Nightly  rivaroxaban, 20 mg, Oral, Daily With Dinner  sodium chloride, 10 mL, Intravenous, Q12H      IV meds:                         Data Review:  Results from last 7 days   Lab Units 02/16/22  0504 02/15/22  1659 02/15/22  1659   SODIUM mmol/L 140  --  138   POTASSIUM mmol/L 4.4  --  4.5   CHLORIDE mmol/L 106  --  104   CO2 mmol/L 19.0*  --  23.3   BUN mg/dL 14  --  15   CREATININE mg/dL 0.96  --  1.02*   GLUCOSE mg/dL 137*   < > 127*   CALCIUM mg/dL 9.2  --  9.7    < > = values in this interval not displayed.         Estimated Creatinine Clearance: 40.1 mL/min (by C-G formula based on SCr of 0.96 mg/dL).  Results from last 7 days   Lab Units 02/16/22  0504 02/15/22  1659   WBC 10*3/mm3 13.17* 10.89*   HEMOGLOBIN g/dL 9.1* 9.7*   PLATELETS 10*3/mm3 285 319         Results from last 7 days   Lab Units 02/15/22  1659   ALT (SGPT) U/L 15   AST (SGOT) U/L 22         Results from last 7 days   Lab Units 02/15/22  1659   PROCALCITONIN ng/mL 0.21         No results found for: HGBA1C, POCGLU    Chest x-ray 2/15 reviewed; shows cardiomegaly.  No acute disease.             Microbiology reviewed             Active Hospital Problems    Diagnosis  POA   • Obesity (BMI 30-39.9) [E66.9]  Yes   • COPD exacerbation (HCC) [J44.1]  Yes      Resolved Hospital Problems   No resolved problems to display.         ASSESSMENT:    COPD exacerbation  Human rhinovirus  History of coronary artery disease  Obesity  Hypothyroidism  Paroxysmal A. fib on anticoagulation  Aortic valve stenosis  Myasthenia gravis          PLAN:    Seems to be making progress.  Continue with steroid taper and bronchodilators for COPD exacerbation.  Weaning oxygen as able.  May require supplemental oxygen at discharge.  Would  benefit from activity and diet for long-term weight loss as this contributes to dyspnea with activity at baseline.        Jose Luis Juarez MD  Pulmonary and Critical Care Medicine  [facility], [facility]  2/17/2022    09:16 EST

## 2022-02-17 NOTE — PROGRESS NOTES
Name: Olena Myers ADMIT: 2/15/2022   : 1935  PCP: Manda Keenan MD    MRN: [id number] LOS: 0 days   AGE/SEX: 86 y.o. female  ROOM: [facility]/     Subjective   Subjective   She is feeling better today. She states neurologist wanted to back off on her prednisone from 10 mg daily to 1 mg a day    Review of Systems   Constitutional: Negative for fever.   Cardiovascular: Negative for chest pain.   Gastrointestinal: Negative for abdominal pain, diarrhea, nausea and vomiting.   Genitourinary: Negative for dysuria.   Neurological: Negative for headaches.      Objective   Objective   Vital Signs  Temp:  [98 °F (36.7 °C)-99.1 °F (37.3 °C)] 98.1 °F (36.7 °C)  Heart Rate:  [52-97] 97  Resp:  [16] 16  BP: (106-160)/(55-83) 141/68  SpO2:  [93 %-96 %] 93 %  on   ;   Device (Oxygen Therapy): room air  Body mass index is 33.34 kg/m².    Physical Exam  Constitutional:       General: She is not in acute distress.     Appearance: Normal appearance. She is not toxic-appearing.   HENT:      Head: Normocephalic and atraumatic.      Right Ear: External ear normal.      Left Ear: External ear normal.      Nose: Nose normal.   Eyes:      Conjunctiva/sclera: Conjunctivae normal.   Cardiovascular:      Rate and Rhythm: Normal rate and regular rhythm.   Pulmonary:      Effort: Pulmonary effort is normal. No respiratory distress.      Breath sounds: Wheezing (slight) present. No rhonchi.   Abdominal:      General: Bowel sounds are normal. There is no distension.      Palpations: Abdomen is soft.      Tenderness: There is no abdominal tenderness. There is no guarding or rebound.   Musculoskeletal:         General: No swelling.      Right lower leg: No edema.      Left lower leg: No edema.   Skin:     General: Skin is warm and dry.   Neurological:      General: No focal deficit present.      Mental Status: She is alert and oriented to person, place, and time.   Psychiatric:         Mood and Affect: Mood normal.         Behavior:  Behavior normal.         Thought Content: Thought content normal.     Results Review  I reviewed the patient's new clinical results.  Results from last 7 days   Lab Units 02/16/22  0504 02/15/22  1659   WBC 10*3/mm3 13.17* 10.89*   HEMOGLOBIN g/dL 9.1* 9.7*   PLATELETS 10*3/mm3 285 319     Results from last 7 days   Lab Units 02/16/22  0504 02/15/22  1659   SODIUM mmol/L 140 138   POTASSIUM mmol/L 4.4 4.5   CHLORIDE mmol/L 106 104   CO2 mmol/L 19.0* 23.3   BUN mg/dL 14 15   CREATININE mg/dL 0.96 1.02*   GLUCOSE mg/dL 137* 127*     Lab Results   Component Value Date    ANIONGAP 15.0 02/16/2022     Estimated Creatinine Clearance: 40.1 mL/min (by C-G formula based on SCr of 0.96 mg/dL).    Results from last 7 days   Lab Units 02/15/22  1659   ALBUMIN g/dL 4.10   BILIRUBIN mg/dL 0.2   ALK PHOS U/L 59   AST (SGOT) U/L 22   ALT (SGPT) U/L 15     Results from last 7 days   Lab Units 02/16/22  0504 02/15/22  1659   CALCIUM mg/dL 9.2 9.7   ALBUMIN g/dL  --  4.10     Results from last 7 days   Lab Units 02/15/22  1659   PROCALCITONIN ng/mL 0.21     Scheduled Meds  aspirin, 81 mg, Oral, Nightly  busPIRone, 10 mg, Oral, BID  cefTRIAXone, 1 g, Intravenous, Q24H  DULoxetine, 60 mg, Oral, BID  ipratropium-albuterol, 3 mL, Nebulization, Q6H While Awake - RT  levothyroxine, 88 mcg, Oral, Daily  losartan, 25 mg, Oral, Nightly  methylPREDNISolone sodium succinate, 60 mg, Intravenous, Q8H  metoprolol succinate XL, 25 mg, Oral, BID  montelukast, 10 mg, Oral, Nightly  mycophenolate, 500 mg, Oral, Q12H  pantoprazole, 40 mg, Oral, Daily  pravastatin, 20 mg, Oral, Daily  raloxifene, 60 mg, Oral, Nightly  rivaroxaban, 20 mg, Oral, Daily With Dinner  sodium chloride, 10 mL, Intravenous, Q12H    Continuous Infusions   PRN Meds  ipratropium-albuterol  •  nitroglycerin  •  [COMPLETED] Insert peripheral IV **AND** sodium chloride  •  sodium chloride     Diet  Diet Regular    I have personally reviewed:  [x]  Laboratory   [x]  Microbiology   [x]   Radiology   [x]  EKG/Telemetry   [x]  Records     Assessment/Plan     Active Hospital Problems    Diagnosis  POA   • **COPD exacerbation (HCC) [J44.1]  Yes   • Obesity (BMI 30-39.9) [E66.9]  Yes   • Diastolic CHF, chronic (HCC) [I50.32]  Unknown   • Sleep apnea [G47.30]  Yes   • Atrial fibrillation (HCC) [I48.91]  Yes   • Rhinovirus [B34.8]  Unknown   • Myasthenia gravis (HCC) [G70.00]  Yes   • CAD (coronary artery disease) [I25.10]  Yes   • Essential hypertension [I10]  Yes      Resolved Hospital Problems   No resolved problems to display.     86 y.o. female admitted with shortness of breath. Patient with a history of COPD and myasthenia gravis    · Acute exacerbation of COPD d/t rhinovirus infection  · She is improving  · Continue supportive care  · Steroids to p.o. (causing some increased WBC)  · appreciate pulmonology  · Atrial fibrillation  · Xarelto  · h/o myasthenia gravis   · Discussed with patient and nursing staff  · Discharge: home 1-2 days. PT. From home.    Carlos Ruiz MD  [facility]  02/17/22  12:34 EST    I wore protective equipment throughout this patient encounter including a face mask, gloves, and protective eyewear.  Hand hygiene was performed before donning protective equipment and after removal when leaving the room.

## 2022-02-17 NOTE — PLAN OF CARE
Goal Outcome Evaluation:              Outcome Summary: VSS. Pt remains on RA throughout shift. Up with PT. Pt reports improvement in soa. IV steroids switched to PO. Possible d/c. Will continue to monitor.

## 2022-02-17 NOTE — CONSULTS
CONSULT NOTE    INTERNAL MEDICINE   [facility]       Patient Identification:  Name: Olena Myers  Age: 86 y.o.  Sex: female  :  1935  MRN: [id number]             Date of Consultation:  22          Primary Care Physician: Manda Keenan MD                               Requesting Physician: dr laureano gaxiola   Reason for Consultation:assuming care    Chief Complaint:  86 year old female presented to the emergency room with shortness of breath which has worsened over the last few days; she has a history of copd; she was using her mininebs at home but was not getting any relief; she was given steroids in the ED and was sent to the observation unit; she has struggled to breath throughout the day and was felt to need admission for further evaluation and treatment; she denies fever or chills    History of Present Illness:   As above      Past Medical History:  Past Medical History:   Diagnosis Date   • Anemia     IRON DEFICIENCY   • Arthritis    • Asthma    • Atrial fibrillation ([facility])    • CAD (coronary artery disease)     HEART STENT   • COPD (chronic obstructive pulmonary disease) ([facility])    • 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   • Frequent urinary tract infections    • History of gastric ulcer    • History of GI bleed    • [place] (hard of hearing)    • Hyperlipidemia    • Hypertension    • Lightheadedness    • LVH (left ventricular hypertrophy)    • MG (myasthenia gravis) ([facility])    • Mini stroke ([facility]) 10/2016, 3/2017   • OA (osteoarthritis)    • Osteoporosis    • Sleep apnea     USES CPAP   • SOB (shortness of breath)     WALKING     Past Surgical History:  Past Surgical History:   Procedure Laterality Date   • APPENDECTOMY     • CARPAL TUNNEL RELEASE Bilateral    • CATARACT EXTRACTION Bilateral    • CORONARY STENT PLACEMENT     • TOTAL HIP ARTHROPLASTY Bilateral    • TOTAL SHOULDER ARTHROPLASTY W/ DISTAL CLAVICLE EXCISION Left 2016    Procedure: LT TOTAL SHOULDER REVERSE ARTHROPLASTY;   Wellness Visit for Adults   AMBULATORY CARE:   A wellness visit  is when you see your healthcare provider to get screened for health problems  Your healthcare provider will also give you advice on how to stay healthy  Write down your questions so you remember to ask them  Ask your healthcare provider how often you should have a wellness visit  What happens at a wellness visit:  Your healthcare provider will ask about your health, and your family history of health problems  This includes high blood pressure, heart disease, and cancer  He or she will ask if you have symptoms that concern you, if you smoke, and about your mood  You may also be asked about your intake of medicines, supplements, food, and alcohol  Any of the following may be done:  · Your weight  will be checked  Your height may also be checked so your body mass index (BMI) can be calculated  Your BMI shows if you are at a healthy weight  · Your blood pressure  and heart rate will be checked  Your temperature may also be checked  · Blood and urine tests  may be done  Blood tests may be done to check your cholesterol levels  Abnormal cholesterol levels increase your risk for heart disease and stroke  You may also need a blood or urine test to check for diabetes if you are at increased risk  Urine tests may be done to look for signs of an infection or kidney disease  · A physical exam  includes checking your heartbeat and lungs with a stethoscope  Your healthcare provider may also check your skin to look for sun damage  · Screening tests  may be recommended  A screening test is done to check for diseases that may not cause symptoms  The screening tests you may need depend on your age, gender, family history, and lifestyle habits  For example, colorectal screening may be recommended if you are 48years old or older  Screening tests you need if you are a woman:   · A Pap smear  is used to screen for cervical cancer   Pap smears are usually Surgeon: NAHOMI Solorzano MD;  [place] [facility] [place] [facility] [place];  Service:    • TOTAL SHOULDER ARTHROPLASTY W/ DISTAL CLAVICLE EXCISION Right 1/8/2019    Procedure: RIGHT TOTAL SHOULDER REVERSE ARTHROPLASTY;  Surgeon: Jovanny Solorzano MD;  [place] [facility] [place] [facility] [place];  Service: Orthopedics      Home Meds:  Medications Prior to Admission   Medication Sig Dispense Refill Last Dose   • albuterol (PROAIR HFA) 108 (90 BASE) MCG/ACT inhaler Inhale 2 puffs Every 6 (Six) Hours As Needed for wheezing or shortness of air.   Past Week at Unknown time   • aspirin 81 MG EC tablet Take 81 mg by mouth Every Night.   Past Week at Unknown time   • azelastine (ASTELIN) 0.1 % nasal spray 2 sprays into the nostril(s) as directed by provider 2 (Two) Times a Day. Use in each nostril as directed   Past Week at Unknown time   • busPIRone (BUSPAR) 10 MG tablet Take 10 mg by mouth 2 (Two) Times a Day.   Past Week at Unknown time   • colesevelam (WELCHOL) 625 MG tablet Take 1,250 mg by mouth 2 (two) times a day.   Past Week at Unknown time   • DULoxetine (CYMBALTA) 60 MG capsule Take 60 mg by mouth 2 (Two) Times a Day.   Past Week at Unknown time   • Fluticasone-Umeclidin-Vilant (Trelegy Ellipta) 100-62.5-25 MCG/INH inhaler Inhale 1 puff Daily.   Past Week at Unknown time   • irbesartan (AVAPRO) 75 MG tablet Take 75 mg by mouth Every Night.   Past Week at Unknown time   • levothyroxine (SYNTHROID, LEVOTHROID) 88 MCG tablet Take 88 mcg by mouth Daily.   Past Week at Unknown time   • metoprolol succinate XL (TOPROL-XL) 25 MG 24 hr tablet Take 1 tablet by mouth 2 (Two) Times a Day. 180 tablet 3 Past Week at Unknown time   • montelukast (SINGULAIR) 10 MG tablet Take 10 mg by mouth Every Night.   Past Week at Unknown time   • mycophenolate (CELLCEPT) 500 MG tablet Take 2,500 mg by mouth Daily.   Past Week at Unknown time   • pantoprazole (PROTONIX) 20 MG EC tablet Take 40 mg by mouth Daily.   Past Week at Unknown time   • pravastatin (PRAVACHOL) 20 MG tablet  done every 3 to 5 years depending on your age  You may need them more often if you have had abnormal Pap smear test results in the past  Ask your healthcare provider how often you should have a Pap smear  · A mammogram  is an x-ray of your breasts to screen for breast cancer  Experts recommend mammograms every 2 years starting at age 48 years  You may need a mammogram at age 52 years or younger if you have an increased risk for breast cancer  Talk to your healthcare provider about when you should start having mammograms and how often you need them  Vaccines you may need:   · Get an influenza vaccine  every year  The influenza vaccine protects you from the flu  Several types of viruses cause the flu  The viruses change over time, so new vaccines are made each year  · Get a tetanus-diphtheria (Td) booster vaccine  every 10 years  This vaccine protects you against tetanus and diphtheria  Tetanus is a severe infection that may cause painful muscle spasms and lockjaw  Diphtheria is a severe bacterial infection that causes a thick covering in the back of your mouth and throat  · Get a human papillomavirus (HPV) vaccine  if you are female and aged 23 to 32 or male 23 to 24 and never received it  This vaccine protects you from HPV infection  HPV is the most common infection spread by sexual contact  HPV may also cause vaginal, penile, and anal cancers  · Get a pneumococcal vaccine  if you are aged 72 years or older  The pneumococcal vaccine is an injection given to protect you from pneumococcal disease  Pneumococcal disease is an infection caused by pneumococcal bacteria  The infection may cause pneumonia, meningitis, or an ear infection  · Get a shingles vaccine  if you are 60 or older, even if you have had shingles before  The shingles vaccine is an injection to protect you from the varicella-zoster virus  This is the same virus that causes chickenpox   Shingles is a painful rash that develops in people Take 20 mg by mouth Daily.   Past Week at Unknown time   • predniSONE (DELTASONE) 10 MG tablet Take 10 mg by mouth Daily.   Past Week at Unknown time   • raloxifene (EVISTA) 60 MG tablet Take 60 mg by mouth Every Night.   Past Week at Unknown time   • rivaroxaban (XARELTO) 20 MG tablet Take 1 tablet by mouth Daily With Dinner. 90 tablet 3 Past Week at Unknown time   • vitamin B-12 (CYANOCOBALAMIN) 100 MCG tablet Take 50 mcg by mouth Daily.   Past Week at Unknown time   • vitamin D (ERGOCALCIFEROL) 17378 UNITS capsule capsule Take 50,000 Units by mouth Every 7 (Seven) Days. Takes on Wednesdays   Past Week at Unknown time     Current Meds:     Current Facility-Administered Medications:   •  aspirin EC tablet 81 mg, 81 mg, Oral, Nightly, Qadah, Abdalhakim, APRN  •  busPIRone (BUSPAR) tablet 10 mg, 10 mg, Oral, BID, Qadah, Abdalhakim, APRN, 10 mg at 02/16/22 1108  •  DULoxetine (CYMBALTA) DR capsule 60 mg, 60 mg, Oral, BID, Qadah, Abdalhakim, APRN, 60 mg at 02/16/22 1108  •  ipratropium-albuterol (DUO-NEB) nebulizer solution 3 mL, 3 mL, Nebulization, Q6H While Awake - RT, Qadah, Abdalhakim, APRN, 3 mL at 02/16/22 2004  •  levothyroxine (SYNTHROID, LEVOTHROID) tablet 88 mcg, 88 mcg, Oral, Daily, Qadah, Abdalhakim, APRN, 88 mcg at 02/16/22 0905  •  losartan (COZAAR) tablet 25 mg, 25 mg, Oral, Nightly, Qadah, Abdalhakim, APRN, 25 mg at 02/16/22 0049  •  methylPREDNISolone sodium succinate (SOLU-Medrol) injection 60 mg, 60 mg, Intravenous, Q8H, Shalonda Mccann PA-C, 60 mg at 02/16/22 1806  •  metoprolol succinate XL (TOPROL-XL) 24 hr tablet 25 mg, 25 mg, Oral, BID, Qadah, Abdalhakim, APRN, 25 mg at 02/16/22 1108  •  montelukast (SINGULAIR) tablet 10 mg, 10 mg, Oral, Nightly, Dagoberto Garcia APRN, 10 mg at 02/16/22 0049  •  mycophenolate (CELLCEPT) tablet 500 mg, 500 mg, Oral, Q12H, Shalonda Mccann PA-C, 500 mg at 02/16/22 1309  •  nitroglycerin (NITROSTAT) SL tablet 0.4 mg, 0.4 mg, Sublingual, Q5 Min PRN,  who had chickenpox or have been exposed to the virus  How to eat healthy:  My Plate is a model for planning healthy meals  It shows the types and amounts of foods that should go on your plate  Fruits and vegetables make up about half of your plate, and grains and protein make up the other half  A serving of dairy is included on the side of your plate  The amount of calories and serving sizes you need depends on your age, gender, weight, and height  Examples of healthy foods are listed below:  · Eat a variety of vegetables  such as dark green, red, and orange vegetables  You can also include canned vegetables low in sodium (salt) and frozen vegetables without added butter or sauces  · Eat a variety of fresh fruits , canned fruit in 100% juice, frozen fruit, and dried fruit  · Include whole grains  At least half of the grains you eat should be whole grains  Examples include whole-wheat bread, wheat pasta, brown rice, and whole-grain cereals such as oatmeal     · Eat a variety of protein foods such as seafood (fish and shellfish), lean meat, and poultry without skin (turkey and chicken)  Examples of lean meats include pork leg, shoulder, or tenderloin, and beef round, sirloin, tenderloin, and extra lean ground beef  Other protein foods include eggs and egg substitutes, beans, peas, soy products, nuts, and seeds  · Choose low-fat dairy products such as skim or 1% milk or low-fat yogurt, cheese, and cottage cheese  · Limit unhealthy fats  such as butter, hard margarine, and shortening  Exercise:  Exercise at least 30 minutes per day on most days of the week  Some examples of exercise include walking, biking, dancing, and swimming  You can also fit in more physical activity by taking the stairs instead of the elevator or parking farther away from stores  Include muscle strengthening activities 2 days each week  Regular exercise provides many health benefits   It helps you manage your weight, and Chinedudah, Abdalmarinom, APRN  •  pantoprazole (PROTONIX) EC tablet 40 mg, 40 mg, Oral, Daily, Qadah, Abdalhakim, APRN, 40 mg at 02/16/22 0906  •  pravastatin (PRAVACHOL) tablet 20 mg, 20 mg, Oral, Daily, Qadah, Abdalhakim, APRN, 20 mg at 02/16/22 0905  •  raloxifene (EVISTA) tablet 60 mg, 60 mg, Oral, Nightly, Qadah, Abdalhakim, APRN  •  rivaroxaban (XARELTO) tablet 20 mg, 20 mg, Oral, Daily With Dinner, Qadah, Abdalhakim, APRN, 20 mg at 02/16/22 1806  •  [COMPLETED] Insert peripheral IV, , , Once **AND** sodium chloride 0.9 % flush 10 mL, 10 mL, Intravenous, PRN, Kush Szymanski MD  •  sodium chloride 0.9 % flush 10 mL, 10 mL, Intravenous, Q12H, Qadah, Abdalhakim, APRN, 10 mL at 02/16/22 0906  •  sodium chloride 0.9 % flush 10 mL, 10 mL, Intravenous, PRN, Qadah, Abdalhakim, APRN  Allergies:  Allergies   Allergen Reactions   • Neomycin Anaphylaxis   • Penicillins Swelling and Rash     Patient reports tolerating cephalexin on multiple occasions, most recently in November 2020. Tolerated ceftriaxone July 2021.  Patient reports that initial reaction to penicillin was 30-40 years ago, but she was unsure which penicillin drug it was.    • Hydrocodone Itching and Rash   • Rosuvastatin Other (See Comments)     Muscle cramps     Social History:   Social History     Socioeconomic History   • Marital status:    Tobacco Use   • Smoking status: Never Smoker   • Smokeless tobacco: Never Used   • Tobacco comment: caffeine - coffee and coke caff. free, tea    Substance and Sexual Activity   • Alcohol use: No   • Drug use: No     Comment: caffine   • Sexual activity: Defer     Family History:  Family History   Problem Relation Age of Onset   • Heart disease Mother    • Hyperlipidemia Mother    • Stroke Mother    • Diabetes Mother    • Breast cancer Mother    • Heart disease Father    • Hyperlipidemia Father    • Stroke Father    • Malig Hyperthermia Neg Hx           Review of Systems  See history of present illness and past  decreases your risk for type 2 diabetes, heart disease, stroke, and high blood pressure  Exercise can also help improve your mood  Ask your healthcare provider about the best exercise plan for you  General health and safety guidelines:   · Do not smoke  Nicotine and other chemicals in cigarettes and cigars can cause lung damage  Ask your healthcare provider for information if you currently smoke and need help to quit  E-cigarettes or smokeless tobacco still contain nicotine  Talk to your healthcare provider before you use these products  · Limit alcohol  A drink of alcohol is 12 ounces of beer, 5 ounces of wine, or 1½ ounces of liquor  · Lose weight, if needed  Being overweight increases your risk of certain health conditions  These include heart disease, high blood pressure, type 2 diabetes, and certain types of cancer  · Protect your skin  Do not sunbathe or use tanning beds  Use sunscreen with a SPF 15 or higher  Apply sunscreen at least 15 minutes before you go outside  Reapply sunscreen every 2 hours  Wear protective clothing, hats, and sunglasses when you are outside  · Drive safely  Always wear your seatbelt  Make sure everyone in your car wears a seatbelt  A seatbelt can save your life if you are in an accident  Do not use your cell phone when you are driving  This could distract you and cause an accident  Pull over if you need to make a call or send a text message  · Practice safe sex  Use latex condoms if are sexually active and have more than one partner  Your healthcare provider may recommend screening tests for sexually transmitted infections (STIs)  · Wear helmets, lifejackets, and protective gear  Always wear a helmet when you ride a bike or motorcycle, go skiing, or play sports that could cause a head injury  Wear protective equipment when you play sports  Wear a lifejacket when you are on a boat or doing water sports      © Copyright [employer] 2021 Information is "medical history.  Patient denies headache, dizziness, syncope, falls, trauma, change in vision, change in hearing, change in taste, changes in weight, changes in appetite, focal weakness, numbness, or paresthesia.  Patient denies chest pain, sinus pressure, rhinorrhea, epistaxis, hemoptysis, nausea, vomiting,hematemesis, diarrhea, constipation or hematchezia.  Denies cold or heat intolerance, polydipsia, polyuria, polyphagia. Denies hematuria, pyuria, dysuria, hesitancy, frequency or urgency. Denies consumption of raw and under cooked meats foods or change in water source.  Denies fever, chills, sweats, night sweats.  Denies missing any routine medications. Remainder of ROS is negative.      Vitals:   /55 (BP Location: Right arm, Patient Position: Sitting)   Pulse 91   Temp 98.4 °F (36.9 °C) (Oral)   Resp 16   Ht 152.4 cm (60\")   Wt 77.4 kg (170 lb 11.2 oz)   SpO2 95%   BMI 33.34 kg/m²   I/O: No intake or output data in the 24 hours ending 02/16/22 2013  Exam:  General Appearance:    Alert, cooperative, no distress, appears stated age   Head:    Normocephalic, without obvious abnormality, atraumatic   Eyes:    PERRL, conjunctivae/corneas clear, EOM's intact, both eyes   Ears:    Normal external ear canals, both ears   Nose:   Nares normal, septum midline, mucosa normal, no drainage    or sinus tenderness   Throat:   Lips, tongue, gums normal; oral mucosa pink and moist   Neck:   Supple, symmetrical, trachea midline, no adenopathy;     thyroid:  no enlargement/tenderness/nodules; no carotid    bruit or JVD   Back:     Symmetric, no curvature, ROM normal, no CVA tenderness   Lungs:     Decreased breath sounds bilaterally, respirations unlabored   Chest Wall:    No tenderness or deformity    Heart:    Regular rate and rhythm, S1 and S2 normal, no murmur, rub   or gallop   Abdomen:     Soft, nontender, bowel sounds active all four quadrants,     no masses, no hepatomegaly, no splenomegaly   Extremities:   " for End User's use only and may not be sold, redistributed or otherwise used for commercial purposes  All illustrations and images included in CareNotes® are the copyrighted property of A D A M [employer] Inc  or Chris Garza  The above information is an  only  It is not intended as medical advice for individual conditions or treatments  Talk to your doctor, nurse or pharmacist before following any medical regimen to see if it is safe and effective for you  Extremities normal, atraumatic, no cyanosis or edema   Pulses:   Pulses palpable in all extremities; symmetric all extremities   Skin:   Skin color normal, Skin is warm and dry,  no rashes or palpable lesions   Neurologic:   CNII-XII intact, motor strength grossly intact, sensation grossly intact to light touch, no focal deficits noted       Data Review:  Labs in chart were reviewed.  WBC   Date Value Ref Range Status   02/16/2022 13.17 (H) 3.40 - 10.80 10*3/mm3 Final     Hemoglobin   Date Value Ref Range Status   02/16/2022 9.1 (L) 12.0 - 15.9 g/dL Final     Hematocrit   Date Value Ref Range Status   02/16/2022 28.6 (L) 34.0 - 46.6 % Final     Platelets   Date Value Ref Range Status   02/16/2022 285 140 - 450 10*3/mm3 Final     Sodium   Date Value Ref Range Status   02/16/2022 140 136 - 145 mmol/L Final     Potassium   Date Value Ref Range Status   02/16/2022 4.4 3.5 - 5.2 mmol/L Final     Chloride   Date Value Ref Range Status   02/16/2022 106 98 - 107 mmol/L Final     CO2   Date Value Ref Range Status   02/16/2022 19.0 (L) 22.0 - 29.0 mmol/L Final     BUN   Date Value Ref Range Status   02/16/2022 14 8 - 23 mg/dL Final     Creatinine   Date Value Ref Range Status   02/16/2022 0.96 0.57 - 1.00 mg/dL Final     Glucose   Date Value Ref Range Status   02/16/2022 137 (H) 65 - 99 mg/dL Final     Calcium   Date Value Ref Range Status   02/16/2022 9.2 8.6 - 10.5 mg/dL Final     AST (SGOT)   Date Value Ref Range Status   02/15/2022 22 1 - 32 U/L Final     ALT (SGPT)   Date Value Ref Range Status   02/15/2022 15 1 - 33 U/L Final     Alkaline Phosphatase   Date Value Ref Range Status   02/15/2022 59 39 - 117 U/L Final               Imaging Results (Last 7 Days)     Procedure Component Value Units Date/Time    XR Chest 1 View [[id number]] Collected: 02/15/22 1715     Updated: 02/15/22 1720    Narrative:      PORTABLE CHEST 02/15/2022 AT 5:00 PM     CLINICAL HISTORY: Dyspnea     The lungs appear fairly well-expanded and appear  free of infiltrates.  The heart is moderately enlarged. The pulmonary vasculature is within  normal limits. A Mediport device is in place in satisfactory position in  the left internal jugular vein. A left shoulder arthroplasty is  partially imaged.     IMPRESSIONS: Moderate cardiomegaly. No evidence of acute disease within  the chest.     This report was finalized on 2/15/2022 5:17 PM by Dr. Jacob Baldwin M.D.           Past Medical History:   Diagnosis Date   • Anemia     IRON DEFICIENCY   • Arthritis    • Asthma    • Atrial fibrillation ([facility])    • CAD (coronary artery disease)     HEART STENT   • COPD (chronic obstructive pulmonary disease) ([facility])    • 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   • Frequent urinary tract infections    • History of gastric ulcer    • History of GI bleed    • [place] (hard of hearing)    • Hyperlipidemia    • Hypertension    • Lightheadedness    • LVH (left ventricular hypertrophy)    • MG (myasthenia gravis) ([facility])    • Mini stroke ([facility]) 10/2016, 3/2017   • OA (osteoarthritis)    • Osteoporosis    • Sleep apnea     USES CPAP   • SOB (shortness of breath)     WALKING       Assessment:  Active Hospital Problems    Diagnosis  POA   • Obesity (BMI 30-39.9) [E66.9]  Yes   • COPD exacerbation ([facility]) [J44.1]  Yes      Resolved Hospital Problems   No resolved problems to display.   hypertension  Atrial fibrillation  Myasthenia gravis    Plan:  Increase steroids to q8  She started wheezing more and had some desaturation with activity according to the aprn in the observation unit  Continue mininebs  Monitor on telemetry   patient and ED provider    Melinda Faust MD   2/16/2022  20:13 EST

## 2022-02-17 NOTE — PLAN OF CARE
Goal Outcome Evaluation:    Progress: no change  Outcome Summary: Vitals stable. SOA with activity. No c/o pain. IV steroid given. Pt ambulated with assist x1 to the bathroom. IV abx continued. Pt stable and needs met at this time.

## 2022-02-17 NOTE — THERAPY EVALUATION
Patient Name: Olena Myers  : 1935    MRN: [id number]                              Today's Date: 2022       Admit Date: 2/15/2022    Visit Dx:     ICD-10-CM ICD-9-CM   1. COPD with acute exacerbation ([facility])  J44.1 491.21   2. Rhinovirus infection  B34.8 079.3   3. Hypoxia  R09.02 799.02     Patient Active Problem List   Diagnosis   • Pre-operative cardiovascular examination   • S/p reverse total shoulder arthroplasty   • Myasthenia gravis (HCC)   • Paroxysmal atrial fibrillation (HCC)   • HX: long term anticoagulant use   • Essential hypertension   • CAD (coronary artery disease)   • Rhinovirus   • Atrial fibrillation (HCC)   • S/P reverse total shoulder arthroplasty, right   • Acute UTI   • COPD (chronic obstructive pulmonary disease) ([facility])   • Hyponatremia   • Metabolic encephalopathy   • Sleep apnea   • Sepsis ([facility])   • Dyspnea   • Acute on chronic diastolic CHF (congestive heart failure) ([facility])   • Diastolic CHF, chronic ([facility])   • Heme positive stool   • E coli bacteremia   • Iron deficiency anemia   • Current chronic use of systemic steroids   • COPD exacerbation ([facility])   • Obesity (BMI 30-39.9)     Past Medical History:   Diagnosis Date   • Anemia     IRON DEFICIENCY   • Arthritis    • Asthma    • Atrial fibrillation ([facility])    • CAD (coronary artery disease)     HEART STENT   • COPD (chronic obstructive pulmonary disease) (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)    • Mini stroke ([facility]) 10/2016, 3/2017   • OA (osteoarthritis)    • Osteoporosis    • Sleep apnea     USES CPAP   • SOB (shortness of breath)     WALKING     Past Surgical History:   Procedure Laterality Date   • APPENDECTOMY     • CARPAL TUNNEL RELEASE Bilateral    • CATARACT  EXTRACTION Bilateral    • CORONARY STENT PLACEMENT     • TOTAL HIP ARTHROPLASTY Bilateral    • TOTAL SHOULDER ARTHROPLASTY W/ DISTAL CLAVICLE EXCISION Left 7/19/2016    Procedure: LT TOTAL SHOULDER REVERSE ARTHROPLASTY;  Surgeon: NAHOMI Solorzano MD;  Location: [facility];  Service:    • TOTAL SHOULDER ARTHROPLASTY W/ DISTAL CLAVICLE EXCISION Right 1/8/2019    Procedure: RIGHT TOTAL SHOULDER REVERSE ARTHROPLASTY;  Surgeon: Jovanny Solorzano MD;  Location: [facility];  Service: Orthopedics      General Information     Row Name 02/17/22 1511          Physical Therapy Time and Intention    Document Type evaluation  -CF     Mode of Treatment individual therapy; physical therapy  -     Row Name 02/17/22 1511          General Information    Patient Profile Reviewed yes  -CF     Prior Level of Function independent:  cane for community amb, also owns walker  -     Existing Precautions/Restrictions no known precautions/restrictions  -     Barriers to Rehab none identified  -     Row Name 02/17/22 1511          Living Environment    Lives With alone  -     Row Name 02/17/22 1511          Home Main Entrance    Number of Stairs, Main Entrance none  -     Row Name 02/17/22 1511          Stairs Within Home, Primary    Number of Stairs, Within Home, Primary none  -     Row Name 02/17/22 1511          Cognition    Orientation Status (Cognition) oriented x 4  -     Row Name 02/17/22 1511          Safety Issues, Functional Mobility    Impairments Affecting Function (Mobility) shortness of breath; strength; balance  -CF           User Key  (r) = Recorded By, (t) = Taken By, (c) = Cosigned By    Initials Name Provider Type     Jessica Tian, PT Physical Therapist               Mobility     Row Name 02/17/22 1537          Bed Mobility    Comment (Bed Mobility) NT pt in chair at arrival and departure  -     Row Name 02/17/22 1535          Sit-Stand Transfer    Sit-Stand Lucas (Transfers) standby  assist; verbal cues  -CF     Assistive Device (Sit-Stand Transfers) other (see comments)  none  -CF     Row Name 02/17/22 1535          Gait/Stairs (Locomotion)    Assistive Device (Gait) other (see comments)  none  -CF     Distance in Feet (Gait) 120'  -CF     Deviations/Abnormal Patterns (Gait) michael decreased; gait speed decreased  -CF     Bilateral Gait Deviations forward flexed posture  -CF     Comment (Gait/Stairs) B feet pronated, flexed posture. C/o SOA mid way through walk, O2 sats reading 93% upon return to chair  -CF           User Key  (r) = Recorded By, (t) = Taken By, (c) = Cosigned By    Initials Name Provider Type    CF Jessica Tian PT Physical Therapist               Obj/Interventions     Row Name 02/17/22 1537          Range of Motion Comprehensive    General Range of Motion bilateral lower extremity ROM WFL  -CF     [facility] Name 02/17/22 1537          Strength Comprehensive (MMT)    Comment, General Manual Muscle Testing (MMT) Assessment BLE WFL  -[facility] Name 02/17/22 1537          Balance    Balance Assessment sitting static balance; sitting dynamic balance; standing static balance; standing dynamic balance  -CF     Static Sitting Balance WFL; sitting, edge of bed; unsupported  -CF     Dynamic Sitting Balance WFL; sitting, edge of bed; unsupported  -CF     Static Standing Balance WFL; unsupported  -CF     Dynamic Standing Balance mild impairment; unsupported  -CF     [facility] Name 02/17/22 1537          Sensory Assessment (Somatosensory)    Sensory Assessment (Somatosensory) LE sensation intact  -CF           User Key  (r) = Recorded By, (t) = Taken By, (c) = Cosigned By    Initials Name Provider Type    CF Jessica Tian PT Physical Therapist               Goals/Plan     Row Name 02/17/22 1549          Bed Mobility Goal 1 (PT)    Activity/Assistive Device (Bed Mobility Goal 1, PT) bed mobility activities, all  -CF     [place] Level/Cues Needed (Bed Mobility Goal 1, PT) modified  independence  -CF     Time Frame (Bed Mobility Goal 1, PT) 1 week  -CF     Row Name 02/17/22 1549          Transfer Goal 1 (PT)    Activity/Assistive Device (Transfer Goal 1, PT) sit-to-stand/stand-to-sit; bed-to-chair/chair-to-bed  -CF     [place] Level/Cues Needed (Transfer Goal 1, PT) modified independence  -CF     Time Frame (Transfer Goal 1, PT) 1 week  -CF     Row Name 02/17/22 1549          Gait Training Goal 1 (PT)    Activity/Assistive Device (Gait Training Goal 1, PT) gait (walking locomotion)  -CF     [place] Level (Gait Training Goal 1, PT) modified independence  -CF     Distance (Gait Training Goal 1, PT) 150'  -CF     Time Frame (Gait Training Goal 1, PT) 1 week  -CF           User Key  (r) = Recorded By, (t) = Taken By, (c) = Cosigned By    Initials Name Provider Type    CF Jessica Tian, PT Physical Therapist               Clinical Impression     Row Name 02/17/22 6129          Pain    Additional Documentation Pain Scale: Numbers Pre/Post-Treatment (Group)  -     Row Name 02/17/22 7053          Pain Scale: Numbers Pre/Post-Treatment    Pretreatment Pain Rating 0/10 - no pain  -CF     Row Name 02/17/22 1747          Plan of Care Review    Plan of Care Reviewed With patient  -CF     Outcome Summary Pt is an 85 yo male admitted from home with SOA, fever and cough. Workup revealed acute COPD exacerbation and rhinovirus. Pt reports living alone, independent with ADLs and mobility and uses cane for community amb only. Does own a walker also if needed. Upon exam today, pt demonstrates minor balance deficits, shortness of air with activity and overall decreased activity tolerance. Pt completed transfers and ambulated 120' with cga and no AD on room air. Pt did c/o feeling SOA however O2 sats 93% upon return to room. PT will continue to follow and progress as able. Anticipate return home with increased assist, may benefit from HH however pt states she is not interested in HH.  -     Row Name  02/17/22 1537          Therapy Assessment/Plan (PT)    Rehab Potential (PT) good, to achieve stated therapy goals  -CF     Criteria for Skilled Interventions Met (PT) yes  -CF     Predicted Duration of Therapy Intervention (PT) 1 week  -CF     Row Name 02/17/22 1537          Vital Signs    Pre SpO2 (%) 97  -CF     O2 Delivery Pre Treatment room air  -CF     Intra SpO2 (%) 93  -CF     O2 Delivery Intra Treatment room air  -CF     Post SpO2 (%) 97  -CF     O2 Delivery Post Treatment room air  -CF     Row Name 02/17/22 1537          Positioning and Restraints    Pre-Treatment Position sitting in chair/recliner  -CF     Post Treatment Position chair  -CF     In Chair reclined; call light within reach; encouraged to call for assist; exit alarm on; notified nsg; legs elevated  -CF           User Key  (r) = Recorded By, (t) = Taken By, (c) = Cosigned By    Initials Name Provider Type    Jessica Slater, ABI Physical Therapist               Outcome Measures     Row Name 02/17/22 1549          How much help from another person do you currently need...    Turning from your back to your side while in flat bed without using bedrails? 4  -CF     Moving from lying on back to sitting on the side of a flat bed without bedrails? 3  -CF     Moving to and from a bed to a chair (including a wheelchair)? 3  -CF     Standing up from a chair using your arms (e.g., wheelchair, bedside chair)? 3  -CF     Climbing 3-5 steps with a railing? 3  -CF     To walk in hospital room? 3  -CF     AM-PAC 6 Clicks Score (PT) 19  -CF     Row Name 02/17/22 1549          Functional Assessment    Outcome Measure Options AM-PAC 6 Clicks Basic Mobility (PT)  -CF           User Key  (r) = Recorded By, (t) = Taken By, (c) = Cosigned By    Initials Name Provider Type    Jessica Slater PT Physical Therapist                             Physical Therapy Education                 Title: PT OT SLP Therapies (In Progress)     Topic: Physical Therapy (In  Progress)     Point: Mobility training (Done)     Learning Progress Summary           Patient Acceptance, E, VU,NR by  at 2/17/2022 1549                   Point: Home exercise program (Not Started)     Learner Progress:  Not documented in this visit.          Point: Body mechanics (Done)     Learning Progress Summary           Patient Acceptance, E, VU,NR by CF at 2/17/2022 1549                   Point: Precautions (Done)     Learning Progress Summary           Patient Acceptance, E, VU,NR by  at 2/17/2022 1549                               User Key     Initials Effective Dates Name Provider Type Discipline     06/16/21 -  Jessica Tian, PT Physical Therapist PT              PT Recommendation and Plan  Planned Therapy Interventions (PT): balance training, bed mobility training, gait training, ROM (range of motion), strengthening, patient/family education, transfer training  Plan of Care Reviewed With: patient  Outcome Summary: Pt is an 87 yo male admitted from home with SOA, fever and cough. Workup revealed acute COPD exacerbation and rhinovirus. Pt reports living alone, independent with ADLs and mobility and uses cane for community amb only. Does own a walker also if needed. Upon exam today, pt demonstrates minor balance deficits, shortness of air with activity and overall decreased activity tolerance. Pt completed transfers and ambulated 120' with cga and no AD on room air. Pt did c/o feeling SOA however O2 sats 93% upon return to room. PT will continue to follow and progress as able. Anticipate return home with increased assist, may benefit from HH however pt states she is not interested in HH.     Time Calculation:    PT Charges     Row Name 02/17/22 1517             Time Calculation    Start Time 1449  -      Stop Time 1509  -      Time Calculation (min) 20 min  -      PT Received On 02/17/22  -      PT - Next Appointment 02/18/22  -      PT Goal Re-Cert Due Date 02/24/22  -               Time Calculation- PT    Total Timed Code Minutes- PT 13 minute(s)  -CF              Timed Charges    88066 - PT Therapeutic Activity Minutes 13  -CF              Total Minutes    Timed Charges Total Minutes 13  -CF       Total Minutes 13  -CF            User Key  (r) = Recorded By, (t) = Taken By, (c) = Cosigned By    Initials Name Provider Type    CF Jessica Tian, PT Physical Therapist              Therapy Charges for Today     Code Description Service Date Service Provider Modifiers Qty    [id number]  PT EVAL MOD COMPLEXITY 2 2/17/2022 Jessica Tian, PT GP 1    [id number]  PT THERAPEUTIC ACT EA 15 MIN 2/17/2022 Jessica Tian, PT GP 1          PT G-Codes  Outcome Measure Options: AM-PAC 6 Clicks Basic Mobility (PT)  AM-PAC 6 Clicks Score (PT): 19    Jessica Tian, PT  2/17/2022

## 2022-02-18 VITALS
SYSTOLIC BLOOD PRESSURE: 136 MMHG | HEIGHT: 60 IN | WEIGHT: 170.7 LBS | OXYGEN SATURATION: 92 % | BODY MASS INDEX: 33.51 KG/M2 | HEART RATE: 75 BPM | TEMPERATURE: 97.8 F | DIASTOLIC BLOOD PRESSURE: 57 MMHG | RESPIRATION RATE: 18 BRPM

## 2022-02-18 LAB
ANION GAP SERPL CALCULATED.3IONS-SCNC: 11.5 MMOL/L (ref 5–15)
BUN SERPL-MCNC: 29 MG/DL (ref 8–23)
BUN/CREAT SERPL: 26.1 (ref 7–25)
CALCIUM SPEC-SCNC: 9.5 MG/DL (ref 8.6–10.5)
CHLORIDE SERPL-SCNC: 102 MMOL/L (ref 98–107)
CO2 SERPL-SCNC: 26.5 MMOL/L (ref 22–29)
CREAT SERPL-MCNC: 1.11 MG/DL (ref 0.57–1)
DEPRECATED RDW RBC AUTO: 45.7 FL (ref 37–54)
ERYTHROCYTE [DISTWIDTH] IN BLOOD BY AUTOMATED COUNT: 14.5 % (ref 12.3–15.4)
GFR SERPL CREATININE-BSD FRML MDRD: 47 ML/MIN/1.73
GFR SERPL CREATININE-BSD FRML MDRD: 57 ML/MIN/1.73
GLUCOSE SERPL-MCNC: 151 MG/DL (ref 65–99)
HCT VFR BLD AUTO: 30.7 % (ref 34–46.6)
HGB BLD-MCNC: 10.1 G/DL (ref 12–15.9)
MCH RBC QN AUTO: 29 PG (ref 26.6–33)
MCHC RBC AUTO-ENTMCNC: 32.9 G/DL (ref 31.5–35.7)
MCV RBC AUTO: 88.2 FL (ref 79–97)
PLATELET # BLD AUTO: 309 10*3/MM3 (ref 140–450)
PMV BLD AUTO: 9.4 FL (ref 6–12)
POTASSIUM SERPL-SCNC: 3.6 MMOL/L (ref 3.5–5.2)
QT INTERVAL: 394 MS
RBC # BLD AUTO: 3.48 10*6/MM3 (ref 3.77–5.28)
SODIUM SERPL-SCNC: 140 MMOL/L (ref 136–145)
WBC NRBC COR # BLD: 19.89 10*3/MM3 (ref 3.4–10.8)

## 2022-02-18 PROCEDURE — 85027 COMPLETE CBC AUTOMATED: CPT | Performed by: HOSPITALIST

## 2022-02-18 PROCEDURE — G0378 HOSPITAL OBSERVATION PER HR: HCPCS

## 2022-02-18 PROCEDURE — 93010 ELECTROCARDIOGRAM REPORT: CPT | Performed by: INTERNAL MEDICINE

## 2022-02-18 PROCEDURE — 80048 BASIC METABOLIC PNL TOTAL CA: CPT | Performed by: HOSPITALIST

## 2022-02-18 PROCEDURE — 97530 THERAPEUTIC ACTIVITIES: CPT

## 2022-02-18 PROCEDURE — 94799 UNLISTED PULMONARY SVC/PX: CPT

## 2022-02-18 PROCEDURE — 63710000001 MYCOPHENOLATE MOFETIL PER 250 MG: Performed by: STUDENT IN AN ORGANIZED HEALTH CARE EDUCATION/TRAINING PROGRAM

## 2022-02-18 PROCEDURE — 93005 ELECTROCARDIOGRAM TRACING: CPT | Performed by: HOSPITALIST

## 2022-02-18 PROCEDURE — 63710000001 PREDNISONE PER 1 MG: Performed by: HOSPITALIST

## 2022-02-18 RX ORDER — UBIDECARENONE 75 MG
50 CAPSULE ORAL DAILY
Start: 2022-02-23 | End: 2022-11-04

## 2022-02-18 RX ORDER — PREDNISONE 20 MG/1
40 TABLET ORAL
Qty: 10 TABLET | Refills: 0 | Status: SHIPPED | OUTPATIENT
Start: 2022-02-18 | End: 2022-02-23

## 2022-02-18 RX ADMIN — MYCOPHENOLATE MOFETIL 500 MG: 500 TABLET ORAL at 08:43

## 2022-02-18 RX ADMIN — IPRATROPIUM BROMIDE AND ALBUTEROL SULFATE 3 ML: 2.5; .5 SOLUTION RESPIRATORY (INHALATION) at 08:07

## 2022-02-18 RX ADMIN — PANTOPRAZOLE SODIUM 40 MG: 40 TABLET, DELAYED RELEASE ORAL at 08:43

## 2022-02-18 RX ADMIN — PREDNISONE 40 MG: 20 TABLET ORAL at 08:43

## 2022-02-18 RX ADMIN — SODIUM CHLORIDE, PRESERVATIVE FREE 10 ML: 5 INJECTION INTRAVENOUS at 08:44

## 2022-02-18 RX ADMIN — PRAVASTATIN SODIUM 20 MG: 40 TABLET ORAL at 08:43

## 2022-02-18 RX ADMIN — LEVOTHYROXINE SODIUM 88 MCG: 0.09 TABLET ORAL at 08:43

## 2022-02-18 NOTE — DISCHARGE SUMMARY
[facility]    Date of Discharge:  2/18/2022    PCP: Manda Keenan MD    Discharge Diagnosis:   Active Hospital Problems    Diagnosis  POA   • **COPD exacerbation (HCC) [J44.1]  Yes   • Obesity (BMI 30-39.9) [E66.9]  Yes   • Diastolic CHF, chronic (HCC) [I50.32]  Unknown   • Sleep apnea [G47.30]  Yes   • Atrial fibrillation (HCC) [I48.91]  Yes   • Rhinovirus [B34.8]  Unknown   • Myasthenia gravis (HCC) [G70.00]  Yes   • CAD (coronary artery disease) [I25.10]  Yes   • Essential hypertension [I10]  Yes      Resolved Hospital Problems   No resolved problems to display.          Consults     Date and Time Order Name Status Description    2/16/2022  4:07 PM Inpatient Internal Medicine Consult Completed     2/16/2022 12:07 AM Inpatient Pulmonology Consult Completed         Hospital Course  86 y.o. female with a history of COPD and myasthenia gravis presented with shortness of breath. Respiratory panel was positive for rhinovirus. CXR showed no acute disease. Pulmonology was consulted and she was started on steroids and nebulized breathing treatments with significant improvement in her symptoms. She walked with PT this morning sats dropped to 91% and had some dysnpea during and mild fatigue afterwards but has recovered. Overall she is feeling improved and would like to go home. She lives by herself but has family support close. She will be sent home on a few more days of Prednisone at 40 mg a day and afterwards will resume 10 mg a day. She will call her neurologist for a follow up appointment. She has some leukocytosis and elevated BUN likely due to steroids (CXR no pneumonia, procalcitonin not elevated, no fever). Recommend follow up CBC with BMP with PCP (Manda Keenan MD) next week.    I discussed the patient's findings and my recommendations with patient and nursing staff.    Condition on Discharge: Improved.     Temp:  [97.2 °F (36.2 °C)-98.7 °F (37.1 °C)]  97.8 °F (36.6 °C)  Heart Rate:  [69-97] 75  Resp:  [16-18] 18  BP: (130-144)/(57-88) 136/57  Body mass index is 33.34 kg/m².    Physical Exam  Constitutional:       General: She is not in acute distress.     Appearance: Normal appearance. She is not toxic-appearing.   HENT:      Head: Normocephalic and atraumatic.      Right Ear: External ear normal.      Left Ear: External ear normal.      Nose: Nose normal.   Eyes:      Conjunctiva/sclera: Conjunctivae normal.   Cardiovascular:      Rate and Rhythm: Normal rate and regular rhythm.   Pulmonary:      Effort: Pulmonary effort is normal. No respiratory distress.      Breath sounds: Normal breath sounds. No wheezing or rhonchi.   Abdominal:      General: Bowel sounds are normal. There is no distension.      Palpations: Abdomen is soft.      Tenderness: There is no abdominal tenderness. There is no guarding or rebound.   Musculoskeletal:         General: No swelling.      Right lower leg: No edema.      Left lower leg: No edema.   Skin:     General: Skin is warm and dry.   Neurological:      General: No focal deficit present.      Mental Status: She is alert and oriented to person, place, and time.   Psychiatric:         Mood and Affect: Mood normal.         Behavior: Behavior normal.         Thought Content: Thought content normal.     While in the room and during my examination of the patient I wore gloves, mask, eye protection.  I washed my hands before and after this patient encounter.     Disposition: Home or Self Care       Discharge Medications      Changes to Medications      Instructions Start Date   predniSONE 20 MG tablet  Commonly known as: DELTASONE  What changed:   · medication strength  · how much to take  · when to take this   40 mg, Oral, Daily With Breakfast      vitamin B-12 100 MCG tablet  Commonly known as: CYANOCOBALAMIN  What changed: These instructions start on February 23, 2022. If you are unsure what to do until then, ask your doctor or other  care provider.   50 mcg, Oral, Daily   Start Date: February 23, 2022        Continue These Medications      Instructions Start Date   aspirin 81 MG EC tablet   81 mg, Oral, Nightly      azelastine 0.1 % nasal spray  Commonly known as: ASTELIN   2 sprays, Nasal, 2 Times Daily, Use in each nostril as directed      busPIRone 10 MG tablet  Commonly known as: BUSPAR   10 mg, Oral, 2 Times Daily      DULoxetine 60 MG capsule  Commonly known as: CYMBALTA   60 mg, Oral, 2 Times Daily      irbesartan 75 MG tablet  Commonly known as: AVAPRO   75 mg, Oral, Nightly      levothyroxine 88 MCG tablet  Commonly known as: SYNTHROID, LEVOTHROID   88 mcg, Oral, Daily      metoprolol succinate XL 25 MG 24 hr tablet  Commonly known as: TOPROL-XL   25 mg, Oral, 2 Times Daily      montelukast 10 MG tablet  Commonly known as: SINGULAIR   10 mg, Oral, Nightly      mycophenolate 500 MG tablet  Commonly known as: CELLCEPT   2,500 mg, Oral, Daily      pantoprazole 20 MG EC tablet  Commonly known as: PROTONIX   40 mg, Oral, Daily      pravastatin 20 MG tablet  Commonly known as: PRAVACHOL   20 mg, Oral, Daily      ProAir  (90 Base) MCG/ACT inhaler  Generic drug: albuterol sulfate HFA   2 puffs, Inhalation, Every 6 Hours PRN      raloxifene 60 MG tablet  Commonly known as: EVISTA   60 mg, Oral, Nightly      rivaroxaban 20 MG tablet  Commonly known as: XARELTO   20 mg, Oral, Daily With Dinner      Trelegy Ellipta 100-62.5-25 MCG/INH inhaler  Generic drug: Fluticasone-Umeclidin-Vilant   1 puff, Inhalation, Daily      vitamin D 1.25 MG (80954 UT) capsule capsule  Commonly known as: ERGOCALCIFEROL   50,000 Units, Oral, Every 7 Days, Takes on Wednesdays         Stop These Medications    colesevelam 625 MG tablet  Commonly known as: WELCHOL           Diet Instructions     Diet: Regular      Discharge Diet: Regular         Activity Instructions     Activity as Tolerated           Additional Instructions for the Follow-ups that You Need to  Schedule     Call MD for problems / concerns.   As directed         Follow-up Information     Manda Keenan MD Follow up in 1 week(s).    Specialty: Pediatrics  Why: check BMP and CBC  Contact information:  [address] [facility] [id number] [phone]             Jose Luis Juarez MD Follow up.    Specialty: Pulmonary Disease  Contact information:  [address] [facility] [address]  [facility] [place] [id number] [phone]             Jesús Zhang MD Follow up.    Specialty: Neurology  Contact information:  [address] [facility] [address] [facility] [address]  [place] [address]  [phone]                           [facility] Derrick Ruiz MD  02/18/22  09:56 EST    Discharge time spent greater than 30 minutes.

## 2022-02-18 NOTE — THERAPY TREATMENT NOTE
Patient Name: Olena Myers  : 1935    MRN: [id number]                              Today's Date: 2022       Admit Date: 2/15/2022    Visit Dx:     ICD-10-CM ICD-9-CM   1. COPD with acute exacerbation (Formerly [facility])  J44.1 491.21   2. Rhinovirus infection  B34.8 079.3   3. Hypoxia  R09.02 799.02     Patient Active Problem List   Diagnosis   • Pre-operative cardiovascular examination   • S/p reverse total shoulder arthroplasty   • Myasthenia gravis (HCC)   • Paroxysmal atrial fibrillation (HCC)   • HX: long term anticoagulant use   • Essential hypertension   • CAD (coronary artery disease)   • Rhinovirus   • Atrial fibrillation (HCC)   • S/P reverse total shoulder arthroplasty, right   • Acute UTI   • COPD (chronic obstructive pulmonary disease) (Formerly [facility])   • Hyponatremia   • Metabolic encephalopathy   • Sleep apnea   • Sepsis (Formerly [facility])   • Dyspnea   • Acute on chronic diastolic CHF (congestive heart failure) (Formerly [facility])   • Diastolic CHF, chronic (Formerly [facility])   • Heme positive stool   • E coli bacteremia   • Iron deficiency anemia   • Current chronic use of systemic steroids   • COPD exacerbation (Formerly [facility])   • Obesity (BMI 30-39.9)     Past Medical History:   Diagnosis Date   • Anemia     IRON DEFICIENCY   • Arthritis    • Asthma    • Atrial fibrillation (Formerly [facility])    • CAD (coronary artery disease)     HEART STENT   • COPD (chronic obstructive pulmonary disease) (Formerly [facility])    • 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   • Frequent urinary tract infections    • History of gastric ulcer    • History of GI bleed    • Kaw (hard of hearing)    • Hyperlipidemia    • Hypertension    • Lightheadedness    • LVH (left ventricular hypertrophy)    • MG (myasthenia gravis) (Formerly [facility])    • Mini stroke (Formerly [facility]) 10/2016, 3/2017   • OA (osteoarthritis)    • Osteoporosis    • Sleep apnea     USES CPAP   • SOB (shortness of breath)     WALKING     Past Surgical History:   Procedure Laterality Date   • APPENDECTOMY     • CARPAL TUNNEL RELEASE Bilateral    • CATARACT  EXTRACTION Bilateral    • CORONARY STENT PLACEMENT     • TOTAL HIP ARTHROPLASTY Bilateral    • TOTAL SHOULDER ARTHROPLASTY W/ DISTAL CLAVICLE EXCISION Left 7/19/2016    Procedure: LT TOTAL SHOULDER REVERSE ARTHROPLASTY;  Surgeon: NAHOMI Solorzano MD;  Location: [facility] OR [facility] [place];  Service:    • TOTAL SHOULDER ARTHROPLASTY W/ DISTAL CLAVICLE EXCISION Right 1/8/2019    Procedure: RIGHT TOTAL SHOULDER REVERSE ARTHROPLASTY;  Surgeon: Jovanny Solorzano MD;  Location: [facility] OR [facility] [place];  Service: Orthopedics      General Information     Row Name 02/18/22 0949          Physical Therapy Time and Intention    Document Type therapy note (daily note)  -CF     Mode of Treatment physical therapy; individual therapy  -CF     Row Name 02/18/22 0949          General Information    Patient Profile Reviewed yes  -CF     Existing Precautions/Restrictions no known precautions/restrictions  -CF     Row Name 02/18/22 0949          Cognition    Orientation Status (Cognition) oriented x 4  -CF     Row Name 02/18/22 0949          Safety Issues, Functional Mobility    Impairments Affecting Function (Mobility) shortness of breath; strength; balance  -CF           User Key  (r) = Recorded By, (t) = Taken By, (c) = Cosigned By    Initials Name Provider Type    CF Jessica Tian, ABI Physical Therapist               Mobility     Row Name 02/18/22 0949          Bed Mobility    Comment (Bed Mobility) NT up in chair  -CF     Row Name 02/18/22 0949          Sit-Stand Transfer    Sit-Stand Wake (Transfers) standby assist; verbal cues  -     Row Name 02/18/22 0949          Gait/Stairs (Locomotion)    Wake Level (Gait) contact guard; standby assist  -CF     Distance in Feet (Gait) 170'  -CF     Deviations/Abnormal Patterns (Gait) michael decreased; gait speed decreased  -CF     Bilateral Gait Deviations forward flexed posture  -CF     Comment (Gait/Stairs) Improved stability today, not needing to reach for furniture upon return to room. C/o  SOA near end of gait but O2 reading 91%  -CF           User Key  (r) = Recorded By, (t) = Taken By, (c) = Cosigned By    Initials Name Provider Type    CF Jessica Tian PT Physical Therapist               Obj/Interventions    No documentation.                Goals/Plan    No documentation.                Clinical Impression     Row Name 02/18/22 0950          Pain Scale: Numbers Pre/Post-Treatment    Pretreatment Pain Rating 0/10 - no pain  -CF     Row Name 02/18/22 0950          Plan of Care Review    Plan of Care Reviewed With patient  -CF     Outcome Summary Pt seen for PT session this AM. Pt increased ambulation distance and her balance appears improved, as she did not have to reach for furniture in the room this date. Pt does still complain of SOA with activity, O2 sats reading 91% immediately upon return to room. Plan is to d/c home today.  -CF     Row Name 02/18/22 0950          Vital Signs    Pre SpO2 (%) 97  -CF     O2 Delivery Pre Treatment room air  -CF     Intra SpO2 (%) 91  -CF     O2 Delivery Intra Treatment room air  -CF     Post SpO2 (%) 98  -CF     O2 Delivery Post Treatment room air  -CF     Row Name 02/18/22 0925          Positioning and Restraints    Pre-Treatment Position sitting in chair/recliner  -CF     Post Treatment Position chair  -CF     In Chair sitting; call light within reach; encouraged to call for assist; notified nsg; with nsg; with other staff  notified RN in room that alarm not reactivated as MD present to assess for d/c  -CF           User Key  (r) = Recorded By, (t) = Taken By, (c) = Cosigned By    Initials Name Provider Type    CF Jessica Tian PT Physical Therapist               Outcome Measures     Row Name 02/18/22 4257          How much help from another person do you currently need...    Turning from your back to your side while in flat bed without using bedrails? 4  -CF     Moving from lying on back to sitting on the side of a flat bed without bedrails? 4  -CF      Moving to and from a bed to a chair (including a wheelchair)? 3  -CF     Standing up from a chair using your arms (e.g., wheelchair, bedside chair)? 4  -CF     Climbing 3-5 steps with a railing? 3  -CF     To walk in hospital room? 3  -CF     AM-PAC 6 Clicks Score (PT) 21  -CF     Row Name 02/18/22 0952          Functional Assessment    Outcome Measure Options AM-PAC 6 Clicks Basic Mobility (PT)  -CF           User Key  (r) = Recorded By, (t) = Taken By, (c) = Cosigned By    Initials Name Provider Type    CF Jessica Tian, ABI Physical Therapist                             Physical Therapy Education                 Title: PT OT SLP Therapies (In Progress)     Topic: Physical Therapy (In Progress)     Point: Mobility training (Done)     Learning Progress Summary           Patient Acceptance, E, VU,DU,NR by CF at 2/18/2022 0952    Acceptance, E, VU,NR by CF at 2/17/2022 1549                   Point: Home exercise program (Not Started)     Learner Progress:  Not documented in this visit.          Point: Body mechanics (Done)     Learning Progress Summary           Patient Acceptance, E, VU,DU,NR by CF at 2/18/2022 0952    Acceptance, E, VU,NR by CF at 2/17/2022 1549                   Point: Precautions (Done)     Learning Progress Summary           Patient Acceptance, E, VU,DU,NR by CF at 2/18/2022 0952    Acceptance, E, VU,NR by CF at 2/17/2022 1549                               User Key     Initials Effective Dates Name Provider Type Discipline    CF 06/16/21 -  Jessica Tian, ABI Physical Therapist PT              PT Recommendation and Plan  Planned Therapy Interventions (PT): balance training, bed mobility training, gait training, ROM (range of motion), strengthening, patient/family education, transfer training  Plan of Care Reviewed With: patient  Outcome Summary: Pt seen for PT session this AM. Pt increased ambulation distance and her balance appears improved, as she did not have to reach for furniture  in the room this date. Pt does still complain of SOA with activity, O2 sats reading 91% immediately upon return to room. Plan is to d/c home today.     Time Calculation:    PT Charges     Row Name 02/18/22 0949             Time Calculation    Start Time 0922  -CF      Stop Time 0934  -CF      Time Calculation (min) 12 min  -CF      PT Received On 02/18/22  -CF      PT - Next Appointment 02/20/22  -CF            User Key  (r) = Recorded By, (t) = Taken By, (c) = Cosigned By    Initials Name Provider Type    CF Jessica Tian, PT Physical Therapist              Therapy Charges for Today     Code Description Service Date Service Provider Modifiers Qty    82634320800 HC PT EVAL MOD COMPLEXITY 2 2/17/2022 Jessica Tian, PT GP 1    19792031742 HC PT THERAPEUTIC ACT EA 15 MIN 2/17/2022 Jessica Tian, PT GP 1    13722697416 HC PT THERAPEUTIC ACT EA 15 MIN 2/18/2022 Jessica Tian, PT GP 1          PT G-Codes  Outcome Measure Options: AM-PAC 6 Clicks Basic Mobility (PT)  AM-PAC 6 Clicks Score (PT): 21    Jessica Tian PT  2/18/2022

## 2022-02-18 NOTE — PLAN OF CARE
Goal Outcome Evaluation:  Plan of Care Reviewed With: patient        Progress: no change  Outcome Summary: VSS, afebrile. Pt remained up in chair until bedtime, amb to BR then to bed. No complaints of pain or discomfort. Room air during the night. Approx 0300, pt appears to have converted to afib while appearing asleep, rate controlled. Ekg ordered for am. Otherwise uneventful shift.

## 2022-02-18 NOTE — PLAN OF CARE
Goal Outcome Evaluation:  Plan of Care Reviewed With: patient           Outcome Summary: Pt seen for PT session this AM. Pt increased ambulation distance and her balance appears improved, as she did not have to reach for furniture in the room this date. Pt does still complain of SOA with activity, O2 sats reading 91% immediately upon return to room. Plan is to d/c home today. Encouraged shorter, more frequent walks at home.

## 2022-02-18 NOTE — PROGRESS NOTES
[facility] INPATIENT PROGRESS NOTE         [address]    2022      PATIENT IDENTIFICATION:  Name: Olena Myers ADMIT: 2/15/2022   : 1935  PCP: Manda Keenan MD    MRN: [id number] LOS: 0 days   AGE/SEX: 86 y.o. female  ROOM: [facility]                     LOS 0    Reason for visit: Acute exacerbation of COPD      SUBJECTIVE:      Resting comfortably.  Saturations 96% at rest on room air at time of visit.  With talking she drops to 92%.  No distress.  Diminished breath sounds.  No wheezing or rhonchi today.      Objective   OBJECTIVE:    Vital Sign Min/Max for last 24 hours  Temp  Min: 97.2 °F (36.2 °C)  Max: 98.7 °F (37.1 °C)   BP  Min: 130/67  Max: 144/88   Pulse  Min: 69  Max: 97   Resp  Min: 16  Max: 18   SpO2  Min: 90 %  Max: 94 %   No data recorded   No data recorded                         Body mass index is 33.34 kg/m².    Intake/Output Summary (Last 24 hours) at 2022 0801  Last data filed at 2022 0500  Gross per 24 hour   Intake 480 ml   Output --   Net 480 ml         Exam:  GEN:  No distress, appears stated age  EYES:   PERRL, anicteric sclerae  ENT:    External ears/nose normal, OP clear  NECK:  No adenopathy, midline trachea  LUNGS: Normal chest on inspection, palpation and Diminished at bases on auscultation  CV:  Normal S1S2, without murmur  ABD:  Nontender, nondistended, no hepatosplenomegaly, +BS  EXT:  No edema.  No cyanosis or clubbing.  No mottling and normal cap refill.    Assessment     Scheduled meds:  aspirin, 81 mg, Oral, Nightly  busPIRone, 10 mg, Oral, BID  cefTRIAXone, 1 g, Intravenous, Q24H  DULoxetine, 60 mg, Oral, BID  ipratropium-albuterol, 3 mL, Nebulization, Q6H While Awake - RT  levothyroxine, 88 mcg, Oral, Daily  losartan, 25 mg, Oral, Nightly  metoprolol succinate XL, 25 mg, Oral, BID  montelukast, 10 mg, Oral, Nightly  mycophenolate, 500 mg, Oral, Q12H  pantoprazole, 40 mg, Oral, Daily  pravastatin, 20 mg, Oral, Daily  predniSONE, 40 mg,  Oral, Daily With Breakfast  raloxifene, 60 mg, Oral, Nightly  rivaroxaban, 20 mg, Oral, Daily With Dinner  sodium chloride, 10 mL, Intravenous, Q12H      IV meds:                         Data Review:  Results from last 7 days   Lab Units 02/16/22  0504 02/15/22  1659 02/15/22  1659   SODIUM mmol/L 140  --  138   POTASSIUM mmol/L 4.4  --  4.5   CHLORIDE mmol/L 106  --  104   CO2 mmol/L 19.0*  --  23.3   BUN mg/dL 14  --  15   CREATININE mg/dL 0.96  --  1.02*   GLUCOSE mg/dL 137*   < > 127*   CALCIUM mg/dL 9.2  --  9.7    < > = values in this interval not displayed.         Estimated Creatinine Clearance: 40.1 mL/min (by C-G formula based on SCr of 0.96 mg/dL).  Results from last 7 days   Lab Units 02/18/22  0525 02/16/22  0504 02/15/22  1659   WBC 10*3/mm3 19.89* 13.17* 10.89*   HEMOGLOBIN g/dL 10.1* 9.1* 9.7*   PLATELETS 10*3/mm3 309 285 319         Results from last 7 days   Lab Units 02/15/22  1659   ALT (SGPT) U/L 15   AST (SGOT) U/L 22         Results from last 7 days   Lab Units 02/15/22  1659   PROCALCITONIN ng/mL 0.21         No results found for: HGBA1C, POCGLU    Chest x-ray 2/15 reviewed; shows cardiomegaly.  No acute disease.             Microbiology reviewed             Active Hospital Problems    Diagnosis  POA   • **COPD exacerbation (HCC) [J44.1]  Yes   • Obesity (BMI 30-39.9) [E66.9]  Yes   • Diastolic CHF, chronic (HCC) [I50.32]  Unknown   • Sleep apnea [G47.30]  Yes   • Atrial fibrillation (HCC) [I48.91]  Yes   • Rhinovirus [B34.8]  Unknown   • Myasthenia gravis (HCC) [G70.00]  Yes   • CAD (coronary artery disease) [I25.10]  Yes   • Essential hypertension [I10]  Yes      Resolved Hospital Problems   No resolved problems to display.         ASSESSMENT:    COPD exacerbation  Human rhinovirus  History of coronary artery disease  Obesity  Hypothyroidism  Paroxysmal A. fib on anticoagulation  Aortic valve stenosis  Myasthenia gravis  Steroid-induced leukocytosis        PLAN:    Seems to be making  progress.  Continue with steroid taper and bronchodilators for COPD exacerbation.  Off supplemental oxygen this morning.  Would benefit from activity and diet for long-term weight loss as this contributes to dyspnea with activity at baseline.  Likely home soon.  No objection from my standpoint.        Jose Luis Juarez MD  Pulmonary and Critical Care Medicine  [facility]  2/18/2022    08:01 EST

## 2022-02-18 NOTE — CASE MANAGEMENT/SOCIAL WORK
Case Management Discharge Note      Final Note: Home with no needs. No home O2 needed. Transport by private auto         Selected Continued Care - Admitted Since 2/15/2022     Destination    No services have been selected for the patient.              Durable Medical Equipment    No services have been selected for the patient.              Dialysis/Infusion    No services have been selected for the patient.              Home Medical Care    No services have been selected for the patient.              Therapy    No services have been selected for the patient.              Community Resources    No services have been selected for the patient.              Community & DME    No services have been selected for the patient.                  Transportation Services  Private: Car    Final Discharge Disposition Code: 01 - home or self-care

## 2022-02-19 ENCOUNTER — READMISSION MANAGEMENT (OUTPATIENT)
Dept: CALL CENTER | Facility: HOSPITAL | Age: 87
End: 2022-02-19

## 2022-02-19 NOTE — OUTREACH NOTE
Prep Survey      Responses   [facility] facility patient discharged from? [place]   Is LACE score < 7 ? No   Emergency Room discharge w/ pulse ox? No   Eligibility Readm Mgmt   Discharge diagnosis COPD exacerbation, Diastolic CHF, chronic    Does the patient have one of the following disease processes/diagnoses(primary or secondary)? COPD/Pneumonia   Does the patient have Home health ordered? No   Is there a DME ordered? No   Prep survey completed? Yes          Deanne Yun RN

## 2022-02-23 ENCOUNTER — READMISSION MANAGEMENT (OUTPATIENT)
Dept: CALL CENTER | Facility: HOSPITAL | Age: 87
End: 2022-02-23

## 2022-02-23 NOTE — OUTREACH NOTE
COPD/PN Week 1 Survey      Responses   [facility] patient discharged from? [place]   Does the patient have one of the following disease processes/diagnoses(primary or secondary)? COPD/Pneumonia   Was the primary reason for admission: COPD exacerbation   Week 1 attempt successful? Yes   Call start time 1444   Call end time 1447   Discharge diagnosis COPD exacerbation, Diastolic CHF, chronic    Meds reviewed with patient/caregiver? Yes   Is the patient having any side effects they believe may be caused by any medication additions or changes? No   Does the patient have all medications ordered at discharge? N/A   Does the patient have a primary care provider?  Yes   Does the patient have an appointment with their PCP or specialist within 7 days of discharge? Yes   Comments regarding PCP Pulm 3/26   Has the patient kept scheduled appointments due by today? N/A   Pulse Ox monitoring None   Psychosocial issues? No   Comments Pt c/o wheezing on exhale, SOA.    Did the patient receive a copy of their discharge instructions? Yes   Nursing interventions Reviewed instructions with patient   What is the patient's perception of their health status since discharge? Improving   Nursing Interventions Nurse provided patient education   If the patient is a current smoker, are they able to teach back resources for cessation? Not a smoker   Is the patient/caregiver able to teach back the hierarchy of who to call/visit for symptoms/problems? PCP, Specialist, Home health nurse, Urgent Care, ED, 911 Yes   Is the patient able to teach back COPD zones? Yes   Nursing interventions Education provided on various zones   Patient reports what zone on this call? Yellow Zone   Yellow Zone Increased shortness of air,  Unable to complete daily activities,  Increased or thicker phlegm/mucus   Yellow interventions Use quick relief inhaler as ordered,  Continue to use daily medications,  Get plenty of rest   Week 1 call completed? Yes           Rhona Javier RN

## 2022-03-02 ENCOUNTER — READMISSION MANAGEMENT (OUTPATIENT)
Dept: CALL CENTER | Facility: HOSPITAL | Age: 87
End: 2022-03-02

## 2022-03-02 NOTE — OUTREACH NOTE
COPD/PN Week 2 Survey      Responses   [facility] patient discharged from? [place]   Does the patient have one of the following disease processes/diagnoses(primary or secondary)? COPD/Pneumonia   Was the primary reason for admission: COPD exacerbation   Week 2 attempt successful? Yes   Call start time 1653   Call end time 1700   Discharge diagnosis COPD exacerbation, Diastolic CHF, chronic    Is the patient taking all medications as directed (includes completed medication regime)? Yes   Does the patient have a primary care provider?  Yes   Has the patient kept scheduled appointments due by today? N/A   Comments Has appt tomorrow with PCP   Pulse Ox monitoring None  [Encouraged to purchase one]   Psychosocial issues? No   What is the patient's perception of their health status since discharge? Improving   Is the patient/caregiver able to teach back the hierarchy of who to call/visit for symptoms/problems? PCP, Specialist, Home health nurse, Urgent Care, ED, 911 Yes   Additional teach back comments States she is somewhat better.  Still has sob with activity.  Still has steroids she is taking.  Using nebulizers and inhalers.  Has appt with PCP tomorrow.   Is the patient able to teach back COPD zones? No   Nursing interventions Education provided on various zones   Patient reports what zone on this call? Yellow Zone   Yellow Zone Increased shortness of air,  Unable to complete daily activities,  Increased or thicker phlegm/mucus   Yellow interventions Continue to use daily medications,  Use quick relief inhaler as ordered,  Get plenty of rest,  Call provider immediatly if symptoms do not improve   Week 2 call completed? Yes   Wrap up additional comments Denies questions or needs at this time          Majo Pablo LPN

## 2022-03-14 ENCOUNTER — READMISSION MANAGEMENT (OUTPATIENT)
Dept: CALL CENTER | Facility: HOSPITAL | Age: 87
End: 2022-03-14

## 2022-03-14 NOTE — OUTREACH NOTE
COPD/PN Week 3 Survey    Flowsheet Row Responses   [facility] patient discharged from? [place]   Does the patient have one of the following disease processes/diagnoses(primary or secondary)? COPD/Pneumonia   Was the primary reason for admission: COPD exacerbation   Week 3 attempt successful? Yes   Call start time 1224   Call end time 1227   Discharge diagnosis COPD exacerbation, Diastolic CHF, chronic    Is patient permission given to speak with other caregiver? Yes   List who call center can speak with sister or son   Meds reviewed with patient/caregiver? Yes   Is the patient having any side effects they believe may be caused by any medication additions or changes? No   Is the patient taking all medications as directed (includes completed medication regime)? Yes   Comments regarding PCP Pulm 3/26   Has the patient kept scheduled appointments due by today? Yes   DME comments No home O2.    Pulse Ox monitoring None   Psychosocial issues? No   Comments Pt reports wheezing has improved, still having significant SOA.   What is the patient's perception of their health status since discharge? Improving   Is the patient able to teach back COPD zones? Yes   Patient reports what zone on this call? Yellow Zone   Yellow Zone Increased shortness of air, Unable to complete daily activities, Increased or thicker phlegm/mucus   Yellow interventions Continue to use daily medications, Use quick relief inhaler as ordered, Use oxygen as ordered   Week 3 call completed? Yes   Wrap up additional comments Denies questions or needs at this time          TYLER BUSTILLO - Registered Nurse

## 2022-03-23 ENCOUNTER — READMISSION MANAGEMENT (OUTPATIENT)
Dept: CALL CENTER | Facility: HOSPITAL | Age: 87
End: 2022-03-23

## 2022-03-23 NOTE — OUTREACH NOTE
COPD/PN Week 4 Survey    Flowsheet Row Responses   [facility] patient discharged from? [place]   Does the patient have one of the following disease processes/diagnoses(primary or secondary)? COPD/Pneumonia   Was the primary reason for admission: COPD exacerbation   Week 4 attempt successful? Yes   Call start time 1018   Call end time 1027   Discharge diagnosis COPD exacerbation, Diastolic CHF, chronic    Is patient permission given to speak with other caregiver? Yes   Meds reviewed with patient/caregiver? Yes   Is the patient having any side effects they believe may be caused by any medication additions or changes? No   Is the patient taking all medications as directed (includes completed medication regime)? Yes   Has the patient kept scheduled appointments due by today? Yes   Is the patient still receiving Home Health Services? N/A   Pulse Ox monitoring None   Psychosocial issues? No   What is the patient's perception of their health status since discharge? Same   Nursing Interventions Nurse provided patient education   Are the patient's immunizations up to date?  Yes   Nursing interventions Educated on importance of maintaining up to date immunizations as advised by provider   If the patient is a current smoker, are they able to teach back resources for cessation? Not a smoker   Is the patient/caregiver able to teach back the hierarchy of who to call/visit for symptoms/problems? PCP, Specialist, Home health nurse, Urgent Care, ED, 911 Yes   Additional teach back comments She is still sob with acitivity. When she sits she is okay.   Is the patient able to teach back COPD zones? Yes   Nursing interventions Education provided on various zones   Patient reports what zone on this call? Yellow Zone   Yellow Zone Increased shortness of air, Using quick relief inhaler/nebulizer more often, Increased or thicker phlegm/mucus   Yellow interventions Continue to use daily medications, Use quick relief inhaler as  ordered, Use oxygen as ordered   Week 4 call completed? Yes   Would the patient like one additional call? No   Wrap up additional comments She reports she is not getting any better, staying the same. She has seen Pulmonary.          FELISHA SCHAEFFER - Registered Nurse

## 2022-03-29 ENCOUNTER — LAB (OUTPATIENT)
Dept: LAB | Facility: HOSPITAL | Age: 87
End: 2022-03-29

## 2022-03-29 ENCOUNTER — TRANSCRIBE ORDERS (OUTPATIENT)
Dept: ADMINISTRATIVE | Facility: HOSPITAL | Age: 87
End: 2022-03-29

## 2022-03-29 DIAGNOSIS — Z01.818 OTHER SPECIFIED PRE-OPERATIVE EXAMINATION: ICD-10-CM

## 2022-03-29 DIAGNOSIS — Z01.818 OTHER SPECIFIED PRE-OPERATIVE EXAMINATION: Primary | ICD-10-CM

## 2022-03-29 LAB — SARS-COV-2 ORF1AB RESP QL NAA+PROBE: NOT DETECTED

## 2022-03-29 PROCEDURE — U0004 COV-19 TEST NON-CDC HGH THRU: HCPCS

## 2022-03-29 PROCEDURE — C9803 HOPD COVID-19 SPEC COLLECT: HCPCS

## 2022-03-31 ENCOUNTER — HOSPITAL ENCOUNTER (OUTPATIENT)
Facility: HOSPITAL | Age: 87
Setting detail: HOSPITAL OUTPATIENT SURGERY
Discharge: HOME | End: 2022-03-31
Attending: INTERNAL MEDICINE | Admitting: INTERNAL MEDICINE

## 2022-03-31 ENCOUNTER — ANESTHESIA EVENT (OUTPATIENT)
Dept: GASTROENTEROLOGY | Facility: HOSPITAL | Age: 87
End: 2022-03-31

## 2022-03-31 ENCOUNTER — ANESTHESIA (OUTPATIENT)
Dept: GASTROENTEROLOGY | Facility: HOSPITAL | Age: 87
End: 2022-03-31

## 2022-03-31 VITALS
HEART RATE: 80 BPM | HEIGHT: 60 IN | DIASTOLIC BLOOD PRESSURE: 80 MMHG | BODY MASS INDEX: 32.89 KG/M2 | OXYGEN SATURATION: 94 % | WEIGHT: 167.5 LBS | RESPIRATION RATE: 16 BRPM | SYSTOLIC BLOOD PRESSURE: 111 MMHG

## 2022-03-31 DIAGNOSIS — J44.1 COPD EXACERBATION: ICD-10-CM

## 2022-03-31 LAB
B PARAPERT DNA SPEC QL NAA+PROBE: NOT DETECTED
B PERT DNA SPEC QL NAA+PROBE: NOT DETECTED
C PNEUM DNA NPH QL NAA+NON-PROBE: NOT DETECTED
FLUAV SUBTYP SPEC NAA+PROBE: NOT DETECTED
FLUBV RNA ISLT QL NAA+PROBE: NOT DETECTED
GIE STN SPEC: NORMAL
HADV DNA SPEC NAA+PROBE: NOT DETECTED
HCOV 229E RNA SPEC QL NAA+PROBE: NOT DETECTED
HCOV HKU1 RNA SPEC QL NAA+PROBE: NOT DETECTED
HCOV NL63 RNA SPEC QL NAA+PROBE: NOT DETECTED
HCOV OC43 RNA SPEC QL NAA+PROBE: NOT DETECTED
HMPV RNA NPH QL NAA+NON-PROBE: NOT DETECTED
HPIV1 RNA ISLT QL NAA+PROBE: NOT DETECTED
HPIV2 RNA SPEC QL NAA+PROBE: NOT DETECTED
HPIV3 RNA NPH QL NAA+PROBE: NOT DETECTED
HPIV4 P GENE NPH QL NAA+PROBE: NOT DETECTED
M PNEUMO IGG SER IA-ACNC: NOT DETECTED
RHINOVIRUS RNA SPEC NAA+PROBE: DETECTED
RSV RNA NPH QL NAA+NON-PROBE: NOT DETECTED
SARS-COV-2 RNA NPH QL NAA+NON-PROBE: NOT DETECTED

## 2022-03-31 PROCEDURE — 88312 SPECIAL STAINS GROUP 1: CPT | Performed by: INTERNAL MEDICINE

## 2022-03-31 PROCEDURE — 87205 SMEAR GRAM STAIN: CPT | Performed by: INTERNAL MEDICINE

## 2022-03-31 PROCEDURE — 87116 MYCOBACTERIA CULTURE: CPT | Performed by: INTERNAL MEDICINE

## 2022-03-31 PROCEDURE — 87071 CULTURE AEROBIC QUANT OTHER: CPT | Performed by: INTERNAL MEDICINE

## 2022-03-31 PROCEDURE — 87206 SMEAR FLUORESCENT/ACID STAI: CPT | Performed by: INTERNAL MEDICINE

## 2022-03-31 PROCEDURE — 87102 FUNGUS ISOLATION CULTURE: CPT | Performed by: INTERNAL MEDICINE

## 2022-03-31 PROCEDURE — 88305 TISSUE EXAM BY PATHOLOGIST: CPT | Performed by: INTERNAL MEDICINE

## 2022-03-31 PROCEDURE — 25010000002 PROPOFOL 10 MG/ML EMULSION: Performed by: NURSE ANESTHETIST, CERTIFIED REGISTERED

## 2022-03-31 PROCEDURE — 0202U NFCT DS 22 TRGT SARS-COV-2: CPT | Performed by: INTERNAL MEDICINE

## 2022-03-31 PROCEDURE — 88112 CYTOPATH CELL ENHANCE TECH: CPT | Performed by: INTERNAL MEDICINE

## 2022-03-31 RX ORDER — IPRATROPIUM BROMIDE AND ALBUTEROL SULFATE 2.5; .5 MG/3ML; MG/3ML
3 SOLUTION RESPIRATORY (INHALATION) ONCE
Status: DISCONTINUED | OUTPATIENT
Start: 2022-03-31 | End: 2022-03-31 | Stop reason: HOSPADM

## 2022-03-31 RX ORDER — LIDOCAINE HYDROCHLORIDE 20 MG/ML
INJECTION, SOLUTION INFILTRATION; PERINEURAL AS NEEDED
Status: DISCONTINUED | OUTPATIENT
Start: 2022-03-31 | End: 2022-03-31 | Stop reason: SURG

## 2022-03-31 RX ORDER — SODIUM CHLORIDE, SODIUM LACTATE, POTASSIUM CHLORIDE, CALCIUM CHLORIDE 600; 310; 30; 20 MG/100ML; MG/100ML; MG/100ML; MG/100ML
30 INJECTION, SOLUTION INTRAVENOUS CONTINUOUS PRN
Status: DISCONTINUED | OUTPATIENT
Start: 2022-03-31 | End: 2022-03-31 | Stop reason: HOSPADM

## 2022-03-31 RX ORDER — PROPOFOL 10 MG/ML
VIAL (ML) INTRAVENOUS AS NEEDED
Status: DISCONTINUED | OUTPATIENT
Start: 2022-03-31 | End: 2022-03-31 | Stop reason: SURG

## 2022-03-31 RX ORDER — METHYLPREDNISOLONE SODIUM SUCCINATE 125 MG/2ML
125 INJECTION, POWDER, LYOPHILIZED, FOR SOLUTION INTRAMUSCULAR; INTRAVENOUS ONCE
Status: DISCONTINUED | OUTPATIENT
Start: 2022-03-31 | End: 2022-03-31 | Stop reason: HOSPADM

## 2022-03-31 RX ORDER — LIDOCAINE HYDROCHLORIDE 10 MG/ML
INJECTION, SOLUTION EPIDURAL; INFILTRATION; INTRACAUDAL; PERINEURAL AS NEEDED
Status: DISCONTINUED | OUTPATIENT
Start: 2022-03-31 | End: 2022-03-31 | Stop reason: HOSPADM

## 2022-03-31 RX ORDER — LIDOCAINE HYDROCHLORIDE 20 MG/ML
INJECTION, SOLUTION EPIDURAL; INFILTRATION; INTRACAUDAL; PERINEURAL AS NEEDED
Status: DISCONTINUED | OUTPATIENT
Start: 2022-03-31 | End: 2022-03-31 | Stop reason: HOSPADM

## 2022-03-31 RX ADMIN — PROPOFOL 250 MCG/KG/MIN: 10 INJECTION, EMULSION INTRAVENOUS at 10:40

## 2022-03-31 RX ADMIN — SODIUM CHLORIDE, POTASSIUM CHLORIDE, SODIUM LACTATE AND CALCIUM CHLORIDE 30 ML/HR: 600; 310; 30; 20 INJECTION, SOLUTION INTRAVENOUS at 10:22

## 2022-03-31 RX ADMIN — PROPOFOL 150 MG: 10 INJECTION, EMULSION INTRAVENOUS at 10:40

## 2022-03-31 RX ADMIN — LIDOCAINE HYDROCHLORIDE 40 MG: 20 INJECTION, SOLUTION INFILTRATION; PERINEURAL at 10:40

## 2022-03-31 NOTE — ANESTHESIA POSTPROCEDURE EVALUATION
"Patient: Olena Myers    Procedure Summary     Date: 03/31/22 Room / Location:  [facility]    Anesthesia Start: 1027 Anesthesia Stop: 1103    Procedure: BRONCHOSCOPY WITH BAL RIGHT LOWER LOBE (N/A Bronchus) Diagnosis:     Surgeons: Remy Tirado MD Provider: Charlie Kelly MD    Anesthesia Type: MAC ASA Status: 4          Anesthesia Type: MAC    Vitals  Vitals Value Taken Time   /80 03/31/22 1139   Temp     Pulse 80 03/31/22 1139   Resp 16 03/31/22 1139   SpO2 94 % 03/31/22 1139           Post Anesthesia Care and Evaluation    Patient location during evaluation: bedside  Patient participation: complete - patient participated  Level of consciousness: awake and alert  Pain management: adequate  Airway patency: patent  Anesthetic complications: No anesthetic complications    Cardiovascular status: acceptable  Respiratory status: acceptable  Hydration status: acceptable    Comments: /80 (BP Location: Left arm, Patient Position: Lying)   Pulse 80   Resp 16   Ht 152.4 cm (60\")   Wt 76 kg (167 lb 8 oz)   SpO2 94%   BMI 32.71 kg/m²       "

## 2022-03-31 NOTE — ANESTHESIA PREPROCEDURE EVALUATION
Anesthesia Evaluation     Patient summary reviewed   NPO Solid Status: > 8 hours  NPO Liquid Status: > 2 hours           Airway   Mallampati: III  TM distance: >3 FB  Neck ROM: full  Large neck circumference  Dental    (+) edentulous    Pulmonary     breath sounds clear to auscultation  (+) COPD, asthma,shortness of breath, sleep apnea,   Cardiovascular   Exercise tolerance: good (4-7 METS)    ECG reviewed  Rate: normal    (+) hypertension, CAD, dysrhythmias Paroxysmal Atrial Fib, CHF , hyperlipidemia,   (-) angina, OCHOA    ROS comment: 2/18/22 ekg: - NORMAL ECG -  Sinus rhythm  No change from prior tracing    Echo 7/5/21: EF = 70%. Mild to moderate aortic valve regurgitation is present. No aortic valve stenosis is present.Full read available in chart.     Neuro/Psych  (+) TIA, numbness,      ROS Comment: Myasthenia gravis   GI/Hepatic/Renal/Endo    (+) obesity,       Musculoskeletal     Abdominal    Substance History      OB/GYN          Other   arthritis,                    Anesthesia Plan    ASA 4     MAC   (MAC anesthesia discussed with patient and/or patient representative. Risks (including but not limited to intra-op awareness), benefits, and alternatives were discussed. Understanding was voiced with an agreement to proceed with a MAC technique and General as a backup option. I have explained other risks of anesthesia including but not limited to dental damage, corneal abrasion, nerve injury, MI, stroke, and death. All questions asked and answered.   )  intravenous induction     Anesthetic plan, all risks, benefits, and alternatives have been provided, discussed and informed consent has been obtained with: patient.        CODE STATUS:

## 2022-03-31 NOTE — ANESTHESIA PROCEDURE NOTES
Airway  Urgency: elective    Date/Time: 3/31/2022 10:42 AM  Airway not difficult    General Information and Staff    Patient location during procedure: OR  Anesthesiologist: Charlie Kelly MD  CRNA: Corin Adrian CRNA    Indications and Patient Condition  Indications for airway management: airway protection    Preoxygenated: yes  MILS maintained throughout  Mask difficulty assessment: 0 - not attempted    Final Airway Details  Final airway type: supraglottic airway      Successful airway: classic  Size 4    Number of attempts at approach: 1  Assessment: lips, teeth, and gum same as pre-op    Additional Comments  Placed with ease. Vent with ease. Teeth as pre-op. Secured to face.              CC:   Chief Complaint   Patient presents with   • Establish Care   • Hypertension       History:  Deysi Valentine is a 51 y.o. female who presents today for evaluation of the above problems.      HTN starting in June, pt noticed she had HA, was 150/100 but had a bad cuff, repeat with new cuff was 140/93. Has been on losartan for about a month, but takes it sporadically, but did take her dose today. Usually takes BP mid morning or afternoon, usually between 120s/80s, occasionally 140/90.     Caffeine: 2 cups daily  No tobacco or ETOH  Sleeps 7 hrs restful sleep  Been on Pristiq for mood for 4 years, no dose change.   Interested on not being on HTN meds  Walks most mornings, limits salt, could improve diet    ROS:  Review of Systems   Constitutional: Negative for chills, fatigue, fever and unexpected weight change.   HENT: Negative for congestion, rhinorrhea and sore throat.    Eyes: Visual disturbance: floater in R eye, saw eye doctor in July.   Respiratory: Negative for shortness of breath.    Cardiovascular: Chest pain: with anxiety.   Gastrointestinal: Negative for abdominal pain, constipation, diarrhea, nausea and vomiting.   Endocrine: Negative for polydipsia and polyuria.   Genitourinary: Negative for difficulty urinating.   Musculoskeletal: Negative for arthralgias and myalgias.   Skin: Negative for rash.   Allergic/Immunologic: Negative for food allergies.   Neurological: Negative for dizziness, numbness and headaches.   Hematological: Negative for adenopathy.   Psychiatric/Behavioral: Negative for behavioral problems and sleep disturbance.       Allergies   Allergen Reactions   • Amoxicillin Rash   • Penicillins Rash     Past Medical History:   Diagnosis Date   • Anemia    • Anxiety    • Arrhythmia    • Celiac disease    • Celiac disease    • Depression    • Hx of colonic polyps    • Hypertension    • Recurrent major depressive disorder, in partial remission (CMS/[facility]) 11/29/2017     Past Surgical History:  "  Procedure Laterality Date   • CARDIAC ABLATION     • COLONOSCOPY N/A 4/13/2017    Mild chronic inflammation changes constistent with sprue, Hyperplastic polyp 15 cm proximal to the anus repeat exam in 10 years   • ENDOSCOPY N/A 4/13/2017    LA Grade A reflux esophagitis     Family History   Problem Relation Age of Onset   • Heart attack Father    • Depression Mother    • Heart attack Mother    • Colon cancer Neg Hx    • Colon polyps Neg Hx       reports that she has never smoked. She has never used smokeless tobacco. She reports that she does not drink alcohol or use drugs.      Current Outpatient Prescriptions:   •  desvenlafaxine (PRISTIQ) 50 MG 24 hr tablet, Take 1 tablet by mouth Daily., Disp: 90 tablet, Rfl: 4  •  losartan (COZAAR) 50 MG tablet, Take 1 tablet by mouth Daily., Disp: 30 tablet, Rfl: 1  •  NON FORMULARY, Take 1 tablet by mouth Daily. Integrative Gluten free multivitamin with iron, Disp: , Rfl:     OBJECTIVE:  /94 (BP Location: Left arm, Patient Position: Sitting, Cuff Size: Adult)   Pulse 88   Temp 98.3 °F (36.8 °C) (Oral)   Resp 12   Ht 154.9 cm (61\")   Wt 66.3 kg (146 lb 3 oz)   LMP 10/19/2018   SpO2 98%   BMI 27.62 kg/m²    Physical Exam   Constitutional: She is oriented to person, place, and time. She appears well-developed and well-nourished. No distress.   HENT:   Head: Atraumatic.   Right Ear: External ear normal.   Left Ear: External ear normal.   Eyes: Conjunctivae are normal. Right eye exhibits no discharge. Left eye exhibits no discharge.   Neck: Neck supple. No JVD present. No tracheal deviation present.   Cardiovascular: Normal rate, regular rhythm and normal heart sounds.    Pulmonary/Chest: Effort normal and breath sounds normal.   Abdominal: Soft.   Mild distention with lymphatic congestion throughout abdomen without organomegaly.  No guarding, mild tension with palpation of celiac ganglion   Musculoskeletal: She exhibits no edema.   Hypertonic paraspinals " bilaterally at the thoracolumbar junction with increased lordosis of lumbar spine   Neurological: She is alert and oriented to person, place, and time.   Skin: Skin is warm and dry. She is not diaphoretic.   Psychiatric: She has a normal mood and affect. Her behavior is normal. Judgment and thought content normal.     Osteopathic Structural Exam  Procedure Note for Osteopathic Manipulative Treatment    Pre-procedure diagnoses: Somatic dysfunctions as listed below.  Consent: Oral consent given for Osteopathic Treatment  Post-procedure diagnoses: same  Complications of procedure: none, patient tolerated procedure well    The evaluation including the history, physical exam and the management decisions, indicate than an appropriate intervention on this date of service is osteopathic manipulative treatment. Oral permission for the osteopathic procedure was obtained. The following treatment methods and the responses for each body region are listed below.        Region Somatic Dysfunction Severity OMT technique Response      Head Bilateral temporal bone internal rotation   Tentorium cerebelli fascial restriction R > L  Frontosphenoidal compression severe Osteopathy in the cranial field Improved      Cervical          Thoracic T11 FRSR moderate Facilitated Positional Release improved      Lumbar L4 ERSR Moderate Balanced ligamentous tension Improved      Sacral       Pelvic          Lower Extremities  R > L psoas spasm  Moderate  Balanced ligamentous tension Improved       Upper Extremities          Rib Cage  b/l rib 2 exhalation somatic dysfunction  B/l rib 4 posterior  severe Balanced ligamentous tension  Improved       Abdomen & Other Sites  thoracic diaphragm b/l crura spasm L > R severe  Myofascial Release Improved          Assessment/Plan    Problem List Items Addressed This Visit     Hypertension - Primary     New onset with diastolic hypertension in our office, with multiple somatic dysfunctions limiting respiratory  and lymphatic compliance/drainage.  Additional segmental dysfunctions of thoracic spine and head potentially affecting autonomic balance  -OMT to balance autonomic tone and improve compliance of lymphatics and circulation.  Modest response today  -Patient will take 1 week trial off of losartan with home BP measurements, if elevated will resume, and if titration needed can increase to 100 mg daily  -BMP 7-10 days following losartan  -Follow 4 weeks         Relevant Medications    losartan (COZAAR) 50 MG tablet    Other Relevant Orders    Basic metabolic panel      Other Visit Diagnoses     Somatic dysfunction of head region        Somatic dysfunction of spine, thoracic        Segmental and somatic dysfunction of rib cage        Segmental and somatic dysfunction of abdomen and other regions        Somatic dysfunction of spine, lumbar        Somatic dysfunction of lower extremity              Patient's Body mass index is 27.62 kg/m². BMI is above normal parameters. Recommendations include: exercise counseling and nutrition counseling.      An After Visit Summary was printed and given to the patient at discharge.  Return in about 4 weeks (around 12/7/2018) for 30 min appt.         Tomas Giron D.O.  [facility]  Osteopathic Neuromusculoskeletal Medicine  11/9/2018

## 2022-04-01 LAB
CYTO UR: NORMAL
LAB AP CASE REPORT: NORMAL
LAB AP SPECIAL STAINS: NORMAL
PATH REPORT.FINAL DX SPEC: NORMAL
PATH REPORT.GROSS SPEC: NORMAL

## 2022-04-04 LAB
BACTERIA SPEC AEROBE CULT: NORMAL
GRAM STN SPEC: NORMAL
GRAM STN SPEC: NORMAL

## 2022-04-25 LAB — FUNGUS WND CULT: ABNORMAL

## 2022-05-12 LAB
MYCOBACTERIUM SPEC CULT: NORMAL
NIGHT BLUE STAIN TISS: NORMAL

## 2022-07-15 ENCOUNTER — HOSPITAL ENCOUNTER (EMERGENCY)
Facility: HOSPITAL | Age: 87
Discharge: LEFT BEFORE DISCHARGE | End: 2022-07-15

## 2022-07-15 VITALS
HEIGHT: 60 IN | SYSTOLIC BLOOD PRESSURE: 110 MMHG | DIASTOLIC BLOOD PRESSURE: 70 MMHG | OXYGEN SATURATION: 97 % | TEMPERATURE: 98.2 F | BODY MASS INDEX: 32.71 KG/M2 | RESPIRATION RATE: 18 BRPM | HEART RATE: 90 BPM

## 2022-07-15 PROCEDURE — 99211 OFF/OP EST MAY X REQ PHY/QHP: CPT

## 2022-07-16 NOTE — ED NOTES
Pt to triage from home via [employer] [id number] with c/o low spo2 and dizziness - pt thought her readings were in the 60's on her pulse ox - pt has dark fingernail polish and cold fingers.  EMS got 97% on 2 lpm nc.  Pt has very long oxygen tubing at home as well. Pt wearing mask in triage. Triage personnel wore appropriate PPE

## 2022-10-25 ENCOUNTER — APPOINTMENT (OUTPATIENT)
Dept: GENERAL RADIOLOGY | Facility: HOSPITAL | Age: 87
End: 2022-10-25

## 2022-10-25 ENCOUNTER — HOSPITAL ENCOUNTER (INPATIENT)
Facility: HOSPITAL | Age: 87
LOS: 3 days | Discharge: HOME | End: 2022-10-28
Attending: EMERGENCY MEDICINE | Admitting: HOSPITALIST

## 2022-10-25 DIAGNOSIS — U07.1 PNEUMONIA DUE TO COVID-19 VIRUS: Primary | ICD-10-CM

## 2022-10-25 DIAGNOSIS — R06.02 SHORTNESS OF BREATH: ICD-10-CM

## 2022-10-25 DIAGNOSIS — E87.1 HYPONATREMIA: ICD-10-CM

## 2022-10-25 DIAGNOSIS — J12.82 PNEUMONIA DUE TO COVID-19 VIRUS: Primary | ICD-10-CM

## 2022-10-25 PROBLEM — J96.11 CHRONIC RESPIRATORY FAILURE WITH HYPOXIA (HCC): Status: ACTIVE | Noted: 2022-10-25

## 2022-10-25 LAB
ALBUMIN SERPL-MCNC: 3.7 G/DL (ref 3.5–5.2)
ALBUMIN SERPL-MCNC: 3.8 G/DL (ref 3.5–5.2)
ALBUMIN/GLOB SERPL: 1.5 G/DL
ALP SERPL-CCNC: 48 U/L (ref 39–117)
ALP SERPL-CCNC: 50 U/L (ref 39–117)
ALT SERPL W P-5'-P-CCNC: 6 U/L (ref 1–33)
ALT SERPL W P-5'-P-CCNC: 9 U/L (ref 1–33)
ANION GAP SERPL CALCULATED.3IONS-SCNC: 12 MMOL/L (ref 5–15)
AST SERPL-CCNC: 22 U/L (ref 1–32)
AST SERPL-CCNC: 24 U/L (ref 1–32)
B PARAPERT DNA SPEC QL NAA+PROBE: NOT DETECTED
B PERT DNA SPEC QL NAA+PROBE: NOT DETECTED
BASOPHILS # BLD AUTO: 0.01 10*3/MM3 (ref 0–0.2)
BASOPHILS NFR BLD AUTO: 0.2 % (ref 0–1.5)
BILIRUB CONJ SERPL-MCNC: <0.2 MG/DL (ref 0–0.3)
BILIRUB INDIRECT SERPL-MCNC: ABNORMAL MG/DL
BILIRUB SERPL-MCNC: 0.3 MG/DL (ref 0–1.2)
BILIRUB SERPL-MCNC: 0.3 MG/DL (ref 0–1.2)
BUN SERPL-MCNC: 12 MG/DL (ref 8–23)
BUN/CREAT SERPL: 12.1 (ref 7–25)
C PNEUM DNA NPH QL NAA+NON-PROBE: NOT DETECTED
CALCIUM SPEC-SCNC: 8.5 MG/DL (ref 8.6–10.5)
CHLORIDE SERPL-SCNC: 95 MMOL/L (ref 98–107)
CO2 SERPL-SCNC: 24 MMOL/L (ref 22–29)
CREAT SERPL-MCNC: 0.96 MG/DL (ref 0.57–1)
CREAT SERPL-MCNC: 0.99 MG/DL (ref 0.57–1)
CRP SERPL-MCNC: 9.9 MG/DL (ref 0–0.5)
D-LACTATE SERPL-SCNC: 1.6 MMOL/L (ref 0.5–2)
DEPRECATED RDW RBC AUTO: 44.9 FL (ref 37–54)
EGFRCR SERPLBLD CKD-EPI 2021: 56 ML/MIN/1.73
EGFRCR SERPLBLD CKD-EPI 2021: 58.1 ML/MIN/1.73
EOSINOPHIL # BLD AUTO: 0 10*3/MM3 (ref 0–0.4)
EOSINOPHIL NFR BLD AUTO: 0 % (ref 0.3–6.2)
ERYTHROCYTE [DISTWIDTH] IN BLOOD BY AUTOMATED COUNT: 14.5 % (ref 12.3–15.4)
FLUAV SUBTYP SPEC NAA+PROBE: NOT DETECTED
FLUBV RNA ISLT QL NAA+PROBE: NOT DETECTED
GLOBULIN UR ELPH-MCNC: 2.4 GM/DL
GLUCOSE SERPL-MCNC: 102 MG/DL (ref 65–99)
HADV DNA SPEC NAA+PROBE: NOT DETECTED
HCOV 229E RNA SPEC QL NAA+PROBE: NOT DETECTED
HCOV HKU1 RNA SPEC QL NAA+PROBE: NOT DETECTED
HCOV NL63 RNA SPEC QL NAA+PROBE: NOT DETECTED
HCOV OC43 RNA SPEC QL NAA+PROBE: NOT DETECTED
HCT VFR BLD AUTO: 24.8 % (ref 34–46.6)
HGB BLD-MCNC: 8.1 G/DL (ref 12–15.9)
HMPV RNA NPH QL NAA+NON-PROBE: NOT DETECTED
HPIV1 RNA ISLT QL NAA+PROBE: NOT DETECTED
HPIV2 RNA SPEC QL NAA+PROBE: NOT DETECTED
HPIV3 RNA NPH QL NAA+PROBE: NOT DETECTED
HPIV4 P GENE NPH QL NAA+PROBE: NOT DETECTED
IMM GRANULOCYTES # BLD AUTO: 0.02 10*3/MM3 (ref 0–0.05)
IMM GRANULOCYTES NFR BLD AUTO: 0.4 % (ref 0–0.5)
INR PPP: 0.97 (ref 0.9–1.1)
LYMPHOCYTES # BLD AUTO: 0.24 10*3/MM3 (ref 0.7–3.1)
LYMPHOCYTES NFR BLD AUTO: 5.2 % (ref 19.6–45.3)
M PNEUMO IGG SER IA-ACNC: NOT DETECTED
MAGNESIUM SERPL-MCNC: 1.8 MG/DL (ref 1.6–2.4)
MCH RBC QN AUTO: 27.8 PG (ref 26.6–33)
MCHC RBC AUTO-ENTMCNC: 32.7 G/DL (ref 31.5–35.7)
MCV RBC AUTO: 85.2 FL (ref 79–97)
MONOCYTES # BLD AUTO: 0.18 10*3/MM3 (ref 0.1–0.9)
MONOCYTES NFR BLD AUTO: 3.9 % (ref 5–12)
NEUTROPHILS NFR BLD AUTO: 4.21 10*3/MM3 (ref 1.7–7)
NEUTROPHILS NFR BLD AUTO: 90.3 % (ref 42.7–76)
NRBC BLD AUTO-RTO: 0 /100 WBC (ref 0–0.2)
NT-PROBNP SERPL-MCNC: 858 PG/ML (ref 0–1800)
PLATELET # BLD AUTO: 186 10*3/MM3 (ref 140–450)
PMV BLD AUTO: 10.9 FL (ref 6–12)
POTASSIUM SERPL-SCNC: 3.7 MMOL/L (ref 3.5–5.2)
PROCALCITONIN SERPL-MCNC: 1.96 NG/ML (ref 0–0.25)
PROT SERPL-MCNC: 5.8 G/DL (ref 6–8.5)
PROT SERPL-MCNC: 6.1 G/DL (ref 6–8.5)
PROTHROMBIN TIME: 13 SECONDS (ref 11.7–14.2)
QT INTERVAL: 395 MS
RBC # BLD AUTO: 2.91 10*6/MM3 (ref 3.77–5.28)
RHINOVIRUS RNA SPEC NAA+PROBE: NOT DETECTED
RSV RNA NPH QL NAA+NON-PROBE: NOT DETECTED
SARS-COV-2 RNA NPH QL NAA+NON-PROBE: DETECTED
SODIUM SERPL-SCNC: 131 MMOL/L (ref 136–145)
TROPONIN T SERPL-MCNC: 0.01 NG/ML (ref 0–0.03)
WBC NRBC COR # BLD: 4.66 10*3/MM3 (ref 3.4–10.8)

## 2022-10-25 PROCEDURE — 85610 PROTHROMBIN TIME: CPT | Performed by: PHYSICIAN ASSISTANT

## 2022-10-25 PROCEDURE — 99285 EMERGENCY DEPT VISIT HI MDM: CPT

## 2022-10-25 PROCEDURE — 94640 AIRWAY INHALATION TREATMENT: CPT

## 2022-10-25 PROCEDURE — 94760 N-INVAS EAR/PLS OXIMETRY 1: CPT

## 2022-10-25 PROCEDURE — 83735 ASSAY OF MAGNESIUM: CPT | Performed by: PHYSICIAN ASSISTANT

## 2022-10-25 PROCEDURE — 80053 COMPREHEN METABOLIC PANEL: CPT | Performed by: NURSE PRACTITIONER

## 2022-10-25 PROCEDURE — XW033E5 INTRODUCTION OF REMDESIVIR ANTI-INFECTIVE INTO PERIPHERAL VEIN, PERCUTANEOUS APPROACH, NEW TECHNOLOGY GROUP 5: ICD-10-PCS | Performed by: HOSPITALIST

## 2022-10-25 PROCEDURE — 93005 ELECTROCARDIOGRAM TRACING: CPT | Performed by: PHYSICIAN ASSISTANT

## 2022-10-25 PROCEDURE — 86140 C-REACTIVE PROTEIN: CPT | Performed by: INTERNAL MEDICINE

## 2022-10-25 PROCEDURE — 82248 BILIRUBIN DIRECT: CPT | Performed by: NURSE PRACTITIONER

## 2022-10-25 PROCEDURE — 84484 ASSAY OF TROPONIN QUANT: CPT | Performed by: PHYSICIAN ASSISTANT

## 2022-10-25 PROCEDURE — 82565 ASSAY OF CREATININE: CPT | Performed by: NURSE PRACTITIONER

## 2022-10-25 PROCEDURE — 83880 ASSAY OF NATRIURETIC PEPTIDE: CPT | Performed by: PHYSICIAN ASSISTANT

## 2022-10-25 PROCEDURE — 93010 ELECTROCARDIOGRAM REPORT: CPT | Performed by: INTERNAL MEDICINE

## 2022-10-25 PROCEDURE — 93005 ELECTROCARDIOGRAM TRACING: CPT | Performed by: INTERNAL MEDICINE

## 2022-10-25 PROCEDURE — 71045 X-RAY EXAM CHEST 1 VIEW: CPT

## 2022-10-25 PROCEDURE — 84145 PROCALCITONIN (PCT): CPT | Performed by: PHYSICIAN ASSISTANT

## 2022-10-25 PROCEDURE — 25010000002 REMDESIVIR 100 MG/20ML SOLUTION 1 EACH VIAL: Performed by: NURSE PRACTITIONER

## 2022-10-25 PROCEDURE — 87040 BLOOD CULTURE FOR BACTERIA: CPT | Performed by: PHYSICIAN ASSISTANT

## 2022-10-25 PROCEDURE — 93010 ELECTROCARDIOGRAM REPORT: CPT | Performed by: STUDENT IN AN ORGANIZED HEALTH CARE EDUCATION/TRAINING PROGRAM

## 2022-10-25 PROCEDURE — 85025 COMPLETE CBC W/AUTO DIFF WBC: CPT | Performed by: PHYSICIAN ASSISTANT

## 2022-10-25 PROCEDURE — 83605 ASSAY OF LACTIC ACID: CPT | Performed by: PHYSICIAN ASSISTANT

## 2022-10-25 PROCEDURE — 25010000002 DEXAMETHASONE PER 1 MG: Performed by: PHYSICIAN ASSISTANT

## 2022-10-25 PROCEDURE — 63710000001 MYCOPHENOLATE MOFETIL PER 250 MG: Performed by: NURSE PRACTITIONER

## 2022-10-25 PROCEDURE — 0202U NFCT DS 22 TRGT SARS-COV-2: CPT | Performed by: PHYSICIAN ASSISTANT

## 2022-10-25 PROCEDURE — 94799 UNLISTED PULMONARY SVC/PX: CPT

## 2022-10-25 PROCEDURE — 94761 N-INVAS EAR/PLS OXIMETRY MLT: CPT

## 2022-10-25 PROCEDURE — 25010000002 CEFTRIAXONE PER 250 MG: Performed by: EMERGENCY MEDICINE

## 2022-10-25 RX ORDER — LEVOTHYROXINE SODIUM 88 UG/1
88 TABLET ORAL DAILY
Status: DISCONTINUED | OUTPATIENT
Start: 2022-10-25 | End: 2022-10-28 | Stop reason: HOSPADM

## 2022-10-25 RX ORDER — DULOXETIN HYDROCHLORIDE 60 MG/1
60 CAPSULE, DELAYED RELEASE ORAL 2 TIMES DAILY
Status: DISCONTINUED | OUTPATIENT
Start: 2022-10-25 | End: 2022-10-28 | Stop reason: HOSPADM

## 2022-10-25 RX ORDER — BUSPIRONE HYDROCHLORIDE 10 MG/1
10 TABLET ORAL 2 TIMES DAILY
Status: DISCONTINUED | OUTPATIENT
Start: 2022-10-25 | End: 2022-10-28 | Stop reason: HOSPADM

## 2022-10-25 RX ORDER — METOPROLOL SUCCINATE 25 MG/1
25 TABLET, EXTENDED RELEASE ORAL 2 TIMES DAILY
Status: DISCONTINUED | OUTPATIENT
Start: 2022-10-25 | End: 2022-10-28 | Stop reason: HOSPADM

## 2022-10-25 RX ORDER — PREDNISONE 10 MG/1
5 TABLET ORAL DAILY
COMMUNITY

## 2022-10-25 RX ORDER — ASPIRIN 81 MG/1
81 TABLET ORAL NIGHTLY
Status: DISCONTINUED | OUTPATIENT
Start: 2022-10-25 | End: 2022-10-28 | Stop reason: HOSPADM

## 2022-10-25 RX ORDER — ALBUTEROL SULFATE 90 UG/1
2 AEROSOL, METERED RESPIRATORY (INHALATION) EVERY 6 HOURS PRN
Status: DISCONTINUED | OUTPATIENT
Start: 2022-10-25 | End: 2022-10-28 | Stop reason: HOSPADM

## 2022-10-25 RX ORDER — DEXAMETHASONE SODIUM PHOSPHATE 10 MG/ML
6 INJECTION INTRAMUSCULAR; INTRAVENOUS DAILY
Status: DISCONTINUED | OUTPATIENT
Start: 2022-10-26 | End: 2022-10-26

## 2022-10-25 RX ORDER — SODIUM CHLORIDE 0.9 % (FLUSH) 0.9 %
10 SYRINGE (ML) INJECTION AS NEEDED
Status: DISCONTINUED | OUTPATIENT
Start: 2022-10-25 | End: 2022-10-28 | Stop reason: HOSPADM

## 2022-10-25 RX ORDER — MYCOPHENOLATE MOFETIL 500 MG/1
2500 TABLET ORAL DAILY
Status: DISCONTINUED | OUTPATIENT
Start: 2022-10-25 | End: 2022-10-28 | Stop reason: HOSPADM

## 2022-10-25 RX ORDER — MONTELUKAST SODIUM 10 MG/1
10 TABLET ORAL NIGHTLY
Status: DISCONTINUED | OUTPATIENT
Start: 2022-10-25 | End: 2022-10-28 | Stop reason: HOSPADM

## 2022-10-25 RX ORDER — BUDESONIDE AND FORMOTEROL FUMARATE DIHYDRATE 160; 4.5 UG/1; UG/1
2 AEROSOL RESPIRATORY (INHALATION)
Status: DISCONTINUED | OUTPATIENT
Start: 2022-10-25 | End: 2022-10-28 | Stop reason: HOSPADM

## 2022-10-25 RX ORDER — PRAVASTATIN SODIUM 20 MG
20 TABLET ORAL DAILY
Status: DISCONTINUED | OUTPATIENT
Start: 2022-10-25 | End: 2022-10-28 | Stop reason: HOSPADM

## 2022-10-25 RX ORDER — DEXAMETHASONE 6 MG/1
6 TABLET ORAL DAILY
Status: DISCONTINUED | OUTPATIENT
Start: 2022-10-26 | End: 2022-10-26

## 2022-10-25 RX ORDER — ALBUTEROL SULFATE 90 UG/1
2 AEROSOL, METERED RESPIRATORY (INHALATION) ONCE
Status: COMPLETED | OUTPATIENT
Start: 2022-10-25 | End: 2022-10-25

## 2022-10-25 RX ORDER — DEXAMETHASONE SODIUM PHOSPHATE 10 MG/ML
6 INJECTION INTRAMUSCULAR; INTRAVENOUS ONCE
Status: COMPLETED | OUTPATIENT
Start: 2022-10-25 | End: 2022-10-25

## 2022-10-25 RX ORDER — LOSARTAN POTASSIUM 25 MG/1
25 TABLET ORAL NIGHTLY
Status: DISCONTINUED | OUTPATIENT
Start: 2022-10-25 | End: 2022-10-28 | Stop reason: HOSPADM

## 2022-10-25 RX ORDER — PANTOPRAZOLE SODIUM 40 MG/1
40 TABLET, DELAYED RELEASE ORAL DAILY
Status: DISCONTINUED | OUTPATIENT
Start: 2022-10-25 | End: 2022-10-28 | Stop reason: HOSPADM

## 2022-10-25 RX ADMIN — PRAVASTATIN SODIUM 20 MG: 20 TABLET ORAL at 18:33

## 2022-10-25 RX ADMIN — REMDESIVIR 200 MG: 100 INJECTION, POWDER, LYOPHILIZED, FOR SOLUTION INTRAVENOUS at 18:34

## 2022-10-25 RX ADMIN — LEVOTHYROXINE SODIUM 88 MCG: 0.09 TABLET ORAL at 18:33

## 2022-10-25 RX ADMIN — MONTELUKAST SODIUM 10 MG: 10 TABLET, FILM COATED ORAL at 20:29

## 2022-10-25 RX ADMIN — DEXAMETHASONE SODIUM PHOSPHATE 6 MG: 10 INJECTION INTRAMUSCULAR; INTRAVENOUS at 08:49

## 2022-10-25 RX ADMIN — DULOXETINE HYDROCHLORIDE 60 MG: 60 CAPSULE, DELAYED RELEASE ORAL at 20:29

## 2022-10-25 RX ADMIN — RIVAROXABAN 20 MG: 20 TABLET, FILM COATED ORAL at 18:33

## 2022-10-25 RX ADMIN — PANTOPRAZOLE SODIUM 40 MG: 40 TABLET, DELAYED RELEASE ORAL at 18:43

## 2022-10-25 RX ADMIN — MYCOPHENOLATE MOFETIL 2500 MG: 500 TABLET ORAL at 18:34

## 2022-10-25 RX ADMIN — ALBUTEROL SULFATE 2 PUFF: 90 AEROSOL, METERED RESPIRATORY (INHALATION) at 09:42

## 2022-10-25 RX ADMIN — BUSPIRONE HYDROCHLORIDE 10 MG: 10 TABLET ORAL at 20:29

## 2022-10-25 RX ADMIN — ASPIRIN 81 MG: 81 TABLET, COATED ORAL at 20:29

## 2022-10-25 RX ADMIN — LOSARTAN POTASSIUM 25 MG: 25 TABLET, FILM COATED ORAL at 20:29

## 2022-10-25 RX ADMIN — CEFTRIAXONE SODIUM 1 G: 1 INJECTION, POWDER, FOR SOLUTION INTRAMUSCULAR; INTRAVENOUS at 14:33

## 2022-10-25 RX ADMIN — METOPROLOL SUCCINATE 25 MG: 25 TABLET, EXTENDED RELEASE ORAL at 22:32

## 2022-10-26 ENCOUNTER — APPOINTMENT (OUTPATIENT)
Dept: CT IMAGING | Facility: HOSPITAL | Age: 87
End: 2022-10-26

## 2022-10-26 LAB
ALBUMIN SERPL-MCNC: 3.3 G/DL (ref 3.5–5.2)
ALBUMIN/GLOB SERPL: 1.4 G/DL
ALP SERPL-CCNC: 48 U/L (ref 39–117)
ALT SERPL W P-5'-P-CCNC: 8 U/L (ref 1–33)
ANION GAP SERPL CALCULATED.3IONS-SCNC: 11.9 MMOL/L (ref 5–15)
AST SERPL-CCNC: 23 U/L (ref 1–32)
BASOPHILS # BLD AUTO: 0 10*3/MM3 (ref 0–0.2)
BASOPHILS NFR BLD AUTO: 0 % (ref 0–1.5)
BILIRUB CONJ SERPL-MCNC: <0.2 MG/DL (ref 0–0.3)
BILIRUB SERPL-MCNC: 0.2 MG/DL (ref 0–1.2)
BUN SERPL-MCNC: 13 MG/DL (ref 8–23)
BUN/CREAT SERPL: 13.7 (ref 7–25)
CALCIUM SPEC-SCNC: 8.4 MG/DL (ref 8.6–10.5)
CHLORIDE SERPL-SCNC: 101 MMOL/L (ref 98–107)
CO2 SERPL-SCNC: 24.1 MMOL/L (ref 22–29)
CREAT SERPL-MCNC: 0.95 MG/DL (ref 0.57–1)
CRP SERPL-MCNC: 11.01 MG/DL (ref 0–0.5)
D DIMER PPP FEU-MCNC: 0.64 MCGFEU/ML (ref 0–0.49)
D-LACTATE SERPL-SCNC: 1.2 MMOL/L (ref 0.5–2)
DEPRECATED RDW RBC AUTO: 44.7 FL (ref 37–54)
EGFRCR SERPLBLD CKD-EPI 2021: 58.8 ML/MIN/1.73
EOSINOPHIL # BLD AUTO: 0 10*3/MM3 (ref 0–0.4)
EOSINOPHIL NFR BLD AUTO: 0 % (ref 0.3–6.2)
ERYTHROCYTE [DISTWIDTH] IN BLOOD BY AUTOMATED COUNT: 14.6 % (ref 12.3–15.4)
FERRITIN SERPL-MCNC: 174 NG/ML (ref 13–150)
GLOBULIN UR ELPH-MCNC: 2.4 GM/DL
GLUCOSE SERPL-MCNC: 96 MG/DL (ref 65–99)
HCT VFR BLD AUTO: 24.4 % (ref 34–46.6)
HGB BLD-MCNC: 8 G/DL (ref 12–15.9)
L PNEUMO1 AG UR QL IA: NEGATIVE
LYMPHOCYTES # BLD AUTO: 0.32 10*3/MM3 (ref 0.7–3.1)
LYMPHOCYTES NFR BLD AUTO: 6.8 % (ref 19.6–45.3)
MCH RBC QN AUTO: 27.4 PG (ref 26.6–33)
MCHC RBC AUTO-ENTMCNC: 32.8 G/DL (ref 31.5–35.7)
MCV RBC AUTO: 83.6 FL (ref 79–97)
MONOCYTES # BLD AUTO: 0.31 10*3/MM3 (ref 0.1–0.9)
MONOCYTES NFR BLD AUTO: 6.5 % (ref 5–12)
NEUTROPHILS NFR BLD AUTO: 4.08 10*3/MM3 (ref 1.7–7)
NEUTROPHILS NFR BLD AUTO: 86.1 % (ref 42.7–76)
PLATELET # BLD AUTO: 205 10*3/MM3 (ref 140–450)
PMV BLD AUTO: 9.4 FL (ref 6–12)
POTASSIUM SERPL-SCNC: 5 MMOL/L (ref 3.5–5.2)
PROCALCITONIN SERPL-MCNC: 2.81 NG/ML (ref 0–0.25)
PROT SERPL-MCNC: 5.7 G/DL (ref 6–8.5)
QT INTERVAL: 390 MS
RBC # BLD AUTO: 2.92 10*6/MM3 (ref 3.77–5.28)
S PNEUM AG SPEC QL LA: NEGATIVE
SODIUM SERPL-SCNC: 137 MMOL/L (ref 136–145)
WBC NRBC COR # BLD: 4.74 10*3/MM3 (ref 3.4–10.8)

## 2022-10-26 PROCEDURE — 63710000001 MYCOPHENOLATE MOFETIL PER 250 MG: Performed by: NURSE PRACTITIONER

## 2022-10-26 PROCEDURE — 84145 PROCALCITONIN (PCT): CPT | Performed by: NURSE PRACTITIONER

## 2022-10-26 PROCEDURE — 25010000002 CEFTRIAXONE PER 250 MG: Performed by: NURSE PRACTITIONER

## 2022-10-26 PROCEDURE — 94799 UNLISTED PULMONARY SVC/PX: CPT

## 2022-10-26 PROCEDURE — 85379 FIBRIN DEGRADATION QUANT: CPT | Performed by: NURSE PRACTITIONER

## 2022-10-26 PROCEDURE — 86140 C-REACTIVE PROTEIN: CPT | Performed by: NURSE PRACTITIONER

## 2022-10-26 PROCEDURE — 83605 ASSAY OF LACTIC ACID: CPT | Performed by: NURSE PRACTITIONER

## 2022-10-26 PROCEDURE — 80053 COMPREHEN METABOLIC PANEL: CPT | Performed by: NURSE PRACTITIONER

## 2022-10-26 PROCEDURE — 25010000002 REMDESIVIR 100 MG/20ML SOLUTION 1 EACH VIAL: Performed by: NURSE PRACTITIONER

## 2022-10-26 PROCEDURE — 82248 BILIRUBIN DIRECT: CPT | Performed by: NURSE PRACTITIONER

## 2022-10-26 PROCEDURE — 87899 AGENT NOS ASSAY W/OPTIC: CPT | Performed by: INTERNAL MEDICINE

## 2022-10-26 PROCEDURE — 85025 COMPLETE CBC W/AUTO DIFF WBC: CPT | Performed by: NURSE PRACTITIONER

## 2022-10-26 PROCEDURE — 82728 ASSAY OF FERRITIN: CPT | Performed by: NURSE PRACTITIONER

## 2022-10-26 PROCEDURE — 87449 NOS EACH ORGANISM AG IA: CPT | Performed by: INTERNAL MEDICINE

## 2022-10-26 RX ORDER — OLANZAPINE 5 MG/1
5 TABLET ORAL 2 TIMES DAILY PRN
Status: DISCONTINUED | OUTPATIENT
Start: 2022-10-26 | End: 2022-10-28 | Stop reason: HOSPADM

## 2022-10-26 RX ORDER — ACETAMINOPHEN 325 MG/1
650 TABLET ORAL EVERY 6 HOURS PRN
Status: DISCONTINUED | OUTPATIENT
Start: 2022-10-26 | End: 2022-10-28 | Stop reason: HOSPADM

## 2022-10-26 RX ADMIN — DULOXETINE HYDROCHLORIDE 60 MG: 60 CAPSULE, DELAYED RELEASE ORAL at 20:07

## 2022-10-26 RX ADMIN — METOPROLOL SUCCINATE 25 MG: 25 TABLET, EXTENDED RELEASE ORAL at 09:41

## 2022-10-26 RX ADMIN — MYCOPHENOLATE MOFETIL 2500 MG: 500 TABLET ORAL at 09:41

## 2022-10-26 RX ADMIN — CEFTRIAXONE SODIUM 1 G: 1 INJECTION, POWDER, FOR SOLUTION INTRAMUSCULAR; INTRAVENOUS at 14:23

## 2022-10-26 RX ADMIN — DULOXETINE HYDROCHLORIDE 60 MG: 60 CAPSULE, DELAYED RELEASE ORAL at 09:41

## 2022-10-26 RX ADMIN — LOSARTAN POTASSIUM 25 MG: 25 TABLET, FILM COATED ORAL at 20:07

## 2022-10-26 RX ADMIN — RIVAROXABAN 20 MG: 20 TABLET, FILM COATED ORAL at 18:56

## 2022-10-26 RX ADMIN — LEVOTHYROXINE SODIUM 88 MCG: 0.09 TABLET ORAL at 09:41

## 2022-10-26 RX ADMIN — ACETAMINOPHEN 650 MG: 325 TABLET, FILM COATED ORAL at 16:01

## 2022-10-26 RX ADMIN — BUSPIRONE HYDROCHLORIDE 10 MG: 10 TABLET ORAL at 09:40

## 2022-10-26 RX ADMIN — DEXAMETHASONE 6 MG: 6 TABLET ORAL at 09:41

## 2022-10-26 RX ADMIN — MONTELUKAST SODIUM 10 MG: 10 TABLET, FILM COATED ORAL at 20:07

## 2022-10-26 RX ADMIN — ASPIRIN 81 MG: 81 TABLET, COATED ORAL at 20:07

## 2022-10-26 RX ADMIN — METOPROLOL SUCCINATE 25 MG: 25 TABLET, EXTENDED RELEASE ORAL at 20:07

## 2022-10-26 RX ADMIN — BUSPIRONE HYDROCHLORIDE 10 MG: 10 TABLET ORAL at 20:07

## 2022-10-26 RX ADMIN — REMDESIVIR 100 MG: 100 INJECTION, POWDER, LYOPHILIZED, FOR SOLUTION INTRAVENOUS at 16:01

## 2022-10-26 RX ADMIN — PRAVASTATIN SODIUM 20 MG: 20 TABLET ORAL at 09:41

## 2022-10-26 RX ADMIN — OLANZAPINE 5 MG: 5 TABLET ORAL at 15:06

## 2022-10-26 RX ADMIN — PANTOPRAZOLE SODIUM 40 MG: 40 TABLET, DELAYED RELEASE ORAL at 09:41

## 2022-10-27 ENCOUNTER — APPOINTMENT (OUTPATIENT)
Dept: CT IMAGING | Facility: HOSPITAL | Age: 87
End: 2022-10-27

## 2022-10-27 LAB
ALBUMIN SERPL-MCNC: 3.1 G/DL (ref 3.5–5.2)
ALBUMIN/GLOB SERPL: 1.7 G/DL
ALP SERPL-CCNC: 46 U/L (ref 39–117)
ALT SERPL W P-5'-P-CCNC: 9 U/L (ref 1–33)
ANION GAP SERPL CALCULATED.3IONS-SCNC: 10 MMOL/L (ref 5–15)
AST SERPL-CCNC: 34 U/L (ref 1–32)
BASOPHILS # BLD AUTO: 0 10*3/MM3 (ref 0–0.2)
BASOPHILS NFR BLD AUTO: 0 % (ref 0–1.5)
BILIRUB CONJ SERPL-MCNC: <0.2 MG/DL (ref 0–0.3)
BILIRUB SERPL-MCNC: 0.2 MG/DL (ref 0–1.2)
BILIRUB UR QL STRIP: NEGATIVE
BUN SERPL-MCNC: 17 MG/DL (ref 8–23)
BUN/CREAT SERPL: 19.8 (ref 7–25)
CALCIUM SPEC-SCNC: 8.2 MG/DL (ref 8.6–10.5)
CHLORIDE SERPL-SCNC: 100 MMOL/L (ref 98–107)
CLARITY UR: CLEAR
CO2 SERPL-SCNC: 25 MMOL/L (ref 22–29)
COLOR UR: YELLOW
CREAT SERPL-MCNC: 0.86 MG/DL (ref 0.57–1)
CRP SERPL-MCNC: 9.64 MG/DL (ref 0–0.5)
DEPRECATED RDW RBC AUTO: 47.4 FL (ref 37–54)
EGFRCR SERPLBLD CKD-EPI 2021: 66.3 ML/MIN/1.73
EOSINOPHIL # BLD AUTO: 0 10*3/MM3 (ref 0–0.4)
EOSINOPHIL NFR BLD AUTO: 0 % (ref 0.3–6.2)
ERYTHROCYTE [DISTWIDTH] IN BLOOD BY AUTOMATED COUNT: 15 % (ref 12.3–15.4)
FERRITIN SERPL-MCNC: 189 NG/ML (ref 13–150)
GLOBULIN UR ELPH-MCNC: 1.8 GM/DL
GLUCOSE SERPL-MCNC: 83 MG/DL (ref 65–99)
GLUCOSE UR STRIP-MCNC: NEGATIVE MG/DL
HCT VFR BLD AUTO: 23.5 % (ref 34–46.6)
HGB BLD-MCNC: 7.6 G/DL (ref 12–15.9)
HGB UR QL STRIP.AUTO: NEGATIVE
IMM GRANULOCYTES # BLD AUTO: 0.03 10*3/MM3 (ref 0–0.05)
IMM GRANULOCYTES NFR BLD AUTO: 0.9 % (ref 0–0.5)
KETONES UR QL STRIP: NEGATIVE
LEUKOCYTE ESTERASE UR QL STRIP.AUTO: NEGATIVE
LYMPHOCYTES # BLD AUTO: 0.29 10*3/MM3 (ref 0.7–3.1)
LYMPHOCYTES NFR BLD AUTO: 8.9 % (ref 19.6–45.3)
MCH RBC QN AUTO: 27.8 PG (ref 26.6–33)
MCHC RBC AUTO-ENTMCNC: 32.3 G/DL (ref 31.5–35.7)
MCV RBC AUTO: 86.1 FL (ref 79–97)
MONOCYTES # BLD AUTO: 0.21 10*3/MM3 (ref 0.1–0.9)
MONOCYTES NFR BLD AUTO: 6.4 % (ref 5–12)
NEUTROPHILS NFR BLD AUTO: 2.74 10*3/MM3 (ref 1.7–7)
NEUTROPHILS NFR BLD AUTO: 83.8 % (ref 42.7–76)
NITRITE UR QL STRIP: NEGATIVE
NRBC BLD AUTO-RTO: 0 /100 WBC (ref 0–0.2)
PH UR STRIP.AUTO: 5.5 [PH] (ref 5–8)
PLATELET # BLD AUTO: 214 10*3/MM3 (ref 140–450)
PMV BLD AUTO: 9.8 FL (ref 6–12)
POTASSIUM SERPL-SCNC: 3.9 MMOL/L (ref 3.5–5.2)
POTASSIUM SERPL-SCNC: 4.4 MMOL/L (ref 3.5–5.2)
PROT SERPL-MCNC: 4.9 G/DL (ref 6–8.5)
PROT UR QL STRIP: NEGATIVE
QT INTERVAL: 390 MS
RBC # BLD AUTO: 2.73 10*6/MM3 (ref 3.77–5.28)
SODIUM SERPL-SCNC: 135 MMOL/L (ref 136–145)
SP GR UR STRIP: 1.01 (ref 1–1.03)
UROBILINOGEN UR QL STRIP: NORMAL
WBC NRBC COR # BLD: 3.27 10*3/MM3 (ref 3.4–10.8)

## 2022-10-27 PROCEDURE — 93010 ELECTROCARDIOGRAM REPORT: CPT | Performed by: INTERNAL MEDICINE

## 2022-10-27 PROCEDURE — 84132 ASSAY OF SERUM POTASSIUM: CPT | Performed by: HOSPITALIST

## 2022-10-27 PROCEDURE — 80053 COMPREHEN METABOLIC PANEL: CPT | Performed by: NURSE PRACTITIONER

## 2022-10-27 PROCEDURE — 97165 OT EVAL LOW COMPLEX 30 MIN: CPT

## 2022-10-27 PROCEDURE — 86140 C-REACTIVE PROTEIN: CPT | Performed by: NURSE PRACTITIONER

## 2022-10-27 PROCEDURE — 85025 COMPLETE CBC W/AUTO DIFF WBC: CPT | Performed by: NURSE PRACTITIONER

## 2022-10-27 PROCEDURE — 63710000001 PREDNISONE PER 5 MG: Performed by: NURSE PRACTITIONER

## 2022-10-27 PROCEDURE — 25010000002 CEFTRIAXONE PER 250 MG: Performed by: NURSE PRACTITIONER

## 2022-10-27 PROCEDURE — 63710000001 MYCOPHENOLATE MOFETIL PER 250 MG: Performed by: NURSE PRACTITIONER

## 2022-10-27 PROCEDURE — 70450 CT HEAD/BRAIN W/O DYE: CPT

## 2022-10-27 PROCEDURE — 25010000002 REMDESIVIR 100 MG/20ML SOLUTION 1 EACH VIAL: Performed by: NURSE PRACTITIONER

## 2022-10-27 PROCEDURE — 81003 URINALYSIS AUTO W/O SCOPE: CPT | Performed by: NURSE PRACTITIONER

## 2022-10-27 PROCEDURE — 94799 UNLISTED PULMONARY SVC/PX: CPT

## 2022-10-27 PROCEDURE — 93005 ELECTROCARDIOGRAM TRACING: CPT | Performed by: HOSPITALIST

## 2022-10-27 PROCEDURE — 82728 ASSAY OF FERRITIN: CPT | Performed by: NURSE PRACTITIONER

## 2022-10-27 PROCEDURE — 82248 BILIRUBIN DIRECT: CPT | Performed by: NURSE PRACTITIONER

## 2022-10-27 RX ORDER — PREDNISONE 1 MG/1
0.5 TABLET ORAL DAILY
Status: DISCONTINUED | OUTPATIENT
Start: 2022-10-27 | End: 2022-10-28 | Stop reason: HOSPADM

## 2022-10-27 RX ADMIN — REMDESIVIR 100 MG: 100 INJECTION, POWDER, LYOPHILIZED, FOR SOLUTION INTRAVENOUS at 17:11

## 2022-10-27 RX ADMIN — BUDESONIDE AND FORMOTEROL FUMARATE DIHYDRATE 2 PUFF: 160; 4.5 AEROSOL RESPIRATORY (INHALATION) at 20:11

## 2022-10-27 RX ADMIN — METOPROLOL SUCCINATE 25 MG: 25 TABLET, EXTENDED RELEASE ORAL at 20:00

## 2022-10-27 RX ADMIN — DULOXETINE HYDROCHLORIDE 60 MG: 60 CAPSULE, DELAYED RELEASE ORAL at 09:05

## 2022-10-27 RX ADMIN — PRAVASTATIN SODIUM 20 MG: 20 TABLET ORAL at 09:04

## 2022-10-27 RX ADMIN — DULOXETINE HYDROCHLORIDE 60 MG: 60 CAPSULE, DELAYED RELEASE ORAL at 20:00

## 2022-10-27 RX ADMIN — CEFTRIAXONE SODIUM 1 G: 1 INJECTION, POWDER, FOR SOLUTION INTRAMUSCULAR; INTRAVENOUS at 13:05

## 2022-10-27 RX ADMIN — PREDNISONE 0.5 MG: 1 TABLET ORAL at 13:58

## 2022-10-27 RX ADMIN — BUSPIRONE HYDROCHLORIDE 10 MG: 10 TABLET ORAL at 09:05

## 2022-10-27 RX ADMIN — BUSPIRONE HYDROCHLORIDE 10 MG: 10 TABLET ORAL at 20:00

## 2022-10-27 RX ADMIN — LOSARTAN POTASSIUM 25 MG: 25 TABLET, FILM COATED ORAL at 20:00

## 2022-10-27 RX ADMIN — MONTELUKAST SODIUM 10 MG: 10 TABLET, FILM COATED ORAL at 20:00

## 2022-10-27 RX ADMIN — LEVOTHYROXINE SODIUM 88 MCG: 0.09 TABLET ORAL at 09:04

## 2022-10-27 RX ADMIN — METOPROLOL SUCCINATE 25 MG: 25 TABLET, EXTENDED RELEASE ORAL at 09:05

## 2022-10-27 RX ADMIN — MYCOPHENOLATE MOFETIL 2500 MG: 500 TABLET ORAL at 09:02

## 2022-10-27 RX ADMIN — ASPIRIN 81 MG: 81 TABLET, COATED ORAL at 20:00

## 2022-10-27 RX ADMIN — PANTOPRAZOLE SODIUM 40 MG: 40 TABLET, DELAYED RELEASE ORAL at 09:05

## 2022-10-27 RX ADMIN — RIVAROXABAN 15 MG: 15 TABLET, FILM COATED ORAL at 17:12

## 2022-10-28 ENCOUNTER — READMISSION MANAGEMENT (OUTPATIENT)
Dept: CALL CENTER | Facility: HOSPITAL | Age: 87
End: 2022-10-28

## 2022-10-28 VITALS
HEIGHT: 60 IN | BODY MASS INDEX: 32.89 KG/M2 | TEMPERATURE: 99 F | SYSTOLIC BLOOD PRESSURE: 141 MMHG | RESPIRATION RATE: 18 BRPM | DIASTOLIC BLOOD PRESSURE: 78 MMHG | HEART RATE: 80 BPM | OXYGEN SATURATION: 97 % | WEIGHT: 167.55 LBS

## 2022-10-28 PROBLEM — D89.831 CYTOKINE RELEASE SYNDROME, GRADE 1: Status: ACTIVE | Noted: 2022-10-28

## 2022-10-28 LAB
ALBUMIN SERPL-MCNC: 3.2 G/DL (ref 3.5–5.2)
ALBUMIN/GLOB SERPL: 1.8 G/DL
ALP SERPL-CCNC: 50 U/L (ref 39–117)
ALT SERPL W P-5'-P-CCNC: 12 U/L (ref 1–33)
ANION GAP SERPL CALCULATED.3IONS-SCNC: 8.5 MMOL/L (ref 5–15)
AST SERPL-CCNC: 31 U/L (ref 1–32)
BASOPHILS # BLD AUTO: 0 10*3/MM3 (ref 0–0.2)
BASOPHILS NFR BLD AUTO: 0 % (ref 0–1.5)
BILIRUB CONJ SERPL-MCNC: <0.2 MG/DL (ref 0–0.3)
BILIRUB SERPL-MCNC: <0.2 MG/DL (ref 0–1.2)
BUN SERPL-MCNC: 16 MG/DL (ref 8–23)
BUN/CREAT SERPL: 20 (ref 7–25)
CALCIUM SPEC-SCNC: 8.1 MG/DL (ref 8.6–10.5)
CHLORIDE SERPL-SCNC: 100 MMOL/L (ref 98–107)
CO2 SERPL-SCNC: 25.5 MMOL/L (ref 22–29)
CREAT SERPL-MCNC: 0.8 MG/DL (ref 0.57–1)
CRP SERPL-MCNC: 9.74 MG/DL (ref 0–0.5)
D DIMER PPP FEU-MCNC: 0.69 MCGFEU/ML (ref 0–0.49)
D-LACTATE SERPL-SCNC: 0.8 MMOL/L (ref 0.5–2)
DEPRECATED RDW RBC AUTO: 43.1 FL (ref 37–54)
EGFRCR SERPLBLD CKD-EPI 2021: 72.3 ML/MIN/1.73
EOSINOPHIL # BLD AUTO: 0.03 10*3/MM3 (ref 0–0.4)
EOSINOPHIL NFR BLD AUTO: 0.7 % (ref 0.3–6.2)
ERYTHROCYTE [DISTWIDTH] IN BLOOD BY AUTOMATED COUNT: 14.4 % (ref 12.3–15.4)
FERRITIN SERPL-MCNC: 178 NG/ML (ref 13–150)
GLOBULIN UR ELPH-MCNC: 1.8 GM/DL
GLUCOSE SERPL-MCNC: 90 MG/DL (ref 65–99)
HCT VFR BLD AUTO: 23.3 % (ref 34–46.6)
HGB BLD-MCNC: 7.7 G/DL (ref 12–15.9)
IMM GRANULOCYTES # BLD AUTO: 0.04 10*3/MM3 (ref 0–0.05)
IMM GRANULOCYTES NFR BLD AUTO: 1 % (ref 0–0.5)
IRON 24H UR-MRATE: 17 MCG/DL (ref 37–145)
IRON SATN MFR SERPL: 6 % (ref 20–50)
LYMPHOCYTES # BLD AUTO: 0.35 10*3/MM3 (ref 0.7–3.1)
LYMPHOCYTES NFR BLD AUTO: 8.6 % (ref 19.6–45.3)
MCH RBC QN AUTO: 27.1 PG (ref 26.6–33)
MCHC RBC AUTO-ENTMCNC: 33 G/DL (ref 31.5–35.7)
MCV RBC AUTO: 82 FL (ref 79–97)
MONOCYTES # BLD AUTO: 0.25 10*3/MM3 (ref 0.1–0.9)
MONOCYTES NFR BLD AUTO: 6.2 % (ref 5–12)
NEUTROPHILS NFR BLD AUTO: 3.38 10*3/MM3 (ref 1.7–7)
NEUTROPHILS NFR BLD AUTO: 83.5 % (ref 42.7–76)
NRBC BLD AUTO-RTO: 0 /100 WBC (ref 0–0.2)
PLATELET # BLD AUTO: 263 10*3/MM3 (ref 140–450)
PMV BLD AUTO: 9.3 FL (ref 6–12)
POTASSIUM SERPL-SCNC: 4.1 MMOL/L (ref 3.5–5.2)
PROCALCITONIN SERPL-MCNC: 1.04 NG/ML (ref 0–0.25)
PROT SERPL-MCNC: 5 G/DL (ref 6–8.5)
RBC # BLD AUTO: 2.84 10*6/MM3 (ref 3.77–5.28)
SODIUM SERPL-SCNC: 134 MMOL/L (ref 136–145)
TIBC SERPL-MCNC: 274 MCG/DL (ref 298–536)
TRANSFERRIN SERPL-MCNC: 184 MG/DL (ref 200–360)
WBC NRBC COR # BLD: 4.05 10*3/MM3 (ref 3.4–10.8)

## 2022-10-28 PROCEDURE — 83540 ASSAY OF IRON: CPT | Performed by: NURSE PRACTITIONER

## 2022-10-28 PROCEDURE — 84145 PROCALCITONIN (PCT): CPT | Performed by: NURSE PRACTITIONER

## 2022-10-28 PROCEDURE — 94799 UNLISTED PULMONARY SVC/PX: CPT

## 2022-10-28 PROCEDURE — 85025 COMPLETE CBC W/AUTO DIFF WBC: CPT | Performed by: NURSE PRACTITIONER

## 2022-10-28 PROCEDURE — 82248 BILIRUBIN DIRECT: CPT | Performed by: NURSE PRACTITIONER

## 2022-10-28 PROCEDURE — 86140 C-REACTIVE PROTEIN: CPT | Performed by: NURSE PRACTITIONER

## 2022-10-28 PROCEDURE — 83605 ASSAY OF LACTIC ACID: CPT | Performed by: NURSE PRACTITIONER

## 2022-10-28 PROCEDURE — 84466 ASSAY OF TRANSFERRIN: CPT | Performed by: NURSE PRACTITIONER

## 2022-10-28 PROCEDURE — 80053 COMPREHEN METABOLIC PANEL: CPT | Performed by: NURSE PRACTITIONER

## 2022-10-28 PROCEDURE — 94761 N-INVAS EAR/PLS OXIMETRY MLT: CPT

## 2022-10-28 PROCEDURE — 94664 DEMO&/EVAL PT USE INHALER: CPT

## 2022-10-28 PROCEDURE — 82728 ASSAY OF FERRITIN: CPT | Performed by: NURSE PRACTITIONER

## 2022-10-28 PROCEDURE — 63710000001 PREDNISONE PER 5 MG: Performed by: NURSE PRACTITIONER

## 2022-10-28 PROCEDURE — 85379 FIBRIN DEGRADATION QUANT: CPT | Performed by: NURSE PRACTITIONER

## 2022-10-28 PROCEDURE — 63710000001 MYCOPHENOLATE MOFETIL PER 250 MG: Performed by: NURSE PRACTITIONER

## 2022-10-28 RX ORDER — CEFDINIR 300 MG/1
300 CAPSULE ORAL 2 TIMES DAILY
Qty: 8 CAPSULE | Refills: 0 | Status: SHIPPED | OUTPATIENT
Start: 2022-10-28 | End: 2022-11-01

## 2022-10-28 RX ADMIN — METOPROLOL SUCCINATE 25 MG: 25 TABLET, EXTENDED RELEASE ORAL at 08:48

## 2022-10-28 RX ADMIN — PANTOPRAZOLE SODIUM 40 MG: 40 TABLET, DELAYED RELEASE ORAL at 08:47

## 2022-10-28 RX ADMIN — BUDESONIDE AND FORMOTEROL FUMARATE DIHYDRATE 2 PUFF: 160; 4.5 AEROSOL RESPIRATORY (INHALATION) at 09:24

## 2022-10-28 RX ADMIN — DULOXETINE HYDROCHLORIDE 60 MG: 60 CAPSULE, DELAYED RELEASE ORAL at 08:47

## 2022-10-28 RX ADMIN — PREDNISONE 0.5 MG: 1 TABLET ORAL at 08:48

## 2022-10-28 RX ADMIN — PRAVASTATIN SODIUM 20 MG: 20 TABLET ORAL at 08:47

## 2022-10-28 RX ADMIN — MYCOPHENOLATE MOFETIL 2500 MG: 500 TABLET ORAL at 08:47

## 2022-10-28 RX ADMIN — LEVOTHYROXINE SODIUM 88 MCG: 0.09 TABLET ORAL at 08:47

## 2022-10-28 RX ADMIN — Medication 10 ML: at 08:48

## 2022-10-28 RX ADMIN — BUSPIRONE HYDROCHLORIDE 10 MG: 10 TABLET ORAL at 08:48

## 2022-10-29 NOTE — OUTREACH NOTE
Prep Survey    Flowsheet Row Responses   [facility] facility patient discharged from? [place]   Is LACE score < 7 ? No   Emergency Room discharge w/ pulse ox? No   Eligibility Readm Mgmt   Discharge diagnosis Pneumonia due to COVID-19 virus   Does the patient have one of the following disease processes/diagnoses(primary or secondary)? COVID-19   Does the patient have Home health ordered? No   Is there a DME ordered? No   Prep survey completed? Yes          LUIS F CHEATHAM - Registered Nurse

## 2022-10-30 ENCOUNTER — READMISSION MANAGEMENT (OUTPATIENT)
Dept: CALL CENTER | Facility: HOSPITAL | Age: 87
End: 2022-10-30

## 2022-10-30 LAB
BACTERIA SPEC AEROBE CULT: NORMAL
BACTERIA SPEC AEROBE CULT: NORMAL

## 2022-10-30 NOTE — OUTREACH NOTE
COVID-19 Week 1 Survey    Flowsheet Row Responses   [facility] patient discharged from? [place]   Does the patient have one of the following disease processes/diagnoses(primary or secondary)? COVID-19   COVID-19 underlying condition? COPD   Call Number Call 1   Week 1 Call successful? Yes   Call start time [phone]   Call end time [phone]   Discharge diagnosis Pneumonia due to COVID-19 virus   Meds reviewed with patient/caregiver? Yes   Is the patient having any side effects they believe may be caused by any medication additions or changes? No   Does the patient have all medications ordered at discharge? Yes   Is the patient taking all medications as directed (includes completed medication regime)? Yes   Does the patient have a primary care provider?  Yes   Does the patient have an appointment with their PCP or specialist within 7 days of discharge? No   What is preventing the patient from scheduling follow up appointments within 7 days of discharge? Haven't had time   Nursing Interventions Advised patient to make appointment   Has the patient kept scheduled appointments due by today? N/A   Has home health visited the patient within 72 hours of discharge? N/A   DME comments home oxygen prior to this admission, wears 2LO2   Psychosocial issues? No   Did the patient receive a copy of their discharge instructions? Yes   Did the patient receive a copy of COVID-19 specific instructions? Yes   Nursing interventions Reviewed instructions with patient   What is the patient's perception of their health status since discharge? Same   Does the patient have any of the following symptoms? Shortness of breath, Cough  [Pt reports she has always had a cough and SOB with exertion, currently at ther baseline]   Nursing Interventions Nurse provided patient education   Pulse Ox monitoring Intermittent   Pulse Ox device source Patient   O2 Sat comments has not checked this am   O2 Sat: education provided Sat levels, Monitoring  frequency   Is the patient/caregiver able to teach back steps to recovery at home? Set small, achievable goals for return to baseline health   Is the patient/caregiver able to teach back the hierarchy of who to call/visit for symptoms/problems? PCP, Specialist, Home health nurse, Urgent Care, ED, 911 Yes   Nursing interventions Education provided on various zones   Patient reports what zone on this call? Green Zone   Green Zone Reports doing well, Breathing without shortness of breath, Usual activity and exercise level   Green Zone interventions: Take daily medications, Use oxygen as prescribed   COVID-19 call completed? Yes          RENNY THAKUR - Registered Nurse

## 2022-11-01 ENCOUNTER — READMISSION MANAGEMENT (OUTPATIENT)
Dept: CALL CENTER | Facility: HOSPITAL | Age: 87
End: 2022-11-01

## 2022-11-01 NOTE — OUTREACH NOTE
COVID-19 Week 1 Survey    Flowsheet Row Responses   [facility] facility patient discharged from? [place]   Does the patient have one of the following disease processes/diagnoses(primary or secondary)? COVID-19   COVID-19 underlying condition? COPD   Call Number Call 2   Week 1 Call successful? No   Discharge diagnosis Pneumonia due to COVID-19 virus          LARA THAKUR - Registered Nurse

## 2022-11-03 NOTE — PROGRESS NOTES
"Enter Query Response Below      Query Response:     Sepsis due to pneumonia    Electronically signed by Laney BullMILA, 22, 9:02 AM EDT.           If applicable, please update the problem list.         Patient: Olena Myers        : 1935  Account: [id number]           Admit Date: 10/25/2022        How to Respond to this query:       a. Click New Note     b. Answer query within the yellow box.                c. Update the Problem List, if applicable.      If you have any questions about this query contact me at: [email or web address]    Laney BullMILA    Patient is a 87 year old female admitted on 10/25/22 with COVID 19 pneumonia. MD H&P attestation documents, \"Sepsis 2/ PNA: BCx pending. ABx as above. Lactic ok.\" Max temp 102.5, procalcitonin 2.81, CRP 11.1 and WBC 3.27. Cultures negative. Treated with IV antibiotics, dexamethasone and remdesivir.      Can this be further clarified as:     Sepsis due to pneumonia  Pneumonia only  Other, please specify__________  Unable to determine        By submitting this query, we are merely seeking further clarification of documentation to accurately reflect all conditions that you are monitoring, evaluating, treating or that extend the hospitalization or utilize additional resources of care. Please utilize your independent clinical judgment when addressing the question(s) above.      This query and your response, once completed, will be entered into the legal medical record.     Sincerely,  Carol Oneal RN  Clinical Documentation Integrity Program     Josie's original query redirected 10/29 by:  Ruth KYLE RN, CCDS  Clinical Documentation Integrity Program   [email or web address]  "

## 2022-11-04 ENCOUNTER — APPOINTMENT (OUTPATIENT)
Dept: GENERAL RADIOLOGY | Facility: HOSPITAL | Age: 87
End: 2022-11-04

## 2022-11-04 ENCOUNTER — READMISSION MANAGEMENT (OUTPATIENT)
Dept: CALL CENTER | Facility: HOSPITAL | Age: 87
End: 2022-11-04

## 2022-11-04 ENCOUNTER — HOSPITAL ENCOUNTER (INPATIENT)
Facility: HOSPITAL | Age: 87
LOS: 7 days | Discharge: NURSING, REHAB OR LONG-TERM CARE | End: 2022-11-11
Attending: EMERGENCY MEDICINE | Admitting: EMERGENCY MEDICINE

## 2022-11-04 DIAGNOSIS — Z86.79 HISTORY OF ATRIAL FIBRILLATION: ICD-10-CM

## 2022-11-04 DIAGNOSIS — D64.9 ACUTE ON CHRONIC ANEMIA: ICD-10-CM

## 2022-11-04 DIAGNOSIS — Z86.69 HISTORY OF MYASTHENIA GRAVIS: ICD-10-CM

## 2022-11-04 DIAGNOSIS — Z79.01 CHRONIC ANTICOAGULATION: ICD-10-CM

## 2022-11-04 DIAGNOSIS — Z87.09 HISTORY OF COPD: ICD-10-CM

## 2022-11-04 DIAGNOSIS — G93.40 ACUTE ENCEPHALOPATHY: Primary | ICD-10-CM

## 2022-11-04 DIAGNOSIS — U07.1 COVID: ICD-10-CM

## 2022-11-04 LAB
ABO GROUP BLD: NORMAL
ALBUMIN SERPL-MCNC: 2.7 G/DL (ref 3.5–5.2)
ALBUMIN/GLOB SERPL: 1 G/DL
ALP SERPL-CCNC: 79 U/L (ref 39–117)
ALT SERPL W P-5'-P-CCNC: 13 U/L (ref 1–33)
ANION GAP SERPL CALCULATED.3IONS-SCNC: 8.7 MMOL/L (ref 5–15)
AST SERPL-CCNC: 27 U/L (ref 1–32)
BASOPHILS # BLD AUTO: 0.03 10*3/MM3 (ref 0–0.2)
BASOPHILS NFR BLD AUTO: 0.3 % (ref 0–1.5)
BILIRUB SERPL-MCNC: 0.4 MG/DL (ref 0–1.2)
BILIRUB UR QL STRIP: NEGATIVE
BLD GP AB SCN SERPL QL: NEGATIVE
BUN SERPL-MCNC: 12 MG/DL (ref 8–23)
BUN/CREAT SERPL: 14.3 (ref 7–25)
CALCIUM SPEC-SCNC: 8.6 MG/DL (ref 8.6–10.5)
CHLORIDE SERPL-SCNC: 103 MMOL/L (ref 98–107)
CLARITY UR: CLEAR
CO2 SERPL-SCNC: 25.3 MMOL/L (ref 22–29)
COLOR UR: YELLOW
CREAT SERPL-MCNC: 0.84 MG/DL (ref 0.57–1)
D-LACTATE SERPL-SCNC: 1.9 MMOL/L (ref 0.5–2)
DEPRECATED RDW RBC AUTO: 44.2 FL (ref 37–54)
EGFRCR SERPLBLD CKD-EPI 2021: 68.2 ML/MIN/1.73
EOSINOPHIL # BLD AUTO: 0.12 10*3/MM3 (ref 0–0.4)
EOSINOPHIL NFR BLD AUTO: 1.1 % (ref 0.3–6.2)
ERYTHROCYTE [DISTWIDTH] IN BLOOD BY AUTOMATED COUNT: 14.4 % (ref 12.3–15.4)
EXPIRATION DATE: NORMAL
FECAL OCCULT BLOOD SCREEN, POC: NEGATIVE
GLOBULIN UR ELPH-MCNC: 2.6 GM/DL
GLUCOSE SERPL-MCNC: 88 MG/DL (ref 65–99)
GLUCOSE UR STRIP-MCNC: NEGATIVE MG/DL
HCT VFR BLD AUTO: 21.6 % (ref 34–46.6)
HCT VFR BLD AUTO: 25.1 % (ref 34–46.6)
HGB BLD-MCNC: 6.9 G/DL (ref 12–15.9)
HGB BLD-MCNC: 8.3 G/DL (ref 12–15.9)
HGB UR QL STRIP.AUTO: NEGATIVE
IMM GRANULOCYTES # BLD AUTO: 0.14 10*3/MM3 (ref 0–0.05)
IMM GRANULOCYTES NFR BLD AUTO: 1.3 % (ref 0–0.5)
KETONES UR QL STRIP: ABNORMAL
LEUKOCYTE ESTERASE UR QL STRIP.AUTO: NEGATIVE
LYMPHOCYTES # BLD AUTO: 0.53 10*3/MM3 (ref 0.7–3.1)
LYMPHOCYTES NFR BLD AUTO: 4.8 % (ref 19.6–45.3)
Lab: 194
MAGNESIUM SERPL-MCNC: 1.8 MG/DL (ref 1.6–2.4)
MCH RBC QN AUTO: 27.1 PG (ref 26.6–33)
MCHC RBC AUTO-ENTMCNC: 31.9 G/DL (ref 31.5–35.7)
MCV RBC AUTO: 84.7 FL (ref 79–97)
MONOCYTES # BLD AUTO: 0.62 10*3/MM3 (ref 0.1–0.9)
MONOCYTES NFR BLD AUTO: 5.7 % (ref 5–12)
NEGATIVE CONTROL: NEGATIVE
NEUTROPHILS NFR BLD AUTO: 86.8 % (ref 42.7–76)
NEUTROPHILS NFR BLD AUTO: 9.52 10*3/MM3 (ref 1.7–7)
NITRITE UR QL STRIP: NEGATIVE
NRBC BLD AUTO-RTO: 0 /100 WBC (ref 0–0.2)
NT-PROBNP SERPL-MCNC: 1506 PG/ML (ref 0–1800)
PH UR STRIP.AUTO: 6 [PH] (ref 5–8)
PLATELET # BLD AUTO: 275 10*3/MM3 (ref 140–450)
PMV BLD AUTO: 9.6 FL (ref 6–12)
POSITIVE CONTROL: POSITIVE
POTASSIUM SERPL-SCNC: 4.4 MMOL/L (ref 3.5–5.2)
PROCALCITONIN SERPL-MCNC: 0.31 NG/ML (ref 0–0.25)
PROT SERPL-MCNC: 5.3 G/DL (ref 6–8.5)
PROT UR QL STRIP: ABNORMAL
RBC # BLD AUTO: 2.55 10*6/MM3 (ref 3.77–5.28)
RH BLD: NEGATIVE
SARS-COV-2 RNA RESP QL NAA+PROBE: DETECTED
SODIUM SERPL-SCNC: 137 MMOL/L (ref 136–145)
SP GR UR STRIP: 1.02 (ref 1–1.03)
T&S EXPIRATION DATE: NORMAL
TROPONIN T SERPL-MCNC: <0.01 NG/ML (ref 0–0.03)
UROBILINOGEN UR QL STRIP: ABNORMAL
WBC NRBC COR # BLD: 10.96 10*3/MM3 (ref 3.4–10.8)

## 2022-11-04 PROCEDURE — 83735 ASSAY OF MAGNESIUM: CPT | Performed by: EMERGENCY MEDICINE

## 2022-11-04 PROCEDURE — 86923 COMPATIBILITY TEST ELECTRIC: CPT

## 2022-11-04 PROCEDURE — 36430 TRANSFUSION BLD/BLD COMPNT: CPT

## 2022-11-04 PROCEDURE — P9016 RBC LEUKOCYTES REDUCED: HCPCS

## 2022-11-04 PROCEDURE — 86901 BLOOD TYPING SEROLOGIC RH(D): CPT

## 2022-11-04 PROCEDURE — 86900 BLOOD TYPING SEROLOGIC ABO: CPT | Performed by: EMERGENCY MEDICINE

## 2022-11-04 PROCEDURE — 71045 X-RAY EXAM CHEST 1 VIEW: CPT

## 2022-11-04 PROCEDURE — 86901 BLOOD TYPING SEROLOGIC RH(D): CPT | Performed by: EMERGENCY MEDICINE

## 2022-11-04 PROCEDURE — 63710000001 MYCOPHENOLATE MOFETIL PER 250 MG: Performed by: INTERNAL MEDICINE

## 2022-11-04 PROCEDURE — 86900 BLOOD TYPING SEROLOGIC ABO: CPT

## 2022-11-04 PROCEDURE — 93010 ELECTROCARDIOGRAM REPORT: CPT | Performed by: INTERNAL MEDICINE

## 2022-11-04 PROCEDURE — 86850 RBC ANTIBODY SCREEN: CPT | Performed by: EMERGENCY MEDICINE

## 2022-11-04 PROCEDURE — 63710000001 PREDNISONE PER 5 MG: Performed by: INTERNAL MEDICINE

## 2022-11-04 PROCEDURE — 82270 OCCULT BLOOD FECES: CPT | Performed by: EMERGENCY MEDICINE

## 2022-11-04 PROCEDURE — 80053 COMPREHEN METABOLIC PANEL: CPT | Performed by: EMERGENCY MEDICINE

## 2022-11-04 PROCEDURE — P9612 CATHETERIZE FOR URINE SPEC: HCPCS

## 2022-11-04 PROCEDURE — U0003 INFECTIOUS AGENT DETECTION BY NUCLEIC ACID (DNA OR RNA); SEVERE ACUTE RESPIRATORY SYNDROME CORONAVIRUS 2 (SARS-COV-2) (CORONAVIRUS DISEASE [COVID-19]), AMPLIFIED PROBE TECHNIQUE, MAKING USE OF HIGH THROUGHPUT TECHNOLOGIES AS DESCRIBED BY CMS-2020-01-R: HCPCS | Performed by: EMERGENCY MEDICINE

## 2022-11-04 PROCEDURE — 87040 BLOOD CULTURE FOR BACTERIA: CPT | Performed by: EMERGENCY MEDICINE

## 2022-11-04 PROCEDURE — 93005 ELECTROCARDIOGRAM TRACING: CPT | Performed by: EMERGENCY MEDICINE

## 2022-11-04 PROCEDURE — 99285 EMERGENCY DEPT VISIT HI MDM: CPT

## 2022-11-04 PROCEDURE — 85018 HEMOGLOBIN: CPT | Performed by: INTERNAL MEDICINE

## 2022-11-04 PROCEDURE — 84145 PROCALCITONIN (PCT): CPT | Performed by: EMERGENCY MEDICINE

## 2022-11-04 PROCEDURE — 94640 AIRWAY INHALATION TREATMENT: CPT

## 2022-11-04 PROCEDURE — 81003 URINALYSIS AUTO W/O SCOPE: CPT | Performed by: EMERGENCY MEDICINE

## 2022-11-04 PROCEDURE — 84484 ASSAY OF TROPONIN QUANT: CPT | Performed by: EMERGENCY MEDICINE

## 2022-11-04 PROCEDURE — 85025 COMPLETE CBC W/AUTO DIFF WBC: CPT | Performed by: EMERGENCY MEDICINE

## 2022-11-04 PROCEDURE — 83880 ASSAY OF NATRIURETIC PEPTIDE: CPT | Performed by: EMERGENCY MEDICINE

## 2022-11-04 PROCEDURE — 36415 COLL VENOUS BLD VENIPUNCTURE: CPT | Performed by: EMERGENCY MEDICINE

## 2022-11-04 PROCEDURE — 85014 HEMATOCRIT: CPT | Performed by: INTERNAL MEDICINE

## 2022-11-04 PROCEDURE — 83605 ASSAY OF LACTIC ACID: CPT | Performed by: EMERGENCY MEDICINE

## 2022-11-04 PROCEDURE — 94799 UNLISTED PULMONARY SVC/PX: CPT

## 2022-11-04 RX ORDER — MONTELUKAST SODIUM 10 MG/1
10 TABLET ORAL NIGHTLY
Status: DISCONTINUED | OUTPATIENT
Start: 2022-11-04 | End: 2022-11-11 | Stop reason: HOSPADM

## 2022-11-04 RX ORDER — ALBUTEROL SULFATE 2.5 MG/3ML
2.5 SOLUTION RESPIRATORY (INHALATION) EVERY 4 HOURS PRN
Status: DISCONTINUED | OUTPATIENT
Start: 2022-11-04 | End: 2022-11-04 | Stop reason: SDUPTHER

## 2022-11-04 RX ORDER — BUSPIRONE HYDROCHLORIDE 10 MG/1
10 TABLET ORAL 2 TIMES DAILY
Status: DISCONTINUED | OUTPATIENT
Start: 2022-11-04 | End: 2022-11-11 | Stop reason: HOSPADM

## 2022-11-04 RX ORDER — TORSEMIDE 20 MG/1
20 TABLET ORAL DAILY
COMMUNITY

## 2022-11-04 RX ORDER — ONDANSETRON 2 MG/ML
4 INJECTION INTRAMUSCULAR; INTRAVENOUS EVERY 6 HOURS PRN
Status: DISCONTINUED | OUTPATIENT
Start: 2022-11-04 | End: 2022-11-11 | Stop reason: HOSPADM

## 2022-11-04 RX ORDER — ACETAMINOPHEN 650 MG/1
650 SUPPOSITORY RECTAL EVERY 4 HOURS PRN
Status: DISCONTINUED | OUTPATIENT
Start: 2022-11-04 | End: 2022-11-11 | Stop reason: HOSPADM

## 2022-11-04 RX ORDER — ERGOCALCIFEROL 1.25 MG/1
50000 CAPSULE ORAL
Status: DISCONTINUED | OUTPATIENT
Start: 2022-11-04 | End: 2022-11-04

## 2022-11-04 RX ORDER — SODIUM CHLORIDE 0.9 % (FLUSH) 0.9 %
10 SYRINGE (ML) INJECTION AS NEEDED
Status: DISCONTINUED | OUTPATIENT
Start: 2022-11-04 | End: 2022-11-11 | Stop reason: HOSPADM

## 2022-11-04 RX ORDER — RALOXIFENE HYDROCHLORIDE 60 MG/1
60 TABLET, FILM COATED ORAL NIGHTLY
Status: DISCONTINUED | OUTPATIENT
Start: 2022-11-04 | End: 2022-11-05

## 2022-11-04 RX ORDER — ACETAMINOPHEN 325 MG/1
650 TABLET ORAL EVERY 4 HOURS PRN
Status: DISCONTINUED | OUTPATIENT
Start: 2022-11-04 | End: 2022-11-11 | Stop reason: HOSPADM

## 2022-11-04 RX ORDER — FERROUS SULFATE 325(65) MG
325 TABLET ORAL
Status: DISCONTINUED | OUTPATIENT
Start: 2022-11-05 | End: 2022-11-11 | Stop reason: HOSPADM

## 2022-11-04 RX ORDER — PRAVASTATIN SODIUM 20 MG
20 TABLET ORAL NIGHTLY
Status: DISCONTINUED | OUTPATIENT
Start: 2022-11-04 | End: 2022-11-11 | Stop reason: HOSPADM

## 2022-11-04 RX ORDER — BUDESONIDE AND FORMOTEROL FUMARATE DIHYDRATE 160; 4.5 UG/1; UG/1
2 AEROSOL RESPIRATORY (INHALATION)
Status: DISCONTINUED | OUTPATIENT
Start: 2022-11-04 | End: 2022-11-11 | Stop reason: HOSPADM

## 2022-11-04 RX ORDER — DULOXETIN HYDROCHLORIDE 60 MG/1
60 CAPSULE, DELAYED RELEASE ORAL 2 TIMES DAILY
Status: DISCONTINUED | OUTPATIENT
Start: 2022-11-04 | End: 2022-11-11 | Stop reason: HOSPADM

## 2022-11-04 RX ORDER — PREDNISONE 10 MG/1
5 TABLET ORAL DAILY
Status: DISCONTINUED | OUTPATIENT
Start: 2022-11-04 | End: 2022-11-11 | Stop reason: HOSPADM

## 2022-11-04 RX ORDER — TORSEMIDE 20 MG/1
20 TABLET ORAL DAILY
Status: DISCONTINUED | OUTPATIENT
Start: 2022-11-04 | End: 2022-11-11 | Stop reason: HOSPADM

## 2022-11-04 RX ORDER — AZELASTINE 1 MG/ML
2 SPRAY, METERED NASAL DAILY
Status: DISCONTINUED | OUTPATIENT
Start: 2022-11-04 | End: 2022-11-11 | Stop reason: HOSPADM

## 2022-11-04 RX ORDER — SODIUM CHLORIDE 0.9 % (FLUSH) 0.9 %
10 SYRINGE (ML) INJECTION EVERY 12 HOURS SCHEDULED
Status: DISCONTINUED | OUTPATIENT
Start: 2022-11-04 | End: 2022-11-11 | Stop reason: HOSPADM

## 2022-11-04 RX ORDER — ACETAMINOPHEN 160 MG/5ML
650 SOLUTION ORAL EVERY 4 HOURS PRN
Status: DISCONTINUED | OUTPATIENT
Start: 2022-11-04 | End: 2022-11-11 | Stop reason: HOSPADM

## 2022-11-04 RX ORDER — LEVOTHYROXINE SODIUM 88 UG/1
88 TABLET ORAL DAILY
Status: DISCONTINUED | OUTPATIENT
Start: 2022-11-04 | End: 2022-11-11 | Stop reason: HOSPADM

## 2022-11-04 RX ORDER — MYCOPHENOLATE MOFETIL 500 MG/1
1500 TABLET ORAL EVERY EVENING
Status: DISCONTINUED | OUTPATIENT
Start: 2022-11-04 | End: 2022-11-11 | Stop reason: HOSPADM

## 2022-11-04 RX ORDER — PANTOPRAZOLE SODIUM 40 MG/1
40 TABLET, DELAYED RELEASE ORAL EVERY MORNING
Status: DISCONTINUED | OUTPATIENT
Start: 2022-11-04 | End: 2022-11-11 | Stop reason: HOSPADM

## 2022-11-04 RX ORDER — MYCOPHENOLATE MOFETIL 500 MG/1
1500 TABLET ORAL EVERY EVENING
COMMUNITY

## 2022-11-04 RX ORDER — ALBUTEROL SULFATE 2.5 MG/3ML
2.5 SOLUTION RESPIRATORY (INHALATION) EVERY 6 HOURS PRN
Status: DISCONTINUED | OUTPATIENT
Start: 2022-11-04 | End: 2022-11-11 | Stop reason: HOSPADM

## 2022-11-04 RX ORDER — NITROGLYCERIN 0.4 MG/1
0.4 TABLET SUBLINGUAL
Status: DISCONTINUED | OUTPATIENT
Start: 2022-11-04 | End: 2022-11-11 | Stop reason: HOSPADM

## 2022-11-04 RX ORDER — METOPROLOL SUCCINATE 25 MG/1
25 TABLET, EXTENDED RELEASE ORAL 2 TIMES DAILY
Status: DISCONTINUED | OUTPATIENT
Start: 2022-11-04 | End: 2022-11-11 | Stop reason: HOSPADM

## 2022-11-04 RX ORDER — ERGOCALCIFEROL 1.25 MG/1
50000 CAPSULE ORAL
Status: DISCONTINUED | OUTPATIENT
Start: 2022-11-05 | End: 2022-11-11 | Stop reason: HOSPADM

## 2022-11-04 RX ORDER — CHOLECALCIFEROL (VITAMIN D3) 125 MCG
1000 CAPSULE ORAL DAILY
Status: DISCONTINUED | OUTPATIENT
Start: 2022-11-04 | End: 2022-11-11 | Stop reason: HOSPADM

## 2022-11-04 RX ORDER — FERROUS SULFATE 325(65) MG
325 TABLET ORAL
COMMUNITY

## 2022-11-04 RX ORDER — MYCOPHENOLATE MOFETIL 500 MG/1
1000 TABLET ORAL EVERY MORNING
Status: DISCONTINUED | OUTPATIENT
Start: 2022-11-05 | End: 2022-11-11 | Stop reason: HOSPADM

## 2022-11-04 RX ORDER — ALBUTEROL SULFATE 2.5 MG/3ML
2.5 SOLUTION RESPIRATORY (INHALATION) EVERY 4 HOURS PRN
COMMUNITY

## 2022-11-04 RX ORDER — LANOLIN ALCOHOL/MO/W.PET/CERES
1000 CREAM (GRAM) TOPICAL DAILY
COMMUNITY

## 2022-11-04 RX ADMIN — PANTOPRAZOLE SODIUM 40 MG: 40 TABLET, DELAYED RELEASE ORAL at 21:58

## 2022-11-04 RX ADMIN — Medication 10 ML: at 21:58

## 2022-11-04 RX ADMIN — MONTELUKAST SODIUM 10 MG: 10 TABLET, FILM COATED ORAL at 21:57

## 2022-11-04 RX ADMIN — RALOXIFENE 60 MG: 60 TABLET ORAL at 21:59

## 2022-11-04 RX ADMIN — PRAVASTATIN SODIUM 20 MG: 20 TABLET ORAL at 21:58

## 2022-11-04 RX ADMIN — METOPROLOL SUCCINATE 25 MG: 25 TABLET, EXTENDED RELEASE ORAL at 21:58

## 2022-11-04 RX ADMIN — DULOXETINE HYDROCHLORIDE 60 MG: 60 CAPSULE, DELAYED RELEASE ORAL at 21:57

## 2022-11-04 RX ADMIN — Medication 1000 MCG: at 21:57

## 2022-11-04 RX ADMIN — LEVOTHYROXINE SODIUM 88 MCG: 0.09 TABLET ORAL at 21:57

## 2022-11-04 RX ADMIN — TORSEMIDE 20 MG: 20 TABLET ORAL at 21:57

## 2022-11-04 RX ADMIN — TIOTROPIUM BROMIDE INHALATION SPRAY 1 PUFF: 3.12 SPRAY, METERED RESPIRATORY (INHALATION) at 20:21

## 2022-11-04 RX ADMIN — AZELASTINE HYDROCHLORIDE 2 SPRAY: 137 SPRAY, METERED NASAL at 21:58

## 2022-11-04 RX ADMIN — MYCOPHENOLATE MOFETIL 1500 MG: 500 TABLET ORAL at 21:58

## 2022-11-04 RX ADMIN — PREDNISONE 5 MG: 10 TABLET ORAL at 21:57

## 2022-11-04 RX ADMIN — BUSPIRONE HYDROCHLORIDE 10 MG: 10 TABLET ORAL at 21:57

## 2022-11-04 NOTE — ED PROVIDER NOTES
EMERGENCY DEPARTMENT ENCOUNTER    Room Number:  16/16  Date of encounter:  11/4/2022  PCP: Manda Keenan MD  Historian: Patient, EMS      HPI:  Chief Complaint: Confusion, urinary frequency  A complete HPI/ROS/PMH/PSH/SH/FH are unobtainable due to: None    Context: Olena Myers is a 87 y.o. female who presents to the ED via [facility] from [facility], patient was found with confusion this morning upon waking up around 0900.  Facility reports that she has been having conversations with people that are not there, is complaining of increasing urinary frequency and urge incontinence.  Denies any abdominal pain, nausea, vomiting.  Recently diagnosed with COVID-pneumonia, is on chronic O2 for COPD.  Suggest that maybe she is slightly more short of breath than usual.  Denies any pain.  EMS reports that she was able to stand and pivot on scene but was talking to things that were not there.      MEDICAL RECORD REVIEW    Discharge summary reviewed from October 28, 2022, patient was admitted on October 25, 2022 with COVID-pneumonia, completed a course of remdesivir, did show some delirium on dexamethasone so that was discontinued with improvement in the delirium type symptoms.    PAST MEDICAL HISTORY  Active Ambulatory Problems     Diagnosis Date Noted   • Pre-operative cardiovascular examination 06/10/2016   • S/p reverse total shoulder arthroplasty 07/19/2016   • Myasthenia gravis (HCC) 11/23/2016   • Paroxysmal atrial fibrillation (HCC) 11/23/2016   • HX: long term anticoagulant use 11/23/2016   • Essential hypertension 11/23/2016   • CAD (coronary artery disease) 11/23/2016   • Rhinovirus 11/27/2016   • Atrial fibrillation (HCC) 12/21/2016   • S/P reverse total shoulder arthroplasty, right 01/08/2019   • Acute UTI 07/05/2021   • COPD (chronic obstructive pulmonary disease) ([facility])    • Metabolic encephalopathy    • Sleep apnea    • Sepsis ([facility]) 07/05/2021   • Dyspnea 07/05/2021   • Acute on  chronic diastolic CHF (congestive heart failure) ([facility]) 07/05/2021   • Diastolic CHF, chronic ([facility]) 07/09/2021   • Heme positive stool 07/09/2021   • E coli bacteremia 07/09/2021   • Iron deficiency anemia 07/09/2021   • Current chronic use of systemic steroids 07/09/2021   • COPD exacerbation ([facility]) 02/15/2022   • Obesity (BMI 30-39.9) 02/16/2022   • Pneumonia due to COVID-19 virus 10/25/2022   • Chronic respiratory failure with hypoxia ([facility]) 10/25/2022   • Cytokine release syndrome, grade 1 10/28/2022     Resolved Ambulatory Problems     Diagnosis Date Noted   • Pneumonia 11/22/2016   • Loculated left pleural effusion 11/27/2016   • Hyponatremia      Past Medical History:   Diagnosis Date   • Anemia    • Arthritis    • Asthma    • Dilation of esophagus    • Frequent urinary tract infections    • History of gastric ulcer    • History of GI bleed    • [place] (hard of hearing)    • Hyperlipidemia    • Hypertension    • Lightheadedness    • LVH (left ventricular hypertrophy)    • MG (myasthenia gravis) ([facility])    • Mini stroke 10/2016, 3/2017   • OA (osteoarthritis)    • Osteoporosis    • SOB (shortness of breath)          PAST SURGICAL HISTORY  Past Surgical History:   Procedure Laterality Date   • APPENDECTOMY     • BRONCHOSCOPY N/A 3/31/2022    Procedure: BRONCHOSCOPY WITH BAL RIGHT LOWER LOBE;  Surgeon: Remy Tirado MD;  Location: [facility] ENDOSCOPY;  Service: Pulmonary;  Laterality: N/A;  COPD EXACERBATION     • CARPAL TUNNEL RELEASE Bilateral    • CATARACT EXTRACTION Bilateral    • CORONARY STENT PLACEMENT     • TOTAL HIP ARTHROPLASTY Bilateral    • TOTAL SHOULDER ARTHROPLASTY W/ DISTAL CLAVICLE EXCISION Left 7/19/2016    Procedure: LT TOTAL SHOULDER REVERSE ARTHROPLASTY;  Surgeon: NAHOMI Solorzano MD;  Location: [facility] OR [facility];  Service:    • TOTAL SHOULDER ARTHROPLASTY W/ DISTAL CLAVICLE EXCISION Right 1/8/2019    Procedure: RIGHT TOTAL SHOULDER REVERSE ARTHROPLASTY;  Surgeon: Jovanny Solorzano MD;  Location: [facility]  OR [facility];  Service: Orthopedics         FAMILY HISTORY  Family History   Problem Relation Age of Onset   • Heart disease Mother    • Hyperlipidemia Mother    • Stroke Mother    • Diabetes Mother    • Breast cancer Mother    • Heart disease Father    • Hyperlipidemia Father    • Stroke Father    • Malig Hyperthermia Neg Hx          SOCIAL HISTORY  Social History     Socioeconomic History   • Marital status:    Tobacco Use   • Smoking status: Never   • Smokeless tobacco: Never   • Tobacco comments:     caffeine - coffee and coke caff. free, tea    Vaping Use   • Vaping Use: Never used   Substance and Sexual Activity   • Alcohol use: No   • Drug use: No     Comment: caffine   • Sexual activity: Defer         ALLERGIES  Neomycin, Penicillins, Hydrocodone, and Rosuvastatin        REVIEW OF SYSTEMS  Review of Systems     All systems reviewed and negative except for those discussed in HPI.       PHYSICAL EXAM    I have reviewed the triage vital signs and nursing notes.    ED Triage Vitals   Temp Heart Rate Resp BP SpO2   11/04/22 1144 11/04/22 1146 11/04/22 1146 11/04/22 1146 11/04/22 1146   99.1 °F (37.3 °C) 97 20 157/75 98 %      Temp src Heart Rate Source Patient Position BP Location FiO2 (%)   -- -- -- -- --              Physical Exam  General: Nontoxic, nondiaphoretic  HEENT: Mucous membranes tacky, atraumatic, EOMI  Neck: Full ROM  Pulm: Symmetric chest rise, nonlabored, lungs slightly coarse bilaterally but no overt wheezes/rales/rhonchi  Cardiovascular: Borderline tachycardia with irregularly irregular rhythm, intact distal pulses, no peripheral edema  GI: Soft, nontender, nondistended, no rebound, no guarding, bowel sounds present  MSK: Full ROM, no deformity  Skin: Warm, dry  Neuro: Awake, alert, oriented x 4, GCS 15, moving all extremities, no focal deficits  Psych: Calm, cooperative      N95, protective eye goggles, and gloves used during this encounter. Patient in surgical mask.      LAB RESULTS  Recent  Results (from the past 24 hour(s))   Comprehensive Metabolic Panel    Collection Time: 11/04/22 11:55 AM    Specimen: Blood   Result Value Ref Range    Glucose 88 65 - 99 mg/dL    BUN 12 8 - 23 mg/dL    Creatinine 0.84 0.57 - 1.00 mg/dL    Sodium 137 136 - 145 mmol/L    Potassium 4.4 3.5 - 5.2 mmol/L    Chloride 103 98 - 107 mmol/L    CO2 25.3 22.0 - 29.0 mmol/L    Calcium 8.6 8.6 - 10.5 mg/dL    Total Protein 5.3 (L) 6.0 - 8.5 g/dL    Albumin 2.70 (L) 3.50 - 5.20 g/dL    ALT (SGPT) 13 1 - 33 U/L    AST (SGOT) 27 1 - 32 U/L    Alkaline Phosphatase 79 39 - 117 U/L    Total Bilirubin 0.4 0.0 - 1.2 mg/dL    Globulin 2.6 gm/dL    A/G Ratio 1.0 g/dL    BUN/Creatinine Ratio 14.3 7.0 - 25.0    Anion Gap 8.7 5.0 - 15.0 mmol/L    eGFR 68.2 >60.0 mL/min/1.73   CBC Auto Differential    Collection Time: 11/04/22 11:55 AM    Specimen: Blood   Result Value Ref Range    WBC 10.96 (H) 3.40 - 10.80 10*3/mm3    RBC 2.55 (L) 3.77 - 5.28 10*6/mm3    Hemoglobin 6.9 (C) 12.0 - 15.9 g/dL    Hematocrit 21.6 (L) 34.0 - 46.6 %    MCV 84.7 79.0 - 97.0 fL    MCH 27.1 26.6 - 33.0 pg    MCHC 31.9 31.5 - 35.7 g/dL    RDW 14.4 12.3 - 15.4 %    RDW-SD 44.2 37.0 - 54.0 fl    MPV 9.6 6.0 - 12.0 fL    Platelets 275 140 - 450 10*3/mm3    Neutrophil % 86.8 (H) 42.7 - 76.0 %    Lymphocyte % 4.8 (L) 19.6 - 45.3 %    Monocyte % 5.7 5.0 - 12.0 %    Eosinophil % 1.1 0.3 - 6.2 %    Basophil % 0.3 0.0 - 1.5 %    Immature Grans % 1.3 (H) 0.0 - 0.5 %    Neutrophils, Absolute 9.52 (H) 1.70 - 7.00 10*3/mm3    Lymphocytes, Absolute 0.53 (L) 0.70 - 3.10 10*3/mm3    Monocytes, Absolute 0.62 0.10 - 0.90 10*3/mm3    Eosinophils, Absolute 0.12 0.00 - 0.40 10*3/mm3    Basophils, Absolute 0.03 0.00 - 0.20 10*3/mm3    Immature Grans, Absolute 0.14 (H) 0.00 - 0.05 10*3/mm3    nRBC 0.0 0.0 - 0.2 /100 WBC   Troponin    Collection Time: 11/04/22 11:55 AM    Specimen: Blood   Result Value Ref Range    Troponin T <0.010 0.000 - 0.030 ng/mL   BNP    Collection Time: 11/04/22  11:55 AM    Specimen: Blood   Result Value Ref Range    proBNP 1,506.0 0.0 - 1,800.0 pg/mL   Lactic Acid, Plasma    Collection Time: 11/04/22 11:55 AM    Specimen: Blood   Result Value Ref Range    Lactate 1.9 0.5 - 2.0 mmol/L   Procalcitonin    Collection Time: 11/04/22 11:55 AM    Specimen: Blood   Result Value Ref Range    Procalcitonin 0.31 (H) 0.00 - 0.25 ng/mL   Magnesium    Collection Time: 11/04/22 11:55 AM    Specimen: Blood   Result Value Ref Range    Magnesium 1.8 1.6 - 2.4 mg/dL   Urinalysis With Culture If Indicated - Urine, Catheter    Collection Time: 11/04/22 11:57 AM    Specimen: Urine, Catheter   Result Value Ref Range    Color, UA Yellow Yellow, Straw    Appearance, UA Clear Clear    pH, UA 6.0 5.0 - 8.0    Specific Gravity, UA 1.019 1.005 - 1.030    Glucose, UA Negative Negative    Ketones, UA Trace (A) Negative    Bilirubin, UA Negative Negative    Blood, UA Negative Negative    Protein, UA Trace (A) Negative    Leuk Esterase, UA Negative Negative    Nitrite, UA Negative Negative    Urobilinogen, UA 1.0 E.U./dL 0.2 - 1.0 E.U./dL   COVID-19, [facility] IN-HOUSE CEPHEID/STEVEN NP SWAB IN TRANSPORT MEDIA 8-12 HR TAT - Swab, Nasopharynx    Collection Time: 11/04/22 12:05 PM    Specimen: Nasopharynx; Swab   Result Value Ref Range    COVID19 Detected (C) Not Detected - Ref. Range   ECG 12 Lead Altered Mental Status    Collection Time: 11/04/22 12:27 PM   Result Value Ref Range    QT Interval 347 ms   Type & Screen    Collection Time: 11/04/22  1:16 PM    Specimen: Blood   Result Value Ref Range    ABO Type A     RH type Negative     Antibody Screen Negative     T&S Expiration Date 11/7/2022 11:59:59 PM    POCT Occult Blood Stool    Collection Time: 11/04/22  1:59 PM    Specimen: Per Rectum; Stool   Result Value Ref Range    Fecal Occult Blood Negative Negative    Lot Number [id number]     Expiration Date 02/28/2023     Positive Control Positive Positive    Negative Control Negative Negative   Prepare RBC, 1 Units     Collection Time: 11/04/22  2:18 PM   Result Value Ref Range    Product Code [id number]     Unit Number [id number]     UNIT  ABO A     UNIT  RH NEG     Crossmatch Interpretation Compatible     Dispense Status XM     Blood Expiration Date 202211262359     Blood Type Barcode 0600        Ordered the above labs and independently reviewed the results.        RADIOLOGY  XR Chest 1 View    Result Date: 11/4/2022  XR CHEST 1 VW-  HISTORY: Female who is 87 years-old,  dyspnea  TECHNIQUE: Frontal view of the chest  COMPARISON: 10/25/2022  FINDINGS:  Left chest port appears stable. The heart size is borderline. Aorta is tortuous, calcified. Mildly increased prominence of vascular and interstitial markings suggesting congestive heart failure. Bilateral pulmonary opacities appear similar to prior exam. No pleural effusion, or pneumothorax. No acute osseous process.      Mildly increased prominence of vascular and interstitial markings. Otherwise, no significant change. Continued follow-up suggested.  This report was finalized on 11/4/2022 12:38 PM by Dr. José Manuel Anaya M.D.        I ordered the above noted radiological studies. Reviewed by me.  See dictation for official radiology interpretation.      PROCEDURES    Critical Care  Performed by: Daniel Ortiz MD  Authorized by: Daniel Ortiz MD     Critical care provider statement:     Critical care time (minutes):  32    Critical care time was exclusive of:  Separately billable procedures and treating other patients    Critical care was necessary to treat or prevent imminent or life-threatening deterioration of the following conditions:  Circulatory failure and CNS failure or compromise    Critical care was time spent personally by me on the following activities:  Development of treatment plan with patient or surrogate, discussions with consultants, evaluation of patient's response to treatment, examination of patient, obtaining history from patient or surrogate,  ordering and performing treatments and interventions, ordering and review of laboratory studies, ordering and review of radiographic studies, re-evaluation of patient's condition and review of old charts      EKG    EKG Time: 1227  Rhythm/Rate: Sinus rhythm with rate 96  Normal axis  Normal intervals  Isolated T wave inversion in lead III   No STEMI     Interpreted Contemporaneously by me, independently viewed  No emergent changes compared to October 27, 2022      MEDICATIONS GIVEN IN ER    Medications - No data to display      PROGRESS, DATA ANALYSIS, CONSULTS, AND MEDICAL DECISION MAKING    All labs have been independently reviewed by me.  All radiology studies have been reviewed by me and discussed with radiologist dictating the report.   EKG's independently viewed and interpreted by me.  Discussion below represents my analysis of pertinent findings related to patient's condition, differential diagnosis, treatment plan and final disposition.    Initial concern for UTI, renal failure, lecture abnormalities, anemia, dehydration, pneumonia, among others.  Plan for labs, EKG, chest x-ray, and reevaluation with results.    ED Course as of 11/04/22 1420   Fri Nov 04, 2022   1237 Nitrite, UA: Negative [DC]   1237 Leukocytes, UA: Negative [DC]   1237 Ketones, UA(!): Trace [DC]   1257 Glucose: 88 [DC]   1257 BUN: 12 [DC]   1257 Creatinine: 0.84 [DC]   1257 Sodium: 137 [DC]   1257 Potassium: 4.4 [DC]   1257 ALT (SGPT): 13 [DC]   1257 AST (SGOT): 27 [DC]   1257 Alkaline Phosphatase: 79 [DC]   1257 Total Bilirubin: 0.4 [DC]   1257 WBC(!): 10.96 [DC]   1257 RBC(!): 2.55 [DC]   1257 Hemoglobin(!!): 6.9  7.7 one week ago [DC]   1257 Platelets: 275 [DC]   1257 proBNP: 1,506.0 [DC]   1257 Troponin T: <0.010 [DC]   1257 Procalcitonin(!): 0.31 [DC]   1347 COVID19(!!): Detected [DC]   1419 Discussed with Dr. Taylor, hospitalist, discussed patient's clinical course and findings today, treatment modalities, and need for  hospitalization. [DC]      ED Course User Index  [DC] Daniel Ortiz MD     Have discussed with the patient's son over the phone, he states that since jabari COVID she has had intermittent periods of similar mental status issues.  Labs today do show an acute on chronic anemia which I think at this point needs to be addressed with blood transfusion due to increasing altered mental status.  She is at times quite coherent and alert and other times more bizarre and concerns for hallucinations.  No evidence of UTI, Hemoccult negative with low concern for bleed at this point.  No evidence of renal failure.  Patient and son have been updated the course and plan and need for hospital stay.  All questions and concerns addressed.    AS OF 14:20 EDT VITALS:    BP - 146/91  HR - 72  TEMP - 99.1 °F (37.3 °C)  02 SATS - 100%        DIAGNOSIS  Final diagnoses:   Acute encephalopathy   Acute on chronic anemia   COVID   History of COPD   History of atrial fibrillation   Chronic anticoagulation   History of myasthenia gravis         DISPOSITION  HOSPITALIZATION    Discussed treatment plan and reason for hospitalization with pt/family and hospitalizing physician.  Pt/family voiced understanding of the plan for hospitalization for further testing/treatment as needed.                  Daniel Ortiz MD  11/04/22 8017

## 2022-11-04 NOTE — H&P
Internal medicine history and physical  INTERNAL MEDICINE   [facility]       Patient Identification:  Name: Olena Myers  Age: 87 y.o.  Sex: female  :  1935  MRN: [id number]                   Primary Care Physician: Manda Keenan MD                               Date of admission:2022    Chief Complaint: Sent from nursing home for evaluation of confusion in the setting of increased urinary frequency and incontinence.    History of Present Illness:   Source of information review of records and discussion with ER physician patient herself is unable to engage in any significant information exchange.  Patient is 87-year-old female who was recently diagnosed with COVID-19 pneumonia and has history of sleep apnea/COPD for which she is on chronic oxygen supplementation and carries a diagnosis of asthma as well as atrial fibrillation and coronary artery disease as well as esophagitis and has frequent urinary tract infection who is immunocompromised and takes immunosuppressive therapy and steroids for her underlying myasthenia gravis while apparently at her baseline until yesterday.  This morning when she woke up at 9:00 she was hallucinating and was complaining of increased frequency of urination and urgency..  Patient was recently hospitalized from 10/25/2022 through 10/28/2022 for COVID-19 pneumonia and was treated with remdesivir and because of delirium on dexamethasone it was discontinued with improvement in her symptoms.  Because of elevated procalcitonin besides the treatment for COVID she was also given antibiotic therapy and was sent home on oral antibiotics on 10/28/2022 for 4 more days of treatment which she has finished.  Patient is unable to give details of her symptoms at this time.  She just needs help with her oxygen supplementation and keeps saying that she is not getting any better.  Patient was noted to be anemic during previous hospitalization and was noted to have  hemoglobin in the range of 7.6-8.1.  She has had intolerance to iron therapy in the past.  Combination of iron deficiency and chronic anemia for which hematology oncology referral was made and it was noted that patient was to follow-up with her hematologist at [facility].  In the emergency room she was noted to have a hemoglobin of 6.9 for which a unit of packed RBC transfusion has been ordered.  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   • Frequent urinary tract infections    • History of gastric ulcer    • History of GI bleed    • [place] (hard of hearing)    • Hyperlipidemia    • Hypertension    • Hyponatremia    • Lightheadedness    • LVH (left ventricular hypertrophy)    • MG (myasthenia gravis) (HCC)    • Mini stroke 10/2016, 3/2017   • OA (osteoarthritis)    • Osteoporosis    • Sleep apnea     USES CPAP   • SOB (shortness of breath)     WALKING     Past Surgical History:  Past Surgical History:   Procedure Laterality Date   • APPENDECTOMY     • BRONCHOSCOPY N/A 3/31/2022    Procedure: BRONCHOSCOPY WITH BAL RIGHT LOWER LOBE;  Surgeon: Remy Tirado MD;  Location: [facility] ENDOSCOPY;  Service: Pulmonary;  Laterality: N/A;  COPD EXACERBATION     • CARPAL TUNNEL RELEASE Bilateral    • CATARACT EXTRACTION Bilateral    • CORONARY STENT PLACEMENT     • TOTAL HIP ARTHROPLASTY Bilateral    • TOTAL SHOULDER ARTHROPLASTY W/ DISTAL CLAVICLE EXCISION Left 7/19/2016    Procedure: LT TOTAL SHOULDER REVERSE ARTHROPLASTY;  Surgeon: NAHOMI Solorzano MD;  Location: [facility] OR [facility];  Service:    • TOTAL SHOULDER ARTHROPLASTY W/ DISTAL CLAVICLE EXCISION Right 1/8/2019    Procedure: RIGHT TOTAL SHOULDER REVERSE ARTHROPLASTY;  Surgeon: Jovanny Solorzano MD;  Location: [facility] OR  [facility];  Service: Orthopedics      Home Meds:  (Not in a hospital admission)    Current Meds:   No current facility-administered medications for this encounter.    Current Outpatient Medications:   •  albuterol (PROVENTIL) (2.5 MG/3ML) 0.083% nebulizer solution, Take 2.5 mg by nebulization Every 4 (Four) Hours As Needed for Wheezing., Disp: , Rfl:   •  albuterol sulfate  (90 Base) MCG/ACT inhaler, Inhale 2 puffs Every 6 (Six) Hours As Needed for wheezing or shortness of air., Disp: , Rfl:   •  aspirin 81 MG EC tablet, Take 1 tablet by mouth Daily., Disp: , Rfl:   •  azelastine (ASTELIN) 0.1 % nasal spray, 2 sprays into the nostril(s) as directed by provider 2 (Two) Times a Day. Use in each nostril as directed, Disp: , Rfl:   •  busPIRone (BUSPAR) 10 MG tablet, Take 10 mg by mouth 2 (Two) Times a Day., Disp: , Rfl:   •  DULoxetine (CYMBALTA) 60 MG capsule, Take 60 mg by mouth 2 (Two) Times a Day., Disp: , Rfl:   •  ferrous sulfate 325 (65 FE) MG tablet, Take 1 tablet by mouth Daily With Breakfast., Disp: , Rfl:   •  Fluticasone-Umeclidin-Vilant (Trelegy Ellipta) 100-62.5-25 MCG/INH inhaler, Inhale 1 puff Daily., Disp: , Rfl:   •  irbesartan (AVAPRO) 75 MG tablet, Take 75 mg by mouth Every Night., Disp: , Rfl:   •  levothyroxine (SYNTHROID, LEVOTHROID) 88 MCG tablet, Take 88 mcg by mouth Daily., Disp: , Rfl:   •  metoprolol succinate XL (TOPROL-XL) 25 MG 24 hr tablet, Take 1 tablet by mouth 2 (Two) Times a Day., Disp: 180 tablet, Rfl: 3  •  montelukast (SINGULAIR) 10 MG tablet, Take 10 mg by mouth Every Night., Disp: , Rfl:   •  mycophenolate (CELLCEPT) 500 MG tablet, Take 2 tablets by mouth Every Morning., Disp: , Rfl:   •  mycophenolate (CELLCEPT) 500 MG tablet, Take 3 tablets by mouth Every Evening., Disp: , Rfl:   •  pantoprazole (PROTONIX) 40 MG EC tablet, Take 1 tablet by mouth Daily., Disp: , Rfl:   •  pravastatin (PRAVACHOL) 20 MG tablet, Take 1 tablet by mouth Every Night., Disp: , Rfl:   •  predniSONE  "(DELTASONE) 10 MG tablet, Take 5 mg by mouth Daily., Disp: , Rfl:   •  raloxifene (EVISTA) 60 MG tablet, Take 60 mg by mouth Every Night., Disp: , Rfl:   •  rivaroxaban (XARELTO) 20 MG tablet, Take 1 tablet by mouth Daily With Dinner., Disp: , Rfl:   •  torsemide (DEMADEX) 20 MG tablet, Take 1 tablet by mouth Daily., Disp: , Rfl:   •  vitamin B-12 (CYANOCOBALAMIN) 1000 MCG tablet, Take 1 tablet by mouth Daily., Disp: , Rfl:   •  vitamin D (ERGOCALCIFEROL) 41406 UNITS capsule capsule, Take 50,000 Units by mouth Every 7 (Seven) Days. Takes on Wednesdays, Disp: , Rfl:   Allergies:  Allergies   Allergen Reactions   • Neomycin Anaphylaxis   • Penicillins Swelling and Rash     Historical allergy x 30-40 years, tolerates cephalosporins   • Hydrocodone Itching and Rash   • Rosuvastatin Other (See Comments)     Muscle cramps     Social History:   Social History     Tobacco Use   • Smoking status: Never   • Smokeless tobacco: Never   • Tobacco comments:     caffeine - coffee and coke caff. free, tea    Substance Use Topics   • Alcohol use: No      Family History:  Family History   Problem Relation Age of Onset   • Heart disease Mother    • Hyperlipidemia Mother    • Stroke Mother    • Diabetes Mother    • Breast cancer Mother    • Heart disease Father    • Hyperlipidemia Father    • Stroke Father    • Malig Hyperthermia Neg Hx           Review of Systems  See history of present illness and past medical history.   As described in history of presenting illness.  Patient result denies any hematemesis or melena or change in the color of her stool.  Remainder of ROS is negative.      Vitals:   /77   Pulse 103   Temp 98.6 °F (37 °C) (Oral)   Resp 18   Ht 152.4 cm (60\")   Wt 76 kg (167 lb 8.8 oz)   SpO2 100%   BMI 32.72 kg/m²   I/O:     Intake/Output Summary (Last 24 hours) at 11/4/2022 1711  Last data filed at 11/4/2022 1704  Gross per 24 hour   Intake 333.75 ml   Output --   Net 333.75 ml     Exam:  Patient is " examined using the personal protective equipment as per guidelines from infection control for this particular patient as enacted.  Hand washing was performed before and after patient interaction.  General Appearance:   Awake interactive female who is currently fidgeting with nasal cannula oxygen supplementation and holding her TV remote control as it is a talking device.   Head:    Normocephalic, without obvious abnormality, atraumatic   Eyes:    PERRL, conjunctiva/corneas clear, EOM's intact, both eyes   Ears:    Normal external ear canals, both ears   Nose:   Nares normal, septum midline, mucosa normal, no drainage    or sinus tenderness   Throat:   Lips, tongue, gums normal; oral mucosa pink and moist   Neck:   Supple, symmetrical, trachea midline, no adenopathy;     thyroid:  no enlargement/tenderness/nodules; no carotid    bruit or JVD   Back:     Symmetric, no curvature, ROM normal, no CVA tenderness   Lungs:    Bilateral air entry with occasional rhonchi   Chest Wall:    No tenderness or deformity    Heart:   S1-S2 irregularly irregular   Abdomen:    Soft nontender   Extremities:   Extremities normal, atraumatic, no cyanosis or edema   Pulses:   Pulses palpable in all extremities; symmetric all extremities   Skin:  Bruising due to anticoagulation therapy noted   Neurologic:  Follows command and directable       Data Review:      I reviewed the patient's new clinical results.  Results from last 7 days   Lab Units 11/04/22  1155   WBC 10*3/mm3 10.96*   HEMOGLOBIN g/dL 6.9*   PLATELETS 10*3/mm3 275     Results from last 7 days   Lab Units 11/04/22  1155   SODIUM mmol/L 137   POTASSIUM mmol/L 4.4   CHLORIDE mmol/L 103   CO2 mmol/L 25.3   BUN mg/dL 12   CREATININE mg/dL 0.84   CALCIUM mg/dL 8.6   GLUCOSE mg/dL 88     Microbiology Results (last 10 days)     Procedure Component Value - Date/Time    COVID PRE-OP / PRE-PROCEDURE SCREENING ORDER (NO ISOLATION) - Swab, Nasopharynx [[id number]]  (Abnormal) Collected:  11/04/22 1205    Lab Status: Final result Specimen: Swab from Nasopharynx Updated: 11/04/22 1329    Narrative:      The following orders were created for panel order COVID PRE-OP / PRE-PROCEDURE SCREENING ORDER (NO ISOLATION) - Swab, Nasopharynx.  Procedure                               Abnormality         Status                     ---------                               -----------         ------                     COVID-19,[facility] IN-HOUSE...[[id number]]  Abnormal            Final result                 Please view results for these tests on the individual orders.    COVID-19,[facility]HOUSE CEPHEID/STEVEN NP SWAB IN TRANSPORT MEDIA 8-12 HR TAT - Swab, Nasopharynx [[id number]]  (Abnormal) Collected: 11/04/22 1205    Lab Status: Final result Specimen: Swab from Nasopharynx Updated: 11/04/22 1329     COVID19 Detected    Narrative:      Fact sheet for providers: https://www.fda.gov/media/809042/download     Fact sheet for patients: https://www.fda.gov/media/517834/download    S. Pneumo Ag Urine or CSF - Urine, Urine, Clean Catch [[id number]]  (Normal) Collected: 10/26/22 0746    Lab Status: Final result Specimen: Urine, Clean Catch Updated: 10/26/22 0849     Strep Pneumo Ag Negative    Legionella Antigen, Urine - Urine, Urine, Clean Catch [[id number]]  (Normal) Collected: 10/26/22 0746    Lab Status: Final result Specimen: Urine, Clean Catch Updated: 10/26/22 0849     LEGIONELLA ANTIGEN, URINE Negative        CT Head Without Contrast    Result Date: 10/27/2022   No CT evidence for acute intracranial pathology. Mild-to-moderate changes of chronic small vessel ischemic phenomena are noted. The etiology of the patient's generalized weakness and mental status change is not further elucidated on this examination; and if further assessment is indicated, one could obtain an MRI of the brain for follow-up.  Incidental note is made of a partially empty sella.  Note is made of subtotal opacification of the right mastoid air cells  and middle ear cavity.   Radiation dose reduction techniques were utilized, including automated exposure control and exposure modulation based on body size.  This report was finalized on 10/27/2022 9:33 AM by Dr. Yohannes Luke M.D.      XR Chest 1 View    Result Date: 11/4/2022  Mildly increased prominence of vascular and interstitial markings. Otherwise, no significant change. Continued follow-up suggested.  This report was finalized on 11/4/2022 12:38 PM by Dr. José Manuel Anaya M.D.      XR Chest 1 View    Result Date: 10/25/2022  FINDINGS AND IMPRESSION: Bilateral shoulder arthroplasties are not well evaluated. There is a left Port-A-Cath with tip terminating over the superior vena cava.  Mild to moderate asymmetric pulmonary opacification is present bilaterally concerning for multifocal pneumonia in the appropriate clinical context. Asymmetric pulmonary edema is also possible but considered less likely. Correlation with patient history is recommended. Follow-up chest radiograph in 6-8 weeks is recommended to ensure appropriate resolution. No pleural effusion or pneumothorax is seen. Cardiac silhouette is mildly enlarged.  This report was finalized on 10/25/2022 9:56 AM by Dr. Daruis Cristina M.D.      ECG 12 Lead Altered Mental Status   Preliminary Result   HEART RATE= 96  bpm   RR Interval= 625  ms   CA Interval= 179  ms   P Horizontal Axis=   deg   P Front Axis= -15  deg   QRSD Interval= 83  ms   QT Interval= 347  ms   QRS Axis= -30  deg   T Wave Axis= -3  deg   - ABNORMAL ECG -   Sinus rhythm   Inferior infarct, old   Anteroseptal infarct, age indeterminate   Electronically Signed By:    Date and Time of Study: 2022-11-04 12:27:09        Brief Urine Lab Results  (Last result in the past 365 days)      Color   Clarity   Blood   Leuk Est   Nitrite   Protein   CREAT   Urine HCG        11/04/22 1157 Yellow   Clear   Negative   Negative   Negative   Trace                 Assessment:  Active Hospital Problems     Diagnosis  POA   • Altered mental status [R41.82]  Unknown   • Chronic respiratory failure with hypoxia ([facility]) [J96.11]  Yes   • COVID-19 virus infection [U07.1]  Unknown   • Acute on chronic diastolic CHF (congestive heart failure) ([facility]) [I50.33]  Yes   • COPD (chronic obstructive pulmonary disease) (HCC) [J44.9]  Yes   • Sleep apnea [G47.30]  Yes     USES CPAP     • Paroxysmal atrial fibrillation (HCC) [I48.0]  Yes   • Myasthenia gravis ([facility]) [G70.00]  Yes   • HX: long term anticoagulant use [Z92.29]  Not Applicable   • Essential hypertension [I10]  Yes       Plan: See admitting orders  · Continue with serial H&H and monitor hemoglobin after receiving a transfusion in the emergency room.  · Continue with oxygen supplementation and as needed nebulizer treatment  · Continue with infection control protocol for COVID-19 infection  · Continue regimen for hypertension and atrial fibrillation and allow her to use her CPAP device if available and continue her baseline oxygen supplementation of 4 L nasal cannula.  · Hold her anticoagulation therapy and aspirin given her hemoglobin and check stool for Hemoccult and consider GI evaluation and hematology oncology evaluation given the decline in her hemoglobin.    · Continue her immunosuppressive therapy for underlying myasthenia gravis including prednisone and CellCept.  · OT PT evaluation.  · May benefit from neurology evaluation for confusion and disorientation but so far negative work-up.    Luz Taylor MD   11/4/2022  17:11 EDT  Much of this encounter note is an electronic transcription/translation of spoken language to printed text. The electronic translation of spoken language may permit erroneous, or at times, nonsensical words or phrases to be inadvertently transcribed; Although I have reviewed the note for such errors, some may still exist

## 2022-11-04 NOTE — ED NOTES
Nursing report ED to floor  Olena Myers  87 y.o.  female    HPI :   Chief Complaint   Patient presents with    Altered Mental Status       Admitting doctor:   Luz Taylor MD    Admitting diagnosis:   The primary encounter diagnosis was Acute encephalopathy. Diagnoses of Acute on chronic anemia, COVID, History of COPD, History of atrial fibrillation, Chronic anticoagulation, and History of myasthenia gravis were also pertinent to this visit.    Code status:   Current Code Status       Date Active Code Status Order ID Comments User Context       Prior            Allergies:   Neomycin, Penicillins, Hydrocodone, and Rosuvastatin    Isolation:   Enhanced Droplet/Contact     Intake and Output    Intake/Output Summary (Last 24 hours) at 11/4/2022 1716  Last data filed at 11/4/2022 1704  Gross per 24 hour   Intake 333.75 ml   Output --   Net 333.75 ml       Weight:       11/04/22  1146   Weight: 76 kg (167 lb 8.8 oz)       Most recent vitals:   Vitals:    11/04/22 1506 11/04/22 1536 11/04/22 1550 11/04/22 1704   BP: 147/59 150/81  146/77   Pulse: (!) 121 117 118 103   Resp:  20  18   Temp:    98.6 °F (37 °C)   TempSrc:    Oral   SpO2: 96% 94% 94% 100%   Weight:       Height:           Active LDAs/IV Access:   Lines, Drains & Airways       Active LDAs       Name Placement date Placement time Site Days    Peripheral IV 11/04/22 Left Antecubital 11/04/22  --  Antecubital  less than 1    Single Lumen Implantable Port 01/08/19 Left Subclavian 01/08/19  0910  Subclavian  1396                    Labs (abnormal labs have a star):   Labs Reviewed   COVID-19,[facility]-HOUSE CEPHEID/STEVEN, NP SWAB IN TRANSPORT MEDIA 8-12 HR TAT - Abnormal; Notable for the following components:       Result Value    COVID19 Detected (*)     All other components within normal limits    Narrative:     Fact sheet for providers: https://www.fda.gov/media/695127/download     Fact sheet for patients: https://www.fda.gov/media/536228/download   COMPREHENSIVE  "METABOLIC PANEL - Abnormal; Notable for the following components:    Total Protein 5.3 (*)     Albumin 2.70 (*)     All other components within normal limits    Narrative:     GFR Normal >60  Chronic Kidney Disease <60  Kidney Failure <15    The GFR formula is only valid for adults with stable renal function between ages 18 and 70.   URINALYSIS W/ CULTURE IF INDICATED - Abnormal; Notable for the following components:    Ketones, UA Trace (*)     Protein, UA Trace (*)     All other components within normal limits    Narrative:     In absence of clinical symptoms, the presence of pyuria, bacteria, and/or nitrites on the urinalysis result does not correlate with infection.  Urine microscopic not indicated.   CBC WITH AUTO DIFFERENTIAL - Abnormal; Notable for the following components:    WBC 10.96 (*)     RBC 2.55 (*)     Hemoglobin 6.9 (*)     Hematocrit 21.6 (*)     Neutrophil % 86.8 (*)     Lymphocyte % 4.8 (*)     Immature Grans % 1.3 (*)     Neutrophils, Absolute 9.52 (*)     Lymphocytes, Absolute 0.53 (*)     Immature Grans, Absolute 0.14 (*)     All other components within normal limits   PROCALCITONIN - Abnormal; Notable for the following components:    Procalcitonin 0.31 (*)     All other components within normal limits    Narrative:     As a Marker for Sepsis (Non-Neonates):    1. <0.5 ng/mL represents a low risk of severe sepsis and/or septic shock.  2. >2 ng/mL represents a high risk of severe sepsis and/or septic shock.    As a Marker for Lower Respiratory Tract Infections that require antibiotic therapy:    PCT on Admission    Antibiotic Therapy       6-12 Hrs later    >0.5                Strongly Recommended  >0.25 - <0.5        Recommended   0.1 - 0.25          Discouraged              Remeasure/reassess PCT  <0.1                Strongly Discouraged     Remeasure/reassess PCT    As 28 day mortality risk marker: \"Change in Procalcitonin Result\" (>80% or <=80%) if Day 0 (or Day 1) and Day 4 values are " available. Refer to http://www.[facility]-pct-calculator.com    Change in PCT <=80%  A decrease of PCT levels below or equal to 80% defines a positive change in PCT test result representing a higher risk for 28-day all-cause mortality of patients diagnosed with severe sepsis for septic shock.    Change in PCT >80%  A decrease of PCT levels of more than 80% defines a negative change in PCT result representing a lower risk for 28-day all-cause mortality of patients diagnosed with severe sepsis or septic shock.      TROPONIN (IN-HOUSE) - Normal    Narrative:     Troponin T Reference Range:  <= 0.03 ng/mL-   Negative for AMI  >0.03 ng/mL-     Abnormal for myocardial necrosis.  Clinicians would have to utilize clinical acumen, EKG, Troponin and serial changes to determine if it is an Acute Myocardial Infarction or myocardial injury due to an underlying chronic condition.       Results may be falsely decreased if patient taking Biotin.     BNP (IN-HOUSE) - Normal    Narrative:     Among patients with dyspnea, NT-proBNP is highly sensitive for the detection of acute congestive heart failure. In addition NT-proBNP of <300 pg/ml effectively rules out acute congestive heart failure with 99% negative predictive value.    Results may be falsely decreased if patient taking Biotin.     LACTIC ACID, PLASMA - Normal   MAGNESIUM - Normal   POCT OCCULT BLOOD STOOL (ED ONLY) - Normal   COVID PRE-OP / PRE-PROCEDURE SCREENING ORDER (NO ISOLATION)    Narrative:     The following orders were created for panel order COVID PRE-OP / PRE-PROCEDURE SCREENING ORDER (NO ISOLATION) - Swab, Nasopharynx.  Procedure                               Abnormality         Status                     ---------                               -----------         ------                     COVID-19,[facility] IN-HOUSE...[[id number]]  Abnormal            Final result                 Please view results for these tests on the individual orders.   BLOOD CULTURE   BLOOD  CULTURE   TYPE AND SCREEN   PREPARE RBC   CBC AND DIFFERENTIAL    Narrative:     The following orders were created for panel order CBC & Differential.  Procedure                               Abnormality         Status                     ---------                               -----------         ------                     CBC Auto Differential[[id number]]        Abnormal            Final result                 Please view results for these tests on the individual orders.       EKG:   ECG 12 Lead Altered Mental Status   Preliminary Result   HEART RATE= 96  bpm   RR Interval= 625  ms   [place] Interval= 179  ms   P Horizontal Axis=   deg   P Front Axis= -15  deg   QRSD Interval= 83  ms   QT Interval= 347  ms   QRS Axis= -30  deg   T Wave Axis= -3  deg   - ABNORMAL ECG -   Sinus rhythm   Inferior infarct, old   Anteroseptal infarct, age indeterminate   Electronically Signed By:    Date and Time of Study: 2022-11-04 12:27:09          Meds given in ED:   Medications - No data to display    Imaging results:  XR Chest 1 View    Result Date: 11/4/2022  Mildly increased prominence of vascular and interstitial markings. Otherwise, no significant change. Continued follow-up suggested.  This report was finalized on 11/4/2022 12:38 PM by Dr. José Manuel Anaya M.D.       Ambulatory status:   - assist    Social issues:   Social History     Socioeconomic History    Marital status:    Tobacco Use    Smoking status: Never    Smokeless tobacco: Never    Tobacco comments:     caffeine - coffee and coke caff. free, tea    Vaping Use    Vaping Use: Never used   Substance and Sexual Activity    Alcohol use: No    Drug use: No     Comment: caffine    Sexual activity: Defer       NIH Stroke Scale:         Sukhwinder Mccormick RN  11/04/22 17:16 EDT

## 2022-11-04 NOTE — ED NOTES
"Pt arrives via EMS from [facility]. Nurse found pt with AMS upon waking up this morning around 0900. Pt also having urinary frequency and incontinence. Per EMS pt was able to stand and pivot on the scene and is \"talking to things that aren't there.\"     Pt oriented to self and time.     Pt chronically on 2L of oxygen.    Pt given mask in triage, staff in PPE.    "

## 2022-11-04 NOTE — ED NOTES
Called and spoke with desk  on 4S. RN unavailable for report. Dot phrase entered 1hr ago. Told to call this RN with any questions. This pt placed in for transport

## 2022-11-05 PROBLEM — I50.32 CHRONIC DIASTOLIC CHF (CONGESTIVE HEART FAILURE) (HCC): Status: ACTIVE | Noted: 2021-07-05

## 2022-11-05 LAB
ALBUMIN SERPL-MCNC: 2.6 G/DL (ref 3.5–5.2)
ALBUMIN/GLOB SERPL: 0.8 G/DL
ALP SERPL-CCNC: 100 U/L (ref 39–117)
ALT SERPL W P-5'-P-CCNC: 15 U/L (ref 1–33)
ANION GAP SERPL CALCULATED.3IONS-SCNC: 11.1 MMOL/L (ref 5–15)
AST SERPL-CCNC: 24 U/L (ref 1–32)
BASOPHILS # BLD AUTO: 0.03 10*3/MM3 (ref 0–0.2)
BASOPHILS NFR BLD AUTO: 0.2 % (ref 0–1.5)
BH BB BLOOD EXPIRATION DATE: NORMAL
BH BB BLOOD TYPE BARCODE: 600
BH BB DISPENSE STATUS: NORMAL
BH BB PRODUCT CODE: NORMAL
BH BB UNIT NUMBER: NORMAL
BILIRUB SERPL-MCNC: 0.7 MG/DL (ref 0–1.2)
BUN SERPL-MCNC: 13 MG/DL (ref 8–23)
BUN/CREAT SERPL: 15.9 (ref 7–25)
CALCIUM SPEC-SCNC: 9.1 MG/DL (ref 8.6–10.5)
CHLORIDE SERPL-SCNC: 98 MMOL/L (ref 98–107)
CO2 SERPL-SCNC: 26.9 MMOL/L (ref 22–29)
CREAT SERPL-MCNC: 0.82 MG/DL (ref 0.57–1)
CROSSMATCH INTERPRETATION: NORMAL
CRP SERPL-MCNC: 15.32 MG/DL (ref 0–0.5)
D-LACTATE SERPL-SCNC: 1.8 MMOL/L (ref 0.5–2)
DEPRECATED RDW RBC AUTO: 42.8 FL (ref 37–54)
EGFRCR SERPLBLD CKD-EPI 2021: 70.2 ML/MIN/1.73
EOSINOPHIL # BLD AUTO: 0.02 10*3/MM3 (ref 0–0.4)
EOSINOPHIL NFR BLD AUTO: 0.2 % (ref 0.3–6.2)
ERYTHROCYTE [DISTWIDTH] IN BLOOD BY AUTOMATED COUNT: 14.2 % (ref 12.3–15.4)
FERRITIN SERPL-MCNC: 146 NG/ML (ref 13–150)
GLOBULIN UR ELPH-MCNC: 3.4 GM/DL
GLUCOSE SERPL-MCNC: 115 MG/DL (ref 65–99)
HCT VFR BLD AUTO: 26.5 % (ref 34–46.6)
HCT VFR BLD AUTO: 28.7 % (ref 34–46.6)
HCT VFR BLD AUTO: 28.7 % (ref 34–46.6)
HCT VFR BLD AUTO: 29.6 % (ref 34–46.6)
HGB BLD-MCNC: 8.7 G/DL (ref 12–15.9)
HGB BLD-MCNC: 9.4 G/DL (ref 12–15.9)
HGB BLD-MCNC: 9.4 G/DL (ref 12–15.9)
HGB BLD-MCNC: 9.5 G/DL (ref 12–15.9)
IMM GRANULOCYTES # BLD AUTO: 0.11 10*3/MM3 (ref 0–0.05)
IMM GRANULOCYTES NFR BLD AUTO: 0.9 % (ref 0–0.5)
IRON 24H UR-MRATE: 18 MCG/DL (ref 37–145)
IRON SATN MFR SERPL: 7 % (ref 20–50)
LYMPHOCYTES # BLD AUTO: 0.51 10*3/MM3 (ref 0.7–3.1)
LYMPHOCYTES NFR BLD AUTO: 3.9 % (ref 19.6–45.3)
MCH RBC QN AUTO: 27 PG (ref 26.6–33)
MCHC RBC AUTO-ENTMCNC: 32.8 G/DL (ref 31.5–35.7)
MCV RBC AUTO: 82.5 FL (ref 79–97)
MONOCYTES # BLD AUTO: 0.46 10*3/MM3 (ref 0.1–0.9)
MONOCYTES NFR BLD AUTO: 3.6 % (ref 5–12)
NEUTROPHILS NFR BLD AUTO: 11.81 10*3/MM3 (ref 1.7–7)
NEUTROPHILS NFR BLD AUTO: 91.2 % (ref 42.7–76)
NRBC BLD AUTO-RTO: 0 /100 WBC (ref 0–0.2)
PLATELET # BLD AUTO: 251 10*3/MM3 (ref 140–450)
PMV BLD AUTO: 9.4 FL (ref 6–12)
POTASSIUM SERPL-SCNC: 4.3 MMOL/L (ref 3.5–5.2)
PROT SERPL-MCNC: 6 G/DL (ref 6–8.5)
QT INTERVAL: 347 MS
RBC # BLD AUTO: 3.48 10*6/MM3 (ref 3.77–5.28)
SODIUM SERPL-SCNC: 136 MMOL/L (ref 136–145)
TIBC SERPL-MCNC: 273 MCG/DL (ref 298–536)
TRANSFERRIN SERPL-MCNC: 183 MG/DL (ref 200–360)
UNIT  ABO: NORMAL
UNIT  RH: NORMAL
WBC NRBC COR # BLD: 12.94 10*3/MM3 (ref 3.4–10.8)

## 2022-11-05 PROCEDURE — 94799 UNLISTED PULMONARY SVC/PX: CPT

## 2022-11-05 PROCEDURE — 82728 ASSAY OF FERRITIN: CPT | Performed by: INTERNAL MEDICINE

## 2022-11-05 PROCEDURE — 84466 ASSAY OF TRANSFERRIN: CPT | Performed by: INTERNAL MEDICINE

## 2022-11-05 PROCEDURE — 63710000001 MYCOPHENOLATE MOFETIL PER 250 MG: Performed by: INTERNAL MEDICINE

## 2022-11-05 PROCEDURE — 80053 COMPREHEN METABOLIC PANEL: CPT | Performed by: INTERNAL MEDICINE

## 2022-11-05 PROCEDURE — 86140 C-REACTIVE PROTEIN: CPT | Performed by: INTERNAL MEDICINE

## 2022-11-05 PROCEDURE — 99223 1ST HOSP IP/OBS HIGH 75: CPT | Performed by: INTERNAL MEDICINE

## 2022-11-05 PROCEDURE — 85018 HEMOGLOBIN: CPT | Performed by: INTERNAL MEDICINE

## 2022-11-05 PROCEDURE — 83540 ASSAY OF IRON: CPT | Performed by: INTERNAL MEDICINE

## 2022-11-05 PROCEDURE — 85025 COMPLETE CBC W/AUTO DIFF WBC: CPT | Performed by: INTERNAL MEDICINE

## 2022-11-05 PROCEDURE — 94664 DEMO&/EVAL PT USE INHALER: CPT

## 2022-11-05 PROCEDURE — 85014 HEMATOCRIT: CPT | Performed by: INTERNAL MEDICINE

## 2022-11-05 PROCEDURE — 83605 ASSAY OF LACTIC ACID: CPT | Performed by: INTERNAL MEDICINE

## 2022-11-05 RX ADMIN — ACETAMINOPHEN 650 MG: 325 TABLET, FILM COATED ORAL at 00:05

## 2022-11-05 RX ADMIN — TIOTROPIUM BROMIDE INHALATION SPRAY 1 PUFF: 3.12 SPRAY, METERED RESPIRATORY (INHALATION) at 08:53

## 2022-11-05 RX ADMIN — FERROUS SULFATE TAB 325 MG (65 MG ELEMENTAL FE) 325 MG: 325 (65 FE) TAB at 09:58

## 2022-11-05 RX ADMIN — PRAVASTATIN SODIUM 20 MG: 20 TABLET ORAL at 21:56

## 2022-11-05 RX ADMIN — Medication 10 ML: at 21:55

## 2022-11-05 RX ADMIN — Medication 10 ML: at 09:59

## 2022-11-05 RX ADMIN — BUDESONIDE AND FORMOTEROL FUMARATE DIHYDRATE 2 PUFF: 160; 4.5 AEROSOL RESPIRATORY (INHALATION) at 20:01

## 2022-11-05 RX ADMIN — DULOXETINE HYDROCHLORIDE 60 MG: 60 CAPSULE, DELAYED RELEASE ORAL at 09:58

## 2022-11-05 RX ADMIN — ACETAMINOPHEN 650 MG: 325 TABLET, FILM COATED ORAL at 14:48

## 2022-11-05 RX ADMIN — PANTOPRAZOLE SODIUM 40 MG: 40 TABLET, DELAYED RELEASE ORAL at 09:58

## 2022-11-05 RX ADMIN — BUSPIRONE HYDROCHLORIDE 10 MG: 10 TABLET ORAL at 21:55

## 2022-11-05 RX ADMIN — DULOXETINE HYDROCHLORIDE 60 MG: 60 CAPSULE, DELAYED RELEASE ORAL at 21:55

## 2022-11-05 RX ADMIN — ERGOCALCIFEROL 50000 UNITS: 1.25 CAPSULE ORAL at 09:58

## 2022-11-05 RX ADMIN — METOPROLOL SUCCINATE 25 MG: 25 TABLET, EXTENDED RELEASE ORAL at 21:55

## 2022-11-05 RX ADMIN — BUDESONIDE AND FORMOTEROL FUMARATE DIHYDRATE 2 PUFF: 160; 4.5 AEROSOL RESPIRATORY (INHALATION) at 08:53

## 2022-11-05 RX ADMIN — BUSPIRONE HYDROCHLORIDE 10 MG: 10 TABLET ORAL at 09:58

## 2022-11-05 RX ADMIN — MONTELUKAST SODIUM 10 MG: 10 TABLET, FILM COATED ORAL at 21:55

## 2022-11-05 RX ADMIN — MYCOPHENOLATE MOFETIL 1500 MG: 500 TABLET ORAL at 17:52

## 2022-11-05 RX ADMIN — METOPROLOL SUCCINATE 25 MG: 25 TABLET, EXTENDED RELEASE ORAL at 09:58

## 2022-11-05 RX ADMIN — ACETAMINOPHEN 650 MG: 325 TABLET, FILM COATED ORAL at 21:55

## 2022-11-05 RX ADMIN — MYCOPHENOLATE MOFETIL 1000 MG: 500 TABLET ORAL at 06:36

## 2022-11-05 RX ADMIN — ACETAMINOPHEN 650 MG: 325 TABLET, FILM COATED ORAL at 10:48

## 2022-11-05 NOTE — CONSULTS
[facility] CBC GROUP INITIAL INPATIENT CONSULTATION NOTE    REASON FOR CONSULTATION:    Anemia    HISTORY OF PRESENT ILLNESS:  Olena Myers is a 87 y.o. female who we are asked to see today in consultation for the above issue.    Patient has past medical history significant for hypertension, hyperlipidemia, coronary artery disease, COPD, atrial fibrillation (chronic anticoagulation with Xarelto 20 mg daily), hyponatremia, osteoporosis, obstructive sleep apnea, frequent urinary tract infections.  The patient has myasthenia gravis followed by neurology in the [facility], is on treatment with CellCept 1000 mg a.m., 1500 mg p.m. and prednisone 5 mg daily.  She does take raloxifene for her osteoporosis.    Patient is followed for anemia in the [place] system by Dr. Mancia.  She has had evidence of iron deficiency in the past in addition to anemia secondary to chronic disease.  Hemoglobin has generally been in the 9-10 range with normal MCV.  Patient is supposed to take oral iron sulfate 325 mg daily however reports that she has not been on this for quite some time due to constipation issues.  She is compliant with oral B12 1000 mcg daily at home.    Patient was hospitalized recently 10/25 - 10/28/2022 due to COVID-19 pneumonia.  She required increase in her baseline O2 requirement from 2 L up to 4 L nasal cannula and was treated with dexamethasone initially however developed delirium and this was discontinued.  She completed a course of remdesivir.  She was weaned back to 2 L O2 nasal cannula at the time of discharge.  During her hospitalization, hemoglobin declined into the 7-8 range.  Anemia evaluation 10/28/2022 showed iron 17, ferritin 178, iron saturation 6%, TIBC 274, CRP 9.74 (in the midst of COVID-19 infection).    Patient is currently admitted with symptoms of urinary frequency and incontinence with hallucinations.  Labs on admission 11/4/2022 with hemoglobin 6.9, MCV 84.7.  Stool negative for occult  blood on 11/4/2022.  Patient received 1 unit PRBC transfusion with improvement in hemoglobin.  On most recent check hemoglobin 9.5.    Patient is currently alert and oriented, no current complaints.  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   • Frequent urinary tract infections    • History of gastric ulcer    • History of GI bleed    • [place] (hard of hearing)    • Hyperlipidemia    • Hypertension    • Hyponatremia    • Lightheadedness    • LVH (left ventricular hypertrophy)    • MG (myasthenia gravis) (HCC)    • Mini stroke 10/2016, 3/2017   • OA (osteoarthritis)    • Osteoporosis    • Sleep apnea     USES CPAP   • SOB (shortness of breath)     WALKING       Past Surgical History:   Procedure Laterality Date   • APPENDECTOMY     • BRONCHOSCOPY N/A 3/31/2022    Procedure: BRONCHOSCOPY WITH BAL RIGHT LOWER LOBE;  Surgeon: Remy Tirado MD;  Location: [facility] ENDOSCOPY;  Service: Pulmonary;  Laterality: N/A;  COPD EXACERBATION     • CARPAL TUNNEL RELEASE Bilateral    • CATARACT EXTRACTION Bilateral    • CORONARY STENT PLACEMENT     • TOTAL HIP ARTHROPLASTY Bilateral    • TOTAL SHOULDER ARTHROPLASTY W/ DISTAL CLAVICLE EXCISION Left 7/19/2016    Procedure: LT TOTAL SHOULDER REVERSE ARTHROPLASTY;  Surgeon: NAHOMI Solorzano MD;  Location: [facility] OR [facility];  Service:    • TOTAL SHOULDER ARTHROPLASTY W/ DISTAL CLAVICLE EXCISION Right 1/8/2019    Procedure: RIGHT TOTAL SHOULDER REVERSE ARTHROPLASTY;  Surgeon: Jovanny Solorzano MD;  Location: [facility] OR [facility];  Service: Orthopedics       SOCIAL HISTORY:   reports that she has never smoked. She has never used smokeless tobacco. She reports that she does not drink alcohol and does not use drugs.    FAMILY  HISTORY:  family history includes Breast cancer in her mother; Diabetes in her mother; Heart disease in her father and mother; Hyperlipidemia in her father and mother; Stroke in her father and mother.    ALLERGIES:  Allergies   Allergen Reactions   • Neomycin Anaphylaxis   • Penicillins Swelling and Rash     Historical allergy x 30-40 years, tolerates cephalosporins   • Hydrocodone Itching and Rash   • Rosuvastatin Other (See Comments)     Muscle cramps       MEDICATIONS:  As listed in the electronic medical record.    Review of Systems   Comprehensive review of systems is obtained with pertinent positive findings as noted in the interval history above.  All other systems negative.    Vitals:    11/04/22 2025 11/04/22 2314 11/05/22 0734 11/05/22 0853   BP:  147/75 134/95    BP Location:  Right arm Right arm    Patient Position:  Lying Lying    Pulse: 110 98 90 85   Resp: 18 18 18 18   Temp:  99.2 °F (37.3 °C) 97 °F (36.1 °C)    TempSrc:  Oral Oral    SpO2: 92% 98% 98% 97%   Weight:       Height:           Physical Exam  Constitutional:       Appearance: She is well-developed.   Eyes:      Conjunctiva/sclera: Conjunctivae normal.   Neck:      Thyroid: No thyromegaly.   Cardiovascular:      Rate and Rhythm: Normal rate and regular rhythm.      Heart sounds: Murmur heard.     No friction rub. No gallop.   Pulmonary:      Effort: No respiratory distress.      Breath sounds: Normal breath sounds.   Abdominal:      General: Bowel sounds are normal. There is no distension.      Palpations: Abdomen is soft.      Tenderness: There is no abdominal tenderness.   Lymphadenopathy:      Head:      Right side of head: No submandibular adenopathy.      Cervical: No cervical adenopathy.      Upper Body:      Right upper body: No supraclavicular adenopathy.      Left upper body: No supraclavicular adenopathy.   Skin:     General: Skin is warm and dry.      Findings: No rash.   Neurological:      Mental Status: She is alert and  oriented to person, place, and time.      Cranial Nerves: No cranial nerve deficit.      Motor: No abnormal muscle tone.      Deep Tendon Reflexes: Reflexes normal.   Psychiatric:         Behavior: Behavior normal.         DIAGNOSTIC DATA:    Results from last 7 days   Lab Units 11/05/22  0756 11/05/22  0144 11/04/22  2107 11/04/22  1155   WBC 10*3/mm3 12.94*  --   --  10.96*   HEMOGLOBIN g/dL 9.4*  9.4* 8.7* 8.3* 6.9*   HEMATOCRIT % 28.7*  28.7* 26.5* 25.1* 21.6*   PLATELETS 10*3/mm3 251  --   --  275      Results from last 7 days   Lab Units 11/05/22  0756 11/04/22  1155   SODIUM mmol/L 136 137   POTASSIUM mmol/L 4.3 4.4   CHLORIDE mmol/L 98 103   CO2 mmol/L 26.9 25.3   BUN mg/dL 13 12   CREATININE mg/dL 0.82 0.84   CALCIUM mg/dL 9.1 8.6   BILIRUBIN mg/dL 0.7 0.4   ALK PHOS U/L 100 79   ALT (SGPT) U/L 15 13   AST (SGOT) U/L 24 27   GLUCOSE mg/dL 115* 88        Assessment & Plan   ASSESSMENT:  This is a 87 y.o. female with:    *Acute exacerbation of chronic anemia  · Patient is followed for anemia in the [place] system by Dr. Mancia.    · She has had evidence of iron deficiency in the past in addition to anemia secondary to chronic disease.    · Hemoglobin has generally been in the 9-10 range with normal MCV.    · Patient is supposed to take oral iron sulfate 325 mg daily however reports that she has not been on this for quite some time due to constipation issues.  She is compliant with oral B12 1000 mcg daily at home.  · Patient was hospitalized recently 10/25 - 10/28/2022 due to COVID-19 pneumonia. During her hospitalization, hemoglobin declined into the 7-8 range.  Anemia evaluation 10/28/2022 showed iron 17, ferritin 178, iron saturation 6%, TIBC 274, CRP 9.74 (in the midst of COVID-19 infection).  · Patient is currently admitted with symptoms of urinary frequency and incontinence with hallucinations.  Labs on admission 11/4/2022 with hemoglobin 6.9, MCV 84.7.    · Stool negative for occult blood on  11/4/2022.    · Patient received 1 unit PRBC transfusion with improvement in hemoglobin.  On most recent check hemoglobin 9.5.  · Patient's worsening anemia from her baseline is likely related to her recent COVID-19 infection and worsening anemia of chronic disease.  Recent labs with falsely elevated ferritin on 10/28/2022 at 178 due to underlying COVID-19 infection.  Remainder of her labs did show a low iron saturation at 6% although low TIBC at 274, likely all consistent with anemia of chronic disease.  Stool is negative for occult blood, no clinical evidence of GI blood loss.  We will go ahead and recheck iron panel and ferritin, as we are slightly further out from her acute COVID-19 infection.  In the hospital she is continuing on oral iron sulfate 325 mg daily although she does not take this at home due to constipation.  We will check B12 and folate studies which have not been checked recently.  Patient does take an oral B12 supplement at home.  Would recommend further transfusion support for hemoglobin less than 7.0.  We will discuss potential need for IV iron pending her repeat iron panel and ferritin.    *Recent COVID-19 infection  · Patient was hospitalized recently 10/25 - 10/28/2022 due to COVID-19 pneumonia.    · She required increase in her baseline O2 requirement from 2 L up to 4 L nasal cannula and was treated with dexamethasone initially however developed delirium and this was discontinued.    · She completed a course of remdesivir.    · She was weaned back to 2 L O2 nasal cannula at the time of discharge.  · Patient with recurrent fever to 100.6 on 11/4/2022  · Patient currently afebrile    *Atrial fibrillation  · Patient is on Xarelto 20 mg daily at home  · Xarelto held since admission due to worsening anemia with concern for possible GI blood loss  · Stool is negative for occult blood, no clinical evidence of GI bleeding, could likely resume anticoagulation at any time.    *Myasthenia  gravis  · The patient has myasthenia gravis followed by neurology in the [facility], is on treatment with CellCept 1000 mg a.m., 1500 mg p.m. and prednisone 5 mg daily.    *Chronic hypoxic respiratory failure  • Patient with underlying COPD, obstructive sleep apnea  • Chronically on 2 L O2 nasal cannula  • Patient with recent transient worsening of respiratory status with COVID-19 infection during hospitalization but transitioned back to 2 L O2 nasal cannula at discharge  • Patient currently on 3 L O2 nasal cannula    *Transient confusion/altered mental status  • Patient presented with confusion, hallucinations  • Mental status has normalized, possibly related to anemia, hypoxia.    *Osteoporosis  · Patient is on raloxifene 60 mg daily  · This does confer some hypercoagulable risk, would hold at least during hospitalization in the setting of recent COVID-19 infection    *Disposition  · Patient and her son do wonder whether she should go to subacute rehab rather than going back to assisted living for now.  She was having difficulty in assisted living after recent hospitalization.  I have asked them to discuss this with [facility] when available on Monday.    PLAN:   1. Check additional labs today with iron panel, ferritin, B12, folate, CRP, ESR  2. With no clinical evidence of GI blood loss in stool negative for occult blood could likely resume Xarelto at any time for atrial fibrillation  3. Currently patient with every 8 hour H/H monitoring.  We will discontinue this and recheck CBC in a.m.  4. Patient continues on oral B12 1000 mcg daily  5. Patient continues on oral iron sulfate 325 mg daily  6. CBC in a.m.  7. Following discharge patient will follow-up with Dr. Mancia in the [facility] for further hematologic care.    Discussed with patient at bedside        Carlin Mathis MD

## 2022-11-05 NOTE — OUTREACH NOTE
COVID-19 Week 2 Survey    Flowsheet Row Responses   [facility] facility patient discharged from? [place]   Does the patient have one of the following disease processes/diagnoses(primary or secondary)? COVID-19   COVID-19 underlying condition? COPD   Call Number Call 1   COVID-19 Week 2: Call 1 attempt successful? No   Revoke Readmitted   Discharge diagnosis Pneumonia due to COVID-19 virus          LUIS F CHEATHAM - Registered Nurse

## 2022-11-05 NOTE — PROGRESS NOTES
" LOS: 1 day     Name: Olena Myers  Age: 87 y.o.  Sex: female  :  1935  MRN: [id number]         Primary Care Physician: Manda Keenan MD    Subjective   Subjective  Patient is awake and oriented x3 today.  She recognizes that she was confused yesterday but this has since resolved.  She received a unit of packed red blood cells in the emergency room.  On 2-3 L which is her baseline.  Asking when she can go home.  Denies any chest pain.  She is always short of breath and denies any increase of this beyond her baseline.  No abdominal pain.    Objective   Vital Signs  Temp:  [97 °F (36.1 °C)-100.6 °F (38.1 °C)] 97 °F (36.1 °C)  Heart Rate:  [] 85  Resp:  [16-20] 18  BP: (125-159)/(59-95) 134/95  Body mass index is 32.72 kg/m².    Objective:  General Appearance:  Comfortable, in no acute distress and ill-appearing (She looks chronically ill, weak and deconditioned).    Vital signs: (most recent): Blood pressure 134/95, pulse 85, temperature 97 °F (36.1 °C), temperature source Oral, resp. rate 18, height 152.4 cm (60\"), weight 76 kg (167 lb 8.8 oz), SpO2 97 %.    Lungs:  Normal effort and normal respiratory rate.  She is not in respiratory distress.  There are decreased breath sounds.    Heart: Normal rate.  Regular rhythm.    Abdomen: Abdomen is soft.  Bowel sounds are normal.   There is no abdominal tenderness.     Extremities: There is no dependent edema or local swelling.    Neurological: Patient is alert and oriented to person, place and time.    Skin:  Warm and dry.              Results Review:       I reviewed the patient's new clinical results.    Results from last 7 days   Lab Units 22  0756 22  0144 22  1155   WBC 10*3/mm3 12.94*  --   --  10.96*   HEMOGLOBIN g/dL 9.4*  9.4* 8.7* 8.3* 6.9*   PLATELETS 10*3/mm3 251  --   --  275     Results from last 7 days   Lab Units 22  0756 22  1155   SODIUM mmol/L 136 137   POTASSIUM mmol/L 4.3 4.4   CHLORIDE " mmol/L 98 103   CO2 mmol/L 26.9 25.3   BUN mg/dL 13 12   CREATININE mg/dL 0.82 0.84   CALCIUM mg/dL 9.1 8.6   GLUCOSE mg/dL 115* 88                 Scheduled Meds:   azelastine, 2 spray, Each Nare, Daily  budesonide-formoterol, 2 puff, Inhalation, BID - RT   And  tiotropium bromide monohydrate, 1 puff, Inhalation, Daily - RT  busPIRone, 10 mg, Oral, BID  DULoxetine, 60 mg, Oral, BID  ferrous sulfate, 325 mg, Oral, Daily With Breakfast  levothyroxine, 88 mcg, Oral, Daily  metoprolol succinate XL, 25 mg, Oral, BID  montelukast, 10 mg, Oral, Nightly  mycophenolate, 1,000 mg, Oral, QAM  mycophenolate, 1,500 mg, Oral, Q PM  pantoprazole, 40 mg, Oral, QAM  pravastatin, 20 mg, Oral, Nightly  predniSONE, 5 mg, Oral, Daily  raloxifene, 60 mg, Oral, Nightly  sodium chloride, 10 mL, Intravenous, Q12H  torsemide, 20 mg, Oral, Daily  vitamin B-12, 1,000 mcg, Oral, Daily  vitamin D, 50,000 Units, Oral, Q7 Days      PRN Meds:   •  acetaminophen **OR** acetaminophen **OR** acetaminophen  •  albuterol  •  nitroglycerin  •  ondansetron  •  sodium chloride  Continuous Infusions:       Assessment & Plan   Active Hospital Problems    Diagnosis  POA   • **Anemia [D64.9]  Yes   • Altered mental status [R41.82]  Unknown   • Chronic respiratory failure with hypoxia ([facility]) [J96.11]  Yes   • COVID-19 virus infection [U07.1]  Unknown   • Chronic diastolic CHF (congestive heart failure) ([facility]) [I50.32]  Yes   • COPD (chronic obstructive pulmonary disease) ([facility]) [J44.9]  Yes   • Sleep apnea [G47.30]  Yes   • Paroxysmal atrial fibrillation ([facility]) [I48.0]  Yes   • Myasthenia gravis ([facility]) [G70.00]  Yes   • HX: long term anticoagulant use [Z92.29]  Not Applicable   • Essential hypertension [I10]  Yes      Resolved Hospital Problems   No resolved problems to display.       Assessment & Plan    -She has had excellent response to 1 unit of packed red blood cells in the emergency room yesterday.  Continue to monitor hemoglobin for now.  No signs of  bleeding at present.  Anticoagulation on hold for the moment.  -Supportive care for COVID-19.  Does not require remdesivir or Decadron.  Oxygenation at her baseline.   -Low-grade fever of 100.6 overnight noted.  Blood cultures are pending.  Urinalysis and chest x-ray did not reveal any source of infection.  Could be coming from the COVID 19.  Will monitor for recurrence of fever and if so we will conduct additional work-up.  -Continue immunosuppressive agents for myasthenia gravis.  Prednisone and CellCept.  -Her confusion appears to be resolved.  Will monitor for any recurrence.  -We will have therapy services see her.    Dispo  If she remains stable then anticipate discharge back to the nursing home soon.    Kb Johnson MD  [facility]  11/05/22  11:41 EDT

## 2022-11-06 LAB
BASOPHILS # BLD AUTO: 0.04 10*3/MM3 (ref 0–0.2)
BASOPHILS NFR BLD AUTO: 0.4 % (ref 0–1.5)
DEPRECATED RDW RBC AUTO: 44 FL (ref 37–54)
EOSINOPHIL # BLD AUTO: 0.16 10*3/MM3 (ref 0–0.4)
EOSINOPHIL NFR BLD AUTO: 1.6 % (ref 0.3–6.2)
ERYTHROCYTE [DISTWIDTH] IN BLOOD BY AUTOMATED COUNT: 14.3 % (ref 12.3–15.4)
FOLATE SERPL-MCNC: 7.65 NG/ML (ref 4.78–24.2)
HCT VFR BLD AUTO: 28.4 % (ref 34–46.6)
HGB BLD-MCNC: 9.1 G/DL (ref 12–15.9)
IMM GRANULOCYTES # BLD AUTO: 0.09 10*3/MM3 (ref 0–0.05)
IMM GRANULOCYTES NFR BLD AUTO: 0.9 % (ref 0–0.5)
LYMPHOCYTES # BLD AUTO: 0.39 10*3/MM3 (ref 0.7–3.1)
LYMPHOCYTES NFR BLD AUTO: 3.9 % (ref 19.6–45.3)
MCH RBC QN AUTO: 26.8 PG (ref 26.6–33)
MCHC RBC AUTO-ENTMCNC: 32 G/DL (ref 31.5–35.7)
MCV RBC AUTO: 83.5 FL (ref 79–97)
MONOCYTES # BLD AUTO: 0.49 10*3/MM3 (ref 0.1–0.9)
MONOCYTES NFR BLD AUTO: 4.9 % (ref 5–12)
NEUTROPHILS NFR BLD AUTO: 8.92 10*3/MM3 (ref 1.7–7)
NEUTROPHILS NFR BLD AUTO: 88.3 % (ref 42.7–76)
NRBC BLD AUTO-RTO: 0 /100 WBC (ref 0–0.2)
PLATELET # BLD AUTO: 240 10*3/MM3 (ref 140–450)
PMV BLD AUTO: 9.4 FL (ref 6–12)
RBC # BLD AUTO: 3.4 10*6/MM3 (ref 3.77–5.28)
VIT B12 BLD-MCNC: 1666 PG/ML (ref 211–946)
WBC NRBC COR # BLD: 10.09 10*3/MM3 (ref 3.4–10.8)

## 2022-11-06 PROCEDURE — 25010000002 NA FERRIC GLUC CPLX PER 12.5 MG: Performed by: INTERNAL MEDICINE

## 2022-11-06 PROCEDURE — 97162 PT EVAL MOD COMPLEX 30 MIN: CPT

## 2022-11-06 PROCEDURE — 94664 DEMO&/EVAL PT USE INHALER: CPT

## 2022-11-06 PROCEDURE — 97110 THERAPEUTIC EXERCISES: CPT

## 2022-11-06 PROCEDURE — 63710000001 PREDNISONE PER 5 MG: Performed by: INTERNAL MEDICINE

## 2022-11-06 PROCEDURE — 82607 VITAMIN B-12: CPT | Performed by: INTERNAL MEDICINE

## 2022-11-06 PROCEDURE — 99232 SBSQ HOSP IP/OBS MODERATE 35: CPT | Performed by: INTERNAL MEDICINE

## 2022-11-06 PROCEDURE — 85025 COMPLETE CBC W/AUTO DIFF WBC: CPT | Performed by: INTERNAL MEDICINE

## 2022-11-06 PROCEDURE — 94799 UNLISTED PULMONARY SVC/PX: CPT

## 2022-11-06 PROCEDURE — 82746 ASSAY OF FOLIC ACID SERUM: CPT | Performed by: INTERNAL MEDICINE

## 2022-11-06 PROCEDURE — 63710000001 DIPHENHYDRAMINE PER 50 MG: Performed by: INTERNAL MEDICINE

## 2022-11-06 PROCEDURE — 63710000001 MYCOPHENOLATE MOFETIL PER 250 MG: Performed by: INTERNAL MEDICINE

## 2022-11-06 RX ORDER — OLANZAPINE 5 MG/1
5 TABLET ORAL NIGHTLY PRN
Status: DISCONTINUED | OUTPATIENT
Start: 2022-11-06 | End: 2022-11-11 | Stop reason: HOSPADM

## 2022-11-06 RX ORDER — DIPHENHYDRAMINE HCL 25 MG
25 CAPSULE ORAL DAILY
Status: COMPLETED | OUTPATIENT
Start: 2022-11-06 | End: 2022-11-07

## 2022-11-06 RX ORDER — FAMOTIDINE 10 MG/ML
20 INJECTION, SOLUTION INTRAVENOUS DAILY
Status: COMPLETED | OUTPATIENT
Start: 2022-11-06 | End: 2022-11-07

## 2022-11-06 RX ADMIN — DULOXETINE HYDROCHLORIDE 60 MG: 60 CAPSULE, DELAYED RELEASE ORAL at 21:12

## 2022-11-06 RX ADMIN — Medication 10 ML: at 21:13

## 2022-11-06 RX ADMIN — Medication 1000 MCG: at 09:30

## 2022-11-06 RX ADMIN — BUDESONIDE AND FORMOTEROL FUMARATE DIHYDRATE 2 PUFF: 160; 4.5 AEROSOL RESPIRATORY (INHALATION) at 20:00

## 2022-11-06 RX ADMIN — LEVOTHYROXINE SODIUM 88 MCG: 0.09 TABLET ORAL at 09:30

## 2022-11-06 RX ADMIN — BUSPIRONE HYDROCHLORIDE 10 MG: 10 TABLET ORAL at 09:30

## 2022-11-06 RX ADMIN — AZELASTINE HYDROCHLORIDE 2 SPRAY: 137 SPRAY, METERED NASAL at 09:30

## 2022-11-06 RX ADMIN — FERROUS SULFATE TAB 325 MG (65 MG ELEMENTAL FE) 325 MG: 325 (65 FE) TAB at 09:30

## 2022-11-06 RX ADMIN — MYCOPHENOLATE MOFETIL 1000 MG: 500 TABLET ORAL at 07:26

## 2022-11-06 RX ADMIN — TIOTROPIUM BROMIDE INHALATION SPRAY 1 PUFF: 3.12 SPRAY, METERED RESPIRATORY (INHALATION) at 08:51

## 2022-11-06 RX ADMIN — TORSEMIDE 20 MG: 20 TABLET ORAL at 09:30

## 2022-11-06 RX ADMIN — METOPROLOL SUCCINATE 25 MG: 25 TABLET, EXTENDED RELEASE ORAL at 21:12

## 2022-11-06 RX ADMIN — MYCOPHENOLATE MOFETIL 1500 MG: 500 TABLET ORAL at 18:00

## 2022-11-06 RX ADMIN — FAMOTIDINE 20 MG: 10 INJECTION INTRAVENOUS at 16:07

## 2022-11-06 RX ADMIN — PRAVASTATIN SODIUM 20 MG: 20 TABLET ORAL at 21:13

## 2022-11-06 RX ADMIN — SODIUM CHLORIDE 250 MG: 9 INJECTION, SOLUTION INTRAVENOUS at 16:06

## 2022-11-06 RX ADMIN — ACETAMINOPHEN 650 MG: 325 TABLET, FILM COATED ORAL at 18:27

## 2022-11-06 RX ADMIN — RIVAROXABAN 20 MG: 20 TABLET, FILM COATED ORAL at 18:01

## 2022-11-06 RX ADMIN — PREDNISONE 5 MG: 10 TABLET ORAL at 09:30

## 2022-11-06 RX ADMIN — DIPHENHYDRAMINE HYDROCHLORIDE 25 MG: 25 CAPSULE ORAL at 16:07

## 2022-11-06 RX ADMIN — MONTELUKAST SODIUM 10 MG: 10 TABLET, FILM COATED ORAL at 21:13

## 2022-11-06 RX ADMIN — DULOXETINE HYDROCHLORIDE 60 MG: 60 CAPSULE, DELAYED RELEASE ORAL at 09:30

## 2022-11-06 RX ADMIN — BUSPIRONE HYDROCHLORIDE 10 MG: 10 TABLET ORAL at 21:12

## 2022-11-06 RX ADMIN — METOPROLOL SUCCINATE 25 MG: 25 TABLET, EXTENDED RELEASE ORAL at 09:30

## 2022-11-06 RX ADMIN — PANTOPRAZOLE SODIUM 40 MG: 40 TABLET, DELAYED RELEASE ORAL at 07:27

## 2022-11-06 RX ADMIN — BUDESONIDE AND FORMOTEROL FUMARATE DIHYDRATE 2 PUFF: 160; 4.5 AEROSOL RESPIRATORY (INHALATION) at 08:51

## 2022-11-06 RX ADMIN — Medication 10 ML: at 09:31

## 2022-11-06 NOTE — PROGRESS NOTES
" LOS: 2 days     Name: Olena Myers  Age: 87 y.o.  Sex: female  :  1935  MRN: [id number]         Primary Care Physician: Manda Keenan MD    Subjective   Subjective  No new complaints or acute overnight events.  Mentation improved overall.  Confusion still waxes and wanes a little bit per son at the bedside.    Objective   Vital Signs  Temp:  [97.3 °F (36.3 °C)-98.1 °F (36.7 °C)] 98 °F (36.7 °C)  Heart Rate:  [78-99] 99  Resp:  [18] 18  BP: (138-158)/(65-90) 158/65  Body mass index is 32.72 kg/m².    Objective:  General Appearance:  Comfortable, in no acute distress and ill-appearing (She looks chronically ill, weak and deconditioned).    Vital signs: (most recent): Blood pressure 158/65, pulse 99, temperature 98 °F (36.7 °C), temperature source Oral, resp. rate 18, height 152.4 cm (60\"), weight 76 kg (167 lb 8.8 oz), SpO2 99 %.    Lungs:  Normal effort and normal respiratory rate.    Heart: Normal rate.  Regular rhythm.    Abdomen: Abdomen is soft.  Bowel sounds are normal.   There is no abdominal tenderness.     Extremities: There is no dependent edema or local swelling.    Neurological: Patient is alert and oriented to person, place and time.    Skin:  Warm and dry.              Results Review:       I reviewed the patient's new clinical results.    Results from last 7 days   Lab Units 22  0353 22  1540 22  0756 22  0144 22  2107 22  1155   WBC 10*3/mm3 10.09  --  12.94*  --   --  10.96*   HEMOGLOBIN g/dL 9.1* 9.5* 9.4*  9.4* 8.7* 8.3* 6.9*   PLATELETS 10*3/mm3 240  --  251  --   --  275     Results from last 7 days   Lab Units 22  0756 22  1155   SODIUM mmol/L 136 137   POTASSIUM mmol/L 4.3 4.4   CHLORIDE mmol/L 98 103   CO2 mmol/L 26.9 25.3   BUN mg/dL 13 12   CREATININE mg/dL 0.82 0.84   CALCIUM mg/dL 9.1 8.6   GLUCOSE mg/dL 115* 88                 Scheduled Meds:   azelastine, 2 spray, Each Nare, Daily  budesonide-formoterol, 2 puff, Inhalation, BID - " RT   And  tiotropium bromide monohydrate, 1 puff, Inhalation, Daily - RT  busPIRone, 10 mg, Oral, BID  DULoxetine, 60 mg, Oral, BID  ferrous sulfate, 325 mg, Oral, Daily With Breakfast  levothyroxine, 88 mcg, Oral, Daily  metoprolol succinate XL, 25 mg, Oral, BID  montelukast, 10 mg, Oral, Nightly  mycophenolate, 1,000 mg, Oral, QAM  mycophenolate, 1,500 mg, Oral, Q PM  pantoprazole, 40 mg, Oral, QAM  pravastatin, 20 mg, Oral, Nightly  predniSONE, 5 mg, Oral, Daily  rivaroxaban, 20 mg, Oral, Daily With Dinner  sodium chloride, 10 mL, Intravenous, Q12H  torsemide, 20 mg, Oral, Daily  vitamin B-12, 1,000 mcg, Oral, Daily  vitamin D, 50,000 Units, Oral, Q7 Days      PRN Meds:   •  acetaminophen **OR** acetaminophen **OR** acetaminophen  •  albuterol  •  nitroglycerin  •  ondansetron  •  sodium chloride  Continuous Infusions:       Assessment & Plan   Active Hospital Problems    Diagnosis  POA   • **Anemia [D64.9]  Yes   • Altered mental status [R41.82]  Unknown   • Chronic respiratory failure with hypoxia ([facility]) [J96.11]  Yes   • COVID-19 virus infection [U07.1]  Unknown   • Chronic diastolic CHF (congestive heart failure) ([facility]) [I50.32]  Yes   • COPD (chronic obstructive pulmonary disease) ([facility]) [J44.9]  Yes   • Sleep apnea [G47.30]  Yes   • Paroxysmal atrial fibrillation ([facility]) [I48.0]  Yes   • Myasthenia gravis ([facility]) [G70.00]  Yes   • HX: long term anticoagulant use [Z92.29]  Not Applicable   • Essential hypertension [I10]  Yes      Resolved Hospital Problems   No resolved problems to display.       Assessment & Plan    -Anemia stable.  Restart Xarelto today.  -Supportive care for COVID-19.  Does not require remdesivir or Decadron.  Oxygenation at her baseline.   -No additional fevers.  Monitor off antibiotics.  -Continue immunosuppressive agents for myasthenia gravis.  Prednisone and CellCept.  -Her confusion appears to be overall improved.  -Therapy services     Dispo  Discussed with her son at the bedside.  She  came from assisted living and they are requesting rehab placement.      Kb Johnson MD  [facility]  11/06/22  11:09 EST

## 2022-11-06 NOTE — PROGRESS NOTES
[facility] GROUP INPATIENT PROGRESS NOTE    Length of Stay:  2 days    CHIEF COMPLAINT:  Anemia, recent COVID-19 infection, atrial fibrillation, myasthenia gravis, chronic hypoxic respiratory failure, transient confusion/altered mental status    SUBJECTIVE:   Patient with no new complaints today.  She has a continued minimal cough.    ROS:  Review of Systems   A comprehensive review of systems was obtained with pertinent positive findings as noted in the interval history above.  All other systems negative.      OBJECTIVE:  Vitals:    11/05/22 2003 11/05/22 2258 11/05/22 2300 11/06/22 0803   BP:  138/74 138/74 158/65   BP Location:  Left arm  Left arm   Patient Position:  Lying  Lying   Pulse: 83 81 83 90   Resp: 18 18  18   Temp:  97.7 °F (36.5 °C)  98 °F (36.7 °C)   TempSrc:  Oral  Oral   SpO2: 99% 99% 100% 96%   Weight:       Height:             PHYSICAL EXAMINATION:  General: Alert and orient x3 no distress  Chest/Lungs: Clear to auscultation bilaterally  Heart: Regular rate and rhythm with 2/6 murmur  Abdomen/GI: Soft nontender nondistended bowel sounds present  Extremities: No edema    DIAGNOSTIC DATA:  Results Review:     I reviewed the patient's new clinical results.    Results from last 7 days   Lab Units 11/06/22  0353 11/05/22  1540 11/05/22  0756 11/04/22  2107 11/04/22  1155   WBC 10*3/mm3 10.09  --  12.94*  --  10.96*   HEMOGLOBIN g/dL 9.1* 9.5* 9.4*  9.4*   < > 6.9*   HEMATOCRIT % 28.4* 29.6* 28.7*  28.7*   < > 21.6*   PLATELETS 10*3/mm3 240  --  251  --  275    < > = values in this interval not displayed.      Results from last 7 days   Lab Units 11/05/22  0756 11/04/22  1155   SODIUM mmol/L 136 137   POTASSIUM mmol/L 4.3 4.4   CHLORIDE mmol/L 98 103   CO2 mmol/L 26.9 25.3   BUN mg/dL 13 12   CREATININE mg/dL 0.82 0.84   CALCIUM mg/dL 9.1 8.6   BILIRUBIN mg/dL 0.7 0.4   ALK PHOS U/L 100 79   ALT (SGPT) U/L 15 13   AST (SGOT) U/L 24 27   GLUCOSE mg/dL 115* 88      Lab Results   Component  Value Date    NEUTROABS 8.92 (H) 11/06/2022         Results from last 7 days   Lab Units 11/04/22  1155   MAGNESIUM mg/dL 1.8         Assessment & Plan   ASSESSMENT/PLAN:  This is a 87 y.o. female with:     *Acute exacerbation of chronic anemia  • Patient is followed for anemia in the [place] system by Dr. Mancia.    • She has had evidence of iron deficiency in the past in addition to anemia secondary to chronic disease.    • Hemoglobin has generally been in the 9-10 range with normal MCV.    • Patient is supposed to take oral iron sulfate 325 mg daily however reports that she has not been on this for quite some time due to constipation issues.  She is compliant with oral B12 1000 mcg daily at home.  • Patient was hospitalized recently 10/25 - 10/28/2022 due to COVID-19 pneumonia. During her hospitalization, hemoglobin declined into the 7-8 range.  Anemia evaluation 10/28/2022 showed iron 17, ferritin 178, iron saturation 6%, TIBC 274, CRP 9.74 (in the midst of COVID-19 infection).  • Patient is currently admitted with symptoms of urinary frequency and incontinence with hallucinations.  Labs on admission 11/4/2022 with hemoglobin 6.9, MCV 84.7.    • Stool negative for occult blood on 11/4/2022.    • Patient received 1 unit PRBC transfusion with improvement in hemoglobin.    Hemoglobin improved up to hemoglobin 9.5.  • Labs on 11/5/2022 with iron 18, ferritin 146, iron saturation 7%, TIBC 273, folate 7.65, B12 1666, CRP 15.32  • Hemoglobin today stable at 9.1.  Labs appear to represent primarily anemia of chronic disease however there may be a component of ongoing iron deficiency.  Given the patient's poor tolerance of oral iron it may be prudent to proceed on with IV iron during hospitalization.  We will proceed with Ferrlecit 250 mg IV daily x2 doses.     *Recent COVID-19 infection  • Patient was hospitalized recently 10/25 - 10/28/2022 due to COVID-19 pneumonia.    • She required increase in her baseline O2  requirement from 2 L up to 4 L nasal cannula and was treated with dexamethasone initially however developed delirium and this was discontinued.    • She completed a course of remdesivir.    • She was weaned back to 2 L O2 nasal cannula at the time of discharge.  • Patient with recurrent fever to 100.6 on 11/4/2022  • Patient currently afebrile, minimal residual cough     *Atrial fibrillation  • Patient is on Xarelto 20 mg daily at home  • Xarelto held since admission due to worsening anemia with concern for possible GI blood loss  • Stool is negative for occult blood, no clinical evidence of GI bleeding  • Patient resumed Xarelto today 20 mg daily     *Myasthenia gravis  • The patient has myasthenia gravis followed by neurology in the [facility], is on treatment with CellCept 1000 mg a.m., 1500 mg p.m. and prednisone 5 mg daily.     *Chronic hypoxic respiratory failure  • Patient with underlying COPD, obstructive sleep apnea  • Chronically on 2 L O2 nasal cannula  • Patient with recent transient worsening of respiratory status with COVID-19 infection during hospitalization but transitioned back to 2 L O2 nasal cannula at discharge  • Patient currently on 3 L O2 nasal cannula     *Transient confusion/altered mental status  • Patient presented with confusion, hallucinations  • Mental status has normalized, possibly related to anemia, hypoxia.     *Osteoporosis  • Patient is on raloxifene 60 mg daily  • This does confer some hypercoagulable risk, would hold at least during hospitalization in the setting of recent COVID-19 infection     *Disposition  • Patient is going to subacute rehab prior to returning to her assisted living facility.     PLAN:   1. Proceed with Ferrlecit 250 mg IV daily x2 doses  2. Patient has resumed Xarelto 20 mg daily with no evidence of GI blood loss  3. Discontinue every 8 hour H/H monitoring  4. Patient continues on oral B12 1000 mcg daily  5. Patient continues currently on oral iron  sulfate 325 mg daily although does not tolerate this as outpatient  6. Daily CBC  7. Following discharge patient will follow-up with Dr. Mancia in the [place] system for further hematologic care  8. We will sign off, please call with further questions.    Discussed with patient and son at bedside             Carlin Mathis MD

## 2022-11-06 NOTE — THERAPY EVALUATION
Patient Name: Olena Myers  : 1935    MRN: [id number]                              Today's Date: 2022       Admit Date: 2022    Visit Dx:     ICD-10-CM ICD-9-CM   1. Acute encephalopathy  G93.40 348.30   2. Acute on chronic anemia  D64.9 285.9   3. COVID  U07.1 079.89   4. History of COPD  Z87.09 V12.69   5. History of atrial fibrillation  Z86.79 V12.59   6. Chronic anticoagulation  Z79.01 V58.61   7. History of myasthenia gravis  Z86.69 V12.49     Patient Active Problem List   Diagnosis   • Pre-operative cardiovascular examination   • S/p reverse total shoulder arthroplasty   • Myasthenia gravis (HCC)   • Paroxysmal atrial fibrillation (HCC)   • HX: long term anticoagulant use   • Essential hypertension   • CAD (coronary artery disease)   • Rhinovirus   • Atrial fibrillation (HCC)   • S/P reverse total shoulder arthroplasty, right   • Acute UTI   • COPD (chronic obstructive pulmonary disease) (HCC)   • Metabolic encephalopathy   • Sleep apnea   • Sepsis (HCC)   • Dyspnea   • Chronic diastolic CHF (congestive heart failure) (HCC)   • Diastolic CHF, chronic (HCC)   • Heme positive stool   • E coli bacteremia   • Iron deficiency anemia   • Current chronic use of systemic steroids   • COPD exacerbation (HCC)   • Obesity (BMI 30-39.9)   • COVID-19 virus infection   • Chronic respiratory failure with hypoxia (HCC)   • Cytokine release syndrome, grade 1   • Altered mental status   • Anemia     Past Medical History:   Diagnosis Date   • Anemia     IRON DEFICIENCY   • Arthritis    • Asthma    • Atrial fibrillation (HCC)    • CAD (coronary artery disease)     HEART STENT   • COPD (chronic obstructive pulmonary disease) (HCC)    • 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   • Frequent urinary tract infections    • History of gastric ulcer    • History of GI bleed    • [place] (hard of hearing)    • Hyperlipidemia    • Hypertension    • Hyponatremia    • Lightheadedness    • LVH (left ventricular hypertrophy)    •  MG ([facility]asthenia gravis) ([facility])    • Mini stroke 10/2016, 3/2017   • OA (osteoarthritis)    • Osteoporosis    • Sleep apnea     USES CPAP   • SOB (shortness of breath)     WALKING     Past Surgical History:   Procedure Laterality Date   • APPENDECTOMY     • BRONCHOSCOPY N/A 3/31/2022    Procedure: BRONCHOSCOPY WITH BAL RIGHT LOWER LOBE;  Surgeon: Remy Tirado MD;  Location: [facility] ENDOSCOPY;  Service: Pulmonary;  Laterality: N/A;  COPD EXACERBATION     • CARPAL TUNNEL RELEASE Bilateral    • CATARACT EXTRACTION Bilateral    • CORONARY STENT PLACEMENT     • TOTAL HIP ARTHROPLASTY Bilateral    • TOTAL SHOULDER ARTHROPLASTY W/ DISTAL CLAVICLE EXCISION Left 7/19/2016    Procedure: LT TOTAL SHOULDER REVERSE ARTHROPLASTY;  Surgeon: NAHOMI Solorzano MD;  Location:  [facility];  Service:    • TOTAL SHOULDER ARTHROPLASTY W/ DISTAL CLAVICLE EXCISION Right 1/8/2019    Procedure: RIGHT TOTAL SHOULDER REVERSE ARTHROPLASTY;  Surgeon: Jovanny Solorzano MD;  [place] [facility] [place] [facility] [place]  Service: [place]opedics      General Information     Row Name 11/06/22 1542          Physical Therapy Time and Intention    Document Type evaluation  -AR     Mode of Treatment physical therapy  -AR     Row Name 11/06/22 1542          General Information    Patient Profile Reviewed yes  -AR     Prior Level of Function min assist:  per chart from [facility], unable to provide any history.  pt confused, unable to be re-oriented  -AR     Existing Precautions/Restrictions fall  -AR     Barriers to Rehab none identified  -AR     Row Name 11/06/22 1542          Living Environment    People in Home alone  -AR     Row Name 11/06/22 1542          Home Main Entrance    Number of Stairs, Main Entrance none  -AR     Row Name 11/06/22 1542          Cognition    Orientation Status (Cognition) disoriented to;place;situation  Pt t hinks she is in living room, attempted reorientation several times, pt still 'waiting for '  and 'in living room'  -AR     Row Name 11/06/22  1542          Safety Issues, Functional Mobility    Safety Issues Affecting Function (Mobility) insight into deficits/self-awareness;problem-solving;at risk behavior observed  -AR     Impairments Affecting Function (Mobility) balance;cognition;endurance/activity tolerance;strength;shortness of breath  -AR           User Key  (r) = Recorded By, (t) = Taken By, (c) = Cosigned By    Initials Name Provider Type    AR Josefa Albright, PT Physical Therapist               Mobility     Row Name 11/06/22 1544          Bed Mobility    Comment, (Bed Mobility) in chair  -AR     Row Name 11/06/22 1544          Sit-Stand Transfer    Sit-Stand [place] (Transfers) contact guard  -AR     Assistive Device (Sit-Stand Transfers) walker, front-wheeled  -AR     Comment, (Sit-Stand Transfer) cues for UE placement  -AR     Row Name 11/06/22 1544          Gait/Stairs (Locomotion)    [place] Level (Gait) contact guard  -AR     Assistive Device (Gait) walker, front-wheeled  -AR     Distance in Feet (Gait) 35' limited by SOB, requesting to sit down.  Sp02 still in 90s on 02  -AR     Deviations/Abnormal Patterns (Gait) gait speed decreased;festinating/shuffling  -AR     Bilateral Gait Deviations forward flexed posture;heel strike decreased  -AR           User Key  (r) = Recorded By, (t) = Taken By, (c) = Cosigned By    Initials Name Provider Type    AR Josefa Albright, PT Physical Therapist               Obj/Interventions     Row Name 11/06/22 1545          Range of Motion Comprehensive    Comment, General Range of Motion B LE WFL  -AR     Row Name 11/06/22 1545          Strength Comprehensive (MMT)    Comment, General Manual Muscle Testing (MMT) Assessment B LE -4/5  -AR     Row Name 11/06/22 1545          Motor Skills    Therapeutic Exercise --  B  LAQ 10x  -AR     Row Name 11/06/22 1545          Balance    Balance Assessment standing dynamic balance  -AR     Dynamic Standing Balance minimal assist  -AR     Position/Device Used,  Standing Balance walker, rolling  -AR           User Key  (r) = Recorded By, (t) = Taken By, (c) = Cosigned By    Initials Name Provider Type    AR Josefa Albright, PT Physical Therapist               Goals/Plan     Row Name 11/06/22 1548          Bed Mobility Goal 1 (PT)    Activity/Assistive Device (Bed Mobility Goal 1, PT) bed mobility activities, all  -AR     [place] Level/Cues Needed (Bed Mobility Goal 1, PT) standby assist  -AR     Time Frame (Bed Mobility Goal 1, PT) 1 week  -AR     Row Name 11/06/22 1548          Transfer Goal 1 (PT)    Activity/Assistive Device (Transfer Goal 1, PT) sit-to-stand/stand-to-sit;bed-to-chair/chair-to-bed;walker, rolling  -AR     [place] Level/Cues Needed (Transfer Goal 1, PT) standby assist  -AR     Time Frame (Transfer Goal 1, PT) 1 week  -AR     Row Name 11/06/22 1548          Gait Training Goal 1 (PT)    Activity/Assistive Device (Gait Training Goal 1, PT) gait (walking locomotion);walker, rolling  -AR     [place] Level (Gait Training Goal 1, PT) standby assist  -AR     Distance (Gait Training Goal 1, PT) 100  -AR     Time Frame (Gait Training Goal 1, PT) 1 week  -AR     Row Name 11/06/22 1548          Therapy Assessment/Plan (PT)    Planned Therapy Interventions (PT) balance training;bed mobility training;gait training;home exercise program;patient/family education;transfer training;ROM (range of motion);stair training;strengthening  -AR           User Key  (r) = Recorded By, (t) = Taken By, (c) = Cosigned By    Initials Name Provider Type    AR Josefa Albright, PT Physical Therapist               Clinical Impression     Row Name 11/06/22 1541          Pain    Pretreatment Pain Rating 0/10 - no pain  -AR     Posttreatment Pain Rating 0/10 - no pain  -AR     Pain Intervention(s) Repositioned  -AR     Row Name 11/06/22 1544          Plan of Care Review    Outcome Evaluation Pt admitted from [facility] with anemia, +covid.  Pt presents confused, disoriented to  location/situation, unable to provide any history but follows instructions for mobility easily.  Pt demonstrates generalized weakness, limited activity tolerance and ind. w/ mobility but able to ambulate 35' w/ RW and CGA, limited by SOB; requesting to sit back down.  Increased time due to pt trying to get up several times when PT trying to exit room, re educated and helped pt get situated multiple times. Currently recommend DC to SNU.  -AR     Row Name 11/06/22 1545          Therapy Assessment/Plan (PT)    Rehab Potential (PT) good, to achieve stated therapy goals  -AR     Therapy Frequency (PT) 3 times/wk  -AR     Row Name 11/06/22 1545          Vital Signs    Pre SpO2 (%) --  not on monitor  -AR     O2 Delivery Pre Treatment supplemental O2  -AR     Post SpO2 (%) 97  -AR     O2 Delivery Post Treatment supplemental O2  -AR     Row Name 11/06/22 1545          Positioning and Restraints    Pre-Treatment Position sitting in chair/recliner  -AR     Post Treatment Position chair  -AR     In Chair notified nsg;reclined;sitting;call light within reach;encouraged to call for assist;exit alarm on  0 second delay, table in front of pt  -AR           User Key  (r) = Recorded By, (t) = Taken By, (c) = Cosigned By    Initials Name Provider Type    Josefa Sanders, PT Physical Therapist               Outcome Measures     Row Name 11/06/22 1548 11/06/22 0928       How much help from another person do you currently need...    Turning from your back to your side while in flat bed without using bedrails? 3  -AR 3  -JL    Moving from lying on back to sitting on the side of a flat bed without bedrails? 3  -AR 3  -JL    Moving to and from a bed to a chair (including a wheelchair)? 3  -AR 3  -JL    Standing up from a chair using your arms (e.g., wheelchair, bedside chair)? 3  -AR 3  -JL    Climbing 3-5 steps with a railing? 2  -AR 2  -JL    To walk in hospital room? 3  -AR 3  -JL    AM-PAC 6 Clicks Score (PT) 17  -AR 17  -JL     Highest level of mobility 5 --> Static standing  -AR 5 --> Static standing  -RHIANNON    Row Name 11/06/22 1548          Functional Assessment    Outcome Measure Options AM-PAC 6 Clicks Basic Mobility (PT)  -AR           User Key  (r) = Recorded By, (t) = Taken By, (c) = Cosigned By    Initials Name Provider Type    Vijay Brooks, RN Registered Nurse    Josefa Sanders, PT Physical Therapist                             Physical Therapy Education     Title: PT OT SLP Therapies (In Progress)     Topic: Physical Therapy (In Progress)     Point: Mobility training (In Progress)     Learning Progress Summary           Patient Acceptance, E, NR by AR at 11/6/2022 1549                   Point: Home exercise program (In Progress)     Learning Progress Summary           Patient Acceptance, E, NR by AR at 11/6/2022 1549                   Point: Body mechanics (In Progress)     Learning Progress Summary           Patient Acceptance, E, NR by AR at 11/6/2022 1549                   Point: Precautions (In Progress)     Learning Progress Summary           Patient Acceptance, E, NR by AR at 11/6/2022 1549                               User Key     Initials Effective Dates Name Provider Type Discipline    AR 06/16/21 -  Josefa Albright, PT Physical Therapist PT              PT Recommendation and Plan  Planned Therapy Interventions (PT): balance training, bed mobility training, gait training, home exercise program, patient/family education, transfer training, ROM (range of motion), stair training, strengthening  Outcome Evaluation: Pt admitted from [facility] with anemia, +covid.  Pt presents confused, disoriented to location/situation, unable to provide any history but follows instructions for mobility easily.  Pt demonstrates generalized weakness, limited activity tolerance and ind. w/ mobility but able to ambulate 35' w/ RW and CGA, limited by SOB; requesting to sit back down.  Increased time due to pt trying to get up several times when  PT trying to exit room, re educated and helped pt get situated multiple times. Currently recommend DC to SNU.     Time Calculation:    PT Charges     Row Name 11/06/22 1541             Time Calculation    Start Time 1501  -AR      Stop Time 1540  -AR      Time Calculation (min) 39 min  -AR      PT Received On 11/06/22  -AR      PT - Next Appointment 11/08/22  -AR      PT Goal Re-Cert Due Date 11/13/22  -AR            User Key  (r) = Recorded By, (t) = Taken By, (c) = Cosigned By    Initials Name Provider Type    AR Josefa Albright, PT Physical Therapist              Therapy Charges for Today     Code Description Service Date Service Provider Modifiers Qty    07383721342 HC PT EVAL MOD COMPLEXITY 4 11/6/2022 Josefa Albright, PT GP 1    18974247620 HC PT THER PROC EA 15 MIN 11/6/2022 Josefa Albright, PT GP 1          PT G-Codes  Outcome Measure Options: AM-PAC 6 Clicks Basic Mobility (PT)  AM-PAC 6 Clicks Score (PT): 17    Josefa Albright PT  11/6/2022

## 2022-11-06 NOTE — PLAN OF CARE
Goal Outcome Evaluation:              Outcome Evaluation: Pt admitted from [facility] with anemia, +covid.  Pt presents confused, disoriented to location/situation, unable to provide any history but follows instructions for mobility easily.  Pt demonstrates generalized weakness, limited activity tolerance and ind. w/ mobility but able to ambulate 35' w/ RW and CGA, limited by SOB; requesting to sit back down.  Increased time due to pt trying to get up several times when PT trying to exit room, re educated and helped pt get situated multiple times. Currently recommend DC to SNU.

## 2022-11-07 LAB
ANION GAP SERPL CALCULATED.3IONS-SCNC: 8.9 MMOL/L (ref 5–15)
BASOPHILS # BLD AUTO: 0.05 10*3/MM3 (ref 0–0.2)
BASOPHILS NFR BLD AUTO: 0.5 % (ref 0–1.5)
BUN SERPL-MCNC: 19 MG/DL (ref 8–23)
BUN/CREAT SERPL: 14.6 (ref 7–25)
CALCIUM SPEC-SCNC: 8.7 MG/DL (ref 8.6–10.5)
CHLORIDE SERPL-SCNC: 97 MMOL/L (ref 98–107)
CO2 SERPL-SCNC: 29.1 MMOL/L (ref 22–29)
CREAT SERPL-MCNC: 1.3 MG/DL (ref 0.57–1)
DEPRECATED RDW RBC AUTO: 45.2 FL (ref 37–54)
EGFRCR SERPLBLD CKD-EPI 2021: 40.4 ML/MIN/1.73
EOSINOPHIL # BLD AUTO: 0.09 10*3/MM3 (ref 0–0.4)
EOSINOPHIL NFR BLD AUTO: 1 % (ref 0.3–6.2)
ERYTHROCYTE [DISTWIDTH] IN BLOOD BY AUTOMATED COUNT: 14.5 % (ref 12.3–15.4)
GLUCOSE SERPL-MCNC: 90 MG/DL (ref 65–99)
HCT VFR BLD AUTO: 27.2 % (ref 34–46.6)
HGB BLD-MCNC: 8.7 G/DL (ref 12–15.9)
IMM GRANULOCYTES # BLD AUTO: 0.06 10*3/MM3 (ref 0–0.05)
IMM GRANULOCYTES NFR BLD AUTO: 0.6 % (ref 0–0.5)
LYMPHOCYTES # BLD AUTO: 0.47 10*3/MM3 (ref 0.7–3.1)
LYMPHOCYTES NFR BLD AUTO: 5 % (ref 19.6–45.3)
MCH RBC QN AUTO: 27.1 PG (ref 26.6–33)
MCHC RBC AUTO-ENTMCNC: 32 G/DL (ref 31.5–35.7)
MCV RBC AUTO: 84.7 FL (ref 79–97)
MONOCYTES # BLD AUTO: 0.57 10*3/MM3 (ref 0.1–0.9)
MONOCYTES NFR BLD AUTO: 6 % (ref 5–12)
NEUTROPHILS NFR BLD AUTO: 8.19 10*3/MM3 (ref 1.7–7)
NEUTROPHILS NFR BLD AUTO: 86.9 % (ref 42.7–76)
NRBC BLD AUTO-RTO: 0 /100 WBC (ref 0–0.2)
PLATELET # BLD AUTO: 223 10*3/MM3 (ref 140–450)
PMV BLD AUTO: 10.1 FL (ref 6–12)
POTASSIUM SERPL-SCNC: 3.8 MMOL/L (ref 3.5–5.2)
RBC # BLD AUTO: 3.21 10*6/MM3 (ref 3.77–5.28)
SARS-COV-2 AG RESP QL IA.RAPID: NORMAL
SODIUM SERPL-SCNC: 135 MMOL/L (ref 136–145)
WBC NRBC COR # BLD: 9.43 10*3/MM3 (ref 3.4–10.8)

## 2022-11-07 PROCEDURE — 63710000001 PREDNISONE PER 5 MG: Performed by: INTERNAL MEDICINE

## 2022-11-07 PROCEDURE — 63710000001 DIPHENHYDRAMINE PER 50 MG: Performed by: INTERNAL MEDICINE

## 2022-11-07 PROCEDURE — 94799 UNLISTED PULMONARY SVC/PX: CPT

## 2022-11-07 PROCEDURE — 85025 COMPLETE CBC W/AUTO DIFF WBC: CPT | Performed by: INTERNAL MEDICINE

## 2022-11-07 PROCEDURE — 87426 SARSCOV CORONAVIRUS AG IA: CPT | Performed by: HOSPITALIST

## 2022-11-07 PROCEDURE — 97166 OT EVAL MOD COMPLEX 45 MIN: CPT

## 2022-11-07 PROCEDURE — 97530 THERAPEUTIC ACTIVITIES: CPT

## 2022-11-07 PROCEDURE — 25010000002 NA FERRIC GLUC CPLX PER 12.5 MG: Performed by: INTERNAL MEDICINE

## 2022-11-07 PROCEDURE — 80048 BASIC METABOLIC PNL TOTAL CA: CPT | Performed by: INTERNAL MEDICINE

## 2022-11-07 PROCEDURE — 63710000001 MYCOPHENOLATE MOFETIL PER 250 MG: Performed by: INTERNAL MEDICINE

## 2022-11-07 PROCEDURE — 94664 DEMO&/EVAL PT USE INHALER: CPT

## 2022-11-07 RX ADMIN — LEVOTHYROXINE SODIUM 88 MCG: 0.09 TABLET ORAL at 10:07

## 2022-11-07 RX ADMIN — BUSPIRONE HYDROCHLORIDE 10 MG: 10 TABLET ORAL at 20:40

## 2022-11-07 RX ADMIN — BUDESONIDE AND FORMOTEROL FUMARATE DIHYDRATE 2 PUFF: 160; 4.5 AEROSOL RESPIRATORY (INHALATION) at 07:55

## 2022-11-07 RX ADMIN — DULOXETINE HYDROCHLORIDE 60 MG: 60 CAPSULE, DELAYED RELEASE ORAL at 10:11

## 2022-11-07 RX ADMIN — MONTELUKAST SODIUM 10 MG: 10 TABLET, FILM COATED ORAL at 20:40

## 2022-11-07 RX ADMIN — Medication 10 ML: at 20:40

## 2022-11-07 RX ADMIN — BUDESONIDE AND FORMOTEROL FUMARATE DIHYDRATE 2 PUFF: 160; 4.5 AEROSOL RESPIRATORY (INHALATION) at 19:52

## 2022-11-07 RX ADMIN — MYCOPHENOLATE MOFETIL 1000 MG: 500 TABLET ORAL at 17:27

## 2022-11-07 RX ADMIN — DULOXETINE HYDROCHLORIDE 60 MG: 60 CAPSULE, DELAYED RELEASE ORAL at 20:40

## 2022-11-07 RX ADMIN — METOPROLOL SUCCINATE 25 MG: 25 TABLET, EXTENDED RELEASE ORAL at 10:07

## 2022-11-07 RX ADMIN — DIPHENHYDRAMINE HYDROCHLORIDE 25 MG: 25 CAPSULE ORAL at 10:10

## 2022-11-07 RX ADMIN — RIVAROXABAN 20 MG: 20 TABLET, FILM COATED ORAL at 17:27

## 2022-11-07 RX ADMIN — TIOTROPIUM BROMIDE INHALATION SPRAY 1 PUFF: 3.12 SPRAY, METERED RESPIRATORY (INHALATION) at 07:56

## 2022-11-07 RX ADMIN — MYCOPHENOLATE MOFETIL 1000 MG: 500 TABLET ORAL at 06:48

## 2022-11-07 RX ADMIN — ACETAMINOPHEN 650 MG: 325 TABLET, FILM COATED ORAL at 12:59

## 2022-11-07 RX ADMIN — AZELASTINE HYDROCHLORIDE 2 SPRAY: 137 SPRAY, METERED NASAL at 10:08

## 2022-11-07 RX ADMIN — Medication 1000 MCG: at 10:07

## 2022-11-07 RX ADMIN — SODIUM CHLORIDE 250 MG: 9 INJECTION, SOLUTION INTRAVENOUS at 10:10

## 2022-11-07 RX ADMIN — FERROUS SULFATE TAB 325 MG (65 MG ELEMENTAL FE) 325 MG: 325 (65 FE) TAB at 10:07

## 2022-11-07 RX ADMIN — FAMOTIDINE 20 MG: 10 INJECTION INTRAVENOUS at 10:08

## 2022-11-07 RX ADMIN — Medication 10 ML: at 10:11

## 2022-11-07 RX ADMIN — PREDNISONE 5 MG: 10 TABLET ORAL at 10:06

## 2022-11-07 RX ADMIN — PRAVASTATIN SODIUM 20 MG: 20 TABLET ORAL at 20:40

## 2022-11-07 RX ADMIN — PANTOPRAZOLE SODIUM 40 MG: 40 TABLET, DELAYED RELEASE ORAL at 06:48

## 2022-11-07 RX ADMIN — METOPROLOL SUCCINATE 25 MG: 25 TABLET, EXTENDED RELEASE ORAL at 20:40

## 2022-11-07 RX ADMIN — TORSEMIDE 20 MG: 20 TABLET ORAL at 10:06

## 2022-11-07 RX ADMIN — BUSPIRONE HYDROCHLORIDE 10 MG: 10 TABLET ORAL at 10:07

## 2022-11-07 NOTE — CASE MANAGEMENT/SOCIAL WORK
Continued Stay Note  [facility]     Patient Name: Olena Myers  MRN: [id number]  Today's Date: 11/7/2022    Admit Date: 11/4/2022    Plan: SNF referrals pending   Discharge Plan     Row Name 11/07/22 1308       Plan    Plan SNF referrals pending    Patient/Family in Agreement with Plan yes    Plan Comments CCP placed outbound call to [facility] [phone], pt son, d/t pts confusion. Introduced self and role of CCP. Face sheet information and pharmacy verified. Pt is from [facility]. Pt no longer drives, and [facility] takes care of meals, cleaning and laundry; pt takes care of her own medications. Pt has a walker, grab bars a nebulizer and O2 at 2LNC continuously thru [facility]. Pt does not have a living will. [facility] declines meds to beds at this time and denies trouble affording medications. Pt has used HH in the past and has been to [facility] and [facility]. CCP explain PT rec SNF at [place]. [facility] verbalized understanding and stated they were talking about SNF at [place]. Scotty would like a referral be made to [facility]. CCP explained referral can be made but often times if pt in Covid Isolation at [facility] [place] pt would need to continue isolation at facility. CCP explained the only facility in [place] for Covid + pts is [facility]. [facility]ty verbalized understanding and is agreeable to [facility] Covid Unit. CCP stated we would talk to Infection Control about pts Covid Isolation status. [facility]ty verbalized understanding. Mildred/[facility] and Gretchen/[facility] notified of referrals. Gretchen/[facility] confirmed pt would have to be out of Covid Isolation for [facility] to be able to accept. Mildred/[facility] stated she will review pt and let CCP know if they can clinically accept and bed availability. Cat RN/CCP               Discharge Codes    No documentation.               Expected Discharge Date and Time     Expected Discharge Date Expected Discharge Time    Nov 9, 2022             Iva HIDALGO  ISMAEL Obrien

## 2022-11-07 NOTE — PLAN OF CARE
Goal Outcome Evaluation:      VSS. Rested well today. In Covid isolation. Working on a discharge plan. X1 assists. On 2L. Has PRN zyprexa. Care plan carefully updated.             20.7

## 2022-11-07 NOTE — CASE MANAGEMENT/SOCIAL WORK
Continued Stay Note  [facility]     Patient Name: Olena Myers  MRN: [id number]  Today's Date: 11/7/2022    Admit Date: 11/4/2022    Plan: [facility] Unit pending pre-cert   Discharge Plan     Row Name 11/07/22 0422       Plan    Plan [facility] Unit pending pre-cert    Patient/Family in Agreement with Plan yes    Plan Comments CCP discussed pt with Infection Control; pt will not be out of isolation in the next 48hrs. CCP then received call from Mildred/Signature notifying CCP bed available at [facility] tomorrow and did we want to start pre-cert. Pre-cert started. Mildred/Signature to notify Scotty, pts son. Cat RN/CCP    Row Name 11/07/22 8797       Plan    Plan SNF referrals pending    Patient/Family in Agreement with Plan yes    Plan Comments CCP placed outbound call to Scotty [phone], pt son, d/t pts confusion. Introduced self and role of CCP. Face sheet information and pharmacy verified. Pt is from [facility]. Pt no longer drives, and [facility] takes care of meals, cleaning and laundry; pt takes care of her own medications. Pt has a walker, grab bars a nebulizer and O2 at [employer] continuously thru [facility]. Pt does not have a living will. Scotty declines meds to beds at this time and denies trouble affording medications. Pt has used HH in the past and has been to [facility] and [facility]. CCP explain PT rec SNF at [facility]. Scotty verbalized understanding and stated they were talking about SNF at [facility]. Scotty would like a referral be made to [facility]. CCP explained referral can be made but often times if pt in Covid Isolation at [facility] pt would need to continue isolation at facility. CCP explained the only facility in [place] for Covid + pts is [facility]. Scotty verbalized understanding and is agreeable to [facility]. CCP stated we would talk to Infection Control about pts Covid Isolation status. Scotty verbalized  understanding. Mildred/Deyanira and Gretchen/[facility] notified of referrals. Gretchen/[facility] confirmed pt would have to be out of Covid Isolation for [facility] to be able to accept. Mildred/Deyanira stated she will review pt and let CCP know if they can clinically accept and bed availability. Cat RN/CCP               Discharge Codes    No documentation.               Expected Discharge Date and Time     Expected Discharge Date Expected Discharge Time    Nov 9, 2022             Iva Obrien RN

## 2022-11-07 NOTE — PLAN OF CARE
Goal Outcome Evaluation:  Plan of Care Reviewed With: patient           Outcome Evaluation: pt. confused to situation,otherwise easy to reorient, pt. slept most of the night. no new issues.

## 2022-11-07 NOTE — DISCHARGE PLACEMENT REQUEST
"Olena Ann (87 y.o. Female)     Date of Birth   1935    Social Security Number       Address   [address] [facility] [address] [place], [place] [address]    Home Phone   [phone]    MRN   [id number]       [facility]    Marital Status                               Admission Date   11/4/22    Admission Type   Emergency    Admitting Provider   Daniel Ortiz MD    Attending Provider   Johny Mendoza MD    Department, Room/Bed   [address], S411/1       Discharge Date       Discharge Disposition       Discharge Destination                               Attending Provider: Johny Mendoza MD    Allergies: Neomycin, Penicillins, Hydrocodone, Rosuvastatin    Isolation: Enh Drop/Con   Infection: COVID (confirmed) (10/25/22)   Code Status: CPR    Ht: 152.4 cm (60\")   Wt: 76 kg (167 lb 8.8 oz)    Admission Cmt: None   Principal Problem: Anemia [D64.9]                 Active Insurance as of 11/4/2022     Primary Coverage     Payor Plan Insurance Group Employer/Plan Group    HUMANA HUMANA [id number]     Payor Plan Address Payor Plan Phone Number Payor Plan Fax Number Effective Dates    [address] [address] [phone]  1/1/2013 - None Entered    [facility] [id number]       Subscriber Name Subscriber Birth Date Member ID       OLENA ANN 1935 [id number]           Secondary Coverage     Payor Plan Insurance Group Employer/Plan Group    [facility] MEDICARE REPLACEMENT [facility] MEDICARE REPLACEMENT [id number]     Payor Plan Address Payor Plan Phone Number Payor Plan Fax Number Effective Dates    [address] [address]   3/1/2018 - None Entered    [facility] [id number]       Subscriber Name Subscriber Birth Date Member ID       OLENA ANN 1935 [id number]                 Emergency Contacts      (Rel.) Home Phone Work Phone Mobile Phone    Scotty Ann (Son) [phone] -- [phone]    Olvin Ann (Son) [phone] -- [phone]    Teddy Ann (Son) " [phone] -- [phone]    Nazanin Pierson (Sister) [phone] -- [phone]

## 2022-11-07 NOTE — PLAN OF CARE
Goal Outcome Evaluation:  Plan of Care Reviewed With: patient           Outcome Evaluation: Pt seen for OT jadiel this AM. Pt supine in bed upon arrival for therapy. Pt admitted with anemia and COVID +. Pt oriented to person today. Per chart pt is from [facility]. Thought she was in the library at [facility]. Required cues for orientation throughout. Required repeated instructions for following commands. Pt transitioned to EOB with CGA and increased time. Dep with donning socks, max A toileting, set up for simple grooming task EOB. Performed STS with CGA and use of Rwx. Cues for proper positioning of UEs. Pt requried min A to ambulate to sink and back to bed. Pt requesting to return to bed due fatigue and SOB. Pt reports chronic issues with shoulder R ~90 deg shoulder flex and L <90 deg. Generalized weakness noted. Pt demonstrates decreased strength, endurance, activity tolerance, balance, ROM, functional mobility and ADL performance. Pt to benefit from skilled OT to address deficits and maximize independence with ADLs. Rec dc to SNF.

## 2022-11-07 NOTE — THERAPY EVALUATION
Patient Name: Olena Myers  : 1935    MRN: [id number]                              Today's Date: 2022       Admit Date: 2022    Visit Dx:     ICD-10-CM ICD-9-CM   1. Acute encephalopathy  G93.40 348.30   2. Acute on chronic anemia  D64.9 285.9   3. COVID  U07.1 079.89   4. History of COPD  Z87.09 V12.69   5. History of atrial fibrillation  Z86.79 V12.59   6. Chronic anticoagulation  Z79.01 V58.61   7. History of myasthenia gravis  Z86.69 V12.49     Patient Active Problem List   Diagnosis   • Pre-operative cardiovascular examination   • S/p reverse total shoulder arthroplasty   • Myasthenia gravis (HCC)   • Paroxysmal atrial fibrillation (HCC)   • HX: long term anticoagulant use   • Essential hypertension   • CAD (coronary artery disease)   • Rhinovirus   • Atrial fibrillation (HCC)   • S/P reverse total shoulder arthroplasty, right   • Acute UTI   • COPD (chronic obstructive pulmonary disease) (HCC)   • Metabolic encephalopathy   • Sleep apnea   • Sepsis (HCC)   • Dyspnea   • Chronic diastolic CHF (congestive heart failure) (HCC)   • Diastolic CHF, chronic (HCC)   • Heme positive stool   • E coli bacteremia   • Iron deficiency anemia   • Current chronic use of systemic steroids   • COPD exacerbation (HCC)   • Obesity (BMI 30-39.9)   • COVID-19 virus infection   • Chronic respiratory failure with hypoxia (HCC)   • Cytokine release syndrome, grade 1   • Altered mental status   • Anemia     Past Medical History:   Diagnosis Date   • Anemia     IRON DEFICIENCY   • Arthritis    • Asthma    • Atrial fibrillation (HCC)    • CAD (coronary artery disease)     HEART STENT   • COPD (chronic obstructive pulmonary disease) (HCC)    • 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   • Frequent urinary tract infections    • History of gastric ulcer    • History of GI bleed    • [place] (hard of hearing)    • Hyperlipidemia    • Hypertension    • Hyponatremia    • Lightheadedness    • LVH (left ventricular hypertrophy)    •  MG (myasthenia gravis) ([facility])    • Mini stroke 10/2016, 3/2017   • OA (osteoarthritis)    • Osteoporosis    • Sleep apnea     USES CPAP   • SOB (shortness of breath)     WALKING     Past Surgical History:   Procedure Laterality Date   • APPENDECTOMY     • BRONCHOSCOPY N/A 3/31/2022    Procedure: BRONCHOSCOPY WITH BAL RIGHT LOWER LOBE;  Surgeon: Remy Tirado MD;  Location: [facility] ENDOSCOPY;  Service: Pulmonary;  Laterality: N/A;  COPD EXACERBATION     • CARPAL TUNNEL RELEASE Bilateral    • CATARACT EXTRACTION Bilateral    • CORONARY STENT PLACEMENT     • TOTAL HIP ARTHROPLASTY Bilateral    • TOTAL SHOULDER ARTHROPLASTY W/ DISTAL CLAVICLE EXCISION Left 7/19/2016    Procedure: LT TOTAL SHOULDER REVERSE ARTHROPLASTY;  Surgeon: NAHOMI Solorzano MD;  Location:  [facility];  Service:    • TOTAL SHOULDER ARTHROPLASTY W/ DISTAL CLAVICLE EXCISION Right 1/8/2019    Procedure: RIGHT TOTAL SHOULDER REVERSE ARTHROPLASTY;  Surgeon: Jovanny Solorzano MD;  [place] [facility] [place] [facility]  Service: Orthopedics      General Information     Row Name 11/07/22 1536          OT Time and Intention    Document Type evaluation  -     Mode of Treatment individual therapy;occupational therapy  -     Row Name 11/07/22 1536          General Information    Patient Profile Reviewed yes  -     Prior Level of Function --  unable to obtain PLOF due to pt's confusion throughout. Per chart she is from [facility] and gets assistance with meals and laundry  -     Existing Precautions/Restrictions fall  -     Row Name 11/07/22 1536          Living Environment    People in Home facility resident  [facility]  -     Row Name 11/07/22 1536          Home Main Entrance    Number of Stairs, Main Entrance none  -     Row Name 11/07/22 1538          Cognition    Orientation Status (Cognition) oriented to;person;disoriented to;place;situation  Pt stated she was in the Library at [facility] [employer]. Cues for orientation throughout  -     Row Name 11/07/22 1537           Safety Issues, Functional Mobility    Safety Issues Affecting Function (Mobility) insight into deficits/self-awareness;judgment;positioning of assistive device;problem-solving;sequencing abilities;awareness of need for assistance  -     Impairments Affecting Function (Mobility) balance;cognition;endurance/activity tolerance;strength;shortness of breath  -     Comment, Safety Issues/Impairments (Mobility) nonskid socks and gait belt donned  -           User Key  (r) = Recorded By, (t) = Taken By, (c) = Cosigned By    Initials Name Provider Type    Karly Mccann OT Occupational Therapist                 Mobility/ADL's     Row Name 11/07/22 1538          Bed Mobility    Bed Mobility supine-sit;sit-supine  -     Supine-Sit [place] (Bed Mobility) contact guard  -     Sit-Supine [place] (Bed Mobility) minimum assist (75% patient effort)  -[facility] Name 11/07/22 1538          Sit-Stand Transfer    Sit-Stand [place] (Transfers) contact guard  -     Assistive Device (Sit-Stand Transfers) walker, front-wheeled  -     Comment, (Sit-Stand Transfer) cues for UE placement  -[facility] Name 11/07/22 1538          Functional Mobility    Functional Mobility- Ind. Level contact guard assist;minimum assist (75% patient effort)  -     Functional Mobility- Device walker, front-wheeled  -     Functional Mobility-Distance (Feet) --  to sink and back to bed. pt reporting headache when standing and requesting to return to bed  -     Row Name 11/07/22 1538          Activities of Daily Living    BADL Assessment/Intervention lower body dressing;grooming;feeding  -[facility] Name 11/07/22 1538          Lower Body Dressing Assessment/Training    [place] Level (Lower Body Dressing) lower body dressing skills;don;doff;socks;dependent (less than 25% patient effort)  -     Comment, (Lower Body Dressing) Pt attempted but unable to complete sitting EOB  -     Row Name 11/07/22 1538           Grooming Assessment/Training    [place] Level (Grooming) grooming skills;wash face, hands;set up;verbal cues;nonverbal cues (demo/gesture)  -     Position (Grooming) edge of bed sitting  -     Row Name 11/07/22 1538          Self-Feeding Assessment/Training    [place] Level (Feeding) feeding skills;set up;scoop food and bring to mouth;prepare tray/open items  -     Position (Self-Feeding) sitting up in bed  -           User Key  (r) = Recorded By, (t) = Taken By, (c) = Cosigned By    Initials Name Provider Type    Karly Mccann OT Occupational Therapist               Obj/Interventions     Row Name 11/07/22 1540          Range of Motion Comprehensive    Comment, General Range of Motion RUE shoulder flexion ~90 degrees; LUE <90 degrees  pt reports chronic issues with shoulder  -[facility] Name 11/07/22 1540          Strength Comprehensive (MMT)    General Manual Muscle Testing (MMT) Assessment upper extremity strength deficits identified  -     Comment, General Manual Muscle Testing (MMT) Assessment RUE 3+/5; LUE 3/5  -[facility] Name 11/07/22 1540          Motor Skills    Motor Skills functional endurance  -     Functional Endurance Poor. Limited functional mobility and activity due to SOA and requesting to return to bed due to fatigue  -[facility] Name 11/07/22 1540          Balance    Balance Assessment sitting static balance;sitting dynamic balance;standing static balance;standing dynamic balance  -     Static Sitting Balance standby assist  -     Dynamic Sitting Balance contact guard  -     Static Standing Balance contact guard  -     Dynamic Standing Balance minimal assist  -     Position/Device Used, Standing Balance walker, front-wheeled  -     Balance Interventions sitting;standing;occupation based/functional task  -           User Key  (r) = Recorded By, (t) = Taken By, (c) = Cosigned By    Initials Name Provider Type    Karly Mccann OT Occupational  Therapist               Goals/Plan     Row Name 11/07/22 1548          Transfer Goal 1 (OT)    Activity/Assistive Device (Transfer Goal 1, OT) sit-to-stand/stand-to-sit;bed-to-chair/chair-to-bed;toilet  -KA     [place] Level/Cues Needed (Transfer Goal 1, OT) standby assist  -KA     Time Frame (Transfer Goal 1, OT) 2 weeks  -KA     Progress/Outcome (Transfer Goal 1, OT) goal ongoing  -     Row Name 11/07/22 1548          Bathing Goal 1 (OT)    Activity/Device (Bathing Goal 1, OT) bathing skills, all  -KA     [place] Level/Cues Needed (Bathing Goal 1, OT) contact guard required  -KA     Time Frame (Bathing Goal 1, OT) 2 weeks  -KA     Progress/Outcomes (Bathing Goal 1, OT) goal ongoing  -[facility] Name 11/07/22 1548          Dressing Goal 1 (OT)    Activity/Device (Dressing Goal 1, OT) dressing skills, all  -KA     [place]/Cues Needed (Dressing Goal 1, OT) minimum assist (75% or more patient effort)  -KA     Time Frame (Dressing Goal 1, OT) 2 weeks  -KA     Progress/Outcome (Dressing Goal 1, OT) goal ongoing  -[facility] Name 11/07/22 1548          Toileting Goal 1 (OT)    Activity/Device (Toileting Goal 1, OT) toileting skills, all  -KA     [place] Level/Cues Needed (Toileting Goal 1, OT) minimum assist (75% or more patient effort)  -KA     Time Frame (Toileting Goal 1, OT) 2 weeks  -KA     Progress/Outcome (Toileting Goal 1, OT) goal ongoing  -[facility] Name 11/07/22 1548          Therapy Assessment/Plan (OT)    Planned Therapy Interventions (OT) activity tolerance training;functional balance retraining;occupation/activity based interventions;ROM/therapeutic exercise;strengthening exercise;transfer/mobility retraining;patient/caregiver education/training;BADL retraining  -           User Key  (r) = Recorded By, (t) = Taken By, (c) = Cosigned By    Initials Name Provider Type    Karly Mccann, OT Occupational Therapist               Clinical Impression     Row Name 11/07/22 1543           Pain Assessment    Pretreatment Pain Rating 0/10 - no pain  -     Posttreatment Pain Rating 0/10 - no pain  -     Row Name 11/07/22 1542          Plan of Care Review    Plan of Care Reviewed With patient  -     Outcome Evaluation Pt seen for OT jadiel this AM. Pt supine in bed upon arrival for therapy. Pt admitted with anemia and COVID +. Pt oriented to person today. Per chart pt is from [facility]. Thought she was in the library at [facility] [employer] [facility]. Required cues for orientation throughout. Required repeated instructions for following commands. Pt transitioned to EOB with CGA and increased time. Dep with donning socks, max A toileting, set up for simple grooming task EOB. Performed STS with CGA and use of Rwx. Cues for proper positioning of UEs. Pt requried min A to ambulate to sink and back to bed. Pt requesting to return to bed due fatigue and SOB. Pt reports chronic issues with shoulder R ~90 deg shoulder flex and L <90 deg. Generalized weakness noted. Pt demonstrates decreased strength, endurance, activity tolerance, balance, ROM, functional mobility and ADL performance. Pt to benefit from skilled OT to address deficits and maximize independence with ADLs. Rec dc to SNF.  -     Row Name 11/07/22 1542          Therapy Assessment/Plan (OT)    Therapy Frequency (OT) 5 times/wk  -     Row Name 11/07/22 1542          Therapy Plan Review/Discharge Plan (OT)    Anticipated Discharge Disposition (OT) skilled nursing facility  -     Row Name 11/07/22 1542          Positioning and Restraints    Pre-Treatment Position in bed  -     Post Treatment Position bed  -KA     In Bed supine;notified nsg;exit alarm on;encouraged to call for assist;call light within reach  -           User Key  (r) = Recorded By, (t) = Taken By, (c) = Cosigned By    Initials Name Provider Type    Karly Mccann, OT Occupational Therapist               Outcome Measures     Row Name 11/07/22 1548          How much help from  another is currently needed...    Putting on and taking off regular lower body clothing? 1  -KA     Bathing (including washing, rinsing, and drying) 2  -KA     Toileting (which includes using toilet bed pan or urinal) 1  -KA     Putting on and taking off regular upper body clothing 2  -KA     Taking care of personal grooming (such as brushing teeth) 3  -KA     Eating meals 3  -KA     AM-PAC 6 Clicks Score (OT) 12  -KA     Row Name 11/07/22 1548          Functional Assessment    Outcome Measure Options AM-PAC 6 Clicks Daily Activity (OT)  -KA           User Key  (r) = Recorded By, (t) = Taken By, (c) = Cosigned By    Initials Name Provider Type    Karly Mccann OT Occupational Therapist                Occupational Therapy Education     Title: PT OT SLP Therapies (In Progress)     Topic: Occupational Therapy (Done)     Point: ADL training (Done)     Description:   Instruct learner(s) on proper safety adaptation and remediation techniques during self care or transfers.   Instruct in proper use of assistive devices.              Learning Progress Summary           Patient Acceptance, E, VU,NR by SINAN at 11/7/2022 1549                   Point: Home exercise program (Done)     Description:   Instruct learner(s) on appropriate technique for monitoring, assisting and/or progressing therapeutic exercises/activities.              Learning Progress Summary           Patient Acceptance, E, VU,NR by SINAN at 11/7/2022 1549                   Point: Precautions (Done)     Description:   Instruct learner(s) on prescribed precautions during self-care and functional transfers.              Learning Progress Summary           Patient Acceptance, E, VU,NR by SINAN at 11/7/2022 1549                   Point: Body mechanics (Done)     Description:   Instruct learner(s) on proper positioning and spine alignment during self-care, functional mobility activities and/or exercises.              Learning Progress Summary           Patient  Acceptance, E, VU,NR by  at 11/7/2022 1549                               User Key     Initials Effective Dates Name Provider Type Discipline     09/22/22 -  Karly Tidwell OT Occupational Therapist OT              OT Recommendation and Plan  Planned Therapy Interventions (OT): activity tolerance training, functional balance retraining, occupation/activity based interventions, ROM/therapeutic exercise, strengthening exercise, transfer/mobility retraining, patient/caregiver education/training, BADL retraining  Therapy Frequency (OT): 5 times/wk  Plan of Care Review  Plan of Care Reviewed With: patient  Outcome Evaluation: Pt seen for OT eval this AM. Pt supine in bed upon arrival for therapy. Pt admitted with anemia and COVID +. Pt oriented to person today. Per chart pt is from [facility]. Thought she was in the library at [facility] [employer]. Required cues for orientation throughout. Required repeated instructions for following commands. Pt transitioned to EOB with CGA and increased time. Dep with donning socks, max A toileting, set up for simple grooming task EOB. Performed STS with CGA and use of Rwx. Cues for proper positioning of UEs. Pt requried min A to ambulate to sink and back to bed. Pt requesting to return to bed due fatigue and SOB. Pt reports chronic issues with shoulder R ~90 deg shoulder flex and L <90 deg. Generalized weakness noted. Pt demonstrates decreased strength, endurance, activity tolerance, balance, ROM, functional mobility and ADL performance. Pt to benefit from skilled OT to address deficits and maximize independence with ADLs. Rec dc to SNF.     Time Calculation:    Time Calculation- OT     Row Name 11/07/22 1549             Time Calculation- OT    OT Start Time 1328  -      OT Stop Time 1358  -      OT Time Calculation (min) 30 min  -      Total Timed Code Minutes- OT 23 minute(s)  -      OT Received On 11/07/22  -      OT - Next Appointment 11/08/22  -      OT Goal  Re-Cert Due Date 11/21/22  -KA         Timed Charges    77509 - OT Therapeutic Activity Minutes 23  -KA         Untimed Charges    OT Eval/Re-eval Minutes 7  -KA         Total Minutes    Timed Charges Total Minutes 23  -KA      Untimed Charges Total Minutes 7  -KA       Total Minutes 30  -KA            User Key  (r) = Recorded By, (t) = Taken By, (c) = Cosigned By    Initials Name Provider Type    Karly Mccann OT Occupational Therapist              Therapy Charges for Today     Code Description Service Date Service Provider Modifiers Qty    55609244378 HC OT THERAPEUTIC ACT EA 15 MIN 11/7/2022 Karly Tidwell OT GO 2    [id number] HC OT EVAL MOD COMPLEXITY 2 11/7/2022 Karly Tidwell OT GO 1               Karly Tidwell OT  11/7/2022

## 2022-11-07 NOTE — CASE MANAGEMENT/SOCIAL WORK
Continued Stay Note  [facility]     Patient Name: Olena Myers  MRN: [id number]  Today's Date: 11/7/2022    Admit Date: 11/4/2022    Plan: SNF pending 48hr antigen test   Discharge Plan     Row Name 11/07/22 1541       Plan    Plan SNF pending 48hr antigen test    Patient/Family in Agreement with Plan yes    Plan Comments CCP received call from Infection Control stating Dr. Hurst recommends 48hrs antigen test. CCP placed call to MD, MD ordered 48hr antigen test. Mildred/Deyanira and Scotty, pts son, notified of 48hr antigen test. Cat RN/CCP    Row Name 11/07/22 1337       Plan    Plan [facility] Unit pending pre-cert    Patient/Family in Agreement with Plan yes    Plan Comments CCP discussed pt with Infection Control; pt will not be out of isolation in the next 48hrs. CCP then recieved call from Mildred/Deyanira notifying CCP bed available at [facility] tomorrow and did we want to start pre-cert. Pre-cert started. Mildred/Deyanira to notify Scotty, pts son. Cat RN/CCP    Row Name 11/07/22 130       Plan    Plan SNF referrals pending    Patient/Family in Agreement with Plan yes    Plan Comments CCP placed outbound call to Scotty [phone], pt son, d/t pts confusion. Introduced self and role of CCP. Face sheet information and pharmacy verified. Pt is from [facility]. Pt no longer drives, and [facility] takes care of meals, cleaning and laundry; pt takes care of her own medications. Pt has a walker, grab bars a nebulizer and O2 at [facility] continuously thru [facility]. Pt does not have a living will. Scotty declines meds to beds at this time and denies trouble affording medications. Pt has used HH in the past and has been to [facility]. CCP explain PT rec SNF at [place]. Scotty verbalized understanding and stated they were talking about SNF at [facility]. Scotty would like a referral be made to [facility]. CCP explained referral can be made but often times if pt  in Covid Isolation at [facility] pt would need to continue isolation at facility. CCP explained the only facility in [place] for Covid + pts is [facility]. Scotty verbalized understanding and is agreeable to [facility] Covid Unit. CCP stated we would talk to Infection Control about pts Covid Isolation status. Scotty verbalized understanding. Mildred/[facility] and Gretchen/[facility] notified of referrals. Gretchen/[facility] confirmed pt would have to be out of Covid Isolation for [facility] to be able to accept. Mildred/[facility] stated she will review pt and let CCP know if they can clinically accept and bed availability. Cat RN/CCP               Discharge Codes    No documentation.               Expected Discharge Date and Time     Expected Discharge Date Expected Discharge Time    Nov 9, 2022             Iva Obrien, RN

## 2022-11-08 LAB
BASOPHILS # BLD AUTO: 0.03 10*3/MM3 (ref 0–0.2)
BASOPHILS NFR BLD AUTO: 0.2 % (ref 0–1.5)
BH BB BLOOD EXPIRATION DATE: NORMAL
BH BB BLOOD TYPE BARCODE: 600
BH BB DISPENSE STATUS: NORMAL
BH BB PRODUCT CODE: NORMAL
BH BB UNIT NUMBER: NORMAL
CROSSMATCH INTERPRETATION: NORMAL
DEPRECATED RDW RBC AUTO: 44.1 FL (ref 37–54)
EOSINOPHIL # BLD AUTO: 0.07 10*3/MM3 (ref 0–0.4)
EOSINOPHIL NFR BLD AUTO: 0.6 % (ref 0.3–6.2)
ERYTHROCYTE [DISTWIDTH] IN BLOOD BY AUTOMATED COUNT: 14.2 % (ref 12.3–15.4)
HCT VFR BLD AUTO: 32.1 % (ref 34–46.6)
HGB BLD-MCNC: 10.2 G/DL (ref 12–15.9)
IMM GRANULOCYTES # BLD AUTO: 0.09 10*3/MM3 (ref 0–0.05)
IMM GRANULOCYTES NFR BLD AUTO: 0.7 % (ref 0–0.5)
LYMPHOCYTES # BLD AUTO: 0.29 10*3/MM3 (ref 0.7–3.1)
LYMPHOCYTES NFR BLD AUTO: 2.4 % (ref 19.6–45.3)
MCH RBC QN AUTO: 27.1 PG (ref 26.6–33)
MCHC RBC AUTO-ENTMCNC: 31.8 G/DL (ref 31.5–35.7)
MCV RBC AUTO: 85.4 FL (ref 79–97)
MONOCYTES # BLD AUTO: 0.53 10*3/MM3 (ref 0.1–0.9)
MONOCYTES NFR BLD AUTO: 4.4 % (ref 5–12)
NEUTROPHILS NFR BLD AUTO: 11.14 10*3/MM3 (ref 1.7–7)
NEUTROPHILS NFR BLD AUTO: 91.7 % (ref 42.7–76)
NRBC BLD AUTO-RTO: 0 /100 WBC (ref 0–0.2)
PLATELET # BLD AUTO: 272 10*3/MM3 (ref 140–450)
PMV BLD AUTO: 10.2 FL (ref 6–12)
RBC # BLD AUTO: 3.76 10*6/MM3 (ref 3.77–5.28)
UNIT  ABO: NORMAL
UNIT  RH: NORMAL
WBC NRBC COR # BLD: 12.15 10*3/MM3 (ref 3.4–10.8)

## 2022-11-08 PROCEDURE — 97530 THERAPEUTIC ACTIVITIES: CPT

## 2022-11-08 PROCEDURE — 63710000001 MYCOPHENOLATE MOFETIL PER 250 MG: Performed by: INTERNAL MEDICINE

## 2022-11-08 PROCEDURE — 94799 UNLISTED PULMONARY SVC/PX: CPT

## 2022-11-08 PROCEDURE — 94761 N-INVAS EAR/PLS OXIMETRY MLT: CPT

## 2022-11-08 PROCEDURE — 94664 DEMO&/EVAL PT USE INHALER: CPT

## 2022-11-08 PROCEDURE — 85025 COMPLETE CBC W/AUTO DIFF WBC: CPT | Performed by: INTERNAL MEDICINE

## 2022-11-08 PROCEDURE — 97116 GAIT TRAINING THERAPY: CPT

## 2022-11-08 PROCEDURE — 63710000001 PREDNISONE PER 5 MG: Performed by: INTERNAL MEDICINE

## 2022-11-08 RX ADMIN — PANTOPRAZOLE SODIUM 40 MG: 40 TABLET, DELAYED RELEASE ORAL at 09:37

## 2022-11-08 RX ADMIN — METOPROLOL SUCCINATE 25 MG: 25 TABLET, EXTENDED RELEASE ORAL at 21:37

## 2022-11-08 RX ADMIN — FERROUS SULFATE TAB 325 MG (65 MG ELEMENTAL FE) 325 MG: 325 (65 FE) TAB at 09:37

## 2022-11-08 RX ADMIN — Medication 10 ML: at 21:37

## 2022-11-08 RX ADMIN — LEVOTHYROXINE SODIUM 88 MCG: 0.09 TABLET ORAL at 09:37

## 2022-11-08 RX ADMIN — Medication 1000 MCG: at 09:37

## 2022-11-08 RX ADMIN — AZELASTINE HYDROCHLORIDE 2 SPRAY: 137 SPRAY, METERED NASAL at 09:38

## 2022-11-08 RX ADMIN — BUDESONIDE AND FORMOTEROL FUMARATE DIHYDRATE 2 PUFF: 160; 4.5 AEROSOL RESPIRATORY (INHALATION) at 08:49

## 2022-11-08 RX ADMIN — TORSEMIDE 20 MG: 20 TABLET ORAL at 09:37

## 2022-11-08 RX ADMIN — MONTELUKAST SODIUM 10 MG: 10 TABLET, FILM COATED ORAL at 21:37

## 2022-11-08 RX ADMIN — MYCOPHENOLATE MOFETIL 1500 MG: 500 TABLET ORAL at 18:41

## 2022-11-08 RX ADMIN — BUDESONIDE AND FORMOTEROL FUMARATE DIHYDRATE 2 PUFF: 160; 4.5 AEROSOL RESPIRATORY (INHALATION) at 20:36

## 2022-11-08 RX ADMIN — TIOTROPIUM BROMIDE INHALATION SPRAY 1 PUFF: 3.12 SPRAY, METERED RESPIRATORY (INHALATION) at 08:50

## 2022-11-08 RX ADMIN — METOPROLOL SUCCINATE 25 MG: 25 TABLET, EXTENDED RELEASE ORAL at 09:37

## 2022-11-08 RX ADMIN — BUSPIRONE HYDROCHLORIDE 10 MG: 10 TABLET ORAL at 09:37

## 2022-11-08 RX ADMIN — BUSPIRONE HYDROCHLORIDE 10 MG: 10 TABLET ORAL at 21:37

## 2022-11-08 RX ADMIN — ACETAMINOPHEN 650 MG: 325 TABLET, FILM COATED ORAL at 09:41

## 2022-11-08 RX ADMIN — DULOXETINE HYDROCHLORIDE 60 MG: 60 CAPSULE, DELAYED RELEASE ORAL at 21:37

## 2022-11-08 RX ADMIN — PREDNISONE 5 MG: 10 TABLET ORAL at 09:37

## 2022-11-08 RX ADMIN — DULOXETINE HYDROCHLORIDE 60 MG: 60 CAPSULE, DELAYED RELEASE ORAL at 09:37

## 2022-11-08 RX ADMIN — MYCOPHENOLATE MOFETIL 1000 MG: 500 TABLET ORAL at 09:36

## 2022-11-08 RX ADMIN — Medication 10 ML: at 09:38

## 2022-11-08 RX ADMIN — RIVAROXABAN 20 MG: 20 TABLET, FILM COATED ORAL at 18:41

## 2022-11-08 RX ADMIN — PRAVASTATIN SODIUM 20 MG: 20 TABLET ORAL at 21:37

## 2022-11-08 NOTE — PLAN OF CARE
Goal Outcome Evaluation:  Plan of Care Reviewed With: patient        Progress: declining  Outcome Evaluation: Pt seen for PT this AM, difficulty progressing gait distance 2/2 poor endurance. Pt sitting UIC on arrival, completed LE exercises with some encouragement. Pt stood from recliner with SBA - cues to push up from armrests instead of pulling on rwx. Pt ambulated 8ft around bed with CGA-SBA and rwx, c/o SOA and fatigue requiring seated rest break. Pt ambulated another 7ft with rwx with a seated rest break following, then ambulated 15ft back to bed with max encouragement to progress gait distance w/o rest break. Pt notably fatigued following, assisted with repositioning in chair. Pt's O2 sensor suspended on monitor - monitor made aware and corrected, though also needing a new sensor. Nsg aid present at end of session and assisting with changing sensory - unable to read O2 sats throughout session. Pt left sitting up with needs met and aid present. PT will continue to follow to progress mobility as tolerated. Anticipate DC to SNF.

## 2022-11-08 NOTE — PLAN OF CARE
Goal Outcome Evaluation:  Plan of Care Reviewed With: patient           Outcome Evaluation: Pt seen for OT session this AM. Pt supine in bed upon arrival and eager to participate with therapy and get up. Pt demonstrated improvement with bed mobility this date requiring SBA/increased time to complete. Dep to don socks. STS from EOB required CGA and use of Rwx. Pt ambulated around bed to chair with CGA and Rwx, required 1 seated rest break before reaching chair. 1st STS from chair pt unable to clear bottom. Stood with min A and rwx and ambulated to chair. set up with simple grooming task. Declined further ADL/ther ex due to being too fatigued. Pt continues to benefit from skilled OT to address deficits and maximize independence with ADLs.

## 2022-11-08 NOTE — THERAPY TREATMENT NOTE
Patient Name: Olena Myers  : 1935    MRN: [id number]                              Today's Date: 2022       Admit Date: 2022    Visit Dx:     ICD-10-CM ICD-9-CM   1. Acute encephalopathy  G93.40 348.30   2. Acute on chronic anemia  D64.9 285.9   3. COVID  U07.1 079.89   4. History of COPD  Z87.09 V12.69   5. History of atrial fibrillation  Z86.79 V12.59   6. Chronic anticoagulation  Z79.01 V58.61   7. History of myasthenia gravis  Z86.69 V12.49     Patient Active Problem List   Diagnosis   • Pre-operative cardiovascular examination   • S/p reverse total shoulder arthroplasty   • Myasthenia gravis (HCC)   • Paroxysmal atrial fibrillation (HCC)   • HX: long term anticoagulant use   • Essential hypertension   • CAD (coronary artery disease)   • Rhinovirus   • Atrial fibrillation (HCC)   • S/P reverse total shoulder arthroplasty, right   • Acute UTI   • COPD (chronic obstructive pulmonary disease) (HCC)   • Metabolic encephalopathy   • Sleep apnea   • Sepsis (HCC)   • Dyspnea   • Chronic diastolic CHF (congestive heart failure) (HCC)   • Diastolic CHF, chronic (HCC)   • Heme positive stool   • E coli bacteremia   • Iron deficiency anemia   • Current chronic use of systemic steroids   • COPD exacerbation (HCC)   • Obesity (BMI 30-39.9)   • COVID-19 virus infection   • Chronic respiratory failure with hypoxia (HCC)   • Cytokine release syndrome, grade 1   • Altered mental status   • Anemia     Past Medical History:   Diagnosis Date   • Anemia     IRON DEFICIENCY   • Arthritis    • Asthma    • Atrial fibrillation (HCC)    • CAD (coronary artery disease)     HEART STENT   • COPD (chronic obstructive pulmonary disease) (HCC)    • 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   • Frequent urinary tract infections    • History of gastric ulcer    • History of GI bleed    • [place] (hard of hearing)    • Hyperlipidemia    • Hypertension    • Hyponatremia    • Lightheadedness    • LVH (left ventricular hypertrophy)    •  MG (myasthenia gravis) ([facility])    • Mini stroke 10/2016, 3/2017   • OA (osteoarthritis)    • Osteoporosis    • Sleep apnea     USES CPAP   • SOB (shortness of breath)     WALKING     Past Surgical History:   Procedure Laterality Date   • APPENDECTOMY     • BRONCHOSCOPY N/A 3/31/2022    Procedure: BRONCHOSCOPY WITH BAL RIGHT LOWER LOBE;  Surgeon: Remy Tirado MD;  Location: [facility] ENDOSCOPY;  Service: Pulmonary;  Laterality: N/A;  COPD EXACERBATION     • CARPAL TUNNEL RELEASE Bilateral    • CATARACT EXTRACTION Bilateral    • CORONARY STENT PLACEMENT     • TOTAL HIP ARTHROPLASTY Bilateral    • TOTAL SHOULDER ARTHROPLASTY W/ DISTAL CLAVICLE EXCISION Left 7/19/2016    Procedure: LT TOTAL SHOULDER REVERSE ARTHROPLASTY;  Surgeon: NAHOMI Solozrano MD;  Location: [facility] OR [facility];  Service:    • TOTAL SHOULDER ARTHROPLASTY W/ DISTAL CLAVICLE EXCISION Right 1/8/2019    Procedure: RIGHT TOTAL SHOULDER REVERSE ARTHROPLASTY;  Surgeon: Jovanny Solorzano MD;  Location: [facility] OR [facility];  Service: Orthopedics      General Information     Row Name 11/08/22 1207          Physical Therapy Time and Intention    Document Type therapy note (daily note)  -     Mode of Treatment individual therapy;physical therapy  -     Row Name 11/08/22 1207          General Information    Patient Profile Reviewed yes  -     Existing Precautions/Restrictions fall  -     Row Name 11/08/22 1207          Cognition    Orientation Status (Cognition) oriented to;person;disoriented to;place;situation  -     Row Name 11/08/22 1205          Safety Issues, Functional Mobility    Impairments Affecting Function (Mobility) balance;cognition;endurance/activity tolerance;strength;shortness of breath  -           User Key  (r) = Recorded By, (t) = Taken By, (c) = Cosigned By    Initials Name Provider Type     Swetha Hurst PT Physical Therapist               Mobility     Row Name 11/08/22 1204          Bed Mobility    Supine-Sit [place] (Bed  Mobility) not tested  -     Sit-Supine [place] (Bed Mobility) not tested  -     Comment, (Bed Mobility) NT - UIC at beginning and end of session  -     Row Name 11/08/22 1209          Sit-Stand Transfer    Sit-Stand [place] (Transfers) contact guard;standby assist  -     Assistive Device (Sit-Stand Transfers) walker, front-wheeled  -     Comment, (Sit-Stand Transfer) SBA for STS from recliner, CGA for multiple STS from lower chair following breaks throughout gait  -     Row Name 11/08/22 1209          Gait/Stairs (Locomotion)    [place] Level (Gait) contact guard  -     Assistive Device (Gait) walker, front-wheeled  -     Distance in Feet (Gait) 30ft total with 3 seated rest breaks  -     Deviations/Abnormal Patterns (Gait) gait speed decreased;festinating/shuffling  -     Bilateral Gait Deviations forward flexed posture;heel strike decreased  -           User Key  (r) = Recorded By, (t) = Taken By, (c) = Cosigned By    Initials Name Provider Type     Swetha Hurst PT Physical Therapist               Obj/Interventions     Row Name 11/08/22 1210          Motor Skills    Therapeutic Exercise --  10 reps B AP/LAQ/seated marches  -           User Key  (r) = Recorded By, (t) = Taken By, (c) = Cosigned By    Initials Name Provider Type    Swetha Pearce PT Physical Therapist               Goals/Plan    No documentation.                Clinical Impression     Row Name 11/08/22 1210          Pain    Pre/Posttreatment Pain Comment C/o neck and back pain, no numerical value given  -     Row Name 11/08/22 1210          Plan of Care Review    Plan of Care Reviewed With patient  -     Progress declining  -     Outcome Evaluation Pt seen for PT this AM, difficulty progressing gait distance 2/2 poor endurance. Pt sitting UIC on arrival, completed LE exercises with some encouragement. Pt stood from recliner with SBA - cues to push up from armrests instead of pulling on rwx.  Pt ambulated 8ft around bed with CGA-SBA and rwx, c/o SOA and fatigue requiring seated rest break. Pt ambulated another 7ft with rwx with a seated rest break following, then ambulated 15ft back to bed with max encouragement to progress gait distance w/o rest break. Pt notably fatigued following, assisted with repositioning in chair. Pt's O2 sensor suspended on monitor - monitor made aware and corrected, though also needing a new sensor. Nsg aid present at end of session and assisting with changing sensory - unable to read O2 sats throughout session. Pt left sitting up with needs met and aid present. PT will continue to follow to progress mobility as tolerated. Anticipate DC to SNF.  -     Row Name 11/08/22 1210          Vital Signs    O2 Delivery Pre Treatment supplemental O2  -     O2 Delivery Intra Treatment supplemental O2  -     O2 Delivery Post Treatment supplemental O2  -     Row Name 11/08/22 1210          Positioning and Restraints    Pre-Treatment Position sitting in chair/recliner  -     Post Treatment Position chair  -BH     In Chair reclined;call light within reach;encouraged to call for assist;exit alarm on;with nsg  -           User Key  (r) = Recorded By, (t) = Taken By, (c) = Cosigned By    Initials Name Provider Type     Swetha Hurst, PT Physical Therapist               Outcome Measures     Row Name 11/08/22 1216          How much help from another person do you currently need...    Turning from your back to your side while in flat bed without using bedrails? 3  -BH     Moving from lying on back to sitting on the side of a flat bed without bedrails? 3  -BH     Moving to and from a bed to a chair (including a wheelchair)? 3  -BH     Standing up from a chair using your arms (e.g., wheelchair, bedside chair)? 3  -BH     Climbing 3-5 steps with a railing? 2  -BH     To walk in hospital room? 3  -BH     AM-PAC 6 Clicks Score (PT) 17  -     Highest level of mobility 5 --> Static  standing  -     Row Name 11/08/22 1215 11/08/22 1036       Functional Assessment    Outcome Measure Options AM-PAC 6 Clicks Basic Mobility (PT)  - AM-PAC 6 Clicks Daily Activity (OT)  -          User Key  (r) = Recorded By, (t) = Taken By, (c) = Cosigned By    Initials Name Provider Type     Swetha Hurst, PT Physical Therapist    Karly Mccann, OT Occupational Therapist                             Physical Therapy Education     Title: PT OT SLP Therapies (Done)     Topic: Physical Therapy (Done)     Point: Mobility training (Done)     Learning Progress Summary           Patient Acceptance, E,TB,D, VU,NR by  at 11/8/2022 1216    Acceptance, E, NR by AR at 11/6/2022 1549                   Point: Home exercise program (Done)     Learning Progress Summary           Patient Acceptance, E,TB,D, VU,NR by  at 11/8/2022 1216    Acceptance, E, NR by AR at 11/6/2022 1549                   Point: Body mechanics (Done)     Learning Progress Summary           Patient Acceptance, E,TB,D, VU,NR by  at 11/8/2022 1216    Acceptance, E, NR by AR at 11/6/2022 1549                   Point: Precautions (Done)     Learning Progress Summary           Patient Acceptance, E,TB,D, VU,NR by  at 11/8/2022 1216    Acceptance, E, NR by AR at 11/6/2022 1549                               User Key     Initials Effective Dates Name Provider Type Discipline    AR 06/16/21 -  Josefa Albright, PT Physical Therapist PT     04/08/22 -  Swetha Hurst PT Physical Therapist PT              PT Recommendation and Plan     Plan of Care Reviewed With: patient  Progress: declining  Outcome Evaluation: Pt seen for PT this AM, difficulty progressing gait distance 2/2 poor endurance. Pt sitting UIC on arrival, completed LE exercises with some encouragement. Pt stood from recliner with SBA - cues to push up from armrests instead of pulling on rwx. Pt ambulated 8ft around bed with CGA-SBA and rwx, c/o SOA and fatigue requiring seated  rest break. Pt ambulated another 7ft with rwx with a seated rest break following, then ambulated 15ft back to bed with max encouragement to progress gait distance w/o rest break. Pt notably fatigued following, assisted with repositioning in chair. Pt's O2 sensor suspended on monitor - monitor made aware and corrected, though also needing a new sensor. Nsg aid present at end of session and assisting with changing sensory - unable to read O2 sats throughout session. Pt left sitting up with needs met and aid present. PT will continue to follow to progress mobility as tolerated. Anticipate DC to SNF.     Time Calculation:    PT Charges     Row Name 11/08/22 1216             Time Calculation    Start Time 1008  -      Stop Time 1040  -      Time Calculation (min) 32 min  -      PT Received On 11/08/22  -      PT - Next Appointment 11/10/22  -         Time Calculation- PT    Total Timed Code Minutes- PT 32 minute(s)  -         Timed Charges    55454 - PT Therapeutic Exercise Minutes 4  -      80844 - Gait Training Minutes  20  -      84802 - PT Therapeutic Activity Minutes 8  -         Total Minutes    Timed Charges Total Minutes 32  -       Total Minutes 32  -BH            User Key  (r) = Recorded By, (t) = Taken By, (c) = Cosigned By    Initials Name Provider Type     Swetha Hurst PT Physical Therapist              Therapy Charges for Today     Code Description Service Date Service Provider Modifiers Qty    63850670987 HC GAIT TRAINING EA 15 MIN 11/8/2022 Swetha Hurst, PT GP 1    [id number]  PT THERAPEUTIC ACT EA 15 MIN 11/8/2022 Swetha Hurst, PT GP 1          PT G-Codes  Outcome Measure Options: AM-PAC 6 Clicks Basic Mobility (PT)  AM-PAC 6 Clicks Score (PT): 17  AM-PAC 6 Clicks Score (OT): 12    Swetha Hurst PT  11/8/2022

## 2022-11-08 NOTE — PROGRESS NOTES
"DAILY PROGRESS NOTE  [facility]    Patient Identification:  Name: Olena Myers  Age: 87 y.o.  Sex: female  :  1935  MRN: [id number]         Primary Care Physician: Manda Keenan MD    Subjective:  Interval History:She is weak.    Objective:    Scheduled Meds:azelastine, 2 spray, Each Nare, Daily  budesonide-formoterol, 2 puff, Inhalation, BID - RT   And  tiotropium bromide monohydrate, 1 puff, Inhalation, Daily - RT  busPIRone, 10 mg, Oral, BID  DULoxetine, 60 mg, Oral, BID  ferrous sulfate, 325 mg, Oral, Daily With Breakfast  levothyroxine, 88 mcg, Oral, Daily  metoprolol succinate XL, 25 mg, Oral, BID  montelukast, 10 mg, Oral, Nightly  mycophenolate, 1,000 mg, Oral, QAM  mycophenolate, 1,500 mg, Oral, Q PM  pantoprazole, 40 mg, Oral, QAM  pravastatin, 20 mg, Oral, Nightly  predniSONE, 5 mg, Oral, Daily  rivaroxaban, 20 mg, Oral, Daily With Dinner  sodium chloride, 10 mL, Intravenous, Q12H  torsemide, 20 mg, Oral, Daily  vitamin B-12, 1,000 mcg, Oral, Daily  vitamin D, 50,000 Units, Oral, Q7 Days      Continuous Infusions:     Vital signs in last 24 hours:  Temp:  [98 °F (36.7 °C)-99.9 °F (37.7 °C)] 98.7 °F (37.1 °C)  Heart Rate:  [] 100  Resp:  [16-18] 18  BP: (108-135)/(66-79) 135/69    Intake/Output:    Intake/Output Summary (Last 24 hours) at 2022 1103  Last data filed at 2022 1042  Gross per 24 hour   Intake 220 ml   Output 1300 ml   Net -1080 ml       Exam:  /69 (BP Location: Right arm, Patient Position: Lying)   Pulse 100   Temp 98.7 °F (37.1 °C) (Oral)   Resp 18   Ht 152.4 cm (60\")   Wt 76 kg (167 lb 8.8 oz)   SpO2 94%   BMI 32.72 kg/m²     General Appearance:    Alert, cooperative, no distress   Head:    Normocephalic, without obvious abnormality, atraumatic   Eyes:       Throat:   Lips, tongue, gums normal   Neck:   Supple, symmetrical, trachea midline, no JVD   Lungs:     Clear to auscultation bilaterally, respirations unlabored   Chest Wall:    No " tenderness or deformity    Heart:    Regular rate and rhythm, S1 and S2 normal, no murmur,no  Rub or gallop   Abdomen:     Soft, nontender, bowel sounds active, no masses, no organomegaly    Extremities:   Extremities normal, atraumatic, no cyanosis or edema   Pulses:      Skin:   Skin is warm and dry,  no rashes or palpable lesions   Neurologic:   no focal deficits noted      Lab Results (last 72 hours)     Procedure Component Value Units Date/Time    CBC & Differential [[id number]]  (Abnormal) Collected: 11/08/22 0348    Specimen: Blood Updated: 11/08/22 0452    Narrative:      The following orders were created for panel order CBC & Differential.  Procedure                               Abnormality         Status                     ---------                               -----------         ------                     CBC Auto Differential[[id number]]        Abnormal            Final result                 Please view results for these tests on the individual orders.    CBC Auto Differential [[id number]]  (Abnormal) Collected: 11/08/22 0348    Specimen: Blood Updated: 11/08/22 0452     WBC 12.15 10*3/mm3      RBC 3.76 10*6/mm3      Hemoglobin 10.2 g/dL      Hematocrit 32.1 %      MCV 85.4 fL      MCH 27.1 pg      MCHC 31.8 g/dL      RDW 14.2 %      RDW-SD 44.1 fl      MPV 10.2 fL      Platelets 272 10*3/mm3      Neutrophil % 91.7 %      Lymphocyte % 2.4 %      Monocyte % 4.4 %      Eosinophil % 0.6 %      Basophil % 0.2 %      Immature Grans % 0.7 %      Neutrophils, Absolute 11.14 10*3/mm3      Lymphocytes, Absolute 0.29 10*3/mm3      Monocytes, Absolute 0.53 10*3/mm3      Eosinophils, Absolute 0.07 10*3/mm3      Basophils, Absolute 0.03 10*3/mm3      Immature Grans, Absolute 0.09 10*3/mm3      nRBC 0.0 /100 WBC     COVID-19 RAPID AG,VERITOR,COR/DAIV/PAD/TREY/MAD/BISI/LAG/MARY/ IN-HOUSE,DRY SWAB, 1-2 HR TAT - Swab, Nasal Cavity [[id number]]  (Normal) Collected: 11/07/22 1730    Specimen: Swab from Nasal Cavity Updated:  11/07/22 1845     COVID19 Presumptive Negative    Narrative:      Fact sheets for providers: https://www.fda.gov/media/588358/download    Fact sheets for patients: https://www.fda.gov/media/596233/download    Blood Culture - Blood, Arm, Right [[id number]]  (Normal) Collected: 11/04/22 1333    Specimen: Blood from Arm, Right Updated: 11/07/22 1301     Blood Culture No growth at 3 days    Blood Culture - Blood, Arm, Left [[id number]]  (Normal) Collected: 11/04/22 1204    Specimen: Blood from Arm, Left Updated: 11/07/22 1116     Blood Culture No growth at 3 days    Basic Metabolic Panel [[id number]]  (Abnormal) Collected: 11/07/22 0355    Specimen: Blood Updated: 11/07/22 0459     Glucose 90 mg/dL      BUN 19 mg/dL      Creatinine 1.30 mg/dL      Sodium 135 mmol/L      Potassium 3.8 mmol/L      Chloride 97 mmol/L      CO2 29.1 mmol/L      Calcium 8.7 mg/dL      BUN/Creatinine Ratio 14.6     Anion Gap 8.9 mmol/L      eGFR 40.4 mL/min/1.73      Comment: National Kidney Foundation and American Society of Nephrology (ASN) Task Force recommended calculation based on the Chronic Kidney Disease Epidemiology Collaboration (CKD-EPI) equation refit without adjustment for race.       Narrative:      GFR Normal >60  Chronic Kidney Disease <60  Kidney Failure <15    The GFR formula is only valid for adults with stable renal function between ages 18 and 70.    CBC & Differential [[id number]]  (Abnormal) Collected: 11/07/22 0355    Specimen: Blood Updated: 11/07/22 0444    Narrative:      The following orders were created for panel order CBC & Differential.  Procedure                               Abnormality         Status                     ---------                               -----------         ------                     CBC Auto Differential[[id number]]        Abnormal            Final result                 Please view results for these tests on the individual orders.    CBC Auto Differential [[id number]]  (Abnormal) Collected:  11/07/22 0355    Specimen: Blood Updated: 11/07/22 0444     WBC 9.43 10*3/mm3      RBC 3.21 10*6/mm3      Hemoglobin 8.7 g/dL      Hematocrit 27.2 %      MCV 84.7 fL      MCH 27.1 pg      MCHC 32.0 g/dL      RDW 14.5 %      RDW-SD 45.2 fl      MPV 10.1 fL      Platelets 223 10*3/mm3      Neutrophil % 86.9 %      Lymphocyte % 5.0 %      Monocyte % 6.0 %      Eosinophil % 1.0 %      Basophil % 0.5 %      Immature Grans % 0.6 %      Neutrophils, Absolute 8.19 10*3/mm3      Lymphocytes, Absolute 0.47 10*3/mm3      Monocytes, Absolute 0.57 10*3/mm3      Eosinophils, Absolute 0.09 10*3/mm3      Basophils, Absolute 0.05 10*3/mm3      Immature Grans, Absolute 0.06 10*3/mm3      nRBC 0.0 /100 WBC     Folate [[id number]]  (Normal) Collected: 11/06/22 0352    Specimen: Blood Updated: 11/06/22 0500     Folate 7.65 ng/mL     Narrative:      Results may be falsely increased if patient taking Biotin.      Vitamin B12 [[id number]]  (Abnormal) Collected: 11/06/22 0352    Specimen: Blood Updated: 11/06/22 0459     Vitamin B-12 1,666 pg/mL     Narrative:      Results may be falsely increased if patient taking Biotin.      CBC & Differential [[id number]]  (Abnormal) Collected: 11/06/22 0353    Specimen: Blood Updated: 11/06/22 0422    Narrative:      The following orders were created for panel order CBC & Differential.  Procedure                               Abnormality         Status                     ---------                               -----------         ------                     CBC Auto Differential[[id number]]        Abnormal            Final result                 Please view results for these tests on the individual orders.    CBC Auto Differential [[id number]]  (Abnormal) Collected: 11/06/22 0353    Specimen: Blood Updated: 11/06/22 0422     WBC 10.09 10*3/mm3      RBC 3.40 10*6/mm3      Hemoglobin 9.1 g/dL      Hematocrit 28.4 %      MCV 83.5 fL      MCH 26.8 pg      MCHC 32.0 g/dL      RDW 14.3 %      RDW-SD 44.0 fl       MPV 9.4 fL      Platelets 240 10*3/mm3      Neutrophil % 88.3 %      Lymphocyte % 3.9 %      Monocyte % 4.9 %      Eosinophil % 1.6 %      Basophil % 0.4 %      Immature Grans % 0.9 %      Neutrophils, Absolute 8.92 10*3/mm3      Lymphocytes, Absolute 0.39 10*3/mm3      Monocytes, Absolute 0.49 10*3/mm3      Eosinophils, Absolute 0.16 10*3/mm3      Basophils, Absolute 0.04 10*3/mm3      Immature Grans, Absolute 0.09 10*3/mm3      nRBC 0.0 /100 WBC     Ferritin [[id number]]  (Normal) Collected: 11/05/22 0756    Specimen: Blood Updated: 11/05/22 1752     Ferritin 146.00 ng/mL     Narrative:      Results may be falsely decreased if patient taking Biotin.      Iron Profile [[id number]]  (Abnormal) Collected: 11/05/22 0756    Specimen: Blood Updated: 11/05/22 1746     Iron 18 mcg/dL      Iron Saturation 7 %      Transferrin 183 mg/dL      TIBC 273 mcg/dL     C-reactive Protein [[id number]]  (Abnormal) Collected: 11/05/22 0756    Specimen: Blood Updated: 11/05/22 1746     C-Reactive Protein 15.32 mg/dL     Hemoglobin & Hematocrit, Blood [[id number]]  (Abnormal) Collected: 11/05/22 1540    Specimen: Blood Updated: 11/05/22 1600     Hemoglobin 9.5 g/dL      Hematocrit 29.6 %         Data Review:  Results from last 7 days   Lab Units 11/07/22  0355 11/05/22  0756 11/04/22  1155   SODIUM mmol/L 135* 136 137   POTASSIUM mmol/L 3.8 4.3 4.4   CHLORIDE mmol/L 97* 98 103   CO2 mmol/L 29.1* 26.9 25.3   BUN mg/dL 19 13 12   CREATININE mg/dL 1.30* 0.82 0.84   GLUCOSE mg/dL 90 115* 88   CALCIUM mg/dL 8.7 9.1 8.6     Results from last 7 days   Lab Units 11/08/22  0348 11/07/22  0355 11/06/22  0353   WBC 10*3/mm3 12.15* 9.43 10.09   HEMOGLOBIN g/dL 10.2* 8.7* 9.1*   HEMATOCRIT % 32.1* 27.2* 28.4*   PLATELETS 10*3/mm3 272 223 240             Lab Results   Lab Value Date/Time    TROPONINT <0.010 11/04/2022 1155    TROPONINT 0.010 10/25/2022 0843    TROPONINT <0.010 02/15/2022 1659    TROPONINT <0.010 07/05/2021 0929    TROPONINT <0.010  01/05/2021 1716    TROPONINT <0.010 12/20/2016 1549    TROPONINT <0.010 11/23/2016 0337    TROPONINT <0.010 11/22/2016 1606         Results from last 7 days   Lab Units 11/05/22  0756 11/04/22  1155   ALK PHOS U/L 100 79   BILIRUBIN mg/dL 0.7 0.4   ALT (SGPT) U/L 15 13   AST (SGOT) U/L 24 27             No results found for: POCGLU        Past Medical History:   Diagnosis Date   • Anemia     IRON DEFICIENCY   • Arthritis    • Asthma    • Atrial fibrillation (HCC)    • CAD (coronary artery disease)     HEART STENT   • COPD (chronic obstructive pulmonary disease) ([facility])    • 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   • Frequent urinary tract infections    • History of gastric ulcer    • History of GI bleed    • [place] (hard of hearing)    • Hyperlipidemia    • Hypertension    • Hyponatremia    • Lightheadedness    • LVH (left ventricular hypertrophy)    • MG (myasthenia gravis) ([facility])    • Mini stroke 10/2016, 3/2017   • OA (osteoarthritis)    • Osteoporosis    • Sleep apnea     USES CPAP   • SOB (shortness of breath)     WALKING       Assessment:  Active Hospital Problems    Diagnosis  POA   • **Anemia [D64.9]  Yes   • Altered mental status [R41.82]  Unknown   • Chronic respiratory failure with hypoxia ([facility]) [J96.11]  Yes   • COVID-19 virus infection [U07.1]  Unknown   • Chronic diastolic CHF (congestive heart failure) ([facility]) [I50.32]  Yes   • COPD (chronic obstructive pulmonary disease) ([facility]) [J44.9]  Yes   • Sleep apnea [G47.30]  Yes   • Paroxysmal atrial fibrillation ([facility]) [I48.0]  Yes   • Myasthenia gravis ([facility]) [G70.00]  Yes   • HX: long term anticoagulant use [Z92.29]  Not Applicable   • Essential hypertension [I10]  Yes      Resolved Hospital Problems   No resolved problems to display.       Plan:  DC planning. Will need SNU for rehab.  Follow lab. Got iron infusions.    Johny Mendoza MD  11/8/2022  11:03 EST

## 2022-11-08 NOTE — PROGRESS NOTES
"DAILY PROGRESS NOTE  [facility]    Patient Identification:  Name: Olena Myers  Age: 87 y.o.  Sex: female  :  1935  MRN: [id number]         Primary Care Physician: Manda Keenan MD    Subjective:  Interval History:She is weak.    Objective:    Scheduled Meds:azelastine, 2 spray, Each Nare, Daily  budesonide-formoterol, 2 puff, Inhalation, BID - RT   And  tiotropium bromide monohydrate, 1 puff, Inhalation, Daily - RT  busPIRone, 10 mg, Oral, BID  DULoxetine, 60 mg, Oral, BID  ferrous sulfate, 325 mg, Oral, Daily With Breakfast  levothyroxine, 88 mcg, Oral, Daily  metoprolol succinate XL, 25 mg, Oral, BID  montelukast, 10 mg, Oral, Nightly  mycophenolate, 1,000 mg, Oral, QAM  mycophenolate, 1,500 mg, Oral, Q PM  pantoprazole, 40 mg, Oral, QAM  pravastatin, 20 mg, Oral, Nightly  predniSONE, 5 mg, Oral, Daily  rivaroxaban, 20 mg, Oral, Daily With Dinner  sodium chloride, 10 mL, Intravenous, Q12H  torsemide, 20 mg, Oral, Daily  vitamin B-12, 1,000 mcg, Oral, Daily  vitamin D, 50,000 Units, Oral, Q7 Days      Continuous Infusions:     Vital signs in last 24 hours:  Temp:  [98 °F (36.7 °C)-99.9 °F (37.7 °C)] 98.7 °F (37.1 °C)  Heart Rate:  [] 100  Resp:  [16-18] 18  BP: (108-135)/(66-79) 135/69    Intake/Output:    Intake/Output Summary (Last 24 hours) at 2022 1112  Last data filed at 2022 1042  Gross per 24 hour   Intake 220 ml   Output 1300 ml   Net -1080 ml       Exam:  /69 (BP Location: Right arm, Patient Position: Lying)   Pulse 100   Temp 98.7 °F (37.1 °C) (Oral)   Resp 18   Ht 152.4 cm (60\")   Wt 76 kg (167 lb 8.8 oz)   SpO2 94%   BMI 32.72 kg/m²     General Appearance:    Alert, cooperative, no distress   Head:    Normocephalic, without obvious abnormality, atraumatic   Eyes:       Throat:   Lips, tongue, gums normal   Neck:   Supple, symmetrical, trachea midline, no JVD   Lungs:     Clear to auscultation bilaterally, respirations unlabored   Chest Wall:    No " tenderness or deformity    Heart:    Regular rate and rhythm, S1 and S2 normal, no murmur,no  Rub or gallop   Abdomen:     Soft, nontender, bowel sounds active, no masses, no organomegaly    Extremities:   Extremities normal, atraumatic, no cyanosis or edema   Pulses:      Skin:   Skin is warm and dry,  no rashes or palpable lesions   Neurologic:   no focal deficits noted      Lab Results (last 72 hours)     Procedure Component Value Units Date/Time    CBC & Differential [[id number]]  (Abnormal) Collected: 11/08/22 0348    Specimen: Blood Updated: 11/08/22 0452    Narrative:      The following orders were created for panel order CBC & Differential.  Procedure                               Abnormality         Status                     ---------                               -----------         ------                     CBC Auto Differential[[id number]]        Abnormal            Final result                 Please view results for these tests on the individual orders.    CBC Auto Differential [[id number]]  (Abnormal) Collected: 11/08/22 0348    Specimen: Blood Updated: 11/08/22 0452     WBC 12.15 10*3/mm3      RBC 3.76 10*6/mm3      Hemoglobin 10.2 g/dL      Hematocrit 32.1 %      MCV 85.4 fL      MCH 27.1 pg      MCHC 31.8 g/dL      RDW 14.2 %      RDW-SD 44.1 fl      MPV 10.2 fL      Platelets 272 10*3/mm3      Neutrophil % 91.7 %      Lymphocyte % 2.4 %      Monocyte % 4.4 %      Eosinophil % 0.6 %      Basophil % 0.2 %      Immature Grans % 0.7 %      Neutrophils, Absolute 11.14 10*3/mm3      Lymphocytes, Absolute 0.29 10*3/mm3      Monocytes, Absolute 0.53 10*3/mm3      Eosinophils, Absolute 0.07 10*3/mm3      Basophils, Absolute 0.03 10*3/mm3      Immature Grans, Absolute 0.09 10*3/mm3      nRBC 0.0 /100 WBC     COVID-19 RAPID AG,VERITOR,COR/DAVI/PAD/TREY/MAD/BISI/LAG/MARY/ IN-HOUSE,DRY SWAB, 1-2 HR TAT - Swab, Nasal Cavity [[id number]]  (Normal) Collected: 11/07/22 1730    Specimen: Swab from Nasal Cavity Updated:  11/07/22 1845     COVID19 Presumptive Negative    Narrative:      Fact sheets for providers: https://www.fda.gov/media/258043/download    Fact sheets for patients: https://www.fda.gov/media/379737/download    Blood Culture - Blood, Arm, Right [[id number]]  (Normal) Collected: 11/04/22 1333    Specimen: Blood from Arm, Right Updated: 11/07/22 1301     Blood Culture No growth at 3 days    Blood Culture - Blood, Arm, Left [[id number]]  (Normal) Collected: 11/04/22 1204    Specimen: Blood from Arm, Left Updated: 11/07/22 1116     Blood Culture No growth at 3 days    Basic Metabolic Panel [[id number]]  (Abnormal) Collected: 11/07/22 0355    Specimen: Blood Updated: 11/07/22 0459     Glucose 90 mg/dL      BUN 19 mg/dL      Creatinine 1.30 mg/dL      Sodium 135 mmol/L      Potassium 3.8 mmol/L      Chloride 97 mmol/L      CO2 29.1 mmol/L      Calcium 8.7 mg/dL      BUN/Creatinine Ratio 14.6     Anion Gap 8.9 mmol/L      eGFR 40.4 mL/min/1.73      Comment: National Kidney Foundation and American Society of Nephrology (ASN) Task Force recommended calculation based on the Chronic Kidney Disease Epidemiology Collaboration (CKD-EPI) equation refit without adjustment for race.       Narrative:      GFR Normal >60  Chronic Kidney Disease <60  Kidney Failure <15    The GFR formula is only valid for adults with stable renal function between ages 18 and 70.    CBC & Differential [[id number]]  (Abnormal) Collected: 11/07/22 0355    Specimen: Blood Updated: 11/07/22 0444    Narrative:      The following orders were created for panel order CBC & Differential.  Procedure                               Abnormality         Status                     ---------                               -----------         ------                     CBC Auto Differential[[id number]]        Abnormal            Final result                 Please view results for these tests on the individual orders.    CBC Auto Differential [[id number]]  (Abnormal) Collected:  11/07/22 0355    Specimen: Blood Updated: 11/07/22 0444     WBC 9.43 10*3/mm3      RBC 3.21 10*6/mm3      Hemoglobin 8.7 g/dL      Hematocrit 27.2 %      MCV 84.7 fL      MCH 27.1 pg      MCHC 32.0 g/dL      RDW 14.5 %      RDW-SD 45.2 fl      MPV 10.1 fL      Platelets 223 10*3/mm3      Neutrophil % 86.9 %      Lymphocyte % 5.0 %      Monocyte % 6.0 %      Eosinophil % 1.0 %      Basophil % 0.5 %      Immature Grans % 0.6 %      Neutrophils, Absolute 8.19 10*3/mm3      Lymphocytes, Absolute 0.47 10*3/mm3      Monocytes, Absolute 0.57 10*3/mm3      Eosinophils, Absolute 0.09 10*3/mm3      Basophils, Absolute 0.05 10*3/mm3      Immature Grans, Absolute 0.06 10*3/mm3      nRBC 0.0 /100 WBC     Folate [[id number]]  (Normal) Collected: 11/06/22 0352    Specimen: Blood Updated: 11/06/22 0500     Folate 7.65 ng/mL     Narrative:      Results may be falsely increased if patient taking Biotin.      Vitamin B12 [[id number]]  (Abnormal) Collected: 11/06/22 0352    Specimen: Blood Updated: 11/06/22 0459     Vitamin B-12 1,666 pg/mL     Narrative:      Results may be falsely increased if patient taking Biotin.      CBC & Differential [[id number]]  (Abnormal) Collected: 11/06/22 0353    Specimen: Blood Updated: 11/06/22 0422    Narrative:      The following orders were created for panel order CBC & Differential.  Procedure                               Abnormality         Status                     ---------                               -----------         ------                     CBC Auto Differential[[id number]]        Abnormal            Final result                 Please view results for these tests on the individual orders.    CBC Auto Differential [[id number]]  (Abnormal) Collected: 11/06/22 0353    Specimen: Blood Updated: 11/06/22 0422     WBC 10.09 10*3/mm3      RBC 3.40 10*6/mm3      Hemoglobin 9.1 g/dL      Hematocrit 28.4 %      MCV 83.5 fL      MCH 26.8 pg      MCHC 32.0 g/dL      RDW 14.3 %      RDW-SD 44.0 fl       MPV 9.4 fL      Platelets 240 10*3/mm3      Neutrophil % 88.3 %      Lymphocyte % 3.9 %      Monocyte % 4.9 %      Eosinophil % 1.6 %      Basophil % 0.4 %      Immature Grans % 0.9 %      Neutrophils, Absolute 8.92 10*3/mm3      Lymphocytes, Absolute 0.39 10*3/mm3      Monocytes, Absolute 0.49 10*3/mm3      Eosinophils, Absolute 0.16 10*3/mm3      Basophils, Absolute 0.04 10*3/mm3      Immature Grans, Absolute 0.09 10*3/mm3      nRBC 0.0 /100 WBC     Ferritin [[id number]]  (Normal) Collected: 11/05/22 0756    Specimen: Blood Updated: 11/05/22 1752     Ferritin 146.00 ng/mL     Narrative:      Results may be falsely decreased if patient taking Biotin.      Iron Profile [[id number]]  (Abnormal) Collected: 11/05/22 0756    Specimen: Blood Updated: 11/05/22 1746     Iron 18 mcg/dL      Iron Saturation 7 %      Transferrin 183 mg/dL      TIBC 273 mcg/dL     C-reactive Protein [[id number]]  (Abnormal) Collected: 11/05/22 0756    Specimen: Blood Updated: 11/05/22 1746     C-Reactive Protein 15.32 mg/dL     Hemoglobin & Hematocrit, Blood [[id number]]  (Abnormal) Collected: 11/05/22 1540    Specimen: Blood Updated: 11/05/22 1600     Hemoglobin 9.5 g/dL      Hematocrit 29.6 %         Data Review:  Results from last 7 days   Lab Units 11/07/22  0355 11/05/22  0756 11/04/22  1155   SODIUM mmol/L 135* 136 137   POTASSIUM mmol/L 3.8 4.3 4.4   CHLORIDE mmol/L 97* 98 103   CO2 mmol/L 29.1* 26.9 25.3   BUN mg/dL 19 13 12   CREATININE mg/dL 1.30* 0.82 0.84   GLUCOSE mg/dL 90 115* 88   CALCIUM mg/dL 8.7 9.1 8.6     Results from last 7 days   Lab Units 11/08/22  0348 11/07/22  0355 11/06/22  0353   WBC 10*3/mm3 12.15* 9.43 10.09   HEMOGLOBIN g/dL 10.2* 8.7* 9.1*   HEMATOCRIT % 32.1* 27.2* 28.4*   PLATELETS 10*3/mm3 272 223 240             Lab Results   Lab Value Date/Time    TROPONINT <0.010 11/04/2022 1155    TROPONINT 0.010 10/25/2022 0843    TROPONINT <0.010 02/15/2022 1659    TROPONINT <0.010 07/05/2021 0929    TROPONINT <0.010  01/05/2021 1716    TROPONINT <0.010 12/20/2016 1549    TROPONINT <0.010 11/23/2016 0337    TROPONINT <0.010 11/22/2016 1606         Results from last 7 days   Lab Units 11/05/22  0756 11/04/22  1155   ALK PHOS U/L 100 79   BILIRUBIN mg/dL 0.7 0.4   ALT (SGPT) U/L 15 13   AST (SGOT) U/L 24 27             No results found for: POCGLU        Past Medical History:   Diagnosis Date   • Anemia     IRON DEFICIENCY   • Arthritis    • Asthma    • Atrial fibrillation (HCC)    • CAD (coronary artery disease)     HEART STENT   • COPD (chronic obstructive pulmonary disease) ([facility])    • 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   • Frequent urinary tract infections    • History of gastric ulcer    • History of GI bleed    • [place] (hard of hearing)    • Hyperlipidemia    • Hypertension    • Hyponatremia    • Lightheadedness    • LVH (left ventricular hypertrophy)    • MG (myasthenia gravis) ([facility])    • Mini stroke 10/2016, 3/2017   • OA (osteoarthritis)    • Osteoporosis    • Sleep apnea     USES CPAP   • SOB (shortness of breath)     WALKING       Assessment:  Active Hospital Problems    Diagnosis  POA   • **Anemia [D64.9]  Yes   • Altered mental status [R41.82]  Unknown   • Chronic respiratory failure with hypoxia ([facility]) [J96.11]  Yes   • COVID-19 virus infection [U07.1]  Unknown   • Chronic diastolic CHF (congestive heart failure) ([facility]) [I50.32]  Yes   • COPD (chronic obstructive pulmonary disease) ([facility]) [J44.9]  Yes   • Sleep apnea [G47.30]  Yes   • Paroxysmal atrial fibrillation ([facility]) [I48.0]  Yes   • Myasthenia gravis ([facility]) [G70.00]  Yes   • HX: long term anticoagulant use [Z92.29]  Not Applicable   • Essential hypertension [I10]  Yes      Resolved Hospital Problems   No resolved problems to display.       Plan:  DC planning. Will need SNU for rehab.  Follow lab. Got iron infusions.  COVID testing??    Johny Mendoza MD  11/8/2022  11:12 EST

## 2022-11-08 NOTE — THERAPY TREATMENT NOTE
Patient Name: Olena Myers  : 1935    MRN: [id number]                              Today's Date: 2022       Admit Date: 2022    Visit Dx:     ICD-10-CM ICD-9-CM   1. Acute encephalopathy  G93.40 348.30   2. Acute on chronic anemia  D64.9 285.9   3. COVID  U07.1 079.89   4. History of COPD  Z87.09 V12.69   5. History of atrial fibrillation  Z86.79 V12.59   6. Chronic anticoagulation  Z79.01 V58.61   7. History of myasthenia gravis  Z86.69 V12.49     Patient Active Problem List   Diagnosis   • Pre-operative cardiovascular examination   • S/p reverse total shoulder arthroplasty   • Myasthenia gravis (HCC)   • Paroxysmal atrial fibrillation (HCC)   • HX: long term anticoagulant use   • Essential hypertension   • CAD (coronary artery disease)   • Rhinovirus   • Atrial fibrillation (HCC)   • S/P reverse total shoulder arthroplasty, right   • Acute UTI   • COPD (chronic obstructive pulmonary disease) (HCC)   • Metabolic encephalopathy   • Sleep apnea   • Sepsis (HCC)   • Dyspnea   • Chronic diastolic CHF (congestive heart failure) (HCC)   • Diastolic CHF, chronic (HCC)   • Heme positive stool   • E coli bacteremia   • Iron deficiency anemia   • Current chronic use of systemic steroids   • COPD exacerbation (HCC)   • Obesity (BMI 30-39.9)   • COVID-19 virus infection   • Chronic respiratory failure with hypoxia (HCC)   • Cytokine release syndrome, grade 1   • Altered mental status   • Anemia     Past Medical History:   Diagnosis Date   • Anemia     IRON DEFICIENCY   • Arthritis    • Asthma    • Atrial fibrillation (HCC)    • CAD (coronary artery disease)     HEART STENT   • COPD (chronic obstructive pulmonary disease) (HCC)    • 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   • Frequent urinary tract infections    • History of gastric ulcer    • History of GI bleed    • [place] (hard of hearing)    • Hyperlipidemia    • Hypertension    • Hyponatremia    • Lightheadedness    • LVH (left ventricular hypertrophy)    •  MG (myasthenia gravis) ([facility])    • Mini stroke 10/2016, 3/2017   • OA (osteoarthritis)    • Osteoporosis    • Sleep apnea     USES CPAP   • SOB (shortness of breath)     WALKING     Past Surgical History:   Procedure Laterality Date   • APPENDECTOMY     • BRONCHOSCOPY N/A 3/31/2022    Procedure: BRONCHOSCOPY WITH BAL RIGHT LOWER LOBE;  Surgeon: Remy Tirado MD;  Location: [facility] ENDOSCOPY;  Service: Pulmonary;  Laterality: N/A;  COPD EXACERBATION     • CARPAL TUNNEL RELEASE Bilateral    • CATARACT EXTRACTION Bilateral    • CORONARY STENT PLACEMENT     • TOTAL HIP ARTHROPLASTY Bilateral    • TOTAL SHOULDER ARTHROPLASTY W/ DISTAL CLAVICLE EXCISION Left 7/19/2016    Procedure: LT TOTAL SHOULDER REVERSE ARTHROPLASTY;  Surgeon: NAHOMI Solorzano MD;  Location: [facility] OR [facility];  Service:    • TOTAL SHOULDER ARTHROPLASTY W/ DISTAL CLAVICLE EXCISION Right 1/8/2019    Procedure: RIGHT TOTAL SHOULDER REVERSE ARTHROPLASTY;  Surgeon: Jovanny Solorzano MD;  Location: [facility] OR [facility];  Service: Orthopedics      General Information     Row Name 11/08/22 1029          OT Time and Intention    Document Type therapy note (daily note)  -     Mode of Treatment individual therapy;occupational therapy  -     Row Name 11/08/22 1029          General Information    Patient Profile Reviewed yes  -KA     Existing Precautions/Restrictions fall  -     Row Name 11/08/22 1029          Cognition    Orientation Status (Cognition) oriented to;person;disoriented to;place;situation  -     Row Name 11/08/22 1029          Safety Issues, Functional Mobility    Safety Issues Affecting Function (Mobility) awareness of need for assistance;insight into deficits/self-awareness;judgment;sequencing abilities;problem-solving  -     Impairments Affecting Function (Mobility) balance;cognition;endurance/activity tolerance;strength;shortness of breath  -KA     Comment, Safety Issues/Impairments (Mobility) nonskid socks and gait belt donned  -KA            User Key  (r) = Recorded By, (t) = Taken By, (c) = Cosigned By    Initials Name Provider Type    Karly Mccann OT Occupational Therapist                 Mobility/ADL's     Row Name 11/08/22 1030          Bed Mobility    Bed Mobility supine-sit;sit-supine  -     Supine-Sit [place] (Bed Mobility) standby assist  increased time  -     Comment, (Bed Mobility) UIC at the end of session  -     Row Name 11/08/22 1030          Transfers    Comment, (Transfers) 1st STS from bed CGA, 2nd STS from chair required min A and rwx  -     Row Name 11/08/22 1030          Sit-Stand Transfer    Assistive Device (Sit-Stand Transfers) walker, front-wheeled  -     Comment, (Sit-Stand Transfer) cues for UE placement  -     Row Name 11/08/22 1030          Functional Mobility    Functional Mobility- Ind. Level contact guard assist  -     Functional Mobility- Device walker, front-wheeled  -     Functional Mobility-Distance (Feet) --  ambulated around bed to chair with rwx  -     Row Name 11/08/22 1030          Lower Body Dressing Assessment/Training    [place] Level (Lower Body Dressing) lower body dressing skills;don;doff;socks;dependent (less than 25% patient effort)  -     Position (Lower Body Dressing) edge of bed sitting  -     Row Name 11/08/22 1030          Grooming Assessment/Training    [place] Level (Grooming) grooming skills;wash face, hands;set up;verbal cues;nonverbal cues (demo/gesture)  -     Position (Grooming) supported sitting  -     Row Name 11/08/22 1030          Self-Feeding Assessment/Training    [place] Level (Feeding) feeding skills;set up;scoop food and bring to mouth;prepare tray/open items  -     Position (Self-Feeding) sitting up in bed  -           User Key  (r) = Recorded By, (t) = Taken By, (c) = Cosigned By    Initials Name Provider Type    Karly Mccann OT Occupational Therapist               Obj/Interventions     Row Name 11/08/22 1032           Balance    Static Sitting Balance standby assist  -     Dynamic Sitting Balance contact guard  -KA     Position, Sitting Balance sitting edge of bed  -     Static Standing Balance contact guard  -KA     Dynamic Standing Balance contact guard  -KA     Position/Device Used, Standing Balance walker, front-wheeled  -     Balance Interventions sitting;standing;occupation based/functional task  -           User Key  (r) = Recorded By, (t) = Taken By, (c) = Cosigned By    Initials Name Provider Type    Karly Mccann, OT Occupational Therapist               Goals/Plan    No documentation.                Clinical Impression     Row Name 11/08/22 1032          Pain Assessment    Pretreatment Pain Rating 0/10 - no pain  -KA     Posttreatment Pain Rating 0/10 - no pain  -KA     Row Name 11/08/22 1032          Plan of Care Review    Plan of Care Reviewed With patient  -     Outcome Evaluation Pt seen for OT session this AM. Pt supine in bed upon arrival and eager to participate with therapy and get up. Pt demonstrated improvement with bed mobility this date requiring SBA/increased time to complete. Dep to don socks. STS from EOB required CGA and use of Rwx. Pt ambulated around bed to chair with CGA and Rwx, required 1 seated rest break before reaching chair. 1st STS from chair pt unable to clear bottom. Stood with min A and rwx and ambulated to chair. set up with simple grooming task. Declined further ADL/ther ex due to being too fatigued. Pt continues to benefit from skilled OT to address deficits and maximize independence with ADLs.  -     Row Name 11/08/22 1032          Vital Signs    Pre Patient Position Supine  -     Intra Patient Position Standing  -     Post Patient Position Sitting  -     Row Name 11/08/22 1032          Positioning and Restraints    Pre-Treatment Position in bed  -KA     Post Treatment Position chair  -KA     In Chair reclined;call light within reach;encouraged to call for  assist;exit alarm on;with other staff  RT present  -           User Key  (r) = Recorded By, (t) = Taken By, (c) = Cosigned By    Initials Name Provider Type    Karly Mccann OT Occupational Therapist               Outcome Measures     Row Name 11/08/22 1036          How much help from another is currently needed...    Putting on and taking off regular lower body clothing? 1  -KA     Bathing (including washing, rinsing, and drying) 2  -KA     Toileting (which includes using toilet bed pan or urinal) 1  -KA     Putting on and taking off regular upper body clothing 2  -KA     Taking care of personal grooming (such as brushing teeth) 3  -KA     Eating meals 3  -KA     AM-PAC 6 Clicks Score (OT) 12  -KA     Row Name 11/08/22 1036          Functional Assessment    Outcome Measure Options AM-PAC 6 Clicks Daily Activity (OT)  -           User Key  (r) = Recorded By, (t) = Taken By, (c) = Cosigned By    Initials Name Provider Type    Karly Mccann OT Occupational Therapist                Occupational Therapy Education     Title: PT OT SLP Therapies (In Progress)     Topic: Occupational Therapy (Done)     Point: ADL training (Done)     Description:   Instruct learner(s) on proper safety adaptation and remediation techniques during self care or transfers.   Instruct in proper use of assistive devices.              Learning Progress Summary           Patient Acceptance, E, VU,NR by SINAN at 11/7/2022 5489                   Point: Home exercise program (Done)     Description:   Instruct learner(s) on appropriate technique for monitoring, assisting and/or progressing therapeutic exercises/activities.              Learning Progress Summary           Patient Acceptance, E, VU,NR by SINAN at 11/7/2022 3452                   Point: Precautions (Done)     Description:   Instruct learner(s) on prescribed precautions during self-care and functional transfers.              Learning Progress Summary           Patient  Acceptance, E, VU,NR by  at 11/7/2022 1549                   Point: Body mechanics (Done)     Description:   Instruct learner(s) on proper positioning and spine alignment during self-care, functional mobility activities and/or exercises.              Learning Progress Summary           Patient Acceptance, E, VU,NR by  at 11/7/2022 1549                               User Key     Initials Effective Dates Name Provider Type Discipline     09/22/22 -  Karly Tidwell OT Occupational Therapist OT              OT Recommendation and Plan  Planned Therapy Interventions (OT): activity tolerance training, functional balance retraining, occupation/activity based interventions, ROM/therapeutic exercise, strengthening exercise, transfer/mobility retraining, patient/caregiver education/training, BADL retraining  Therapy Frequency (OT): 5 times/wk  Plan of Care Review  Plan of Care Reviewed With: patient  Outcome Evaluation: Pt seen for OT session this AM. Pt supine in bed upon arrival and eager to participate with therapy and get up. Pt demonstrated improvement with bed mobility this date requiring SBA/increased time to complete. Dep to don socks. STS from EOB required CGA and use of Rwx. Pt ambulated around bed to chair with CGA and Rwx, required 1 seated rest break before reaching chair. 1st STS from chair pt unable to clear bottom. Stood with min A and rwx and ambulated to chair. set up with simple grooming task. Declined further ADL/ther ex due to being too fatigued. Pt continues to benefit from skilled OT to address deficits and maximize independence with ADLs.     Time Calculation:    Time Calculation- OT     Row Name 11/08/22 1037             Time Calculation- OT    OT Start Time 0828  -      OT Stop Time 0854  -      OT Time Calculation (min) 26 min  -      Total Timed Code Minutes- OT 26 minute(s)  -      OT Received On 11/08/22  -      OT - Next Appointment 11/09/22  -         Timed Charges    42324  - OT Therapeutic Activity Minutes 26  -KA         Total Minutes    Timed Charges Total Minutes 26  -KA       Total Minutes 26  -KA            User Key  (r) = Recorded By, (t) = Taken By, (c) = Cosigned By    Initials Name Provider Type    Karly Mccann OT Occupational Therapist              Therapy Charges for Today     Code Description Service Date Service Provider Modifiers Qty    52515733602 HC OT THERAPEUTIC ACT EA 15 MIN 11/7/2022 Karly Tidwell OT GO 2    [id number]  OT EVAL MOD COMPLEXITY 2 11/7/2022 Karly Tidwell OT GO 1    33832039923 HC OT THERAPEUTIC ACT EA 15 MIN 11/8/2022 Karly Tidwell OT GO 2               Karly Tidwell OT  11/8/2022

## 2022-11-09 LAB
ANION GAP SERPL CALCULATED.3IONS-SCNC: 10 MMOL/L (ref 5–15)
BACTERIA SPEC AEROBE CULT: NORMAL
BACTERIA SPEC AEROBE CULT: NORMAL
BASOPHILS # BLD AUTO: 0.05 10*3/MM3 (ref 0–0.2)
BASOPHILS NFR BLD AUTO: 0.5 % (ref 0–1.5)
BUN SERPL-MCNC: 21 MG/DL (ref 8–23)
BUN/CREAT SERPL: 17.1 (ref 7–25)
CALCIUM SPEC-SCNC: 8.8 MG/DL (ref 8.6–10.5)
CHLORIDE SERPL-SCNC: 92 MMOL/L (ref 98–107)
CO2 SERPL-SCNC: 30 MMOL/L (ref 22–29)
CREAT SERPL-MCNC: 1.23 MG/DL (ref 0.57–1)
DEPRECATED RDW RBC AUTO: 42.6 FL (ref 37–54)
EGFRCR SERPLBLD CKD-EPI 2021: 43.2 ML/MIN/1.73
EOSINOPHIL # BLD AUTO: 0.08 10*3/MM3 (ref 0–0.4)
EOSINOPHIL NFR BLD AUTO: 0.7 % (ref 0.3–6.2)
ERYTHROCYTE [DISTWIDTH] IN BLOOD BY AUTOMATED COUNT: 14.4 % (ref 12.3–15.4)
GLUCOSE SERPL-MCNC: 96 MG/DL (ref 65–99)
HCT VFR BLD AUTO: 27.2 % (ref 34–46.6)
HGB BLD-MCNC: 8.9 G/DL (ref 12–15.9)
IMM GRANULOCYTES # BLD AUTO: 0.11 10*3/MM3 (ref 0–0.05)
IMM GRANULOCYTES NFR BLD AUTO: 1 % (ref 0–0.5)
LYMPHOCYTES # BLD AUTO: 0.27 10*3/MM3 (ref 0.7–3.1)
LYMPHOCYTES NFR BLD AUTO: 2.4 % (ref 19.6–45.3)
MCH RBC QN AUTO: 27 PG (ref 26.6–33)
MCHC RBC AUTO-ENTMCNC: 32.7 G/DL (ref 31.5–35.7)
MCV RBC AUTO: 82.4 FL (ref 79–97)
MONOCYTES # BLD AUTO: 0.47 10*3/MM3 (ref 0.1–0.9)
MONOCYTES NFR BLD AUTO: 4.2 % (ref 5–12)
NEUTROPHILS NFR BLD AUTO: 10.12 10*3/MM3 (ref 1.7–7)
NEUTROPHILS NFR BLD AUTO: 91.2 % (ref 42.7–76)
NRBC BLD AUTO-RTO: 0 /100 WBC (ref 0–0.2)
PLATELET # BLD AUTO: 253 10*3/MM3 (ref 140–450)
PMV BLD AUTO: 10.3 FL (ref 6–12)
POTASSIUM SERPL-SCNC: 3.4 MMOL/L (ref 3.5–5.2)
POTASSIUM SERPL-SCNC: 4.8 MMOL/L (ref 3.5–5.2)
QT INTERVAL: 360 MS
RBC # BLD AUTO: 3.3 10*6/MM3 (ref 3.77–5.28)
SARS-COV-2 AG RESP QL IA.RAPID: NORMAL
SARS-COV-2 ORF1AB RESP QL NAA+PROBE: NOT DETECTED
SODIUM SERPL-SCNC: 132 MMOL/L (ref 136–145)
TROPONIN T SERPL-MCNC: <0.01 NG/ML (ref 0–0.03)
WBC NRBC COR # BLD: 11.1 10*3/MM3 (ref 3.4–10.8)

## 2022-11-09 PROCEDURE — 63710000001 PREDNISONE PER 5 MG: Performed by: INTERNAL MEDICINE

## 2022-11-09 PROCEDURE — U0004 COV-19 TEST NON-CDC HGH THRU: HCPCS | Performed by: HOSPITALIST

## 2022-11-09 PROCEDURE — 80048 BASIC METABOLIC PNL TOTAL CA: CPT | Performed by: HOSPITALIST

## 2022-11-09 PROCEDURE — 97535 SELF CARE MNGMENT TRAINING: CPT

## 2022-11-09 PROCEDURE — 87426 SARSCOV CORONAVIRUS AG IA: CPT | Performed by: HOSPITALIST

## 2022-11-09 PROCEDURE — 63710000001 MYCOPHENOLATE MOFETIL PER 250 MG: Performed by: INTERNAL MEDICINE

## 2022-11-09 PROCEDURE — 84132 ASSAY OF SERUM POTASSIUM: CPT | Performed by: HOSPITALIST

## 2022-11-09 PROCEDURE — 85025 COMPLETE CBC W/AUTO DIFF WBC: CPT | Performed by: INTERNAL MEDICINE

## 2022-11-09 PROCEDURE — 84484 ASSAY OF TROPONIN QUANT: CPT | Performed by: HOSPITALIST

## 2022-11-09 PROCEDURE — 93005 ELECTROCARDIOGRAM TRACING: CPT | Performed by: HOSPITALIST

## 2022-11-09 PROCEDURE — 93010 ELECTROCARDIOGRAM REPORT: CPT | Performed by: INTERNAL MEDICINE

## 2022-11-09 PROCEDURE — 94799 UNLISTED PULMONARY SVC/PX: CPT

## 2022-11-09 RX ORDER — POTASSIUM CHLORIDE 750 MG/1
40 TABLET, FILM COATED, EXTENDED RELEASE ORAL AS NEEDED
Status: DISCONTINUED | OUTPATIENT
Start: 2022-11-09 | End: 2022-11-11 | Stop reason: HOSPADM

## 2022-11-09 RX ORDER — POTASSIUM CHLORIDE 1.5 G/1.77G
40 POWDER, FOR SOLUTION ORAL AS NEEDED
Status: DISCONTINUED | OUTPATIENT
Start: 2022-11-09 | End: 2022-11-11 | Stop reason: HOSPADM

## 2022-11-09 RX ORDER — POTASSIUM CHLORIDE 7.45 MG/ML
10 INJECTION INTRAVENOUS
Status: DISCONTINUED | OUTPATIENT
Start: 2022-11-09 | End: 2022-11-11 | Stop reason: HOSPADM

## 2022-11-09 RX ADMIN — BUSPIRONE HYDROCHLORIDE 10 MG: 10 TABLET ORAL at 20:52

## 2022-11-09 RX ADMIN — BUDESONIDE AND FORMOTEROL FUMARATE DIHYDRATE 2 PUFF: 160; 4.5 AEROSOL RESPIRATORY (INHALATION) at 20:20

## 2022-11-09 RX ADMIN — METOPROLOL SUCCINATE 25 MG: 25 TABLET, EXTENDED RELEASE ORAL at 20:52

## 2022-11-09 RX ADMIN — MONTELUKAST SODIUM 10 MG: 10 TABLET, FILM COATED ORAL at 20:52

## 2022-11-09 RX ADMIN — DULOXETINE HYDROCHLORIDE 60 MG: 60 CAPSULE, DELAYED RELEASE ORAL at 20:52

## 2022-11-09 RX ADMIN — RIVAROXABAN 20 MG: 20 TABLET, FILM COATED ORAL at 18:03

## 2022-11-09 RX ADMIN — POTASSIUM CHLORIDE 40 MEQ: 750 TABLET, EXTENDED RELEASE ORAL at 17:06

## 2022-11-09 RX ADMIN — BUSPIRONE HYDROCHLORIDE 10 MG: 10 TABLET ORAL at 08:19

## 2022-11-09 RX ADMIN — FERROUS SULFATE TAB 325 MG (65 MG ELEMENTAL FE) 325 MG: 325 (65 FE) TAB at 08:19

## 2022-11-09 RX ADMIN — DULOXETINE HYDROCHLORIDE 60 MG: 60 CAPSULE, DELAYED RELEASE ORAL at 08:19

## 2022-11-09 RX ADMIN — PREDNISONE 5 MG: 10 TABLET ORAL at 08:19

## 2022-11-09 RX ADMIN — Medication 10 ML: at 08:33

## 2022-11-09 RX ADMIN — TORSEMIDE 20 MG: 20 TABLET ORAL at 08:19

## 2022-11-09 RX ADMIN — ACETAMINOPHEN 650 MG: 325 TABLET, FILM COATED ORAL at 20:52

## 2022-11-09 RX ADMIN — LEVOTHYROXINE SODIUM 88 MCG: 0.09 TABLET ORAL at 08:19

## 2022-11-09 RX ADMIN — Medication 10 ML: at 20:52

## 2022-11-09 RX ADMIN — METOPROLOL SUCCINATE 25 MG: 25 TABLET, EXTENDED RELEASE ORAL at 08:19

## 2022-11-09 RX ADMIN — PRAVASTATIN SODIUM 20 MG: 20 TABLET ORAL at 20:52

## 2022-11-09 RX ADMIN — BUDESONIDE AND FORMOTEROL FUMARATE DIHYDRATE 2 PUFF: 160; 4.5 AEROSOL RESPIRATORY (INHALATION) at 07:35

## 2022-11-09 RX ADMIN — MYCOPHENOLATE MOFETIL 1500 MG: 500 TABLET ORAL at 17:06

## 2022-11-09 RX ADMIN — ACETAMINOPHEN 650 MG: 325 TABLET, FILM COATED ORAL at 00:32

## 2022-11-09 RX ADMIN — ACETAMINOPHEN 650 MG: 325 TABLET, FILM COATED ORAL at 11:52

## 2022-11-09 RX ADMIN — Medication 1000 MCG: at 08:19

## 2022-11-09 RX ADMIN — MYCOPHENOLATE MOFETIL 1000 MG: 500 TABLET ORAL at 06:17

## 2022-11-09 RX ADMIN — TIOTROPIUM BROMIDE INHALATION SPRAY 1 PUFF: 3.12 SPRAY, METERED RESPIRATORY (INHALATION) at 07:35

## 2022-11-09 RX ADMIN — POTASSIUM CHLORIDE 40 MEQ: 750 TABLET, EXTENDED RELEASE ORAL at 13:49

## 2022-11-09 RX ADMIN — PANTOPRAZOLE SODIUM 40 MG: 40 TABLET, DELAYED RELEASE ORAL at 06:17

## 2022-11-09 RX ADMIN — AZELASTINE HYDROCHLORIDE 2 SPRAY: 137 SPRAY, METERED NASAL at 08:33

## 2022-11-09 NOTE — THERAPY TREATMENT NOTE
Patient Name: Olena Myers  : 1935    MRN: [id number]                              Today's Date: 2022       Admit Date: 2022    Visit Dx:     ICD-10-CM ICD-9-CM   1. Acute encephalopathy  G93.40 348.30   2. Acute on chronic anemia  D64.9 285.9   3. COVID  U07.1 079.89   4. History of COPD  Z87.09 V12.69   5. History of atrial fibrillation  Z86.79 V12.59   6. Chronic anticoagulation  Z79.01 V58.61   7. History of myasthenia gravis  Z86.69 V12.49     Patient Active Problem List   Diagnosis   • Pre-operative cardiovascular examination   • S/p reverse total shoulder arthroplasty   • Myasthenia gravis (HCC)   • Paroxysmal atrial fibrillation (HCC)   • HX: long term anticoagulant use   • Essential hypertension   • CAD (coronary artery disease)   • Rhinovirus   • Atrial fibrillation (HCC)   • S/P reverse total shoulder arthroplasty, right   • Acute UTI   • COPD (chronic obstructive pulmonary disease) (HCC)   • Metabolic encephalopathy   • Sleep apnea   • Sepsis (HCC)   • Dyspnea   • Chronic diastolic CHF (congestive heart failure) (HCC)   • Diastolic CHF, chronic (HCC)   • Heme positive stool   • E coli bacteremia   • Iron deficiency anemia   • Current chronic use of systemic steroids   • COPD exacerbation (HCC)   • Obesity (BMI 30-39.9)   • COVID-19 virus infection   • Chronic respiratory failure with hypoxia (HCC)   • Cytokine release syndrome, grade 1   • Altered mental status   • Anemia     Past Medical History:   Diagnosis Date   • Anemia     IRON DEFICIENCY   • Arthritis    • Asthma    • Atrial fibrillation (HCC)    • CAD (coronary artery disease)     HEART STENT   • COPD (chronic obstructive pulmonary disease) (HCC)    • 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   • Frequent urinary tract infections    • History of gastric ulcer    • History of GI bleed    • [place] (hard of hearing)    • Hyperlipidemia    • Hypertension    • Hyponatremia    • Lightheadedness    • LVH (left ventricular hypertrophy)    •  MG [facility]myasthenia gravis) ([facility])    • Mini stroke 10/2016, 3/2017   • OA (osteoarthritis)    • Osteoporosis    • Sleep apnea     USES CPAP   • SOB (shortness of breath)     WALKING     Past Surgical History:   Procedure Laterality Date   • APPENDECTOMY     • BRONCHOSCOPY N/A 3/31/2022    Procedure: BRONCHOSCOPY WITH BAL RIGHT LOWER LOBE;  Surgeon: Remy Tirado MD;  Location: [facility] ENDOSCOPY;  Service: Pulmonary;  Laterality: N/A;  COPD EXACERBATION     • CARPAL TUNNEL RELEASE Bilateral    • CATARACT EXTRACTION Bilateral    • CORONARY STENT PLACEMENT     • TOTAL HIP ARTHROPLASTY Bilateral    • TOTAL SHOULDER ARTHROPLASTY W/ DISTAL CLAVICLE EXCISION Left 7/19/2016    Procedure: LT TOTAL SHOULDER REVERSE ARTHROPLASTY;  Surgeon: NAHOMI Solorzano MD;  [place] [facility] [place] [facility] [place]:    • TOTAL SHOULDER ARTHROPLASTY W/ DISTAL CLAVICLE EXCISION Right 1/8/2019    Procedure: RIGHT TOTAL SHOULDER REVERSE ARTHROPLASTY;  Surgeon: Jovanny Solorzano MD;  [place] [facility] [place] [facility] [place]  Service: Orthopedics      General Information     Row Name 11/09/22 1206          OT Time and Intention    Document Type therapy note (daily note)  -     Mode of Treatment individual therapy;occupational therapy  -     Row Name 11/09/22 1206          General Information    Patient Profile Reviewed yes  -     Existing Precautions/Restrictions fall  -     Row Name 11/09/22 1206          Cognition    Orientation Status (Cognition) oriented to;person;place  pt knew she was at [facility] [place] by [facility] today  -     Row Name 11/09/22 1206          Safety Issues, Functional Mobility    Safety Issues Affecting Function (Mobility) insight into deficits/self-awareness;awareness of need for assistance;judgment;sequencing abilities;problem-solving  -     Impairments Affecting Function (Mobility) balance;cognition;endurance/activity tolerance;strength;shortness of breath  -     Comment, Safety Issues/Impairments (Mobility) non skid socks and gait belt donned  -            User Key  (r) = Recorded By, (t) = Taken By, (c) = Cosigned By    Initials Name Provider Type     Karly Tidwell, OT Occupational Therapist                 Mobility/ADL's     Row Name 11/09/22 1207          Bed Mobility    Comment, (Bed Mobility) NT- pt up in chair at the beginning and end of session  -     Row Name 11/09/22 1207          Transfers    Transfers toilet transfer  -[facility] 11/09/22 1207          Sit-Stand Transfer    Sit-Stand Adrian (Transfers) contact guard;standby assist  -     Assistive Device (Sit-Stand Transfers) walker, front-wheeled  -     Comment, (Sit-Stand Transfer) From bedside chair and BSC  -KA     Row Name 11/09/22 1207          Toilet Transfer    Type (Toilet Transfer) sit-stand;stand-sit  -     Adrian Level (Toilet Transfer) contact guard  HHA  -     Row Name 11/09/22 1207          Functional Mobility    Functional Mobility- Ind. Level contact guard assist  [facility]     Functional Mobility- Device walker, front-wheeled  -     Functional Mobility-Distance (Feet) --  steps to [facility] today  -     Functional Mobility- Comment Pt declined further mobility this date due to decreased endurance and fatigue. Used [facility]  -[facility] Name 11/09/22 1207          Activities of Daily Living    BADL Assessment/Intervention bathing;upper body dressing;toileting  -[facility] Name 11/09/22 1207          Bathing Assessment/Intervention    Adrian Level (Bathing) bathing skills;upper body;lower body;minimum assist (75% patient effort)  -     Position (Bathing) supported sitting  seated in chair  -     Comment, (Bathing) increased time and min A due to decreased endurance  -KA     Row Name 11/09/22 1207          Upper Body Dressing Assessment/Training    Adrian Level (Upper Body Dressing) upper body dressing skills;doff;don;standby assist  -     Position (Upper Body Dressing) supported sitting  -[facility] Name 11/09/22 1207          Toileting  Assessment/Training    [place] Level (Toileting) toileting skills;change pad/brief;perform perineal hygiene;maximum assist (25% patient effort);adjust/manage clothing  -     Assistive Devices (Toileting) commode, 3-in-1  -KA     Position (Toileting) supported sitting;supported standing  -           User Key  (r) = Recorded By, (t) = Taken By, (c) = Cosigned By    Initials Name Provider Type    Karly Mccann OT Occupational Therapist               Obj/Interventions     Row Name 11/09/22 1212          Balance    Static Sitting Balance standby assist  -     Dynamic Sitting Balance contact guard;standby assist  -KA     Static Standing Balance standby assist  -KA     Dynamic Standing Balance contact guard  -     Position/Device Used, Standing Balance supported;walker, front-wheeled  -     Balance Interventions sitting;standing;occupation based/functional task  -           User Key  (r) = Recorded By, (t) = Taken By, (c) = Cosigned By    Initials Name Provider Type    Karly Mccann OT Occupational Therapist               Goals/Plan    No documentation.                Clinical Impression     Row Name 11/09/22 1213          Pain Assessment    Pretreatment Pain Rating 0/10 - no pain  -     Posttreatment Pain Rating 0/10 - no pain  -KA     Row Name 11/09/22 1213          Plan of Care Review    Plan of Care Reviewed With patient  -     Outcome Evaluation Pt seen for OT session this AM. Worked on ADL participation and endurance. Pt participated in bathing task seated in chair requiring min A to complete due to decreased endurance. Pt required rest breaks throughout and educated on breathing techniques as pt felt SOA at times. Pt declined further functional mobility due to poor endurance. Pt transferred to [facility] with [facility]. Required max A for ezequiel care while standing. Pt continues to benefit from skilled OT to address deficits and maximize independence with ADLs.  -SINAN     Row Name 11/09/22 1213           Vital Signs    Pre Patient Position Sitting  -KA     Intra Patient Position Standing  -KA     Post Patient Position Sitting  -KA     Row Name 11/09/22 1213          Positioning and Restraints    Pre-Treatment Position sitting in chair/recliner  -KA     Post Treatment Position chair  -KA     In Chair reclined;call light within reach;encouraged to call for assist;exit alarm on;notified nsg  -KA           User Key  (r) = Recorded By, (t) = Taken By, (c) = Cosigned By    Initials Name Provider Type    Karly Mccann, OT Occupational Therapist               Outcome Measures     Row Name 11/09/22 1219          How much help from another is currently needed...    Putting on and taking off regular lower body clothing? 1  -KA     Bathing (including washing, rinsing, and drying) 3  -KA     Toileting (which includes using toilet bed pan or urinal) 2  -KA     Putting on and taking off regular upper body clothing 2  -KA     Taking care of personal grooming (such as brushing teeth) 3  -KA     Eating meals 3  -KA     AM-PAC 6 Clicks Score (OT) 14  -KA     Row Name 11/09/22 0836          How much help from another person do you currently need...    Turning from your back to your side while in flat bed without using bedrails? 3  -MM     Moving from lying on back to sitting on the side of a flat bed without bedrails? 3  -MM     Moving to and from a bed to a chair (including a wheelchair)? 3  -MM     Standing up from a chair using your arms (e.g., wheelchair, bedside chair)? 3  -MM     Climbing 3-5 steps with a railing? 2  -MM     To walk in hospital room? 3  -MM     AM-PAC 6 Clicks Score (PT) 17  -MM     Highest level of mobility 5 --> Static standing  -MM     Row Name 11/09/22 1219          Functional Assessment    Outcome Measure Options AM-PAC 6 Clicks Daily Activity (OT)  -KA           User Key  (r) = Recorded By, (t) = Taken By, (c) = Cosigned By    Initials Name Provider Type    Anita Chairez, RN Registered Nurse     Karly Mccann OT Occupational Therapist                Occupational Therapy Education     Title: PT OT SLP Therapies (Done)     Topic: Occupational Therapy (Done)     Point: ADL training (Done)     Description:   Instruct learner(s) on proper safety adaptation and remediation techniques during self care or transfers.   Instruct in proper use of assistive devices.              Learning Progress Summary           Patient Acceptance, E, VU,NR by SINAN at 11/7/2022 1549                   Point: Home exercise program (Done)     Description:   Instruct learner(s) on appropriate technique for monitoring, assisting and/or progressing therapeutic exercises/activities.              Learning Progress Summary           Patient Acceptance, E, VU,NR by SINAN at 11/7/2022 1549                   Point: Precautions (Done)     Description:   Instruct learner(s) on prescribed precautions during self-care and functional transfers.              Learning Progress Summary           Patient Acceptance, E, VU,NR by SINAN at 11/7/2022 1549                   Point: Body mechanics (Done)     Description:   Instruct learner(s) on proper positioning and spine alignment during self-care, functional mobility activities and/or exercises.              Learning Progress Summary           Patient Acceptance, E, VU,NR by SINAN at 11/7/2022 1549                               User Key     Initials Effective Dates Name Provider Type Discipline    SINAN 09/22/22 -  Karly Tidwell OT Occupational Therapist OT              OT Recommendation and Plan  Planned Therapy Interventions (OT): activity tolerance training, functional balance retraining, occupation/activity based interventions, ROM/therapeutic exercise, strengthening exercise, transfer/mobility retraining, patient/caregiver education/training, BADL retraining  Therapy Frequency (OT): 5 times/wk  Plan of Care Review  Plan of Care Reviewed With: patient  Outcome Evaluation: Pt seen for OT session this AM.  Worked on ADL participation and endurance. Pt participated in bathing task seated in chair requiring min A to complete due to decreased endurance. Pt required rest breaks throughout and educated on breathing techniques as pt felt SOA at times. Pt declined further functional mobility due to poor endurance. [place] [facility] [place] Required max A for ezequiel care while standing. Pt continues to benefit from skilled OT to address deficits and maximize independence with ADLs.     Time Calculation:    Time Calculation- OT     Row Name 11/09/22 1219             Time Calculation- OT    OT Start Time 0907  -KA      OT Stop Time 0945  -KA      OT Time Calculation (min) 38 min  -KA      Total Timed Code Minutes- OT 38 minute(s)  -KA      OT Received On 11/09/22  -KA      OT - Next Appointment 11/10/22  -KA         Timed Charges    03459 - OT Self Care/Mgmt Minutes 38  -KA         Total Minutes    Timed Charges Total Minutes 38  -KA       Total Minutes 38  -KA            User Key  (r) = Recorded By, (t) = Taken By, (c) = Cosigned By    Initials Name Provider Type    Karly Mccann OT Occupational Therapist              Therapy Charges for Today     Code Description Service Date Service Provider Modifiers Qty    92565687032 HC OT THERAPEUTIC ACT EA 15 MIN 11/8/2022 Karly Tidwell OT GO 2    26121254551 HC OT SELF CARE/MGMT/TRAIN EA 15 MIN 11/9/2022 Karly Tidwell OT GO 3               Karly Tidwell OT  11/9/2022

## 2022-11-09 NOTE — PLAN OF CARE
Goal Outcome Evaluation:  Plan of Care Reviewed With: patient           Outcome Evaluation: Pt seen for OT session this AM. Worked on ADL participation and endurance. Pt participated in bathing task seated in chair requiring min A to complete due to decreased endurance. Pt required rest breaks throughout and educated on breathing techniques as pt felt SOA at times. Pt declined further functional mobility due to poor endurance. [place] [facility] [place] Required max A for ezequiel care while standing. Pt continues to benefit from skilled OT to address deficits and maximize independence with ADLs.

## 2022-11-09 NOTE — PLAN OF CARE
Problem: Adult Inpatient Plan of Care  Goal: Absence of Hospital-Acquired Illness or Injury  Intervention: Identify and Manage Fall Risk  Recent Flowsheet Documentation  Taken 11/9/2022 1600 by Anita Alcantara RN  Safety Promotion/Fall Prevention: activity supervised  Taken 11/9/2022 1420 by Anita Alcantara RN  Safety Promotion/Fall Prevention:   activity supervised   assistive device/personal items within reach   clutter free environment maintained   room organization consistent   safety round/check completed  Taken 11/9/2022 0836 by Anita Alcantara RN  Safety Promotion/Fall Prevention:   activity supervised   assistive device/personal items within reach   clutter free environment maintained   nonskid shoes/slippers when out of bed   room organization consistent   safety round/check completed   Goal Outcome Evaluation:           Progress: improving  Outcome Evaluation: Patient AOx3 disoriented in time VSS patient have EKG done show sinus rhthn with abnormal R-wave progression with inferior infarct age indeterminate and troponin =0.010 no chest pain or SOA no dizziness no pain, treated potassiun according to protocol and need to recheck order place, able to get up to the chair still weak .have a bowell movement today dark color patient is on Iron pill daily, increased appetite, check for covid test first result negative pending secong result.

## 2022-11-09 NOTE — PROGRESS NOTES
"DAILY PROGRESS NOTE  [facility]    Patient Identification:  Name: Olena Myers  Age: 87 y.o.  Sex: female  :  1935  MRN: [id number]         Primary Care Physician: Manda Keenan MD    Subjective:  Interval History:She is weak.    Objective:    Scheduled Meds:azelastine, 2 spray, Each Nare, Daily  budesonide-formoterol, 2 puff, Inhalation, BID - RT   And  tiotropium bromide monohydrate, 1 puff, Inhalation, Daily - RT  busPIRone, 10 mg, Oral, BID  DULoxetine, 60 mg, Oral, BID  ferrous sulfate, 325 mg, Oral, Daily With Breakfast  levothyroxine, 88 mcg, Oral, Daily  metoprolol succinate XL, 25 mg, Oral, BID  montelukast, 10 mg, Oral, Nightly  mycophenolate, 1,000 mg, Oral, QAM  mycophenolate, 1,500 mg, Oral, Q PM  pantoprazole, 40 mg, Oral, QAM  pravastatin, 20 mg, Oral, Nightly  predniSONE, 5 mg, Oral, Daily  rivaroxaban, 20 mg, Oral, Daily With Dinner  sodium chloride, 10 mL, Intravenous, Q12H  torsemide, 20 mg, Oral, Daily  vitamin B-12, 1,000 mcg, Oral, Daily  vitamin D, 50,000 Units, Oral, Q7 Days      Continuous Infusions:     Vital signs in last 24 hours:  Temp:  [96.7 °F (35.9 °C)-98.7 °F (37.1 °C)] 96.7 °F (35.9 °C)  Heart Rate:  [80-92] 89  Resp:  [18] 18  BP: (107-132)/(51-75) 132/51    Intake/Output:    Intake/Output Summary (Last 24 hours) at 2022 1317  Last data filed at 2022 0836  Gross per 24 hour   Intake 560 ml   Output 650 ml   Net -90 ml       Exam:  /51 (BP Location: Right arm, Patient Position: Lying)   Pulse 89   Temp 96.7 °F (35.9 °C) (Oral)   Resp 18   Ht 152.4 cm (60\")   Wt 76 kg (167 lb 8.8 oz)   SpO2 91%   BMI 32.72 kg/m²     General Appearance:    Alert, cooperative, no distress   Head:    Normocephalic, without obvious abnormality, atraumatic   Eyes:       Throat:   Lips, tongue, gums normal   Neck:   Supple, symmetrical, trachea midline, no JVD   Lungs:     Clear to auscultation bilaterally, respirations unlabored   Chest Wall:    No " tenderness or deformity    Heart:    Regular rate and rhythm, S1 and S2 normal, no murmur,no  Rub or gallop   Abdomen:     Soft, nontender, bowel sounds active, no masses, no organomegaly    Extremities:   Extremities normal, atraumatic, no cyanosis or edema   Pulses:      Skin:   Skin is warm and dry,  no rashes or palpable lesions   Neurologic:   no focal deficits noted      Lab Results (last 72 hours)     Procedure Component Value Units Date/Time    CBC & Differential [[id number]]  (Abnormal) Collected: 11/08/22 0348    Specimen: Blood Updated: 11/08/22 0452    Narrative:      The following orders were created for panel order CBC & Differential.  Procedure                               Abnormality         Status                     ---------                               -----------         ------                     CBC Auto Differential[[id number]]        Abnormal            Final result                 Please view results for these tests on the individual orders.    CBC Auto Differential [[id number]]  (Abnormal) Collected: 11/08/22 0348    Specimen: Blood Updated: 11/08/22 0452     WBC 12.15 10*3/mm3      RBC 3.76 10*6/mm3      Hemoglobin 10.2 g/dL      Hematocrit 32.1 %      MCV 85.4 fL      MCH 27.1 pg      MCHC 31.8 g/dL      RDW 14.2 %      RDW-SD 44.1 fl      MPV 10.2 fL      Platelets 272 10*3/mm3      Neutrophil % 91.7 %      Lymphocyte % 2.4 %      Monocyte % 4.4 %      Eosinophil % 0.6 %      Basophil % 0.2 %      Immature Grans % 0.7 %      Neutrophils, Absolute 11.14 10*3/mm3      Lymphocytes, Absolute 0.29 10*3/mm3      Monocytes, Absolute 0.53 10*3/mm3      Eosinophils, Absolute 0.07 10*3/mm3      Basophils, Absolute 0.03 10*3/mm3      Immature Grans, Absolute 0.09 10*3/mm3      nRBC 0.0 /100 WBC     COVID-19 RAPID AG,VERITOR,COR/DAVI/PAD/TREY/MAD/BISI/LAG/MARY/ IN-HOUSE,DRY SWAB, 1-2 HR TAT - Swab, Nasal Cavity [[id number]]  (Normal) Collected: 11/07/22 1730    Specimen: Swab from Nasal Cavity Updated:  11/07/22 1845     COVID19 Presumptive Negative    Narrative:      Fact sheets for providers: https://www.fda.gov/media/393035/download    Fact sheets for patients: https://www.fda.gov/media/349693/download    Blood Culture - Blood, Arm, Right [[id number]]  (Normal) Collected: 11/04/22 1333    Specimen: Blood from Arm, Right Updated: 11/07/22 1301     Blood Culture No growth at 3 days    Blood Culture - Blood, Arm, Left [[id number]]  (Normal) Collected: 11/04/22 1204    Specimen: Blood from Arm, Left Updated: 11/07/22 1116     Blood Culture No growth at 3 days    Basic Metabolic Panel [[id number]]  (Abnormal) Collected: 11/07/22 0355    Specimen: Blood Updated: 11/07/22 0459     Glucose 90 mg/dL      BUN 19 mg/dL      Creatinine 1.30 mg/dL      Sodium 135 mmol/L      Potassium 3.8 mmol/L      Chloride 97 mmol/L      CO2 29.1 mmol/L      Calcium 8.7 mg/dL      BUN/Creatinine Ratio 14.6     Anion Gap 8.9 mmol/L      eGFR 40.4 mL/min/1.73      Comment: National Kidney Foundation and American Society of Nephrology (ASN) Task Force recommended calculation based on the Chronic Kidney Disease Epidemiology Collaboration (CKD-EPI) equation refit without adjustment for race.       Narrative:      GFR Normal >60  Chronic Kidney Disease <60  Kidney Failure <15    The GFR formula is only valid for adults with stable renal function between ages 18 and 70.    CBC & Differential [[id number]]  (Abnormal) Collected: 11/07/22 0355    Specimen: Blood Updated: 11/07/22 0444    Narrative:      The following orders were created for panel order CBC & Differential.  Procedure                               Abnormality         Status                     ---------                               -----------         ------                     CBC Auto Differential[[id number]]        Abnormal            Final result                 Please view results for these tests on the individual orders.    CBC Auto Differential [[id number]]  (Abnormal) Collected:  11/07/22 0355    Specimen: Blood Updated: 11/07/22 0444     WBC 9.43 10*3/mm3      RBC 3.21 10*6/mm3      Hemoglobin 8.7 g/dL      Hematocrit 27.2 %      MCV 84.7 fL      MCH 27.1 pg      MCHC 32.0 g/dL      RDW 14.5 %      RDW-SD 45.2 fl      MPV 10.1 fL      Platelets 223 10*3/mm3      Neutrophil % 86.9 %      Lymphocyte % 5.0 %      Monocyte % 6.0 %      Eosinophil % 1.0 %      Basophil % 0.5 %      Immature Grans % 0.6 %      Neutrophils, Absolute 8.19 10*3/mm3      Lymphocytes, Absolute 0.47 10*3/mm3      Monocytes, Absolute 0.57 10*3/mm3      Eosinophils, Absolute 0.09 10*3/mm3      Basophils, Absolute 0.05 10*3/mm3      Immature Grans, Absolute 0.06 10*3/mm3      nRBC 0.0 /100 WBC     Folate [[id number]]  (Normal) Collected: 11/06/22 0352    Specimen: Blood Updated: 11/06/22 0500     Folate 7.65 ng/mL     Narrative:      Results may be falsely increased if patient taking Biotin.      Vitamin B12 [[id number]]  (Abnormal) Collected: 11/06/22 0352    Specimen: Blood Updated: 11/06/22 0459     Vitamin B-12 1,666 pg/mL     Narrative:      Results may be falsely increased if patient taking Biotin.      CBC & Differential [[id number]]  (Abnormal) Collected: 11/06/22 0353    Specimen: Blood Updated: 11/06/22 0422    Narrative:      The following orders were created for panel order CBC & Differential.  Procedure                               Abnormality         Status                     ---------                               -----------         ------                     CBC Auto Differential[[id number]]        Abnormal            Final result                 Please view results for these tests on the individual orders.    CBC Auto Differential [[id number]]  (Abnormal) Collected: 11/06/22 0353    Specimen: Blood Updated: 11/06/22 0422     WBC 10.09 10*3/mm3      RBC 3.40 10*6/mm3      Hemoglobin 9.1 g/dL      Hematocrit 28.4 %      MCV 83.5 fL      MCH 26.8 pg      MCHC 32.0 g/dL      RDW 14.3 %      RDW-SD 44.0 fl       MPV 9.4 fL      Platelets 240 10*3/mm3      Neutrophil % 88.3 %      Lymphocyte % 3.9 %      Monocyte % 4.9 %      Eosinophil % 1.6 %      Basophil % 0.4 %      Immature Grans % 0.9 %      Neutrophils, Absolute 8.92 10*3/mm3      Lymphocytes, Absolute 0.39 10*3/mm3      Monocytes, Absolute 0.49 10*3/mm3      Eosinophils, Absolute 0.16 10*3/mm3      Basophils, Absolute 0.04 10*3/mm3      Immature Grans, Absolute 0.09 10*3/mm3      nRBC 0.0 /100 WBC     Ferritin [[id number]]  (Normal) Collected: 11/05/22 0756    Specimen: Blood Updated: 11/05/22 1752     Ferritin 146.00 ng/mL     Narrative:      Results may be falsely decreased if patient taking Biotin.      Iron Profile [[id number]]  (Abnormal) Collected: 11/05/22 0756    Specimen: Blood Updated: 11/05/22 1746     Iron 18 mcg/dL      Iron Saturation 7 %      Transferrin 183 mg/dL      TIBC 273 mcg/dL     C-reactive Protein [[id number]]  (Abnormal) Collected: 11/05/22 0756    Specimen: Blood Updated: 11/05/22 1746     C-Reactive Protein 15.32 mg/dL     Hemoglobin & Hematocrit, Blood [[id number]]  (Abnormal) Collected: 11/05/22 1540    Specimen: Blood Updated: 11/05/22 1600     Hemoglobin 9.5 g/dL      Hematocrit 29.6 %         Data Review:  Results from last 7 days   Lab Units 11/09/22 0419 11/07/22 0355 11/05/22 0756   SODIUM mmol/L 132* 135* 136   POTASSIUM mmol/L 3.4* 3.8 4.3   CHLORIDE mmol/L 92* 97* 98   CO2 mmol/L 30.0* 29.1* 26.9   BUN mg/dL 21 19 13   CREATININE mg/dL 1.23* 1.30* 0.82   GLUCOSE mg/dL 96 90 115*   CALCIUM mg/dL 8.8 8.7 9.1     Results from last 7 days   Lab Units 11/09/22 0419 11/08/22  0348 11/07/22  0355   WBC 10*3/mm3 11.10* 12.15* 9.43   HEMOGLOBIN g/dL 8.9* 10.2* 8.7*   HEMATOCRIT % 27.2* 32.1* 27.2*   PLATELETS 10*3/mm3 253 272 223             Lab Results   Lab Value Date/Time    TROPONINT <0.010 11/04/2022 1155    TROPONINT 0.010 10/25/2022 0843    TROPONINT <0.010 02/15/2022 1659    TROPONINT <0.010 07/05/2021 0929    TROPONINT <0.010  01/05/2021 1716    TROPONINT <0.010 12/20/2016 1549    TROPONINT <0.010 11/23/2016 0337    TROPONINT <0.010 11/22/2016 1606         Results from last 7 days   Lab Units 11/05/22  0756 11/04/22  1155   ALK PHOS U/L 100 79   BILIRUBIN mg/dL 0.7 0.4   ALT (SGPT) U/L 15 13   AST (SGOT) U/L 24 27             No results found for: POCGLU        Past Medical History:   Diagnosis Date   • Anemia     IRON DEFICIENCY   • Arthritis    • Asthma    • Atrial fibrillation (HCC)    • CAD (coronary artery disease)     HEART STENT   • COPD (chronic obstructive pulmonary disease) ([facility])    • 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   • Frequent urinary tract infections    • History of gastric ulcer    • History of GI bleed    • [place] (hard of hearing)    • Hyperlipidemia    • Hypertension    • Hyponatremia    • Lightheadedness    • LVH (left ventricular hypertrophy)    • MG (myasthenia gravis) ([facility])    • Mini stroke 10/2016, 3/2017   • OA (osteoarthritis)    • Osteoporosis    • Sleep apnea     USES CPAP   • SOB (shortness of breath)     WALKING       Assessment:  Active Hospital Problems    Diagnosis  POA   • **Anemia [D64.9]  Yes   • Altered mental status [R41.82]  Unknown   • Chronic respiratory failure with hypoxia ([facility]) [J96.11]  Yes   • COVID-19 virus infection [U07.1]  Unknown   • Chronic diastolic CHF (congestive heart failure) ([facility]) [I50.32]  Yes   • COPD (chronic obstructive pulmonary disease) ([facility]) [J44.9]  Yes   • Sleep apnea [G47.30]  Yes   • Paroxysmal atrial fibrillation ([facility]) [I48.0]  Yes   • Myasthenia gravis ([facility]) [G70.00]  Yes   • HX: long term anticoagulant use [Z92.29]  Not Applicable   • Essential hypertension [I10]  Yes      Resolved Hospital Problems   No resolved problems to display.       Plan:  DC planning. Will need SNU for rehab.  Follow lab. Got iron infusions.  COVID testing??    Johny Mendoza MD  11/9/2022  13:17 EST

## 2022-11-10 LAB
ANION GAP SERPL CALCULATED.3IONS-SCNC: 12 MMOL/L (ref 5–15)
BASOPHILS # BLD AUTO: 0.04 10*3/MM3 (ref 0–0.2)
BASOPHILS NFR BLD AUTO: 0.5 % (ref 0–1.5)
BUN SERPL-MCNC: 22 MG/DL (ref 8–23)
BUN/CREAT SERPL: 19.6 (ref 7–25)
CALCIUM SPEC-SCNC: 9.3 MG/DL (ref 8.6–10.5)
CHLORIDE SERPL-SCNC: 92 MMOL/L (ref 98–107)
CO2 SERPL-SCNC: 28 MMOL/L (ref 22–29)
CREAT SERPL-MCNC: 1.12 MG/DL (ref 0.57–1)
DEPRECATED RDW RBC AUTO: 44.4 FL (ref 37–54)
EGFRCR SERPLBLD CKD-EPI 2021: 48.3 ML/MIN/1.73
EOSINOPHIL # BLD AUTO: 0.08 10*3/MM3 (ref 0–0.4)
EOSINOPHIL NFR BLD AUTO: 1 % (ref 0.3–6.2)
ERYTHROCYTE [DISTWIDTH] IN BLOOD BY AUTOMATED COUNT: 14.7 % (ref 12.3–15.4)
GLUCOSE SERPL-MCNC: 87 MG/DL (ref 65–99)
HCT VFR BLD AUTO: 28.8 % (ref 34–46.6)
HGB BLD-MCNC: 9.4 G/DL (ref 12–15.9)
IMM GRANULOCYTES # BLD AUTO: 0.06 10*3/MM3 (ref 0–0.05)
IMM GRANULOCYTES NFR BLD AUTO: 0.7 % (ref 0–0.5)
LYMPHOCYTES # BLD AUTO: 0.39 10*3/MM3 (ref 0.7–3.1)
LYMPHOCYTES NFR BLD AUTO: 4.8 % (ref 19.6–45.3)
MCH RBC QN AUTO: 27.3 PG (ref 26.6–33)
MCHC RBC AUTO-ENTMCNC: 32.6 G/DL (ref 31.5–35.7)
MCV RBC AUTO: 83.7 FL (ref 79–97)
MONOCYTES # BLD AUTO: 0.4 10*3/MM3 (ref 0.1–0.9)
MONOCYTES NFR BLD AUTO: 5 % (ref 5–12)
NEUTROPHILS NFR BLD AUTO: 7.11 10*3/MM3 (ref 1.7–7)
NEUTROPHILS NFR BLD AUTO: 88 % (ref 42.7–76)
NRBC BLD AUTO-RTO: 0 /100 WBC (ref 0–0.2)
PLATELET # BLD AUTO: 301 10*3/MM3 (ref 140–450)
PMV BLD AUTO: 10.2 FL (ref 6–12)
POTASSIUM SERPL-SCNC: 4.2 MMOL/L (ref 3.5–5.2)
RBC # BLD AUTO: 3.44 10*6/MM3 (ref 3.77–5.28)
SODIUM SERPL-SCNC: 132 MMOL/L (ref 136–145)
WBC NRBC COR # BLD: 8.08 10*3/MM3 (ref 3.4–10.8)

## 2022-11-10 PROCEDURE — 80048 BASIC METABOLIC PNL TOTAL CA: CPT | Performed by: HOSPITALIST

## 2022-11-10 PROCEDURE — 63710000001 PREDNISONE PER 5 MG: Performed by: INTERNAL MEDICINE

## 2022-11-10 PROCEDURE — 94799 UNLISTED PULMONARY SVC/PX: CPT

## 2022-11-10 PROCEDURE — 63710000001 MYCOPHENOLATE MOFETIL PER 250 MG: Performed by: INTERNAL MEDICINE

## 2022-11-10 PROCEDURE — 97116 GAIT TRAINING THERAPY: CPT

## 2022-11-10 PROCEDURE — 94761 N-INVAS EAR/PLS OXIMETRY MLT: CPT

## 2022-11-10 PROCEDURE — 85025 COMPLETE CBC W/AUTO DIFF WBC: CPT | Performed by: INTERNAL MEDICINE

## 2022-11-10 PROCEDURE — 94664 DEMO&/EVAL PT USE INHALER: CPT

## 2022-11-10 RX ADMIN — PRAVASTATIN SODIUM 20 MG: 20 TABLET ORAL at 20:32

## 2022-11-10 RX ADMIN — DULOXETINE HYDROCHLORIDE 60 MG: 60 CAPSULE, DELAYED RELEASE ORAL at 20:32

## 2022-11-10 RX ADMIN — BUSPIRONE HYDROCHLORIDE 10 MG: 10 TABLET ORAL at 08:15

## 2022-11-10 RX ADMIN — BUSPIRONE HYDROCHLORIDE 10 MG: 10 TABLET ORAL at 20:32

## 2022-11-10 RX ADMIN — TORSEMIDE 20 MG: 20 TABLET ORAL at 08:15

## 2022-11-10 RX ADMIN — BUDESONIDE AND FORMOTEROL FUMARATE DIHYDRATE 2 PUFF: 160; 4.5 AEROSOL RESPIRATORY (INHALATION) at 19:51

## 2022-11-10 RX ADMIN — Medication 10 ML: at 08:16

## 2022-11-10 RX ADMIN — FERROUS SULFATE TAB 325 MG (65 MG ELEMENTAL FE) 325 MG: 325 (65 FE) TAB at 08:15

## 2022-11-10 RX ADMIN — MYCOPHENOLATE MOFETIL 1000 MG: 500 TABLET ORAL at 06:19

## 2022-11-10 RX ADMIN — DULOXETINE HYDROCHLORIDE 60 MG: 60 CAPSULE, DELAYED RELEASE ORAL at 08:15

## 2022-11-10 RX ADMIN — TIOTROPIUM BROMIDE INHALATION SPRAY 1 PUFF: 3.12 SPRAY, METERED RESPIRATORY (INHALATION) at 07:36

## 2022-11-10 RX ADMIN — RIVAROXABAN 20 MG: 20 TABLET, FILM COATED ORAL at 17:14

## 2022-11-10 RX ADMIN — ACETAMINOPHEN 650 MG: 325 TABLET, FILM COATED ORAL at 20:32

## 2022-11-10 RX ADMIN — BUDESONIDE AND FORMOTEROL FUMARATE DIHYDRATE 2 PUFF: 160; 4.5 AEROSOL RESPIRATORY (INHALATION) at 07:36

## 2022-11-10 RX ADMIN — PREDNISONE 5 MG: 10 TABLET ORAL at 08:15

## 2022-11-10 RX ADMIN — ACETAMINOPHEN 650 MG: 325 TABLET, FILM COATED ORAL at 10:59

## 2022-11-10 RX ADMIN — METOPROLOL SUCCINATE 25 MG: 25 TABLET, EXTENDED RELEASE ORAL at 20:32

## 2022-11-10 RX ADMIN — MYCOPHENOLATE MOFETIL 1500 MG: 500 TABLET ORAL at 17:14

## 2022-11-10 RX ADMIN — Medication 10 ML: at 20:32

## 2022-11-10 RX ADMIN — METOPROLOL SUCCINATE 25 MG: 25 TABLET, EXTENDED RELEASE ORAL at 08:15

## 2022-11-10 RX ADMIN — Medication 1000 MCG: at 08:15

## 2022-11-10 RX ADMIN — AZELASTINE HYDROCHLORIDE 2 SPRAY: 137 SPRAY, METERED NASAL at 08:16

## 2022-11-10 RX ADMIN — PANTOPRAZOLE SODIUM 40 MG: 40 TABLET, DELAYED RELEASE ORAL at 06:18

## 2022-11-10 RX ADMIN — MONTELUKAST SODIUM 10 MG: 10 TABLET, FILM COATED ORAL at 20:32

## 2022-11-10 RX ADMIN — LEVOTHYROXINE SODIUM 88 MCG: 0.09 TABLET ORAL at 08:15

## 2022-11-10 NOTE — CASE MANAGEMENT/SOCIAL WORK
Continued Stay Note  [facility]     Patient Name: Olena Myers  MRN: [id number]  Today's Date: 11/10/2022    Admit Date: 11/4/2022    Plan: [facility] - pending pre-cert   Discharge Plan     Row Name 11/10/22 1344       Plan    Plan [facility] - pending pre-cert    Plan Comments Spoke with Vandana - [facility] can accept patient and will start pre-cert.  Spoke with son at bedside and he is agreeable.  Ambulance disclaimer given - will need W/C transport per son.  He is aware that it is private pay.  CCP continues to follow.  Latanya GUEVARA    Row Name 11/10/22 1323       Plan    Plan [facility] - pending acceptance and pre-cert    Patient/Family in Agreement with Plan yes    Plan Comments Spoke with son at bedside.  He would like referral to [facility] since patient is out of isolation - spoke with Gretchen and no beds available.  He would like referral to [facility] - spoke with Red and awaiting decision.  Will also need pre-cert. Spoke with Mildred/Deyanira and [facility] has withdrawn their submission for pre-cert.  CCP continues to follow.  Latanya GUEVARA                       Expected Discharge Date and Time     Expected Discharge Date Expected Discharge Time    Nov 11, 2022             Becky S. [place], RN

## 2022-11-10 NOTE — PLAN OF CARE
Goal Outcome Evaluation:  Plan of Care Reviewed With: patient        Progress: no change  Outcome Evaluation: Pt seen for PT this PM and tolerated mobility well. Pt out of COVID isolation, but still c/o SOA and overall poor endurance. Pt transferred to EOB with SBA, STS with CGA and ambulated 10ft + 15ft with rwx. Pt unsteady and fatigues quickly, requiring 1 seated rest break. Pt agreeable to sitting UIC following, assisted with repositioning and left with needs met. Pt encouraged to walk to bathroom with nsg and walk around room with assist as able as well as trying to sit UIC throughout the day. PT will continue to follow to progress mobility as tolerated. Anticipate DC to SNF.

## 2022-11-10 NOTE — PLAN OF CARE
Goal Outcome Evaluation:           Progress: improving  Outcome Evaluation: VSS, 1.5 L nc, SR, a/o x4 but remains forgetful. Bed/chair alarm.

## 2022-11-10 NOTE — CASE MANAGEMENT/SOCIAL WORK
Continued Stay Note  [facility]     Patient Name: Olena Myers  MRN: [id number]  Today's Date: 11/10/2022    Admit Date: 11/4/2022    Plan: [facility] - pending acceptance and pre-cert   Discharge Plan     Row Name 11/10/22 1323       Plan    Plan [facility] - pending acceptance and pre-cert    Patient/Family in Agreement with Plan yes    Plan Comments Spoke with son at bedside.  He would like referral to [facility] since patient is out of isolation - spoke with Gretchen and no beds available.  He would like referral to [facility] - spoke with Red and awaiting decision.  Will also need pre-cert. Spoke with Mildred/Deyanira and [facility] has withdrawn their submission for pre-cert.  CCP continues to follow.  Latanya GUEVARA                    Expected Discharge Date and Time     Expected Discharge Date Expected Discharge Time    Nov 11, 2022             Becky S. [place], RN

## 2022-11-10 NOTE — THERAPY TREATMENT NOTE
Patient Name: Olena Myers  : 1935    MRN: [id number]                              Today's Date: 11/10/2022       Admit Date: 2022    Visit Dx:     ICD-10-CM ICD-9-CM   1. Acute encephalopathy  G93.40 348.30   2. Acute on chronic anemia  D64.9 285.9   3. COVID  U07.1 079.89   4. History of COPD  Z87.09 V12.69   5. History of atrial fibrillation  Z86.79 V12.59   6. Chronic anticoagulation  Z79.01 V58.61   7. History of myasthenia gravis  Z86.69 V12.49     Patient Active Problem List   Diagnosis   • Pre-operative cardiovascular examination   • S/p reverse total shoulder arthroplasty   • Myasthenia gravis (HCC)   • Paroxysmal atrial fibrillation (HCC)   • HX: long term anticoagulant use   • Essential hypertension   • CAD (coronary artery disease)   • Rhinovirus   • Atrial fibrillation (HCC)   • S/P reverse total shoulder arthroplasty, right   • Acute UTI   • COPD (chronic obstructive pulmonary disease) (HCC)   • Metabolic encephalopathy   • Sleep apnea   • Sepsis (HCC)   • Dyspnea   • Chronic diastolic CHF (congestive heart failure) (HCC)   • Diastolic CHF, chronic (HCC)   • Heme positive stool   • E coli bacteremia   • Iron deficiency anemia   • Current chronic use of systemic steroids   • COPD exacerbation (HCC)   • Obesity (BMI 30-39.9)   • COVID-19 virus infection   • Chronic respiratory failure with hypoxia (HCC)   • Cytokine release syndrome, grade 1   • Altered mental status   • Anemia     Past Medical History:   Diagnosis Date   • Anemia     IRON DEFICIENCY   • Arthritis    • Asthma    • Atrial fibrillation (HCC)    • CAD (coronary artery disease)     HEART STENT   • COPD (chronic obstructive pulmonary disease) (HCC)    • 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   • Frequent urinary tract infections    • History of gastric ulcer    • History of GI bleed    • [place] (hard of hearing)    • Hyperlipidemia    • Hypertension    • Hyponatremia    • Lightheadedness    • LVH (left ventricular hypertrophy)    •  MG [facility]asthenia gravis) [facility])    • Mini stroke 10/2016, 3/2017   • OA (osteoarthritis)    • Osteoporosis    • Sleep apnea     USES CPAP   • SOB (shortness of breath)     WALKING     Past Surgical History:   Procedure Laterality Date   • APPENDECTOMY     • BRONCHOSCOPY N/A 3/31/2022    Procedure: BRONCHOSCOPY WITH BAL RIGHT LOWER LOBE;  Surgeon: Remy Tirado MD;  Location: [facility] ENDOSCOPY;  Service: Pulmonary;  Laterality: N/A;  COPD EXACERBATION     • CARPAL TUNNEL RELEASE Bilateral    • CATARACT EXTRACTION Bilateral    • CORONARY STENT PLACEMENT     • TOTAL HIP ARTHROPLASTY Bilateral    • TOTAL SHOULDER ARTHROPLASTY W/ DISTAL CLAVICLE EXCISION Left 7/19/2016    Procedure: LT TOTAL SHOULDER REVERSE ARTHROPLASTY;  Surgeon: NAHOMI Solorzano MD;  Location: [facility] OR [facility];  Service:    • TOTAL SHOULDER ARTHROPLASTY W/ DISTAL CLAVICLE EXCISION Right 1/8/2019    Procedure: RIGHT TOTAL SHOULDER REVERSE ARTHROPLASTY;  Surgeon: Jovanny Solorzano MD;  Location: [facility] OR [facility];  Service: Orthopedics      General Information     [facility] Name 11/10/22 9902          Physical Therapy Time and Intention    Document Type therapy note (daily note)  -     Mode of Treatment individual therapy;physical therapy  -     Row Name 11/10/22 1452          General Information    Patient Profile Reviewed yes  -     Existing Precautions/Restrictions fall  -     Row Name 11/10/22 1450          Cognition    Orientation Status (Cognition) oriented to;person;place  -     Row Name 11/10/22 1913          Safety Issues, Functional Mobility    Impairments Affecting Function (Mobility) balance;cognition;endurance/activity tolerance;strength;shortness of breath  -           User Key  (r) = Recorded By, (t) = Taken By, (c) = Cosigned By    Initials Name Provider Type     Swetha Hurst PT Physical Therapist               Mobility     Row Name 11/10/22 6514          Bed Mobility    Bed Mobility supine-sit  -     Supine-Sit [place] (Bed  Mobility) standby assist;verbal cues  -     Sit-Supine [place] (Bed Mobility) not tested  UIC  -     Row Name 11/10/22 1451          Sit-Stand Transfer    Sit-Stand [place] (Transfers) contact guard;standby assist  -     Assistive Device (Sit-Stand Transfers) walker, front-wheeled  -     Row Name 11/10/22 1451          Gait/Stairs (Locomotion)    [place] Level (Gait) contact guard;verbal cues  -     Assistive Device (Gait) walker, front-wheeled  -     Distance in Feet (Gait) 10ft + 15ft  -     Deviations/Abnormal Patterns (Gait) gait speed decreased;festinating/shuffling  -     Bilateral Gait Deviations forward flexed posture;heel strike decreased  -           User Key  (r) = Recorded By, (t) = Taken By, (c) = Cosigned By    Initials Name Provider Type     Swetha Hurst, PT Physical Therapist               Obj/Interventions    No documentation.                Goals/Plan    No documentation.                Clinical Impression     Row Name 11/10/22 1451          Pain    Pretreatment Pain Rating 0/10 - no pain  -     Posttreatment Pain Rating 0/10 - no pain  -     Row Name 11/10/22 1451          Plan of Care Review    Plan of Care Reviewed With patient  -     Progress no change  -     Outcome Evaluation Pt seen for PT this PM and tolerated mobility well. Pt out of COVID isolation, but still c/o SOA and overall poor endurance. Pt transferred to EOB with SBA, STS with CGA and ambulated 10ft + 15ft with rwx. Pt unsteady and fatigues quickly, requiring 1 seated rest break. Pt agreeable to sitting UIC following, assisted with repositioning and left with needs met. Pt encouraged to walk to bathroom with nsg and walk around room with assist as able as well as trying to sit UIC throughout the day. PT will continue to follow to progress mobility as tolerated. Anticipate DC to SNF.  -     Row Name 11/10/22 1451          Therapy Assessment/Plan (PT)    Therapy Frequency (PT) 5  times/wk  Out of COVID isolation  -     Row Name 11/10/22 1451          Vital Signs    O2 Delivery Pre Treatment supplemental O2  -     O2 Delivery Intra Treatment supplemental O2  -     O2 Delivery Post Treatment supplemental O2  -     Row Name 11/10/22 1451          Positioning and Restraints    Pre-Treatment Position in bed  -     Post Treatment Position chair  -     In Chair reclined;call light within reach;encouraged to call for assist;exit alarm on;with family/caregiver  -           User Key  (r) = Recorded By, (t) = Taken By, (c) = Cosigned By    Initials Name Provider Type     Swetha Hurst PT Physical Therapist               Outcome Measures     Row Name 11/10/22 1454 11/10/22 1411       How much help from another person do you currently need...    Turning from your back to your side while in flat bed without using bedrails? 3  - 3  -BB    Moving from lying on back to sitting on the side of a flat bed without bedrails? 3  - 3  -BB    Moving to and from a bed to a chair (including a wheelchair)? 3  - 3  -BB    Standing up from a chair using your arms (e.g., wheelchair, bedside chair)? 3  - 3  -BB    Climbing 3-5 steps with a railing? 2  - 2  -BB    To walk in hospital room? 3  - 3  -BB    AM-PAC 6 Clicks Score (PT) 17  - 17  -BB    Highest level of mobility 5 --> Static standing  - 5 --> Static standing  -    Row Name 11/10/22 1454          Functional Assessment    Outcome Measure Options AM-PAC 6 Clicks Basic Mobility (PT)  -           User Key  (r) = Recorded By, (t) = Taken By, (c) = Cosigned By    Initials Name Provider Type     Jv Lopez, RN Registered Nurse     Swetha Hurst PT Physical Therapist                             Physical Therapy Education     Title: PT OT SLP Therapies (Done)     Topic: Physical Therapy (Done)     Point: Mobility training (Done)     Learning Progress Summary           Patient Acceptance, E,TB,D, VU,NR by  at 11/10/2022  1454    Acceptance, E,TB,D, VU,NR by  at 11/8/2022 1216    Acceptance, E, NR by AR at 11/6/2022 1549                   Point: Home exercise program (Done)     Learning Progress Summary           Patient Acceptance, E,TB,D, VU,NR by  at 11/10/2022 1454    Acceptance, E,TB,D, VU,NR by  at 11/8/2022 1216    Acceptance, E, NR by AR at 11/6/2022 1549                   Point: Body mechanics (Done)     Learning Progress Summary           Patient Acceptance, E,TB,D, VU,NR by  at 11/10/2022 1454    Acceptance, E,TB,D, VU,NR by  at 11/8/2022 1216    Acceptance, E, NR by AR at 11/6/2022 1549                   Point: Precautions (Done)     Learning Progress Summary           Patient Acceptance, E,TB,D, VU,NR by  at 11/10/2022 1454    Acceptance, E,TB,D, VU,NR by  at 11/8/2022 1216    Acceptance, E, NR by AR at 11/6/2022 1549                               User Key     Initials Effective Dates Name Provider Type Discipline    AR 06/16/21 -  Josefa Albright PT Physical Therapist PT     04/08/22 -  Swetha Hurst PT Physical Therapist PT              PT Recommendation and Plan     Plan of Care Reviewed With: patient  Progress: no change  Outcome Evaluation: Pt seen for PT this PM and tolerated mobility well. Pt out of COVID isolation, but still c/o SOA and overall poor endurance. Pt transferred to EOB with SBA, STS with CGA and ambulated 10ft + 15ft with rwx. Pt unsteady and fatigues quickly, requiring 1 seated rest break. Pt agreeable to sitting UIC following, assisted with repositioning and left with needs met. Pt encouraged to walk to bathroom with nsg and walk around room with assist as able as well as trying to sit UIC throughout the day. PT will continue to follow to progress mobility as tolerated. Anticipate DC to SNF.     Time Calculation:    PT Charges     Row Name 11/10/22 2075             Time Calculation    Start Time 1145  -      Stop Time 1157  -      Time Calculation (min) 12 min  -      PT  Received On 11/10/22  -      PT - Next Appointment 11/11/22  -         Time Calculation- PT    Total Timed Code Minutes- PT 12 minute(s)  -         Timed Charges    19132 - PT Therapeutic Exercise Minutes --  -      46177 - Gait Training Minutes  8  -      35111 - PT Therapeutic Activity Minutes 4  -         Total Minutes    Timed Charges Total Minutes 12  -       Total Minutes 12  -            User Key  (r) = Recorded By, (t) = Taken By, (c) = Cosigned By    Initials Name Provider Type     Swetha Hurst, PT Physical Therapist              Therapy Charges for Today     Code Description Service Date Service Provider Modifiers Qty    [id number] HC GAIT TRAINING EA 15 MIN 11/10/2022 Swetha Hurst PT GP 1          PT G-Codes  Outcome Measure Options: AM-PAC 6 Clicks Basic Mobility (PT)  AM-PAC 6 Clicks Score (PT): 17  AM-PAC 6 Clicks Score (OT): 14    Swetha Hurst PT  11/10/2022

## 2022-11-10 NOTE — PROGRESS NOTES
"DAILY PROGRESS NOTE  [facility]    Patient Identification:  Name: Olena Myers  Age: 87 y.o.  Sex: female  :  1935  MRN: [id number]         Primary Care Physician: Manda Keenan MD    Subjective:  Interval History:She is weak.    Objective:    Scheduled Meds:azelastine, 2 spray, Each Nare, Daily  budesonide-formoterol, 2 puff, Inhalation, BID - RT   And  tiotropium bromide monohydrate, 1 puff, Inhalation, Daily - RT  busPIRone, 10 mg, Oral, BID  DULoxetine, 60 mg, Oral, BID  ferrous sulfate, 325 mg, Oral, Daily With Breakfast  levothyroxine, 88 mcg, Oral, Daily  metoprolol succinate XL, 25 mg, Oral, BID  montelukast, 10 mg, Oral, Nightly  mycophenolate, 1,000 mg, Oral, QAM  mycophenolate, 1,500 mg, Oral, Q PM  pantoprazole, 40 mg, Oral, QAM  pravastatin, 20 mg, Oral, Nightly  predniSONE, 5 mg, Oral, Daily  rivaroxaban, 20 mg, Oral, Daily With Dinner  sodium chloride, 10 mL, Intravenous, Q12H  torsemide, 20 mg, Oral, Daily  vitamin B-12, 1,000 mcg, Oral, Daily  vitamin D, 50,000 Units, Oral, Q7 Days      Continuous Infusions:     Vital signs in last 24 hours:  Temp:  [96.5 °F (35.8 °C)-98 °F (36.7 °C)] 98 °F (36.7 °C)  Heart Rate:  [79-92] 89  Resp:  [16-18] 16  BP: (103-131)/(54-72) 131/72    Intake/Output:    Intake/Output Summary (Last 24 hours) at 11/10/2022 1306  Last data filed at 11/10/2022 0817  Gross per 24 hour   Intake 250 ml   Output 300 ml   Net -50 ml       Exam:  /72 (BP Location: Right arm, Patient Position: Lying)   Pulse 89   Temp 98 °F (36.7 °C) (Oral)   Resp 16   Ht 152.4 cm (60\")   Wt 76 kg (167 lb 8.8 oz)   SpO2 90%   BMI 32.72 kg/m²     General Appearance:    Alert, cooperative, no distress   Head:    Normocephalic, without obvious abnormality, atraumatic   Eyes:       Throat:   Lips, tongue, gums normal   Neck:   Supple, symmetrical, trachea midline, no JVD   Lungs:     Clear to auscultation bilaterally, respirations unlabored   Chest Wall:    No " tenderness or deformity    Heart:    Regular rate and rhythm, S1 and S2 normal, no murmur,no  Rub or gallop   Abdomen:     Soft, nontender, bowel sounds active, no masses, no organomegaly    Extremities:   Extremities normal, atraumatic, no cyanosis or edema   Pulses:      Skin:   Skin is warm and dry,  no rashes or palpable lesions   Neurologic:   no focal deficits noted      Lab Results (last 72 hours)     Procedure Component Value Units Date/Time    CBC & Differential [[id number]]  (Abnormal) Collected: 11/08/22 0348    Specimen: Blood Updated: 11/08/22 0452    Narrative:      The following orders were created for panel order CBC & Differential.  Procedure                               Abnormality         Status                     ---------                               -----------         ------                     CBC Auto Differential[[id number]]        Abnormal            Final result                 Please view results for these tests on the individual orders.    CBC Auto Differential [[id number]]  (Abnormal) Collected: 11/08/22 0348    Specimen: Blood Updated: 11/08/22 0452     WBC 12.15 10*3/mm3      RBC 3.76 10*6/mm3      Hemoglobin 10.2 g/dL      Hematocrit 32.1 %      MCV 85.4 fL      MCH 27.1 pg      MCHC 31.8 g/dL      RDW 14.2 %      RDW-SD 44.1 fl      MPV 10.2 fL      Platelets 272 10*3/mm3      Neutrophil % 91.7 %      Lymphocyte % 2.4 %      Monocyte % 4.4 %      Eosinophil % 0.6 %      Basophil % 0.2 %      Immature Grans % 0.7 %      Neutrophils, Absolute 11.14 10*3/mm3      Lymphocytes, Absolute 0.29 10*3/mm3      Monocytes, Absolute 0.53 10*3/mm3      Eosinophils, Absolute 0.07 10*3/mm3      Basophils, Absolute 0.03 10*3/mm3      Immature Grans, Absolute 0.09 10*3/mm3      nRBC 0.0 /100 WBC     COVID-19 RAPID AG,VERITOR,COR/DAVI/PAD/TREY/MAD/BISI/LAG/MARY/ IN-HOUSE,DRY SWAB, 1-2 HR TAT - Swab, Nasal Cavity [[id number]]  (Normal) Collected: 11/07/22 1730    Specimen: Swab from Nasal Cavity Updated:  11/07/22 1845     COVID19 Presumptive Negative    Narrative:      Fact sheets for providers: https://www.fda.gov/media/233724/download    Fact sheets for patients: https://www.fda.gov/media/770331/download    Blood Culture - Blood, Arm, Right [[id number]]  (Normal) Collected: 11/04/22 1333    Specimen: Blood from Arm, Right Updated: 11/07/22 1301     Blood Culture No growth at 3 days    Blood Culture - Blood, Arm, Left [[id number]]  (Normal) Collected: 11/04/22 1204    Specimen: Blood from Arm, Left Updated: 11/07/22 1116     Blood Culture No growth at 3 days    Basic Metabolic Panel [[id number]]  (Abnormal) Collected: 11/07/22 0355    Specimen: Blood Updated: 11/07/22 0459     Glucose 90 mg/dL      BUN 19 mg/dL      Creatinine 1.30 mg/dL      Sodium 135 mmol/L      Potassium 3.8 mmol/L      Chloride 97 mmol/L      CO2 29.1 mmol/L      Calcium 8.7 mg/dL      BUN/Creatinine Ratio 14.6     Anion Gap 8.9 mmol/L      eGFR 40.4 mL/min/1.73      Comment: National Kidney Foundation and American Society of Nephrology (ASN) Task Force recommended calculation based on the Chronic Kidney Disease Epidemiology Collaboration (CKD-EPI) equation refit without adjustment for race.       Narrative:      GFR Normal >60  Chronic Kidney Disease <60  Kidney Failure <15    The GFR formula is only valid for adults with stable renal function between ages 18 and 70.    CBC & Differential [[id number]]  (Abnormal) Collected: 11/07/22 0355    Specimen: Blood Updated: 11/07/22 0444    Narrative:      The following orders were created for panel order CBC & Differential.  Procedure                               Abnormality         Status                     ---------                               -----------         ------                     CBC Auto Differential[[id number]]        Abnormal            Final result                 Please view results for these tests on the individual orders.    CBC Auto Differential [[id number]]  (Abnormal) Collected:  11/07/22 0355    Specimen: Blood Updated: 11/07/22 0444     WBC 9.43 10*3/mm3      RBC 3.21 10*6/mm3      Hemoglobin 8.7 g/dL      Hematocrit 27.2 %      MCV 84.7 fL      MCH 27.1 pg      MCHC 32.0 g/dL      RDW 14.5 %      RDW-SD 45.2 fl      MPV 10.1 fL      Platelets 223 10*3/mm3      Neutrophil % 86.9 %      Lymphocyte % 5.0 %      Monocyte % 6.0 %      Eosinophil % 1.0 %      Basophil % 0.5 %      Immature Grans % 0.6 %      Neutrophils, Absolute 8.19 10*3/mm3      Lymphocytes, Absolute 0.47 10*3/mm3      Monocytes, Absolute 0.57 10*3/mm3      Eosinophils, Absolute 0.09 10*3/mm3      Basophils, Absolute 0.05 10*3/mm3      Immature Grans, Absolute 0.06 10*3/mm3      nRBC 0.0 /100 WBC     Folate [[id number]]  (Normal) Collected: 11/06/22 0352    Specimen: Blood Updated: 11/06/22 0500     Folate 7.65 ng/mL     Narrative:      Results may be falsely increased if patient taking Biotin.      Vitamin B12 [[id number]]  (Abnormal) Collected: 11/06/22 0352    Specimen: Blood Updated: 11/06/22 0459     Vitamin B-12 1,666 pg/mL     Narrative:      Results may be falsely increased if patient taking Biotin.      CBC & Differential [[id number]]  (Abnormal) Collected: 11/06/22 0353    Specimen: Blood Updated: 11/06/22 0422    Narrative:      The following orders were created for panel order CBC & Differential.  Procedure                               Abnormality         Status                     ---------                               -----------         ------                     CBC Auto Differential[[id number]]        Abnormal            Final result                 Please view results for these tests on the individual orders.    CBC Auto Differential [[id number]]  (Abnormal) Collected: 11/06/22 0353    Specimen: Blood Updated: 11/06/22 0422     WBC 10.09 10*3/mm3      RBC 3.40 10*6/mm3      Hemoglobin 9.1 g/dL      Hematocrit 28.4 %      MCV 83.5 fL      MCH 26.8 pg      MCHC 32.0 g/dL      RDW 14.3 %      RDW-SD 44.0 fl       MPV 9.4 fL      Platelets 240 10*3/mm3      Neutrophil % 88.3 %      Lymphocyte % 3.9 %      Monocyte % 4.9 %      Eosinophil % 1.6 %      Basophil % 0.4 %      Immature Grans % 0.9 %      Neutrophils, Absolute 8.92 10*3/mm3      Lymphocytes, Absolute 0.39 10*3/mm3      Monocytes, Absolute 0.49 10*3/mm3      Eosinophils, Absolute 0.16 10*3/mm3      Basophils, Absolute 0.04 10*3/mm3      Immature Grans, Absolute 0.09 10*3/mm3      nRBC 0.0 /100 WBC     Ferritin [[id number]]  (Normal) Collected: 11/05/22 0756    Specimen: Blood Updated: 11/05/22 1752     Ferritin 146.00 ng/mL     Narrative:      Results may be falsely decreased if patient taking Biotin.      Iron Profile [[id number]]  (Abnormal) Collected: 11/05/22 0756    Specimen: Blood Updated: 11/05/22 1746     Iron 18 mcg/dL      Iron Saturation 7 %      Transferrin 183 mg/dL      TIBC 273 mcg/dL     C-reactive Protein [[id number]]  (Abnormal) Collected: 11/05/22 0756    Specimen: Blood Updated: 11/05/22 1746     C-Reactive Protein 15.32 mg/dL     Hemoglobin & Hematocrit, Blood [[id number]]  (Abnormal) Collected: 11/05/22 1540    Specimen: Blood Updated: 11/05/22 1600     Hemoglobin 9.5 g/dL      Hematocrit 29.6 %         Data Review:  Results from last 7 days   Lab Units 11/10/22  0413 11/09/22  2009 11/09/22  0419 11/07/22  0355   SODIUM mmol/L 132*  --  132* 135*   POTASSIUM mmol/L 4.2 4.8 3.4* 3.8   CHLORIDE mmol/L 92*  --  92* 97*   CO2 mmol/L 28.0  --  30.0* 29.1*   BUN mg/dL 22  --  21 19   CREATININE mg/dL 1.12*  --  1.23* 1.30*   GLUCOSE mg/dL 87  --  96 90   CALCIUM mg/dL 9.3  --  8.8 8.7     Results from last 7 days   Lab Units 11/10/22  0413 11/09/22  0419 11/08/22  0348   WBC 10*3/mm3 8.08 11.10* 12.15*   HEMOGLOBIN g/dL 9.4* 8.9* 10.2*   HEMATOCRIT % 28.8* 27.2* 32.1*   PLATELETS 10*3/mm3 301 253 272             Lab Results   Lab Value Date/Time    TROPONINT <0.010 11/09/2022 1616    TROPONINT <0.010 11/04/2022 1155    TROPONINT 0.010 10/25/2022 0843     TROPONINT <0.010 02/15/2022 1659    TROPONINT <0.010 07/05/2021 0929    TROPONINT <0.010 01/05/2021 1716    TROPONINT <0.010 12/20/2016 1549    TROPONINT <0.010 11/23/2016 0337    TROPONINT <0.010 11/22/2016 1606         Results from last 7 days   Lab Units 11/05/22  0756 11/04/22  1155   ALK PHOS U/L 100 79   BILIRUBIN mg/dL 0.7 0.4   ALT (SGPT) U/L 15 13   AST (SGOT) U/L 24 27             No results found for: POCGLU        Past Medical History:   Diagnosis Date   • Anemia     IRON DEFICIENCY   • Arthritis    • Asthma    • Atrial fibrillation ([facility])    • CAD (coronary artery disease)     HEART STENT   • COPD (chronic obstructive pulmonary disease) ([facility])    • 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   • Frequent urinary tract infections    • History of gastric ulcer    • History of GI bleed    • [place] (hard of hearing)    • Hyperlipidemia    • Hypertension    • Hyponatremia    • Lightheadedness    • LVH (left ventricular hypertrophy)    • MG (myasthenia gravis) ([facility])    • Mini stroke 10/2016, 3/2017   • OA (osteoarthritis)    • Osteoporosis    • Sleep apnea     USES CPAP   • SOB (shortness of breath)     WALKING       Assessment:  Active Hospital Problems    Diagnosis  POA   • **Anemia [D64.9]  Yes   • Altered mental status [R41.82]  Unknown   • Chronic respiratory failure with hypoxia ([facility]) [J96.11]  Yes   • COVID-19 virus infection [U07.1]  Unknown   • Chronic diastolic CHF (congestive heart failure) ([facility]) [I50.32]  Yes   • COPD (chronic obstructive pulmonary disease) ([facility]) [J44.9]  Yes   • Sleep apnea [G47.30]  Yes   • Paroxysmal atrial fibrillation ([facility]) [I48.0]  Yes   • Myasthenia gravis ([facility]) [G70.00]  Yes   • HX: long term anticoagulant use [Z92.29]  Not Applicable   • Essential hypertension [I10]  Yes      Resolved Hospital Problems   No resolved problems to display.       Plan:  DC planning. Will need SNU for rehab.  Follow lab. Got iron infusions.  COVID testing negative.   Rehab sometime  Somewhere.    Johny  BENNY Mendoza MD  11/10/2022  13:06 EST

## 2022-11-11 VITALS
DIASTOLIC BLOOD PRESSURE: 62 MMHG | WEIGHT: 167.55 LBS | HEIGHT: 60 IN | SYSTOLIC BLOOD PRESSURE: 106 MMHG | BODY MASS INDEX: 32.89 KG/M2 | RESPIRATION RATE: 16 BRPM | HEART RATE: 88 BPM | OXYGEN SATURATION: 96 % | TEMPERATURE: 98.2 F

## 2022-11-11 LAB
ANION GAP SERPL CALCULATED.3IONS-SCNC: 10.1 MMOL/L (ref 5–15)
BASOPHILS # BLD AUTO: 0.05 10*3/MM3 (ref 0–0.2)
BASOPHILS NFR BLD AUTO: 0.5 % (ref 0–1.5)
BUN SERPL-MCNC: 24 MG/DL (ref 8–23)
BUN/CREAT SERPL: 20.7 (ref 7–25)
CALCIUM SPEC-SCNC: 9.5 MG/DL (ref 8.6–10.5)
CHLORIDE SERPL-SCNC: 94 MMOL/L (ref 98–107)
CO2 SERPL-SCNC: 28.9 MMOL/L (ref 22–29)
CREAT SERPL-MCNC: 1.16 MG/DL (ref 0.57–1)
DEPRECATED RDW RBC AUTO: 46.2 FL (ref 37–54)
EGFRCR SERPLBLD CKD-EPI 2021: 46.3 ML/MIN/1.73
EOSINOPHIL # BLD AUTO: 0.15 10*3/MM3 (ref 0–0.4)
EOSINOPHIL NFR BLD AUTO: 1.6 % (ref 0.3–6.2)
ERYTHROCYTE [DISTWIDTH] IN BLOOD BY AUTOMATED COUNT: 14.8 % (ref 12.3–15.4)
GLUCOSE SERPL-MCNC: 94 MG/DL (ref 65–99)
HCT VFR BLD AUTO: 28.6 % (ref 34–46.6)
HGB BLD-MCNC: 9.1 G/DL (ref 12–15.9)
IMM GRANULOCYTES # BLD AUTO: 0.07 10*3/MM3 (ref 0–0.05)
IMM GRANULOCYTES NFR BLD AUTO: 0.7 % (ref 0–0.5)
LYMPHOCYTES # BLD AUTO: 0.43 10*3/MM3 (ref 0.7–3.1)
LYMPHOCYTES NFR BLD AUTO: 4.5 % (ref 19.6–45.3)
MCH RBC QN AUTO: 27.1 PG (ref 26.6–33)
MCHC RBC AUTO-ENTMCNC: 31.8 G/DL (ref 31.5–35.7)
MCV RBC AUTO: 85.1 FL (ref 79–97)
MONOCYTES # BLD AUTO: 0.42 10*3/MM3 (ref 0.1–0.9)
MONOCYTES NFR BLD AUTO: 4.4 % (ref 5–12)
NEUTROPHILS NFR BLD AUTO: 8.45 10*3/MM3 (ref 1.7–7)
NEUTROPHILS NFR BLD AUTO: 88.3 % (ref 42.7–76)
NRBC BLD AUTO-RTO: 0 /100 WBC (ref 0–0.2)
PLATELET # BLD AUTO: 321 10*3/MM3 (ref 140–450)
PMV BLD AUTO: 10.1 FL (ref 6–12)
POTASSIUM SERPL-SCNC: 4 MMOL/L (ref 3.5–5.2)
RBC # BLD AUTO: 3.36 10*6/MM3 (ref 3.77–5.28)
SODIUM SERPL-SCNC: 133 MMOL/L (ref 136–145)
WBC NRBC COR # BLD: 9.57 10*3/MM3 (ref 3.4–10.8)

## 2022-11-11 PROCEDURE — 63710000001 PREDNISONE PER 5 MG: Performed by: INTERNAL MEDICINE

## 2022-11-11 PROCEDURE — 63710000001 MYCOPHENOLATE MOFETIL PER 250 MG: Performed by: INTERNAL MEDICINE

## 2022-11-11 PROCEDURE — 80048 BASIC METABOLIC PNL TOTAL CA: CPT | Performed by: HOSPITALIST

## 2022-11-11 PROCEDURE — 85025 COMPLETE CBC W/AUTO DIFF WBC: CPT | Performed by: INTERNAL MEDICINE

## 2022-11-11 PROCEDURE — 94664 DEMO&/EVAL PT USE INHALER: CPT

## 2022-11-11 PROCEDURE — 94799 UNLISTED PULMONARY SVC/PX: CPT

## 2022-11-11 PROCEDURE — 94761 N-INVAS EAR/PLS OXIMETRY MLT: CPT

## 2022-11-11 RX ORDER — ACETAMINOPHEN 325 MG/1
650 TABLET ORAL EVERY 4 HOURS PRN
Start: 2022-11-11

## 2022-11-11 RX ADMIN — TIOTROPIUM BROMIDE INHALATION SPRAY 1 PUFF: 3.12 SPRAY, METERED RESPIRATORY (INHALATION) at 07:15

## 2022-11-11 RX ADMIN — ACETAMINOPHEN 650 MG: 325 TABLET, FILM COATED ORAL at 10:48

## 2022-11-11 RX ADMIN — BUSPIRONE HYDROCHLORIDE 10 MG: 10 TABLET ORAL at 08:44

## 2022-11-11 RX ADMIN — DULOXETINE HYDROCHLORIDE 60 MG: 60 CAPSULE, DELAYED RELEASE ORAL at 08:44

## 2022-11-11 RX ADMIN — METOPROLOL SUCCINATE 25 MG: 25 TABLET, EXTENDED RELEASE ORAL at 08:44

## 2022-11-11 RX ADMIN — TORSEMIDE 20 MG: 20 TABLET ORAL at 08:44

## 2022-11-11 RX ADMIN — MYCOPHENOLATE MOFETIL 1000 MG: 500 TABLET ORAL at 06:47

## 2022-11-11 RX ADMIN — LEVOTHYROXINE SODIUM 88 MCG: 0.09 TABLET ORAL at 08:44

## 2022-11-11 RX ADMIN — AZELASTINE HYDROCHLORIDE 2 SPRAY: 137 SPRAY, METERED NASAL at 08:45

## 2022-11-11 RX ADMIN — PANTOPRAZOLE SODIUM 40 MG: 40 TABLET, DELAYED RELEASE ORAL at 06:47

## 2022-11-11 RX ADMIN — PREDNISONE 5 MG: 10 TABLET ORAL at 08:44

## 2022-11-11 RX ADMIN — BUDESONIDE AND FORMOTEROL FUMARATE DIHYDRATE 2 PUFF: 160; 4.5 AEROSOL RESPIRATORY (INHALATION) at 07:15

## 2022-11-11 RX ADMIN — Medication 1000 MCG: at 08:44

## 2022-11-11 RX ADMIN — FERROUS SULFATE TAB 325 MG (65 MG ELEMENTAL FE) 325 MG: 325 (65 FE) TAB at 08:44

## 2022-11-11 RX ADMIN — Medication 10 ML: at 08:45

## 2022-11-11 NOTE — CASE MANAGEMENT/SOCIAL WORK
Continued Stay Note  [facility]     Patient Name: Olena Myers  MRN: [id number]  Today's Date: 11/11/2022    Admit Date: 11/4/2022    Plan: [facility] via [facility] at 3pm   Discharge Plan     Row Name 11/11/22 1155       Plan    Plan [facility] via [facility] at 3pm    Plan Comments CCP received call from Vandana/Coleman Mo notifying CCP pre-cert obtained. MD and RN notified. CCP arranged [facility] transport for 3pm confirmation #[id number] cost $116 and information given to pts son Scotty to pay for transportation. Vandana/Coleman Mo notified of transport time. Packet given to RN. Cat RN/CCP    Final Discharge Disposition Code 03 - skilled nursing facility (SNF)    Final Note [facility] via [facility] at 3pm. No additional CCP needs. Cat RN/CCP               Discharge Codes    No documentation.               Expected Discharge Date and Time     Expected Discharge Date Expected Discharge Time    Nov 11, 2022             Iva Obrien, RN

## 2022-11-11 NOTE — DISCHARGE SUMMARY
PHYSICIAN DISCHARGE SUMMARY                                                                        [facility]    Patient Identification:  Name: Olena Myers  Age: 87 y.o.  Sex: female  :  1935  MRN: [id number]  Primary Care Physician: Manda Keenan MD    Admit date: 2022  Discharge date and time:2022  Discharged Condition: good    Discharge Diagnoses:  Active Hospital Problems    Diagnosis  POA    **Anemia [D64.9]  Yes    Altered mental status [R41.82]  Yes    Chronic respiratory failure with hypoxia ([facility]) [J96.11]  Yes    COVID-19 virus infection [U07.1]  Yes    Chronic diastolic CHF (congestive heart failure) ([facility]) [I50.32]  Yes    COPD (chronic obstructive pulmonary disease) ([facility]) [J44.9]  Yes    Sleep apnea [G47.30]  Yes    Paroxysmal atrial fibrillation ([facility]) [I48.0]  Yes    Myasthenia gravis ([facility]) [G70.00]  Yes    HX: long term anticoagulant use [Z92.29]  Not Applicable    Essential hypertension [I10]  Yes      Resolved Hospital Problems   No resolved problems to display.   COVID-19 with viral shedding  Altered mental status secondary to anemia    PMHX:   Past Medical History:   Diagnosis Date    Anemia     IRON DEFICIENCY    Arthritis     Asthma     Atrial fibrillation (HCC)     CAD (coronary artery disease)     HEART STENT    COPD (chronic obstructive pulmonary disease) ([facility])     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    Frequent urinary tract infections     History of gastric ulcer     History of GI bleed     [place] (hard of hearing)     Hyperlipidemia     Hypertension     Hyponatremia     Lightheadedness     LVH (left ventricular hypertrophy)     MG (myasthenia gravis) ([facility])     Mini stroke 10/2016, 3/2017    OA (osteoarthritis)     Osteoporosis     Sleep apnea     USES CPAP    SOB (shortness of breath)     WALKING     PSHX:   Past Surgical History:   Procedure Laterality Date    APPENDECTOMY       BRONCHOSCOPY N/A 3/31/2022    Procedure: BRONCHOSCOPY WITH BAL RIGHT LOWER LOBE;  Surgeon: Remy Tirado MD;  Location: [facility] ENDOSCOPY;  Service: Pulmonary;  Laterality: N/A;  COPD EXACERBATION      CARPAL TUNNEL RELEASE Bilateral     CATARACT EXTRACTION Bilateral     CORONARY STENT PLACEMENT      TOTAL HIP ARTHROPLASTY Bilateral     TOTAL SHOULDER ARTHROPLASTY W/ DISTAL CLAVICLE EXCISION Left 7/19/2016    Procedure: LT TOTAL SHOULDER REVERSE ARTHROPLASTY;  Surgeon: NAHOMI Solorzano MD;  Location: [facility] OR [facility];  Service:     TOTAL SHOULDER ARTHROPLASTY W/ DISTAL CLAVICLE EXCISION Right 1/8/2019    Procedure: RIGHT TOTAL SHOULDER REVERSE ARTHROPLASTY;  Surgeon: Jovanny Solorzano MD;  [place] [facility] [place] [facility]  Service: Orthopedics       Hospital Course: Olena Myers   is 87-year-old female who was recently diagnosed with COVID-19 pneumonia and has history of sleep apnea/COPD for which she is on chronic oxygen supplementation and carries a diagnosis of asthma as well as atrial fibrillation and coronary artery disease as well as esophagitis and has frequent urinary tract infection who is immunocompromised and takes immunosuppressive therapy and steroids for her underlying myasthenia gravis while apparently at her baseline until yesterday.  This morning when she woke up at 9:00 she was hallucinating and was complaining of increased frequency of urination and urgency..  Patient was recently hospitalized from 10/25/2022 through 10/28/2022 for COVID-19 pneumonia and was treated with remdesivir and because of delirium on dexamethasone it was discontinued with improvement in her symptoms.  Because of elevated procalcitonin besides the treatment for COVID she was also given antibiotic therapy and was sent home on oral antibiotics on 10/28/2022 for 4 more days of treatment which she has finished.  Patient is unable to give details of her symptoms at this time.  She just needs help with her oxygen supplementation and keeps  saying that she is not getting any better.  Patient was noted to be anemic during previous hospitalization and was noted to have hemoglobin in the range of 7.6-8.1.  She has had intolerance to iron therapy in the past.  Combination of iron deficiency and chronic anemia for which hematology oncology referral was made and it was noted that patient was to follow-up with her hematologist at [facility].  In the emergency room she was noted to have a hemoglobin of 6.9 for which a unit of packed RBC transfusion has been ordered.  The patient was admitted to the hospital and seen by hematology.  The patient had the antigen test for COVID-19 which was negative x2.  She was incredibly weak and the plan is to go to skilled nursing facility for rehab prior to returning home.  She did have some adjustments on her medicine and was doing much better and was back to her baseline with her mental state.  She is still a little short of air requiring oxygen at only 1 L.      Consults:     Consults       Date and Time Order Name Status Description    11/4/2022  6:55 PM Hematology & Oncology Inpatient Consult Completed     11/4/2022  1:59 PM [facility] (on-call MD unless specified) Details            Results from last 7 days   Lab Units 11/11/22  0418   WBC 10*3/mm3 9.57   HEMOGLOBIN g/dL 9.1*   HEMATOCRIT % 28.6*   PLATELETS 10*3/mm3 321     Results from last 7 days   Lab Units 11/11/22  0418   SODIUM mmol/L 133*   POTASSIUM mmol/L 4.0   CHLORIDE mmol/L 94*   CO2 mmol/L 28.9   BUN mg/dL 24*   CREATININE mg/dL 1.16*   GLUCOSE mg/dL 94   CALCIUM mg/dL 9.5     Significant Diagnostic Studies:   WBC   Date Value Ref Range Status   11/11/2022 9.57 3.40 - 10.80 10*3/mm3 Final     Hemoglobin   Date Value Ref Range Status   11/11/2022 9.1 (L) 12.0 - 15.9 g/dL Final     Hematocrit   Date Value Ref Range Status   11/11/2022 28.6 (L) 34.0 - 46.6 % Final     Platelets   Date Value Ref Range Status   11/11/2022 321 140 - 450 10*3/mm3 Final     Sodium    Date Value Ref Range Status   11/11/2022 133 (L) 136 - 145 mmol/L Final     Potassium   Date Value Ref Range Status   11/11/2022 4.0 3.5 - 5.2 mmol/L Final     Chloride   Date Value Ref Range Status   11/11/2022 94 (L) 98 - 107 mmol/L Final     CO2   Date Value Ref Range Status   11/11/2022 28.9 22.0 - 29.0 mmol/L Final     BUN   Date Value Ref Range Status   11/11/2022 24 (H) 8 - 23 mg/dL Final     Creatinine   Date Value Ref Range Status   11/11/2022 1.16 (H) 0.57 - 1.00 mg/dL Final     Glucose   Date Value Ref Range Status   11/11/2022 94 65 - 99 mg/dL Final     Calcium   Date Value Ref Range Status   11/11/2022 9.5 8.6 - 10.5 mg/dL Final     No results found for: AST, ALT, ALKPHOS  No results found for: APTT, INR  No results found for: COLORU, CLARITYU, SPECGRAV, PHUR, PROTEINUR, GLUCOSEU, KETONESU, BLOODU, NITRITE, LEUKOCYTESUR, BILIRUBINUR, UROBILINOGEN, RBCUA, WBCUA, BACTERIA, UACOMMENT  Troponin T   Date Value Ref Range Status   11/09/2022 <0.010 0.000 - 0.030 ng/mL Final     No components found for: HGBA1C;2  No components found for: TSH;2  Imaging Results (All)       Procedure Component Value Units Date/Time    XR Chest 1 View [[id number]] Collected: 11/04/22 1236     Updated: 11/04/22 1241    Narrative:      XR CHEST 1 VW-     HISTORY: Female who is 87 years-old,  dyspnea     TECHNIQUE: Frontal view of the chest     COMPARISON: 10/25/2022     FINDINGS:     Left chest port appears stable. The heart size is borderline. Aorta is  tortuous, calcified. Mildly increased prominence of vascular and  interstitial markings suggesting congestive heart failure. Bilateral  pulmonary opacities appear similar to prior exam. No pleural effusion,  or pneumothorax. No acute osseous process.       Impression:      Mildly increased prominence of vascular and interstitial  markings. Otherwise, no significant change. Continued follow-up  suggested.     This report was finalized on 11/4/2022 12:38 PM by Dr. José Manuel Anaya,  M.D.             Lab Results (last 7 days)       Procedure Component Value Units Date/Time    Basic Metabolic Panel [[id number]]  (Abnormal) Collected: 11/11/22 0418    Specimen: Blood Updated: 11/11/22 0539     Glucose 94 mg/dL      BUN 24 mg/dL      Creatinine 1.16 mg/dL      Sodium 133 mmol/L      Potassium 4.0 mmol/L      Chloride 94 mmol/L      CO2 28.9 mmol/L      Calcium 9.5 mg/dL      BUN/Creatinine Ratio 20.7     Anion Gap 10.1 mmol/L      eGFR 46.3 mL/min/1.73      Comment: National Kidney Foundation and American Society of Nephrology (ASN) Task Force recommended calculation based on the Chronic Kidney Disease Epidemiology Collaboration (CKD-EPI) equation refit without adjustment for race.       Narrative:      GFR Normal >60  Chronic Kidney Disease <60  Kidney Failure <15    The GFR formula is only valid for adults with stable renal function between ages 18 and 70.    CBC & Differential [[id number]]  (Abnormal) Collected: 11/11/22 0418    Specimen: Blood Updated: 11/11/22 0503    Narrative:      The following orders were created for panel order CBC & Differential.  Procedure                               Abnormality         Status                     ---------                               -----------         ------                     CBC Auto Differential[[id number]]        Abnormal            Final result                 Please view results for these tests on the individual orders.    CBC Auto Differential [[id number]]  (Abnormal) Collected: 11/11/22 0418    Specimen: Blood Updated: 11/11/22 0503     WBC 9.57 10*3/mm3      RBC 3.36 10*6/mm3      Hemoglobin 9.1 g/dL      Hematocrit 28.6 %      MCV 85.1 fL      MCH 27.1 pg      MCHC 31.8 g/dL      RDW 14.8 %      RDW-SD 46.2 fl      MPV 10.1 fL      Platelets 321 10*3/mm3      Neutrophil % 88.3 %      Lymphocyte % 4.5 %      Monocyte % 4.4 %      Eosinophil % 1.6 %      Basophil % 0.5 %      Immature Grans % 0.7 %      Neutrophils, Absolute 8.45 10*3/mm3       Lymphocytes, Absolute 0.43 10*3/mm3      Monocytes, Absolute 0.42 10*3/mm3      Eosinophils, Absolute 0.15 10*3/mm3      Basophils, Absolute 0.05 10*3/mm3      Immature Grans, Absolute 0.07 10*3/mm3      nRBC 0.0 /100 WBC     Basic Metabolic Panel [[id number]]  (Abnormal) Collected: 11/10/22 0413    Specimen: Blood Updated: 11/10/22 0513     Glucose 87 mg/dL      BUN 22 mg/dL      Creatinine 1.12 mg/dL      Sodium 132 mmol/L      Potassium 4.2 mmol/L      Chloride 92 mmol/L      CO2 28.0 mmol/L      Calcium 9.3 mg/dL      BUN/Creatinine Ratio 19.6     Anion Gap 12.0 mmol/L      eGFR 48.3 mL/min/1.73      Comment: National Kidney Foundation and American Society of Nephrology (ASN) Task Force recommended calculation based on the Chronic Kidney Disease Epidemiology Collaboration (CKD-EPI) equation refit without adjustment for race.       Narrative:      GFR Normal >60  Chronic Kidney Disease <60  Kidney Failure <15    The GFR formula is only valid for adults with stable renal function between ages 18 and 70.    CBC & Differential [[id number]]  (Abnormal) Collected: 11/10/22 0413    Specimen: Blood Updated: 11/10/22 0455    Narrative:      The following orders were created for panel order CBC & Differential.  Procedure                               Abnormality         Status                     ---------                               -----------         ------                     CBC Auto Differential[[id number]]        Abnormal            Final result                 Please view results for these tests on the individual orders.    CBC Auto Differential [[id number]]  (Abnormal) Collected: 11/10/22 0413    Specimen: Blood Updated: 11/10/22 0455     WBC 8.08 10*3/mm3      RBC 3.44 10*6/mm3      Hemoglobin 9.4 g/dL      Hematocrit 28.8 %      MCV 83.7 fL      MCH 27.3 pg      MCHC 32.6 g/dL      RDW 14.7 %      RDW-SD 44.4 fl      MPV 10.2 fL      Platelets 301 10*3/mm3      Neutrophil % 88.0 %      Lymphocyte % 4.8 %       Monocyte % 5.0 %      Eosinophil % 1.0 %      Basophil % 0.5 %      Immature Grans % 0.7 %      Neutrophils, Absolute 7.11 10*3/mm3      Lymphocytes, Absolute 0.39 10*3/mm3      Monocytes, Absolute 0.40 10*3/mm3      Eosinophils, Absolute 0.08 10*3/mm3      Basophils, Absolute 0.04 10*3/mm3      Immature Grans, Absolute 0.06 10*3/mm3      nRBC 0.0 /100 WBC     Potassium [[id number]]  (Normal) Collected: 11/09/22 2009    Specimen: Blood Updated: 11/09/22 2043     Potassium 4.8 mmol/L     COVID-19 RAPID AG,VERITOR,COR/DAVI/PAD/TREY/MAD/BISI/LAG/MARY/ IN-HOUSE,DRY SWAB, 1-2 HR TAT - Swab, Nasal Cavity [[id number]]  (Normal) Collected: 11/09/22 1804    Specimen: Swab from Nasal Cavity Updated: 11/09/22 1832     COVID19 Presumptive Negative    Narrative:      Fact sheets for providers: https://www.fda.gov/media/656172/download    Fact sheets for patients: https://www.fda.gov/media/585484/download    Troponin [[id number]]  (Normal) Collected: 11/09/22 1616    Specimen: Blood Updated: 11/09/22 1708     Troponin T <0.010 ng/mL     Narrative:      Troponin T Reference Range:  <= 0.03 ng/mL-   Negative for AMI  >0.03 ng/mL-     Abnormal for myocardial necrosis.  Clinicians would have to utilize clinical acumen, EKG, Troponin and serial changes to determine if it is an Acute Myocardial Infarction or myocardial injury due to an underlying chronic condition.       Results may be falsely decreased if patient taking Biotin.      COVID PRE-OP / PRE-PROCEDURE SCREENING ORDER (NO ISOLATION) - Swab, Nasopharynx [[id number]]  (Normal) Collected: 11/09/22 0820    Specimen: Swab from Nasopharynx Updated: 11/09/22 1505    Narrative:      The following orders were created for panel order COVID PRE-OP / PRE-PROCEDURE SCREENING ORDER (NO ISOLATION) - Swab, Nasopharynx.  Procedure                               Abnormality         Status                     ---------                               -----------         ------                      COVID-19,APTIMA PANTHER(...[[id number]]  Normal              Final result                 Please view results for these tests on the individual orders.    COVID-19,APTIMA PANTHER(ADAM),BH BISI/BH [facility], NP/OP SWAB IN UTM/VTM/SALINE TRANSPORT MEDIA,24 HR TAT - Swab, Nasopharynx [[id number]]  (Normal) Collected: 11/09/22 0820    Specimen: Swab from Nasopharynx Updated: 11/09/22 1505     COVID19 Not Detected    Narrative:      Fact sheet for providers: https://www.fda.gov/media/836062/download     Fact sheet for patients: https://www.fda.gov/media/805193/download    Test performed by RT PCR.    Blood Culture - Blood, Arm, Right [[id number]]  (Normal) Collected: 11/04/22 1333    Specimen: Blood from Arm, Right Updated: 11/09/22 1301     Blood Culture No growth at 5 days    Blood Culture - Blood, Arm, Left [[id number]]  (Normal) Collected: 11/04/22 1204    Specimen: Blood from Arm, Left Updated: 11/09/22 1115     Blood Culture No growth at 5 days    Basic Metabolic Panel [[id number]]  (Abnormal) Collected: 11/09/22 0419    Specimen: Blood Updated: 11/09/22 0534     Glucose 96 mg/dL      BUN 21 mg/dL      Creatinine 1.23 mg/dL      Sodium 132 mmol/L      Potassium 3.4 mmol/L      Chloride 92 mmol/L      CO2 30.0 mmol/L      Calcium 8.8 mg/dL      BUN/Creatinine Ratio 17.1     Anion Gap 10.0 mmol/L      eGFR 43.2 mL/min/1.73      Comment: National Kidney Foundation and American Society of Nephrology (ASN) Task Force recommended calculation based on the Chronic Kidney Disease Epidemiology Collaboration (CKD-EPI) equation refit without adjustment for race.       Narrative:      GFR Normal >60  Chronic Kidney Disease <60  Kidney Failure <15    The GFR formula is only valid for adults with stable renal function between ages 18 and 70.    CBC & Differential [[id number]]  (Abnormal) Collected: 11/09/22 0419    Specimen: Blood Updated: 11/09/22 0457    Narrative:      The following orders were created for panel order CBC &  Differential.  Procedure                               Abnormality         Status                     ---------                               -----------         ------                     CBC Auto Differential[[id number]]        Abnormal            Final result                 Please view results for these tests on the individual orders.    CBC Auto Differential [[id number]]  (Abnormal) Collected: 11/09/22 0419    Specimen: Blood Updated: 11/09/22 0457     WBC 11.10 10*3/mm3      RBC 3.30 10*6/mm3      Hemoglobin 8.9 g/dL      Hematocrit 27.2 %      MCV 82.4 fL      MCH 27.0 pg      MCHC 32.7 g/dL      RDW 14.4 %      RDW-SD 42.6 fl      MPV 10.3 fL      Platelets 253 10*3/mm3      Neutrophil % 91.2 %      Lymphocyte % 2.4 %      Monocyte % 4.2 %      Eosinophil % 0.7 %      Basophil % 0.5 %      Immature Grans % 1.0 %      Neutrophils, Absolute 10.12 10*3/mm3      Lymphocytes, Absolute 0.27 10*3/mm3      Monocytes, Absolute 0.47 10*3/mm3      Eosinophils, Absolute 0.08 10*3/mm3      Basophils, Absolute 0.05 10*3/mm3      Immature Grans, Absolute 0.11 10*3/mm3      nRBC 0.0 /100 WBC     CBC & Differential [[id number]]  (Abnormal) Collected: 11/08/22 0348    Specimen: Blood Updated: 11/08/22 0452    Narrative:      The following orders were created for panel order CBC & Differential.  Procedure                               Abnormality         Status                     ---------                               -----------         ------                     CBC Auto Differential[[id number]]        Abnormal            Final result                 Please view results for these tests on the individual orders.    CBC Auto Differential [[id number]]  (Abnormal) Collected: 11/08/22 0348    Specimen: Blood Updated: 11/08/22 0452     WBC 12.15 10*3/mm3      RBC 3.76 10*6/mm3      Hemoglobin 10.2 g/dL      Hematocrit 32.1 %      MCV 85.4 fL      MCH 27.1 pg      MCHC 31.8 g/dL      RDW 14.2 %      RDW-SD 44.1 fl      MPV 10.2 fL       Platelets 272 10*3/mm3      Neutrophil % 91.7 %      Lymphocyte % 2.4 %      Monocyte % 4.4 %      Eosinophil % 0.6 %      Basophil % 0.2 %      Immature Grans % 0.7 %      Neutrophils, Absolute 11.14 10*3/mm3      Lymphocytes, Absolute 0.29 10*3/mm3      Monocytes, Absolute 0.53 10*3/mm3      Eosinophils, Absolute 0.07 10*3/mm3      Basophils, Absolute 0.03 10*3/mm3      Immature Grans, Absolute 0.09 10*3/mm3      nRBC 0.0 /100 WBC     COVID-19 RAPID AG,VERITOR,COR/DAVI/PAD/TREY/MAD/BISI/LAG/MARY/ IN-HOUSE,DRY SWAB, 1-2 HR TAT - Swab, Nasal Cavity [[id number]]  (Normal) Collected: 11/07/22 1730    Specimen: Swab from Nasal Cavity Updated: 11/07/22 1845     COVID19 Presumptive Negative    Narrative:      Fact sheets for providers: https://www.fda.gov/media/445053/download    Fact sheets for patients: https://www.fda.gov/media/720142/download    Basic Metabolic Panel [[id number]]  (Abnormal) Collected: 11/07/22 0355    Specimen: Blood Updated: 11/07/22 0459     Glucose 90 mg/dL      BUN 19 mg/dL      Creatinine 1.30 mg/dL      Sodium 135 mmol/L      Potassium 3.8 mmol/L      Chloride 97 mmol/L      CO2 29.1 mmol/L      Calcium 8.7 mg/dL      BUN/Creatinine Ratio 14.6     Anion Gap 8.9 mmol/L      eGFR 40.4 mL/min/1.73      Comment: National Kidney Foundation and American Society of Nephrology (ASN) Task Force recommended calculation based on the Chronic Kidney Disease Epidemiology Collaboration (CKD-EPI) equation refit without adjustment for race.       Narrative:      GFR Normal >60  Chronic Kidney Disease <60  Kidney Failure <15    The GFR formula is only valid for adults with stable renal function between ages 18 and 70.    CBC & Differential [[id number]]  (Abnormal) Collected: 11/07/22 0355    Specimen: Blood Updated: 11/07/22 0444    Narrative:      The following orders were created for panel order CBC & Differential.  Procedure                               Abnormality         Status                     ---------                                -----------         ------                     CBC Auto Differential[[id number]]        Abnormal            Final result                 Please view results for these tests on the individual orders.    CBC Auto Differential [[id number]]  (Abnormal) Collected: 11/07/22 0355    Specimen: Blood Updated: 11/07/22 0444     WBC 9.43 10*3/mm3      RBC 3.21 10*6/mm3      Hemoglobin 8.7 g/dL      Hematocrit 27.2 %      MCV 84.7 fL      MCH 27.1 pg      MCHC 32.0 g/dL      RDW 14.5 %      RDW-SD 45.2 fl      MPV 10.1 fL      Platelets 223 10*3/mm3      Neutrophil % 86.9 %      Lymphocyte % 5.0 %      Monocyte % 6.0 %      Eosinophil % 1.0 %      Basophil % 0.5 %      Immature Grans % 0.6 %      Neutrophils, Absolute 8.19 10*3/mm3      Lymphocytes, Absolute 0.47 10*3/mm3      Monocytes, Absolute 0.57 10*3/mm3      Eosinophils, Absolute 0.09 10*3/mm3      Basophils, Absolute 0.05 10*3/mm3      Immature Grans, Absolute 0.06 10*3/mm3      nRBC 0.0 /100 WBC     Folate [[id number]]  (Normal) Collected: 11/06/22 0352    Specimen: Blood Updated: 11/06/22 0500     Folate 7.65 ng/mL     Narrative:      Results may be falsely increased if patient taking Biotin.      Vitamin B12 [[id number]]  (Abnormal) Collected: 11/06/22 0352    Specimen: Blood Updated: 11/06/22 0459     Vitamin B-12 1,666 pg/mL     Narrative:      Results may be falsely increased if patient taking Biotin.      CBC & Differential [[id number]]  (Abnormal) Collected: 11/06/22 0353    Specimen: Blood Updated: 11/06/22 0422    Narrative:      The following orders were created for panel order CBC & Differential.  Procedure                               Abnormality         Status                     ---------                               -----------         ------                     CBC Auto Differential[[id number]]        Abnormal            Final result                 Please view results for these tests on the individual orders.    CBC Auto  Differential [[id number]]  (Abnormal) Collected: 11/06/22 0353    Specimen: Blood Updated: 11/06/22 0422     WBC 10.09 10*3/mm3      RBC 3.40 10*6/mm3      Hemoglobin 9.1 g/dL      Hematocrit 28.4 %      MCV 83.5 fL      MCH 26.8 pg      MCHC 32.0 g/dL      RDW 14.3 %      RDW-SD 44.0 fl      MPV 9.4 fL      Platelets 240 10*3/mm3      Neutrophil % 88.3 %      Lymphocyte % 3.9 %      Monocyte % 4.9 %      Eosinophil % 1.6 %      Basophil % 0.4 %      Immature Grans % 0.9 %      Neutrophils, Absolute 8.92 10*3/mm3      Lymphocytes, Absolute 0.39 10*3/mm3      Monocytes, Absolute 0.49 10*3/mm3      Eosinophils, Absolute 0.16 10*3/mm3      Basophils, Absolute 0.04 10*3/mm3      Immature Grans, Absolute 0.09 10*3/mm3      nRBC 0.0 /100 WBC     Ferritin [[id number]]  (Normal) Collected: 11/05/22 0756    Specimen: Blood Updated: 11/05/22 1752     Ferritin 146.00 ng/mL     Narrative:      Results may be falsely decreased if patient taking Biotin.      Iron Profile [[id number]]  (Abnormal) Collected: 11/05/22 0756    Specimen: Blood Updated: 11/05/22 1746     Iron 18 mcg/dL      Iron Saturation 7 %      Transferrin 183 mg/dL      TIBC 273 mcg/dL     C-reactive Protein [[id number]]  (Abnormal) Collected: 11/05/22 0756    Specimen: Blood Updated: 11/05/22 1746     C-Reactive Protein 15.32 mg/dL     Hemoglobin & Hematocrit, Blood [[id number]]  (Abnormal) Collected: 11/05/22 1540    Specimen: Blood Updated: 11/05/22 1600     Hemoglobin 9.5 g/dL      Hematocrit 29.6 %     Comprehensive Metabolic Panel [[id number]]  (Abnormal) Collected: 11/05/22 0756    Specimen: Blood Updated: 11/05/22 0904     Glucose 115 mg/dL      BUN 13 mg/dL      Creatinine 0.82 mg/dL      Sodium 136 mmol/L      Potassium 4.3 mmol/L      Chloride 98 mmol/L      CO2 26.9 mmol/L      Calcium 9.1 mg/dL      Total Protein 6.0 g/dL      Albumin 2.60 g/dL      ALT (SGPT) 15 U/L      AST (SGOT) 24 U/L      Alkaline Phosphatase 100 U/L      Total Bilirubin 0.7 mg/dL       Globulin 3.4 gm/dL      A/G Ratio 0.8 g/dL      BUN/Creatinine Ratio 15.9     Anion Gap 11.1 mmol/L      eGFR 70.2 mL/min/1.73      Comment: National Kidney Foundation and American Society of Nephrology (ASN) Task Force recommended calculation based on the Chronic Kidney Disease Epidemiology Collaboration (CKD-EPI) equation refit without adjustment for race.       Narrative:      GFR Normal >60  Chronic Kidney Disease <60  Kidney Failure <15    The GFR formula is only valid for adults with stable renal function between ages 18 and 70.    Lactic Acid, Plasma [[id number]]  (Normal) Collected: 11/05/22 0756    Specimen: Blood Updated: 11/05/22 0837     Lactate 1.8 mmol/L     Hemoglobin & Hematocrit, Blood [[id number]]  (Abnormal) Collected: 11/05/22 0756    Specimen: Blood Updated: 11/05/22 0821     Hemoglobin 9.4 g/dL      Hematocrit 28.7 %     CBC Auto Differential [[id number]]  (Abnormal) Collected: 11/05/22 0756    Specimen: Blood Updated: 11/05/22 0821     WBC 12.94 10*3/mm3      RBC 3.48 10*6/mm3      Hemoglobin 9.4 g/dL      Hematocrit 28.7 %      MCV 82.5 fL      MCH 27.0 pg      MCHC 32.8 g/dL      RDW 14.2 %      RDW-SD 42.8 fl      MPV 9.4 fL      Platelets 251 10*3/mm3      Neutrophil % 91.2 %      Lymphocyte % 3.9 %      Monocyte % 3.6 %      Eosinophil % 0.2 %      Basophil % 0.2 %      Immature Grans % 0.9 %      Neutrophils, Absolute 11.81 10*3/mm3      Lymphocytes, Absolute 0.51 10*3/mm3      Monocytes, Absolute 0.46 10*3/mm3      Eosinophils, Absolute 0.02 10*3/mm3      Basophils, Absolute 0.03 10*3/mm3      Immature Grans, Absolute 0.11 10*3/mm3      nRBC 0.0 /100 WBC     Hemoglobin & Hematocrit, Blood [[id number]]  (Abnormal) Collected: 11/05/22 0144    Specimen: Blood Updated: 11/05/22 0158     Hemoglobin 8.7 g/dL      Hematocrit 26.5 %     Hemoglobin & Hematocrit, Blood [[id number]]  (Abnormal) Collected: 11/04/22 2107    Specimen: Blood Updated: 11/04/22 2123     Hemoglobin 8.3 g/dL      Hematocrit  25.1 %     POCT Occult Blood Stool [[id number]]  (Normal) Collected: 11/04/22 1359    Specimen: Stool from Per Rectum Updated: 11/04/22 1359     Fecal Occult Blood Negative     Lot Number [id number]     Expiration Date 02/28/2023     Positive Control Positive     Negative Control Negative    COVID PRE-OP / PRE-PROCEDURE SCREENING ORDER (NO ISOLATION) - Swab, Nasopharynx [[id number]]  (Abnormal) Collected: 11/04/22 1205    Specimen: Swab from Nasopharynx Updated: 11/04/22 1329    Narrative:      The following orders were created for panel order COVID PRE-OP / PRE-PROCEDURE SCREENING ORDER (NO ISOLATION) - Swab, Nasopharynx.  Procedure                               Abnormality         Status                     ---------                               -----------         ------                     COVID-19,[facility]...[[id number]]  Abnormal            Final result                 Please view results for these tests on the individual orders.    COVID-19,[facility] CEPHEID/STEVEN NP SWAB IN TRANSPORT MEDIA 8-12 HR TAT - Swab, Nasopharynx [[id number]]  (Abnormal) Collected: 11/04/22 1205    Specimen: Swab from Nasopharynx Updated: 11/04/22 1329     COVID19 Detected    Narrative:      Fact sheet for providers: https://www.fda.gov/media/355521/download     Fact sheet for patients: https://www.fda.gov/media/347712/download    BNP [[id number]]  (Normal) Collected: 11/04/22 1155    Specimen: Blood Updated: 11/04/22 1249     proBNP 1,506.0 pg/mL     Narrative:      Among patients with dyspnea, NT-proBNP is highly sensitive for the detection of acute congestive heart failure. In addition NT-proBNP of <300 pg/ml effectively rules out acute congestive heart failure with 99% negative predictive value.    Results may be falsely decreased if patient taking Biotin.      Procalcitonin [[id number]]  (Abnormal) Collected: 11/04/22 1155    Specimen: Blood Updated: 11/04/22 1249     Procalcitonin 0.31 ng/mL     Narrative:      As a Marker for  "Sepsis (Non-Neonates):    1. <0.5 ng/mL represents a low risk of severe sepsis and/or septic shock.  2. >2 ng/mL represents a high risk of severe sepsis and/or septic shock.    As a Marker for Lower Respiratory Tract Infections that require antibiotic therapy:    PCT on Admission    Antibiotic Therapy       6-12 Hrs later    >0.5                Strongly Recommended  >0.25 - <0.5        Recommended   0.1 - 0.25          Discouraged              Remeasure/reassess PCT  <0.1                Strongly Discouraged     Remeasure/reassess PCT    As 28 day mortality risk marker: \"Change in Procalcitonin Result\" (>80% or <=80%) if Day 0 (or Day 1) and Day 4 values are available. Refer to http://www.[facility]FeedStr[facility] – StroudSustaining Technologiespct-calculator.com    Change in PCT <=80%  A decrease of PCT levels below or equal to 80% defines a positive change in PCT test result representing a higher risk for 28-day all-cause mortality of patients diagnosed with severe sepsis for septic shock.    Change in PCT >80%  A decrease of PCT levels of more than 80% defines a negative change in PCT result representing a lower risk for 28-day all-cause mortality of patients diagnosed with severe sepsis or septic shock.       Troponin [[id number]]  (Normal) Collected: 11/04/22 1155    Specimen: Blood Updated: 11/04/22 1249     Troponin T <0.010 ng/mL     Narrative:      Troponin T Reference Range:  <= 0.03 ng/mL-   Negative for AMI  >0.03 ng/mL-     Abnormal for myocardial necrosis.  Clinicians would have to utilize clinical acumen, EKG, Troponin and serial changes to determine if it is an Acute Myocardial Infarction or myocardial injury due to an underlying chronic condition.       Results may be falsely decreased if patient taking Biotin.      CBC & Differential [[id number]]  (Abnormal) Collected: 11/04/22 1155    Specimen: Blood Updated: 11/04/22 1243    Narrative:      The following orders were created for panel order CBC & Differential.  Procedure                              "  Abnormality         Status                     ---------                               -----------         ------                     CBC Auto Differential[[id number]]        Abnormal            Final result                 Please view results for these tests on the individual orders.    CBC Auto Differential [[id number]]  (Abnormal) Collected: 11/04/22 1155    Specimen: Blood Updated: 11/04/22 1243     WBC 10.96 10*3/mm3      RBC 2.55 10*6/mm3      Hemoglobin 6.9 g/dL      Hematocrit 21.6 %      MCV 84.7 fL      MCH 27.1 pg      MCHC 31.9 g/dL      RDW 14.4 %      RDW-SD 44.2 fl      MPV 9.6 fL      Platelets 275 10*3/mm3      Neutrophil % 86.8 %      Lymphocyte % 4.8 %      Monocyte % 5.7 %      Eosinophil % 1.1 %      Basophil % 0.3 %      Immature Grans % 1.3 %      Neutrophils, Absolute 9.52 10*3/mm3      Lymphocytes, Absolute 0.53 10*3/mm3      Monocytes, Absolute 0.62 10*3/mm3      Eosinophils, Absolute 0.12 10*3/mm3      Basophils, Absolute 0.03 10*3/mm3      Immature Grans, Absolute 0.14 10*3/mm3      nRBC 0.0 /100 WBC     Comprehensive Metabolic Panel [[id number]]  (Abnormal) Collected: 11/04/22 1155    Specimen: Blood Updated: 11/04/22 1242     Glucose 88 mg/dL      BUN 12 mg/dL      Creatinine 0.84 mg/dL      Sodium 137 mmol/L      Potassium 4.4 mmol/L      Comment: Slight hemolysis detected by analyzer. Results may be affected.        Chloride 103 mmol/L      CO2 25.3 mmol/L      Calcium 8.6 mg/dL      Total Protein 5.3 g/dL      Albumin 2.70 g/dL      ALT (SGPT) 13 U/L      AST (SGOT) 27 U/L      Alkaline Phosphatase 79 U/L      Total Bilirubin 0.4 mg/dL      Globulin 2.6 gm/dL      A/G Ratio 1.0 g/dL      BUN/Creatinine Ratio 14.3     Anion Gap 8.7 mmol/L      eGFR 68.2 mL/min/1.73      Comment: National Kidney Foundation and American Society of Nephrology (ASN) Task Force recommended calculation based on the Chronic Kidney Disease Epidemiology Collaboration (CKD-EPI) equation refit without adjustment  "for race.       Narrative:      GFR Normal >60  Chronic Kidney Disease <60  Kidney Failure <15    The GFR formula is only valid for adults with stable renal function between ages 18 and 70.    Magnesium [[id number]]  (Normal) Collected: 11/04/22 1155    Specimen: Blood Updated: 11/04/22 1242     Magnesium 1.8 mg/dL     Lactic Acid, Plasma [[id number]]  (Normal) Collected: 11/04/22 1155    Specimen: Blood Updated: 11/04/22 1240     Lactate 1.9 mmol/L     Urinalysis With Culture If Indicated - Urine, Catheter [[id number]]  (Abnormal) Collected: 11/04/22 1157    Specimen: Urine, Catheter Updated: 11/04/22 1217     Color, UA Yellow     Appearance, UA Clear     pH, UA 6.0     Specific Gravity, UA 1.019     Glucose, UA Negative     Ketones, UA Trace     Bilirubin, UA Negative     Blood, UA Negative     Protein, UA Trace     Leuk Esterase, UA Negative     Nitrite, UA Negative     Urobilinogen, UA 1.0 E.U./dL    Narrative:      In absence of clinical symptoms, the presence of pyuria, bacteria, and/or nitrites on the urinalysis result does not correlate with infection.  Urine microscopic not indicated.          /57 (BP Location: Left arm, Patient Position: Sitting)   Pulse 85   Temp 97.5 °F (36.4 °C) (Oral)   Resp 18   Ht 152.4 cm (60\")   Wt 76 kg (167 lb 8.8 oz)   SpO2 95%   BMI 32.72 kg/m²     Discharge Exam:  General Appearance:    Alert, cooperative, no distress                          Head:    Normocephalic, without obvious abnormality, atraumatic                          Eyes:                            Throat:   Lips, tongue, gums normal                          Neck:   Supple, symmetrical, trachea midline, no JVD                        Lungs:     Clear to auscultation bilaterally, respirations unlabored                Chest Wall:    No tenderness or deformity                        Heart:    Regular rate and rhythm, S1 and S2 normal, no murmur,no  Rub  or gallop                  Abdomen:     Soft, " non-tender, bowel sounds active, no masses, no organomegaly                  Extremities:   Extremities normal, atraumatic, no cyanosis or edema                             Skin:   Skin is warm and dry,  no rashes or palpable lesions                  Neurologic:   no focal deficits noted     Disposition:  Skilled nursing facility    Activity as tolerated    Diet as tolerated  Diet Order   Procedures    Diet Regular; Cardiac       Patient Instructions:      Discharge Medications        New Medications        Instructions Start Date   acetaminophen 325 MG tablet  Commonly known as: TYLENOL   650 mg, Oral, Every 4 Hours PRN             Continue These Medications        Instructions Start Date   albuterol (2.5 MG/3ML) 0.083% nebulizer solution  Commonly known as: PROVENTIL   2.5 mg, Nebulization, Every 4 Hours PRN      albuterol sulfate  (90 Base) MCG/ACT inhaler  Commonly known as: PROVENTIL HFA;VENTOLIN HFA;PROAIR HFA   2 puffs, Inhalation, Every 6 Hours PRN      azelastine 0.1 % nasal spray  Commonly known as: ASTELIN   2 sprays, Nasal, 2 Times Daily, Use in each nostril as directed       busPIRone 10 MG tablet  Commonly known as: BUSPAR   10 mg, Oral, 2 Times Daily      DULoxetine 60 MG capsule  Commonly known as: CYMBALTA   60 mg, Oral, 2 Times Daily      ferrous sulfate 325 (65 FE) MG tablet   325 mg, Oral, Daily With Breakfast      irbesartan 75 MG tablet  Commonly known as: AVAPRO   75 mg, Oral, Nightly      levothyroxine 88 MCG tablet  Commonly known as: SYNTHROID, LEVOTHROID   88 mcg, Oral, Daily      metoprolol succinate XL 25 MG 24 hr tablet  Commonly known as: TOPROL-XL   25 mg, Oral, 2 Times Daily      montelukast 10 MG tablet  Commonly known as: SINGULAIR   10 mg, Oral, Nightly      mycophenolate 500 MG tablet  Commonly known as: CELLCEPT   1,000 mg, Oral, Every Morning      mycophenolate 500 MG tablet  Commonly known as: CELLCEPT   1,500 mg, Oral, Every Evening      pantoprazole 40 MG EC  tablet  Commonly known as: PROTONIX   40 mg, Oral, Daily      pravastatin 20 MG tablet  Commonly known as: PRAVACHOL   20 mg, Oral, Nightly      predniSONE 10 MG tablet  Commonly known as: DELTASONE   5 mg, Oral, Daily      raloxifene 60 MG tablet  Commonly known as: EVISTA   60 mg, Oral, Nightly      rivaroxaban 20 MG tablet  Commonly known as: XARELTO   20 mg, Oral, Daily With Dinner      torsemide 20 MG tablet  Commonly known as: DEMADEX   20 mg, Oral, Daily      Trelegy Ellipta 100-62.5-25 MCG/ACT inhaler  Generic drug: Fluticasone-Umeclidin-Vilant   1 puff, Inhalation, Daily      vitamin B-12 1000 MCG tablet  Commonly known as: CYANOCOBALAMIN   1,000 mcg, Oral, Daily      vitamin D 1.25 MG (11408 UT) capsule capsule  Commonly known as: ERGOCALCIFEROL   50,000 Units, Oral, Every 7 Days, Takes on Wednesdays             Stop These Medications      aspirin 81 MG EC tablet            No future appointments.   Contact information for follow-up providers       Manda Keenan MD Follow up.    Specialty: Pediatrics  Why: After released from rehab  [facility] information:  [address] [facility] [address] [phone]                       Contact information for after-discharge care       Destination       [facility]    Service: Skilled Nursing  Contact information:  [address] [facility] [address] [phone]                                 Discharge Order (From admission, onward)       Start     Ordered    11/11/22 1101  Discharge patient  Once        Expected Discharge Date: 11/11/22    Discharge Disposition: Skilled Nursing Facility (DC - External)    Physician of Record for Attribution - Please select from Treatment Team: THOMAS BANGURA [[id number]]    Review needed by CMO to determine Physician of Record: No       Question Answer Comment   Physician of Record for Attribution - Please select from Treatment Team THOMAS BANGURA    Review needed by CMO to determine Physician of Record  No        11/11/22 1104                    Total time spent discharging patient including evaluation,post hospitalization follow up,  medication and post hospitalization instructions and education total time exceeds 30 minutes.    Signed:  Johny Mendoza MD  11/11/2022  11:04 EST

## 2022-11-11 NOTE — PLAN OF CARE
Problem: Adult Inpatient Plan of Care  Goal: Absence of Hospital-Acquired Illness or Injury  Intervention: Identify and Manage Fall Risk  Recent Flowsheet Documentation  Taken 11/11/2022 1000 by Anita Alcantara, RN  Safety Promotion/Fall Prevention:   activity supervised   safety round/check completed   room organization consistent   assistive device/personal items within reach   clutter free environment maintained  Taken 11/11/2022 0847 by Anita Alcantara, RN  Safety Promotion/Fall Prevention:   activity supervised   assistive device/personal items within reach   clutter free environment maintained   safety round/check completed   room organization consistent   Goal Outcome Evaluation:           Progress: improving  Outcome Evaluation: Patient admitted 11/4/22 due to pneumonia and covid + treated with antibiotic and breathing treatment patient AO x 4 forgetful working with PT getting up to chair and Bedside commode take medication PO whole without difficultu intermittent productive cough, will be transfer to [place] this aftrnoon to continue with physical therapy transportation schedule at 3 pm

## 2022-11-11 NOTE — CASE MANAGEMENT/SOCIAL WORK
Case Management Discharge Note      Final Note: Green Maguire via [employer] at 3pm. No additional CCP needs. Cat RN/CCP         Selected Continued Care - Admitted Since 11/4/2022     Destination Coordination complete.    Service Provider Selected Services Address Phone Fax Patient Preferred    [facility] [address] [place] [id number] [phone] --          Durable Medical Equipment    No services have been selected for the patient.              Dialysis/Infusion    No services have been selected for the patient.              Home Medical Care    No services have been selected for the patient.              Therapy    No services have been selected for the patient.              Community Resources    No services have been selected for the patient.              Community & DME    No services have been selected for the patient.                       Final Discharge Disposition Code: 03 - skilled nursing facility (SNF)

## 2024-01-10 ENCOUNTER — HOSPITAL ENCOUNTER (OUTPATIENT)
Facility: HOSPITAL | Age: 89
Setting detail: OBSERVATION
Discharge: HOME | End: 2024-01-11
Attending: EMERGENCY MEDICINE | Admitting: HOSPITALIST
Payer: MEDICARE

## 2024-01-10 ENCOUNTER — APPOINTMENT (OUTPATIENT)
Dept: GENERAL RADIOLOGY | Facility: HOSPITAL | Age: 89
End: 2024-01-10
Payer: MEDICARE

## 2024-01-10 DIAGNOSIS — N17.9 AKI (ACUTE KIDNEY INJURY): Primary | ICD-10-CM

## 2024-01-10 DIAGNOSIS — I48.91 ATRIAL FIBRILLATION, UNSPECIFIED TYPE: ICD-10-CM

## 2024-01-10 PROBLEM — E03.9 HYPOTHYROIDISM (ACQUIRED): Status: ACTIVE | Noted: 2024-01-10

## 2024-01-10 LAB
ALBUMIN SERPL-MCNC: 4.4 G/DL (ref 3.5–5.2)
ALBUMIN/GLOB SERPL: 2 G/DL
ALP SERPL-CCNC: 76 U/L (ref 39–117)
ALT SERPL W P-5'-P-CCNC: 6 U/L (ref 1–33)
ANION GAP SERPL CALCULATED.3IONS-SCNC: 15.9 MMOL/L (ref 5–15)
AST SERPL-CCNC: 12 U/L (ref 1–32)
BASOPHILS # BLD AUTO: 0.02 10*3/MM3 (ref 0–0.2)
BASOPHILS NFR BLD AUTO: 0.2 % (ref 0–1.5)
BILIRUB SERPL-MCNC: 0.4 MG/DL (ref 0–1.2)
BUN SERPL-MCNC: 17 MG/DL (ref 8–23)
BUN/CREAT SERPL: 11 (ref 7–25)
CALCIUM SPEC-SCNC: 9.1 MG/DL (ref 8.6–10.5)
CHLORIDE SERPL-SCNC: 96 MMOL/L (ref 98–107)
CO2 SERPL-SCNC: 28.1 MMOL/L (ref 22–29)
CREAT SERPL-MCNC: 1.54 MG/DL (ref 0.57–1)
DEPRECATED RDW RBC AUTO: 44.6 FL (ref 37–54)
EGFRCR SERPLBLD CKD-EPI 2021: 32.7 ML/MIN/1.73
EOSINOPHIL # BLD AUTO: 0 10*3/MM3 (ref 0–0.4)
EOSINOPHIL NFR BLD AUTO: 0 % (ref 0.3–6.2)
ERYTHROCYTE [DISTWIDTH] IN BLOOD BY AUTOMATED COUNT: 13.6 % (ref 12.3–15.4)
GLOBULIN UR ELPH-MCNC: 2.2 GM/DL
GLUCOSE SERPL-MCNC: 114 MG/DL (ref 65–99)
HCT VFR BLD AUTO: 31.4 % (ref 34–46.6)
HGB BLD-MCNC: 9.6 G/DL (ref 12–15.9)
HOLD SPECIMEN: NORMAL
HOLD SPECIMEN: NORMAL
IMM GRANULOCYTES # BLD AUTO: 0.06 10*3/MM3 (ref 0–0.05)
IMM GRANULOCYTES NFR BLD AUTO: 0.6 % (ref 0–0.5)
LYMPHOCYTES # BLD AUTO: 0.3 10*3/MM3 (ref 0.7–3.1)
LYMPHOCYTES NFR BLD AUTO: 3 % (ref 19.6–45.3)
MAGNESIUM SERPL-MCNC: 2.1 MG/DL (ref 1.6–2.4)
MCH RBC QN AUTO: 27.7 PG (ref 26.6–33)
MCHC RBC AUTO-ENTMCNC: 30.6 G/DL (ref 31.5–35.7)
MCV RBC AUTO: 90.5 FL (ref 79–97)
MONOCYTES # BLD AUTO: 0.45 10*3/MM3 (ref 0.1–0.9)
MONOCYTES NFR BLD AUTO: 4.6 % (ref 5–12)
NEUTROPHILS NFR BLD AUTO: 9.03 10*3/MM3 (ref 1.7–7)
NEUTROPHILS NFR BLD AUTO: 91.6 % (ref 42.7–76)
NRBC BLD AUTO-RTO: 0 /100 WBC (ref 0–0.2)
PLATELET # BLD AUTO: 480 10*3/MM3 (ref 140–450)
PMV BLD AUTO: 8.6 FL (ref 6–12)
POTASSIUM SERPL-SCNC: 3.8 MMOL/L (ref 3.5–5.2)
PROT SERPL-MCNC: 6.6 G/DL (ref 6–8.5)
QT INTERVAL: 339 MS
QTC INTERVAL: 463 MS
RBC # BLD AUTO: 3.47 10*6/MM3 (ref 3.77–5.28)
SODIUM SERPL-SCNC: 140 MMOL/L (ref 136–145)
TROPONIN T SERPL HS-MCNC: 47 NG/L
TSH SERPL DL<=0.05 MIU/L-ACNC: 0.54 UIU/ML (ref 0.27–4.2)
WBC NRBC COR # BLD AUTO: 9.86 10*3/MM3 (ref 3.4–10.8)
WHOLE BLOOD HOLD COAG: NORMAL
WHOLE BLOOD HOLD SPECIMEN: NORMAL

## 2024-01-10 PROCEDURE — 93010 ELECTROCARDIOGRAM REPORT: CPT | Performed by: INTERNAL MEDICINE

## 2024-01-10 PROCEDURE — G0378 HOSPITAL OBSERVATION PER HR: HCPCS

## 2024-01-10 PROCEDURE — 94761 N-INVAS EAR/PLS OXIMETRY MLT: CPT

## 2024-01-10 PROCEDURE — 25810000003 SODIUM CHLORIDE 0.9 % SOLUTION: Performed by: HOSPITALIST

## 2024-01-10 PROCEDURE — 36415 COLL VENOUS BLD VENIPUNCTURE: CPT | Performed by: EMERGENCY MEDICINE

## 2024-01-10 PROCEDURE — 93005 ELECTROCARDIOGRAM TRACING: CPT

## 2024-01-10 PROCEDURE — 84484 ASSAY OF TROPONIN QUANT: CPT | Performed by: EMERGENCY MEDICINE

## 2024-01-10 PROCEDURE — 94760 N-INVAS EAR/PLS OXIMETRY 1: CPT

## 2024-01-10 PROCEDURE — 25810000003 SODIUM CHLORIDE 0.9 % SOLUTION: Performed by: PHYSICIAN ASSISTANT

## 2024-01-10 PROCEDURE — 96360 HYDRATION IV INFUSION INIT: CPT

## 2024-01-10 PROCEDURE — 94640 AIRWAY INHALATION TREATMENT: CPT

## 2024-01-10 PROCEDURE — 99284 EMERGENCY DEPT VISIT MOD MDM: CPT

## 2024-01-10 PROCEDURE — 93005 ELECTROCARDIOGRAM TRACING: CPT | Performed by: EMERGENCY MEDICINE

## 2024-01-10 PROCEDURE — 83735 ASSAY OF MAGNESIUM: CPT | Performed by: EMERGENCY MEDICINE

## 2024-01-10 PROCEDURE — 63710000001 MYCOPHENOLATE MOFETIL PER 250 MG: Performed by: HOSPITALIST

## 2024-01-10 PROCEDURE — 80053 COMPREHEN METABOLIC PANEL: CPT | Performed by: EMERGENCY MEDICINE

## 2024-01-10 PROCEDURE — 94799 UNLISTED PULMONARY SVC/PX: CPT

## 2024-01-10 PROCEDURE — 85025 COMPLETE CBC W/AUTO DIFF WBC: CPT | Performed by: EMERGENCY MEDICINE

## 2024-01-10 PROCEDURE — 71045 X-RAY EXAM CHEST 1 VIEW: CPT

## 2024-01-10 PROCEDURE — 84443 ASSAY THYROID STIM HORMONE: CPT | Performed by: EMERGENCY MEDICINE

## 2024-01-10 RX ORDER — BUSPIRONE HYDROCHLORIDE 10 MG/1
10 TABLET ORAL 2 TIMES DAILY
Status: DISCONTINUED | OUTPATIENT
Start: 2024-01-10 | End: 2024-01-11 | Stop reason: HOSPADM

## 2024-01-10 RX ORDER — ONDANSETRON 4 MG/1
4 TABLET, ORALLY DISINTEGRATING ORAL EVERY 6 HOURS PRN
Status: DISCONTINUED | OUTPATIENT
Start: 2024-01-10 | End: 2024-01-11 | Stop reason: HOSPADM

## 2024-01-10 RX ORDER — MYCOPHENOLATE MOFETIL 500 MG/1
1000 TABLET ORAL EVERY MORNING
Status: DISCONTINUED | OUTPATIENT
Start: 2024-01-11 | End: 2024-01-11 | Stop reason: HOSPADM

## 2024-01-10 RX ORDER — MONTELUKAST SODIUM 10 MG/1
10 TABLET ORAL NIGHTLY
Status: DISCONTINUED | OUTPATIENT
Start: 2024-01-10 | End: 2024-01-11 | Stop reason: HOSPADM

## 2024-01-10 RX ORDER — METOPROLOL SUCCINATE 25 MG/1
25 TABLET, EXTENDED RELEASE ORAL ONCE
Status: COMPLETED | OUTPATIENT
Start: 2024-01-10 | End: 2024-01-10

## 2024-01-10 RX ORDER — ONDANSETRON 2 MG/ML
4 INJECTION INTRAMUSCULAR; INTRAVENOUS EVERY 6 HOURS PRN
Status: DISCONTINUED | OUTPATIENT
Start: 2024-01-10 | End: 2024-01-11 | Stop reason: HOSPADM

## 2024-01-10 RX ORDER — CHOLECALCIFEROL (VITAMIN D3) 125 MCG
1000 CAPSULE ORAL DAILY
Status: DISCONTINUED | OUTPATIENT
Start: 2024-01-10 | End: 2024-01-11 | Stop reason: HOSPADM

## 2024-01-10 RX ORDER — LEVOTHYROXINE SODIUM 88 UG/1
88 TABLET ORAL
Status: DISCONTINUED | OUTPATIENT
Start: 2024-01-11 | End: 2024-01-11 | Stop reason: HOSPADM

## 2024-01-10 RX ORDER — PREDNISONE 5 MG/1
5 TABLET ORAL DAILY
Status: DISCONTINUED | OUTPATIENT
Start: 2024-01-11 | End: 2024-01-11 | Stop reason: HOSPADM

## 2024-01-10 RX ORDER — PRAVASTATIN SODIUM 20 MG
20 TABLET ORAL NIGHTLY
Status: DISCONTINUED | OUTPATIENT
Start: 2024-01-10 | End: 2024-01-11 | Stop reason: HOSPADM

## 2024-01-10 RX ORDER — BUDESONIDE AND FORMOTEROL FUMARATE DIHYDRATE 160; 4.5 UG/1; UG/1
2 AEROSOL RESPIRATORY (INHALATION)
Status: DISCONTINUED | OUTPATIENT
Start: 2024-01-10 | End: 2024-01-11 | Stop reason: HOSPADM

## 2024-01-10 RX ORDER — ALBUTEROL SULFATE 2.5 MG/3ML
2.5 SOLUTION RESPIRATORY (INHALATION) EVERY 4 HOURS PRN
Status: DISCONTINUED | OUTPATIENT
Start: 2024-01-10 | End: 2024-01-11 | Stop reason: HOSPADM

## 2024-01-10 RX ORDER — AZELASTINE 1 MG/ML
2 SPRAY, METERED NASAL DAILY
Status: DISCONTINUED | OUTPATIENT
Start: 2024-01-11 | End: 2024-01-11 | Stop reason: HOSPADM

## 2024-01-10 RX ORDER — SODIUM CHLORIDE 9 MG/ML
40 INJECTION, SOLUTION INTRAVENOUS AS NEEDED
Status: DISCONTINUED | OUTPATIENT
Start: 2024-01-10 | End: 2024-01-11 | Stop reason: HOSPADM

## 2024-01-10 RX ORDER — SODIUM CHLORIDE 9 MG/ML
50 INJECTION, SOLUTION INTRAVENOUS CONTINUOUS
Status: DISCONTINUED | OUTPATIENT
Start: 2024-01-10 | End: 2024-01-11

## 2024-01-10 RX ORDER — SODIUM CHLORIDE 0.9 % (FLUSH) 0.9 %
10 SYRINGE (ML) INJECTION AS NEEDED
Status: DISCONTINUED | OUTPATIENT
Start: 2024-01-10 | End: 2024-01-11 | Stop reason: HOSPADM

## 2024-01-10 RX ORDER — MYCOPHENOLATE MOFETIL 500 MG/1
1500 TABLET ORAL EVERY EVENING
Status: DISCONTINUED | OUTPATIENT
Start: 2024-01-10 | End: 2024-01-11 | Stop reason: HOSPADM

## 2024-01-10 RX ORDER — METOPROLOL SUCCINATE 25 MG/1
25 TABLET, EXTENDED RELEASE ORAL 2 TIMES DAILY
Status: DISCONTINUED | OUTPATIENT
Start: 2024-01-10 | End: 2024-01-11 | Stop reason: HOSPADM

## 2024-01-10 RX ORDER — ACETAMINOPHEN 325 MG/1
650 TABLET ORAL EVERY 4 HOURS PRN
Status: DISCONTINUED | OUTPATIENT
Start: 2024-01-10 | End: 2024-01-11 | Stop reason: HOSPADM

## 2024-01-10 RX ORDER — SODIUM CHLORIDE 0.9 % (FLUSH) 0.9 %
10 SYRINGE (ML) INJECTION EVERY 12 HOURS SCHEDULED
Status: DISCONTINUED | OUTPATIENT
Start: 2024-01-10 | End: 2024-01-11 | Stop reason: HOSPADM

## 2024-01-10 RX ORDER — DULOXETIN HYDROCHLORIDE 60 MG/1
60 CAPSULE, DELAYED RELEASE ORAL 2 TIMES DAILY
Status: DISCONTINUED | OUTPATIENT
Start: 2024-01-10 | End: 2024-01-11 | Stop reason: HOSPADM

## 2024-01-10 RX ORDER — PANTOPRAZOLE SODIUM 40 MG/1
40 TABLET, DELAYED RELEASE ORAL DAILY
Status: DISCONTINUED | OUTPATIENT
Start: 2024-01-11 | End: 2024-01-11 | Stop reason: HOSPADM

## 2024-01-10 RX ADMIN — BUSPIRONE HYDROCHLORIDE 10 MG: 10 TABLET ORAL at 20:34

## 2024-01-10 RX ADMIN — RIVAROXABAN 20 MG: 20 TABLET, FILM COATED ORAL at 20:33

## 2024-01-10 RX ADMIN — DULOXETINE HYDROCHLORIDE 60 MG: 60 CAPSULE, DELAYED RELEASE ORAL at 20:34

## 2024-01-10 RX ADMIN — METOPROLOL SUCCINATE 25 MG: 25 TABLET, EXTENDED RELEASE ORAL at 15:59

## 2024-01-10 RX ADMIN — SODIUM CHLORIDE 50 ML/HR: 9 INJECTION, SOLUTION INTRAVENOUS at 20:27

## 2024-01-10 RX ADMIN — PRAVASTATIN SODIUM 20 MG: 20 TABLET ORAL at 20:34

## 2024-01-10 RX ADMIN — MYCOPHENOLATE MOFETIL 1500 MG: 500 TABLET ORAL at 20:34

## 2024-01-10 RX ADMIN — METOPROLOL SUCCINATE 25 MG: 25 TABLET, EXTENDED RELEASE ORAL at 20:34

## 2024-01-10 RX ADMIN — Medication 10 ML: at 20:36

## 2024-01-10 RX ADMIN — SODIUM CHLORIDE 500 ML: 9 INJECTION, SOLUTION INTRAVENOUS at 16:01

## 2024-01-10 RX ADMIN — BUDESONIDE AND FORMOTEROL FUMARATE DIHYDRATE 2 PUFF: 160; 4.5 AEROSOL RESPIRATORY (INHALATION) at 20:28

## 2024-01-10 RX ADMIN — MONTELUKAST SODIUM 10 MG: 10 TABLET, FILM COATED ORAL at 20:33

## 2024-01-10 NOTE — PLAN OF CARE
Goal Outcome Evaluation:  Plan of Care Reviewed With: patient, son        Progress: no change  Outcome Evaluation: Patient admitted from the ER. VSS. No c/o pain. Awaiting admission orders. Bed alarm in use, call light within reach.

## 2024-01-10 NOTE — ED NOTES
"Nursing report ED to floor  Olena Myers  88 y.o.  female    HPI :   Chief Complaint   Patient presents with    Rapid Heart Rate    Weakness - Generalized       Admitting doctor:   Hernan Waldron MD    Admitting diagnosis:   The primary encounter diagnosis was KELLEE (acute kidney injury). A diagnosis of Atrial fibrillation, unspecified type was also pertinent to this visit.    Code status:   Current Code Status       Date Active Code Status Order ID Comments User Context       Prior            Allergies:   Neomycin, Penicillins, Hydrocodone, and Rosuvastatin    Isolation:   No active isolations    Intake and Output  No intake or output data in the 24 hours ending 01/10/24 1632    Weight:       01/10/24  1542   Weight: 75.8 kg (167 lb)       Most recent vitals:   Vitals:    01/10/24 1443 01/10/24 1446 01/10/24 1542 01/10/24 1559   BP:  140/83  144/63   Pulse: 104 105  110   Resp: 20      Temp: 99 °F (37.2 °C)      TempSrc: Tympanic      SpO2: 96%      Weight:   75.8 kg (167 lb)    Height:   152.4 cm (60\")        Active LDAs/IV Access:   Lines, Drains & Airways       Active LDAs       Name Placement date Placement time Site Days    Peripheral IV 01/10/24 1553 Right Wrist 01/10/24  1553  Wrist  less than 1    External Urinary Catheter 11/05/22  0844  --  431    Single Lumen Implantable Port 01/08/19 Left Subclavian 01/08/19  0910  Subclavian  1828                    Labs (abnormal labs have a star):   Labs Reviewed   COMPREHENSIVE METABOLIC PANEL - Abnormal; Notable for the following components:       Result Value    Glucose 114 (*)     Creatinine 1.54 (*)     Chloride 96 (*)     Anion Gap 15.9 (*)     eGFR 32.7 (*)     All other components within normal limits    Narrative:     GFR Normal >60  Chronic Kidney Disease <60  Kidney Failure <15    The GFR formula is only valid for adults with stable renal function between ages 18 and 70.   SINGLE HSTROPONIN T - Abnormal; Notable for the following components:    HS " Troponin T 47 (*)     All other components within normal limits    Narrative:     High Sensitive Troponin T Reference Range:  <14.0 ng/L- Negative Female for AMI  <22.0 ng/L- Negative Male for AMI  >=14 - Abnormal Female indicating possible myocardial injury.  >=22 - Abnormal Male indicating possible myocardial injury.   Clinicians would have to utilize clinical acumen, EKG, Troponin, and serial changes to determine if it is an Acute Myocardial Infarction or myocardial injury due to an underlying chronic condition.        CBC WITH AUTO DIFFERENTIAL - Abnormal; Notable for the following components:    RBC 3.47 (*)     Hemoglobin 9.6 (*)     Hematocrit 31.4 (*)     MCHC 30.6 (*)     Platelets 480 (*)     Neutrophil % 91.6 (*)     Lymphocyte % 3.0 (*)     Monocyte % 4.6 (*)     Eosinophil % 0.0 (*)     Immature Grans % 0.6 (*)     Neutrophils, Absolute 9.03 (*)     Lymphocytes, Absolute 0.30 (*)     Immature Grans, Absolute 0.06 (*)     All other components within normal limits   MAGNESIUM - Normal   TSH - Normal   RAINBOW DRAW    Narrative:     The following orders were created for panel order Brandon Draw.  Procedure                               Abnormality         Status                     ---------                               -----------         ------                     Green Top (Gel)[[id number]]                                  Final result               Lavender Top[[id number]]                                     Final result               Gold Top - SST[[id number]]                                   Final result               Light Blue Top[[id number]]                                   Final result                 Please view results for these tests on the individual orders.   CBC AND DIFFERENTIAL    Narrative:     The following orders were created for panel order CBC & Differential.  Procedure                               Abnormality         Status                     ---------                                -----------         ------                     CBC Auto Differential[[id number]]        Abnormal            Final result                 Please view results for these tests on the individual orders.   GREEN TOP   LAVENDER TOP   GOLD TOP - SST   LIGHT BLUE TOP       EKG:   ECG 12 Lead ED Triage Standing Order; Dysrhythmia   Preliminary Result   HEART RATE= 112  bpm   RR Interval= 536  ms   NE Interval= 153  ms   P Horizontal Axis=   deg   P Front Axis= -41  deg   QRSD Interval= 99  ms   QT Interval= 339  ms   QTcB= 463  ms   QRS Axis= -52  deg   T Wave Axis= 77  deg   - ABNORMAL ECG -   Sinus tachycardia with irregular rate   Left anterior fascicular block   Abnormal R-wave progression, late transition   LVH with secondary repolarization abnormality   Electronically Signed By:    Date and Time of Study: 2024-01-10 14:51:31          Meds given in ED:   Medications   sodium chloride 0.9 % flush 10 mL (has no administration in time range)   sodium chloride 0.9 % bolus 500 mL (500 mL Intravenous New Bag 1/10/24 1601)   metoprolol succinate XL (TOPROL-XL) 24 hr tablet 25 mg (25 mg Oral Given 1/10/24 6968)       Imaging results:  XR Chest 1 View    Result Date: 1/10/2024  As described.    This report was finalized on 1/10/2024 3:59 PM by Dr. José Manuel Anaya M.D on Workstation: [id number]       Ambulatory status:   - assistx1 - uses wheelchair    Social issues:   Social History     Socioeconomic History    Marital status:    Tobacco Use    Smoking status: Never    Smokeless tobacco: Never    Tobacco comments:     caffeine - coffee and coke caff. free, tea    Vaping Use    Vaping Use: Never used   Substance and Sexual Activity    Alcohol use: No    Drug use: No     Comment: caffine    Sexual activity: Defer       NIH Stroke Scale:       Usha Joe RN  01/10/24 16:32 EST

## 2024-01-10 NOTE — ED PROVIDER NOTES
SHARED VISIT: This visit was performed by BOTH a physician and an APC. The substantive portion of the medical decision making was performed by this attesting physician who made or approved the management plan and takes responsibility for patient management. All studies in the APC note (if performed) were independently interpreted by me.      The DAYAMI and I have discussed this patient's history, physical exam, and treatment plan.  I have reviewed the documentation and personally had a face to face interaction with the patient. I affirm the documentation and agree with the treatment and plan.  The attached note describes my personal findings.       I provided a substantive portion of the care of the patient.  I personally performed the physical exam in its entirety, and below are my findings.      Brief history of present illness: Patient presents for evaluation of generalized fatigue and some shortness of breath as well as some palpitation sensation for the past several days.  Her physical therapist happened to notice that she was tachycardic and recommended patient come in for further evaluation.  Patient denies any flulike symptoms or fevers.  She denies any chest pain.        PHYSICAL EXAM  ED Triage Vitals   Temp Heart Rate Resp BP SpO2   01/10/24 1443 01/10/24 1443 01/10/24 1443 01/10/24 1446 01/10/24 1443   99 °F (37.2 °C) 104 20 140/83 96 %      Temp src Heart Rate Source Patient Position BP Location FiO2 (%)   01/10/24 1443 01/10/24 1443 -- -- --   Tympanic Monitor            GENERAL: No diaphoresis, normal habitus, no acute distress  HENT: nares patent, normocephalic, atraumatic  EYES: no scleral icterus, normal conjunctivae  CV: Normal pulses, somewhat elevated heart rate, irregular rhythm  RESPIRATORY: normal effort, no stridor, lungs clear to auscultation bilaterally  MUSCULOSKELETAL: no deformity  NEURO: alert, moves all extremities, follows commands  PSYCH:  calm, cooperative  SKIN: warm, dry    Vital  signs and nursing notes reviewed.    MDM:   EKG         EKG time/Interp time: 1451/1530  Rhythm/Rate: Atrial fibrillation with RVR, 112 bpm  P waves and IN: None apparent with poor baseline  QRS, axis: 99 ms, left axis deviation  ST and T waves: No ST segment elevations are present.  Independently interpreted by me contemporaneously with treatment    CXR  I independently interpreted the Chest X-ray and my findings are: No Pneumothorax, No Effusion, No Infiltrate      My differential diagnosis includes but is not limited to: Atrial fibrillation, atrial flutter, SVT, electrolyte abnormality, acute MI    ED Course as of 01/10/24 2344   Wed Reagan 10, 2024   1552 Per my independent interpretation of the chest x-ray, there is no obvious pneumothorax. [DC]   1613 I discussed the case with MD Chivo with [facility] at this time regarding the patient.  I discussed work-up, results, concerns.  I discussed the consulting provider's desire for tele admit.    [DC]      ED Course User Index  [DC] Jacob Hubbard, PA         Plan: Administer oral metoprolol supplement to achieve better rate control.  It seems patient's symptoms are largely driven by her tachycardic RVR response right now.  Overall work of breathing and saturations are normal range.  Given her age and comorbidities, as well as troponin which is somewhat elevated, I think she would benefit from a period of observation on telemetry for improved rate control and repeat evaluation of troponin to follow the trend.  I think her troponins are probably elevated simply because of tachycardic demand..  She is agreeable with the plan to stay in hospital tonight for further supportive care.      Please note that portions of this were completed with a voice recognition program.         Note Disclaimer: At [facility], we believe that sharing information builds trust and better relationships. You are receiving this note because you are receiving care at [facility] or recently  visited. It is possible you will see health information before a provider has talked with you about it. This kind of information can be easy to misunderstand. To help you fully understand what it means for your health, we urge you to discuss this note with your provider.     Robert Braun MD  01/10/24 [id number]

## 2024-01-10 NOTE — ED PROVIDER NOTES
EMERGENCY DEPARTMENT ENCOUNTER    Room Number:  01/01  Date of encounter:  1/10/2024  PCP: Manda Keenan MD  Historian: Patient  Full history not obtainable due to: None    HPI:  Chief Complaint: Fatigue    Context: Olena Myers is a 88 y.o. female with a PMH significant for myasthenia gravis, paroxysmal A-fib anticoagulated on Xarelto, COPD, CAD, CHF who presents to the ED c/o generalized fatigue over the past couple of days.  Physical therapist at her living facility today noticed that her heart was out of rhythm and sent her here for further evaluation.  She does arrive in atrial fibrillation with a rate between 105 and 115 bpm.  She denies chest pain, shortness of breath, dizziness, syncope, fever, chills, cough recently.  Her only complaint has been generalized fatigue persistently without progression over the past few days.  No sick contacts at her facility.  No changes to bowel habits or urination.      MEDICAL RECORD REVIEW:    Upon review of the medical record it appears the patient was evaluated in the office with oncology for iron deficiency anemia on 6/13/2023.  The patient had a normal ferritin and TSH on 1//24.    PAST MEDICAL HISTORY    Active Ambulatory Problems     Diagnosis Date Noted    Pre-operative cardiovascular examination 06/10/2016    S/p reverse total shoulder arthroplasty 07/19/2016    Myasthenia gravis 11/23/2016    Paroxysmal atrial fibrillation 11/23/2016    HX: long term anticoagulant use 11/23/2016    Essential hypertension 11/23/2016    CAD (coronary artery disease) 11/23/2016    Rhinovirus 11/27/2016    Atrial fibrillation 12/21/2016    S/P reverse total shoulder arthroplasty, right 01/08/2019    Acute UTI 07/05/2021    COPD (chronic obstructive pulmonary disease)     Metabolic encephalopathy     Sleep apnea     Sepsis 07/05/2021    Dyspnea 07/05/2021    Chronic diastolic CHF (congestive heart failure) 07/05/2021    Diastolic CHF, chronic 07/09/2021    Heme positive stool  07/09/2021    E coli bacteremia 07/09/2021    Iron deficiency anemia 07/09/2021    Current chronic use of systemic steroids 07/09/2021    COPD exacerbation 02/15/2022    Obesity (BMI 30-39.9) 02/16/2022    COVID-19 virus infection 10/25/2022    Chronic respiratory failure with hypoxia 10/25/2022    Cytokine release syndrome, grade 1 10/28/2022    Altered mental status     Anemia 11/04/2022     Resolved Ambulatory Problems     Diagnosis Date Noted    Pneumonia 11/22/2016    Loculated left pleural effusion 11/27/2016    Hyponatremia      Past Medical History:   Diagnosis Date    Arthritis     Asthma     Dilation of esophagus     Frequent urinary tract infections     History of gastric ulcer     History of GI bleed     [place] (hard of hearing)     Hyperlipidemia     Hypertension     Lightheadedness     LVH (left ventricular hypertrophy)     MG (myasthenia gravis)     Mini stroke 10/2016, 3/2017    OA (osteoarthritis)     Osteoporosis     SOB (shortness of breath)          PAST SURGICAL HISTORY  Past Surgical History:   Procedure Laterality Date    APPENDECTOMY      BRONCHOSCOPY N/A 3/31/2022    Procedure: BRONCHOSCOPY WITH BAL RIGHT LOWER LOBE;  Surgeon: Remy Tirado MD;  Location: [facility] ENDOSCOPY;  Service: Pulmonary;  Laterality: N/A;  COPD EXACERBATION      CARPAL TUNNEL RELEASE Bilateral     CATARACT EXTRACTION Bilateral     CORONARY STENT PLACEMENT      TOTAL HIP ARTHROPLASTY Bilateral     TOTAL SHOULDER ARTHROPLASTY W/ DISTAL CLAVICLE EXCISION Left 7/19/2016    Procedure: LT TOTAL SHOULDER REVERSE ARTHROPLASTY;  Surgeon: NAHOMI Solorzano MD;  [place] [facility] [place] [facility] [place]  Service:     TOTAL SHOULDER ARTHROPLASTY W/ DISTAL CLAVICLE EXCISION Right 1/8/2019    Procedure: RIGHT TOTAL SHOULDER REVERSE ARTHROPLASTY;  Surgeon: Jovanny Solorzano MD;  [place] [facility] [place] [facility] [place]  Service: Orthopedics         FAMILY HISTORY  Family History   Problem Relation Age of Onset    Heart disease Mother     Hyperlipidemia Mother      Stroke Mother     Diabetes Mother     Breast cancer Mother     Heart disease Father     Hyperlipidemia Father     Stroke Father     Malig Hyperthermia Neg Hx          SOCIAL HISTORY  Social History     Socioeconomic History    Marital status:    Tobacco Use    Smoking status: Never    Smokeless tobacco: Never    Tobacco comments:     caffeine - coffee and coke caff. free, tea    Vaping Use    Vaping Use: Never used   Substance and Sexual Activity    Alcohol use: No    Drug use: No     Comment: caffine    Sexual activity: Defer         ALLERGIES  Neomycin, Penicillins, Hydrocodone, and Rosuvastatin        REVIEW OF SYSTEMS    All systems reviewed and marked as negative except as listed in HPI     PHYSICAL EXAM    I have reviewed the triage vital signs and nursing notes.    ED Triage Vitals   Temp Heart Rate Resp BP SpO2   01/10/24 1443 01/10/24 1443 01/10/24 1443 01/10/24 1446 01/10/24 1443   99 °F (37.2 °C) 104 20 140/83 96 %      Temp src Heart Rate Source Patient Position BP Location FiO2 (%)   01/10/24 1443 01/10/24 1443 -- -- --   Tympanic Monitor          Physical Exam  Constitutional:       General: She is not in acute distress.     Appearance: She is well-developed.      Interventions: Nasal cannula in place.   HENT:      Head: Normocephalic and atraumatic.   Eyes:      General: No scleral icterus.     Conjunctiva/sclera: Conjunctivae normal.   Neck:      Trachea: No tracheal deviation.   Cardiovascular:      Rate and Rhythm: Tachycardia present. Rhythm irregularly irregular.      Heart sounds: Murmur heard.   Pulmonary:      Effort: Pulmonary effort is normal.      Breath sounds: Normal breath sounds.   Abdominal:      Palpations: Abdomen is soft.      Tenderness: There is no abdominal tenderness.   Musculoskeletal:         General: No deformity.      Cervical back: Normal range of motion.   Lymphadenopathy:      Cervical: No cervical adenopathy.   Skin:     General: Skin is warm and dry.    Neurological:      Mental Status: She is alert and oriented to person, place, and time.   Psychiatric:         Behavior: Behavior normal.         Vital signs and nursing notes reviewed.            LAB RESULTS  Recent Results (from the past 24 hour(s))   ECG 12 Lead ED Triage Standing Order; Dysrhythmia    Collection Time: 01/10/24  2:51 PM   Result Value Ref Range    QT Interval 339 ms    QTC Interval 463 ms   Comprehensive Metabolic Panel    Collection Time: 01/10/24  3:01 PM    Specimen: Arm, Left; Blood   Result Value Ref Range    Glucose 114 (H) 65 - 99 mg/dL    BUN 17 8 - 23 mg/dL    Creatinine 1.54 (H) 0.57 - 1.00 mg/dL    Sodium 140 136 - 145 mmol/L    Potassium 3.8 3.5 - 5.2 mmol/L    Chloride 96 (L) 98 - 107 mmol/L    CO2 28.1 22.0 - 29.0 mmol/L    Calcium 9.1 8.6 - 10.5 mg/dL    Total Protein 6.6 6.0 - 8.5 g/dL    Albumin 4.4 3.5 - 5.2 g/dL    ALT (SGPT) 6 1 - 33 U/L    AST (SGOT) 12 1 - 32 U/L    Alkaline Phosphatase 76 39 - 117 U/L    Total Bilirubin 0.4 0.0 - 1.2 mg/dL    Globulin 2.2 gm/dL    A/G Ratio 2.0 g/dL    BUN/Creatinine Ratio 11.0 7.0 - 25.0    Anion Gap 15.9 (H) 5.0 - 15.0 mmol/L    eGFR 32.7 (L) >60.0 mL/min/1.73   Magnesium    Collection Time: 01/10/24  3:01 PM    Specimen: Arm, Left; Blood   Result Value Ref Range    Magnesium 2.1 1.6 - 2.4 mg/dL   Single High Sensitivity Troponin T    Collection Time: 01/10/24  3:01 PM    Specimen: Arm, Left; Blood   Result Value Ref Range    HS Troponin T 47 (H) <14 ng/L   TSH    Collection Time: 01/10/24  3:01 PM    Specimen: Arm, Left; Blood   Result Value Ref Range    TSH 0.542 0.270 - 4.200 uIU/mL   Green Top (Gel)    Collection Time: 01/10/24  3:01 PM   Result Value Ref Range    Extra Tube Hold for add-ons.    Lavender Top    Collection Time: 01/10/24  3:01 PM   Result Value Ref Range    Extra Tube hold for add-on    Gold Top - SST    Collection Time: 01/10/24  3:01 PM   Result Value Ref Range    Extra Tube Hold for add-ons.    Light Blue Top     Collection Time: 01/10/24  3:01 PM   Result Value Ref Range    Extra Tube Hold for add-ons.    CBC Auto Differential    Collection Time: 01/10/24  3:01 PM    Specimen: Arm, Left; Blood   Result Value Ref Range    WBC 9.86 3.40 - 10.80 10*3/mm3    RBC 3.47 (L) 3.77 - 5.28 10*6/mm3    Hemoglobin 9.6 (L) 12.0 - 15.9 g/dL    Hematocrit 31.4 (L) 34.0 - 46.6 %    MCV 90.5 79.0 - 97.0 fL    MCH 27.7 26.6 - 33.0 pg    MCHC 30.6 (L) 31.5 - 35.7 g/dL    RDW 13.6 12.3 - 15.4 %    RDW-SD 44.6 37.0 - 54.0 fl    MPV 8.6 6.0 - 12.0 fL    Platelets 480 (H) 140 - 450 10*3/mm3    Neutrophil % 91.6 (H) 42.7 - 76.0 %    Lymphocyte % 3.0 (L) 19.6 - 45.3 %    Monocyte % 4.6 (L) 5.0 - 12.0 %    Eosinophil % 0.0 (L) 0.3 - 6.2 %    Basophil % 0.2 0.0 - 1.5 %    Immature Grans % 0.6 (H) 0.0 - 0.5 %    Neutrophils, Absolute 9.03 (H) 1.70 - 7.00 10*3/mm3    Lymphocytes, Absolute 0.30 (L) 0.70 - 3.10 10*3/mm3    Monocytes, Absolute 0.45 0.10 - 0.90 10*3/mm3    Eosinophils, Absolute 0.00 0.00 - 0.40 10*3/mm3    Basophils, Absolute 0.02 0.00 - 0.20 10*3/mm3    Immature Grans, Absolute 0.06 (H) 0.00 - 0.05 10*3/mm3    nRBC 0.0 0.0 - 0.2 /100 WBC       Ordered the above labs and independently reviewed the results.        RADIOLOGY  XR Chest 1 View    Result Date: 1/10/2024  XR CHEST 1 VW-  HISTORY: Female who is 88 years-old, dysrhythmia  TECHNIQUE: Frontal view of the chest  COMPARISON: 11/4/2022  FINDINGS: Left chest port appears stable. Heart size is borderline. Pulmonary vasculature is unremarkable. Aorta is calcified. Small likely atelectasis or scarring at the mid lungs. No focal pulmonary consolidation, pleural effusion, or pneumothorax. No acute osseous process.      As described.    This report was finalized on 1/10/2024 3:59 PM by Dr. José Manuel Anaya M.D on Workstation: [id number]       I ordered the above noted radiological studies. Independently reviewed by me and discussed with radiologist.  See dictation above for official radiology  interpretation.      PROCEDURES    Procedures        MEDICATIONS GIVEN IN ER    Medications   sodium chloride 0.9 % flush 10 mL (has no administration in time range)   sodium chloride 0.9 % bolus 500 mL (500 mL Intravenous New Bag 1/10/24 1601)   metoprolol succinate XL (TOPROL-XL) 24 hr tablet 25 mg (25 mg Oral Given 1/10/24 1559)         PROGRESS, DATA ANALYSIS, CONSULTS, AND MEDICAL DECISION MAKING    All labs have been independently interpreted by me.  All radiology studies have been interpreted by me.  Discussion below represents my analysis of pertinent findings related to patient's condition, differential diagnosis, treatment plan and final disposition.    Patient presentation and evaluation consistent with mild KELLEE in the setting of atrial fibrillation causing generalized fatigue.  I feel that she would benefit from an extra dose of beta-blockers, continue monitoring and fluid hydration through the night.  Patient agreeable and all questions answered.    - Chronic or social conditions impacting care: None      DIFFERENTIAL DIAGNOSIS INCLUDE BUT NOT LIMITED TO:     KELLEE, atrial fibrillation, UTI      Orders placed during this visit:  Orders Placed This Encounter   Procedures    XR Chest 1 View    [place] Draw    Comprehensive Metabolic Panel    Magnesium    Single High Sensitivity Troponin T    TSH    CBC Auto Differential    NPO Diet NPO Type: Strict NPO    Undress & Gown    Continuous Pulse Oximetry    LHA (on-call MD unless specified) Details    Oxygen Therapy- Nasal Cannula; Titrate 1-6 LPM Per SpO2; 90 - 95%    ECG 12 Lead ED Triage Standing Order; Dysrhythmia    Insert Peripheral IV    Initiate Observation Status    CBC & Differential    Green Top (Gel)    Lavender Top    Gold Top - SST    Light Blue Top         ED Course as of 01/10/24 1615   Wed Reagan 10, 2024   1552 Per my independent interpretation of the chest x-ray, there is no obvious pneumothorax. [DC]   1613 I discussed the case with MD Chivo  with A at this time regarding the patient.  I discussed work-up, results, concerns.  I discussed the consulting provider's desire for tele admit.    [DC]      ED Course User Index  [DC] Jacob Hubbard PA       AS OF 16:15 EST VITALS:    BP - 144/63  HR - 110  TEMP - 99 °F (37.2 °C) (Tympanic)  02 SATS - 96%        1616 I rechecked the patient.  I discussed the patient's labs, radiology findings (including all incidental findings), diagnosis, and plan for admission. The patient understands and agrees with the plan.      DIAGNOSIS  Final diagnoses:   KELLEE (acute kidney injury)   Atrial fibrillation, unspecified type         DISPOSITION  Admit    Pt masked in first look. I wore a surgical mask throughout my encounters with the pt. I performed hand hygiene on entry into the pt room and upon exit.     Dictated utilizing Dragon dictation     Note Disclaimer: At [facility], we believe that sharing information builds trust and better relationships. You are receiving this note because you recently visited [facility]. It is possible you will see health information before a provider has talked with you about it. This kind of information can be easy to misunderstand. To help you fully understand what it means for your health, we urge you to discuss this note with your provider.      Jacob Hubbard PA  01/10/24 0178

## 2024-01-10 NOTE — H&P
HISTORY AND PHYSICAL   [facility]        Date of Admission: 1/10/2024  Patient Identification:  Name: Olena Myers  Age: 88 y.o.  Sex: female  :  1935  MRN: [id number]                     Primary Care Physician: Manda Keenan MD    Chief Complaint: Generalized weakness and malaise    History of Present Illness:   Mrs. Myers is a wonderfully pleasant 88-year-old female who is actually very sharp with mentation and she is an excellent historian.  She seems pretty confident that she has not been getting her Toprol.  Her medications are somehow dispensed to her pharmacy through assisted living but she thinks that has been left out.  She is on this medication due to past history of paroxysmal atrial fibrillation.  She is anticoagulated on Xarelto as well.  She has never been able to tell if she was going in and out of A-fib and can never appreciate palpitations.  She has noticed increased generalized malaise with minimal distances.  She is now walking distances of stay 30 to 40 feet and she is having to stop if she is just overtly tired.  She came to the hospital was found to be in A-fib with RVR and was given her Toprol 25 mg x 1 in the ER.  Her heart rate is already coming down to where she is right around 104 at the time of my exam.  She is in no cardiopulmonary distress at this time.  She denies any recent fever chills or night sweats.  She denies any loss of consciousness or falls of any kind.  She is able to catch this when she gets tired and she sits down and waits.    Past Medical History:  Past Medical History:   Diagnosis Date    Anemia     IRON DEFICIENCY    Arthritis     Asthma     Atrial fibrillation     CAD (coronary artery disease)     HEART STENT    COPD (chronic obstructive pulmonary disease)     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    Frequent urinary tract infections     History of gastric ulcer     History of GI bleed     [place] (hard of hearing)     Hyperlipidemia      Hypertension     Hyponatremia     Lightheadedness     LVH (left ventricular hypertrophy)     MG (myasthenia gravis)     Mini stroke 10/2016, 3/2017    OA (osteoarthritis)     Osteoporosis     Sleep apnea     USES CPAP    SOB (shortness of breath)     WALKING     Past Surgical History:  Past Surgical History:   Procedure Laterality Date    APPENDECTOMY      BRONCHOSCOPY N/A 3/31/2022    Procedure: BRONCHOSCOPY WITH BAL RIGHT LOWER LOBE;  Surgeon: Remy Tirado MD;  Location: [facility] ENDOSCOPY;  Service: Pulmonary;  Laterality: N/A;  COPD EXACERBATION      CARPAL TUNNEL RELEASE Bilateral     CATARACT EXTRACTION Bilateral     CORONARY STENT PLACEMENT      TOTAL HIP ARTHROPLASTY Bilateral     TOTAL SHOULDER ARTHROPLASTY W/ DISTAL CLAVICLE EXCISION Left 7/19/2016    Procedure: LT TOTAL SHOULDER REVERSE ARTHROPLASTY;  Surgeon: NAHOMI Solorzano MD;  Location:  [facility];  Service:     TOTAL SHOULDER ARTHROPLASTY W/ DISTAL CLAVICLE EXCISION Right 1/8/2019    Procedure: RIGHT TOTAL SHOULDER REVERSE ARTHROPLASTY;  Surgeon: Jovanny Solorzano MD;  Location:  [facility];  Service: Orthopedics      Home Meds:  Medications Prior to Admission   Medication Sig Dispense Refill Last Dose    acetaminophen (TYLENOL) 325 MG tablet Take 2 tablets by mouth Every 4 (Four) Hours As Needed for Mild Pain.   Past Month    albuterol (PROVENTIL) (2.5 MG/3ML) 0.083% nebulizer solution Take 2.5 mg by nebulization Every 4 (Four) Hours As Needed for Wheezing.   1/10/2024    albuterol sulfate  (90 Base) MCG/ACT inhaler Inhale 2 puffs Every 6 (Six) Hours As Needed for Wheezing or Shortness of Air.   1/10/2024    azelastine (ASTELIN) 0.1 % nasal spray 2 sprays into the nostril(s) as directed by provider 2 (Two) Times a Day. Use in each nostril as directed   1/10/2024    busPIRone (BUSPAR) 10 MG tablet Take 1 tablet by mouth 2 (Two) Times a Day.   1/10/2024    DULoxetine (CYMBALTA) 60 MG capsule Take 1 capsule by mouth 2 (Two) Times a Day.    1/10/2024    Fluticasone-Umeclidin-Vilant (Trelegy Ellipta) 100-62.5-25 MCG/INH inhaler Inhale 1 puff Daily.   1/10/2024    irbesartan (AVAPRO) 75 MG tablet Take 1 tablet by mouth Every Night.   1/9/2024    levothyroxine (SYNTHROID, LEVOTHROID) 88 MCG tablet Take 1 tablet by mouth Daily.   1/10/2024    montelukast (SINGULAIR) 10 MG tablet Take 1 tablet by mouth Every Night.   1/9/2024    mycophenolate (CELLCEPT) 500 MG tablet Take 3 tablets by mouth Every Evening.   1/9/2024    pantoprazole (PROTONIX) 40 MG EC tablet Take 1 tablet by mouth Daily.   1/10/2024    pravastatin (PRAVACHOL) 20 MG tablet Take 1 tablet by mouth Every Night.   1/9/2024    predniSONE (DELTASONE) 10 MG tablet Take 0.5 tablets by mouth Daily.   1/10/2024    raloxifene (EVISTA) 60 MG tablet Take 1 tablet by mouth Every Night.   1/9/2024    rivaroxaban (XARELTO) 20 MG tablet Take 1 tablet by mouth Daily With Dinner.   1/9/2024    torsemide (DEMADEX) 20 MG tablet Take 1 tablet by mouth Daily.   1/10/2024    vitamin B-12 (CYANOCOBALAMIN) 1000 MCG tablet Take 1 tablet by mouth Daily.   1/10/2024    vitamin D (ERGOCALCIFEROL) 99121 UNITS capsule capsule Take 1 capsule by mouth Every 7 (Seven) Days. Takes on Wednesdays   1/10/2024    ferrous sulfate 325 (65 FE) MG tablet Take 1 tablet by mouth Daily With Breakfast.       metoprolol succinate XL (TOPROL-XL) 25 MG 24 hr tablet Take 1 tablet by mouth 2 (Two) Times a Day. 180 tablet 3 Unknown    mycophenolate (CELLCEPT) 500 MG tablet Take 2 tablets by mouth Every Morning.          Allergies:  Allergies   Allergen Reactions    Neomycin Anaphylaxis    Penicillins Swelling and Rash     Historical allergy x 30-40 years, tolerates cephalosporins    Hydrocodone Itching and Rash    Rosuvastatin Other (See Comments)     Muscle cramps     Immunizations:  Immunization History   Administered Date(s) Administered    COVID-19 (MODERNA) 1st,2nd,3rd Dose Monovalent 02/12/2021, 03/12/2021, 09/17/2021, 06/29/2022     "COVID-19 F23 (PFIZER) 12YRS+ (COMIRNATY) 10/11/2023     Social History:   Social History     Social History Narrative    Not on file     Social History     Socioeconomic History    Marital status:    Tobacco Use    Smoking status: Never    Smokeless tobacco: Never    Tobacco comments:     caffeine - coffee and coke caff. free, tea    Vaping Use    Vaping Use: Never used   Substance and Sexual Activity    Alcohol use: No    Drug use: No     Comment: caffine    Sexual activity: Defer       Family History:  Family History   Problem Relation Age of Onset    Heart disease Mother     Hyperlipidemia Mother     Stroke Mother     Diabetes Mother     Breast cancer Mother     Heart disease Father     Hyperlipidemia Father     Stroke Father     Malig Hyperthermia Neg Hx         Review of Systems  See history of present illness and past medical history.  Denies any fever chills night sweats.  Denies any nausea or vomiting but admits to generalized malaise and that worsens with minimal exertion of 30 to 40 feet.  No chest pain palpitations cough shortness of air.  No abdominal pain dysuria diarrhea.  No loss of consciousness or focal loss of function.  Remainder of ROS is negative.    Objective:  T Max 24 hrs: Temp (24hrs), Av.3 °F (36.8 °C), Min:97.6 °F (36.4 °C), Max:99 °F (37.2 °C)    Vitals Ranges:   Temp:  [97.6 °F (36.4 °C)-99 °F (37.2 °C)] 97.6 °F (36.4 °C)  Heart Rate:  [102-110] 102  Resp:  [16-20] 16  BP: (139-147)/(54-83) 147/54      Exam:  /54 (BP Location: Right arm, Patient Position: Sitting)   Pulse 102   Temp 97.6 °F (36.4 °C) (Oral)   Resp 16   Ht 152.4 cm (60\")   Wt 75.8 kg (167 lb)   SpO2 95%   BMI 32.61 kg/m²     General Appearance:    Alert, cooperative, excellent historian, alert and oriented x 3, fluent speech.  Accompanied by son at bedside.  Resting comfortably in bed conversational and pleasant   Head:    Normocephalic, without obvious abnormality, atraumatic       Ears:    Normal " external ear canals, both ears   Nose:   Nares normal, septum midline, mucosa normal, no drainage    or sinus tenderness   Throat:   Lips, mucosa, and tongue normal though mucous membranes attach dry   Neck:   Supple, large diameter but no obvious JVD       Lungs:     Clear to auscultation bilaterally, respirations unlabored with conversation   Chest Wall:    No tenderness or deformity    Heart:  Irregularly irregular rate and rhythm, S1 and S2 normal, + murmur   Abdomen:     Soft, nontender, bowel sounds active all four quadrants   Extremities: Moving all, no cyanosis or edema   Pulses:   2+ and symmetric all extremities   Skin: Some senile purpura/diffuse bruising       Neurologic:   CNII-XII intact, no obvious focal deficits, I did not evaluate gait      .    Data Review:  Labs in chart were reviewed.    Results from last 7 days   Lab Units 01/10/24  1501   TSH uIU/mL 0.542          Imaging Results (All)       Procedure Component Value Units Date/Time    XR Chest 1 View [[id number]] Collected: 01/10/24 1556     Updated: 01/10/24 1602    Narrative:      XR CHEST 1 VW-     HISTORY: Female who is 88 years-old, dysrhythmia     TECHNIQUE: Frontal view of the chest     COMPARISON: 11/4/2022     FINDINGS: Left chest port appears stable. Heart size is borderline.  Pulmonary vasculature is unremarkable. Aorta is calcified. Small likely  atelectasis or scarring at the mid lungs. No focal pulmonary  consolidation, pleural effusion, or pneumothorax. No acute osseous  process.       Impression:      As described.           This report was finalized on 1/10/2024 3:59 PM by Dr. José Manuel Anaya M.D on Workstation: [facility]                 Assessment:  Active Hospital Problems    Diagnosis  POA    **KELLEE (acute kidney injury) [N17.9]  Yes    Hypothyroidism (acquired) [E03.9]  Unknown    Chronic respiratory failure with hypoxia [J96.11]  Yes    Current chronic use of systemic steroids [Z79.52]  Not Applicable    Chronic  diastolic CHF (congestive heart failure) [I50.32]  Yes    COPD (chronic obstructive pulmonary disease) [J44.9]  Yes    Paroxysmal atrial fibrillation [I48.0]  Yes    HX: long term anticoagulant use [Z92.29]  Not Applicable    Essential hypertension [I10]  Yes    CAD (coronary artery disease) [I25.10]  Yes      Resolved Hospital Problems   No resolved problems to display.       Plan:    ARF -mild versus developing CKD 3a per review of old creatinine levels   -Hold torsemide for tonight -very gentle IVF 50 cc an hour and recheck in a.m..  Clinically seems mildly dry   -Caution as 2021 echo shows grade 2 diastolic dysfunction with normal EF but absolutely no volume overload at this time      PAF - Anticoagulated on Xarelto with slight RVR at admission   -Past history of CAD/CHF   -Elevated troponin likely secondary to A-fib/RVR.  Repeat pending   -Follows with Dr. Hernandez   -Unfortunately does not think she has been taking her Toprol as her medications are dispensed from her pharmacy through her assisted living.  Will need CCP to check up on this to see if she was actually getting.  If she was not in that is definitely the culprit and she got Toprol in the ER and I will give another dose in the a.m. and utilize any metoprolol IV if we have any breakthrough issues.    Hypothyroidism on levothyroxine with therapeutic TSH    Anemia of chronic disease -hemoglobin currently at 8.6 pretty much consistent with her baseline levels.     -No longer on supplemental iron  -Thrombocytosis already so noted and will monitor CBC  -On daily B12    COPD -2 L/chronic hypoxic respiratory failure   -SEDA -okay with use of home CPAP    -Chronic steroids but no concerned about adrenal insufficiency/crisis    HLD on statin    History of myasthenia gravis/CellCept    GERD on PPI    Generalized fatigue/malaise/weakness   -Fall precautions with PT to evaluate    No additional DVT prophylaxis needed since anticoagulated on Xarelto        Further  recommendations to follow as clinical course unfolds    Hernan Waldron MD  1/10/2024  18:18 EST

## 2024-01-11 ENCOUNTER — READMISSION MANAGEMENT (OUTPATIENT)
Dept: CALL CENTER | Facility: HOSPITAL | Age: 89
End: 2024-01-11
Payer: MEDICARE

## 2024-01-11 VITALS
OXYGEN SATURATION: 100 % | DIASTOLIC BLOOD PRESSURE: 63 MMHG | TEMPERATURE: 98.1 F | SYSTOLIC BLOOD PRESSURE: 109 MMHG | HEART RATE: 75 BPM | WEIGHT: 167 LBS | RESPIRATION RATE: 18 BRPM | BODY MASS INDEX: 32.79 KG/M2 | HEIGHT: 60 IN

## 2024-01-11 LAB
ANION GAP SERPL CALCULATED.3IONS-SCNC: 11.9 MMOL/L (ref 5–15)
BUN SERPL-MCNC: 16 MG/DL (ref 8–23)
BUN/CREAT SERPL: 15 (ref 7–25)
CALCIUM SPEC-SCNC: 8.9 MG/DL (ref 8.6–10.5)
CHLORIDE SERPL-SCNC: 100 MMOL/L (ref 98–107)
CO2 SERPL-SCNC: 27.1 MMOL/L (ref 22–29)
CREAT SERPL-MCNC: 1.07 MG/DL (ref 0.57–1)
DEPRECATED RDW RBC AUTO: 47.2 FL (ref 37–54)
EGFRCR SERPLBLD CKD-EPI 2021: 50.7 ML/MIN/1.73
ERYTHROCYTE [DISTWIDTH] IN BLOOD BY AUTOMATED COUNT: 13.7 % (ref 12.3–15.4)
GEN 5 2HR TROPONIN T REFLEX: 35 NG/L
GLUCOSE SERPL-MCNC: 90 MG/DL (ref 65–99)
HCT VFR BLD AUTO: 26.4 % (ref 34–46.6)
HGB BLD-MCNC: 8.3 G/DL (ref 12–15.9)
MCH RBC QN AUTO: 29.4 PG (ref 26.6–33)
MCHC RBC AUTO-ENTMCNC: 31.4 G/DL (ref 31.5–35.7)
MCV RBC AUTO: 93.6 FL (ref 79–97)
PLATELET # BLD AUTO: 362 10*3/MM3 (ref 140–450)
PMV BLD AUTO: 9 FL (ref 6–12)
POTASSIUM SERPL-SCNC: 3.2 MMOL/L (ref 3.5–5.2)
RBC # BLD AUTO: 2.82 10*6/MM3 (ref 3.77–5.28)
SODIUM SERPL-SCNC: 139 MMOL/L (ref 136–145)
TROPONIN T DELTA: 0 NG/L
TROPONIN T SERPL HS-MCNC: 35 NG/L
WBC NRBC COR # BLD AUTO: 6.75 10*3/MM3 (ref 3.4–10.8)

## 2024-01-11 PROCEDURE — 63710000001 MYCOPHENOLATE MOFETIL PER 250 MG: Performed by: HOSPITALIST

## 2024-01-11 PROCEDURE — 63710000001 PREDNISONE PER 5 MG: Performed by: HOSPITALIST

## 2024-01-11 PROCEDURE — 97530 THERAPEUTIC ACTIVITIES: CPT

## 2024-01-11 PROCEDURE — G0378 HOSPITAL OBSERVATION PER HR: HCPCS

## 2024-01-11 PROCEDURE — 84484 ASSAY OF TROPONIN QUANT: CPT | Performed by: HOSPITALIST

## 2024-01-11 PROCEDURE — 94799 UNLISTED PULMONARY SVC/PX: CPT

## 2024-01-11 PROCEDURE — 97162 PT EVAL MOD COMPLEX 30 MIN: CPT

## 2024-01-11 PROCEDURE — 85027 COMPLETE CBC AUTOMATED: CPT | Performed by: HOSPITALIST

## 2024-01-11 PROCEDURE — 80048 BASIC METABOLIC PNL TOTAL CA: CPT | Performed by: HOSPITALIST

## 2024-01-11 RX ORDER — POTASSIUM CHLORIDE 750 MG/1
40 TABLET, FILM COATED, EXTENDED RELEASE ORAL ONCE
Status: COMPLETED | OUTPATIENT
Start: 2024-01-11 | End: 2024-01-11

## 2024-01-11 RX ORDER — METOPROLOL SUCCINATE 25 MG/1
25 TABLET, EXTENDED RELEASE ORAL 2 TIMES DAILY
Qty: 60 TABLET | Refills: 0 | Status: SHIPPED | OUTPATIENT
Start: 2024-01-11 | End: 2024-02-10

## 2024-01-11 RX ADMIN — LEVOTHYROXINE SODIUM 88 MCG: 88 TABLET ORAL at 06:13

## 2024-01-11 RX ADMIN — Medication 10 ML: at 08:34

## 2024-01-11 RX ADMIN — BUSPIRONE HYDROCHLORIDE 10 MG: 10 TABLET ORAL at 08:33

## 2024-01-11 RX ADMIN — AZELASTINE HYDROCHLORIDE 2 SPRAY: 137 SPRAY, METERED NASAL at 08:34

## 2024-01-11 RX ADMIN — PREDNISONE 5 MG: 5 TABLET ORAL at 08:33

## 2024-01-11 RX ADMIN — MYCOPHENOLATE MOFETIL 1000 MG: 500 TABLET ORAL at 06:13

## 2024-01-11 RX ADMIN — DULOXETINE HYDROCHLORIDE 60 MG: 60 CAPSULE, DELAYED RELEASE ORAL at 08:33

## 2024-01-11 RX ADMIN — METOPROLOL SUCCINATE 25 MG: 25 TABLET, EXTENDED RELEASE ORAL at 08:33

## 2024-01-11 RX ADMIN — BUDESONIDE AND FORMOTEROL FUMARATE DIHYDRATE 2 PUFF: 160; 4.5 AEROSOL RESPIRATORY (INHALATION) at 07:10

## 2024-01-11 RX ADMIN — PANTOPRAZOLE SODIUM 40 MG: 40 TABLET, DELAYED RELEASE ORAL at 08:34

## 2024-01-11 RX ADMIN — Medication 1000 MCG: at 08:34

## 2024-01-11 RX ADMIN — POTASSIUM CHLORIDE 40 MEQ: 750 TABLET, EXTENDED RELEASE ORAL at 08:33

## 2024-01-11 RX ADMIN — TIOTROPIUM BROMIDE INHALATION SPRAY 2 PUFF: 3.12 SPRAY, METERED RESPIRATORY (INHALATION) at 07:11

## 2024-01-11 NOTE — THERAPY EVALUATION
Patient Name: Olena Myers  : 1935    MRN: [id number]                              Today's Date: 2024       Admit Date: 1/10/2024    Visit Dx:     ICD-10-CM ICD-9-CM   1. KELLEE (acute kidney injury)  N17.9 584.9   2. Atrial fibrillation, unspecified type  I48.91 427.31     Patient Active Problem List   Diagnosis    Pre-operative cardiovascular examination    S/p reverse total shoulder arthroplasty    Myasthenia gravis    Paroxysmal atrial fibrillation    HX: long term anticoagulant use    Essential hypertension    CAD (coronary artery disease)    Rhinovirus    Atrial fibrillation    S/P reverse total shoulder arthroplasty, right    Acute UTI    COPD (chronic obstructive pulmonary disease)    Metabolic encephalopathy    Sleep apnea    Sepsis    Dyspnea    Chronic diastolic CHF (congestive heart failure)    Diastolic CHF, chronic    Heme positive stool    E coli bacteremia    Iron deficiency anemia    Current chronic use of systemic steroids    COPD exacerbation    Obesity (BMI 30-39.9)    COVID-19 virus infection    Chronic respiratory failure with hypoxia    Cytokine release syndrome, grade 1    Altered mental status    Anemia    KELLEE (acute kidney injury)    Hypothyroidism (acquired)     Past Medical History:   Diagnosis Date    Anemia     IRON DEFICIENCY    Arthritis     Asthma     Atrial fibrillation     CAD (coronary artery disease)     HEART STENT    COPD (chronic obstructive pulmonary disease)     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    Frequent urinary tract infections     History of gastric ulcer     History of GI bleed     [place] (hard of hearing)     Hyperlipidemia     Hypertension     Hyponatremia     Lightheadedness     LVH (left ventricular hypertrophy)     MG (myasthenia gravis)     Mini stroke 10/2016, 3/2017    OA (osteoarthritis)     Osteoporosis     Sleep apnea     USES CPAP    SOB (shortness of breath)     WALKING     Past Surgical History:   Procedure Laterality Date    APPENDECTOMY       BRONCHOSCOPY N/A 3/31/2022    Procedure: BRONCHOSCOPY WITH BAL RIGHT LOWER LOBE;  Surgeon: Remy Tirado MD;  Location: [facility] ENDOSCOPY;  Service: Pulmonary;  Laterality: N/A;  COPD EXACERBATION      CARPAL TUNNEL RELEASE Bilateral     CATARACT EXTRACTION Bilateral     CORONARY STENT PLACEMENT      TOTAL HIP ARTHROPLASTY Bilateral     TOTAL SHOULDER ARTHROPLASTY W/ DISTAL CLAVICLE EXCISION Left 7/19/2016    Procedure: LT TOTAL SHOULDER REVERSE ARTHROPLASTY;  Surgeon: NAHOMI Solorzano MD;  Location: [facility];  Service:     TOTAL SHOULDER ARTHROPLASTY W/ DISTAL CLAVICLE EXCISION Right 1/8/2019    Procedure: RIGHT TOTAL SHOULDER REVERSE ARTHROPLASTY;  Surgeon: Jovanny Solorzano MD;  Location: [facility];  Service: Orthopedics      General Information       Row Name 01/11/24 1150          Physical Therapy Time and Intention    Document Type evaluation  -EM     Mode of Treatment individual therapy;physical therapy  -EM       Row Name 01/11/24 1150          General Information    Patient Profile Reviewed yes  -EM     Prior Level of Function independent:  uses rollator at baseline, working with PT at AL facility  -EM     Existing Precautions/Restrictions fall;oxygen therapy device and L/min  -EM       Row Name 01/11/24 1150          Living Environment    People in Home facility resident  -EM       Row Name 01/11/24 1150          Cognition    Orientation Status (Cognition) oriented x 3  -EM       Row Name 01/11/24 1150          Safety Issues, Functional Mobility    Impairments Affecting Function (Mobility) strength;endurance/activity tolerance  -EM               User Key  (r) = Recorded By, (t) = Taken By, (c) = Cosigned By      Initials Name Provider Type    EM Antonia Morales, PT Physical Therapist                   Mobility       Row Name 01/11/24 1151          Bed Mobility    Bed Mobility supine-sit;sit-supine  -EM     Supine-Sit [place] (Bed Mobility) modified independence  -EM     Sit-Supine  [place] (Bed Mobility) modified independence  -EM     Assistive Device (Bed Mobility) head of bed elevated  -EM       Row Name 01/11/24 1151          Sit-Stand Transfer    Sit-Stand [place] (Transfers) standby assist  -EM     Assistive Device (Sit-Stand Transfers) walker, front-wheeled  -EM       Row Name 01/11/24 1151          Gait/Stairs (Locomotion)    [place] Level (Gait) contact guard;standby assist  -EM     Assistive Device (Gait) walker, front-wheeled  -EM     Distance in Feet (Gait) 20  -EM     Deviations/Abnormal Patterns (Gait) gait speed decreased;stride length decreased  -EM     Comment, (Gait/Stairs) pt ambulated 20 feet, took seated rest to readjust oxygen and switch rwx and then ambulated another 20 feet, steady, no overt LOB, pt reports this is her functional baseline  -EM               User Key  (r) = Recorded By, (t) = Taken By, (c) = Cosigned By      Initials Name Provider Type    EM Antonia Morales, PT Physical Therapist                   Obj/Interventions       Row Name 01/11/24 1153          Range of Motion Comprehensive    General Range of Motion other (see comments)  -EM     Comment, General Range of Motion lacking shoulder flexion RUE, chronic issue per pt report  -EM       Row Name 01/11/24 1153          Strength Comprehensive (MMT)    General Manual Muscle Testing (MMT) Assessment other (see comments)  -EM     Comment, General Manual Muscle Testing (MMT) Assessment no focal deficits identified  -EM       Row Name 01/11/24 1153          Balance    Balance Assessment sitting static balance;sitting dynamic balance;standing static balance;standing dynamic balance  -EM     Static Sitting Balance independent  -EM     Dynamic Sitting Balance independent  -EM     Position, Sitting Balance sitting edge of bed  -EM     Static Standing Balance standby assist  -EM     Dynamic Standing Balance contact guard  -EM     Position/Device Used, Standing Balance walker, rolling  -EM        Row Name 01/11/24 1153          Sensory Assessment (Somatosensory)    Sensory Assessment (Somatosensory) sensation intact  -EM               User Key  (r) = Recorded By, (t) = Taken By, (c) = Cosigned By      Initials Name Provider Type    Antonia Gerard, PT Physical Therapist                   Goals/Plan    No documentation.                  Clinical Impression       Row Name 01/11/24 1155          Pain    Pretreatment Pain Rating 0/10 - no pain  -EM       Row Name 01/11/24 1155          Plan of Care Review    Plan of Care Reviewed With patient  -EM     Outcome Evaluation Pt is 87 yo female admitted to [facility] with afib with RVR. PMH significant for anemia, COPD, MG. Patient lives in assisted living, uses rollator at baseline and currently working with PT at that facility. Today, patient performed bed mobility independently, sit to stand with SBA, ambulated short distance in room with rwx and CGA. Pt appears to be at functional baseline, subjectively reports she feels much better than when she came in and wants to return to assisted living. Recommend return to AL, continued use of rollator and continue with PT at facility.  -EM       Row Name 01/11/24 115          Therapy Assessment/Plan (PT)    Patient/Family Therapy Goals Statement (PT) go home  -EM     Criteria for Skilled Interventions Met (PT) skilled treatment is necessary  -EM     Therapy Frequency (PT) evaluation only  -EM       Row Name 01/11/24 1153          Positioning and Restraints    Pre-Treatment Position in bed  -EM     Post Treatment Position bed  -EM     In Bed supine;call light within reach;exit alarm on;with family/caregiver  -EM               User Key  (r) = Recorded By, (t) = Taken By, (c) = Cosigned By      Initials Name Provider Type    Antonia Gerard, PT Physical Therapist                   Outcome Measures       Row Name 01/11/24 115          How much help from another person do you currently need...    Turning from your  back to your side while in flat bed without using bedrails? 4  -EM     Moving from lying on back to sitting on the side of a flat bed without bedrails? 4  -EM     Moving to and from a bed to a chair (including a wheelchair)? 3  -EM     Standing up from a chair using your arms (e.g., wheelchair, bedside chair)? 3  -EM     Climbing 3-5 steps with a railing? 3  -EM     To walk in hospital room? 3  -EM     AM-PAC 6 Clicks Score (PT) 20  -EM     Highest Level of Mobility Goal 6 --> Walk 10 steps or more  -EM               User Key  (r) = Recorded By, (t) = Taken By, (c) = Cosigned By      Initials Name Provider Type    EM Antonia Morales PT Physical Therapist                                 Physical Therapy Education       Title: PT OT SLP Therapies (Done)       Topic: Physical Therapy (Done)       Point: Mobility training (Done)       Learning Progress Summary             Patient Acceptance, E, VU by EM at 1/11/2024 1159                                         User Key       Initials Effective Dates Name Provider Type Discipline     06/16/21 -  Antonia Morales PT Physical Therapist PT                  PT Recommendation and Plan     Plan of Care Reviewed With: patient  Outcome Evaluation: [facility] 87 [facility]. PMH significant for anemia, COPD, MG. Patient lives in assisted living, uses rollator at baseline and currently working with PT at that facility. Today, patient performed bed mobility independently, sit to stand with SBA, ambulated short distance in room with rwx and CGA. Pt appears to be at functional baseline, subjectively reports she feels much better than when she came in and wants to return to assisted living. Recommend return to AL, continued use of rollator and continue with PT at facility.     Time Calculation:         PT Charges       Row Name 01/11/24 1153             Time Calculation    Start Time 1130  -EM      Stop Time 1145  -EM      Time Calculation  (min) 15 min  -EM      PT Received On 01/11/24  -EM         Time Calculation- PT    Total Timed Code Minutes- PT 8 minute(s)  -EM         Timed Charges    11498 - PT Therapeutic Activity Minutes 8  -EM         Total Minutes    Timed Charges Total Minutes 8  -EM       Total Minutes 8  -EM                User Key  (r) = Recorded By, (t) = Taken By, (c) = Cosigned By      Initials Name Provider Type    EM Antonia Morales, PT Physical Therapist                  Therapy Charges for Today       Code Description Service Date Service Provider Modifiers Qty    [id number]  PT THERAPEUTIC ACT EA 15 MIN 1/11/2024 Antonia Morales, PT GP 1    [id number] HC PT EVAL MOD COMPLEXITY 1 1/11/2024 Antonia Morales, PT GP 1            PT G-Codes  AM-PAC 6 Clicks Score (PT): 20  PT Discharge Summary  Anticipated Discharge Disposition (PT): assisted living, home with home health    Antonia Morales, PT  1/11/2024

## 2024-01-11 NOTE — DISCHARGE SUMMARY
Date of Admission: 1/10/2024  Date of Discharge:  1/11/2024  Primary Care Physician: Manda Keenan MD     Discharge Diagnosis:  Active Hospital Problems    Diagnosis  POA    **Paroxysmal atrial fibrillation [I48.0]  Yes    KELLEE (acute kidney injury) [N17.9]  Yes    Hypothyroidism (acquired) [E03.9]  Unknown    Chronic respiratory failure with hypoxia [J96.11]  Yes    Current chronic use of systemic steroids [Z79.52]  Not Applicable    Chronic diastolic CHF (congestive heart failure) [I50.32]  Yes    COPD (chronic obstructive pulmonary disease) [J44.9]  Yes    HX: long term anticoagulant use [Z92.29]  Not Applicable    Essential hypertension [I10]  Yes    CAD (coronary artery disease) [I25.10]  Yes      Resolved Hospital Problems   No resolved problems to display.       DETAILS OF HOSPITAL STAY     Pertinent Test Results and Procedures Performed    Chest x-ray:  Left chest port appears stable. Heart size is borderline.   Pulmonary vasculature is unremarkable. Aorta is calcified. Small likely   atelectasis or scarring at the mid lungs. No focal pulmonary   consolidation, pleural effusion, or pneumothorax. No acute osseous   process.     Hospital Course  This is a very pleasant 88-year-old female with history of atrial fibrillation, chronic hypoxic respiratory failure, diastolic heart failure, COPD, long-term use of anticoagulation as well as steroids who presented to the emergency room with generalized weakness and decreased exercise tolerance.  Upon evaluation in the emergency room she was found to be in rapid atrial fibrillation.  Patient suspected that she was not receiving her metoprolol at her assisted living facility.  Medication list was obtained which confirmed this.  She was started back on metoprolol and her heart rate is now much better controlled.  We will get her up and ambulate her and if she does well with this then she will return back to her assisted living facility today in stable condition with  explicit instructions to remain on metoprolol as outlined below.    Physical Exam at Discharge:  General: No acute distress, AAOx3  HEENT: EOMI, PERRL  Cardiovascular: +s1 and s2, RRR  Lungs: No rhonchi or wheezing  Abdomen: soft, nontender    Consults:   Consults       Date and Time Order Name Status Description    1/10/2024  4:04 PM [facility] (on-call MD unless specified) Details                Condition on Discharge: Stable, improved    Discharge Disposition  Home or Self Care    Discharge Medications     Discharge Medications        Continue These Medications        Instructions Start Date   acetaminophen 325 MG tablet  Commonly known as: TYLENOL   650 mg, Oral, Every 4 Hours PRN      albuterol (2.5 MG/3ML) 0.083% nebulizer solution  Commonly known as: PROVENTIL   2.5 mg, Nebulization, Every 4 Hours PRN      albuterol sulfate  (90 Base) MCG/ACT inhaler  Commonly known as: PROVENTIL HFA;VENTOLIN HFA;PROAIR HFA   2 puffs, Inhalation, Every 6 Hours PRN      azelastine 0.1 % nasal spray  Commonly known as: ASTELIN   2 sprays, Nasal, 2 Times Daily, Use in each nostril as directed       busPIRone 10 MG tablet  Commonly known as: BUSPAR   10 mg, Oral, 2 Times Daily      DULoxetine 60 MG capsule  Commonly known as: CYMBALTA   60 mg, Oral, 2 Times Daily      ferrous sulfate 325 (65 FE) MG tablet   325 mg, Oral, Daily With Breakfast      irbesartan 75 MG tablet  Commonly known as: AVAPRO   75 mg, Oral, Nightly      levothyroxine 88 MCG tablet  Commonly known as: SYNTHROID, LEVOTHROID   88 mcg, Oral, Daily      metoprolol succinate XL 25 MG 24 hr tablet  Commonly known as: TOPROL-XL   25 mg, Oral, 2 Times Daily      montelukast 10 MG tablet  Commonly known as: SINGULAIR   10 mg, Oral, Nightly      mycophenolate 500 MG tablet  Commonly known as: CELLCEPT   1,000 mg, Oral, Every Morning      mycophenolate 500 MG tablet  Commonly known as: CELLCEPT   1,500 mg, Oral, Every Evening      pantoprazole 40 MG EC tablet  Commonly  known as: PROTONIX   40 mg, Oral, Daily      pravastatin 20 MG tablet  Commonly known as: PRAVACHOL   20 mg, Oral, Nightly      predniSONE 10 MG tablet  Commonly known as: DELTASONE   5 mg, Oral, Daily      raloxifene 60 MG tablet  Commonly known as: EVISTA   60 mg, Oral, Nightly      rivaroxaban 20 MG tablet  Commonly known as: XARELTO   20 mg, Oral, Daily With Dinner      torsemide 20 MG tablet  Commonly known as: DEMADEX   20 mg, Oral, Daily      Trelegy Ellipta 100-62.5-25 MCG/INH inhaler  Generic drug: Fluticasone-Umeclidin-Vilant   1 puff, Inhalation, Daily      vitamin B-12 1000 MCG tablet  Commonly known as: CYANOCOBALAMIN   1,000 mcg, Oral, Daily      vitamin D 1.25 MG (59866 UT) capsule capsule  Commonly known as: ERGOCALCIFEROL   50,000 Units, Oral, Every 7 Days, Takes on Wednesdays               Discharge Diet:   Diet Instructions       Diet: Regular/House Diet, Cardiac Diets; Healthy Heart (2-3 Na+); Regular Texture (IDDSI 7); Thin (IDDSI 0)      Discharge Diet:  Regular/House Diet  Cardiac Diets       Cardiac Diet: Healthy Heart (2-3 Na+)    Texture: Regular Texture (IDDSI 7)    Fluid Consistency: Thin (IDDSI 0)            Activity at Discharge:   Activity Instructions       Activity as Tolerated              Follow-up Appointments  No future appointments.  Additional Instructions for the Follow-ups that You Need to Schedule       Discharge Follow-up with PCP   As directed       Currently Documented PCP:    Manda Keenan MD    PCP Phone Number:    [phone]     Follow Up Details: 1 week                  I have examined and discussed discharge planning with the patient today.    I wore full protective equipment throughout the patient encounter including eye protection and facemask.  Hand hygiene was performed before donning protective equipment and after removal when leaving the room.     Kb Johnson MD  01/11/24  09:44 EST    Time: Discharge greater than 30 min

## 2024-01-11 NOTE — PLAN OF CARE
Goal Outcome Evaluation:  Plan of Care Reviewed With: patient           Outcome Evaluation: Pt is 89 yo female admitted to [facility] with afib with RVR. PMH significant for anemia, COPD, MG. Patient lives in assisted living, uses rollator at baseline and currently working with PT at that facility. Today, patient performed bed mobility independently, sit to stand with SBA, ambulated short distance in room with rwx and CGA. Pt appears to be at functional baseline, subjectively reports she feels much better than when she came in and wants to return to assisted living. Recommend return to AL, continued use of rollator and continue with PT at facility.      Anticipated Discharge Disposition (PT): assisted living, home with home health

## 2024-01-11 NOTE — PLAN OF CARE
Problem: Adult Inpatient Plan of Care  Goal: Plan of Care Review  Flowsheets (Taken 1/11/2024 9295)  Progress: no change  Plan of Care Reviewed With: patient  Outcome Evaluation: no complaints overnight heart rhythm has been normal sinus sats upper 90's on oxygen but is on at home   Goal Outcome Evaluation:  Plan of Care Reviewed With: patient        Progress: no change  Outcome Evaluation: no complaints overnight heart rhythm has been normal sinus sats upper 90's on oxygen but is on at home

## 2024-01-12 NOTE — CASE MANAGEMENT/SOCIAL WORK
Case Management Discharge Note      Final Note: Discharged home         Selected Continued Care - Discharged on 1/11/2024 Admission date: 1/10/2024 - Discharge disposition: Home or Self Care      Destination    No services have been selected for the patient.                Durable Medical Equipment    No services have been selected for the patient.                Dialysis/Infusion    No services have been selected for the patient.                Home Medical Care    No services have been selected for the patient.                Therapy    No services have been selected for the patient.                Community Resources    No services have been selected for the patient.                Community & DME    No services have been selected for the patient.                    Transportation Services  Private: Car    Final Discharge Disposition Code: 01 - home or self-care

## 2024-01-12 NOTE — OUTREACH NOTE
Prep Survey      Flowsheet Row Responses   [facility] facility patient discharged from? [facility]   Is LACE score < 7 ? Yes   Eligibility Readm Mgmt   Discharge diagnosis A-fib RVR   Does the patient have one of the following disease processes/diagnoses(primary or secondary)? Other   Does the patient have Home health ordered? No   Is there a DME ordered? No   Prep survey completed? Yes            Beata THAKUR - Registered Nurse

## 2024-08-10 ENCOUNTER — APPOINTMENT (OUTPATIENT)
Dept: CT IMAGING | Facility: HOSPITAL | Age: 89
End: 2024-08-10
Payer: MEDICARE

## 2024-08-10 ENCOUNTER — HOSPITAL ENCOUNTER (INPATIENT)
Facility: HOSPITAL | Age: 89
LOS: 10 days | Discharge: NURSING, REHAB OR LONG-TERM CARE | End: 2024-08-20
Attending: EMERGENCY MEDICINE | Admitting: INTERNAL MEDICINE
Payer: MEDICARE

## 2024-08-10 ENCOUNTER — APPOINTMENT (OUTPATIENT)
Dept: GENERAL RADIOLOGY | Facility: HOSPITAL | Age: 89
End: 2024-08-10
Payer: MEDICARE

## 2024-08-10 DIAGNOSIS — R93.2 ABNORMAL COMPUTED TOMOGRAPHY OF GALLBLADDER: ICD-10-CM

## 2024-08-10 DIAGNOSIS — Z74.09 DECREASED MOBILITY AND ENDURANCE: ICD-10-CM

## 2024-08-10 DIAGNOSIS — D50.0 IRON DEFICIENCY ANEMIA DUE TO CHRONIC BLOOD LOSS: ICD-10-CM

## 2024-08-10 DIAGNOSIS — R06.09 EXERTIONAL DYSPNEA: Primary | ICD-10-CM

## 2024-08-10 DIAGNOSIS — D64.9 CHRONIC ANEMIA: ICD-10-CM

## 2024-08-10 DIAGNOSIS — J96.11 CHRONIC RESPIRATORY FAILURE WITH HYPOXIA: ICD-10-CM

## 2024-08-10 DIAGNOSIS — R10.10 PAIN OF UPPER ABDOMEN: ICD-10-CM

## 2024-08-10 LAB
ALBUMIN SERPL-MCNC: 4.1 G/DL (ref 3.5–5.2)
ALBUMIN/GLOB SERPL: 2.2 G/DL
ALP SERPL-CCNC: 73 U/L (ref 39–117)
ALT SERPL W P-5'-P-CCNC: 7 U/L (ref 1–33)
ANION GAP SERPL CALCULATED.3IONS-SCNC: 15 MMOL/L (ref 5–15)
AST SERPL-CCNC: 16 U/L (ref 1–32)
BASOPHILS # BLD AUTO: 0.05 10*3/MM3 (ref 0–0.2)
BASOPHILS NFR BLD AUTO: 0.3 % (ref 0–1.5)
BILIRUB SERPL-MCNC: 0.4 MG/DL (ref 0–1.2)
BUN SERPL-MCNC: 22 MG/DL (ref 8–23)
BUN/CREAT SERPL: 22.9 (ref 7–25)
CALCIUM SPEC-SCNC: 9.1 MG/DL (ref 8.6–10.5)
CHLORIDE SERPL-SCNC: 96 MMOL/L (ref 98–107)
CHOLEST SERPL-MCNC: 136 MG/DL (ref 0–200)
CO2 SERPL-SCNC: 27 MMOL/L (ref 22–29)
CREAT SERPL-MCNC: 0.96 MG/DL (ref 0.57–1)
DEPRECATED RDW RBC AUTO: 53.4 FL (ref 37–54)
EGFRCR SERPLBLD CKD-EPI 2021: 57.7 ML/MIN/1.73
EOSINOPHIL # BLD AUTO: 0.08 10*3/MM3 (ref 0–0.4)
EOSINOPHIL NFR BLD AUTO: 0.5 % (ref 0.3–6.2)
ERYTHROCYTE [DISTWIDTH] IN BLOOD BY AUTOMATED COUNT: 15.2 % (ref 12.3–15.4)
GEN 5 2HR TROPONIN T REFLEX: 38 NG/L
GLOBULIN UR ELPH-MCNC: 1.9 GM/DL
GLUCOSE SERPL-MCNC: 122 MG/DL (ref 65–99)
HCT VFR BLD AUTO: 26.9 % (ref 34–46.6)
HDLC SERPL-MCNC: 53 MG/DL (ref 40–60)
HGB BLD-MCNC: 8.3 G/DL (ref 12–15.9)
IMM GRANULOCYTES # BLD AUTO: 0.1 10*3/MM3 (ref 0–0.05)
IMM GRANULOCYTES NFR BLD AUTO: 0.6 % (ref 0–0.5)
INR PPP: 2.08 (ref 0.9–1.1)
LDLC SERPL CALC-MCNC: 55 MG/DL (ref 0–100)
LDLC/HDLC SERPL: 0.93 {RATIO}
LYMPHOCYTES # BLD AUTO: 0.36 10*3/MM3 (ref 0.7–3.1)
LYMPHOCYTES NFR BLD AUTO: 2.3 % (ref 19.6–45.3)
MAGNESIUM SERPL-MCNC: 2.3 MG/DL (ref 1.6–2.4)
MCH RBC QN AUTO: 29.9 PG (ref 26.6–33)
MCHC RBC AUTO-ENTMCNC: 30.9 G/DL (ref 31.5–35.7)
MCV RBC AUTO: 96.8 FL (ref 79–97)
MONOCYTES # BLD AUTO: 0.88 10*3/MM3 (ref 0.1–0.9)
MONOCYTES NFR BLD AUTO: 5.6 % (ref 5–12)
NEUTROPHILS NFR BLD AUTO: 14.32 10*3/MM3 (ref 1.7–7)
NEUTROPHILS NFR BLD AUTO: 90.7 % (ref 42.7–76)
NRBC BLD AUTO-RTO: 0 /100 WBC (ref 0–0.2)
NT-PROBNP SERPL-MCNC: 1417 PG/ML (ref 0–1800)
PLATELET # BLD AUTO: 426 10*3/MM3 (ref 140–450)
PMV BLD AUTO: 9.5 FL (ref 6–12)
POTASSIUM SERPL-SCNC: 3.4 MMOL/L (ref 3.5–5.2)
PROT SERPL-MCNC: 6 G/DL (ref 6–8.5)
PROTHROMBIN TIME: 23.6 SECONDS (ref 11.7–14.2)
QT INTERVAL: 353 MS
QTC INTERVAL: 437 MS
RBC # BLD AUTO: 2.78 10*6/MM3 (ref 3.77–5.28)
SODIUM SERPL-SCNC: 138 MMOL/L (ref 136–145)
TRIGL SERPL-MCNC: 168 MG/DL (ref 0–150)
TROPONIN T DELTA: -3 NG/L
TROPONIN T SERPL HS-MCNC: 41 NG/L
VLDLC SERPL-MCNC: 28 MG/DL (ref 5–40)
WBC NRBC COR # BLD AUTO: 15.79 10*3/MM3 (ref 3.4–10.8)
WHOLE BLOOD HOLD COAG: NORMAL
WHOLE BLOOD HOLD SPECIMEN: NORMAL

## 2024-08-10 PROCEDURE — 84484 ASSAY OF TROPONIN QUANT: CPT | Performed by: EMERGENCY MEDICINE

## 2024-08-10 PROCEDURE — 36415 COLL VENOUS BLD VENIPUNCTURE: CPT

## 2024-08-10 PROCEDURE — 85025 COMPLETE CBC W/AUTO DIFF WBC: CPT | Performed by: EMERGENCY MEDICINE

## 2024-08-10 PROCEDURE — 63710000001 MYCOPHENOLATE MOFETIL PER 250 MG: Performed by: INTERNAL MEDICINE

## 2024-08-10 PROCEDURE — 83735 ASSAY OF MAGNESIUM: CPT | Performed by: EMERGENCY MEDICINE

## 2024-08-10 PROCEDURE — 74176 CT ABD & PELVIS W/O CONTRAST: CPT

## 2024-08-10 PROCEDURE — 83880 ASSAY OF NATRIURETIC PEPTIDE: CPT | Performed by: EMERGENCY MEDICINE

## 2024-08-10 PROCEDURE — 85610 PROTHROMBIN TIME: CPT | Performed by: EMERGENCY MEDICINE

## 2024-08-10 PROCEDURE — 93005 ELECTROCARDIOGRAM TRACING: CPT | Performed by: EMERGENCY MEDICINE

## 2024-08-10 PROCEDURE — 99285 EMERGENCY DEPT VISIT HI MDM: CPT

## 2024-08-10 PROCEDURE — 63710000001 PREDNISONE PER 5 MG: Performed by: INTERNAL MEDICINE

## 2024-08-10 PROCEDURE — 93010 ELECTROCARDIOGRAM REPORT: CPT | Performed by: INTERNAL MEDICINE

## 2024-08-10 PROCEDURE — 80053 COMPREHEN METABOLIC PANEL: CPT | Performed by: EMERGENCY MEDICINE

## 2024-08-10 PROCEDURE — 71045 X-RAY EXAM CHEST 1 VIEW: CPT

## 2024-08-10 PROCEDURE — 80061 LIPID PANEL: CPT | Performed by: INTERNAL MEDICINE

## 2024-08-10 RX ORDER — METOPROLOL TARTRATE 25 MG/1
25 TABLET, FILM COATED ORAL EVERY 12 HOURS SCHEDULED
Status: DISCONTINUED | OUTPATIENT
Start: 2024-08-10 | End: 2024-08-11

## 2024-08-10 RX ORDER — PRAVASTATIN SODIUM 20 MG
20 TABLET ORAL NIGHTLY
Status: DISCONTINUED | OUTPATIENT
Start: 2024-08-10 | End: 2024-08-12 | Stop reason: SDUPTHER

## 2024-08-10 RX ORDER — PREDNISONE 5 MG/1
5 TABLET ORAL DAILY
Status: DISCONTINUED | OUTPATIENT
Start: 2024-08-10 | End: 2024-08-17

## 2024-08-10 RX ORDER — ALBUTEROL SULFATE 0.83 MG/ML
2.5 SOLUTION RESPIRATORY (INHALATION) EVERY 6 HOURS PRN
Status: DISCONTINUED | OUTPATIENT
Start: 2024-08-10 | End: 2024-08-12

## 2024-08-10 RX ORDER — BUDESONIDE AND FORMOTEROL FUMARATE DIHYDRATE 160; 4.5 UG/1; UG/1
2 AEROSOL RESPIRATORY (INHALATION)
Status: DISCONTINUED | OUTPATIENT
Start: 2024-08-10 | End: 2024-08-20 | Stop reason: HOSPADM

## 2024-08-10 RX ORDER — SODIUM CHLORIDE 0.9 % (FLUSH) 0.9 %
10 SYRINGE (ML) INJECTION AS NEEDED
Status: DISCONTINUED | OUTPATIENT
Start: 2024-08-10 | End: 2024-08-20 | Stop reason: HOSPADM

## 2024-08-10 RX ORDER — MONTELUKAST SODIUM 10 MG/1
10 TABLET ORAL NIGHTLY
Status: DISCONTINUED | OUTPATIENT
Start: 2024-08-10 | End: 2024-08-20 | Stop reason: HOSPADM

## 2024-08-10 RX ORDER — ACETAMINOPHEN 160 MG/5ML
650 SOLUTION ORAL EVERY 4 HOURS PRN
Status: DISCONTINUED | OUTPATIENT
Start: 2024-08-10 | End: 2024-08-20 | Stop reason: HOSPADM

## 2024-08-10 RX ORDER — PANTOPRAZOLE SODIUM 40 MG/1
40 TABLET, DELAYED RELEASE ORAL DAILY
Status: DISCONTINUED | OUTPATIENT
Start: 2024-08-10 | End: 2024-08-13

## 2024-08-10 RX ORDER — ERGOCALCIFEROL 1.25 MG/1
50000 CAPSULE, LIQUID FILLED ORAL
Status: DISCONTINUED | OUTPATIENT
Start: 2024-08-14 | End: 2024-08-20 | Stop reason: HOSPADM

## 2024-08-10 RX ORDER — LOSARTAN POTASSIUM 25 MG/1
25 TABLET ORAL
Status: DISCONTINUED | OUTPATIENT
Start: 2024-08-10 | End: 2024-08-11

## 2024-08-10 RX ORDER — SODIUM CHLORIDE 0.9 % (FLUSH) 0.9 %
10 SYRINGE (ML) INJECTION EVERY 12 HOURS SCHEDULED
Status: DISCONTINUED | OUTPATIENT
Start: 2024-08-10 | End: 2024-08-20 | Stop reason: HOSPADM

## 2024-08-10 RX ORDER — ONDANSETRON 2 MG/ML
4 INJECTION INTRAMUSCULAR; INTRAVENOUS EVERY 6 HOURS PRN
Status: DISCONTINUED | OUTPATIENT
Start: 2024-08-10 | End: 2024-08-20 | Stop reason: HOSPADM

## 2024-08-10 RX ORDER — SODIUM CHLORIDE 9 MG/ML
40 INJECTION, SOLUTION INTRAVENOUS AS NEEDED
Status: DISCONTINUED | OUTPATIENT
Start: 2024-08-10 | End: 2024-08-20 | Stop reason: HOSPADM

## 2024-08-10 RX ORDER — MYCOPHENOLATE MOFETIL 250 MG/1
1000 CAPSULE ORAL EVERY MORNING
Status: DISCONTINUED | OUTPATIENT
Start: 2024-08-11 | End: 2024-08-20 | Stop reason: HOSPADM

## 2024-08-10 RX ORDER — ACETAMINOPHEN 650 MG/1
650 SUPPOSITORY RECTAL EVERY 4 HOURS PRN
Status: DISCONTINUED | OUTPATIENT
Start: 2024-08-10 | End: 2024-08-20 | Stop reason: HOSPADM

## 2024-08-10 RX ORDER — ASPIRIN 81 MG/1
324 TABLET, CHEWABLE ORAL DAILY
Status: DISCONTINUED | OUTPATIENT
Start: 2024-08-10 | End: 2024-08-11

## 2024-08-10 RX ORDER — UREA 10 %
1000 LOTION (ML) TOPICAL DAILY
Status: DISCONTINUED | OUTPATIENT
Start: 2024-08-10 | End: 2024-08-20 | Stop reason: HOSPADM

## 2024-08-10 RX ORDER — ACETAMINOPHEN 500 MG
1000 TABLET ORAL ONCE
Status: COMPLETED | OUTPATIENT
Start: 2024-08-10 | End: 2024-08-10

## 2024-08-10 RX ORDER — DULOXETIN HYDROCHLORIDE 60 MG/1
60 CAPSULE, DELAYED RELEASE ORAL 2 TIMES DAILY
Status: DISCONTINUED | OUTPATIENT
Start: 2024-08-10 | End: 2024-08-20 | Stop reason: HOSPADM

## 2024-08-10 RX ORDER — LEVOTHYROXINE SODIUM 88 UG/1
88 TABLET ORAL
Status: DISCONTINUED | OUTPATIENT
Start: 2024-08-11 | End: 2024-08-20 | Stop reason: HOSPADM

## 2024-08-10 RX ORDER — NITROGLYCERIN 0.4 MG/1
0.4 TABLET SUBLINGUAL
Status: DISCONTINUED | OUTPATIENT
Start: 2024-08-10 | End: 2024-08-20 | Stop reason: HOSPADM

## 2024-08-10 RX ORDER — ACETAMINOPHEN 325 MG/1
650 TABLET ORAL EVERY 4 HOURS PRN
Status: DISCONTINUED | OUTPATIENT
Start: 2024-08-10 | End: 2024-08-20 | Stop reason: HOSPADM

## 2024-08-10 RX ORDER — MYCOPHENOLATE MOFETIL 250 MG/1
1500 CAPSULE ORAL NIGHTLY
Status: DISCONTINUED | OUTPATIENT
Start: 2024-08-10 | End: 2024-08-20 | Stop reason: HOSPADM

## 2024-08-10 RX ORDER — AZELASTINE 1 MG/ML
2 SPRAY, METERED NASAL 2 TIMES DAILY
Status: DISCONTINUED | OUTPATIENT
Start: 2024-08-10 | End: 2024-08-20 | Stop reason: HOSPADM

## 2024-08-10 RX ORDER — TORSEMIDE 20 MG/1
20 TABLET ORAL DAILY
Status: DISCONTINUED | OUTPATIENT
Start: 2024-08-10 | End: 2024-08-20 | Stop reason: HOSPADM

## 2024-08-10 RX ORDER — BUSPIRONE HYDROCHLORIDE 10 MG/1
10 TABLET ORAL 2 TIMES DAILY
Status: DISCONTINUED | OUTPATIENT
Start: 2024-08-10 | End: 2024-08-20 | Stop reason: HOSPADM

## 2024-08-10 RX ORDER — PRAVASTATIN SODIUM 20 MG
20 TABLET ORAL NIGHTLY
Status: DISCONTINUED | OUTPATIENT
Start: 2024-08-10 | End: 2024-08-20 | Stop reason: HOSPADM

## 2024-08-10 RX ORDER — RALOXIFENE HYDROCHLORIDE 60 MG/1
60 TABLET, FILM COATED ORAL NIGHTLY
Status: DISCONTINUED | OUTPATIENT
Start: 2024-08-10 | End: 2024-08-20 | Stop reason: HOSPADM

## 2024-08-10 RX ORDER — FERROUS SULFATE 325(65) MG
325 TABLET ORAL
Status: DISCONTINUED | OUTPATIENT
Start: 2024-08-11 | End: 2024-08-11

## 2024-08-10 RX ADMIN — LOSARTAN POTASSIUM 25 MG: 25 TABLET, FILM COATED ORAL at 22:53

## 2024-08-10 RX ADMIN — AZELASTINE HYDROCHLORIDE 2 SPRAY: 137 SPRAY, METERED NASAL at 22:54

## 2024-08-10 RX ADMIN — DULOXETINE HYDROCHLORIDE 60 MG: 60 CAPSULE, DELAYED RELEASE ORAL at 22:54

## 2024-08-10 RX ADMIN — ASPIRIN 324 MG: 81 TABLET, CHEWABLE ORAL at 17:59

## 2024-08-10 RX ADMIN — Medication 10 ML: at 20:26

## 2024-08-10 RX ADMIN — RIVAROXABAN 20 MG: 20 TABLET, FILM COATED ORAL at 20:26

## 2024-08-10 RX ADMIN — PREDNISONE 5 MG: 5 TABLET ORAL at 22:53

## 2024-08-10 RX ADMIN — RALOXIFENE HYDROCHLORIDE 60 MG: 60 TABLET, FILM COATED ORAL at 22:52

## 2024-08-10 RX ADMIN — ACETAMINOPHEN 1000 MG: 500 TABLET ORAL at 15:01

## 2024-08-10 RX ADMIN — MONTELUKAST SODIUM 10 MG: 10 TABLET, FILM COATED ORAL at 22:53

## 2024-08-10 RX ADMIN — METOPROLOL TARTRATE 25 MG: 25 TABLET, FILM COATED ORAL at 17:58

## 2024-08-10 RX ADMIN — MYCOPHENOLATE MOFETIL 1500 MG: 250 CAPSULE ORAL at 22:52

## 2024-08-10 RX ADMIN — PRAVASTATIN SODIUM 20 MG: 20 TABLET ORAL at 20:26

## 2024-08-10 RX ADMIN — BUSPIRONE HYDROCHLORIDE 10 MG: 10 TABLET ORAL at 22:53

## 2024-08-10 RX ADMIN — PANTOPRAZOLE SODIUM 40 MG: 40 TABLET, DELAYED RELEASE ORAL at 20:26

## 2024-08-10 NOTE — ED NOTES
Nursing report ED to floor  Olena Myers  88 y.o.  female    HPI :  HPI (Adult)  Stated Reason for Visit: nausea abd pain    Chief Complaint  Chief Complaint   Patient presents with    Nausea    Abdominal Pain       Admitting doctor:   Luz Taylor MD    Admitting diagnosis:   The primary encounter diagnosis was Exertional dyspnea. Diagnoses of Pain of upper abdomen associated with exertion and has complete resolution, Chronic anemia, and Chronic respiratory failure with hypoxia were also pertinent to this visit.    Code status:   Current Code Status       Date Active Code Status Order ID Comments User Context       Prior            Allergies:   Neomycin, Penicillins, Hydrocodone, and Rosuvastatin    Isolation:   No active isolations    Intake and Output  No intake or output data in the 24 hours ending 08/10/24 1526    Weight:       08/10/24  0918   Weight: 63.5 kg (140 lb)       Most recent vitals:   Vitals:    08/10/24 1316 08/10/24 1416 08/10/24 1459 08/10/24 1500   BP: 130/90 154/55     BP Location:       Patient Position:       Pulse: 116 100 104 112   Resp:       Temp:       SpO2: 90% 100% 100% 99%   Weight:       Height:           Active LDAs/IV Access:   Lines, Drains & Airways       Active LDAs       Name Placement date Placement time Site Days    Peripheral IV 08/10/24 1131 Right Antecubital 08/10/24  1131  Antecubital  less than 1    External Urinary Catheter 11/05/22  0844  --  644    Single Lumen Implantable Port 01/08/19 Left Subclavian 01/08/19  0910  Subclavian  2041                    Labs (abnormal labs have a star):   Labs Reviewed   TROPONIN - Abnormal; Notable for the following components:       Result Value    HS Troponin T 41 (*)     All other components within normal limits    Narrative:     High Sensitive Troponin T Reference Range:  <14.0 ng/L- Negative Female for AMI  <22.0 ng/L- Negative Male for AMI  >=14 - Abnormal Female indicating possible myocardial injury.  >=22 - Abnormal Male  indicating possible myocardial injury.   Clinicians would have to utilize clinical acumen, EKG, Troponin, and serial changes to determine if it is an Acute Myocardial Infarction or myocardial injury due to an underlying chronic condition.        HIGH SENSITIVITIY TROPONIN T 2HR - Abnormal; Notable for the following components:    HS Troponin T 38 (*)     All other components within normal limits    Narrative:     High Sensitive Troponin T Reference Range:  <14.0 ng/L- Negative Female for AMI  <22.0 ng/L- Negative Male for AMI  >=14 - Abnormal Female indicating possible myocardial injury.  >=22 - Abnormal Male indicating possible myocardial injury.   Clinicians would have to utilize clinical acumen, EKG, Troponin, and serial changes to determine if it is an Acute Myocardial Infarction or myocardial injury due to an underlying chronic condition.        PROTIME-INR - Abnormal; Notable for the following components:    Protime 23.6 (*)     INR 2.08 (*)     All other components within normal limits   COMPREHENSIVE METABOLIC PANEL - Abnormal; Notable for the following components:    Glucose 122 (*)     Potassium 3.4 (*)     Chloride 96 (*)     eGFR 57.7 (*)     All other components within normal limits    Narrative:     GFR Normal >60  Chronic Kidney Disease <60  Kidney Failure <15    The GFR formula is only valid for adults with stable renal function between ages 18 and 70.   CBC WITH AUTO DIFFERENTIAL - Abnormal; Notable for the following components:    WBC 15.79 (*)     RBC 2.78 (*)     Hemoglobin 8.3 (*)     Hematocrit 26.9 (*)     MCHC 30.9 (*)     Neutrophil % 90.7 (*)     Lymphocyte % 2.3 (*)     Immature Grans % 0.6 (*)     Neutrophils, Absolute 14.32 (*)     Lymphocytes, Absolute 0.36 (*)     Immature Grans, Absolute 0.10 (*)     All other components within normal limits   BNP (IN-HOUSE) - Normal    Narrative:     This assay is used as an aid in the diagnosis of individuals suspected of having heart failure. It  can be used as an aid in the diagnosis of acute decompensated heart failure (ADHF) in patients presenting with signs and symptoms of ADHF to the emergency department (ED). In addition, NT-proBNP of <300 pg/mL indicates ADHF is not likely.    Age Range Result Interpretation  NT-proBNP Concentration (pg/mL:      <50             Positive            >450                   Gray                 300-450                    Negative             <300    50-75           Positive            >900                  Gray                300-900                  Negative            <300      >75             Positive            >1800                  Gray                300-1800                  Negative            <300   MAGNESIUM - Normal   RAINBOW DRAW    Narrative:     The following orders were created for panel order [place] Draw.  Procedure                               Abnormality         Status                     ---------                               -----------         ------                     Lavender Top[[id number]]                                     Final result               Light Blue Top[[id number]]                                   Final result                 Please view results for these tests on the individual orders.   LAVENDER TOP   LIGHT BLUE TOP   CBC AND DIFFERENTIAL    Narrative:     The following orders were created for panel order CBC & Differential.  Procedure                               Abnormality         Status                     ---------                               -----------         ------                     CBC Auto Differential[[id number]]        Abnormal            Final result                 Please view results for these tests on the individual orders.       EKG:   ECG 12 Lead Dyspnea   Preliminary Result   HEART RATE=92  bpm   RR Yitjrawn=137  ms   IA Qselkuxq=250  ms   P Horizontal Axis=  deg   P Front Axis=-39  deg   QRSD Dqswdynx=575  ms   QT Ihwdjfei=296  ms   YOwF=012  ms   QRS  Axis=-45  deg   T Wave Axis=41  deg   - ABNORMAL ECG -   Sinus rhythm   Left ventricular hypertrophy   Inferior infarct, old   Anterior Q waves, possibly due to LVH   Date and Time of Study:2024-08-10 09:34:20      Telemetry Scan   Final Result      Telemetry Scan   Final Result      ECG 12 Lead Dyspnea    (Results Pending)       Meds given in ED:   Medications   sodium chloride 0.9 % flush 10 mL (has no administration in time range)   acetaminophen (TYLENOL) tablet 1,000 mg (1,000 mg Oral Given 8/10/24 1501)       Imaging results:  CT Abdomen Pelvis Without Contrast    Result Date: 8/10/2024    1. Distended gallbladder.  2. No urolithiasis or hydronephrosis identified). Assessment of the pelvis is limited by hardware artifact).  3. Colonic diverticulosis. No acute inflammatory process of bowel is identified.  4. Age-indeterminate L1 compression deformity, correlate clinically.    This report was finalized on 8/10/2024 1:43 PM by Dr. José Manuel Anaya M.D on Workstation: [employer]      XR Chest 1 View    Result Date: 8/10/2024  No focal pulmonary consolidation. Mild cardiomegaly. Follow-up as clinical indications persist.  This report was finalized on 8/10/2024 10:51 AM by Dr. José Manuel Anaya M.D on Workstation: [employer]       Ambulatory status:   - assist     Social issues:   Social History     Socioeconomic History    Marital status:    Tobacco Use    Smoking status: Never    Smokeless tobacco: Never    Tobacco comments:     caffeine - coffee and coke caff. free, tea    Vaping Use    Vaping status: Never Used   Substance and Sexual Activity    Alcohol use: No    Drug use: No     Comment: caffine    Sexual activity: Defer       Peripheral Neurovascular  Peripheral Neurovascular (Adult)  Peripheral Neurovascular WDL: .WDL except, neurovascular assessment lower  Additional Documentation: Edema (Group)  Edema  Edema: foot, left, foot, right, leg, left, leg, right  Leg, Left Edema: 1+ (Trace)  Leg, Right  Edema: 1+ (Trace)  Foot, Left Edema: 1+ (Trace)  Foot, Right Edema: 1+ (Trace)  LLE Neurovascular Assessment  Color LLE: no discoloration  RLE Neurovascular Assessment  Color RLE: no discoloration    Neuro Cognitive  Neuro Cognitive (Adult)  Cognitive/Neuro/Behavioral WDL: WDL, orientation  Orientation: oriented x 4    Learning  Learning Assessment (Adult)  Learning Readiness and Ability: no barriers identified    Respiratory  Respiratory WDL  Respiratory WDL: .WDL except, rhythm/pattern  Rhythm/Pattern, Respiratory: depth regular, pattern regular, unlabored (2L of O2, soa on exertion)    Abdominal Pain       Pain Assessments  Pain (Adult)  (0-10) Pain Rating: Rest: 5  (0-10) Pain Rating: Activity: 5  Preferred Pain Scale: FACES (Smith-Baker FACES Pain Rating Scale)  FACES Pain Rating: Rest: 6-->hurts even more  Pain Location: back  Pain Side/Orientation: right  Pain Description: dull    NIH Stroke Scale       Jeannie Martino RN  08/10/24 15:26 EDT

## 2024-08-10 NOTE — PLAN OF CARE
Goal Outcome Evaluation:         Pt arrive on unit. Ambulates with walker with X1 assistance, alert and orientated, pt anxious . Heart rhythm is A fibrillation, cocxys red but blanchable, skin tear on left lower leg that was present before admission to hospital.pt on 2L and that is pt baseline at home. Safety maintained.

## 2024-08-10 NOTE — ED PROVIDER NOTES
EMERGENCY DEPARTMENT ENCOUNTER    Room Number:  N435/1  Date of encounter:  8/10/2024  PCP: Manda Keenan MD  Historian: Patient and son  Relevant information and history provided by sources other than the patient will be included below and in the ED Course.  Review of pertinent past medical records may also be included in record below and ED Course.    HPI:  Chief Complaint: Dry heaves, exertional shortness of breath  A complete HPI/ROS/PMH/PSH/SH/FH are unobtainable due to: Not applicable  Context: Olena Myers is a 88 y.o. female who presents to the ED c/o this patient has been suffering from shortness of breath that is worse with exertion for the past 2 weeks.  She does have a history of chronic severe COPD as well as congestive heart failure and chronic hypoxic respiratory failure and is chronically on 2 L of oxygen.  The symptoms of shortness of breath have been worsening over the past 2 weeks.  She is scheduled to see Dr. Hernandez in another 2 weeks.  She has been compliant with all her medicines.  That includes her diuretic, inhalers as well as Xarelto.  Her last dose of Xarelto was last night.  Her symptoms also started this morning when she felt worsening of the shortness of breath with exertion associated with some dry heaves and some abdominal discomfort in the upper portion of her abdomen.  That discomfort in her abdomen only occurs with exertion and is absent at rest.  The same with the dry heaves.  She tells me this is exactly what this felt like when she had a stent 30 years ago.  She denies any fevers or chills.  She denies any new weight gain.  Denies any new swelling to her extremities.  Denies any fevers or chills.  No headache.  Currently right now she is pain-free and has no nausea.  She states as long as she sits in the bed and remains still she is asymptomatic.  She does have chronic shortness of breath worse with laying down and that is at baseline.  She also feels that she has some  increased cough and increased chest congestion from normal.  It is usually clear phlegm.        Previous Episodes: Yes with previous stent that she had 30 years ago.  Current Symptoms: Currently asymptomatic if she remains still and does not move.    MEDICAL HISTORY REVIEWED  I can see this patient was admitted to [facility] and discharged under 11/2024.  She has a history of chronic congestive heart failure and chronic hypoxic respiratory failure on chronic oxygen.  History of atrial fibrillation and is anticoagulated.  Also does have a history of COPD and is chronically on steroids.  History of hypertension and coronary artery disease.  She came in with an exacerbation of her atrial fibrillation with a rapid ventricular rate and that was controlled with medicines and she was discharged home.  Did review her medicine list.  She is anticoagulated on Xarelto.      PAST MEDICAL HISTORY  Active Ambulatory Problems     Diagnosis Date Noted    Pre-operative cardiovascular examination 06/10/2016    S/p reverse total shoulder arthroplasty 07/19/2016    Myasthenia gravis 11/23/2016    Paroxysmal atrial fibrillation 11/23/2016    HX: long term anticoagulant use 11/23/2016    Essential hypertension 11/23/2016    CAD (coronary artery disease) 11/23/2016    Rhinovirus 11/27/2016    Atrial fibrillation 12/21/2016    S/P reverse total shoulder arthroplasty, right 01/08/2019    Acute UTI 07/05/2021    COPD (chronic obstructive pulmonary disease)     Metabolic encephalopathy     Sleep apnea     Sepsis 07/05/2021    Dyspnea 07/05/2021    Chronic diastolic CHF (congestive heart failure) 07/05/2021    Diastolic CHF, chronic 07/09/2021    Heme positive stool 07/09/2021    E coli bacteremia 07/09/2021    Iron deficiency anemia 07/09/2021    Current chronic use of systemic steroids 07/09/2021    COPD exacerbation 02/15/2022    Obesity (BMI 30-39.9) 02/16/2022    COVID-19 virus infection 10/25/2022    Chronic respiratory  failure with hypoxia 10/25/2022    Cytokine release syndrome, grade 1 10/28/2022    Altered mental status     Anemia 11/04/2022    KELLEE (acute kidney injury) 01/10/2024    Hypothyroidism (acquired) 01/10/2024     Resolved Ambulatory Problems     Diagnosis Date Noted    Pneumonia 11/22/2016    Loculated left pleural effusion 11/27/2016    Hyponatremia      Past Medical History:   Diagnosis Date    Arthritis     Asthma     Dilation of esophagus     Frequent urinary tract infections     History of gastric ulcer     History of GI bleed     [place] (hard of hearing)     Hyperlipidemia     Hypertension     Lightheadedness     LVH (left ventricular hypertrophy)     MG (myasthenia gravis)     Mini stroke 10/2016, 3/2017    OA (osteoarthritis)     Osteoporosis     SOB (shortness of breath)          PAST SURGICAL HISTORY  Past Surgical History:   Procedure Laterality Date    APPENDECTOMY      BRONCHOSCOPY N/A 3/31/2022    Procedure: BRONCHOSCOPY WITH BAL RIGHT LOWER LOBE;  Surgeon: Remy Tirado MD;  Location: [facility] ENDOSCOPY;  Service: Pulmonary;  Laterality: N/A;  COPD EXACERBATION      CARPAL TUNNEL RELEASE Bilateral     CATARACT EXTRACTION Bilateral     CORONARY STENT PLACEMENT      TOTAL HIP ARTHROPLASTY Bilateral     TOTAL SHOULDER ARTHROPLASTY W/ DISTAL CLAVICLE EXCISION Left 7/19/2016    Procedure: LT TOTAL SHOULDER REVERSE ARTHROPLASTY;  Surgeon: NAHOMI Solorzano MD;  [place] [facility] [place] [facility] [place]:     TOTAL SHOULDER ARTHROPLASTY W/ DISTAL CLAVICLE EXCISION Right 1/8/2019    Procedure: RIGHT TOTAL SHOULDER REVERSE ARTHROPLASTY;  Surgeon: Jovanny Solorzano MD;  Location: [facility] OR [facility];  Service: Orthopedics         FAMILY HISTORY  Family History   Problem Relation Age of Onset    Heart disease Mother     Hyperlipidemia Mother     Stroke Mother     Diabetes Mother     Breast cancer Mother     Heart disease Father     Hyperlipidemia Father     Stroke Father     Malig Hyperthermia Neg Hx          SOCIAL  HISTORY  Social History     Socioeconomic History    Marital status:    Tobacco Use    Smoking status: Never    Smokeless tobacco: Never    Tobacco comments:     caffeine - coffee and coke caff. free, tea    Vaping Use    Vaping status: Never Used   Substance and Sexual Activity    Alcohol use: No    Drug use: No     Comment: caffine    Sexual activity: Defer         ALLERGIES  Neomycin, Penicillins, Hydrocodone, and Rosuvastatin        REVIEW OF SYSTEMS  Review of Systems     All systems reviewed and negative except for those discussed in HPI.       PHYSICAL EXAM    I have reviewed the triage vital signs and nursing notes.    ED Triage Vitals   Temp Heart Rate Resp BP SpO2   08/10/24 0905 08/10/24 0907 08/10/24 0905 08/10/24 0915 08/10/24 0907   98.1 °F (36.7 °C) 81 20 137/59 (!) 88 %      Temp src Heart Rate Source Patient Position BP Location FiO2 (%)   -- -- 08/10/24 0915 08/10/24 0915 --     Lying Right arm        GENERAL: This is an elderly frail female that is chronically ill-appearing.  Vital signs on my initial evaluation have been reviewed.  Her oxygen saturation is 99% on 2 L.  HENT: nares patent  Head/neck/ face are symmetric without gross deformity, signs of trauma, or swelling  EYES: no scleral icterus, no conjunctival pallor.  NECK: Supple, no meningismus  CV: regular rhythm, regular rate with intact distal pulses.  Distant heart sounds.  RESPIRATORY: normal effort and no respiratory distress.  Mild crackles in the base of both of her lungs bilaterally.  ABDOMEN: soft and nontender.  No tenderness on diffuse abdominal exam.  Obese and normal bowel sounds  MUSCULOSKELETAL: no deformity.  1+ edema to feet and ankles bilaterally.  Intact distal pulses to upper and lower extremities are equal strong and symmetric.  NEURO: alert and appropriate, moves all extremities, follows commands.  No focal motor or sensory changes  SKIN: warm, dry    Vital signs and nursing notes reviewed.        LAB  RESULTS  Recent Results (from the past 24 hour(s))   ECG 12 Lead Dyspnea    Collection Time: 08/10/24  9:34 AM   Result Value Ref Range    QT Interval 353 ms    QTC Interval 437 ms   BNP    Collection Time: 08/10/24  9:45 AM    Specimen: Blood   Result Value Ref Range    proBNP 1,417.0 0.0 - 1,800.0 pg/mL   High Sensitivity Troponin T    Collection Time: 08/10/24  9:45 AM    Specimen: Blood   Result Value Ref Range    HS Troponin T 41 (H) <14 ng/L   Magnesium    Collection Time: 08/10/24  9:45 AM    Specimen: Blood   Result Value Ref Range    Magnesium 2.3 1.6 - 2.4 mg/dL   Lavender Top    Collection Time: 08/10/24  9:45 AM   Result Value Ref Range    Extra Tube hold for add-on    Light Blue Top    Collection Time: 08/10/24  9:45 AM   Result Value Ref Range    Extra Tube Hold for add-ons.    Protime-INR    Collection Time: 08/10/24  9:45 AM    Specimen: Blood   Result Value Ref Range    Protime 23.6 (H) 11.7 - 14.2 Seconds    INR 2.08 (H) 0.90 - 1.10   Comprehensive Metabolic Panel    Collection Time: 08/10/24  9:45 AM    Specimen: Blood   Result Value Ref Range    Glucose 122 (H) 65 - 99 mg/dL    BUN 22 8 - 23 mg/dL    Creatinine 0.96 0.57 - 1.00 mg/dL    Sodium 138 136 - 145 mmol/L    Potassium 3.4 (L) 3.5 - 5.2 mmol/L    Chloride 96 (L) 98 - 107 mmol/L    CO2 27.0 22.0 - 29.0 mmol/L    Calcium 9.1 8.6 - 10.5 mg/dL    Total Protein 6.0 6.0 - 8.5 g/dL    Albumin 4.1 3.5 - 5.2 g/dL    ALT (SGPT) 7 1 - 33 U/L    AST (SGOT) 16 1 - 32 U/L    Alkaline Phosphatase 73 39 - 117 U/L    Total Bilirubin 0.4 0.0 - 1.2 mg/dL    Globulin 1.9 gm/dL    A/G Ratio 2.2 g/dL    BUN/Creatinine Ratio 22.9 7.0 - 25.0    Anion Gap 15.0 5.0 - 15.0 mmol/L    eGFR 57.7 (L) >60.0 mL/min/1.73   CBC Auto Differential    Collection Time: 08/10/24  9:45 AM    Specimen: Blood   Result Value Ref Range    WBC 15.79 (H) 3.40 - 10.80 10*3/mm3    RBC 2.78 (L) 3.77 - 5.28 10*6/mm3    Hemoglobin 8.3 (L) 12.0 - 15.9 g/dL    Hematocrit 26.9 (L) 34.0 - 46.6  %    MCV 96.8 79.0 - 97.0 fL    MCH 29.9 26.6 - 33.0 pg    MCHC 30.9 (L) 31.5 - 35.7 g/dL    RDW 15.2 12.3 - 15.4 %    RDW-SD 53.4 37.0 - 54.0 fl    MPV 9.5 6.0 - 12.0 fL    Platelets 426 140 - 450 10*3/mm3    Neutrophil % 90.7 (H) 42.7 - 76.0 %    Lymphocyte % 2.3 (L) 19.6 - 45.3 %    Monocyte % 5.6 5.0 - 12.0 %    Eosinophil % 0.5 0.3 - 6.2 %    Basophil % 0.3 0.0 - 1.5 %    Immature Grans % 0.6 (H) 0.0 - 0.5 %    Neutrophils, Absolute 14.32 (H) 1.70 - 7.00 10*3/mm3    Lymphocytes, Absolute 0.36 (L) 0.70 - 3.10 10*3/mm3    Monocytes, Absolute 0.88 0.10 - 0.90 10*3/mm3    Eosinophils, Absolute 0.08 0.00 - 0.40 10*3/mm3    Basophils, Absolute 0.05 0.00 - 0.20 10*3/mm3    Immature Grans, Absolute 0.10 (H) 0.00 - 0.05 10*3/mm3    nRBC 0.0 0.0 - 0.2 /100 WBC   High Sensitivity Troponin T 2Hr    Collection Time: 08/10/24 11:31 AM    Specimen: Blood   Result Value Ref Range    HS Troponin T 38 (H) <14 ng/L    Troponin T Delta -3 >=-4 - <+4 ng/L   Lipid Panel    Collection Time: 08/10/24 11:31 AM    Specimen: Blood   Result Value Ref Range    Total Cholesterol 136 0 - 200 mg/dL    Triglycerides 168 (H) 0 - 150 mg/dL    HDL Cholesterol 53 40 - 60 mg/dL    LDL Cholesterol  55 0 - 100 mg/dL    VLDL Cholesterol 28 5 - 40 mg/dL    LDL/HDL Ratio 0.93        Ordered the above labs and independently reviewed the results.        RADIOLOGY  CT Abdomen Pelvis Without Contrast    Result Date: 8/10/2024  CT ABDOMEN PELVIS WO CONTRAST-  INDICATIONS: Pain  TECHNIQUE: Radiation dose reduction techniques were utilized, including automated exposure control and exposure modulation based on body size. Unenhanced ABDOMEN AND PELVIS CT  COMPARISON: None available  FINDINGS:  Structures in the pelvis are partly obscured by attenuation artifact from bilateral hip arthroplasty hardware.  The gallbladder is distended, but otherwise appears unremarkable (if further imaging evaluation of the gallbladder is indicated, ultrasound could be considered).   No urolithiasis or hydronephrosis is identified, but the distal ureters and urinary bladder are partly obscured by hardware artifact.  Left adrenal adenoma is apparent, stable.  Otherwise unremarkable appearance of the liver, spleen, adrenal glands, pancreas, kidneys, bladder. Coarse calcifications at the uterus suggest underlying fibroid disease.  No bowel obstruction or abnormal bowel thickening is identified. Colonic diverticula are seen that do not appear inflamed. The appendix is not identified, limiting the assessment, and requiring clinical correlation.  No free intraperitoneal gas or free fluid. Umbilical hernia of fat is present.  Scattered small mesenteric and para-aortic lymph nodes are seen that are not significant by size criteria.  Abdominal aorta is not aneurysmal. Aortic and other arterial calcifications are present.  The lung bases are clear. Show mild atelectasis/scarring. The heart is enlarged.  Degenerative changes are seen in the spine. L1 compression deformity, with 55% loss of height anteriorly, is age-indeterminate, but new from 7/6/2021, correlate clinically.        1. Distended gallbladder.  2. No urolithiasis or hydronephrosis identified). Assessment of the pelvis is limited by hardware artifact).  3. Colonic diverticulosis. No acute inflammatory process of bowel is identified.  4. Age-indeterminate L1 compression deformity, correlate clinically.    This report was finalized on 8/10/2024 1:43 PM by Dr. José Manuel Anaya M.D on Workstation: [email or web address])      XR Chest 1 View    Result Date: 8/10/2024  XR CHEST 1 VW-  HISTORY: Female who is 88 years-old, short of breath  TECHNIQUE: Frontal view of the chest  COMPARISON: 1/10/2024  FINDINGS: The heart is mildly enlarged. Left chest port appears stable. Aorta is calcified. Pulmonary vasculature is unremarkable. Likely scarring or atelectasis at the mid lungs. Eventration of the right hemidiaphragm is apparent. No focal pulmonary consolidation,  pleural effusion, or pneumothorax. No acute osseous process.      No focal pulmonary consolidation. Mild cardiomegaly. Follow-up as clinical indications persist.  This report was finalized on 8/10/2024 10:51 AM by Dr. José Manuel Anaya M.D on Workstation: [email or web address]       I ordered the above noted radiological studies. Reviewed by me and discussed with radiologist.  See dictation for official radiology interpretation.      PROCEDURES    Procedures      MEDICATIONS GIVEN IN ER    Medications   sodium chloride 0.9 % flush 10 mL (has no administration in time range)   aspirin chewable tablet 324 mg (has no administration in time range)   metoprolol tartrate (LOPRESSOR) tablet 25 mg (has no administration in time range)   pravastatin (PRAVACHOL) tablet 20 mg (has no administration in time range)   acetaminophen (TYLENOL) tablet 1,000 mg (1,000 mg Oral Given 8/10/24 1501)         All labs have been independently reviewed by me.  All radiology studies have been reviewed by me and I discussed with radiologist dictating the report when indicated below.  All EKG's independently viewed and interpreted by me.  Discussion below represents my analysis of pertinent findings related to patient's condition, differential diagnosis, treatment plan and final disposition.        PROGRESS, DATA ANALYSIS, CONSULTS, AND MEDICAL DECISION MAKING    DDx includes CHF, acute coronary syndrome, pulmonary embolism, pneumothorax, pneumonia, asthma/COPD,aspiration,  pulmonary hypertension, metabolic acidosis, deconditioning, anemia, other hematologic etiologies such as CO poisoning, anxiety.   I do not see any obvious acute changes on her EKG but she has symptoms very similar to previous episodes when she had a stent placed in her heart.  This is a lady that has multiple significant comorbidities and a complex patient.  She has been compliant with her Xarelto with her last dose last night.  I do not believe she has a PE.  I am concerned about  potential heart failure or potential unstable angina as this was similar to what she experienced when she had a stent 30 years ago.  Currently she is asymptomatic and has no pain.  She also has no shortness of breath.  Informed her and this son of the test that we will order.  All questions answered at this time.  She will very likely need to be admitted to the hospital and have cardiology evaluation.  All questions answered at this time.      ED Course as of 08/10/24 1648   Sat Aug 10, 2024   0947 My own independent her potation of the EKG that was done at 934 reveals a rate of 92 it is sinus rhythm there is an interventional conduction delay  There is Q waves in the inferior leads there is signs of LVH I do not see any definitive acute injury pattern appreciated  Compared to the EKG done on January 10, 2024 and overall the EKG looks very similar. [MM]   1214 HS Troponin T(!): 41 [MM]   1214 HS Troponin T(!): 38 [MM]   1214 proBNP: 1,417.0 [MM]   1214 I reviewed the chest x-ray.  There is no focal pulmonary consolidation or pneumothorax.  Patient does have some cardiomegaly.  I did review the radiologist report as well. [MM]   1217 Unfortunately I just became aware that CBC and CMP were not collected on this patient.  I suspect that this was due to my mistake and not adding it on on my initial evaluation.  She has remained chest pain-free and asymptomatic when I have reevaluated this patient right at this time.  She has no abdominal pain.  I am also going to do a CT scan of her abdomen and pelvis without contrast.  She did have discomfort earlier but that was associated with exertional shortness of breath and dry heaves.  And this again is the same type of symptoms that she had prior to her stent 30 years ago.  Vital signs are stable.  She is on her 2 L of oxygen which is chronically on.  All questions answered at this time.  I apologized to the patient for the delay. [MM]   1253 Hemoglobin(!): 8.3  Chronic anemia  no significant change [MM]   1253 WBC(!): 15.79  Patient has advanced COPD and is chronically on steroids.  She reports no fever here or prior to arrival here. [MM]   1254 INR(!): 2.08  Patient is on Xarelto. [MM]   1413 I reviewed the CT scan of the abdomen pelvis report from the radiologist..  There is a mildly distended gallbladder.  No definitive signs of cholecystitis.  No signs of hydronephrosis.  Colonic diverticulosis but no signs of any acute infection or inflammatory process.  Age-indeterminate L1 compression deformity.  Please see complete dictated report from radiologist. [MM]   1518 I did talk with the patient as well as son again at bedside.  She does not have any shortness of breath at this time.  Oxygen saturation is 99% on 2 L.  Informed them about the results of the lab work and the CT scan.  She has no abdominal pain on repeat exam no right upper quadrant pain.  Did clarify about the admission.  All questions answered. [MM]   1520 I did communicate with the patient and the patient's son.  Patient is a DO NOT RESUSCITATE.  No intubation or CPR [MM]   1521 I communicated with Dr. Taylor who is on for [facility].  Informed of the patient's presenting symptoms.  He would like me to consult cardiology down here in the emergency department. [MM]   1522   He agrees to admit the patient to the hospital. [MM]   1547 I did discuss the case with the cardiologist Dr. Scanlon informed him of the patient's pending symptoms and the results of the test.  Informed the conversation with A.  He agrees to consult on this patient.  All questions answered [MM]      ED Course User Index  [MM] Jose Luis Ruffin MD       AS OF 16:48 EDT VITALS:    BP - 147/66  HR - 112  TEMP - 98.1 °F (36.7 °C)  02 SATS - 99%    SOCIAL DETERMINANTS OF HEALTH THAT IMPACT OR LIMIT CARE (For example..Homelessness,safe discharge, inability to obtain care, follow up, or prescriptions):      DIAGNOSIS  Final diagnoses:   Exertional dyspnea   Pain  of upper abdomen associated with exertion and has complete resolution   Chronic anemia   Chronic respiratory failure with hypoxia         DISPOSITION  I have reviewed the test results with my patient and explained the current treatment plan.  I answered all of the patient's questions.  The patient will be admitted to monitor bed at this time.  The patient is not hypotensive and is tolerating their current disease condition well enough for a monitored bed at this time.  The patient's current condition does not require intensive care treatment at this time.            DICTATED UTILIZING DRAGON DICTATION    Note Disclaimer: At [facility], we believe that sharing information builds trust and better relationships. You are receiving this note because you recently visited [facility]. It is possible you will see health information before a provider has talked with you about it. This kind of information can be easy to misunderstand. To help you fully understand what it means for your health, we urge you to discuss this note with your provider.       Jose Luis Ruffin MD  08/10/24 0417

## 2024-08-10 NOTE — H&P
Internal medicine history and physical  INTERNAL MEDICINE   [facility]       Patient Identification:  Name: Olena Myers  Age: 88 y.o.  Sex: female  :  1935  MRN: [id number]                   Primary Care Physician: Manda Keenan MD                               Date of admission:8/10/2024    Chief Complaint: Prior living nausea and abdominal discomfort for a week shortness of breath for the last 4 months.    History of Present Illness:   Source of information patient herself and patient's son at the bedside and discussion with ER physician.  Patient is 88-year-old female who has complicated past medical history including history of myasthenia gravis on immunosuppressive therapy, history of TIA, hypertension, hyponatremia, chronic congestive heart failure, history of GI bleed and gastric ulcer as well as COPD and asthma as well as chronic atrial fibrillation on anticoagulation therapy who follows with Dr. Diana and last time she saw her was about a year ago at his outpatient clinic was in her usual state of her health until about 4 months ago when she started noticing increasing dyspnea on exertion and shortness of breath.  She admits that she has COPD and uses oxygen at 2 L nasal cannula but lately her activity has been limited because of shortness of breath and immediately walking in the room and was making her out of breath.  In this background yesterday she was having increasing shortness of breath and dry heaving and upper abdominal discomfort that reminded her of heart attack that she had 30 years ago that resulted in stent placement.  Workup in the emergency room revealed room air oxygen saturation of 88% and CT scan of the abdomen pelvis performed for abdominal discomfort and dry heaving showed distended gallbladder no structural abnormality of the  tract and chronic diverticulosis and age-indeterminate L1 fracture.  Patient initial troponin was 38 and later it was 41 and her  EKG did not show any acute injury pattern.  Very similar to what it was in January 2024.  Patient was noted to have WBC count of 15,000 INR of 2.08 and hemoglobin of 8.3 which is unchanged.  Because of her dry heaving feeling that she had yesterday reminded of her having heart condition that required stent 30 years ago along with elevated white blood cell count and complaints of worsening dyspnea on exertion patient is being admitted for further care for serial troponin and cardiology evaluation.  Patient and her son does want her to be DNR.  During my evaluation patient was feeling better and does not have any more of dry heaving feeling and wants to know if she can go home tomorrow.    Past Medical History:  Past Medical History:   Diagnosis Date    Anemia     IRON DEFICIENCY    Arthritis     Asthma     Atrial fibrillation     CAD (coronary artery disease)     HEART STENT    COPD (chronic obstructive pulmonary disease)     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    Frequent urinary tract infections     History of gastric ulcer     History of GI bleed     [place] (hard of hearing)     Hyperlipidemia     Hypertension     Hyponatremia     Lightheadedness     LVH (left ventricular hypertrophy)     MG (myasthenia gravis)     Mini stroke 10/2016, 3/2017    OA (osteoarthritis)     Osteoporosis     Sleep apnea     USES CPAP    SOB (shortness of breath)     WALKING     Past Surgical History:  Past Surgical History:   Procedure Laterality Date    APPENDECTOMY      BRONCHOSCOPY N/A 3/31/2022    Procedure: BRONCHOSCOPY WITH BAL RIGHT LOWER LOBE;  Surgeon: Remy Tirado MD;  Location: [facility];  Service: Pulmonary;  Laterality: N/A;  COPD EXACERBATION      CARPAL TUNNEL RELEASE Bilateral     CATARACT EXTRACTION Bilateral     CORONARY STENT PLACEMENT      TOTAL HIP ARTHROPLASTY Bilateral     TOTAL SHOULDER ARTHROPLASTY W/ DISTAL CLAVICLE EXCISION Left 7/19/2016    Procedure: LT TOTAL SHOULDER REVERSE ARTHROPLASTY;   Surgeon: NAHOMI Solorzano MD;  Location: [facility] OR [facility];  Service:     TOTAL SHOULDER ARTHROPLASTY W/ DISTAL CLAVICLE EXCISION Right 1/8/2019    Procedure: RIGHT TOTAL SHOULDER REVERSE ARTHROPLASTY;  Surgeon: Jovanny Solorzano MD;  Location: [facility] OR [facility];  Service: Orthopedics      Home Meds:  Medications Prior to Admission   Medication Sig Dispense Refill Last Dose    acetaminophen (TYLENOL) 325 MG tablet Take 2 tablets by mouth Every 4 (Four) Hours As Needed for Mild Pain.   8/10/2024    albuterol (PROVENTIL) (2.5 MG/3ML) 0.083% nebulizer solution Take 2.5 mg by nebulization Every 4 (Four) Hours As Needed for Wheezing.   8/10/2024    albuterol sulfate  (90 Base) MCG/ACT inhaler Inhale 2 puffs Every 6 (Six) Hours As Needed for Wheezing or Shortness of Air.   8/10/2024    azelastine (ASTELIN) 0.1 % nasal spray 2 sprays into the nostril(s) as directed by provider 2 (Two) Times a Day. Use in each nostril as directed   8/10/2024    busPIRone (BUSPAR) 10 MG tablet Take 1 tablet by mouth 2 (Two) Times a Day.   8/9/2024    DULoxetine (CYMBALTA) 60 MG capsule Take 1 capsule by mouth 2 (Two) Times a Day.   8/9/2024    ferrous sulfate 325 (65 FE) MG tablet Take 1 tablet by mouth Daily With Breakfast.   8/9/2024    Fluticasone-Umeclidin-Vilant (Trelegy Ellipta) 100-62.5-25 MCG/INH inhaler Inhale 1 puff Daily.   8/10/2024    irbesartan (AVAPRO) 75 MG tablet Take 1 tablet by mouth Every Night.   8/9/2024    levothyroxine (SYNTHROID, LEVOTHROID) 88 MCG tablet Take 1 tablet by mouth Daily.   8/9/2024    montelukast (SINGULAIR) 10 MG tablet Take 1 tablet by mouth Every Night.   8/9/2024    mycophenolate (CELLCEPT) 500 MG tablet Take 2 tablets by mouth Every Morning.   8/9/2024    mycophenolate (CELLCEPT) 500 MG tablet Take 3 tablets by mouth Every Evening.   8/9/2024    pantoprazole (PROTONIX) 40 MG EC tablet Take 1 tablet by mouth Daily.   8/9/2024    pravastatin (PRAVACHOL) 20 MG tablet Take 1 tablet by mouth Every Night.    8/9/2024    predniSONE (DELTASONE) 10 MG tablet Take 0.5 tablets by mouth Daily.   8/9/2024    raloxifene (EVISTA) 60 MG tablet Take 1 tablet by mouth Every Night.   8/9/2024    rivaroxaban (XARELTO) 20 MG tablet Take 1 tablet by mouth Daily With Dinner.   8/9/2024    torsemide (DEMADEX) 20 MG tablet Take 1 tablet by mouth Daily.   8/9/2024    vitamin B-12 (CYANOCOBALAMIN) 1000 MCG tablet Take 1 tablet by mouth Daily.   8/9/2024    vitamin D (ERGOCALCIFEROL) 83926 UNITS capsule capsule Take 1 capsule by mouth Every 7 (Seven) Days. Takes on Wednesdays   Past Week    metoprolol succinate XL (TOPROL-XL) 25 MG 24 hr tablet Take 1 tablet by mouth 2 (Two) Times a Day. 60 tablet 0      Current Meds:     Current Facility-Administered Medications:     aspirin chewable tablet 324 mg, 324 mg, Oral, Daily, Ashish Scanlon MD, 324 mg at 08/10/24 1759    metoprolol tartrate (LOPRESSOR) tablet 25 mg, 25 mg, Oral, Q12H, Ashish Scanlon MD, 25 mg at 08/10/24 1758    pravastatin (PRAVACHOL) tablet 20 mg, 20 mg, Oral, Nightly, Ashish Scanlon MD    [COMPLETED] Insert Peripheral IV, , , Once **AND** sodium chloride 0.9 % flush 10 mL, 10 mL, Intravenous, PRN, Jose Luis Ruffin MD  Allergies:  Allergies   Allergen Reactions    Neomycin Anaphylaxis    Penicillins Swelling and Rash     Historical allergy x 30-40 years, tolerates cephalosporins    Hydrocodone Itching and Rash    Rosuvastatin Other (See Comments)     Muscle cramps     Social History:   Social History     Tobacco Use    Smoking status: Never    Smokeless tobacco: Never    Tobacco comments:     caffeine - coffee and coke caff. free, tea    Substance Use Topics    Alcohol use: No      Family History:  Family History   Problem Relation Age of Onset    Heart disease Mother     Hyperlipidemia Mother     Stroke Mother     Diabetes Mother     Breast cancer Mother     Heart disease Father     Hyperlipidemia Father     Stroke Father     Malig Hyperthermia Neg Hx      "      Review of Systems  See history of present illness and past medical history.  As described in the history of presenting illness      Vitals:   /71 (BP Location: Right arm, Patient Position: Sitting)   Pulse 102   Temp 97.7 °F (36.5 °C) (Oral)   Resp 20   Ht 152.4 cm (60\")   Wt 63.5 kg (140 lb)   SpO2 95%   BMI 27.34 kg/m²   I/O: No intake or output data in the 24 hours ending 08/10/24 6624  Exam:  Patient is examined using the personal protective equipment as per guidelines from infection control for this particular patient as enacted.  Hand washing was performed before and after patient interaction.  General Appearance:    Alert, cooperative, no distress, appears stated age   Head:    Normocephalic, without obvious abnormality, atraumatic   Eyes:    PERRL, conjunctiva/corneas clear, EOM's intact, both eyes   Ears:    Normal external ear canals, both ears   Nose: No nasal drainage noted   Throat:   Lips, tongue, gums normal; oral mucosa pink and moist   Neck: Supple   Back:     Symmetric, no curvature, ROM normal, no CVA tenderness   Lungs:   No obvious use of accessory muscles of breathing noted occasional rhonchi   Chest Wall:    No tenderness or deformity    Heart:  S1-S2 irregular   Abdomen:   Soft nontender obese   Extremities: Trace edema noted   Pulses:   Pulses palpable in all extremities; symmetric all extremities   Skin: No rash noted   Neurologic: Alert and oriented x 3 d       Data Review:      I reviewed the patient's new clinical results.  Results from last 7 days   Lab Units 08/10/24  0945   WBC 10*3/mm3 15.79*   HEMOGLOBIN g/dL 8.3*   PLATELETS 10*3/mm3 426     Results from last 7 days   Lab Units 08/10/24  0945   SODIUM mmol/L 138   POTASSIUM mmol/L 3.4*   CHLORIDE mmol/L 96*   CO2 mmol/L 27.0   BUN mg/dL 22   CREATININE mg/dL 0.96   CALCIUM mg/dL 9.1   GLUCOSE mg/dL 122*     CT Abdomen Pelvis Without Contrast    Result Date: 8/10/2024    1. Distended gallbladder.  2. No " urolithiasis or hydronephrosis identified). Assessment of the pelvis is limited by hardware artifact).  3. Colonic diverticulosis. No acute inflammatory process of bowel is identified.  4. Age-indeterminate L1 compression deformity, correlate clinically.    This report was finalized on 8/10/2024 1:43 PM by Dr. José Manuel Anaya M.D on Workstation: [employer]      XR Chest 1 View    Result Date: 8/10/2024  No focal pulmonary consolidation. Mild cardiomegaly. Follow-up as clinical indications persist.  This report was finalized on 8/10/2024 10:51 AM by Dr. José Manuel Anaya M.D on Workstation: [employer]     Microbiology Results (last 10 days)       ** No results found for the last 240 hours. **          ECG 12 Lead Dyspnea   Final Result   HEART RATE=92  bpm   RR Lmmuqobv=362  ms   WA Xysjakcl=791  ms   P Horizontal Axis=  deg   P Front Axis=-39  deg   QRSD Jrywgqrj=619  ms   QT Vuwjgwss=768  ms   UKyE=268  ms   QRS Axis=-45  deg   T Wave Axis=41  deg   - ABNORMAL ECG -   Sinus rhythm   Left anterior fascicular block   Left ventricular hypertrophy   Inferior  infarct, old   Anterior  Q waves, possibly due to LVH   When compared with ECG of 10-Reagan-2024 14:51:31,   No significant change   Electronically Signed By: Charlie Marcial ([facility]) 2024-08-10 16:18:37   Date and Time of Study:2024-08-10 09:34:20      Telemetry Scan   Final Result      Telemetry Scan   Final Result      Telemetry Scan   Final Result      Telemetry Scan   Final Result      Telemetry Scan   Final Result          Assessment:  Active Hospital Problems    Diagnosis  POA    **Exertional dyspnea [R06.09]  Yes    Hypothyroidism (acquired) [E03.9]  Yes    Diastolic CHF, chronic [I50.32]  Yes    COPD (chronic obstructive pulmonary disease) [J44.9]  Yes    Sleep apnea [G47.30]  Yes     USES CPAP      Atrial fibrillation [I48.91]  Yes    Myasthenia gravis [G70.00]  Yes    Paroxysmal atrial fibrillation [I48.0]  Yes    CAD (coronary artery disease) [I25.10]  Yes     Essential hypertension [I10]  Yes       Medical decision making/care plan: See admitting orders  Progressive dyspnea on exertion in the setting of known diastolic congestive heart failure underlying COPD with chronic hypoxia and oxygen supplementation-this is multifactorial and plan is to admit the patient, continue with oxygen supplementation nebulizer treatment and diuretics-torsemide-and get cardiology consultation.  Check serial troponin.  Hypothyroidism-continue thyroid replacement therapy.  COPD sleep apnea-continue home regimen and monitor.  Allow her to use CPAP device and monitor.  Atrial fibrillation-continue with beta-blocker and rate control while awaiting cardiology consultation.  Hypertension-continue current regimen while closely monitoring her renal function  Electrolyte imbalance-continue with potassium replacement per protocol.  History of myasthenia gravis-patient is chronically on CellCept and prednisone-continue current regimen.    Luz Taylor MD   8/10/2024  18:54 EDT    Parts of this note may be an electronic transcription/translation of spoken language to printed text using the Dragon dictation system.=

## 2024-08-11 ENCOUNTER — APPOINTMENT (OUTPATIENT)
Dept: CARDIOLOGY | Facility: HOSPITAL | Age: 89
End: 2024-08-11
Payer: MEDICARE

## 2024-08-11 LAB
ADV 40+41 DNA STL QL NAA+NON-PROBE: NOT DETECTED
ALBUMIN SERPL-MCNC: 3.7 G/DL (ref 3.5–5.2)
ALBUMIN/GLOB SERPL: 1.9 G/DL
ALP SERPL-CCNC: 69 U/L (ref 39–117)
ALT SERPL W P-5'-P-CCNC: 5 U/L (ref 1–33)
ANION GAP SERPL CALCULATED.3IONS-SCNC: 11 MMOL/L (ref 5–15)
AORTIC DIMENSIONLESS INDEX: 0.6 (DI)
ARTERIAL PATENCY WRIST A: POSITIVE
AST SERPL-CCNC: 9 U/L (ref 1–32)
ASTRO TYP 1-8 RNA STL QL NAA+NON-PROBE: NOT DETECTED
ATMOSPHERIC PRESS: 750.2 MMHG
B PARAPERT DNA SPEC QL NAA+PROBE: NOT DETECTED
B PERT DNA SPEC QL NAA+PROBE: NOT DETECTED
BASE EXCESS BLDA CALC-SCNC: -0.1 MMOL/L (ref 0–2)
BASOPHILS # BLD AUTO: 0.03 10*3/MM3 (ref 0–0.2)
BASOPHILS NFR BLD AUTO: 0.2 % (ref 0–1.5)
BDY SITE: ABNORMAL
BH CV ECHO MEAS - AO MAX PG: 16 MMHG
BH CV ECHO MEAS - AO MEAN PG: 7.6 MMHG
BH CV ECHO MEAS - AO V2 MAX: 199.8 CM/SEC
BH CV ECHO MEAS - AO V2 VTI: 28 CM
BH CV ECHO MEAS - AVA(I,D): 1.89 CM2
BH CV ECHO MEAS - EDV(CUBED): 48.5 ML
BH CV ECHO MEAS - EDV(MOD-SP2): 61 ML
BH CV ECHO MEAS - EDV(MOD-SP4): 55 ML
BH CV ECHO MEAS - EF(MOD-BP): 73.9 %
BH CV ECHO MEAS - EF(MOD-SP2): 78.7 %
BH CV ECHO MEAS - EF(MOD-SP4): 69.1 %
BH CV ECHO MEAS - ESV(CUBED): 28.8 ML
BH CV ECHO MEAS - ESV(MOD-SP2): 13 ML
BH CV ECHO MEAS - ESV(MOD-SP4): 17 ML
BH CV ECHO MEAS - FS: 15.9 %
BH CV ECHO MEAS - IVS/LVPW: 1.07 CM
BH CV ECHO MEAS - IVSD: 1.18 CM
BH CV ECHO MEAS - LAT PEAK E' VEL: 11.4 CM/SEC
BH CV ECHO MEAS - LV DIASTOLIC VOL/BSA (35-75): 34.3 CM2
BH CV ECHO MEAS - LV MASS(C)D: 134.2 GRAMS
BH CV ECHO MEAS - LV MAX PG: 11.1 MMHG
BH CV ECHO MEAS - LV MEAN PG: 4 MMHG
BH CV ECHO MEAS - LV SYSTOLIC VOL/BSA (12-30): 10.6 CM2
BH CV ECHO MEAS - LV V1 MAX: 166.3 CM/SEC
BH CV ECHO MEAS - LV V1 VTI: 18 CM
BH CV ECHO MEAS - LVIDD: 3.6 CM
BH CV ECHO MEAS - LVIDS: 3.1 CM
BH CV ECHO MEAS - LVOT AREA: 2.9 CM2
BH CV ECHO MEAS - LVOT DIAM: 1.93 CM
BH CV ECHO MEAS - LVPWD: 1.11 CM
BH CV ECHO MEAS - MED PEAK E' VEL: 7.6 CM/SEC
BH CV ECHO MEAS - MV DEC SLOPE: 741 CM/SEC2
BH CV ECHO MEAS - MV DEC TIME: 0.19 SEC
BH CV ECHO MEAS - MV E MAX VEL: 138 CM/SEC
BH CV ECHO MEAS - MV MAX PG: 7.4 MMHG
BH CV ECHO MEAS - MV MEAN PG: 3.6 MMHG
BH CV ECHO MEAS - MV P1/2T: 55.2 MSEC
BH CV ECHO MEAS - MV V2 VTI: 18.7 CM
BH CV ECHO MEAS - MVA(P1/2T): 4 CM2
BH CV ECHO MEAS - MVA(VTI): 2.8 CM2
BH CV ECHO MEAS - PA ACC TIME: 0.08 SEC
BH CV ECHO MEAS - PA V2 MAX: 145.2 CM/SEC
BH CV ECHO MEAS - PULM DIAS VEL: 46.3 CM/SEC
BH CV ECHO MEAS - PULM S/D: 1.39
BH CV ECHO MEAS - PULM SYS VEL: 64.3 CM/SEC
BH CV ECHO MEAS - QP/QS: 0.42
BH CV ECHO MEAS - RF(MV,LVOT): 38
BH CV ECHO MEAS - RV MAX PG: 3.7 MMHG
BH CV ECHO MEAS - RV V1 MAX: 96 CM/SEC
BH CV ECHO MEAS - RV V1 VTI: 11.5 CM
BH CV ECHO MEAS - RVOT DIAM: 1.57 CM
BH CV ECHO MEAS - SV(LVOT): 52.9 ML
BH CV ECHO MEAS - SV(MOD-SP2): 48 ML
BH CV ECHO MEAS - SV(MOD-SP4): 38 ML
BH CV ECHO MEAS - SV(RVOT): 22.4 ML
BH CV ECHO MEAS - SVI(LVOT): 33 ML/M2
BH CV ECHO MEAS - SVI(MOD-SP2): 29.9 ML/M2
BH CV ECHO MEAS - SVI(MOD-SP4): 23.7 ML/M2
BH CV ECHO MEAS - TAPSE (>1.6): 0.9 CM
BH CV ECHO MEASUREMENTS AVERAGE E/E' RATIO: 14.53
BH CV XLRA - RV BASE: 3.9 CM
BH CV XLRA - RV LENGTH: 7.1 CM
BH CV XLRA - RV MID: 4 CM
BH CV XLRA - TDI S': 9 CM/SEC
BILIRUB SERPL-MCNC: 0.5 MG/DL (ref 0–1.2)
BUN SERPL-MCNC: 19 MG/DL (ref 8–23)
BUN/CREAT SERPL: 21.6 (ref 7–25)
C CAYETANENSIS DNA STL QL NAA+NON-PROBE: NOT DETECTED
C COLI+JEJ+UPSA DNA STL QL NAA+NON-PROBE: NOT DETECTED
C DIFF TOX GENS STL QL NAA+PROBE: NEGATIVE
C PNEUM DNA NPH QL NAA+NON-PROBE: NOT DETECTED
CALCIUM SPEC-SCNC: 8.6 MG/DL (ref 8.6–10.5)
CHLORIDE SERPL-SCNC: 97 MMOL/L (ref 98–107)
CO2 BLDA-SCNC: 23.9 MMOL/L (ref 23–27)
CO2 SERPL-SCNC: 28 MMOL/L (ref 22–29)
CREAT SERPL-MCNC: 0.88 MG/DL (ref 0.57–1)
CRYPTOSP DNA STL QL NAA+NON-PROBE: NOT DETECTED
D-LACTATE SERPL-SCNC: 1.3 MMOL/L (ref 0.5–2)
DEPRECATED RDW RBC AUTO: 53.4 FL (ref 37–54)
E HISTOLYT DNA STL QL NAA+NON-PROBE: NOT DETECTED
EAEC PAA PLAS AGGR+AATA ST NAA+NON-PRB: NOT DETECTED
EC STX1+STX2 GENES STL QL NAA+NON-PROBE: NOT DETECTED
EGFRCR SERPLBLD CKD-EPI 2021: 64.1 ML/MIN/1.73
EOSINOPHIL # BLD AUTO: 0.08 10*3/MM3 (ref 0–0.4)
EOSINOPHIL NFR BLD AUTO: 0.6 % (ref 0.3–6.2)
EPEC EAE GENE STL QL NAA+NON-PROBE: NOT DETECTED
ERYTHROCYTE [DISTWIDTH] IN BLOOD BY AUTOMATED COUNT: 15.4 % (ref 12.3–15.4)
ETEC LTA+ST1A+ST1B TOX ST NAA+NON-PROBE: NOT DETECTED
FLUAV SUBTYP SPEC NAA+PROBE: NOT DETECTED
FLUBV RNA ISLT QL NAA+PROBE: NOT DETECTED
G LAMBLIA DNA STL QL NAA+NON-PROBE: NOT DETECTED
GAS FLOW AIRWAY: 2 LPM
GLOBULIN UR ELPH-MCNC: 2 GM/DL
GLUCOSE SERPL-MCNC: 119 MG/DL (ref 65–99)
HADV DNA SPEC NAA+PROBE: NOT DETECTED
HCO3 BLDA-SCNC: 23 MMOL/L (ref 22–28)
HCOV 229E RNA SPEC QL NAA+PROBE: NOT DETECTED
HCOV HKU1 RNA SPEC QL NAA+PROBE: NOT DETECTED
HCOV NL63 RNA SPEC QL NAA+PROBE: NOT DETECTED
HCOV OC43 RNA SPEC QL NAA+PROBE: NOT DETECTED
HCT VFR BLD AUTO: 26.1 % (ref 34–46.6)
HEMOCCULT STL QL: POSITIVE
HEMODILUTION: NO
HGB BLD-MCNC: 8 G/DL (ref 12–15.9)
HMPV RNA NPH QL NAA+NON-PROBE: NOT DETECTED
HPIV1 RNA ISLT QL NAA+PROBE: NOT DETECTED
HPIV2 RNA SPEC QL NAA+PROBE: NOT DETECTED
HPIV3 RNA NPH QL NAA+PROBE: NOT DETECTED
HPIV4 P GENE NPH QL NAA+PROBE: NOT DETECTED
IMM GRANULOCYTES # BLD AUTO: 0.08 10*3/MM3 (ref 0–0.05)
IMM GRANULOCYTES NFR BLD AUTO: 0.6 % (ref 0–0.5)
LEFT ATRIUM VOLUME INDEX: 40.4 ML/M2
LEFT ATRIUM VOLUME: 65 ML
LYMPHOCYTES # BLD AUTO: 0.22 10*3/MM3 (ref 0.7–3.1)
LYMPHOCYTES NFR BLD AUTO: 1.6 % (ref 19.6–45.3)
M PNEUMO IGG SER IA-ACNC: NOT DETECTED
MCH RBC QN AUTO: 29.5 PG (ref 26.6–33)
MCHC RBC AUTO-ENTMCNC: 30.7 G/DL (ref 31.5–35.7)
MCV RBC AUTO: 96.3 FL (ref 79–97)
MODALITY: ABNORMAL
MONOCYTES # BLD AUTO: 0.59 10*3/MM3 (ref 0.1–0.9)
MONOCYTES NFR BLD AUTO: 4.3 % (ref 5–12)
NEUTROPHILS NFR BLD AUTO: 12.63 10*3/MM3 (ref 1.7–7)
NEUTROPHILS NFR BLD AUTO: 92.7 % (ref 42.7–76)
NOROVIRUS GI+II RNA STL QL NAA+NON-PROBE: NOT DETECTED
NRBC BLD AUTO-RTO: 0 /100 WBC (ref 0–0.2)
P SHIGELLOIDES DNA STL QL NAA+NON-PROBE: NOT DETECTED
PCO2 BLDA: 30.9 MM HG (ref 35–45)
PH BLDA: 7.48 PH UNITS (ref 7.35–7.45)
PLATELET # BLD AUTO: 369 10*3/MM3 (ref 140–450)
PMV BLD AUTO: 8.8 FL (ref 6–12)
PO2 BLDA: 118.8 MM HG (ref 80–100)
POTASSIUM SERPL-SCNC: 3.3 MMOL/L (ref 3.5–5.2)
POTASSIUM SERPL-SCNC: 4.5 MMOL/L (ref 3.5–5.2)
PROT SERPL-MCNC: 5.7 G/DL (ref 6–8.5)
QT INTERVAL: 314 MS
QTC INTERVAL: 457 MS
RBC # BLD AUTO: 2.71 10*6/MM3 (ref 3.77–5.28)
RHINOVIRUS RNA SPEC NAA+PROBE: NOT DETECTED
RSV RNA NPH QL NAA+NON-PROBE: NOT DETECTED
RVA RNA STL QL NAA+NON-PROBE: NOT DETECTED
S ENT+BONG DNA STL QL NAA+NON-PROBE: NOT DETECTED
SAO2 % BLDCOA: 99 % (ref 92–98.5)
SAPO I+II+IV+V RNA STL QL NAA+NON-PROBE: NOT DETECTED
SARS-COV-2 RNA NPH QL NAA+NON-PROBE: NOT DETECTED
SHIGELLA SP+EIEC IPAH ST NAA+NON-PROBE: NOT DETECTED
SINUS: 3.3 CM
SODIUM SERPL-SCNC: 136 MMOL/L (ref 136–145)
TOTAL RATE: 18 BREATHS/MINUTE
TSH SERPL DL<=0.05 MIU/L-ACNC: 1.08 UIU/ML (ref 0.27–4.2)
V CHOL+PARA+VUL DNA STL QL NAA+NON-PROBE: NOT DETECTED
V CHOLERAE DNA STL QL NAA+NON-PROBE: NOT DETECTED
WBC NRBC COR # BLD AUTO: 13.63 10*3/MM3 (ref 3.4–10.8)
Y ENTEROCOL DNA STL QL NAA+NON-PROBE: NOT DETECTED

## 2024-08-11 PROCEDURE — 93010 ELECTROCARDIOGRAM REPORT: CPT | Performed by: INTERNAL MEDICINE

## 2024-08-11 PROCEDURE — 97162 PT EVAL MOD COMPLEX 30 MIN: CPT

## 2024-08-11 PROCEDURE — 93306 TTE W/DOPPLER COMPLETE: CPT | Performed by: INTERNAL MEDICINE

## 2024-08-11 PROCEDURE — 94799 UNLISTED PULMONARY SVC/PX: CPT

## 2024-08-11 PROCEDURE — 94761 N-INVAS EAR/PLS OXIMETRY MLT: CPT

## 2024-08-11 PROCEDURE — 97110 THERAPEUTIC EXERCISES: CPT

## 2024-08-11 PROCEDURE — 99223 1ST HOSP IP/OBS HIGH 75: CPT | Performed by: INTERNAL MEDICINE

## 2024-08-11 PROCEDURE — 84443 ASSAY THYROID STIM HORMONE: CPT | Performed by: NURSE PRACTITIONER

## 2024-08-11 PROCEDURE — 94640 AIRWAY INHALATION TREATMENT: CPT

## 2024-08-11 PROCEDURE — 36600 WITHDRAWAL OF ARTERIAL BLOOD: CPT

## 2024-08-11 PROCEDURE — 63710000001 MYCOPHENOLATE MOFETIL PER 250 MG: Performed by: INTERNAL MEDICINE

## 2024-08-11 PROCEDURE — 87507 IADNA-DNA/RNA PROBE TQ 12-25: CPT | Performed by: NURSE PRACTITIONER

## 2024-08-11 PROCEDURE — 82803 BLOOD GASES ANY COMBINATION: CPT

## 2024-08-11 PROCEDURE — 25010000002 DIGOXIN PER 500 MCG: Performed by: INTERNAL MEDICINE

## 2024-08-11 PROCEDURE — 0202U NFCT DS 22 TRGT SARS-COV-2: CPT | Performed by: NURSE PRACTITIONER

## 2024-08-11 PROCEDURE — 83605 ASSAY OF LACTIC ACID: CPT | Performed by: INTERNAL MEDICINE

## 2024-08-11 PROCEDURE — 25510000001 PERFLUTREN 6.52 MG/ML SUSPENSION 2 ML VIAL: Performed by: INTERNAL MEDICINE

## 2024-08-11 PROCEDURE — 87493 C DIFF AMPLIFIED PROBE: CPT

## 2024-08-11 PROCEDURE — 94760 N-INVAS EAR/PLS OXIMETRY 1: CPT

## 2024-08-11 PROCEDURE — 93005 ELECTROCARDIOGRAM TRACING: CPT | Performed by: NURSE PRACTITIONER

## 2024-08-11 PROCEDURE — 82272 OCCULT BLD FECES 1-3 TESTS: CPT | Performed by: NURSE PRACTITIONER

## 2024-08-11 PROCEDURE — 93306 TTE W/DOPPLER COMPLETE: CPT

## 2024-08-11 PROCEDURE — 25810000003 SODIUM CHLORIDE 0.9 % SOLUTION: Performed by: INTERNAL MEDICINE

## 2024-08-11 PROCEDURE — 85025 COMPLETE CBC W/AUTO DIFF WBC: CPT | Performed by: INTERNAL MEDICINE

## 2024-08-11 PROCEDURE — 80053 COMPREHEN METABOLIC PANEL: CPT | Performed by: INTERNAL MEDICINE

## 2024-08-11 PROCEDURE — 84132 ASSAY OF SERUM POTASSIUM: CPT | Performed by: INTERNAL MEDICINE

## 2024-08-11 RX ORDER — DIGOXIN 0.25 MG/ML
250 INJECTION INTRAMUSCULAR; INTRAVENOUS ONCE
Status: COMPLETED | OUTPATIENT
Start: 2024-08-11 | End: 2024-08-11

## 2024-08-11 RX ORDER — METOPROLOL TARTRATE 25 MG/1
25 TABLET, FILM COATED ORAL 2 TIMES DAILY
Status: DISCONTINUED | OUTPATIENT
Start: 2024-08-11 | End: 2024-08-12

## 2024-08-11 RX ORDER — SODIUM CHLORIDE 9 MG/ML
125 INJECTION, SOLUTION INTRAVENOUS CONTINUOUS
Status: DISCONTINUED | OUTPATIENT
Start: 2024-08-11 | End: 2024-08-12

## 2024-08-11 RX ORDER — GUAIFENESIN 600 MG/1
600 TABLET, EXTENDED RELEASE ORAL EVERY 12 HOURS SCHEDULED
Status: DISCONTINUED | OUTPATIENT
Start: 2024-08-11 | End: 2024-08-20 | Stop reason: HOSPADM

## 2024-08-11 RX ORDER — POTASSIUM CHLORIDE 750 MG/1
40 TABLET, FILM COATED, EXTENDED RELEASE ORAL EVERY 4 HOURS
Status: COMPLETED | OUTPATIENT
Start: 2024-08-11 | End: 2024-08-11

## 2024-08-11 RX ADMIN — BUSPIRONE HYDROCHLORIDE 10 MG: 10 TABLET ORAL at 21:29

## 2024-08-11 RX ADMIN — MYCOPHENOLATE MOFETIL 1500 MG: 250 CAPSULE ORAL at 21:28

## 2024-08-11 RX ADMIN — AZELASTINE HYDROCHLORIDE 2 SPRAY: 137 SPRAY, METERED NASAL at 21:29

## 2024-08-11 RX ADMIN — ACETAMINOPHEN 325MG 650 MG: 325 TABLET ORAL at 22:38

## 2024-08-11 RX ADMIN — GUAIFENESIN 600 MG: 600 TABLET, EXTENDED RELEASE ORAL at 13:24

## 2024-08-11 RX ADMIN — DULOXETINE HYDROCHLORIDE 60 MG: 60 CAPSULE, DELAYED RELEASE ORAL at 08:35

## 2024-08-11 RX ADMIN — POTASSIUM CHLORIDE 40 MEQ: 750 TABLET, EXTENDED RELEASE ORAL at 03:49

## 2024-08-11 RX ADMIN — ACETAMINOPHEN 325MG 650 MG: 325 TABLET ORAL at 14:46

## 2024-08-11 RX ADMIN — BUDESONIDE AND FORMOTEROL FUMARATE DIHYDRATE 2 PUFF: 160; 4.5 AEROSOL RESPIRATORY (INHALATION) at 21:54

## 2024-08-11 RX ADMIN — ACETAMINOPHEN 325MG 650 MG: 325 TABLET ORAL at 03:03

## 2024-08-11 RX ADMIN — ACETAMINOPHEN 325MG 650 MG: 325 TABLET ORAL at 18:28

## 2024-08-11 RX ADMIN — Medication 10 ML: at 08:36

## 2024-08-11 RX ADMIN — RIVAROXABAN 20 MG: 20 TABLET, FILM COATED ORAL at 18:29

## 2024-08-11 RX ADMIN — PANTOPRAZOLE SODIUM 40 MG: 40 TABLET, DELAYED RELEASE ORAL at 08:35

## 2024-08-11 RX ADMIN — PERFLUTREN 2 ML: 6.52 INJECTION, SUSPENSION INTRAVENOUS at 16:06

## 2024-08-11 RX ADMIN — DIGOXIN 250 MCG: 0.25 INJECTION INTRAMUSCULAR; INTRAVENOUS at 17:11

## 2024-08-11 RX ADMIN — AZELASTINE HYDROCHLORIDE 2 SPRAY: 137 SPRAY, METERED NASAL at 08:39

## 2024-08-11 RX ADMIN — RALOXIFENE HYDROCHLORIDE 60 MG: 60 TABLET, FILM COATED ORAL at 21:29

## 2024-08-11 RX ADMIN — METOPROLOL TARTRATE 25 MG: 25 TABLET, FILM COATED ORAL at 22:38

## 2024-08-11 RX ADMIN — POTASSIUM CHLORIDE 40 MEQ: 750 TABLET, EXTENDED RELEASE ORAL at 08:35

## 2024-08-11 RX ADMIN — PRAVASTATIN SODIUM 20 MG: 20 TABLET ORAL at 21:29

## 2024-08-11 RX ADMIN — BUSPIRONE HYDROCHLORIDE 10 MG: 10 TABLET ORAL at 08:34

## 2024-08-11 RX ADMIN — MONTELUKAST SODIUM 10 MG: 10 TABLET, FILM COATED ORAL at 21:29

## 2024-08-11 RX ADMIN — ASPIRIN 324 MG: 81 TABLET, CHEWABLE ORAL at 08:34

## 2024-08-11 RX ADMIN — DULOXETINE HYDROCHLORIDE 60 MG: 60 CAPSULE, DELAYED RELEASE ORAL at 21:29

## 2024-08-11 RX ADMIN — ACETAMINOPHEN 325MG 650 MG: 325 TABLET ORAL at 08:33

## 2024-08-11 RX ADMIN — BUDESONIDE AND FORMOTEROL FUMARATE DIHYDRATE 2 PUFF: 160; 4.5 AEROSOL RESPIRATORY (INHALATION) at 11:38

## 2024-08-11 RX ADMIN — MYCOPHENOLATE MOFETIL 1000 MG: 250 CAPSULE ORAL at 05:47

## 2024-08-11 RX ADMIN — DIGOXIN 250 MCG: 0.25 INJECTION INTRAMUSCULAR; INTRAVENOUS at 13:11

## 2024-08-11 RX ADMIN — GUAIFENESIN 600 MG: 600 TABLET, EXTENDED RELEASE ORAL at 22:38

## 2024-08-11 RX ADMIN — TIOTROPIUM BROMIDE INHALATION SPRAY 2 PUFF: 3.12 SPRAY, METERED RESPIRATORY (INHALATION) at 11:38

## 2024-08-11 RX ADMIN — Medication 10 ML: at 22:40

## 2024-08-11 RX ADMIN — LEVOTHYROXINE SODIUM 88 MCG: 88 TABLET ORAL at 05:47

## 2024-08-11 RX ADMIN — SODIUM CHLORIDE 125 ML/HR: 9 INJECTION, SOLUTION INTRAVENOUS at 18:29

## 2024-08-11 NOTE — PROGRESS NOTES
Name: Olena Myers ADMIT: 8/10/2024   : 1935  PCP: Manda Keenan MD    MRN: [id number] LOS: 1 days   AGE/SEX: 88 y.o. female  ROOM: [facility] (formerly [facility])     Subjective   Subjective     Patient reports feels about the same as when seen in ED.  Still breathless with conversation or any activity.  Denies any chest pain, orthopnea, nausea, or vomiting.  Reports nonproductive cough.  Feels like it is there she just cannot get it up.  Denies any hemoptysis, fever, or chills.  Does reports she can tell that her heart has been racing more often.  States she did have echocardiogram around August of last year with Dr. Hernandez.  Does report 2 episodes of very soft stool today.  She states over the last 2 weeks she has had on and off again loose watery stools.  She has had issues with progressive worsening of her shortness of breath over the last 4 months and at 1 point this past summer she was treated for acute bronchitis with antibiotics Omnicef and guaifenesin with only mild improvement in her symptoms.  Son states she is breathless with any activity using a walker at her assisted living facility.  He has noticed a progressive decline in her functional status over the last 4 months.  She states this past week she has felt more weak and more short of breath along with upper abdominal pain and loose stools.         Objective   Objective   Vital Signs  Temp:  [97.3 °F (36.3 °C)-98.2 °F (36.8 °C)] 98.2 °F (36.8 °C)  Heart Rate:  [] 118  Resp:  [18-20] 18  BP: ()/(51-90) 96/60  SpO2:  [90 %-100 %] 97 %  on  Flow (L/min):  [2] 2;   Device (Oxygen Therapy): nasal cannula  Body mass index is 27.34 kg/m².    Physical Exam  Vitals and nursing note reviewed.   Constitutional:       General: She is not in acute distress.     Appearance: She is obese. She is ill-appearing.      Comments: Cushingoid appearance   Eyes:      General: No scleral icterus.     Conjunctiva/sclera: Conjunctivae normal.   Cardiovascular:      Rate and  Rhythm: Tachycardia present. Rhythm irregular.      Pulses: Normal pulses.      Heart sounds: Murmur (murmur noted right upper sternal border 2 /6) heard.   Pulmonary:      Breath sounds: Rhonchi present.      Comments: Conversational Breathlessness, 2 liters  Abdominal:      General: Bowel sounds are normal. There is no distension.      Palpations: Abdomen is soft.      Tenderness: There is no abdominal tenderness.   Musculoskeletal:         General: Deformity (left hand arthritic changes noted) present.      Right lower leg: Edema present.      Left lower leg: Edema present.      Comments: Trace Lower extremity edema bilaterally     Skin:     General: Skin is warm and dry.      Capillary Refill: Capillary refill takes less than 2 seconds.      Comments: Right lower leg dressing in place, D/I.   Neurological:      General: No focal deficit present.      Mental Status: She is alert and oriented to person, place, and time. Mental status is at baseline.      Motor: Weakness (generalized weakness) present.   Psychiatric:         Mood and Affect: Mood normal.         Behavior: Behavior normal.         Thought Content: Thought content normal.         Judgment: Judgment normal.             Results Review  I reviewed the patient's new clinical results.  Results from last 7 days   Lab Units 08/11/24  0303 08/10/24  0945   WBC 10*3/mm3 13.63* 15.79*   HEMOGLOBIN g/dL 8.0* 8.3*   PLATELETS 10*3/mm3 369 426     Results from last 7 days   Lab Units 08/11/24  0303 08/10/24  0945   SODIUM mmol/L 136 138   POTASSIUM mmol/L 3.3* 3.4*   CHLORIDE mmol/L 97* 96*   CO2 mmol/L 28.0 27.0   BUN mg/dL 19 22   CREATININE mg/dL 0.88 0.96   GLUCOSE mg/dL 119* 122*     Lab Results   Component Value Date    ANIONGAP 11.0 08/11/2024     Estimated Creatinine Clearance: 38.2 mL/min (by C-G formula based on SCr of 0.88 mg/dL).   Lab Results   Component Value Date    EGFR 64.1 08/11/2024     Results from last 7 days   Lab Units 08/11/24  0303  "08/10/24  0945   ALBUMIN g/dL 3.7 4.1   BILIRUBIN mg/dL 0.5 0.4   ALK PHOS U/L 69 73   AST (SGOT) U/L 9 16   ALT (SGPT) U/L 5 7     Results from last 7 days   Lab Units 08/11/24  0303 08/10/24  0945   CALCIUM mg/dL 8.6 9.1   ALBUMIN g/dL 3.7 4.1   MAGNESIUM mg/dL  --  2.3     Results from last 7 days   Lab Units 08/11/24  0303   LACTATE mmol/L 1.3     No results found for: \"HGBA1C\", \"POCGLU\"    CT Abdomen Pelvis Without Contrast    Result Date: 8/10/2024    1. Distended gallbladder.  2. No urolithiasis or hydronephrosis identified). Assessment of the pelvis is limited by hardware artifact).  3. Colonic diverticulosis. No acute inflammatory process of bowel is identified.  4. Age-indeterminate L1 compression deformity, correlate clinically.    This report was finalized on 8/10/2024 1:43 PM by Dr. José Manuel Anaya M.D on Workstation: [employer]      XR Chest 1 View    Result Date: 8/10/2024  No focal pulmonary consolidation. Mild cardiomegaly. Follow-up as clinical indications persist.  This report was finalized on 8/10/2024 10:51 AM by Dr. José Manuel Anaya M.D on Workstation: [employer]         Current Facility-Administered Medications:     acetaminophen (TYLENOL) tablet 650 mg, 650 mg, Oral, Q4H PRN, 650 mg at 08/11/24 0833 **OR** acetaminophen (TYLENOL) 160 MG/5ML oral solution 650 mg, 650 mg, Oral, Q4H PRN **OR** acetaminophen (TYLENOL) suppository 650 mg, 650 mg, Rectal, Q4H PRN, Luz Taylor MD    albuterol (PROVENTIL) nebulizer solution 0.083% 2.5 mg/3mL, 2.5 mg, Nebulization, Q6H PRN, Luz Taylor MD    aspirin chewable tablet 324 mg, 324 mg, Oral, Daily, Luz Taylor MD, 324 mg at 08/11/24 0834    azelastine (ASTELIN) nasal spray 2 spray, 2 spray, Each Nare, BID, Luz Taylor MD, 2 spray at 08/11/24 0839    budesonide-formoterol (SYMBICORT) 160-4.5 MCG/ACT inhaler 2 puff, 2 puff, Inhalation, BID - RT **AND** tiotropium (SPIRIVA RESPIMAT) 2.5 mcg/act aerosol solution inhaler, 2 puff, Inhalation, Daily - " RT, Luz Taylor MD    busPIRone (BUSPAR) tablet 10 mg, 10 mg, Oral, BID, Luz Taylor MD, 10 mg at 08/11/24 0834    Calcium Replacement - Follow Nurse / BPA Driven Protocol, , Does not apply, PRN, Luz Taylor MD    DULoxetine (CYMBALTA) DR capsule 60 mg, 60 mg, Oral, BID, Luz Taylor MD, 60 mg at 08/11/24 0835    ferrous sulfate tablet 325 mg, 325 mg, Oral, Daily With Breakfast, Luz Taylor MD    levothyroxine (SYNTHROID, LEVOTHROID) tablet 88 mcg, 88 mcg, Oral, Q AM, Luz Taylor MD, 88 mcg at 08/11/24 0547    losartan (COZAAR) tablet 25 mg, 25 mg, Oral, Q24H, Luz Taylor MD, 25 mg at 08/10/24 2253    Magnesium Standard Dose Replacement - Follow Nurse / BPA Driven Protocol, , Does not apply, PRN, Luz Taylor MD    metoprolol tartrate (LOPRESSOR) tablet 25 mg, 25 mg, Oral, Q12H, Luz Taylor MD, 25 mg at 08/10/24 1758    montelukast (SINGULAIR) tablet 10 mg, 10 mg, Oral, Nightly, Luz Taylor MD, 10 mg at 08/10/24 2253    mycophenolate (CELLCEPT) capsule 1,000 mg, 1,000 mg, Oral, QAM, Luz Taylor MD, 1,000 mg at 08/11/24 0547    mycophenolate (CELLCEPT) capsule 1,500 mg, 1,500 mg, Oral, Nightly, Luz Taylor MD, 1,500 mg at 08/10/24 2252    nitroglycerin (NITROSTAT) SL tablet 0.4 mg, 0.4 mg, Sublingual, Q5 Min PRN, Luz Taylor MD    ondansetron (ZOFRAN) injection 4 mg, 4 mg, Intravenous, Q6H PRN, Luz Taylor MD    pantoprazole (PROTONIX) EC tablet 40 mg, 40 mg, Oral, Daily, Luz Taylor MD, 40 mg at 08/11/24 0835    Phosphorus Replacement - Follow Nurse / BPA Driven Protocol, , Does not apply, PRNBrandon Jawed, MD    Potassium Replacement - Follow Nurse / BPA Driven Protocol, , Does not apply, PRNBrandon Jawed, MD    pravastatin (PRAVACHOL) tablet 20 mg, 20 mg, Oral, Nightly, Luz Taylor MD    pravastatin (PRAVACHOL) tablet 20 mg, 20 mg, Oral, Nightly, Luz Taylor MD, 20 mg at 08/10/24 2026    predniSONE (DELTASONE) tablet 5 mg, 5 mg, Oral, Daily, Luz Taylor MD, 5 mg at 08/10/24  2253    raloxifene (EVISTA) tablet 60 mg, 60 mg, Oral, Nightly, Luz Taylor MD, 60 mg at 08/10/24 2252    rivaroxaban (XARELTO) tablet 20 mg, 20 mg, Oral, Daily With Dinner, Luz Taylor MD, 20 mg at 08/10/24 2026    [COMPLETED] Insert Peripheral IV, , , Once **AND** sodium chloride 0.9 % flush 10 mL, 10 mL, Intravenous, PRN, Luz Taylor MD    sodium chloride 0.9 % flush 10 mL, 10 mL, Intravenous, Q12H, Luz Taylor MD, 10 mL at 08/11/24 0836    sodium chloride 0.9 % flush 10 mL, 10 mL, Intravenous, PRN, Luz Taylor MD    sodium chloride 0.9 % infusion 40 mL, 40 mL, Intravenous, PRN, Luz Taylor MD    torsemide (DEMADEX) tablet 20 mg, 20 mg, Oral, Daily, Luz Taylor MD    vitamin B-12 (CYANOCOBALAMIN) tablet 1,000 mcg, 1,000 mcg, Oral, Daily, Luz Taylor MD    [START ON 8/14/2024] vitamin D (ERGOCALCIFEROL) capsule 50,000 Units, 50,000 Units, Oral, Q7 Days, Luz Taylor MD       Diet  Diet: Cardiac, Diabetic, Renal; Healthy Heart (2-3 Na+); Consistent Carbohydrate; Low Sodium (2-3g), Low Potassium, Low Phosphorus; Fluid Consistency: Thin (IDDSI 0)      8/10/24  AFib RVR with LVH pattern when compared to prior EKG is now in AFib           8/10/24  ED EKG  - SR with LAFB      1/10/24                   Echo   Interpretation Summary 7/5/2021    Calculated left ventricular EF = 69.5% Estimated left ventricular EF = 70% Estimated left ventricular EF was in agreement with the calculated left ventricular EF. Left ventricular systolic function is normal. The left ventricular cavity is small in size. Left ventricular wall thickness is consistent with moderate concentric hypertrophy. All left ventricular wall segments contract normally. Left ventricular diastolic function is consistent with (grade II w/high LAP) pseudonormalization.  Left atrial volume is severely increased. The right atrial cavity is moderate to severely dilated.  There is severe calcification of the aortic valve. The aortic valve appears  trileaflet. Mild to moderate aortic valve regurgitation is present. No aortic valve stenosis is present.  No mitral valve regurgitation or significant stenosis is present. Severe mitral annular calcification is present. There is mild, bileaflet mitral valve thickening present.  Mild tricuspid valve regurgitation is present. Calculated right ventricular systolic pressure from tricuspid regurgitation is 35 mmHg. There is no significant pulmonary hypertension       Assessment/Plan     Active Hospital Problems    Diagnosis  POA    **Exertional dyspnea [R06.09]  Yes    Hypothyroidism (acquired) [E03.9]  Yes    Diastolic CHF, chronic [I50.32]  Yes    COPD (chronic obstructive pulmonary disease) [J44.9]  Yes    Sleep apnea [G47.30]  Yes    Atrial fibrillation [I48.91]  Yes    Myasthenia gravis [G70.00]  Yes    Paroxysmal atrial fibrillation [I48.0]  Yes    CAD (coronary artery disease) [I25.10]  Yes    Essential hypertension [I10]  Yes      Resolved Hospital Problems   No resolved problems to display.     88 y.o. female lives in assisted living with past medical history including history of myasthenia gravis on immunosuppressive therapy,  TIA, hypertension, hyponatremia, chronic congestive heart failure, GI bleed and gastric ulcer, chronic afib on anticoagulation,   COPD and asthma. Came to [facility] ED for nausea and abd discomfort for a weeks and [facility] for 4 months.  Workup in the emergency room revealed room air oxygen saturation of 88% and CT scan of the abdomen pelvis performed for abdominal discomfort and dry heaving showed distended gallbladder no structural abnormality of the  tract and chronic diverticulosis and age-indeterminate L1 fracture.        Abd pain and nausea  - CT scan of the abdomen pelvis performed for abdominal discomfort and dry heaving showed distended gallbladder no structural abnormality of the  tract and chronic diverticulosis and age-indeterminate L1 fracture.    - has had two soft formed BM's  today.  Last 2 weeks she reports loose watery stools and abdominal cramping.  States she felt the last couple days it was somewhat improved until today she has begun stooling again.  -Denies any sick contacts but does live in an assisted living center.  -No nausea or vomiting.  She reports that her appetite is good though family in the room state they have noticed a significant decline in her appetite over the last few weeks.  - abd exam is benign  - check stool PCR  - she denies use of stool softeners as outpt.     Exertional Dyspnea  - progressive over 4 months.  -She is followed by Dr. Tirado and was last seen at the beginning of the year she thinks though I am unable to find that office visit documentation.  - chest xray no focal consolidation.  Mild cardiomegaly noted with normal pulmonary vasculature.   - likely multi-factoral.  Deconditioning, HF, COPD, A-fib, myasthenia gravis  - conversational dyspnea noted, scattered rhonchi on exam.      Chronic hypoxic respiratory failure/ COPD/ Asthma  - is on 02 at home   - uses trelegy, albuterol, and singulair at home  - flutter valve to bedside  - continue nebs and inhaler   -  Check RVP  - - will ask Dr. Tirado to see.  - add mucinex.     Leukocytosis  - WBC count elevated on admit 15.79, trended down 13.63 today.    - She is on chronic prednisone therapy which can cause some leukocytosis.  Appears that she was normal on her white blood cell count January 2024 was elevated.   - lactate normal  - monitor and trend.  No current indication for antibiotic therapy.     Anemia of chronic disease  - Hgb 8.0.  Looks like she trends upper 8 to low 9.     - She is followed by [facility] as outpt.  Was placed on oral iron but was intolerant d/t constipation.  [facility] has been doing intermittent IRON infusions but she reports it has been sometime since she has been seen or received iron transfusion.   - check FOBS and iron profile, b12, folate  - transfuse if < 7.0 or hemodynamically  unstable.      Chronic HFpEF/ CAD/ HTN/  Valvular Heart Disease  - is followed by Dr. Hernandez at [facility] last seen 10/10/23  - Trop elevated with normal delta; 12 lead EKG no acute ischemic changes. EKG stable from 1/2024. I ordered a repeat EKG this am in setting of AFib RVR.    - continue home avapro (non-formulary) and Metoprolol as outpt.  Currently on losartan and metoprolol Here.   - Takes torsemide 20 mg daily as outpt  - proBNP normal on admit, chest xray on admit mild cardiomegaly and normal pulmonary vasculature.  She does not  very volume overloaded on exam.    - Continue ASA and pravastatin  - last echo she reports was at Dr. Veronica office fall of 2023.  She has been told she has a heart murmur in the past.  murmur noted on exam today.   - Echo 7/5/21 showed EF 70% with moderate concentric LVH. Mild to moderate AI   with grade 2 diastolic dysfunction    Afib on AC  - continue xarelto  - She is followed by DR. Hernandez  - 12 lead EKG appears to be SR with PAC's.  She is now in AFib RVR on tele.    - repeat 12 lead EKG  showing AFib RVR today.   Low SBP 90's  have limited use of her scheduled metoprolol.    - await cardiology consult    Myasthenia gravis dx 2011  - on cellcept and prednisone  - 2/2021 allergic reaction to IVIG  - limited mobility.  Uses walker but will use W/C for long distances.     Hypothyroidism  -continue thyroid replacement  - 1/2024 TSH normal  - with afib RVR will recheck tsh with reflex free t4.     Hypokalemia   - replacement per protocol.   - mag 2.3 8/10     PT/ OT      DVT prophylaxis  Xarelto (home med)    Discharge  TBD  Expected discharge date/ time has not been documented.    Discussed with patient and nursing staff    MILA Escobar  [facility]

## 2024-08-11 NOTE — PLAN OF CARE
Goal Outcome Evaluation:  Plan of Care Reviewed With: patient   VSS. Pt states shortness of air has not changed from arrival to hospital. Stable on 2L NC. Had periods of anxiety at the beginning of the shift, but calmed down and had a restful night. Safety maintained.      Progress: improving

## 2024-08-11 NOTE — CONSULTS
Date of Hospital Visit: 8/10/2024  Date of consult: 24  Encounter Provider: Ashish Scanlon MD  Place of Service: [facility] CARDIOLOGY  Patient Name: Olena Myers  :1935  Referral Provider: No ref. provider found    Chief complaint: dyspnea    Reason for consult:     History of Present Illness  Olena Myers is an 88-year-old female, she was followed by Dr. Olvin Hernandez, last seen in office by Aimee Montalvo in .  She currently is to Have a Heart patient. She has a prior medical history of CHF, COPD, myasthenia gravis, hypertension, hyperlipidemia,  coronary artery disease, paroxysmal A-fib, aortic valve stenosis.  She has a history of a previous angioplasty with a bare-metal stent placed in the LAD, her left ventricular systolic function at that time was normal, as well as her follow-up perfusion stress test. After having a CVA and being treated at [facility] she was found to be in A-fib and placed on a beta-blocker she converted after being treated with amiodarone which was later stopped because of evidence of fibrosis and increased COPD exacerbation.  She presents to our ER complaining of worsening shortness of breath x 2 weeks, she does have a history of COPD and CHF she is chronically on 2 L of oxygen.  This morning her dyspnea worsened associated with some dry heaves and upper abdominal pain.  Which she states is similar to when she had her stent placed 30 years ago.  EKG: Atrial fibrillation   Left ventricular hypertrophy  Inferior infarct, old  Initial troponin 41, repeat 38 delta -3 BNP 1417  Potassium 3.3  Hemoglobin 8.0, hematocrit 26.1    ECHO 21  Calculated left ventricular EF = 69.5% Estimated left ventricular EF = 70% Estimated left ventricular EF was in agreement with the calculated left ventricular EF. Left ventricular systolic function is normal. The left ventricular cavity is small in size. Left ventricular wall thickness is  consistent with moderate concentric hypertrophy. All left ventricular wall segments contract normally. Left ventricular diastolic function is consistent with (grade II w/high LAP) pseudonormalization.  Left atrial volume is severely increased. The right atrial cavity is moderate to severely dilated.  There is severe calcification of the aortic valve. The aortic valve appears trileaflet. Mild to moderate aortic valve regurgitation is present. No aortic valve stenosis is present.  No mitral valve regurgitation or significant stenosis is present. Severe mitral annular calcification is present. There is mild, bileaflet mitral valve thickening present.  Mild tricuspid valve regurgitation is present. Calculated right ventricular systolic pressure from tricuspid regurgitation is 35 mmHg. There is no significant pulmonary hypertension    More recent test results will be ordered from [facility] when it opens on Monday.     Past Medical History:   Diagnosis Date    Anemia     IRON DEFICIENCY    Arthritis     Asthma     Atrial fibrillation     CAD (coronary artery disease)     HEART STENT    COPD (chronic obstructive pulmonary disease)     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    Frequent urinary tract infections     History of gastric ulcer     History of GI bleed     [place]     Hyperlipidemia     Hypertension     Hyponatremia     Lightheadedness     LVH (left ventricular hypertrophy)     MG (myasthenia gravis)     Mini stroke 10/2016, 3/2017    OA (osteoarthritis)     Osteoporosis     Sleep apnea     USES CPAP    SOB (shortness of breath)     WALKING       Past Surgical History:   Procedure Laterality Date    APPENDECTOMY      BRONCHOSCOPY N/A 3/31/2022    Procedure: BRONCHOSCOPY WITH BAL RIGHT LOWER LOBE;  Surgeon: Remy Tirado MD;  Location: [facility] ENDOSCOPY;  Service: Pulmonary;  Laterality: N/A;  COPD EXACERBATION      CARPAL TUNNEL RELEASE Bilateral     CATARACT EXTRACTION Bilateral     CORONARY  STENT PLACEMENT      TOTAL HIP ARTHROPLASTY Bilateral     TOTAL SHOULDER ARTHROPLASTY W/ DISTAL CLAVICLE EXCISION Left 7/19/2016    Procedure: LT TOTAL SHOULDER REVERSE ARTHROPLASTY;  Surgeon: NAHOMI Solorzano MD;  [facility] [place];  Service:     TOTAL SHOULDER ARTHROPLASTY W/ DISTAL CLAVICLE EXCISION Right 1/8/2019    Procedure: RIGHT TOTAL SHOULDER REVERSE ARTHROPLASTY;  Surgeon: Jovanny Solorzano MD;  Location: [facility] [place];  Service: Orthopedics       Medications Prior to Admission   Medication Sig Dispense Refill Last Dose    acetaminophen (TYLENOL) 325 MG tablet Take 2 tablets by mouth Every 4 (Four) Hours As Needed for Mild Pain.   8/10/2024    albuterol (PROVENTIL) (2.5 MG/3ML) 0.083% nebulizer solution Take 2.5 mg by nebulization Every 4 (Four) Hours As Needed for Wheezing.   8/10/2024    albuterol sulfate  (90 Base) MCG/ACT inhaler Inhale 2 puffs Every 6 (Six) Hours As Needed for Wheezing or Shortness of Air.   8/10/2024    azelastine (ASTELIN) 0.1 % nasal spray 2 sprays into the nostril(s) as directed by provider 2 (Two) Times a Day. Use in each nostril as directed   8/10/2024    busPIRone (BUSPAR) 10 MG tablet Take 1 tablet by mouth 2 (Two) Times a Day.   8/9/2024    DULoxetine (CYMBALTA) 60 MG capsule Take 1 capsule by mouth 2 (Two) Times a Day.   8/9/2024    ferrous sulfate 325 (65 FE) MG tablet Take 1 tablet by mouth Daily With Breakfast.   8/9/2024    Fluticasone-Umeclidin-Vilant (Trelegy Ellipta) 100-62.5-25 MCG/INH inhaler Inhale 1 puff Daily.   8/10/2024    irbesartan (AVAPRO) 75 MG tablet Take 1 tablet by mouth Every Night.   8/9/2024    levothyroxine (SYNTHROID, LEVOTHROID) 88 MCG tablet Take 1 tablet by mouth Daily.   8/9/2024    montelukast (SINGULAIR) 10 MG tablet Take 1 tablet by mouth Every Night.   8/9/2024    mycophenolate (CELLCEPT) 500 MG tablet Take 2 tablets by mouth Every Morning.   8/9/2024    mycophenolate (CELLCEPT) 500 MG tablet Take 3 tablets by mouth Every  Evening.   8/9/2024    pantoprazole (PROTONIX) 40 MG EC tablet Take 1 tablet by mouth Daily.   8/9/2024    pravastatin (PRAVACHOL) 20 MG tablet Take 1 tablet by mouth Every Night.   8/9/2024    predniSONE (DELTASONE) 10 MG tablet Take 0.5 tablets by mouth Daily.   8/9/2024    raloxifene (EVISTA) 60 MG tablet Take 1 tablet by mouth Every Night.   8/9/2024    rivaroxaban (XARELTO) 20 MG tablet Take 1 tablet by mouth Daily With Dinner.   8/9/2024    torsemide (DEMADEX) 20 MG tablet Take 1 tablet by mouth Daily.   8/9/2024    vitamin B-12 (CYANOCOBALAMIN) 1000 MCG tablet Take 1 tablet by mouth Daily.   8/9/2024    vitamin D (ERGOCALCIFEROL) 43472 UNITS capsule capsule Take 1 capsule by mouth Every 7 (Seven) Days. Takes on Wednesdays   Past Week    metoprolol succinate XL (TOPROL-XL) 25 MG 24 hr tablet Take 1 tablet by mouth 2 (Two) Times a Day. 60 tablet 0        Current Meds  Scheduled Meds:aspirin, 324 mg, Oral, Daily  azelastine, 2 spray, Each Nare, BID  budesonide-formoterol, 2 puff, Inhalation, BID - RT   And  tiotropium bromide monohydrate, 2 puff, Inhalation, Daily - RT  busPIRone, 10 mg, Oral, BID  DULoxetine, 60 mg, Oral, BID  ferrous sulfate, 325 mg, Oral, Daily With Breakfast  guaiFENesin, 600 mg, Oral, Q12H  levothyroxine, 88 mcg, Oral, Q AM  losartan, 25 mg, Oral, Q24H  metoprolol tartrate, 25 mg, Oral, Q12H  montelukast, 10 mg, Oral, Nightly  mycophenolate, 1,000 mg, Oral, QAM  mycophenolate, 1,500 mg, Oral, Nightly  pantoprazole, 40 mg, Oral, Daily  pravastatin, 20 mg, Oral, Nightly  pravastatin, 20 mg, Oral, Nightly  predniSONE, 5 mg, Oral, Daily  raloxifene, 60 mg, Oral, Nightly  rivaroxaban, 20 mg, Oral, Daily With Dinner  sodium chloride, 10 mL, Intravenous, Q12H  torsemide, 20 mg, Oral, Daily  vitamin B-12, 1,000 mcg, Oral, Daily  [START ON 8/14/2024] vitamin D, 50,000 Units, Oral, Q7 Days      Continuous Infusions:   PRN Meds:.  acetaminophen **OR** acetaminophen **OR** acetaminophen    albuterol     "Calcium Replacement - Follow Nurse / BPA Driven Protocol    Magnesium Standard Dose Replacement - Follow Nurse / BPA Driven Protocol    nitroglycerin    ondansetron    Phosphorus Replacement - Follow Nurse / BPA Driven Protocol    Potassium Replacement - Follow Nurse / BPA Driven Protocol    [COMPLETED] Insert Peripheral IV **AND** sodium chloride    sodium chloride    sodium chloride    Allergies as of 08/10/2024 - Reviewed 08/10/2024   Allergen Reaction Noted    Neomycin Anaphylaxis 07/12/2016    Penicillins Swelling and Rash 06/10/2016    Hydrocodone Itching and Rash 11/18/2016    Rosuvastatin Other (See Comments) 08/28/2019       Social History     Socioeconomic History    Marital status:    Tobacco Use    Smoking status: Never    Smokeless tobacco: Never    Tobacco comments:     caffeine - coffee and coke caff. free, tea    Vaping Use    Vaping status: Never Used   Substance and Sexual Activity    Alcohol use: No    Drug use: No     Comment: caffine    Sexual activity: Defer       Family History   Problem Relation Age of Onset    Heart disease Mother     Hyperlipidemia Mother     Stroke Mother     Diabetes Mother     Breast cancer Mother     Heart disease Father     Hyperlipidemia Father     Stroke Father     Malig Hyperthermia Neg Hx        REVIEW OF SYSTEMS:   All systems reviewed and pertinent positives include in HPI otherwise negative review of systems.       Objective:   Temp:  [97.3 °F (36.3 °C)-98.2 °F (36.8 °C)] 98.2 °F (36.8 °C)  Heart Rate:  [] 132  Resp:  [18-20] 20  BP: ()/(51-98) 117/98  Body mass index is 27.34 kg/m².  Flowsheet Rows      Flowsheet Row First Filed Value   Admission Height 152.4 cm (60\") Documented at 08/10/2024 0918   Admission Weight 63.5 kg (140 lb) Documented at 08/10/2024 0918          Vitals:    08/11/24 1315   BP: 117/98   Pulse: (!) 132   Resp: 20   Temp:    SpO2: 95%       General Appearance:    Alert, cooperative, in no acute distress   Head:    " Normocephalic, without obvious abnormality, atraumatic   Eyes:            Lids and lashes normal, conjunctivae and sclerae normal, no   icterus, no pallor, corneas clear, PERRLA   Ears:    Ears appear intact with no abnormalities noted   Throat:   No oral lesions, no thrush, oral mucosa moist   Neck:   No adenopathy, supple, trachea midline, no thyromegaly, no   carotid bruit, no JVD   Back:     No kyphosis present, no scoliosis present, no skin lesions, erythema or scars, no tenderness to percussion or palpation, range of motion normal   Lungs:     Clear to auscultation,respirations regular, even and unlabored    Heart:  Irregularly irregular and tachycardic, normal S1 and S2, no murmur, no gallop, no rub, no click   Chest Wall:    No abnormalities observed   Abdomen:     Normal bowel sounds, no masses, no organomegaly, soft nontender, nondistended, no guarding, no rebound  tenderness   Extremities:   Moves all extremities well, no edema, no cyanosis, no redness   Pulses:   Pulses palpable and equal bilaterally. Normal radial, carotid, femoral, dorsalis pedis and posterior tibial pulses bilaterally. Normal abdominal aorta   Skin:  Neurology:   Psychiatric:   No bleeding, bruising or rash   Normal speech and cranial nerve exam, no focal deficit   Alert and oriented x 3, normal mood and affect             Review of Data:      Results from last 7 days   Lab Units 08/11/24  1237 08/11/24  0303   SODIUM mmol/L  --  136   POTASSIUM mmol/L 4.5 3.3*   CHLORIDE mmol/L  --  97*   CO2 mmol/L  --  28.0   BUN mg/dL  --  19   CREATININE mg/dL  --  0.88   CALCIUM mg/dL  --  8.6   BILIRUBIN mg/dL  --  0.5   ALK PHOS U/L  --  69   ALT (SGPT) U/L  --  5   AST (SGOT) U/L  --  9   GLUCOSE mg/dL  --  119*     Results from last 7 days   Lab Units 08/10/24  1131 08/10/24  0945   HSTROP T ng/L 38* 41*     @LABRCNTbnp@  Results from last 7 days   Lab Units 08/11/24  0303 08/10/24  0945   WBC 10*3/mm3 13.63* 15.79*   HEMOGLOBIN g/dL 8.0*  8.3*   HEMATOCRIT % 26.1* 26.9*   PLATELETS 10*3/mm3 369 426     Results from last 7 days   Lab Units 08/10/24  0945   INR  2.08*     Results from last 7 days   Lab Units 08/10/24  0945   MAGNESIUM mg/dL 2.3     @LABRCNTIP(chol,trig,hdl,ldl)          I personally viewed and interpreted the patient's EKG/Telemetry data  )   Exertional dyspnea    Myasthenia gravis    Paroxysmal atrial fibrillation    Essential hypertension    CAD (coronary artery disease)    Atrial fibrillation    COPD (chronic obstructive pulmonary disease)    Sleep apnea    Diastolic CHF, chronic    Hypothyroidism (acquired)        Assessment and Plan:  Ms. Myers is a pleasant 88 years old lady with past medical history of CAD/stent, paroxysmal atrial fibrillation, COPD on supplemental oxygen at home, myasthenia gravis on immunosuppressive therapy and HFpEF on chronic torsemide came to the ER with progressively worsening exertional shortness of breath of several weeks duration.  She reports intermittent palpitations during these times.  She ambulates using a walker.  ER evaluation significant for  A-fib with RVR, borderline low blood pressure.  At home she is on metoprolol extended release 25 mg p.o. daily, Xarelto and torsemide 20 mg.  Irbesartan was discontinued a while ago.  She was started on losartan last night and today blood pressure was borderline low.  She was sitting on a chair having conversation with family today.  Not volume overloaded. She denies any chest pain  She had echocardiogram today: Hyperdynamic left ventricular with mildly elevated LVOT gradient, IVC appears to be collapsing.  She was tachycardic throughout exam    Exertional shortness of breath probably from atrial fibrillation no evidence for CHF.-   History of paroxysmal atrial fibrillation-in the past treated with amiodarone that was discontinued because of worsened COPD and reported some pulmonary fibrosis.  On low-dose metoprolol and Xarelto at home.  She is compliant  with treatment.    History of CAD and stent. Stable.  No evidence for ACS  Chronic COPD/chronic hypoxemic respiratory failure on supplemental oxygen  Hypercortisolemia pravastatin    I will give her 2 dose of digoxin 250 mcg( if HR not controlled with first dose) BP slightly on the lower side.  I will give her 1L saline.   If BP improves start metoprolol tartrate at lower dose  Continue Xarelto.    Ashish Scanlon MD  08/11/24  16:39 EDT.      Time spent in reviewing chart, discussion and examination:

## 2024-08-11 NOTE — PLAN OF CARE
Goal Outcome Evaluation:         Pt on 3L NC, NS running at 125ml/hr. Pt had several stools, at beginning of shift stools were soft and middle and end stools were liquid. Pt with a fib RVR cadiology notified, given ordered two doses of 250 mcg of digoxin HR at end of shift in high 100s to mid 110s. Lower leg skin tear dressing that was present on admission changed with Petrillium gauze and mepelex. Respiratory panel and stool sample collected on shift. Pain medication given. Safety maintained,

## 2024-08-11 NOTE — CONSULTS
Group: [facility]         CONSULT NOTE    Patient Identification:  Olena Myers  88 y.o.  female  1935  [id number]            Requesting physician: MADINA    Reason for Consultation: SOA    History of Present Illness:  Olena Myers is an 88 year old female with past medical history asthma, COPD on home O2 2L NC, SEDA, myasthenia gravis on immunosuppressant, CHF, HTN, chronic afib on anticoagulation who presented to the ED yesterday from assisted living with complaints of nausea with dry heaving, abd discomfort x weeks and shortness of air x 4 months.  She was found to be in atrial fibrillation, RVR.  CXR negative for any focal consolidation, CT abdomen pelvis with gallbladder distention but no other acute process, clear lung bases and mild atelectasis /scarring.  RVP negative.  Appears supplemental oxygen was uptitrated to 3L NC today.  She was admitted to telemetry with cardiology consult.  We have been consulted for complaints of shortness of air as well.    She denies any abdominal pain, nausea currently.  Has tolerated diet today.  Denies fever, chills.  Denies any recent sick contacts.  She states her chronic exertional dyspnea started to get worse in July.  She denies any recent leg swelling, has been compliant with diuresis and anticoagulation at home.  She denies any increase in sputum production, no cough.  She has occasional sputum production which appears clear.  She reports for at least a year she is able to use a walker to ambulate a few steps and becomes short of breath, mostly uses a wheelchair.  No recent hospitalizations for COPD exacerbation.  Last seen Dr. Tirado in December 2023 and was doing well at that time, recommended to follow-up in 1 year.  She maintains on Trelegy, albuterol, 2 LNC continuously at home.  She is curious to know if she can go home tomorrow.    Review of Systems  CONSTITUTIONAL:  Denies fevers or chills  EYE:  No new vision changes  EAR:  No change in  hearing  CARDIAC:  No chest pain  PULMONARY: Positive for shortness of breath  GI:  No diarrhea, hematemesis or hematochezia,  RENAL:  No dysuria or urinary frequency  MUSCULOSKELETAL: Positive for generalized immobility  ENDOCRINE:  No heat or cold intolerance  INTEGUMENTARY: No skin rashes  NEUROLOGICAL:  No dizziness or confusion.  No seizure activity  PSYCHIATRIC:  No new anxiety or depression  12 system review of systems performed and all else negative     Past Medical History:  Past Medical History:   Diagnosis Date    Anemia     IRON DEFICIENCY    Arthritis     Asthma     Atrial fibrillation     CAD (coronary artery disease)     HEART STENT    COPD (chronic obstructive pulmonary disease)     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    Frequent urinary tract infections     History of gastric ulcer     History of GI bleed     [place] (hard of hearing)     Hyperlipidemia     Hypertension     Hyponatremia     Lightheadedness     LVH (left ventricular hypertrophy)     MG (myasthenia gravis)     Mini stroke 10/2016, 3/2017    OA (osteoarthritis)     Osteoporosis     Sleep apnea     USES CPAP    SOB (shortness of breath)     WALKING       Past Surgical History:  Past Surgical History:   Procedure Laterality Date    APPENDECTOMY      BRONCHOSCOPY N/A 3/31/2022    Procedure: BRONCHOSCOPY WITH BAL RIGHT LOWER LOBE;  Surgeon: Remy Tirado MD;  Location: [facility];  Service: Pulmonary;  Laterality: N/A;  COPD EXACERBATION      CARPAL TUNNEL RELEASE Bilateral     CATARACT EXTRACTION Bilateral     CORONARY STENT PLACEMENT      TOTAL HIP ARTHROPLASTY Bilateral     TOTAL SHOULDER ARTHROPLASTY W/ DISTAL CLAVICLE EXCISION Left 7/19/2016    Procedure: LT TOTAL SHOULDER REVERSE ARTHROPLASTY;  Surgeon: NAHOMI Solorzano MD;  Location: [facility] OR [facility] [place];  Service:     TOTAL SHOULDER ARTHROPLASTY W/ DISTAL CLAVICLE EXCISION Right 1/8/2019    Procedure: RIGHT TOTAL SHOULDER REVERSE ARTHROPLASTY;  Surgeon: Jovanny Solorzano MD;   [place] [facility] [place] [facility] [place]: [place]opedics        Home Meds:  Medications Prior to Admission   Medication Sig Dispense Refill Last Dose    acetaminophen (TYLENOL) 325 MG tablet Take 2 tablets by mouth Every 4 (Four) Hours As Needed for Mild Pain.   8/10/2024    albuterol (PROVENTIL) (2.5 MG/3ML) 0.083% nebulizer solution Take 2.5 mg by nebulization Every 4 (Four) Hours As Needed for Wheezing.   8/10/2024    albuterol sulfate  (90 Base) MCG/ACT inhaler Inhale 2 puffs Every 6 (Six) Hours As Needed for Wheezing or Shortness of Air.   8/10/2024    azelastine (ASTELIN) 0.1 % nasal spray 2 sprays into the nostril(s) as directed by provider 2 (Two) Times a Day. Use in each nostril as directed   8/10/2024    busPIRone (BUSPAR) 10 MG tablet Take 1 tablet by mouth 2 (Two) Times a Day.   8/9/2024    DULoxetine (CYMBALTA) 60 MG capsule Take 1 capsule by mouth 2 (Two) Times a Day.   8/9/2024    ferrous sulfate 325 (65 FE) MG tablet Take 1 tablet by mouth Daily With Breakfast.   8/9/2024    Fluticasone-Umeclidin-Vilant (Trelegy Ellipta) 100-62.5-25 MCG/INH inhaler Inhale 1 puff Daily.   8/10/2024    irbesartan (AVAPRO) 75 MG tablet Take 1 tablet by mouth Every Night.   8/9/2024    levothyroxine (SYNTHROID, LEVOTHROID) 88 MCG tablet Take 1 tablet by mouth Daily.   8/9/2024    montelukast (SINGULAIR) 10 MG tablet Take 1 tablet by mouth Every Night.   8/9/2024    mycophenolate (CELLCEPT) 500 MG tablet Take 2 tablets by mouth Every Morning.   8/9/2024    mycophenolate (CELLCEPT) 500 MG tablet Take 3 tablets by mouth Every Evening.   8/9/2024    pantoprazole (PROTONIX) 40 MG EC tablet Take 1 tablet by mouth Daily.   8/9/2024    pravastatin (PRAVACHOL) 20 MG tablet Take 1 tablet by mouth Every Night.   8/9/2024    predniSONE (DELTASONE) 10 MG tablet Take 0.5 tablets by mouth Daily.   8/9/2024    raloxifene (EVISTA) 60 MG tablet Take 1 tablet by mouth Every Night.   8/9/2024    rivaroxaban (XARELTO) 20 MG tablet Take 1 tablet  "by mouth Daily With Dinner.   8/9/2024    torsemide (DEMADEX) 20 MG tablet Take 1 tablet by mouth Daily.   8/9/2024    vitamin B-12 (CYANOCOBALAMIN) 1000 MCG tablet Take 1 tablet by mouth Daily.   8/9/2024    vitamin D (ERGOCALCIFEROL) 19954 UNITS capsule capsule Take 1 capsule by mouth Every 7 (Seven) Days. Takes on Wednesdays   Past Week    metoprolol succinate XL (TOPROL-XL) 25 MG 24 hr tablet Take 1 tablet by mouth 2 (Two) Times a Day. 60 tablet 0        Allergies:  Allergies   Allergen Reactions    Neomycin Anaphylaxis    Penicillins Swelling and Rash     Historical allergy x 30-40 years, tolerates cephalosporins    Hydrocodone Itching and Rash    Rosuvastatin Other (See Comments)     Muscle cramps       Social History:   Social History     Socioeconomic History    Marital status:    Tobacco Use    Smoking status: Never    Smokeless tobacco: Never    Tobacco comments:     caffeine - coffee and coke caff. free, tea    Vaping Use    Vaping status: Never Used   Substance and Sexual Activity    Alcohol use: No    Drug use: No     Comment: caffine    Sexual activity: Defer       Family History:  Family History   Problem Relation Age of Onset    Heart disease Mother     Hyperlipidemia Mother     Stroke Mother     Diabetes Mother     Breast cancer Mother     Heart disease Father     Hyperlipidemia Father     Stroke Father     [place] Hyperthermia Neg Hx        Physical Exam:  /81   Pulse (!) 128   Temp 98.2 °F (36.8 °C) (Oral)   Resp 20   Ht 152.4 cm (60\")   Wt 63.5 kg (139 lb 15.9 oz)   SpO2 95%   BMI 27.34 kg/m²  Body mass index is 27.34 kg/m². 95% 63.5 kg (139 lb 15.9 oz)    Physical Exam  Constitutional: Elderly female sitting up in chair, no acute distress, watching TV with her son  Head: NCAT  Eyes: No pallor, anicteric conjunctiva, PERRLA.  ENMT:  No oral thrush. Dry MM.   NECK: Trachea midline, no thyromegaly, no JVD  Heart: Irregularly irregular rhythm, tachycardic rate, HR currently 118 per " monitor, no pedal edema   Lungs: AYLIN +, clear to auscultation throughout, no wheezing or crackles   Abdomen: Soft, nondistended.  No tenderness, guarding or rigidity. No palpable masses.  No nausea or vomiting, tolerating diet.  Extremities: Extremities warm and well perfused. No cyanosis/ clubbing.  Wound right shin covered with clean/dry Mepilex.  Chronic lower extremity changes.  All pulses palpable.  Neuro: Alert and oriented, answers questions appropriately, no neuro deficits  Psych: Mood and affect appropriate    PPE recommended per [facility] infectious disease Isolation protocol for the current clinical scenario(as mentioned below) was followed.    LABS:  COVID19   Date Value Ref Range Status   08/11/2024 Not Detected Not Detected - Ref. Range Final       Lab Results   Component Value Date    CALCIUM 8.6 08/11/2024    PHOS 3.0 07/10/2021     Results from last 7 days   Lab Units 08/11/24  1237 08/11/24  0303 08/10/24  0945   MAGNESIUM mg/dL  --   --  2.3   SODIUM mmol/L  --  136 138   POTASSIUM mmol/L 4.5 3.3* 3.4*   CHLORIDE mmol/L  --  97* 96*   CO2 mmol/L  --  28.0 27.0   BUN mg/dL  --  19 22   CREATININE mg/dL  --  0.88 0.96   GLUCOSE mg/dL  --  119* 122*   CALCIUM mg/dL  --  8.6 9.1   WBC 10*3/mm3  --  13.63* 15.79*   HEMOGLOBIN g/dL  --  8.0* 8.3*   PLATELETS 10*3/mm3  --  369 426   ALT (SGPT) U/L  --  5 7   AST (SGOT) U/L  --  9 16   PROBNP pg/mL  --   --  1,417.0     Lab Results   Component Value Date    CKTOTAL 70 06/20/2019    TROPONINT 38 (H) 08/10/2024     Results from last 7 days   Lab Units 08/10/24  1131 08/10/24  0945   HSTROP T ng/L 38* 41*         Results from last 7 days   Lab Units 08/11/24  0303   LACTATE mmol/L 1.3         Results from last 7 days   Lab Units 08/11/24  1507 08/11/24  1324   ADENOVIRUS DETECTION BY PCR   --  Not Detected   ADENOVIRUS  Not Detected  --    CORONAVIRUS 229E   --  Not Detected   CORONAVIRUS HKU1   --  Not Detected   CORONAVIRUS NL63   --  Not Detected    CORONAVIRUS OC43   --  Not Detected   HUMAN METAPNEUMOVIRUS   --  Not Detected   HUMAN RHINOVIRUS/ENTEROVIRUS   --  Not Detected   INFLUENZA B PCR   --  Not Detected   PARAINFLUENZA 1   --  Not Detected   PARAINFLUENZA VIRUS 2   --  Not Detected   PARAINFLUENZA VIRUS 3   --  Not Detected   PARAINFLUENZA VIRUS 4   --  Not Detected   BORDETELLA PERTUSSIS PCR   --  Not Detected   BORDETELLA PARAPERTUSSIS PCR   --  Not Detected   CHLAMYDOPHILA PNEUMONIAE PCR   --  Not Detected   MYCOPLAMA PNEUMO PCR   --  Not Detected   RSV, PCR   --  Not Detected     Results from last 7 days   Lab Units 08/10/24  0945   INR  2.08*         Lab Results   Component Value Date    TSH 1.080 08/11/2024     Estimated Creatinine Clearance: 38.2 mL/min (by C-G formula based on SCr of 0.88 mg/dL).         Imaging: I personally visualized the images of scans/x-rays performed within last 3 days.      Assessment:  Chronic hypoxic respiratory failure  Severe COPD without exacerbation  Chronic exertional dyspnea  SEDA noncompliant with CPAP  Atrial fibrillation RVR on chronic anticoagulation  Chronic diastolic heart failure  CAD, Hx cardiac stenting  Hx myasthenia gravis on Cellcept/Prednisone  Leukocytosis  Kyphosis    Recommendations:  -Patient has chronic exertional dyspnea, likely being exacerbated by A-fib RVR/diastolic HF, no pulm HTN per echo today.  She is on 2L NC upon my exam however will go ahead and get ABG.  -Currently receiving 1L NS at 125 cc/h along with 2 doses IV dig today in hopes to reduce heart rate, starting metoprolol at 8PM tonight per cardiology, diuresis per cardiology  -She has been noncompliant with CPAP x 1 year, this was discussed at her last office visit with Dr Tirado in December, she likely needs bi-level at night however patient currently declines both and states she has been doing fine without x 1 year  -Continue supplemental oxygen, keep spO2 88-92%  -Continue Singulair, Mucinex, Symbicort/Spiriva, as needed  albuterol  -Prednisone, CellCept for Hx myasthenia gravis has been resumed.  Leukocytosis likely related to steroid use  -Do not feel PE is likely, has been compliant with Xarelto at home.  If shortness of breath worsens will get CTA chest.    -Should follow-up with Dr Tirado as outpatient upon discharge    Gracy Naqvi, APRN  8/11/2024  19:12 EDT      Much of this encounter note is an electronic transcription/translation of spoken language to printed text using Dragon Software.

## 2024-08-11 NOTE — PLAN OF CARE
Goal Outcome Evaluation:  Plan of Care Reviewed With: patient      Pt admitted with shortness of breath she has history of chronic severe COPD as well as congestive heart failure and chronic hypoxic respiratory failure and is chronically on 2 L of oxygen. She has history of myasthenia gravis as well. This visit she was able to walk in the room about 25'Skip and tolerated fair moderate complaints of fatigue and shortness of breath limiting mobility. Anticipate need for home with hhpt vs snf at this time.             Anticipated Discharge Disposition (PT): home with assist, home with home health, skilled nursing facility

## 2024-08-11 NOTE — THERAPY EVALUATION
Patient Name: Olena Myers  : 1935    MRN: [id number]                              Today's Date: 2024       Admit Date: 8/10/2024    Visit Dx:     ICD-10-CM ICD-9-CM   1. Exertional dyspnea  R06.09 786.09   2. Pain of upper abdomen associated with exertion and has complete resolution  R10.10 789.09   3. Chronic anemia  D64.9 285.9   4. Chronic respiratory failure with hypoxia  J96.11 518.83     799.02     Patient Active Problem List   Diagnosis    Pre-operative cardiovascular examination    S/p reverse total shoulder arthroplasty    Myasthenia gravis    Paroxysmal atrial fibrillation    HX: long term anticoagulant use    Essential hypertension    CAD (coronary artery disease)    Rhinovirus    Atrial fibrillation    S/P reverse total shoulder arthroplasty, right    Acute UTI    COPD (chronic obstructive pulmonary disease)    Metabolic encephalopathy    Sleep apnea    Sepsis    Dyspnea    Chronic diastolic CHF (congestive heart failure)    Diastolic CHF, chronic    Heme positive stool    E coli bacteremia    Iron deficiency anemia    Current chronic use of systemic steroids    COPD exacerbation    Obesity (BMI 30-39.9)    COVID-19 virus infection    Chronic respiratory failure with hypoxia    Cytokine release syndrome, grade 1    Altered mental status    Anemia    KELLEE (acute kidney injury)    Hypothyroidism (acquired)    Exertional dyspnea     Past Medical History:   Diagnosis Date    Anemia     IRON DEFICIENCY    Arthritis     Asthma     Atrial fibrillation     CAD (coronary artery disease)     HEART STENT    COPD (chronic obstructive pulmonary disease)     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    Frequent urinary tract infections     History of gastric ulcer     History of GI bleed     [place] (hard of hearing)     Hyperlipidemia     Hypertension     Hyponatremia     Lightheadedness     LVH (left ventricular hypertrophy)     MG (myasthenia gravis)     Mini stroke 10/2016, 3/2017    OA (osteoarthritis)      Osteoporosis     Sleep apnea     USES CPAP    SOB (shortness of breath)     WALKING     Past Surgical History:   Procedure Laterality Date    APPENDECTOMY      BRONCHOSCOPY N/A 3/31/2022    Procedure: BRONCHOSCOPY WITH BAL RIGHT LOWER LOBE;  Surgeon: Remy Tirado MD;  Location: [facility] ENDOSCOPY;  Service: Pulmonary;  Laterality: N/A;  COPD EXACERBATION      CARPAL TUNNEL RELEASE Bilateral     CATARACT EXTRACTION Bilateral     CORONARY STENT PLACEMENT      TOTAL HIP ARTHROPLASTY Bilateral     TOTAL SHOULDER ARTHROPLASTY W/ DISTAL CLAVICLE EXCISION Left 7/19/2016    Procedure: LT TOTAL SHOULDER REVERSE ARTHROPLASTY;  Surgeon: NAHOMI Solorzano MD;  Location: [facility] OR [facility] [place];  Service:     TOTAL SHOULDER ARTHROPLASTY W/ DISTAL CLAVICLE EXCISION Right 1/8/2019    Procedure: RIGHT TOTAL SHOULDER REVERSE ARTHROPLASTY;  Surgeon: Jovanny Solorzano MD;  Location: [facility] OR [facility] [place];  Service: Orthopedics      General Information       Row Name 08/11/24 0916          Physical Therapy Time and Intention    Document Type evaluation  -CS     Mode of Treatment physical therapy  -CS       Row Name 08/11/24 0916          General Information    Patient Profile Reviewed yes  -CS     Prior Level of Function independent:  -CS       Row Name 08/11/24 0916          Safety Issues, Functional Mobility    Impairments Affecting Function (Mobility) balance;endurance/activity tolerance;strength  -CS               User Key  (r) = Recorded By, (t) = Taken By, (c) = Cosigned By      Initials Name Provider Type    CS Eran Rodriguez PT Physical Therapist                   Mobility       Row Name 08/11/24 0916          Bed Mobility    Comment, (Bed Mobility) in chair  -CS       Row Name 08/11/24 0916          Sit-Stand Transfer    Sit-Stand [place] (Transfers) minimum assist (75% patient effort)  -CS       Row Name 08/11/24 0916          Gait/Stairs (Locomotion)    [place] Level (Gait) minimum assist (75% patient effort)  -CS     Distance  in Feet (Gait) 25  -CS     Deviations/Abnormal Patterns (Gait) gait speed decreased  -CS     Comment, (Gait/Stairs) limited by weakness  -CS               User Key  (r) = Recorded By, (t) = Taken By, (c) = Cosigned By      Initials Name Provider Type    Eran Younger, PT Physical Therapist                   Obj/Interventions       Row Name 08/11/24 0917          Range of Motion Comprehensive    General Range of Motion no range of motion deficits identified  -CS       Row Name 08/11/24 0917          Strength Comprehensive (MMT)    General Manual Muscle Testing (MMT) Assessment no strength deficits identified  -CS               User Key  (r) = Recorded By, (t) = Taken By, (c) = Cosigned By      Initials Name Provider Type    Eran Younger, PT Physical Therapist                   Goals/Plan       Row Name 08/11/24 0918          Bed Mobility Goal 1 (PT)    Activity/Assistive Device (Bed Mobility Goal 1, PT) bed mobility activities, all  -CS     [place] Level/Cues Needed (Bed Mobility Goal 1, PT) modified independence  -CS       Row Name 08/11/24 0918          Transfer Goal 1 (PT)    Activity/Assistive Device (Transfer Goal 1, PT) transfers, all  -CS     [place] Level/Cues Needed (Transfer Goal 1, PT) modified independence  -CS     Time Frame (Transfer Goal 1, PT) 1 week  -CS       Row Name 08/11/24 0918          Gait Training Goal 1 (PT)    Activity/Assistive Device (Gait Training Goal 1, PT) assistive device use  -CS     [place] Level (Gait Training Goal 1, PT) modified independence  -CS     Distance (Gait Training Goal 1, PT) 150  -CS     Time Frame (Gait Training Goal 1, PT) 1 week  -CS       Row Name 08/11/24 0918          Therapy Assessment/Plan (PT)    Planned Therapy Interventions (PT) balance training;bed mobility training;gait training;patient/family education;home exercise program;joint mobilization;ROM (range of motion);stair training;strengthening;manual therapy  techniques;stretching;neuromuscular re-education;transfer training  -CS               User Key  (r) = Recorded By, (t) = Taken By, (c) = Cosigned By      Initials Name Provider Type    Eran Younger, PT Physical Therapist                   Clinical Impression       Row Name 08/11/24 0917          Pain    Pretreatment Pain Rating 0/10 - no pain  -CS     Posttreatment Pain Rating 0/10 - no pain  -CS       Row Name 08/11/24 0917          Plan of Care Review    Plan of Care Reviewed With patient  -CS       Row Name 08/11/24 0917          Therapy Assessment/Plan (PT)    Patient/Family Therapy Goals Statement (PT) home  -CS     Rehab Potential (PT) good, to achieve stated therapy goals  -CS     Criteria for Skilled Interventions Met (PT) yes  -CS     Therapy Frequency (PT) 6 times/wk  -CS       Row Name 08/11/24 0917          Positioning and Restraints    Pre-Treatment Position sitting in chair/recliner  -CS     Post Treatment Position chair  -CS     In Chair reclined;encouraged to call for assist;call light within reach;exit alarm on  -CS               User Key  (r) = Recorded By, (t) = Taken By, (c) = Cosigned By      Initials Name Provider Type    Eran Younger, PT Physical Therapist                   Outcome Measures       Row Name 08/11/24 0918 08/11/24 0850       How much help from another person do you currently need...    Turning from your back to your side while in flat bed without using bedrails? 4  -CS 4  -AG    Moving from lying on back to sitting on the side of a flat bed without bedrails? 3  -CS 3  -AG    Moving to and from a bed to a chair (including a wheelchair)? 3  -CS 3  -AG    Standing up from a chair using your arms (e.g., wheelchair, bedside chair)? 3  -CS 3  -AG    Climbing 3-5 steps with a railing? 3  -CS 3  -AG    To walk in hospital room? 3  -CS 3  -AG    AM-PAC 6 Clicks Score (PT) 19  -CS 19  -AG    Highest Level of Mobility Goal 6 --> Walk 10 steps or more  -CS 6 --> Walk 10 steps  or more  -      Row Name 08/11/24 0918          Functional Assessment    Outcome Measure Options AM-PAC 6 Clicks Basic Mobility (PT)  -CS               User Key  (r) = Recorded By, (t) = Taken By, (c) = Cosigned By      Initials Name Provider Type    CS Eran Rodriguez, PT Physical Therapist    Lillina Lucero, RN Registered Nurse                                 Physical Therapy Education       Title: PT OT SLP Therapies (Done)       Topic: Physical Therapy (Done)       Point: Mobility training (Done)       Learning Progress Summary             Patient Acceptance, E,TB, VU,NR by CS at 8/11/2024 0919                         Point: Home exercise program (Done)       Learning Progress Summary             Patient Acceptance, E,TB, VU,NR by  at 8/11/2024 0919                         Point: Body mechanics (Done)       Learning Progress Summary             Patient Acceptance, E,TB, VU,NR by  at 8/11/2024 0919                         Point: Precautions (Done)       Learning Progress Summary             Patient Acceptance, E,TB, VU,NR by  at 8/11/2024 0919                                         User Key       Initials Effective Dates Name Provider Type Discipline     07/11/23 -  Eran Rodriguez, PT Physical Therapist PT                  PT Recommendation and Plan  Planned Therapy Interventions (PT): balance training, bed mobility training, gait training, patient/family education, home exercise program, joint mobilization, ROM (range of motion), stair training, strengthening, manual therapy techniques, stretching, neuromuscular re-education, transfer training  Plan of Care Reviewed With: patient     Time Calculation:         PT Charges       Row Name 08/11/24 0921             Time Calculation    Start Time 0845  -      Stop Time 0857  -      Time Calculation (min) 12 min  -      PT Received On 08/11/24  -      PT - Next Appointment 08/12/24  -      PT Goal Re-Cert Due Date 08/18/24  -                 User Key  (r) = Recorded By, (t) = Taken By, (c) = Cosigned By      Initials Name Provider Type    CS Eran Rodriguez, PT Physical Therapist                  Therapy Charges for Today       Code Description Service Date Service Provider Modifiers Qty    92166117332 HC PT EVAL MOD COMPLEXITY 2 8/11/2024 Eran Rodriguez, PT GP 1    41004026039 HC PT THER PROC EA 15 MIN 8/11/2024 Eran Rodriguez, PT GP 1            PT G-Codes  Outcome Measure Options: AM-PAC 6 Clicks Basic Mobility (PT)  AM-PAC 6 Clicks Score (PT): 19  PT Discharge Summary  Anticipated Discharge Disposition (PT): home with assist, home with home health, skilled nursing facility    Eran Rodriguez PT  8/11/2024

## 2024-08-12 ENCOUNTER — APPOINTMENT (OUTPATIENT)
Dept: ULTRASOUND IMAGING | Facility: HOSPITAL | Age: 89
End: 2024-08-12
Payer: MEDICARE

## 2024-08-12 LAB
ABO GROUP BLD: NORMAL
ANION GAP SERPL CALCULATED.3IONS-SCNC: 8.9 MMOL/L (ref 5–15)
BLD GP AB SCN SERPL QL: NEGATIVE
BUN SERPL-MCNC: 21 MG/DL (ref 8–23)
BUN/CREAT SERPL: 22.8 (ref 7–25)
CALCIUM SPEC-SCNC: 8.3 MG/DL (ref 8.6–10.5)
CHLORIDE SERPL-SCNC: 102 MMOL/L (ref 98–107)
CO2 SERPL-SCNC: 22.1 MMOL/L (ref 22–29)
CREAT SERPL-MCNC: 0.92 MG/DL (ref 0.57–1)
DEPRECATED RDW RBC AUTO: 54.6 FL (ref 37–54)
DIGOXIN SERPL-MCNC: 1.1 NG/ML (ref 0.6–1.2)
EGFRCR SERPLBLD CKD-EPI 2021: 60.8 ML/MIN/1.73
ERYTHROCYTE [DISTWIDTH] IN BLOOD BY AUTOMATED COUNT: 15.5 % (ref 12.3–15.4)
FOLATE SERPL-MCNC: 12.1 NG/ML (ref 4.78–24.2)
GLUCOSE SERPL-MCNC: 91 MG/DL (ref 65–99)
HCT VFR BLD AUTO: 23.6 % (ref 34–46.6)
HGB BLD-MCNC: 7.2 G/DL (ref 12–15.9)
IRON 24H UR-MRATE: 66 MCG/DL (ref 37–145)
IRON SATN MFR SERPL: 22 % (ref 20–50)
MCH RBC QN AUTO: 29.5 PG (ref 26.6–33)
MCHC RBC AUTO-ENTMCNC: 30.5 G/DL (ref 31.5–35.7)
MCV RBC AUTO: 96.7 FL (ref 79–97)
PLATELET # BLD AUTO: 386 10*3/MM3 (ref 140–450)
PMV BLD AUTO: 9.3 FL (ref 6–12)
POTASSIUM SERPL-SCNC: 4.3 MMOL/L (ref 3.5–5.2)
RBC # BLD AUTO: 2.44 10*6/MM3 (ref 3.77–5.28)
RH BLD: NEGATIVE
SODIUM SERPL-SCNC: 133 MMOL/L (ref 136–145)
T&S EXPIRATION DATE: NORMAL
TIBC SERPL-MCNC: 302 MCG/DL (ref 298–536)
TRANSFERRIN SERPL-MCNC: 203 MG/DL (ref 200–360)
VIT B12 BLD-MCNC: >2000 PG/ML (ref 211–946)
WBC NRBC COR # BLD AUTO: 10.42 10*3/MM3 (ref 3.4–10.8)

## 2024-08-12 PROCEDURE — 80048 BASIC METABOLIC PNL TOTAL CA: CPT | Performed by: NURSE PRACTITIONER

## 2024-08-12 PROCEDURE — 86900 BLOOD TYPING SEROLOGIC ABO: CPT | Performed by: HOSPITALIST

## 2024-08-12 PROCEDURE — 94799 UNLISTED PULMONARY SVC/PX: CPT

## 2024-08-12 PROCEDURE — 25810000003 SODIUM CHLORIDE 0.9 % SOLUTION: Performed by: INTERNAL MEDICINE

## 2024-08-12 PROCEDURE — 85027 COMPLETE CBC AUTOMATED: CPT | Performed by: NURSE PRACTITIONER

## 2024-08-12 PROCEDURE — 63710000001 PREDNISONE PER 5 MG: Performed by: INTERNAL MEDICINE

## 2024-08-12 PROCEDURE — 83540 ASSAY OF IRON: CPT | Performed by: NURSE PRACTITIONER

## 2024-08-12 PROCEDURE — 86923 COMPATIBILITY TEST ELECTRIC: CPT

## 2024-08-12 PROCEDURE — 25010000002 FUROSEMIDE PER 20 MG: Performed by: HOSPITALIST

## 2024-08-12 PROCEDURE — 82746 ASSAY OF FOLIC ACID SERUM: CPT | Performed by: NURSE PRACTITIONER

## 2024-08-12 PROCEDURE — 94760 N-INVAS EAR/PLS OXIMETRY 1: CPT

## 2024-08-12 PROCEDURE — 99222 1ST HOSP IP/OBS MODERATE 55: CPT | Performed by: INTERNAL MEDICINE

## 2024-08-12 PROCEDURE — 80162 ASSAY OF DIGOXIN TOTAL: CPT | Performed by: INTERNAL MEDICINE

## 2024-08-12 PROCEDURE — 84466 ASSAY OF TRANSFERRIN: CPT | Performed by: NURSE PRACTITIONER

## 2024-08-12 PROCEDURE — 76705 ECHO EXAM OF ABDOMEN: CPT

## 2024-08-12 PROCEDURE — 36430 TRANSFUSION BLD/BLD COMPNT: CPT

## 2024-08-12 PROCEDURE — 94664 DEMO&/EVAL PT USE INHALER: CPT

## 2024-08-12 PROCEDURE — P9016 RBC LEUKOCYTES REDUCED: HCPCS

## 2024-08-12 PROCEDURE — 86901 BLOOD TYPING SEROLOGIC RH(D): CPT | Performed by: HOSPITALIST

## 2024-08-12 PROCEDURE — 82607 VITAMIN B-12: CPT | Performed by: NURSE PRACTITIONER

## 2024-08-12 PROCEDURE — 86900 BLOOD TYPING SEROLOGIC ABO: CPT

## 2024-08-12 PROCEDURE — 94761 N-INVAS EAR/PLS OXIMETRY MLT: CPT

## 2024-08-12 PROCEDURE — 86850 RBC ANTIBODY SCREEN: CPT | Performed by: HOSPITALIST

## 2024-08-12 PROCEDURE — 99232 SBSQ HOSP IP/OBS MODERATE 35: CPT | Performed by: INTERNAL MEDICINE

## 2024-08-12 PROCEDURE — 63710000001 MYCOPHENOLATE MOFETIL PER 250 MG: Performed by: INTERNAL MEDICINE

## 2024-08-12 RX ORDER — FUROSEMIDE 10 MG/ML
20 INJECTION INTRAMUSCULAR; INTRAVENOUS ONCE
Status: COMPLETED | OUTPATIENT
Start: 2024-08-12 | End: 2024-08-12

## 2024-08-12 RX ORDER — ATENOLOL 25 MG/1
25 TABLET ORAL EVERY 8 HOURS
Status: DISCONTINUED | OUTPATIENT
Start: 2024-08-12 | End: 2024-08-20 | Stop reason: HOSPADM

## 2024-08-12 RX ORDER — ALBUTEROL SULFATE 0.83 MG/ML
2.5 SOLUTION RESPIRATORY (INHALATION)
Status: DISCONTINUED | OUTPATIENT
Start: 2024-08-12 | End: 2024-08-20 | Stop reason: HOSPADM

## 2024-08-12 RX ADMIN — MONTELUKAST SODIUM 10 MG: 10 TABLET, FILM COATED ORAL at 22:03

## 2024-08-12 RX ADMIN — BUSPIRONE HYDROCHLORIDE 10 MG: 10 TABLET ORAL at 22:04

## 2024-08-12 RX ADMIN — Medication 10 ML: at 09:41

## 2024-08-12 RX ADMIN — GUAIFENESIN 600 MG: 600 TABLET, EXTENDED RELEASE ORAL at 09:48

## 2024-08-12 RX ADMIN — AZELASTINE HYDROCHLORIDE 2 SPRAY: 137 SPRAY, METERED NASAL at 10:10

## 2024-08-12 RX ADMIN — BUDESONIDE AND FORMOTEROL FUMARATE DIHYDRATE 2 PUFF: 160; 4.5 AEROSOL RESPIRATORY (INHALATION) at 08:31

## 2024-08-12 RX ADMIN — ATENOLOL 25 MG: 25 TABLET ORAL at 15:04

## 2024-08-12 RX ADMIN — ACETAMINOPHEN 325MG 650 MG: 325 TABLET ORAL at 06:15

## 2024-08-12 RX ADMIN — DULOXETINE HYDROCHLORIDE 60 MG: 60 CAPSULE, DELAYED RELEASE ORAL at 22:04

## 2024-08-12 RX ADMIN — METOPROLOL TARTRATE 25 MG: 25 TABLET, FILM COATED ORAL at 09:45

## 2024-08-12 RX ADMIN — BUDESONIDE AND FORMOTEROL FUMARATE DIHYDRATE 2 PUFF: 160; 4.5 AEROSOL RESPIRATORY (INHALATION) at 20:39

## 2024-08-12 RX ADMIN — LEVOTHYROXINE SODIUM 88 MCG: 88 TABLET ORAL at 06:15

## 2024-08-12 RX ADMIN — ATENOLOL 25 MG: 25 TABLET ORAL at 22:04

## 2024-08-12 RX ADMIN — PANTOPRAZOLE SODIUM 40 MG: 40 TABLET, DELAYED RELEASE ORAL at 09:45

## 2024-08-12 RX ADMIN — DULOXETINE HYDROCHLORIDE 60 MG: 60 CAPSULE, DELAYED RELEASE ORAL at 09:48

## 2024-08-12 RX ADMIN — AZELASTINE HYDROCHLORIDE 2 SPRAY: 137 SPRAY, METERED NASAL at 22:14

## 2024-08-12 RX ADMIN — TIOTROPIUM BROMIDE INHALATION SPRAY 2 PUFF: 3.12 SPRAY, METERED RESPIRATORY (INHALATION) at 08:30

## 2024-08-12 RX ADMIN — MYCOPHENOLATE MOFETIL 1000 MG: 250 CAPSULE ORAL at 06:15

## 2024-08-12 RX ADMIN — BUSPIRONE HYDROCHLORIDE 10 MG: 10 TABLET ORAL at 09:48

## 2024-08-12 RX ADMIN — FUROSEMIDE 20 MG: 10 INJECTION, SOLUTION INTRAMUSCULAR; INTRAVENOUS at 22:59

## 2024-08-12 RX ADMIN — MYCOPHENOLATE MOFETIL 1500 MG: 250 CAPSULE ORAL at 22:06

## 2024-08-12 RX ADMIN — GUAIFENESIN 600 MG: 600 TABLET, EXTENDED RELEASE ORAL at 22:13

## 2024-08-12 RX ADMIN — Medication 10 ML: at 22:13

## 2024-08-12 RX ADMIN — ACETAMINOPHEN 325MG 650 MG: 325 TABLET ORAL at 13:33

## 2024-08-12 RX ADMIN — TORSEMIDE 20 MG: 20 TABLET ORAL at 09:49

## 2024-08-12 RX ADMIN — RALOXIFENE HYDROCHLORIDE 60 MG: 60 TABLET, FILM COATED ORAL at 22:07

## 2024-08-12 RX ADMIN — ACETAMINOPHEN 325MG 650 MG: 325 TABLET ORAL at 18:57

## 2024-08-12 RX ADMIN — PRAVASTATIN SODIUM 20 MG: 20 TABLET ORAL at 22:03

## 2024-08-12 RX ADMIN — ALBUTEROL SULFATE 2.5 MG: 2.5 SOLUTION RESPIRATORY (INHALATION) at 20:34

## 2024-08-12 RX ADMIN — PREDNISONE 5 MG: 5 TABLET ORAL at 09:47

## 2024-08-12 RX ADMIN — Medication 1000 MCG: at 09:46

## 2024-08-12 RX ADMIN — SODIUM CHLORIDE 125 ML/HR: 9 INJECTION, SOLUTION INTRAVENOUS at 01:58

## 2024-08-12 NOTE — PROGRESS NOTES
Unable to attempt NIF and VC at this time due to patient being off the floor.  Rescheduled for PM Shift today.

## 2024-08-12 NOTE — PROGRESS NOTES
[facility]  [phone]  Dr. Prashanth Lizarraga    Subjective:  LOS: 2    Chief Complaint: Shortness of breath    Still with shortness of breath with exertion.  Does not feel much better since admission.  She feels herself to be wheezing.  Follows Dr. Tirado for asthma.  Has a nebulizer at home but does not use it.    Objective   Vital Signs past 24hrs  Temp range: Temp (24hrs), Av.4 °F (36.9 °C), Min:97.9 °F (36.6 °C), Max:99.1 °F (37.3 °C)    BP range: BP: (104-145)/(62-90) 145/90  Pulse range: Heart Rate:  [] 118  Resp rate range: Resp:  [18-20] 20  Device (Oxygen Therapy): nasal cannulaFlow (L/min):  [2-3] 2  Oxygen range:SpO2:  [92 %-100 %] 92 %   Mechanical Ventilator:     Physical Exam  Constitutional:       Appearance: She is obese.   Eyes:      Pupils: Pupils are equal, round, and reactive to light.   Cardiovascular:      Rate and Rhythm: Normal rate. Rhythm irregular.   Pulmonary:      Effort: Pulmonary effort is normal.      Breath sounds: Rhonchi present. No wheezing.   Abdominal:      General: Bowel sounds are normal.      Palpations: Abdomen is soft. There is no mass.      Tenderness: There is no abdominal tenderness.   Musculoskeletal:         General: No swelling.   Neurological:      Mental Status: She is alert.       Results Review:    I have reviewed the laboratory and imaging data since the last note by [facility] physician.  My annotations are noted in assessment and plan.      Result Review:  I have personally reviewed the results from last note by [facility] physician to 2024 14:09 EDT and agree with these findings:  [x]  Laboratory list / accordion  [x]  Microbiology  [x]  Radiology  []  EKG/Telemetry   []  Cardiology/Vascular   []  Pathology  []  Old records  []  Other:    Medication Review:  I have reviewed the current MAR.  My annotations are noted in assessment and plan.    atenolol, 25 mg, Oral, Q8H  azelastine, 2 spray, Each Nare, BID  budesonide-formoterol, 2 puff,  Inhalation, BID - RT   And  tiotropium bromide monohydrate, 2 puff, Inhalation, Daily - RT  busPIRone, 10 mg, Oral, BID  DULoxetine, 60 mg, Oral, BID  furosemide, 20 mg, Intravenous, Once  guaiFENesin, 600 mg, Oral, Q12H  levothyroxine, 88 mcg, Oral, Q AM  montelukast, 10 mg, Oral, Nightly  mycophenolate, 1,000 mg, Oral, QAM  mycophenolate, 1,500 mg, Oral, Nightly  pantoprazole, 40 mg, Oral, Daily  pravastatin, 20 mg, Oral, Nightly  predniSONE, 5 mg, Oral, Daily  raloxifene, 60 mg, Oral, Nightly  sodium chloride, 10 mL, Intravenous, Q12H  torsemide, 20 mg, Oral, Daily  vitamin B-12, 1,000 mcg, Oral, Daily  [START ON 8/14/2024] vitamin D, 50,000 Units, Oral, Q7 Days           Lines, Drains & Airways       Active LDAs       Name Placement date Placement time Site Days    Peripheral IV 08/10/24 1551 Anterior;Right Forearm 08/10/24  1551  Forearm  1    Single Lumen Implantable Port 01/08/19 Left Subclavian 01/08/19  0910  Subclavian  2043                  Isolation status: No active isolations    Dietary Orders (From admission, onward)       Start     Ordered    08/10/24 1858  Diet: Cardiac, Diabetic, Renal; Healthy Heart (2-3 Na+); Consistent Carbohydrate; Low Sodium (2-3g), Low Potassium, Low Phosphorus; Fluid Consistency: Thin (IDDSI 0)  Diet Effective Now        References:    Diet Order Crosswalk   Question Answer Comment   Diets: Cardiac    Diets: Diabetic    Diets: Renal    Cardiac Diet: Healthy Heart (2-3 Na+)    Diabetic Diet: Consistent Carbohydrate    Renal Diet: Low Sodium (2-3g)    Renal Diet: Low Potassium    Renal Diet: Low Phosphorus    Fluid Consistency: Thin (IDDSI 0)        08/10/24 1858                    PCCM Problems  Dyspnea, especially with exertion  Chronic hypoxia on 2 L oxygen  Asthma  SEDA on CPAP at home  Myasthenia gravis  Anemia  A-fib  RVR  Chronic anticoagulation  Chronic systemic steroid use  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    Plan  of Treatment    Patient reports dyspnea and apparently not at baseline though unclear from reviewing multiple notes whether she is similarly short of breath at home as well.  Multiple etiologies possible.    A-fib with RVR on admission but now better controlled.  Note cardiology input.    Anemia noted and currently getting blood transfusion.?  Blood loss anemia.    Underlying asthma and has rhonchi but no wheezing.  She is on chronic systemic steroids at home for management of her myasthenia gravis.  This will help her asthma as well.  I will add some nebulizer treatments here to help clear his secretions.    With underlying myasthenia gravis ABG looks good.  However check NIF/tidal volume daily for the next 3 days.    She will continue use of CPAP at home.    Prashanth Lizarraga MD  08/12/24  14:09 EDT      Part of this note may be an electronic transcription/translation of spoken language to printed text using the Dragon Dictation System.

## 2024-08-12 NOTE — PROGRESS NOTES
LOS: 2 days   Patient Care Team:  Manda Keenan MD as PCP - General (General Practice)  Olvin Hernandez MD as Consulting Physician (Cardiology)    Chief Complaint: Follow-up paroxysmal atrial fibrillation with RVR.    Interval History: Unfortunately, she had a bowel movement and urinated on herself earlier this morning.  She was very frustrated about this.  She remains in rapid atrial fibrillation.  Still short of breath with exertion.  No chest pain.  Hemoglobin down to 7.2.    Vital Signs:  Temp:  [97.9 °F (36.6 °C)-99.1 °F (37.3 °C)] 97.9 °F (36.6 °C)  Heart Rate:  [] 118  Resp:  [18-20] 20  BP: (104-145)/(62-90) 145/90    Intake/Output Summary (Last 24 hours) at 8/12/2024 1351  Last data filed at 8/12/2024 0545  Gross per 24 hour   Intake 400 ml   Output 750 ml   Net -350 ml       Physical Exam:   General Appearance:    No acute distress, alert and oriented x4, chronically ill-appearing.   Lungs:     Scattered rhonchi, otherwise clear.    Heart:    Irregularly irregular rhythm with tachycardic rate.  II/VI SM LLSB.   Abdomen:     Soft, nontender, nondistended.    Extremities:   Trace edema of the lower extremities.     Results Review:    Results from last 7 days   Lab Units 08/12/24  0458   SODIUM mmol/L 133*   POTASSIUM mmol/L 4.3   CHLORIDE mmol/L 102   CO2 mmol/L 22.1   BUN mg/dL 21   CREATININE mg/dL 0.92   GLUCOSE mg/dL 91   CALCIUM mg/dL 8.3*     Results from last 7 days   Lab Units 08/10/24  1131 08/10/24  0945   HSTROP T ng/L 38* 41*     Results from last 7 days   Lab Units 08/12/24  0458   WBC 10*3/mm3 10.42   HEMOGLOBIN g/dL 7.2*   HEMATOCRIT % 23.6*   PLATELETS 10*3/mm3 386     Results from last 7 days   Lab Units 08/10/24  0945   INR  2.08*     Results from last 7 days   Lab Units 08/10/24  1131   CHOLESTEROL mg/dL 136     Results from last 7 days   Lab Units 08/10/24  0945   MAGNESIUM mg/dL 2.3     Results from last 7 days   Lab Units 08/10/24  1131   CHOLESTEROL mg/dL 136    TRIGLYCERIDES mg/dL 168*   HDL CHOL mg/dL 53   LDL CHOL mg/dL 55       I reviewed the patient's new clinical results.        Assessment:  1.  Shortness of breath, multifactorial  2.  Chronic hypoxic respiratory failure  3.  Worsening anemia with positive Hemoccult  4.  Paroxysmal atrial fibrillation with RVR  5.  COPD, unclear as to exacerbation  6.  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  Coronary artery disease, status post prior BMS to the LAD  8.  Leukocytosis, likely reactive  9.  History of myasthenia gravis, on CellCept and prednisone  10.  Obstructive sleep apnea, noncompliant with CPAP    Plan:  -Again, her Hemoccult was positive, and her anemia has worsened.  I suspect that some of this is driving the shortness of breath and the rapid heart rate.  She has been transfused with 20 mg of IV Lasix afterwards.    -Xarelto is currently being held.  GI has been consulted.    -Her atrial fibrillation is going to be difficult to rate control.  She has had issues with lower blood pressure, and with her anemia and pulmonary status, this is going to be challenging.  I changed her metoprolol to atenolol 25 mg every 8 hours.  I will see how she tolerates this.  She got 2 doses of IV digoxin yesterday, and her digoxin level is 1.1 today.  I would not add further digoxin for now.    -I also feel like she would be unlikely to maintain sinus rhythm in this setting.  She evidently developed pulmonary fibrosis and worsening COPD changes with amiodarone.  Also, with need to be off anticoagulation, a cardioversion is currently contraindicated.    -Continue torsemide 20 mg/day.    -I will see how she responds to the atenolol, and recheck a digoxin level tomorrow for dosing.    Alexis Klein MD  08/12/24  13:51 EDT

## 2024-08-12 NOTE — NURSING NOTE
Wound/Ostomy: We see patient at the request of the floor nurse, regarding skin issue on left lower leg. Patient sitting in chair with legs elevated, upon assessment we could see redness, partial-thickness skin loss on left lower leg, with small amount of yellowish drainage and wound bed with yellow appearance, due to a fall she had, according to the patient. Xeroform dressing ordered.  We also recommend keeping the legs elevated as much as possible.  Wound care order and nursing intervention have been implemented into Epic.   Please re-consult for any additional needs.

## 2024-08-12 NOTE — DISCHARGE PLACEMENT REQUEST
"Olena Myers (88 y.o. Female)       Date of Birth   1935    Social Security Number       Address   [address]  [place] [address]    Home Phone   [phone]    MRN   [id number]       [facility]    Marital Status                               Admission Date   8/10/24    Admission Type   Emergency    Admitting Provider   Luz Taylor MD    Attending Provider   Hernan Waldron MD    Department, Room/Bed   [address], [address]       Discharge Date       Discharge Disposition       Discharge Destination                                 Attending Provider: Hernan Waldron MD    Allergies: Neomycin, Penicillins, Hydrocodone, Rosuvastatin    Isolation: None   Infection: None   Code Status: No CPR    Ht: 152.4 cm (60\")   Wt: 63.5 kg (139 lb 15.9 oz)    Admission Cmt: None   Principal Problem: Exertional dyspnea [R06.09]                   Active Insurance as of 8/10/2024       Primary Coverage       Payor Plan Insurance Group Employer/Plan Group    HUMANA MEDICARE REPLACEMENT HU[employer]NA MED ADV GROUP [id number]       Payor Plan Address Payor Plan Phone Number Payor Plan Fax Number Effective Dates    [address] [address] [phone]  1/1/2013 - None Entered    [facility] [address]         Subscriber Name Subscriber Birth Date Member ID       OLENA MYERS 1935 [id number]                     Emergency Contacts        (Rel.) Home Phone Work Phone Mobile Phone    Scotty Myers (Son) [phone] -- [phone]    Olvin Myers (Son) [phone] -- [phone]    Teddy Myers (Son) [phone] -- [phone]    Nazanin Pierson (Sister) [phone] -- [phone]                "

## 2024-08-12 NOTE — CONSULTS
[facility]  Initial Inpatient Consult Note    Referring Provider: Chivo    Reason for Consultation: Shortness of breath    Subjective     History of present illness:    88 y.o. female admitted on 8/10 for shortness of air.  She has a history of myasthenia gravis ImmunoPro suppressive therapy, TIA, hypertension, heart failure, history of GI bleed with gastric ulcer in the distant past, COPD, on Xarelto for A-fib.  Last colonoscopy was 2017 showing diverticulosis.  Her last EGD was 2020 showing esophagitis.    Past Medical History:  Past Medical History:   Diagnosis Date    Anemia     IRON DEFICIENCY    Arthritis     Asthma     Atrial fibrillation     CAD (coronary artery disease)     HEART STENT    COPD (chronic obstructive pulmonary disease)     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    Frequent urinary tract infections     History of gastric ulcer     History of GI bleed     [place] (hard of hearing)     Hyperlipidemia     Hypertension     Hyponatremia     Lightheadedness     LVH (left ventricular hypertrophy)     MG (myasthenia gravis)     Mini stroke 10/2016, 3/2017    OA (osteoarthritis)     Osteoporosis     Sleep apnea     USES CPAP    SOB (shortness of breath)     WALKING     Past Surgical History:  Past Surgical History:   Procedure Laterality Date    APPENDECTOMY      BRONCHOSCOPY N/A 3/31/2022    Procedure: BRONCHOSCOPY WITH BAL RIGHT LOWER LOBE;  Surgeon: Remy Tirado MD;  Location: [facility] ENDOSCOPY;  Service: Pulmonary;  Laterality: N/A;  COPD EXACERBATION      CARPAL TUNNEL RELEASE Bilateral     CATARACT EXTRACTION Bilateral     CORONARY STENT PLACEMENT      TOTAL HIP ARTHROPLASTY Bilateral     TOTAL SHOULDER ARTHROPLASTY W/ DISTAL CLAVICLE EXCISION Left 7/19/2016    Procedure: LT TOTAL SHOULDER REVERSE ARTHROPLASTY;  Surgeon: NAHOMI Solorzano MD;  [place] [facility] [place] [facility] [place]  Service:     TOTAL SHOULDER ARTHROPLASTY W/ DISTAL CLAVICLE EXCISION Right 1/8/2019    Procedure: RIGHT  TOTAL SHOULDER REVERSE ARTHROPLASTY;  Surgeon: Jovanny Solorzano MD;  [place] [facility] [place] [facility] [place]  Service: [place]opedics      Social History:   Social History     Tobacco Use    Smoking status: Never    Smokeless tobacco: Never    Tobacco comments:     caffeine - coffee and coke caff. free, tea    Substance Use Topics    Alcohol use: No      Family History:  Family History   Problem Relation Age of Onset    Heart disease Mother     Hyperlipidemia Mother     Stroke Mother     Diabetes Mother     Breast cancer Mother     Heart disease Father     Hyperlipidemia Father     Stroke Father     Malig Hyperthermia Neg Hx        Home Meds:  Medications Prior to Admission   Medication Sig Dispense Refill Last Dose    acetaminophen (TYLENOL) 325 MG tablet Take 2 tablets by mouth Every 4 (Four) Hours As Needed for Mild Pain.   8/10/2024    albuterol (PROVENTIL) (2.5 MG/3ML) 0.083% nebulizer solution Take 2.5 mg by nebulization Every 4 (Four) Hours As Needed for Wheezing.   8/10/2024    albuterol sulfate  (90 Base) MCG/ACT inhaler Inhale 2 puffs Every 6 (Six) Hours As Needed for Wheezing or Shortness of Air.   8/10/2024    azelastine (ASTELIN) 0.1 % nasal spray 2 sprays into the nostril(s) as directed by provider 2 (Two) Times a Day. Use in each nostril as directed   8/10/2024    busPIRone (BUSPAR) 10 MG tablet Take 1 tablet by mouth 2 (Two) Times a Day.   8/9/2024    DULoxetine (CYMBALTA) 60 MG capsule Take 1 capsule by mouth 2 (Two) Times a Day.   8/9/2024    ferrous sulfate 325 (65 FE) MG tablet Take 1 tablet by mouth Daily With Breakfast.   8/9/2024    Fluticasone-Umeclidin-Vilant (Trelegy Ellipta) 100-62.5-25 MCG/INH inhaler Inhale 1 puff Daily.   8/10/2024    irbesartan (AVAPRO) 75 MG tablet Take 1 tablet by mouth Every Night.   8/9/2024    levothyroxine (SYNTHROID, LEVOTHROID) 88 MCG tablet Take 1 tablet by mouth Daily.   8/9/2024    montelukast (SINGULAIR) 10 MG tablet Take 1 tablet by mouth Every Night.   8/9/2024     mycophenolate (CELLCEPT) 500 MG tablet Take 2 tablets by mouth Every Morning.   8/9/2024    mycophenolate (CELLCEPT) 500 MG tablet Take 3 tablets by mouth Every Evening.   8/9/2024    pantoprazole (PROTONIX) 40 MG EC tablet Take 1 tablet by mouth Daily.   8/9/2024    pravastatin (PRAVACHOL) 20 MG tablet Take 1 tablet by mouth Every Night.   8/9/2024    predniSONE (DELTASONE) 10 MG tablet Take 0.5 tablets by mouth Daily.   8/9/2024    raloxifene (EVISTA) 60 MG tablet Take 1 tablet by mouth Every Night.   8/9/2024    rivaroxaban (XARELTO) 20 MG tablet Take 1 tablet by mouth Daily With Dinner.   8/9/2024    torsemide (DEMADEX) 20 MG tablet Take 1 tablet by mouth Daily.   8/9/2024    vitamin B-12 (CYANOCOBALAMIN) 1000 MCG tablet Take 1 tablet by mouth Daily.   8/9/2024    vitamin D (ERGOCALCIFEROL) 90014 UNITS capsule capsule Take 1 capsule by mouth Every 7 (Seven) Days. Takes on Wednesdays   Past Week    metoprolol succinate XL (TOPROL-XL) 25 MG 24 hr tablet Take 1 tablet by mouth 2 (Two) Times a Day. 60 tablet 0      Current Meds:   albuterol, 2.5 mg, Nebulization, 4x Daily - RT  atenolol, 25 mg, Oral, Q8H  azelastine, 2 spray, Each Nare, BID  budesonide-formoterol, 2 puff, Inhalation, BID - RT   And  tiotropium bromide monohydrate, 2 puff, Inhalation, Daily - RT  busPIRone, 10 mg, Oral, BID  DULoxetine, 60 mg, Oral, BID  furosemide, 20 mg, Intravenous, Once  guaiFENesin, 600 mg, Oral, Q12H  levothyroxine, 88 mcg, Oral, Q AM  montelukast, 10 mg, Oral, Nightly  mycophenolate, 1,000 mg, Oral, QAM  mycophenolate, 1,500 mg, Oral, Nightly  pantoprazole, 40 mg, Oral, Daily  pravastatin, 20 mg, Oral, Nightly  predniSONE, 5 mg, Oral, Daily  raloxifene, 60 mg, Oral, Nightly  sodium chloride, 10 mL, Intravenous, Q12H  torsemide, 20 mg, Oral, Daily  vitamin B-12, 1,000 mcg, Oral, Daily  [START ON 8/14/2024] vitamin D, 50,000 Units, Oral, Q7 Days      Allergies:  Allergies   Allergen Reactions    Neomycin Anaphylaxis     "Penicillins Swelling and Rash     Historical allergy x 30-40 years, tolerates cephalosporins    Hydrocodone Itching and Rash    Rosuvastatin Other (See Comments)     Muscle cramps     Review of Systems  There is weakness and fatigue all other systems reviewed and negative     Objective     Vital Signs  Temp:  [97.9 °F (36.6 °C)-99.1 °F (37.3 °C)] 97.9 °F (36.6 °C)  Heart Rate:  [] 93  Resp:  [18-20] 20  BP: (104-145)/(62-90) 121/63  Physical Exam:  General Appearance:    Alert, cooperative, in no acute distress   Head:    Normocephalic, without obvious abnormality, atraumatic   Eyes:          conjunctivae and sclerae normal, no   icterus   Throat:   no thrush, oral mucosa moist   Neck:   Supple, no adenopathy   Lungs:     Clear to auscultation bilaterally    Heart:    Regular rhythm and normal rate    Chest Wall:    No abnormalities observed   Abdomen:     Soft, nondistended, nontender; normal bowel sounds   Extremities:   no edema, no redness   Skin:   No bruising or rash   Psychiatric:  normal mood and insight     Results Review:   I reviewed the patient's new clinical results.    Results from last 7 days   Lab Units 08/12/24  0458 08/11/24  0303 08/10/24  0945   WBC 10*3/mm3 10.42 13.63* 15.79*   HEMOGLOBIN g/dL 7.2* 8.0* 8.3*   HEMATOCRIT % 23.6* 26.1* 26.9*   PLATELETS 10*3/mm3 386 369 426     Results from last 7 days   Lab Units 08/12/24  0458 08/11/24  1237 08/11/24  0303 08/10/24  0945   SODIUM mmol/L 133*  --  136 138   POTASSIUM mmol/L 4.3 4.5 3.3* 3.4*   CHLORIDE mmol/L 102  --  97* 96*   CO2 mmol/L 22.1  --  28.0 27.0   BUN mg/dL 21  --  19 22   CREATININE mg/dL 0.92  --  0.88 0.96   CALCIUM mg/dL 8.3*  --  8.6 9.1   BILIRUBIN mg/dL  --   --  0.5 0.4   ALK PHOS U/L  --   --  69 73   ALT (SGPT) U/L  --   --  5 7   AST (SGOT) U/L  --   --  9 16   GLUCOSE mg/dL 91  --  119* 122*     Results from last 7 days   Lab Units 08/10/24  0945   INR  2.08*     No results found for: \"LIPASE\"    Radiology:  CT " Abdomen Pelvis Without Contrast   Final Result           1. Distended gallbladder.       2. No urolithiasis or hydronephrosis identified). Assessment of the   pelvis is limited by hardware artifact).       3. Colonic diverticulosis. No acute inflammatory process of bowel is   identified.       4. Age-indeterminate L1 compression deformity, correlate clinically.               This report was finalized on 8/10/2024 1:43 PM by Dr. José Manuel Anaya M.D on Workstation: "[email or web address]"          XR Chest 1 View   Final Result   No focal pulmonary consolidation. Mild cardiomegaly.   Follow-up as clinical indications persist.       This report was finalized on 8/10/2024 10:51 AM by Dr. José Manuel Anaya M.D on Workstation: [email or web address]          US Gallbladder    (Results Pending)       Assessment & Plan   Active Hospital Problems    Diagnosis     **Exertional dyspnea     Hypothyroidism (acquired)     Diastolic CHF, chronic     COPD (chronic obstructive pulmonary disease)     Sleep apnea     Atrial fibrillation     Myasthenia gravis     Paroxysmal atrial fibrillation     CAD (coronary artery disease)     Essential hypertension        Assessment:  Congestive heart failure  COPD  A-fib on Xarelto  Hypertension  Anemia  Heme positive stools  Myasthenia gravis on immunosuppressive therapy  Chronic hypoxia on 2 L of oxygen  Obesity  Obstructive sleep apnea    Plan:  We are happy to perform EGD and colonoscopy for heme positive stools and anemia when she has been off Xarelto 72 hours and when she has been cleared by the pulmonary and cardiology teams for MAC sedation and for these 2 procedures  Follow hemoglobin  Continue to hold anticoagulation  We will follow      I discussed the patients findings and my recommendations with patient and nursing staff.    Charlie Zacarias MD

## 2024-08-12 NOTE — CASE MANAGEMENT/SOCIAL WORK
Discharge Planning Assessment  [facility]     Patient Name: Olena Myers  MRN: [id number]  Today's Date: 8/12/2024    Admit Date: 8/10/2024    Plan: Home, family to transport   Discharge Needs Assessment       Row Name 08/12/24 1211       Living Environment    People in Home facility resident  Lives at [facility] in [facility]    Current Living Arrangements assisted living facility    Potentially Unsafe Housing Conditions none    In the past 12 months has the electric, gas, oil, or water company threatened to shut off services in your home? No    Primary Care Provided by self    Provides Primary Care For no one    Family Caregiver if Needed child(lorenza), adult    Family Caregiver Names Scotty Myers [phone]    Quality of Family Relationships helpful;involved    Able to Return to Prior Arrangements yes       Resource/Environmental Concerns    Resource/Environmental Concerns none    Transportation Concerns none       Transportation Needs    In the past 12 months, has lack of transportation kept you from medical appointments or from getting medications? no    In the past 12 months, has lack of transportation kept you from meetings, work, or from getting things needed for daily living? No       Food Insecurity    Within the past 12 months, you worried that your food would run out before you got the money to buy more. Never true    Within the past 12 months, the food you bought just didn't last and you didn't have money to get more. Never true       Transition Planning    Patient/Family Anticipates Transition to home    Patient/Family Anticipated Services at Transition none    Transportation Anticipated family or friend will provide       Discharge Needs Assessment    Readmission Within the Last 30 Days no previous admission in last 30 days    Current Outpatient/Agency/Support Group assisted living facility    Equipment Currently Used at Home walker, rolling;oxygen  Her [employer] is handicapp accessible    Concerns to  be Addressed no discharge needs identified;denies needs/concerns at this time    Equipment Needed After Discharge none    Outpatient/Agency/Support Group Needs assisted living facility    Provided Post Acute Provider List? N/A    Provided Post Acute Provider Quality & Resource List? N/A                   Discharge Plan       Row Name 08/12/24 1215       Plan    Plan Home, family to transport    Plan Comments Met with pt at bedside. Introduced self and explained role of . Face sheet verified, PCP is Manda Keenan. Pt denies any difficulty paying for medications, and  she obtains her medications from [facility]. [facility], and she stated her son, Scotty,  could also assist with any care needs that may arise. Pt is independent in ADL's and she uses a walker for mobility. SHe wears o2, and [facility] is her provider. She is current with [facility], referral placed in [facility], [facility] notified, and she has been to [facility] in the past. She anticipates returning home with [facility]. Upon discharge, she stated her son will transport home and she has a transport tank. Explained that CCP would follow to assess for discharge needs.                  Continued Care and Services - Admitted Since 8/10/2024    No active coordination exists for this encounter.       Expected Discharge Date and Time       Expected Discharge Date Expected Discharge Time    Aug 14, 2024            Demographic Summary    No documentation.                  Functional Status    No documentation.                  Psychosocial    No documentation.                  Abuse/Neglect    No documentation.                  Legal    No documentation.                  Substance Abuse    No documentation.                  Patient Forms    No documentation.                     Ruth Calvillo RN

## 2024-08-13 PROBLEM — R93.2 ABNORMAL COMPUTED TOMOGRAPHY OF GALLBLADDER: Status: ACTIVE | Noted: 2024-08-13

## 2024-08-13 LAB
ANION GAP SERPL CALCULATED.3IONS-SCNC: 11 MMOL/L (ref 5–15)
BH BB BLOOD EXPIRATION DATE: NORMAL
BH BB BLOOD TYPE BARCODE: 600
BH BB DISPENSE STATUS: NORMAL
BH BB PRODUCT CODE: NORMAL
BH BB UNIT NUMBER: NORMAL
BUN SERPL-MCNC: 19 MG/DL (ref 8–23)
BUN/CREAT SERPL: 20.9 (ref 7–25)
CALCIUM SPEC-SCNC: 8.2 MG/DL (ref 8.6–10.5)
CHLORIDE SERPL-SCNC: 99 MMOL/L (ref 98–107)
CO2 SERPL-SCNC: 23 MMOL/L (ref 22–29)
CREAT SERPL-MCNC: 0.91 MG/DL (ref 0.57–1)
CROSSMATCH INTERPRETATION: NORMAL
DEPRECATED RDW RBC AUTO: 51.4 FL (ref 37–54)
DIGOXIN SERPL-MCNC: 0.9 NG/ML (ref 0.6–1.2)
EGFRCR SERPLBLD CKD-EPI 2021: 61.6 ML/MIN/1.73
ERYTHROCYTE [DISTWIDTH] IN BLOOD BY AUTOMATED COUNT: 14.8 % (ref 12.3–15.4)
GLUCOSE SERPL-MCNC: 92 MG/DL (ref 65–99)
HCT VFR BLD AUTO: 28.4 % (ref 34–46.6)
HGB BLD-MCNC: 9.2 G/DL (ref 12–15.9)
IGA1 MFR SER: <50 MG/DL (ref 70–400)
IGG1 SER-MCNC: <300 MG/DL (ref 700–1600)
IGM SERPL-MCNC: 48 MG/DL (ref 40–230)
MCH RBC QN AUTO: 31 PG (ref 26.6–33)
MCHC RBC AUTO-ENTMCNC: 32.4 G/DL (ref 31.5–35.7)
MCV RBC AUTO: 95.6 FL (ref 79–97)
PLATELET # BLD AUTO: 369 10*3/MM3 (ref 140–450)
PMV BLD AUTO: 9 FL (ref 6–12)
POTASSIUM SERPL-SCNC: 3.9 MMOL/L (ref 3.5–5.2)
RBC # BLD AUTO: 2.97 10*6/MM3 (ref 3.77–5.28)
SODIUM SERPL-SCNC: 133 MMOL/L (ref 136–145)
UNIT  ABO: NORMAL
UNIT  RH: NORMAL
WBC NRBC COR # BLD AUTO: 10.14 10*3/MM3 (ref 3.4–10.8)

## 2024-08-13 PROCEDURE — 94761 N-INVAS EAR/PLS OXIMETRY MLT: CPT

## 2024-08-13 PROCEDURE — 94760 N-INVAS EAR/PLS OXIMETRY 1: CPT

## 2024-08-13 PROCEDURE — 97530 THERAPEUTIC ACTIVITIES: CPT

## 2024-08-13 PROCEDURE — 63710000001 MYCOPHENOLATE MOFETIL PER 250 MG: Performed by: INTERNAL MEDICINE

## 2024-08-13 PROCEDURE — 99222 1ST HOSP IP/OBS MODERATE 55: CPT | Performed by: SURGERY

## 2024-08-13 PROCEDURE — 99232 SBSQ HOSP IP/OBS MODERATE 35: CPT | Performed by: INTERNAL MEDICINE

## 2024-08-13 PROCEDURE — 80048 BASIC METABOLIC PNL TOTAL CA: CPT | Performed by: INTERNAL MEDICINE

## 2024-08-13 PROCEDURE — 80162 ASSAY OF DIGOXIN TOTAL: CPT | Performed by: INTERNAL MEDICINE

## 2024-08-13 PROCEDURE — 94799 UNLISTED PULMONARY SVC/PX: CPT

## 2024-08-13 PROCEDURE — 99221 1ST HOSP IP/OBS SF/LOW 40: CPT | Performed by: PSYCHIATRY & NEUROLOGY

## 2024-08-13 PROCEDURE — 94664 DEMO&/EVAL PT USE INHALER: CPT

## 2024-08-13 PROCEDURE — 63710000001 PREDNISONE PER 5 MG: Performed by: INTERNAL MEDICINE

## 2024-08-13 PROCEDURE — 82784 ASSAY IGA/IGD/IGG/IGM EACH: CPT | Performed by: INTERNAL MEDICINE

## 2024-08-13 PROCEDURE — 97110 THERAPEUTIC EXERCISES: CPT

## 2024-08-13 PROCEDURE — 85027 COMPLETE CBC AUTOMATED: CPT | Performed by: HOSPITALIST

## 2024-08-13 RX ORDER — DIGOXIN 125 MCG
125 TABLET ORAL
Status: DISCONTINUED | OUTPATIENT
Start: 2024-08-13 | End: 2024-08-20 | Stop reason: HOSPADM

## 2024-08-13 RX ORDER — PANTOPRAZOLE SODIUM 40 MG/1
40 TABLET, DELAYED RELEASE ORAL
Status: DISCONTINUED | OUTPATIENT
Start: 2024-08-13 | End: 2024-08-20 | Stop reason: HOSPADM

## 2024-08-13 RX ADMIN — ALBUTEROL SULFATE 2.5 MG: 2.5 SOLUTION RESPIRATORY (INHALATION) at 11:30

## 2024-08-13 RX ADMIN — Medication 10 ML: at 22:02

## 2024-08-13 RX ADMIN — AZELASTINE HYDROCHLORIDE 2 SPRAY: 137 SPRAY, METERED NASAL at 09:25

## 2024-08-13 RX ADMIN — DIGOXIN 125 MCG: 125 TABLET ORAL at 12:38

## 2024-08-13 RX ADMIN — MYCOPHENOLATE MOFETIL 1000 MG: 250 CAPSULE ORAL at 12:37

## 2024-08-13 RX ADMIN — ACETAMINOPHEN 325MG 650 MG: 325 TABLET ORAL at 11:44

## 2024-08-13 RX ADMIN — ATENOLOL 25 MG: 25 TABLET ORAL at 21:54

## 2024-08-13 RX ADMIN — MONTELUKAST SODIUM 10 MG: 10 TABLET, FILM COATED ORAL at 21:54

## 2024-08-13 RX ADMIN — PRAVASTATIN SODIUM 20 MG: 20 TABLET ORAL at 21:55

## 2024-08-13 RX ADMIN — TIOTROPIUM BROMIDE INHALATION SPRAY 2 PUFF: 3.12 SPRAY, METERED RESPIRATORY (INHALATION) at 11:32

## 2024-08-13 RX ADMIN — ACETAMINOPHEN 325MG 650 MG: 325 TABLET ORAL at 06:53

## 2024-08-13 RX ADMIN — AZELASTINE HYDROCHLORIDE 2 SPRAY: 137 SPRAY, METERED NASAL at 20:01

## 2024-08-13 RX ADMIN — ACETAMINOPHEN 325MG 650 MG: 325 TABLET ORAL at 20:00

## 2024-08-13 RX ADMIN — ALBUTEROL SULFATE 2.5 MG: 2.5 SOLUTION RESPIRATORY (INHALATION) at 15:25

## 2024-08-13 RX ADMIN — DULOXETINE HYDROCHLORIDE 60 MG: 60 CAPSULE, DELAYED RELEASE ORAL at 21:54

## 2024-08-13 RX ADMIN — BUDESONIDE AND FORMOTEROL FUMARATE DIHYDRATE 2 PUFF: 160; 4.5 AEROSOL RESPIRATORY (INHALATION) at 11:31

## 2024-08-13 RX ADMIN — DULOXETINE HYDROCHLORIDE 60 MG: 60 CAPSULE, DELAYED RELEASE ORAL at 09:37

## 2024-08-13 RX ADMIN — GUAIFENESIN 600 MG: 600 TABLET, EXTENDED RELEASE ORAL at 09:35

## 2024-08-13 RX ADMIN — Medication 1000 MCG: at 09:35

## 2024-08-13 RX ADMIN — GUAIFENESIN 600 MG: 600 TABLET, EXTENDED RELEASE ORAL at 21:55

## 2024-08-13 RX ADMIN — PANTOPRAZOLE SODIUM 40 MG: 40 TABLET, DELAYED RELEASE ORAL at 09:37

## 2024-08-13 RX ADMIN — TORSEMIDE 20 MG: 20 TABLET ORAL at 09:31

## 2024-08-13 RX ADMIN — BUDESONIDE AND FORMOTEROL FUMARATE DIHYDRATE 2 PUFF: 160; 4.5 AEROSOL RESPIRATORY (INHALATION) at 20:27

## 2024-08-13 RX ADMIN — BUSPIRONE HYDROCHLORIDE 10 MG: 10 TABLET ORAL at 21:55

## 2024-08-13 RX ADMIN — BUSPIRONE HYDROCHLORIDE 10 MG: 10 TABLET ORAL at 09:37

## 2024-08-13 RX ADMIN — PREDNISONE 5 MG: 5 TABLET ORAL at 09:31

## 2024-08-13 RX ADMIN — Medication 10 ML: at 09:29

## 2024-08-13 RX ADMIN — RALOXIFENE HYDROCHLORIDE 60 MG: 60 TABLET, FILM COATED ORAL at 21:55

## 2024-08-13 RX ADMIN — PANTOPRAZOLE SODIUM 40 MG: 40 TABLET, DELAYED RELEASE ORAL at 21:55

## 2024-08-13 RX ADMIN — MYCOPHENOLATE MOFETIL 1500 MG: 250 CAPSULE ORAL at 21:52

## 2024-08-13 RX ADMIN — ACETAMINOPHEN 325MG 650 MG: 325 TABLET ORAL at 00:34

## 2024-08-13 RX ADMIN — ATENOLOL 25 MG: 25 TABLET ORAL at 14:48

## 2024-08-13 RX ADMIN — LEVOTHYROXINE SODIUM 88 MCG: 88 TABLET ORAL at 06:53

## 2024-08-13 RX ADMIN — ATENOLOL 25 MG: 25 TABLET ORAL at 06:53

## 2024-08-13 RX ADMIN — ALBUTEROL SULFATE 2.5 MG: 2.5 SOLUTION RESPIRATORY (INHALATION) at 20:23

## 2024-08-13 NOTE — SIGNIFICANT NOTE
08/13/24 1525   Negative Inspiratory Force (NIF)   Negative Inspiratory Force (cm H2O) -28   Effort (NIF) fair   Patient Position (NIF) sitting in chair   Vital Capacity (VC)   Forced Vital Capacity (L) 0.5   Patient Position (VC) sitting in chair   Effort (VC) poor  (pt states unable to do, attempted 3 times)

## 2024-08-13 NOTE — PLAN OF CARE
Goal Outcome Evaluation:  Plan of Care Reviewed With: patient        Progress: improving  Outcome Evaluation: Pt seen for PT tx this AM. Pt UIC when PT arrived feeling fatigued but agreeable to participate. Pt able to complete nisha LE exercises while seated in the chair. Pt then stood with Enedina and ambulated 15ft with rwx, CGA. Pt fatigues quickly which limited gait distance. Pt needed assist with toileting needs. Will continue to follow and progress pt as able.

## 2024-08-13 NOTE — PROGRESS NOTES
[facility]     LOS: 3 days     Subjective:    CC:Nausea and Abdominal Pain    DIET:  Diet Order   Procedures    Diet: Cardiac, Diabetic, Renal; Healthy Heart (2-3 Na+); Consistent Carbohydrate; Low Sodium (2-3g), Low Potassium, Low Phosphorus; Fluid Consistency: Thin (IDDSI 0)       Objective:    Vital Signs:  Temp:  [97.3 °F (36.3 °C)-98.8 °F (37.1 °C)] 97.5 °F (36.4 °C)  Heart Rate:  [83-93] 83  Resp:  [18-24] 20  BP: (116-153)/(58-86) 130/78    SpO2:  [97 %-100 %] 100 %  on  Flow (L/min):  [2-3] 2;   Device (Oxygen Therapy): nasal cannula  Body mass index is 27.34 kg/m².    Physical Exam  Constitutional:       Appearance: Normal appearance.   HENT:      Head: Normocephalic and atraumatic.   Cardiovascular:      Heart sounds: No murmur heard.     No friction rub.   Pulmonary:      Effort: Pulmonary effort is normal.      Breath sounds: Normal breath sounds.   Abdominal:      General: Bowel sounds are normal. There is no distension.      Palpations: Abdomen is soft.      Tenderness: There is no abdominal tenderness.   Skin:     General: Skin is warm and dry.   Neurological:      Mental Status: She is alert.   Psychiatric:         Mood and Affect: Mood normal.         Behavior: Behavior normal.         Results Review:    Glucose   Date Value Ref Range Status   08/13/2024 92 65 - 99 mg/dL Final   08/12/2024 91 65 - 99 mg/dL Final   08/11/2024 119 (H) 65 - 99 mg/dL Final     Results from last 7 days   Lab Units 08/13/24  0343   WBC 10*3/mm3 10.14   HEMOGLOBIN g/dL 9.2*   HEMATOCRIT % 28.4*   PLATELETS 10*3/mm3 369     Results from last 7 days   Lab Units 08/13/24  0343 08/11/24  1237 08/11/24  0303   SODIUM mmol/L 133*   < > 136   POTASSIUM mmol/L 3.9   < > 3.3*   CHLORIDE mmol/L 99   < > 97*   CO2 mmol/L 23.0   < > 28.0   BUN mg/dL 19   < > 19   CREATININE mg/dL 0.91   < > 0.88   CALCIUM mg/dL 8.2*   < > 8.6   BILIRUBIN mg/dL  --   --  0.5   ALK PHOS U/L  --   --  69   ALT (SGPT) U/L  --  "  --  5   AST (SGOT) U/L  --   --  9   GLUCOSE mg/dL 92   < > 119*    < > = values in this interval not displayed.     Results from last 7 days   Lab Units 08/10/24  0945   INR  2.08*     Results from last 7 days   Lab Units 08/10/24  0945   MAGNESIUM mg/dL 2.3     Results from last 7 days   Lab Units 08/10/24  1131 08/10/24  0945   HSTROP T ng/L 38* 41*     Cultures:  No results found for: \"BLOODCX\", \"URINECX\", \"WOUNDCX\", \"MRSACX\", \"RESPCX\", \"STOOLCX\"    I have reviewed daily medications and changes in CPOE    Scheduled meds  albuterol, 2.5 mg, Nebulization, 4x Daily - RT  atenolol, 25 mg, Oral, Q8H  azelastine, 2 spray, Each Nare, BID  budesonide-formoterol, 2 puff, Inhalation, BID - RT   And  tiotropium bromide monohydrate, 2 puff, Inhalation, Daily - RT  busPIRone, 10 mg, Oral, BID  digoxin, 125 mcg, Oral, Daily  DULoxetine, 60 mg, Oral, BID  guaiFENesin, 600 mg, Oral, Q12H  levothyroxine, 88 mcg, Oral, Q AM  montelukast, 10 mg, Oral, Nightly  mycophenolate, 1,000 mg, Oral, QAM  mycophenolate, 1,500 mg, Oral, Nightly  pantoprazole, 40 mg, Oral, Daily  pravastatin, 20 mg, Oral, Nightly  predniSONE, 5 mg, Oral, Daily  raloxifene, 60 mg, Oral, Nightly  sodium chloride, 10 mL, Intravenous, Q12H  torsemide, 20 mg, Oral, Daily  vitamin B-12, 1,000 mcg, Oral, Daily  [START ON 8/14/2024] vitamin D, 50,000 Units, Oral, Q7 Days           PRN meds    acetaminophen **OR** acetaminophen **OR** acetaminophen    Calcium Replacement - Follow Nurse / BPA Driven Protocol    Magnesium Standard Dose Replacement - Follow Nurse / BPA Driven Protocol    nitroglycerin    ondansetron    Phosphorus Replacement - Follow Nurse / BPA Driven Protocol    Potassium Replacement - Follow Nurse / BPA Driven Protocol    [COMPLETED] Insert Peripheral IV **AND** sodium chloride    sodium chloride    sodium chloride        Exertional dyspnea    Myasthenia gravis    Paroxysmal atrial fibrillation    Essential hypertension    CAD (coronary artery " disease)    Atrial fibrillation    COPD (chronic obstructive pulmonary disease)    Sleep apnea    Diastolic CHF, chronic    Hypothyroidism (acquired)        Assessment/Plan:    Dyspnea is multifactorial  -Anemia likely contributing  -This has improved  -Pulmonary is following    A-fib with RVR  -Better with digoxin  -Metoprolol changed to atenolol  -Heart rate is much improved    CHF  -Stool heme positive on Xarelto, which is currently being held  -plan for possible scopes on thursday   -Torsemide    Abnormal gallbladder noted on CT  -ask surgery to see for nausea on admission and enlarged gallbladder     Multifactorial dyspnea (anemia, PAF,COPD) - better with blood     CAD- s/p stent placement 28 years ago  Cardiology following and diuresing     COPD- minineb treatments    Leukocytosis likely reactive/chronic steroids     Hypokalemia corrected     Hypothyroidism on levothyroxine     GERD-PPI     Past history of myasthenia gravis 2011 on CellCept/prednisone        Yuan La MD  08/13/24  11:29 EDT

## 2024-08-13 NOTE — CONSULTS
Neurology Consult Note    Referring Provider: Dr. La  Reason for Consultation: history of myasthenia gravis    History of present illness:    The patient is an 88 year old woman admitted 8/10/24 with shortness of breath felt to be multifactorial due to anemia, rapid afib, COPD.    Neurology is asked to evaluate for whether myasthenia gravis is contributing to current condition.    Patient was diagnosed with MG > 10 years ago. She presented with ptosis and double vision. She is followed by neurologist at [facility]. She has been on Cellcept and low dose prednisone for many years with no changes. She has never been intubated or even hospitalized for myasthenia gravis.       Past Medical History  Past Medical History:   Diagnosis Date    Anemia     IRON DEFICIENCY    Arthritis     Asthma     Atrial fibrillation     CAD (coronary artery disease)     HEART STENT    COPD (chronic obstructive pulmonary disease)     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    Frequent urinary tract infections     History of gastric ulcer     History of GI bleed     [place] (hard of hearing)     Hyperlipidemia     Hypertension     Hyponatremia     Lightheadedness     LVH (left ventricular hypertrophy)     MG (myasthenia gravis)     Mini stroke 10/2016, 3/2017    OA (osteoarthritis)     Osteoporosis     Sleep apnea     USES CPAP    SOB (shortness of breath)     WALKING       Past Surgical History  Past Surgical History:   Procedure Laterality Date    APPENDECTOMY      BRONCHOSCOPY N/A 3/31/2022    Procedure: BRONCHOSCOPY WITH BAL RIGHT LOWER LOBE;  Surgeon: Remy Tirado MD;  Location: [facility] ENDOSCOPY;  Service: Pulmonary;  Laterality: N/A;  COPD EXACERBATION      CARPAL TUNNEL RELEASE Bilateral     CATARACT EXTRACTION Bilateral     CORONARY STENT PLACEMENT      TOTAL HIP ARTHROPLASTY Bilateral     TOTAL SHOULDER ARTHROPLASTY W/ DISTAL CLAVICLE EXCISION Left 7/19/2016    Procedure: LT TOTAL SHOULDER REVERSE ARTHROPLASTY;  Surgeon: NAHOMI Ling  MD Rashad;  Location: [facility] OR [facility];  Service:     TOTAL SHOULDER ARTHROPLASTY W/ DISTAL CLAVICLE EXCISION Right 1/8/2019    Procedure: RIGHT TOTAL SHOULDER REVERSE ARTHROPLASTY;  Surgeon: Jovanny Solorzano MD;  Location: [facility] OR [facility];  Service: Orthopedics       Family History  Family History   Problem Relation Age of Onset    Heart disease Mother     Hyperlipidemia Mother     Stroke Mother     Diabetes Mother     Breast cancer Mother     Heart disease Father     Hyperlipidemia Father     Stroke Father     Malig Hyperthermia Neg Hx        Allergies   Allergen Reactions    Neomycin Anaphylaxis    Penicillins Swelling and Rash     Historical allergy x 30-40 years, tolerates cephalosporins    Hydrocodone Itching and Rash    Rosuvastatin Other (See Comments)     Muscle cramps       Social History  Social History     Socioeconomic History    Marital status:    Tobacco Use    Smoking status: Never    Smokeless tobacco: Never    Tobacco comments:     caffeine - coffee and coke caff. free, tea    Vaping Use    Vaping status: Never Used   Substance and Sexual Activity    Alcohol use: No    Drug use: No     Comment: caffine    Sexual activity: Defer       Review of Systems    Medications  Scheduled Meds:albuterol, 2.5 mg, Nebulization, 4x Daily - RT  atenolol, 25 mg, Oral, Q8H  azelastine, 2 spray, Each Nare, BID  budesonide-formoterol, 2 puff, Inhalation, BID - RT   And  tiotropium bromide monohydrate, 2 puff, Inhalation, Daily - RT  busPIRone, 10 mg, Oral, BID  digoxin, 125 mcg, Oral, Daily  DULoxetine, 60 mg, Oral, BID  guaiFENesin, 600 mg, Oral, Q12H  levothyroxine, 88 mcg, Oral, Q AM  montelukast, 10 mg, Oral, Nightly  mycophenolate, 1,000 mg, Oral, QAM  mycophenolate, 1,500 mg, Oral, Nightly  pantoprazole, 40 mg, Oral, BID AC  pravastatin, 20 mg, Oral, Nightly  predniSONE, 5 mg, Oral, Daily  raloxifene, 60 mg, Oral, Nightly  sodium chloride, 10 mL, Intravenous, Q12H  torsemide, 20 mg, Oral, Daily  vitamin  B-12, 1,000 mcg, Oral, Daily  [START ON 8/14/2024] vitamin D, 50,000 Units, Oral, Q7 Days      Continuous Infusions:   PRN Meds:.  acetaminophen **OR** acetaminophen **OR** acetaminophen    Calcium Replacement - Follow Nurse / BPA Driven Protocol    Magnesium Standard Dose Replacement - Follow Nurse / BPA Driven Protocol    nitroglycerin    ondansetron    Phosphorus Replacement - Follow Nurse / BPA Driven Protocol    Potassium Replacement - Follow Nurse / BPA Driven Protocol    [COMPLETED] Insert Peripheral IV **AND** sodium chloride    sodium chloride    sodium chloride    Vital Signs   Temp:  [97.3 °F (36.3 °C)-98.8 °F (37.1 °C)] 97.3 °F (36.3 °C)  Heart Rate:  [83-96] 89  Resp:  [20-24] 20  BP: (122-153)/(67-86) 122/67    Examination:  Constitutional: Well-groomed, well-nourished  HENT:  normal  Eyes: Normal conjunctivae  CVS:  Regular rate and rhythm.  No murmurs.  Good peripheral perfusion.   Resp :   Nonlabored respirations  Musculoskeletal:  No signs of peripheral edema, normal range, no deformities  Skin:  No rash, normal turgor  Neurologic:    Alert oriented and fluent  No dysarthria  EOMF without nystagmus  No ptosis  Pupils symmetric and equally reactive  Face symmetric  Power 4/5 in all extremities with poor effort some give way  Normal coordination  Sensory exam grossly intact  Gait not tested  Psychiatric: No anxiety, normal mood    Results Review:  Results from last 7 days   Lab Units 08/13/24  0343 08/12/24  0458 08/11/24  0303   WBC 10*3/mm3 10.14 10.42 13.63*   HEMOGLOBIN g/dL 9.2* 7.2* 8.0*   HEMATOCRIT % 28.4* 23.6* 26.1*   PLATELETS 10*3/mm3 369 386 369        Results from last 7 days   Lab Units 08/13/24  0343 08/12/24  0458 08/11/24  1237 08/11/24  0303 08/10/24  0945   SODIUM mmol/L 133* 133*  --  136 138   POTASSIUM mmol/L 3.9 4.3 4.5 3.3* 3.4*   CHLORIDE mmol/L 99 102  --  97* 96*   CO2 mmol/L 23.0 22.1  --  28.0 27.0   BUN mg/dL 19 21  --  19 22   CREATININE mg/dL 0.91 0.92  --  0.88 0.96    CALCIUM mg/dL 8.2* 8.3*  --  8.6 9.1   BILIRUBIN mg/dL  --   --   --  0.5 0.4   ALK PHOS U/L  --   --   --  69 73   ALT (SGPT) U/L  --   --   --  5 7   AST (SGOT) U/L  --   --   --  9 16   GLUCOSE mg/dL 92 91  --  119* 122*      Lab Results   Component Value Date    [id number] >2,000 (H) 08/12/2024     Lab Results   Component Value Date    TSH 1.080 08/11/2024         Medical Decision Making and Recommendations  Weakness  Very mild and unlikely to be due to myasthenia exacerbation  She has no bulbar signs or symptoms    Continue current doses of Cellcept and prednisone.    Will follow peripherally    I discussed these findings and my recommendations with patient and family        Shanice Arriaza MD  08/13/24  19:00 EDT

## 2024-08-13 NOTE — THERAPY TREATMENT NOTE
Patient Name: Olena Myers  : 1935    MRN: [id number]                              Today's Date: 2024       Admit Date: 8/10/2024    Visit Dx:     ICD-10-CM ICD-9-CM   1. Exertional dyspnea  R06.09 786.09   2. Pain of upper abdomen associated with exertion and has complete resolution  R10.10 789.09   3. Chronic anemia  D64.9 285.9   4. Chronic respiratory failure with hypoxia  J96.11 518.83     799.02     Patient Active Problem List   Diagnosis    Pre-operative cardiovascular examination    S/p reverse total shoulder arthroplasty    Myasthenia gravis    Paroxysmal atrial fibrillation    HX: long term anticoagulant use    Essential hypertension    CAD (coronary artery disease)    Rhinovirus    Atrial fibrillation    S/P reverse total shoulder arthroplasty, right    Acute UTI    COPD (chronic obstructive pulmonary disease)    Metabolic encephalopathy    Sleep apnea    Sepsis    Dyspnea    Chronic diastolic CHF (congestive heart failure)    Diastolic CHF, chronic    Heme positive stool    E coli bacteremia    Iron deficiency anemia    Current chronic use of systemic steroids    COPD exacerbation    Obesity (BMI 30-39.9)    COVID-19 virus infection    Chronic respiratory failure with hypoxia    Cytokine release syndrome, grade 1    Altered mental status    Anemia    KELLEE (acute kidney injury)    Hypothyroidism (acquired)    Exertional dyspnea     Past Medical History:   Diagnosis Date    Anemia     IRON DEFICIENCY    Arthritis     Asthma     Atrial fibrillation     CAD (coronary artery disease)     HEART STENT    COPD (chronic obstructive pulmonary disease)     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    Frequent urinary tract infections     History of gastric ulcer     History of GI bleed     [place] (hard of hearing)     Hyperlipidemia     Hypertension     Hyponatremia     Lightheadedness     LVH (left ventricular hypertrophy)     MG (myasthenia gravis)     Mini stroke 10/2016, 3/2017    OA (osteoarthritis)      Osteoporosis     Sleep apnea     USES CPAP    SOB (shortness of breath)     WALKING     Past Surgical History:   Procedure Laterality Date    APPENDECTOMY      BRONCHOSCOPY N/A 3/31/2022    Procedure: BRONCHOSCOPY WITH BAL RIGHT LOWER LOBE;  Surgeon: Remy Tirado MD;  Location: [facility] ENDOSCOPY;  Service: Pulmonary;  Laterality: N/A;  COPD EXACERBATION      CARPAL TUNNEL RELEASE Bilateral     CATARACT EXTRACTION Bilateral     CORONARY STENT PLACEMENT      TOTAL HIP ARTHROPLASTY Bilateral     TOTAL SHOULDER ARTHROPLASTY W/ DISTAL CLAVICLE EXCISION Left 7/19/2016    Procedure: LT TOTAL SHOULDER REVERSE ARTHROPLASTY;  Surgeon: NAHOMI Solorzano MD;  Location: [facility] OR [facility] [place];  Service:     TOTAL SHOULDER ARTHROPLASTY W/ DISTAL CLAVICLE EXCISION Right 1/8/2019    Procedure: RIGHT TOTAL SHOULDER REVERSE ARTHROPLASTY;  Surgeon: Jovanny Solorzano MD;  Location: [facility] OR [facility];  Service: Orthopedics      General Information       Row Name 08/13/24 1318          Physical Therapy Time and Intention    Document Type therapy note (daily note)  -     Mode of Treatment physical therapy  -EB       Row Name 08/13/24 1318          General Information    Patient Profile Reviewed yes  -EB       Row Name 08/13/24 1318          Cognition    Orientation Status (Cognition) oriented x 3  -EB       Row Name 08/13/24 1318          Safety Issues, Functional Mobility    Impairments Affecting Function (Mobility) balance;endurance/activity tolerance;strength  -               User Key  (r) = Recorded By, (t) = Taken By, (c) = Cosigned By      Initials Name Provider Type    EB Nahomy Bennett PTA Physical Therapist Assistant                   Mobility       Row Name 08/13/24 1319          Bed Mobility    Bed Mobility --  -EB     Assistive Device (Bed Mobility) --  -EB     Comment, (Bed Mobility) NT-UIC  -EB       Row Name 08/13/24 1319          Sit-Stand Transfer    Sit-Stand Dwarf (Transfers) minimum assist (75% patient effort)  -      Assistive Device (Sit-Stand Transfers) walker, front-wheeled  -EB     Comment, (Sit-Stand Transfer) cues for hand placement when standing  -EB       Row Name 08/13/24 1319          Gait/Stairs (Locomotion)    [place] Level (Gait) contact guard  -EB     Assistive Device (Gait) walker, front-wheeled  -EB     Distance in Feet (Gait) 15  -EB     Deviations/Abnormal Patterns (Gait) gait speed decreased;stride length decreased;weight shifting decreased  -EB     Bilateral Gait Deviations heel strike decreased  -EB     Comment, (Gait/Stairs) limited d/t fatigue and weakness.  -EB               User Key  (r) = Recorded By, (t) = Taken By, (c) = Cosigned By      Initials Name Provider Type    Nahomy Cardona PTA Physical Therapist Assistant                   Obj/Interventions       Row Name 08/13/24 1321          Motor Skills    Therapeutic Exercise --  BLE: AP, LAQs, seated marches (X10)  -EB               User Key  (r) = Recorded By, (t) = Taken By, (c) = Cosigned By      Initials Name Provider Type    Nahomy Cardona PTA Physical Therapist Assistant                   Goals/Plan    No documentation.                  Clinical Impression       Row Name 08/13/24 1322          Plan of Care Review    Plan of Care Reviewed With patient  -EB     Progress improving  -EB     Outcome Evaluation Pt seen for PT tx this AM. Pt UIC when PT arrived feeling fatigued but agreeable to participate. Pt able to complete nisha LE exercises while seated in the chair. Pt then stood with Enedina and ambulated 15ft with rwx, CGA. Pt fatigues quickly which limited gait distance. Pt needed assist with toileting needs. Will continue to follow and progress pt as able.  -EB       Row Name 08/13/24 1322          Therapy Assessment/Plan (PT)    Therapy Frequency (PT) 6 times/wk  -EB       Row Name 08/13/24 1322          Positioning and Restraints    Pre-Treatment Position sitting in chair/recliner  -EB     Post Treatment Position chair  -EB     In  Chair reclined;call light within reach;encouraged to call for assist;exit alarm on  -EB               User Key  (r) = Recorded By, (t) = Taken By, (c) = Cosigned By      Initials Name Provider Type    Nahomy Cardona PTA Physical Therapist Assistant                   Outcome Measures       Row Name 08/13/24 1329          How much help from another person do you currently need...    Turning from your back to your side while in flat bed without using bedrails? 3  -EB     Moving from lying on back to sitting on the side of a flat bed without bedrails? 3  -EB     Moving to and from a bed to a chair (including a wheelchair)? 3  -EB     Standing up from a chair using your arms (e.g., wheelchair, bedside chair)? 3  -EB     Climbing 3-5 steps with a railing? 2  -EB     To walk in hospital room? 3  -EB     AM-PAC 6 Clicks Score (PT) 17  -EB     Highest Level of Mobility Goal 5 --> Static standing  -EB               User Key  (r) = Recorded By, (t) = Taken By, (c) = Cosigned By      Initials Name Provider Type    Nahomy Cardona PTA Physical Therapist Assistant                                 Physical Therapy Education       Title: PT OT SLP Therapies (Done)       Topic: Physical Therapy (Done)       Point: Mobility training (Done)       Learning Progress Summary             Patient Acceptance, E,D,TB, VU,DU by  at 8/13/2024 1329    Acceptance, E,TB, VU,NR by  at 8/11/2024 0919                         Point: Home exercise program (Done)       Learning Progress Summary             Patient Acceptance, E,D,TB, VU,DU by  at 8/13/2024 1329    Acceptance, E,TB, VU,NR by  at 8/11/2024 0919                         Point: Body mechanics (Done)       Learning Progress Summary             Patient Acceptance, E,D,TB, VU,DU by  at 8/13/2024 1329    Acceptance, E,TB, VU,NR by  at 8/11/2024 0919                         Point: Precautions (Done)       Learning Progress Summary             Patient Acceptance, E,D,TB, VU,DU by   at 8/13/2024 1329    Acceptance, E,TB, VU,NR by  at 8/11/2024 0919                                         User Key       Initials Effective Dates Name Provider Type Discipline    EB 02/14/23 -  Nahomy Bennett PTA Physical Therapist Assistant PT    CS 07/11/23 -  Eran Rodriguez PT Physical Therapist PT                  PT Recommendation and Plan     Plan of Care Reviewed With: patient  Progress: improving  Outcome Evaluation: Pt seen for PT tx this AM. Pt UIC when PT arrived feeling fatigued but agreeable to participate. Pt able to complete nisha LE exercises while seated in the chair. Pt then stood with Enednia and ambulated 15ft with rwx, CGA. Pt fatigues quickly which limited gait distance. Pt needed assist with toileting needs. Will continue to follow and progress pt as able.     Time Calculation:         PT Charges       Row Name 08/13/24 1317             Time Calculation    Start Time 1029  -EB      Stop Time 1052  -EB      Time Calculation (min) 23 min  -EB      PT Received On 08/13/24  -EB      PT - Next Appointment 08/14/24  -EB         Time Calculation- PT    Total Timed Code Minutes- PT 23 minute(s)  -EB                User Key  (r) = Recorded By, (t) = Taken By, (c) = Cosigned By      Initials Name Provider Type    EB Nahomy Bennett PTA Physical Therapist Assistant                  Therapy Charges for Today       Code Description Service Date Service Provider Modifiers Qty    51620698184 HC PT THER PROC EA 15 MIN 8/13/2024 Nahomy Bennett PTA GP 1    61614350255 HC PT THERAPEUTIC ACT EA 15 MIN 8/13/2024 Nahomy Bennett PTA GP 1            PT G-Codes  Outcome Measure Options: AM-PAC 6 Clicks Basic Mobility (PT)  AM-PAC 6 Clicks Score (PT): 17       Nahomy Bennett PTA  8/13/2024

## 2024-08-13 NOTE — PROGRESS NOTES
[facility]  Inpatient Progress Note    Reason for Follow Up: Heme positive stool anemia    Subjective     Interval History:   Diarrhea continues now for 48 hours intermittent, GI PCR and C. difficile were negative    Current Facility-Administered Medications:     acetaminophen (TYLENOL) tablet 650 mg, 650 mg, Oral, Q4H PRN, 650 mg at 08/13/24 0653 **OR** acetaminophen (TYLENOL) 160 MG/5ML oral solution 650 mg, 650 mg, Oral, Q4H PRN **OR** acetaminophen (TYLENOL) suppository 650 mg, 650 mg, Rectal, Q4H PRN, Luz Taylor MD    albuterol (PROVENTIL) nebulizer solution 0.083% 2.5 mg/3mL, 2.5 mg, Nebulization, 4x Daily - RT, Prashanth Lizarraga MD, 2.5 mg at 08/12/24 2034    atenolol (TENORMIN) tablet 25 mg, 25 mg, Oral, Q8H, Alexis Klein MD, 25 mg at 08/13/24 0653    azelastine (ASTELIN) nasal spray 2 spray, 2 spray, Each Nare, BID, Luz Taylor MD, 2 spray at 08/13/24 0925    budesonide-formoterol (SYMBICORT) 160-4.5 MCG/ACT inhaler 2 puff, 2 puff, Inhalation, BID - RT, 2 puff at 08/12/24 2039 **AND** tiotropium (SPIRIVA RESPIMAT) 2.5 mcg/act aerosol solution inhaler, 2 puff, Inhalation, Daily - RT, Luz Taylor MD, 2 puff at 08/12/24 0830    busPIRone (BUSPAR) tablet 10 mg, 10 mg, Oral, BID, Luz Taylor MD, 10 mg at 08/13/24 0937    Calcium Replacement - Follow Nurse / BPA Driven Protocol, , Does not apply, PRN, Luz Taylor MD    DULoxetine (CYMBALTA) DR capsule 60 mg, 60 mg, Oral, BID, Luz Taylor MD, 60 mg at 08/13/24 0937    guaiFENesin (MUCINEX) 12 hr tablet 600 mg, 600 mg, Oral, Q12H, Olivia Childs APRN, 600 mg at 08/13/24 0935    levothyroxine (SYNTHROID, LEVOTHROID) tablet 88 mcg, 88 mcg, Oral, Q AM, Luz Taylor MD, 88 mcg at 08/13/24 0653    Magnesium Standard Dose Replacement - Follow Nurse / BPA Driven Protocol, , Does not apply, PRN, Luz Taylor MD    montelukast (SINGULAIR) tablet 10 mg, 10 mg, Oral, Nightly, Luz Taylor MD, 10 mg at 08/12/24 5009     mycophenolate (CELLCEPT) capsule 1,000 mg, 1,000 mg, Oral, QAM, Luz Taylor MD, 1,000 mg at 08/12/24 0615    mycophenolate (CELLCEPT) capsule 1,500 mg, 1,500 mg, Oral, Nightly, Luz Taylor MD, 1,500 mg at 08/12/24 2206    nitroglycerin (NITROSTAT) SL tablet 0.4 mg, 0.4 mg, Sublingual, Q5 Min PRN, Luz Taylor MD    ondansetron (ZOFRAN) injection 4 mg, 4 mg, Intravenous, Q6H PRN, Luz Taylor MD    pantoprazole (PROTONIX) EC tablet 40 mg, 40 mg, Oral, Daily, Luz Taylor MD, 40 mg at 08/13/24 0937    Phosphorus Replacement - Follow Nurse / BPA Driven Protocol, , Does not apply, PRN, Luz Taylor MD    Potassium Replacement - Follow Nurse / BPA Driven Protocol, , Does not apply, PRN, Luz Taylor MD    pravastatin (PRAVACHOL) tablet 20 mg, 20 mg, Oral, Nightly, Luz Taylor MD, 20 mg at 08/12/24 2203    predniSONE (DELTASONE) tablet 5 mg, 5 mg, Oral, Daily, Luz Taylor MD, 5 mg at 08/13/24 0931    raloxifene (EVISTA) tablet 60 mg, 60 mg, Oral, Nightly, Luz Taylor MD, 60 mg at 08/12/24 2207    [COMPLETED] Insert Peripheral IV, , , Once **AND** sodium chloride 0.9 % flush 10 mL, 10 mL, Intravenous, PRN, Luz Taylor MD    sodium chloride 0.9 % flush 10 mL, 10 mL, Intravenous, Q12H, Luz Taylor MD, 10 mL at 08/13/24 0929    sodium chloride 0.9 % flush 10 mL, 10 mL, Intravenous, PRN, Luz Taylor MD    sodium chloride 0.9 % infusion 40 mL, 40 mL, Intravenous, PRN, Luz Taylor MD    torsemide (DEMADEX) tablet 20 mg, 20 mg, Oral, Daily, Luz Taylor MD, 20 mg at 08/13/24 0931    vitamin B-12 (CYANOCOBALAMIN) tablet 1,000 mcg, 1,000 mcg, Oral, Daily, Luz Taylor MD, 1,000 mcg at 08/13/24 0935    [START ON 8/14/2024] vitamin D (ERGOCALCIFEROL) capsule 50,000 Units, 50,000 Units, Oral, Q7 Days, Luz Taylor MD  Review of Systems:    There is weakness and fatigue all other systems reviewed and negative    Objective     Vital Signs  Temp:  [97.3 °F (36.3 °C)-98.8 °F (37.1 °C)] 97.5 °F (36.4 °C)  Heart  "Rate:  [83-93] 83  Resp:  [18-24] 20  BP: (116-153)/(58-86) 130/78  Body mass index is 27.34 kg/m².    Intake/Output Summary (Last 24 hours) at 8/13/2024 1054  Last data filed at 8/13/2024 0735  Gross per 24 hour   Intake 800 ml   Output 1600 ml   Net -800 ml     I/O this shift:  In: -   Out: 100 [Urine:100]     Physical Exam:   General: patient awake, alert and cooperative   Eyes: Normal lids and lashes, no scleral icterus   Neck: supple, normal ROM   Skin: warm and dry, not jaundiced   Cardiovascular: regular rhythm and rate, no murmurs auscultated   Pulm: clear to auscultation bilaterally, regular and unlabored   Abdomen: soft, nontender, nondistended; normal bowel sounds   Extremities: no rash or edema   Psychiatric: Normal mood and behavior; memory intact     Results Review:     I reviewed the patient's new clinical results.    Results from last 7 days   Lab Units 08/13/24  0343 08/12/24  0458 08/11/24  0303   WBC 10*3/mm3 10.14 10.42 13.63*   HEMOGLOBIN g/dL 9.2* 7.2* 8.0*   HEMATOCRIT % 28.4* 23.6* 26.1*   PLATELETS 10*3/mm3 369 386 369     Results from last 7 days   Lab Units 08/13/24  0343 08/12/24  0458 08/11/24  1237 08/11/24  0303 08/10/24  0945   SODIUM mmol/L 133* 133*  --  136 138   POTASSIUM mmol/L 3.9 4.3 4.5 3.3* 3.4*   CHLORIDE mmol/L 99 102  --  97* 96*   CO2 mmol/L 23.0 22.1  --  28.0 27.0   BUN mg/dL 19 21  --  19 22   CREATININE mg/dL 0.91 0.92  --  0.88 0.96   CALCIUM mg/dL 8.2* 8.3*  --  8.6 9.1   BILIRUBIN mg/dL  --   --   --  0.5 0.4   ALK PHOS U/L  --   --   --  69 73   ALT (SGPT) U/L  --   --   --  5 7   AST (SGOT) U/L  --   --   --  9 16   GLUCOSE mg/dL 92 91  --  119* 122*     Results from last 7 days   Lab Units 08/10/24  0945   INR  2.08*     No results found for: \"LIPASE\"    Radiology:  US Gallbladder   Final Result      CT Abdomen Pelvis Without Contrast   Final Result           1. Distended gallbladder.       2. No urolithiasis or hydronephrosis identified). Assessment of the "   pelvis is limited by hardware artifact).       3. Colonic diverticulosis. No acute inflammatory process of bowel is   identified.       4. Age-indeterminate L1 compression deformity, correlate clinically.               This report was finalized on 8/10/2024 1:43 PM by Dr. José Manuel Anaya M.D on Workstation: [email or web address]          XR Chest 1 View   Final Result   No focal pulmonary consolidation. Mild cardiomegaly.   Follow-up as clinical indications persist.       This report was finalized on 8/10/2024 10:51 AM by Dr. José Manuel Anaya M.D on Workstation: [employer]              Assessment & Plan     Active Hospital Problems    Diagnosis     **Exertional dyspnea     Hypothyroidism (acquired)     Diastolic CHF, chronic     COPD (chronic obstructive pulmonary disease)     Sleep apnea     Atrial fibrillation     Myasthenia gravis     Paroxysmal atrial fibrillation     CAD (coronary artery disease)     Essential hypertension      Assessment:  Congestive heart failure  COPD  A-fib on Xarelto  Hypertension  Anemia  Heme positive stools  Myasthenia gravis on immunosuppressive therapy  Chronic hypoxia on 2 L of oxygen  Obesity  Obstructive sleep apnea  Diarrhea     Plan:  We are happy to perform EGD and colonoscopy for heme positive stools and anemia when she has been off Xarelto 72 hours and when she has been cleared by the pulmonary and cardiology teams for MAC sedation and for these 2 procedures  Follow hemoglobin  Continue to hold anticoagulation  GBI PCR and C. difficile negative, holding on Imodium in case EGD and colonoscopy are to be performed in the near future  We will follow  I discussed the patients findings and my recommendations with patient and nursing staff.    Charlie Zacarias MD

## 2024-08-13 NOTE — CONSULTS
[facility]   Consult Note    Patient Name: Olena Myers  : 1935  MRN: [id number]  Primary Care Physician:  Manda Keenan MD  Referring Physician: No ref. provider found  Date of admission: 8/10/2024    Inpatient General Surgery Consult  Consult performed by: Chano Georges Jr., MD  Consult ordered by: Yuan La MD        Subjective   Subjective     Reason for Consult/ Chief Complaint: Abnormal gallbladder    History of present illness  Olena Myers is a very pleasant 88 y.o. female with multiple medical problems including atrial fibrillation for which she takes Xarelto, coronary artery disease, COPD which is oxygen dependent, dyslipidemia, hypertension, myasthenia gravis, congestive heart failure, and aortic valve stenosis who was admitted on 8/10/2024 with increasing shortness of breath and abdominal pain.  She was noted to be and atrial fibrillation with rapid ventricular response and has been managed by cardiology.  Her evaluation included a CT scan of the abdomen and pelvis that showed a distended gallbladder.  This was followed by right upper quadrant ultrasound that showed a very large gallbladder but no evidence for cholecystitis.  I reviewed those images and agree with the distended nature of the gallbladder.  I reviewed a CT scan of the chest from  that also showed a distended gallbladder but not as distended as the current studies.    The patient is currently not complaining of any abdominal pain.  She states that she has generally kept a good appetite with no food intolerances and does not routinely have epigastric or right upper quadrant abdominal pain.  She had no prior knowledge of gallbladder disease.    Review of Systems   Constitutional:  Negative for chills and fever.   Gastrointestinal:  Negative for abdominal distention, abdominal pain, constipation, diarrhea, nausea and vomiting.        Personal History     Past Medical History:   Diagnosis Date   • Anemia     IRON  DEFICIENCY   • Arthritis    • Asthma    • Atrial fibrillation    • CAD (coronary artery disease)     HEART STENT   • COPD (chronic obstructive pulmonary disease)    • 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   • Frequent urinary tract infections    • History of gastric ulcer    • History of GI bleed    • [place] (hard of hearing)    • Hyperlipidemia    • Hypertension    • Hyponatremia    • Lightheadedness    • LVH (left ventricular hypertrophy)    • MG (myasthenia gravis)    • Mini stroke 10/2016, 3/2017   • OA (osteoarthritis)    • Osteoporosis    • Sleep apnea     USES CPAP   • SOB (shortness of breath)     WALKING       Past Surgical History:   Procedure Laterality Date   • APPENDECTOMY     • BRONCHOSCOPY N/A 3/31/2022    Procedure: BRONCHOSCOPY WITH BAL RIGHT LOWER LOBE;  Surgeon: Remy Tirado MD;  Location: [facility] ENDOSCOPY;  Service: Pulmonary;  Laterality: N/A;  COPD EXACERBATION     • CARPAL TUNNEL RELEASE Bilateral    • CATARACT EXTRACTION Bilateral    • CORONARY STENT PLACEMENT     • TOTAL HIP ARTHROPLASTY Bilateral    • TOTAL SHOULDER ARTHROPLASTY W/ DISTAL CLAVICLE EXCISION Left 7/19/2016    Procedure: LT TOTAL SHOULDER REVERSE ARTHROPLASTY;  Surgeon: NAHOMI Solorzano MD;  Location: [facility];  Service:    • TOTAL SHOULDER ARTHROPLASTY W/ DISTAL CLAVICLE EXCISION Right 1/8/2019    Procedure: RIGHT TOTAL SHOULDER REVERSE ARTHROPLASTY;  Surgeon: Jovanny Solorzano MD;  [place] [facility] [place] [facility];  Service: Orthopedics       Family History: family history includes Breast cancer in her mother; Diabetes in her mother; Heart disease in her father and mother; Hyperlipidemia in her father and mother; Stroke in her father and mother. Otherwise pertinent FHx was reviewed and not pertinent to current issue.    Social History:  reports that she has never smoked. She has never used smokeless tobacco. She reports that she does not drink alcohol and does not use drugs.    Home Medications:   DULoxetine,  Fluticasone-Umeclidin-Vilant, acetaminophen, albuterol, albuterol sulfate HFA, azelastine, busPIRone, ferrous sulfate, irbesartan, levothyroxine, metoprolol succinate XL, montelukast, mycophenolate, pantoprazole, pravastatin, predniSONE, raloxifene, rivaroxaban, torsemide, vitamin B-12, and vitamin D    Allergies:  Allergies   Allergen Reactions   • Neomycin Anaphylaxis   • Penicillins Swelling and Rash     Historical allergy x 30-40 years, tolerates cephalosporins   • Hydrocodone Itching and Rash   • Rosuvastatin Other (See Comments)     Muscle cramps       Objective    Objective     Vitals:  Temp:  [97.3 °F (36.3 °C)-98.8 °F (37.1 °C)] 97.3 °F (36.3 °C)  Heart Rate:  [83-96] 89  Resp:  [18-24] 20  BP: (116-153)/(58-86) 122/67  Flow (L/min):  [2-3] 2    Physical Exam  Constitutional:       Appearance: She is not ill-appearing or toxic-appearing.   Abdominal:      General: Abdomen is flat. There is no distension.      Palpations: Abdomen is soft.      Tenderness: There is no abdominal tenderness.   Neurological:      Mental Status: She is alert.   Psychiatric:         Behavior: Behavior is cooperative.         Result Review    Result Review:  I have personally reviewed the results from the time of this admission to 8/13/2024 15:43 EDT and agree with these findings:  [x]  Laboratory list / accordion  []  Microbiology  [x]  Radiology  []  EKG/Telemetry   []  Cardiology/Vascular   []  Pathology  []  Old records  []  Other:      Assessment & Plan   Assessment / Plan     Brief Patient Summary with assessment and plan:  Olena Myers is a 88 y.o. female who has multiple medical problems and had a CT scan of the abdomen and pelvis that showed a distended gallbladder.  She also had a right upper quadrant ultrasound that showed a distended gallbladder.    1.  Gallbladder distention: The patient has a distended gallbladder but no clinical evidence to suggest cholecystitis.  Her current abdominal exam is completely benign.  She  is at high risk for any surgical procedure due to her multiple underlying comorbidities and there is no indication to proceed with cholecystectomy based on her benign abdominal exam.  This was discussed with the patient.  I will sign off remain available if needed.        Chano Georges Jr., MD

## 2024-08-13 NOTE — PROGRESS NOTES
LOS: 3 days   Patient Care Team:  Manda Keenan MD as PCP - General (General Practice)  Olvin Hernandez MD as Consulting Physician (Cardiology)    Chief Complaint: Follow-up paroxysmal atrial fibrillation with RVR.    Interval History: She feels better in general.  She still has some shortness of breath with movement and exertion, but less short of breath at rest.  Her heart rate is better overall.  No chest pain.    Vital Signs:  Temp:  [97.3 °F (36.3 °C)-98.8 °F (37.1 °C)] 97.3 °F (36.3 °C)  Heart Rate:  [83-96] 89  Resp:  [20-24] 20  BP: (122-153)/(67-86) 122/67    Intake/Output Summary (Last 24 hours) at 8/13/2024 1844  Last data filed at 8/13/2024 1459  Gross per 24 hour   Intake 550 ml   Output 1950 ml   Net -1400 ml       Physical Exam:   General Appearance:    No acute distress, alert and oriented x4, chronically ill-appearing.   Lungs:     Scattered rhonchi, otherwise clear.    Heart:    Irregularly irregular rhythm with normal rate.  II/VI SM LLSB.   Abdomen:     Soft, nontender, nondistended.    Extremities:   Trace edema of the lower extremities.     Results Review:    Results from last 7 days   Lab Units 08/13/24  0343   SODIUM mmol/L 133*   POTASSIUM mmol/L 3.9   CHLORIDE mmol/L 99   CO2 mmol/L 23.0   BUN mg/dL 19   CREATININE mg/dL 0.91   GLUCOSE mg/dL 92   CALCIUM mg/dL 8.2*     Results from last 7 days   Lab Units 08/10/24  1131 08/10/24  0945   HSTROP T ng/L 38* 41*     Results from last 7 days   Lab Units 08/13/24  0343   WBC 10*3/mm3 10.14   HEMOGLOBIN g/dL 9.2*   HEMATOCRIT % 28.4*   PLATELETS 10*3/mm3 369     Results from last 7 days   Lab Units 08/10/24  0945   INR  2.08*     Results from last 7 days   Lab Units 08/10/24  1131   CHOLESTEROL mg/dL 136     Results from last 7 days   Lab Units 08/10/24  0945   MAGNESIUM mg/dL 2.3     Results from last 7 days   Lab Units 08/10/24  1131   CHOLESTEROL mg/dL 136   TRIGLYCERIDES mg/dL 168*   HDL CHOL mg/dL 53   LDL CHOL mg/dL 55       I reviewed  the patient's new clinical results.        Assessment:  1.  Shortness of breath, multifactorial  2.  Chronic hypoxic respiratory failure  3.  Worsening anemia with positive Hemoccult  4.  Paroxysmal atrial fibrillation with RVR  5.  COPD, unclear as to exacerbation  6.  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  Coronary artery disease, status post prior BMS to the LAD  8.  Leukocytosis, likely reactive  9.  History of myasthenia gravis, on CellCept and prednisone  10.  Obstructive sleep apnea, noncompliant with CPAP    Plan:  -Hemoccult positive.  She did receive a transfusion.  Xarelto has been held for potential bleeding.  GI has recommended an EGD and colonoscopy when clear from cardiology and pulmonology standpoints.  The Xarelto will need to be held for 72 hours, which would place the scopes on Thursday, 8/15/2024.    -She is clear at acceptable cardiac risk to proceed with bidirectional scopes.  Her heart rate is better controlled, and she is not currently in active congestive heart failure.  She has had no anginal chest discomfort, and her stent was placed remotely.  Overall, she is moderate cardiac risk.    -She has responded well to digoxin and changing the metoprolol to atenolol.  Continue atenolol 25 mg every 8 hours for now.  Start oral digoxin 125 mcg p.o. daily for now.    -I also feel like she would be unlikely to maintain sinus rhythm in this setting.  She evidently developed pulmonary fibrosis and worsening COPD changes with amiodarone.  Also, with the need to be off anticoagulation, cardioversion is currently contraindicated.    -Continue torsemide 20 mg/day.  Volume status appears stable.    Alexis Klein MD  08/13/24  18:44 EDT

## 2024-08-13 NOTE — PLAN OF CARE
Goal Outcome Evaluation:   Vital signs stable.  Up to chair/bedside commode with 1 assist.  Repeated small BM's and voids today.  On oxygen 2 LPM most of shift.  1 unit packed red blood cells given this evening.  Estimated discharge date: 2 days.

## 2024-08-13 NOTE — PROGRESS NOTES
[facility]  [phone]  Dr. Prashanth Lizarraga    Subjective:  LOS: 3    Chief Complaint: Shortness of breath      Follows Dr. Tirado for asthma.  Still with shortness of breath with exertion.  States she feels better at rest.  States she has not been getting breathing treatments here though I did place orders yesterday and looks like they have been administered.    Objective   Vital Signs past 24hrs  Temp range: Temp (24hrs), Av °F (36.7 °C), Min:97.3 °F (36.3 °C), Max:98.8 °F (37.1 °C)    BP range: BP: (116-153)/(58-86) 122/67  Pulse range: Heart Rate:  [83-96] 96  Resp rate range: Resp:  [18-24] 20  Device (Oxygen Therapy): nasal cannulaFlow (L/min):  [2-3] 2  Oxygen range:SpO2:  [90 %-100 %] 90 %   Mechanical Ventilator:     Physical Exam  Constitutional:       Appearance: She is obese.   Eyes:      Pupils: Pupils are equal, round, and reactive to light.   Cardiovascular:      Rate and Rhythm: Normal rate. Rhythm irregular.   Pulmonary:      Effort: Pulmonary effort is normal.      Breath sounds: No wheezing or rhonchi.   Abdominal:      General: Bowel sounds are normal.      Palpations: Abdomen is soft. There is no mass.      Tenderness: There is no abdominal tenderness.   Musculoskeletal:         General: No swelling.   Neurological:      Mental Status: She is alert.       Results Review:    I have reviewed the laboratory and imaging data since the last note by [facility] physician.  My annotations are noted in assessment and plan.      Result Review:  I have personally reviewed the results from last note by [facility] physician to 2024 14:54 EDT and agree with these findings:  [x]  Laboratory list / accordion  [x]  Microbiology  [x]  Radiology  []  EKG/Telemetry   []  Cardiology/Vascular   []  Pathology  []  Old records  []  Other:    Medication Review:  I have reviewed the current MAR.  My annotations are noted in assessment and plan.    albuterol, 2.5 mg, Nebulization, 4x Daily - RT  atenolol, 25 mg,  Oral, Q8H  azelastine, 2 spray, Each Nare, BID  budesonide-formoterol, 2 puff, Inhalation, BID - RT   And  tiotropium bromide monohydrate, 2 puff, Inhalation, Daily - RT  busPIRone, 10 mg, Oral, BID  digoxin, 125 mcg, Oral, Daily  DULoxetine, 60 mg, Oral, BID  guaiFENesin, 600 mg, Oral, Q12H  levothyroxine, 88 mcg, Oral, Q AM  montelukast, 10 mg, Oral, Nightly  mycophenolate, 1,000 mg, Oral, QAM  mycophenolate, 1,500 mg, Oral, Nightly  pantoprazole, 40 mg, Oral, BID AC  pravastatin, 20 mg, Oral, Nightly  predniSONE, 5 mg, Oral, Daily  raloxifene, 60 mg, Oral, Nightly  sodium chloride, 10 mL, Intravenous, Q12H  torsemide, 20 mg, Oral, Daily  vitamin B-12, 1,000 mcg, Oral, Daily  [START ON 8/14/2024] vitamin D, 50,000 Units, Oral, Q7 Days           Lines, Drains & Airways       Active LDAs       Name Placement date Placement time Site Days    Peripheral IV 08/10/24 1551 Anterior;Right Forearm 08/10/24  1551  Forearm  1    Single Lumen Implantable Port 01/08/19 Left Subclavian 01/08/19  0910  Subclavian  2043                  Isolation status: No active isolations    Dietary Orders (From admission, onward)       Start     Ordered    08/10/24 1858  Diet: Cardiac, Diabetic, Renal; Healthy Heart (2-3 Na+); Consistent Carbohydrate; Low Sodium (2-3g), Low Potassium, Low Phosphorus; Fluid Consistency: Thin (IDDSI 0)  Diet Effective Now        References:    Diet Order Crosswalk   Question Answer Comment   Diets: Cardiac    Diets: Diabetic    Diets: Renal    Cardiac Diet: Healthy Heart (2-3 Na+)    Diabetic Diet: Consistent Carbohydrate    Renal Diet: Low Sodium (2-3g)    Renal Diet: Low Potassium    Renal Diet: Low Phosphorus    Fluid Consistency: Thin (IDDSI 0)        08/10/24 1858                    PCC Problems  Dyspnea, especially with exertion  Chronic hypoxia on 2 L oxygen  Asthma  SEDA on CPAP at home  Myasthenia gravis  Anemia  A-fib  RVR  Chronic anticoagulation  Chronic systemic steroid use  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  Probably multifactorial and not entirely from her asthma.    A-fib with RVR on admission but now better controlled.  Note cardiology input.    Anemia noted and hemoglobin is better after transfusion.    Underlying asthma and rhonchi improved today.  Continue with nebs.  She is on chronic systemic steroids at home for management of her myasthenia gravis.      With underlying myasthenia gravis ABG looks good.  However check NIF/tidal volume daily for the next 3 days.  I asked RT to record in progress note.    She will continue use of CPAP at home.    Low IgA and IgG levels likely due to concomitant use of mycophenolate.      Prashanth Lizarraga MD  08/13/24  14:54 EDT      Part of this note may be an electronic transcription/translation of spoken language to printed text using the Dragon Dictation System.

## 2024-08-14 PROCEDURE — 99232 SBSQ HOSP IP/OBS MODERATE 35: CPT | Performed by: NURSE PRACTITIONER

## 2024-08-14 PROCEDURE — 94761 N-INVAS EAR/PLS OXIMETRY MLT: CPT

## 2024-08-14 PROCEDURE — 94664 DEMO&/EVAL PT USE INHALER: CPT

## 2024-08-14 PROCEDURE — 63710000001 PREDNISONE PER 5 MG: Performed by: INTERNAL MEDICINE

## 2024-08-14 PROCEDURE — 99232 SBSQ HOSP IP/OBS MODERATE 35: CPT | Performed by: INTERNAL MEDICINE

## 2024-08-14 PROCEDURE — 63710000001 MYCOPHENOLATE MOFETIL PER 250 MG: Performed by: INTERNAL MEDICINE

## 2024-08-14 PROCEDURE — 94799 UNLISTED PULMONARY SVC/PX: CPT

## 2024-08-14 PROCEDURE — 97530 THERAPEUTIC ACTIVITIES: CPT

## 2024-08-14 PROCEDURE — 94760 N-INVAS EAR/PLS OXIMETRY 1: CPT

## 2024-08-14 RX ADMIN — ACETAMINOPHEN 325MG 650 MG: 325 TABLET ORAL at 05:36

## 2024-08-14 RX ADMIN — BUDESONIDE AND FORMOTEROL FUMARATE DIHYDRATE 2 PUFF: 160; 4.5 AEROSOL RESPIRATORY (INHALATION) at 23:09

## 2024-08-14 RX ADMIN — LEVOTHYROXINE SODIUM 88 MCG: 88 TABLET ORAL at 05:36

## 2024-08-14 RX ADMIN — TORSEMIDE 20 MG: 20 TABLET ORAL at 10:20

## 2024-08-14 RX ADMIN — ALBUTEROL SULFATE 2.5 MG: 2.5 SOLUTION RESPIRATORY (INHALATION) at 11:25

## 2024-08-14 RX ADMIN — POLYETHYLENE GLYCOL 3350, SODIUM SULFATE ANHYDROUS, SODIUM BICARBONATE, SODIUM CHLORIDE, POTASSIUM CHLORIDE 2000 ML: 236; 22.74; 6.74; 5.86; 2.97 POWDER, FOR SOLUTION ORAL at 21:02

## 2024-08-14 RX ADMIN — PANTOPRAZOLE SODIUM 40 MG: 40 TABLET, DELAYED RELEASE ORAL at 10:20

## 2024-08-14 RX ADMIN — RALOXIFENE HYDROCHLORIDE 60 MG: 60 TABLET, FILM COATED ORAL at 21:03

## 2024-08-14 RX ADMIN — BUSPIRONE HYDROCHLORIDE 10 MG: 10 TABLET ORAL at 10:20

## 2024-08-14 RX ADMIN — ALBUTEROL SULFATE 2.5 MG: 2.5 SOLUTION RESPIRATORY (INHALATION) at 07:00

## 2024-08-14 RX ADMIN — DULOXETINE HYDROCHLORIDE 60 MG: 60 CAPSULE, DELAYED RELEASE ORAL at 21:02

## 2024-08-14 RX ADMIN — Medication 1000 MCG: at 10:20

## 2024-08-14 RX ADMIN — GUAIFENESIN 600 MG: 600 TABLET, EXTENDED RELEASE ORAL at 21:02

## 2024-08-14 RX ADMIN — BUSPIRONE HYDROCHLORIDE 10 MG: 10 TABLET ORAL at 21:02

## 2024-08-14 RX ADMIN — TIOTROPIUM BROMIDE INHALATION SPRAY 2 PUFF: 3.12 SPRAY, METERED RESPIRATORY (INHALATION) at 07:02

## 2024-08-14 RX ADMIN — ACETAMINOPHEN 325MG 650 MG: 325 TABLET ORAL at 20:22

## 2024-08-14 RX ADMIN — Medication 10 ML: at 10:21

## 2024-08-14 RX ADMIN — PREDNISONE 5 MG: 5 TABLET ORAL at 10:20

## 2024-08-14 RX ADMIN — DULOXETINE HYDROCHLORIDE 60 MG: 60 CAPSULE, DELAYED RELEASE ORAL at 10:20

## 2024-08-14 RX ADMIN — MONTELUKAST SODIUM 10 MG: 10 TABLET, FILM COATED ORAL at 21:02

## 2024-08-14 RX ADMIN — MYCOPHENOLATE MOFETIL 1500 MG: 250 CAPSULE ORAL at 21:02

## 2024-08-14 RX ADMIN — GUAIFENESIN 600 MG: 600 TABLET, EXTENDED RELEASE ORAL at 10:20

## 2024-08-14 RX ADMIN — ACETAMINOPHEN 325MG 650 MG: 325 TABLET ORAL at 15:01

## 2024-08-14 RX ADMIN — PANTOPRAZOLE SODIUM 40 MG: 40 TABLET, DELAYED RELEASE ORAL at 18:05

## 2024-08-14 RX ADMIN — AZELASTINE HYDROCHLORIDE 2 SPRAY: 137 SPRAY, METERED NASAL at 21:03

## 2024-08-14 RX ADMIN — ATENOLOL 25 MG: 25 TABLET ORAL at 13:35

## 2024-08-14 RX ADMIN — AZELASTINE HYDROCHLORIDE 2 SPRAY: 137 SPRAY, METERED NASAL at 10:20

## 2024-08-14 RX ADMIN — DIGOXIN 125 MCG: 125 TABLET ORAL at 13:35

## 2024-08-14 RX ADMIN — ATENOLOL 25 MG: 25 TABLET ORAL at 05:36

## 2024-08-14 RX ADMIN — ACETAMINOPHEN 325MG 650 MG: 325 TABLET ORAL at 10:20

## 2024-08-14 RX ADMIN — PRAVASTATIN SODIUM 20 MG: 20 TABLET ORAL at 21:02

## 2024-08-14 RX ADMIN — ERGOCALCIFEROL 50000 UNITS: 1.25 CAPSULE ORAL at 10:20

## 2024-08-14 RX ADMIN — Medication 10 ML: at 21:04

## 2024-08-14 RX ADMIN — ALBUTEROL SULFATE 2.5 MG: 2.5 SOLUTION RESPIRATORY (INHALATION) at 15:18

## 2024-08-14 RX ADMIN — MYCOPHENOLATE MOFETIL 1000 MG: 250 CAPSULE ORAL at 13:35

## 2024-08-14 RX ADMIN — BUDESONIDE AND FORMOTEROL FUMARATE DIHYDRATE 2 PUFF: 160; 4.5 AEROSOL RESPIRATORY (INHALATION) at 07:02

## 2024-08-14 RX ADMIN — ATENOLOL 25 MG: 25 TABLET ORAL at 21:02

## 2024-08-14 NOTE — PLAN OF CARE
Goal Outcome Evaluation:  Plan of Care Reviewed With: patient           Outcome Evaluation: Patient seen for PT session this PM. Patient UIC upon arrival. Patient on 2L O2 on arrival. Patient alert and agreeable to mobilitly. Patient endorses back pain. Patient performed STS from EOB with CGA. Patient ambulated 20ft with rwx and CGA. Gait slow with patient fwd flexed. Distance limited by fatigue and feeling SOA. O2 sats >90% throughout. Patient declined further activity due to fatigue. Patient would continue to benefit from skilled PT intervention to address deficits in functional mobility and maximize safety and independence.

## 2024-08-14 NOTE — PROGRESS NOTES
[facility], operated by [facility]  Inpatient Progress Note    Reason for Follow Up: Heme positive stools and anemia    Subjective     Interval History:   No overt bleeding, complains of back pain agreeable to EGD and colonoscopy tomorrow    Current Facility-Administered Medications:     acetaminophen (TYLENOL) tablet 650 mg, 650 mg, Oral, Q4H PRN, 650 mg at 08/14/24 0536 **OR** acetaminophen (TYLENOL) 160 MG/5ML oral solution 650 mg, 650 mg, Oral, Q4H PRN **OR** acetaminophen (TYLENOL) suppository 650 mg, 650 mg, Rectal, Q4H PRN, Luz Taylor MD    albuterol (PROVENTIL) nebulizer solution 0.083% 2.5 mg/3mL, 2.5 mg, Nebulization, 4x Daily - RT, Prashanth Lizarraga MD, 2.5 mg at 08/14/24 0700    atenolol (TENORMIN) tablet 25 mg, 25 mg, Oral, Q8H, Alexis Klein MD, 25 mg at 08/14/24 0536    azelastine (ASTELIN) nasal spray 2 spray, 2 spray, Each Nare, BID, Luz Taylor MD, 2 spray at 08/13/24 2001    budesonide-formoterol (SYMBICORT) 160-4.5 MCG/ACT inhaler 2 puff, 2 puff, Inhalation, BID - RT, 2 puff at 08/14/24 0702 **AND** tiotropium (SPIRIVA RESPIMAT) 2.5 mcg/act aerosol solution inhaler, 2 puff, Inhalation, Daily - RT, Luz Taylor MD, 2 puff at 08/14/24 0702    busPIRone (BUSPAR) tablet 10 mg, 10 mg, Oral, BID, Luz Taylor MD, 10 mg at 08/13/24 2155    Calcium Replacement - Follow Nurse / BPA Driven Protocol, , Does not apply, PRN, Luz Taylor MD    digoxin (LANOXIN) tablet 125 mcg, 125 mcg, Oral, Daily, Alexis Klein MD, 125 mcg at 08/13/24 1238    DULoxetine (CYMBALTA) DR capsule 60 mg, 60 mg, Oral, BID, Luz Taylor MD, 60 mg at 08/13/24 2154    guaiFENesin (MUCINEX) 12 hr tablet 600 mg, 600 mg, Oral, Q12H, Olivia Childs APRN, 600 mg at 08/13/24 2155    levothyroxine (SYNTHROID, LEVOTHROID) tablet 88 mcg, 88 mcg, Oral, Q AM, Luz Taylor MD, 88 mcg at 08/14/24 0536    Magnesium Standard Dose Replacement - Follow Nurse / BPA Driven Protocol, , Does not apply, PRBrandon SCHAEFFER Jawed, MD     montelukast (SINGULAIR) tablet 10 mg, 10 mg, Oral, Nightly, Luz Taylor MD, 10 mg at 08/13/24 2154    mycophenolate (CELLCEPT) capsule 1,000 mg, 1,000 mg, Oral, QAM, Luz Taylor MD, 1,000 mg at 08/13/24 1237    mycophenolate (CELLCEPT) capsule 1,500 mg, 1,500 mg, Oral, Nightly, Luz Taylor MD, 1,500 mg at 08/13/24 2152    nitroglycerin (NITROSTAT) SL tablet 0.4 mg, 0.4 mg, Sublingual, Q5 Min PRN, Luz Taylor MD    ondansetron (ZOFRAN) injection 4 mg, 4 mg, Intravenous, Q6H PRN, Luz Taylor MD    pantoprazole (PROTONIX) EC tablet 40 mg, 40 mg, Oral, BID AC, Yuan La MD, 40 mg at 08/13/24 2155    Phosphorus Replacement - Follow Nurse / BPA Driven Protocol, , Does not apply, PRN, Luz Taylor MD    Potassium Replacement - Follow Nurse / BPA Driven Protocol, , Does not apply, PRN, Luz Taylor MD    pravastatin (PRAVACHOL) tablet 20 mg, 20 mg, Oral, Nightly, Luz Taylor MD, 20 mg at 08/13/24 2155    predniSONE (DELTASONE) tablet 5 mg, 5 mg, Oral, Daily, Luz Taylor MD, 5 mg at 08/13/24 0931    raloxifene (EVISTA) tablet 60 mg, 60 mg, Oral, Nightly, Luz Taylor MD, 60 mg at 08/13/24 2155    [COMPLETED] Insert Peripheral IV, , , Once **AND** sodium chloride 0.9 % flush 10 mL, 10 mL, Intravenous, PRN, Luz Taylor MD    sodium chloride 0.9 % flush 10 mL, 10 mL, Intravenous, Q12H, Luz Taylor MD, 10 mL at 08/13/24 2202    sodium chloride 0.9 % flush 10 mL, 10 mL, Intravenous, PRN, Luz Taylor MD    sodium chloride 0.9 % infusion 40 mL, 40 mL, Intravenous, PRN, Luz Taylor MD    torsemide (DEMADEX) tablet 20 mg, 20 mg, Oral, Daily, Luz Taylor MD, 20 mg at 08/13/24 0931    vitamin B-12 (CYANOCOBALAMIN) tablet 1,000 mcg, 1,000 mcg, Oral, Daily, Luz Taylor MD, 1,000 mcg at 08/13/24 0935    vitamin D (ERGOCALCIFEROL) capsule 50,000 Units, 50,000 Units, Oral, Q7 Days, Luz Taylor MD  Review of Systems:    There is weakness and fatigue all other systems reviewed and negative    Objective  "    Vital Signs  Temp:  [97.3 °F (36.3 °C)-98.4 °F (36.9 °C)] 97.3 °F (36.3 °C)  Heart Rate:  [75-96] 81  Resp:  [18-20] 18  BP: (102-151)/(53-81) 115/53  Body mass index is 27.83 kg/m².    Intake/Output Summary (Last 24 hours) at 8/14/2024 0837  Last data filed at 8/13/2024 2154  Gross per 24 hour   Intake 120 ml   Output 750 ml   Net -630 ml     No intake/output data recorded.     Physical Exam:   General: patient awake, alert and cooperative   Eyes: Normal lids and lashes, no scleral icterus   Neck: supple, normal ROM   Skin: warm and dry, not jaundiced   Cardiovascular: regular rhythm and rate, no murmurs auscultated   Pulm: clear to auscultation bilaterally, regular and unlabored   Abdomen: soft, nontender, nondistended; normal bowel sounds   Extremities: no rash or edema   Psychiatric: Normal mood and behavior; memory intact     Results Review:     I reviewed the patient's new clinical results.    Results from last 7 days   Lab Units 08/13/24  0343 08/12/24  0458 08/11/24  0303   WBC 10*3/mm3 10.14 10.42 13.63*   HEMOGLOBIN g/dL 9.2* 7.2* 8.0*   HEMATOCRIT % 28.4* 23.6* 26.1*   PLATELETS 10*3/mm3 369 386 369     Results from last 7 days   Lab Units 08/13/24  0343 08/12/24  0458 08/11/24  1237 08/11/24  0303 08/10/24  0945   SODIUM mmol/L 133* 133*  --  136 138   POTASSIUM mmol/L 3.9 4.3 4.5 3.3* 3.4*   CHLORIDE mmol/L 99 102  --  97* 96*   CO2 mmol/L 23.0 22.1  --  28.0 27.0   BUN mg/dL 19 21  --  19 22   CREATININE mg/dL 0.91 0.92  --  0.88 0.96   CALCIUM mg/dL 8.2* 8.3*  --  8.6 9.1   BILIRUBIN mg/dL  --   --   --  0.5 0.4   ALK PHOS U/L  --   --   --  69 73   ALT (SGPT) U/L  --   --   --  5 7   AST (SGOT) U/L  --   --   --  9 16   GLUCOSE mg/dL 92 91  --  119* 122*     Results from last 7 days   Lab Units 08/10/24  0945   INR  2.08*     No results found for: \"LIPASE\"    Radiology:  US Gallbladder   Final Result      CT Abdomen Pelvis Without Contrast   Final Result           1. Distended gallbladder.     "   2. No urolithiasis or hydronephrosis identified). Assessment of the   pelvis is limited by hardware artifact).       3. Colonic diverticulosis. No acute inflammatory process of bowel is   identified.       4. Age-indeterminate L1 compression deformity, correlate clinically.               This report was finalized on 8/10/2024 1:43 PM by Dr. José Manuel Anaya M.D on Workstation: [email or web address]          XR Chest 1 View   Final Result   No focal pulmonary consolidation. Mild cardiomegaly.   Follow-up as clinical indications persist.       This report was finalized on 8/10/2024 10:51 AM by Dr. José Manuel Anaya M.D on Workstation: [email or web address]              Assessment & Plan     Active Hospital Problems    Diagnosis     **Exertional dyspnea     Abnormal computed tomography of gallbladder     Hypothyroidism (acquired)     Diastolic CHF, chronic     COPD (chronic obstructive pulmonary disease)     Sleep apnea     Atrial fibrillation     Myasthenia gravis     Paroxysmal atrial fibrillation     CAD (coronary artery disease)     Essential hypertension        Assessment:  Congestive heart failure  COPD  A-fib on Xarelto  Hypertension  Anemia  Heme positive stools  Myasthenia gravis on immunosuppressive therapy  Chronic hypoxia on 2 L of oxygen  Obesity  Obstructive sleep apnea  Diarrhea     Plan:  She has been cleared for procedure by cardiology, I would imagine pulmonary is agreeable as they say she is at baseline so we will plan for EGD and colonoscopy tomorrow  Follow hemoglobin  Continue to hold anticoagulation  We will follow  I discussed the patients findings and my recommendations with patient and nursing staff.    Charlie Zacarias MD

## 2024-08-14 NOTE — PROGRESS NOTES
"    Patient Name: Olena Myers  :1935  88 y.o.      Patient Care Team:  Manda Keenan MD as PCP - General (General Practice)  Olvin Hernandez MD as Consulting Physician (Cardiology)    Chief Complaint: follow up anemia , SOA    Interval History: sitting up in the chair. Complains of diarrhea. HR well controlled.        Objective   Vital Signs  Temp:  [97.3 °F (36.3 °C)-98.4 °F (36.9 °C)] 97.3 °F (36.3 °C)  Heart Rate:  [75-96] 87  Resp:  [16-20] 16  BP: (102-151)/(53-81) 115/53    Intake/Output Summary (Last 24 hours) at 2024 1306  Last data filed at 2024 2154  Gross per 24 hour   Intake 120 ml   Output 750 ml   Net -630 ml     Flowsheet Rows      Flowsheet Row First Filed Value   Admission Height 152.4 cm (60\") Documented at 08/10/2024 0918   Admission Weight 63.5 kg (140 lb) Documented at 08/10/2024 09            Physical Exam:   General Appearance:    Alert, cooperative, in no acute distress   Lungs:     Clear to auscultation.  Normal respiratory effort and rate.      Heart:    Regular rhythm and normal rate, normal S1 and S2, no murmurs, gallops or rubs.     Chest Wall:    No abnormalities observed   Abdomen:     Soft, nontender, positive bowel sounds.     Extremities:   no cyanosis, clubbing or edema.  No marked joint deformities.  Adequate musculoskeletal strength.       Results Review:    Results from last 7 days   Lab Units 24  0343   SODIUM mmol/L 133*   POTASSIUM mmol/L 3.9   CHLORIDE mmol/L 99   CO2 mmol/L 23.0   BUN mg/dL 19   CREATININE mg/dL 0.91   GLUCOSE mg/dL 92   CALCIUM mg/dL 8.2*     Results from last 7 days   Lab Units 08/10/24  1131 08/10/24  0945   HSTROP T ng/L 38* 41*     Results from last 7 days   Lab Units 24  0343   WBC 10*3/mm3 10.14   HEMOGLOBIN g/dL 9.2*   HEMATOCRIT % 28.4*   PLATELETS 10*3/mm3 369     Results from last 7 days   Lab Units 08/10/24  0945   INR  2.08*     Results from last 7 days   Lab Units 08/10/24  0945   MAGNESIUM mg/dL 2.3 "     Results from last 7 days   Lab Units 08/10/24  1131   CHOLESTEROL mg/dL 136   TRIGLYCERIDES mg/dL 168*   HDL CHOL mg/dL 53   LDL CHOL mg/dL 55               Medication Review:   albuterol, 2.5 mg, Nebulization, 4x Daily - RT  atenolol, 25 mg, Oral, Q8H  azelastine, 2 spray, Each Nare, BID  budesonide-formoterol, 2 puff, Inhalation, BID - RT   And  tiotropium bromide monohydrate, 2 puff, Inhalation, Daily - RT  busPIRone, 10 mg, Oral, BID  digoxin, 125 mcg, Oral, Daily  DULoxetine, 60 mg, Oral, BID  guaiFENesin, 600 mg, Oral, Q12H  levothyroxine, 88 mcg, Oral, Q AM  montelukast, 10 mg, Oral, Nightly  mycophenolate, 1,000 mg, Oral, QAM  mycophenolate, 1,500 mg, Oral, Nightly  pantoprazole, 40 mg, Oral, BID AC  polyethylene glycol, 2,000 mL, Oral, Q8H  pravastatin, 20 mg, Oral, Nightly  predniSONE, 5 mg, Oral, Daily  raloxifene, 60 mg, Oral, Nightly  sodium chloride, 10 mL, Intravenous, Q12H  torsemide, 20 mg, Oral, Daily  vitamin B-12, 1,000 mcg, Oral, Daily  vitamin D, 50,000 Units, Oral, Q7 Days              Assessment & Plan   Shortness of breath, multifactorial  Chronic hypoxic respiratory failure  Worsening anemia with positive hemoccult stool  Paroxysmal atrial fibrillation, may be more persistent now. Rate controlled with atenolol and digoxin. Xarelto currently held due to #3.   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   Chronic diastolic heart failure on torsemide and appears compensated.     HR well controlled. For scopes tomorrow.     MILA Smith  [facility]  08/14/24  13:06 EDT

## 2024-08-14 NOTE — PROGRESS NOTES
[facility]     LOS: 4 days     Subjective:    CC:Nausea and Abdominal Pain    DIET:  Diet Order   Procedures    Diet: Liquid; Clear Liquid; Fluid Consistency: Thin (IDDSI 0)   Back hurts and diarrhea (goes right through), its gone through just once    Objective:    Vital Signs:  Temp:  [97.3 °F (36.3 °C)-98.4 °F (36.9 °C)] 97.3 °F (36.3 °C)  Heart Rate:  [75-96] 81  Resp:  [18-20] 18  BP: (102-151)/(53-81) 115/53    SpO2:  [83 %-100 %] 99 %  on  Flow (L/min):  [2] 2;   Device (Oxygen Therapy): nasal cannula  Body mass index is 27.83 kg/m².    Physical Exam  Constitutional:       Comments: Fraili appearing   HENT:      Head: Normocephalic and atraumatic.   Cardiovascular:      Heart sounds: No murmur heard.     No friction rub.   Pulmonary:      Effort: Pulmonary effort is normal.      Breath sounds: Normal breath sounds.   Abdominal:      General: Bowel sounds are normal. There is no distension.      Palpations: Abdomen is soft.      Tenderness: There is no abdominal tenderness.   Skin:     General: Skin is warm and dry.   Neurological:      Mental Status: She is alert.   Psychiatric:         Mood and Affect: Mood normal.         Behavior: Behavior normal.         Results Review:    Glucose   Date Value Ref Range Status   08/13/2024 92 65 - 99 mg/dL Final   08/12/2024 91 65 - 99 mg/dL Final     Results from last 7 days   Lab Units 08/13/24  0343   WBC 10*3/mm3 10.14   HEMOGLOBIN g/dL 9.2*   HEMATOCRIT % 28.4*   PLATELETS 10*3/mm3 369     Results from last 7 days   Lab Units 08/13/24  0343 08/11/24  1237 08/11/24  0303   SODIUM mmol/L 133*   < > 136   POTASSIUM mmol/L 3.9   < > 3.3*   CHLORIDE mmol/L 99   < > 97*   CO2 mmol/L 23.0   < > 28.0   BUN mg/dL 19   < > 19   CREATININE mg/dL 0.91   < > 0.88   CALCIUM mg/dL 8.2*   < > 8.6   BILIRUBIN mg/dL  --   --  0.5   ALK PHOS U/L  --   --  69   ALT (SGPT) U/L  --   --  5   AST (SGOT) U/L  --   --  9   GLUCOSE mg/dL 92   < > 119*    < > =  "values in this interval not displayed.     Results from last 7 days   Lab Units 08/10/24  0945   INR  2.08*     Results from last 7 days   Lab Units 08/10/24  0945   MAGNESIUM mg/dL 2.3     Results from last 7 days   Lab Units 08/10/24  1131 08/10/24  0945   HSTROP T ng/L 38* 41*     Cultures:  No results found for: \"BLOODCX\", \"URINECX\", \"WOUNDCX\", \"MRSACX\", \"RESPCX\", \"STOOLCX\"    I have reviewed daily medications and changes in CPOE    Scheduled meds  albuterol, 2.5 mg, Nebulization, 4x Daily - RT  atenolol, 25 mg, Oral, Q8H  azelastine, 2 spray, Each Nare, BID  budesonide-formoterol, 2 puff, Inhalation, BID - RT   And  tiotropium bromide monohydrate, 2 puff, Inhalation, Daily - RT  busPIRone, 10 mg, Oral, BID  digoxin, 125 mcg, Oral, Daily  DULoxetine, 60 mg, Oral, BID  guaiFENesin, 600 mg, Oral, Q12H  levothyroxine, 88 mcg, Oral, Q AM  montelukast, 10 mg, Oral, Nightly  mycophenolate, 1,000 mg, Oral, QAM  mycophenolate, 1,500 mg, Oral, Nightly  pantoprazole, 40 mg, Oral, BID AC  polyethylene glycol, 2,000 mL, Oral, Q8H  pravastatin, 20 mg, Oral, Nightly  predniSONE, 5 mg, Oral, Daily  raloxifene, 60 mg, Oral, Nightly  sodium chloride, 10 mL, Intravenous, Q12H  torsemide, 20 mg, Oral, Daily  vitamin B-12, 1,000 mcg, Oral, Daily  vitamin D, 50,000 Units, Oral, Q7 Days           PRN meds    acetaminophen **OR** acetaminophen **OR** acetaminophen    Calcium Replacement - Follow Nurse / BPA Driven Protocol    Magnesium Standard Dose Replacement - Follow Nurse / BPA Driven Protocol    nitroglycerin    ondansetron    Phosphorus Replacement - Follow Nurse / BPA Driven Protocol    Potassium Replacement - Follow Nurse / BPA Driven Protocol    [COMPLETED] Insert Peripheral IV **AND** sodium chloride    sodium chloride    sodium chloride        Exertional dyspnea    Myasthenia gravis    Paroxysmal atrial fibrillation    Essential hypertension    CAD (coronary artery disease)    Atrial fibrillation    COPD (chronic " obstructive pulmonary disease)    Sleep apnea    Diastolic CHF, chronic    Iron deficiency anemia    Hypothyroidism (acquired)    Abnormal computed tomography of gallbladder        Assessment/Plan:    Dyspnea is multifactorial  -Anemia likely contributing  -This has improved  -Pulmonary is following    A-fib with RVR  -Better with digoxin  -Metoprolol changed to atenolol  -Heart rate is much improved    CHF  -Stool heme positive on Xarelto, which is currently being held  -plan for possible scopes on thursday   -Torsemide    Abnormal gallbladder noted on CT  -ask surgery to see for nausea on admission and enlarged gallbladder     Multifactorial dyspnea (anemia, PAF,COPD) - better with blood     CAD- s/p stent placement 28 years ago  Cardiology following and diuresing     COPD- minineb treatments    Leukocytosis likely reactive/chronic steroids     Hypokalemia corrected     Hypothyroidism on levothyroxine     GERD-PPI     Past history of myasthenia gravis 2011 on CellCept/prednisone  -seen by neuology and not felt to have any evidence of an exacerbation  -pulmonary has asked for nif and tidal volume x 3 days        Yuan La MD  08/14/24  09:50 EDT

## 2024-08-14 NOTE — SIGNIFICANT NOTE
08/14/24 1125   Negative Inspiratory Force (NIF)   Negative Inspiratory Force (cm H2O) -25   Effort (NIF) fair   Patient Position (NIF) sitting in chair   Vital Capacity (VC)   Forced Vital Capacity (L) 0.39   Patient Position (VC) sitting in chair   Effort (VC) poor

## 2024-08-14 NOTE — THERAPY TREATMENT NOTE
Patient Name: Olena Myers  : 1935    MRN: [id number]                              Today's Date: 2024       Admit Date: 8/10/2024    Visit Dx:     ICD-10-CM ICD-9-CM   1. Exertional dyspnea  R06.09 786.09   2. Pain of upper abdomen associated with exertion and has complete resolution  R10.10 789.09   3. Chronic anemia  D64.9 285.9   4. Chronic respiratory failure with hypoxia  J96.11 518.83     799.02   5. Abnormal computed tomography of gallbladder  R93.2 793.3   6. Iron deficiency anemia due to chronic blood loss  D50.0 280.0     Patient Active Problem List   Diagnosis    Pre-operative cardiovascular examination    S/p reverse total shoulder arthroplasty    Myasthenia gravis    Paroxysmal atrial fibrillation    HX: long term anticoagulant use    Essential hypertension    CAD (coronary artery disease)    Rhinovirus    Atrial fibrillation    S/P reverse total shoulder arthroplasty, right    Acute UTI    COPD (chronic obstructive pulmonary disease)    Metabolic encephalopathy    Sleep apnea    Sepsis    Dyspnea    Chronic diastolic CHF (congestive heart failure)    Diastolic CHF, chronic    Heme positive stool    E coli bacteremia    Iron deficiency anemia    Current chronic use of systemic steroids    COPD exacerbation    Obesity (BMI 30-39.9)    COVID-19 virus infection    Chronic respiratory failure with hypoxia    Cytokine release syndrome, grade 1    Altered mental status    Anemia    KELLEE (acute kidney injury)    Hypothyroidism (acquired)    Exertional dyspnea    Abnormal computed tomography of gallbladder     Past Medical History:   Diagnosis Date    Anemia     IRON DEFICIENCY    Arthritis     Asthma     Atrial fibrillation     CAD (coronary artery disease)     HEART STENT    COPD (chronic obstructive pulmonary disease)     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    Frequent urinary tract infections     History of gastric ulcer     History of GI bleed     [place] (hard of hearing)     Hyperlipidemia      Hypertension     Hyponatremia     Lightheadedness     LVH (left ventricular hypertrophy)     MG (myasthenia gravis)     Mini stroke 10/2016, 3/2017    OA (osteoarthritis)     Osteoporosis     Sleep apnea     USES CPAP    SOB (shortness of breath)     WALKING     Past Surgical History:   Procedure Laterality Date    APPENDECTOMY      BRONCHOSCOPY N/A 3/31/2022    Procedure: BRONCHOSCOPY WITH BAL RIGHT LOWER LOBE;  Surgeon: Remy Tirado MD;  Location: [facility] ENDOSCOPY;  Service: Pulmonary;  Laterality: N/A;  COPD EXACERBATION      CARPAL TUNNEL RELEASE Bilateral     CATARACT EXTRACTION Bilateral     CORONARY STENT PLACEMENT      TOTAL HIP ARTHROPLASTY Bilateral     TOTAL SHOULDER ARTHROPLASTY W/ DISTAL CLAVICLE EXCISION Left 7/19/2016    Procedure: LT TOTAL SHOULDER REVERSE ARTHROPLASTY;  Surgeon: NAHOMI Solorzano MD;  Location: [facility] OR OSC;  Service:     TOTAL SHOULDER ARTHROPLASTY W/ DISTAL CLAVICLE EXCISION Right 1/8/2019    Procedure: RIGHT TOTAL SHOULDER REVERSE ARTHROPLASTY;  Surgeon: Jovanny Solorzano MD;  Location: [facility] [place] [facility];  Service: Orthopedics      General Information       Row Name 08/14/24 1528          Physical Therapy Time and Intention    Document Type therapy note (daily note)  -     Mode of Treatment individual therapy;physical therapy  -       Row Name 08/14/24 1528          General Information    Patient Profile Reviewed yes  -SM       Row Name 08/14/24 1528          Cognition    Orientation Status (Cognition) oriented x 3  -SM       Row Name 08/14/24 1528          Safety Issues, Functional Mobility    Impairments Affecting Function (Mobility) endurance/activity tolerance;shortness of breath;strength;balance  -SM               User Key  (r) = Recorded By, (t) = Taken By, (c) = Cosigned By      Initials Name Provider Type     Nahed Jose PT Physical Therapist                   Mobility       Row Name 08/14/24 1529          Bed Mobility    Bed Mobility supine-sit  -      Comment, (Bed Mobility) Patient UIC upon arrival  -[facility] Name 08/14/24 1529          Sit-Stand Transfer    Sit-Stand [place] (Transfers) contact guard;verbal cues  -     Assistive Device (Sit-Stand Transfers) walker, front-wheeled  -[facility] Name 08/14/24 1529          Gait/Stairs (Locomotion)    [place] Level (Gait) contact guard;verbal cues  -     Assistive Device (Gait) walker, front-wheeled  -     Distance in Feet (Gait) 20  -     Deviations/Abnormal Patterns (Gait) gait speed decreased;stride length decreased;weight shifting decreased  -     Bilateral Gait Deviations heel strike decreased;forward flexed posture  -     Comment, (Gait/Stairs) Gait slow with patient fwd flexed. Distance limited by fatigue.  -               User Key  (r) = Recorded By, (t) = Taken By, (c) = Cosigned By      Initials Name Provider Type     Nahed Jose PT Physical Therapist                   Obj/Interventions       [facility] Name 08/14/24 1531          Balance    Balance Assessment sitting static balance;sitting dynamic balance;sit to stand dynamic balance;standing static balance;standing dynamic balance  -     Static Sitting Balance modified independence  -     Dynamic Sitting Balance supervision  -     Position, Sitting Balance sitting in chair  -     Sit to Stand Dynamic Balance contact guard;verbal cues  -     Static Standing Balance standby assist  -     Dynamic Standing Balance contact guard  -     Position/Device Used, Standing Balance supported;walker, front-wheeled  -     Balance Interventions sitting;standing;sit to stand;supported;static;dynamic  -               User Key  (r) = Recorded By, (t) = Taken By, (c) = Cosigned By      Initials Name Provider Type    Nahed Bonner PT Physical Therapist                   Goals/Plan    No documentation.                  Clinical Impression       [facility] Name 08/14/24 1533          Pain    Pain Location - back  -      Pre/Posttreatment Pain Comment Patient reports back pain but did not rate  -     Pain Intervention(s) Rest;Repositioned  -       Row Name 08/14/24 1533          Plan of Care Review    Plan of Care Reviewed With patient  -     Outcome Evaluation Patient seen for PT session this PM. Patient UIC upon arrival. Patient on 2L O2 on arrival. Patient alert and agreeable to mobilitly. Patient endorses back pain. Patient performed STS from EOB with CGA. Patient ambulated 20ft with rwx and CGA. Gait slow with patient fwd flexed. Distance limited by fatigue and feeling SOA. O2 sats >90% throughout. Patient declined further activity due to fatigue. Patient would continue to benefit from skilled PT intervention to address deficits in functional mobility and maximize safety and independence.  -       Row Name 08/14/24 1533          Therapy Assessment/Plan (PT)    Therapy Frequency (PT) 5 times/wk  -       Row Name 08/14/24 1533          Vital Signs    O2 Delivery Pre Treatment supplemental O2  -     O2 Delivery Intra Treatment supplemental O2  -SM     O2 Delivery Post Treatment supplemental O2  -SM     Pre Patient Position Sitting  -     Intra Patient Position Standing  -     Post Patient Position Sitting  -       Row Name 08/14/24 1533          Positioning and Restraints    Pre-Treatment Position sitting in chair/recliner  -     Post Treatment Position chair  -SM     In Chair reclined;call light within reach;encouraged to call for assist;exit alarm on;notified [facility]  -               User Key  (r) = Recorded By, (t) = Taken By, (c) = Cosigned By      Initials Name Provider Type     Nahed Jose, PT Physical Therapist                   Outcome Measures       Row Name 08/14/24 1537 08/14/24 1020       How much help from another person do you currently need...    Turning from your back to your side while in flat bed without using bedrails? 4  -SM 4  -GP    Moving from lying on back to sitting on the side of  a flat bed without bedrails? 3  -SM 3  -GP    Moving to and from a bed to a chair (including a wheelchair)? 3  -SM 3  -GP    Standing up from a chair using your arms (e.g., wheelchair, bedside chair)? 3  -SM 3  -GP    Climbing 3-5 steps with a railing? 2  -SM 2  -GP    To walk in hospital room? 3  -SM 3  -GP    AM-PAC 6 Clicks Score (PT) 18  - 18  -GP    Highest Level of Mobility Goal 6 --> Walk 10 steps or more  - 6 --> Walk 10 steps or more  -GP      Row Name 08/14/24 1537          Functional Assessment    Outcome Measure Options AM-PAC 6 Clicks Basic Mobility (PT)  -               User Key  (r) = Recorded By, (t) = Taken By, (c) = Cosigned By      Initials Name Provider Type    GP Anne Barcenas, RN Registered Nurse    Nahed Bonner, PT Physical Therapist                                 Physical Therapy Education       Title: PT OT SLP Therapies (Done)       Topic: Physical Therapy (Done)       Point: Mobility training (Done)       Learning Progress Summary             Patient Acceptance, E, VU by  at 8/14/2024 1538    Acceptance, E,D,TB, VU,DU by EB at 8/13/2024 1329    Acceptance, E,TB, VU,NR by  at 8/11/2024 0919                         Point: Home exercise program (Done)       Learning Progress Summary             Patient Acceptance, E, VU by  at 8/14/2024 1538    Acceptance, E,D,TB, VU,DU by EB at 8/13/2024 1329    Acceptance, E,TB, VU,NR by  at 8/11/2024 0919                         Point: Body mechanics (Done)       Learning Progress Summary             Patient Acceptance, E, VU by  at 8/14/2024 1538    Acceptance, E,D,TB, VU,DU by EB at 8/13/2024 1329    Acceptance, E,TB, VU,NR by  at 8/11/2024 0919                         Point: Precautions (Done)       Learning Progress Summary             Patient Acceptance, E, VU by  at 8/14/2024 1538    Acceptance, E,D,TB, VU,DU by EB at 8/13/2024 1329    Acceptance, E,TB, VU,NR by  at 8/11/2024 0919                                          User Key       Initials Effective Dates Name Provider Type Discipline     02/14/23 -  Nahomy Bennett PTA Physical Therapist Assistant PT     07/11/23 -  Eran Rodriguez PT Physical Therapist PT     05/02/22 -  Nahed Jose PT Physical Therapist PT                  PT Recommendation and Plan     Plan of Care Reviewed With: patient  Outcome Evaluation: Patient seen for PT session this PM. Patient UIC upon arrival. Patient on 2L O2 on arrival. Patient alert and agreeable to mobilitly. Patient endorses back pain. Patient performed STS from EOB with CGA. Patient ambulated 20ft with rwx and CGA. Gait slow with patient fwd flexed. Distance limited by fatigue and feeling SOA. O2 sats >90% throughout. Patient declined further activity due to fatigue. Patient would continue to benefit from skilled PT intervention to address deficits in functional mobility and maximize safety and independence.     Time Calculation:         PT Charges       Row Name 08/14/24 1543             Time Calculation    Start Time 1429  -SM      Stop Time 1439  -SM      Time Calculation (min) 10 min  -SM      PT Received On 08/14/24  -      PT - Next Appointment 08/15/24  -         Time Calculation- PT    Total Timed Code Minutes- PT 10 minute(s)  -SM         Timed Charges    10088 - PT Therapeutic Activity Minutes 10  -SM         Total Minutes    Timed Charges Total Minutes 10  -SM       Total Minutes 10  -SM                User Key  (r) = Recorded By, (t) = Taken By, (c) = Cosigned By      Initials Name Provider Type     Nahed Jose PT Physical Therapist                  Therapy Charges for Today       Code Description Service Date Service Provider Modifiers Qty    [id number]  PT THERAPEUTIC ACT EA 15 MIN 8/14/2024 Nahed Jose PT GP 1            PT G-Codes  Outcome Measure Options: AM-PAC 6 Clicks Basic Mobility (PT)  AM-PAC 6 Clicks Score (PT): 18       Nahed Jose PT  8/14/2024

## 2024-08-14 NOTE — PLAN OF CARE
Goal Outcome Evaluation:   Diuretic in Use: torsemide  Response to Diuretics (Output greater than intake): output appears greater per current documentation  Daily Weight (up or down): unknown  O2 Requirements: 2 LPM all shift per NC  Functional Status (Activity level, tolerance and respiratory symptoms): up with 1 assist + walker, please see physical therapy notes.  Discharge Plans:  pending possible EGD/colonoscopy on Thursday.

## 2024-08-15 ENCOUNTER — ANESTHESIA (OUTPATIENT)
Dept: GASTROENTEROLOGY | Facility: HOSPITAL | Age: 89
End: 2024-08-15
Payer: MEDICARE

## 2024-08-15 ENCOUNTER — ANESTHESIA EVENT (OUTPATIENT)
Dept: GASTROENTEROLOGY | Facility: HOSPITAL | Age: 89
End: 2024-08-15
Payer: MEDICARE

## 2024-08-15 LAB
ALBUMIN SERPL-MCNC: 3.7 G/DL (ref 3.5–5.2)
ALBUMIN/GLOB SERPL: 2.1 G/DL
ALP SERPL-CCNC: 68 U/L (ref 39–117)
ALT SERPL W P-5'-P-CCNC: 6 U/L (ref 1–33)
ANION GAP SERPL CALCULATED.3IONS-SCNC: 12 MMOL/L (ref 5–15)
AST SERPL-CCNC: 15 U/L (ref 1–32)
BASOPHILS # BLD AUTO: 0.07 10*3/MM3 (ref 0–0.2)
BASOPHILS NFR BLD AUTO: 0.9 % (ref 0–1.5)
BILIRUB SERPL-MCNC: 0.3 MG/DL (ref 0–1.2)
BUN SERPL-MCNC: 17 MG/DL (ref 8–23)
BUN/CREAT SERPL: 17.7 (ref 7–25)
CALCIUM SPEC-SCNC: 8.8 MG/DL (ref 8.6–10.5)
CHLORIDE SERPL-SCNC: 97 MMOL/L (ref 98–107)
CO2 SERPL-SCNC: 25 MMOL/L (ref 22–29)
CREAT SERPL-MCNC: 0.96 MG/DL (ref 0.57–1)
DEPRECATED RDW RBC AUTO: 53.1 FL (ref 37–54)
EGFRCR SERPLBLD CKD-EPI 2021: 57.7 ML/MIN/1.73
EOSINOPHIL # BLD AUTO: 0.3 10*3/MM3 (ref 0–0.4)
EOSINOPHIL NFR BLD AUTO: 3.8 % (ref 0.3–6.2)
ERYTHROCYTE [DISTWIDTH] IN BLOOD BY AUTOMATED COUNT: 14.6 % (ref 12.3–15.4)
GLOBULIN UR ELPH-MCNC: 1.8 GM/DL
GLUCOSE SERPL-MCNC: 83 MG/DL (ref 65–99)
HCT VFR BLD AUTO: 31.6 % (ref 34–46.6)
HGB BLD-MCNC: 9.7 G/DL (ref 12–15.9)
IMM GRANULOCYTES # BLD AUTO: 0.14 10*3/MM3 (ref 0–0.05)
IMM GRANULOCYTES NFR BLD AUTO: 1.8 % (ref 0–0.5)
INR PPP: 0.96 (ref 0.9–1.1)
LYMPHOCYTES # BLD AUTO: 0.7 10*3/MM3 (ref 0.7–3.1)
LYMPHOCYTES NFR BLD AUTO: 8.8 % (ref 19.6–45.3)
MCH RBC QN AUTO: 30 PG (ref 26.6–33)
MCHC RBC AUTO-ENTMCNC: 30.7 G/DL (ref 31.5–35.7)
MCV RBC AUTO: 97.8 FL (ref 79–97)
MONOCYTES # BLD AUTO: 0.55 10*3/MM3 (ref 0.1–0.9)
MONOCYTES NFR BLD AUTO: 6.9 % (ref 5–12)
NEUTROPHILS NFR BLD AUTO: 6.17 10*3/MM3 (ref 1.7–7)
NEUTROPHILS NFR BLD AUTO: 77.8 % (ref 42.7–76)
NRBC BLD AUTO-RTO: 0 /100 WBC (ref 0–0.2)
PLATELET # BLD AUTO: 419 10*3/MM3 (ref 140–450)
PMV BLD AUTO: 9.2 FL (ref 6–12)
POTASSIUM SERPL-SCNC: 3.2 MMOL/L (ref 3.5–5.2)
POTASSIUM SERPL-SCNC: 4 MMOL/L (ref 3.5–5.2)
PROT SERPL-MCNC: 5.5 G/DL (ref 6–8.5)
PROTHROMBIN TIME: 13 SECONDS (ref 11.7–14.2)
RBC # BLD AUTO: 3.23 10*6/MM3 (ref 3.77–5.28)
SODIUM SERPL-SCNC: 134 MMOL/L (ref 136–145)
WBC NRBC COR # BLD AUTO: 7.93 10*3/MM3 (ref 3.4–10.8)

## 2024-08-15 PROCEDURE — 43239 EGD BIOPSY SINGLE/MULTIPLE: CPT | Performed by: INTERNAL MEDICINE

## 2024-08-15 PROCEDURE — 45378 DIAGNOSTIC COLONOSCOPY: CPT | Performed by: INTERNAL MEDICINE

## 2024-08-15 PROCEDURE — 63710000001 MYCOPHENOLATE MOFETIL PER 250 MG: Performed by: INTERNAL MEDICINE

## 2024-08-15 PROCEDURE — 94799 UNLISTED PULMONARY SVC/PX: CPT

## 2024-08-15 PROCEDURE — 85610 PROTHROMBIN TIME: CPT | Performed by: INTERNAL MEDICINE

## 2024-08-15 PROCEDURE — 84132 ASSAY OF SERUM POTASSIUM: CPT | Performed by: INTERNAL MEDICINE

## 2024-08-15 PROCEDURE — 63710000001 PREDNISONE PER 5 MG: Performed by: INTERNAL MEDICINE

## 2024-08-15 PROCEDURE — 94761 N-INVAS EAR/PLS OXIMETRY MLT: CPT

## 2024-08-15 PROCEDURE — 80053 COMPREHEN METABOLIC PANEL: CPT | Performed by: INTERNAL MEDICINE

## 2024-08-15 PROCEDURE — 88305 TISSUE EXAM BY PATHOLOGIST: CPT | Performed by: INTERNAL MEDICINE

## 2024-08-15 PROCEDURE — 25810000003 SODIUM CHLORIDE 0.9 % SOLUTION: Performed by: INTERNAL MEDICINE

## 2024-08-15 PROCEDURE — 25010000002 PROPOFOL 10 MG/ML EMULSION: Performed by: NURSE ANESTHETIST, CERTIFIED REGISTERED

## 2024-08-15 PROCEDURE — 94664 DEMO&/EVAL PT USE INHALER: CPT

## 2024-08-15 PROCEDURE — 0DB98ZX EXCISION OF DUODENUM, VIA NATURAL OR ARTIFICIAL OPENING ENDOSCOPIC, DIAGNOSTIC: ICD-10-PCS | Performed by: INTERNAL MEDICINE

## 2024-08-15 PROCEDURE — 0DJD8ZZ INSPECTION OF LOWER INTESTINAL TRACT, VIA NATURAL OR ARTIFICIAL OPENING ENDOSCOPIC: ICD-10-PCS | Performed by: INTERNAL MEDICINE

## 2024-08-15 PROCEDURE — 85025 COMPLETE CBC W/AUTO DIFF WBC: CPT | Performed by: INTERNAL MEDICINE

## 2024-08-15 PROCEDURE — 0DB68ZX EXCISION OF STOMACH, VIA NATURAL OR ARTIFICIAL OPENING ENDOSCOPIC, DIAGNOSTIC: ICD-10-PCS | Performed by: INTERNAL MEDICINE

## 2024-08-15 PROCEDURE — 99232 SBSQ HOSP IP/OBS MODERATE 35: CPT | Performed by: NURSE PRACTITIONER

## 2024-08-15 RX ORDER — LIDOCAINE HYDROCHLORIDE 20 MG/ML
INJECTION, SOLUTION INFILTRATION; PERINEURAL AS NEEDED
Status: DISCONTINUED | OUTPATIENT
Start: 2024-08-15 | End: 2024-08-15 | Stop reason: SURG

## 2024-08-15 RX ORDER — POTASSIUM CHLORIDE 750 MG/1
40 TABLET, FILM COATED, EXTENDED RELEASE ORAL EVERY 4 HOURS
Status: COMPLETED | OUTPATIENT
Start: 2024-08-15 | End: 2024-08-15

## 2024-08-15 RX ORDER — SODIUM CHLORIDE 9 MG/ML
30 INJECTION, SOLUTION INTRAVENOUS CONTINUOUS PRN
Status: DISCONTINUED | OUTPATIENT
Start: 2024-08-15 | End: 2024-08-20 | Stop reason: HOSPADM

## 2024-08-15 RX ORDER — PROPOFOL 10 MG/ML
VIAL (ML) INTRAVENOUS CONTINUOUS PRN
Status: DISCONTINUED | OUTPATIENT
Start: 2024-08-15 | End: 2024-08-15 | Stop reason: SURG

## 2024-08-15 RX ADMIN — RALOXIFENE HYDROCHLORIDE 60 MG: 60 TABLET, FILM COATED ORAL at 21:37

## 2024-08-15 RX ADMIN — BUSPIRONE HYDROCHLORIDE 10 MG: 10 TABLET ORAL at 21:37

## 2024-08-15 RX ADMIN — DIGOXIN 125 MCG: 125 TABLET ORAL at 15:40

## 2024-08-15 RX ADMIN — PROPOFOL 100 MG: 10 INJECTION, EMULSION INTRAVENOUS at 14:10

## 2024-08-15 RX ADMIN — PREDNISONE 5 MG: 5 TABLET ORAL at 08:23

## 2024-08-15 RX ADMIN — PANTOPRAZOLE SODIUM 40 MG: 40 TABLET, DELAYED RELEASE ORAL at 06:53

## 2024-08-15 RX ADMIN — DULOXETINE HYDROCHLORIDE 60 MG: 60 CAPSULE, DELAYED RELEASE ORAL at 21:37

## 2024-08-15 RX ADMIN — Medication 10 ML: at 08:23

## 2024-08-15 RX ADMIN — ALBUTEROL SULFATE 2.5 MG: 2.5 SOLUTION RESPIRATORY (INHALATION) at 20:07

## 2024-08-15 RX ADMIN — ATENOLOL 25 MG: 25 TABLET ORAL at 21:41

## 2024-08-15 RX ADMIN — ACETAMINOPHEN 325MG 650 MG: 325 TABLET ORAL at 21:37

## 2024-08-15 RX ADMIN — BUDESONIDE AND FORMOTEROL FUMARATE DIHYDRATE 2 PUFF: 160; 4.5 AEROSOL RESPIRATORY (INHALATION) at 06:45

## 2024-08-15 RX ADMIN — MONTELUKAST SODIUM 10 MG: 10 TABLET, FILM COATED ORAL at 21:37

## 2024-08-15 RX ADMIN — ACETAMINOPHEN 325MG 650 MG: 325 TABLET ORAL at 08:20

## 2024-08-15 RX ADMIN — GUAIFENESIN 600 MG: 600 TABLET, EXTENDED RELEASE ORAL at 21:37

## 2024-08-15 RX ADMIN — PANTOPRAZOLE SODIUM 40 MG: 40 TABLET, DELAYED RELEASE ORAL at 17:59

## 2024-08-15 RX ADMIN — Medication 10 ML: at 21:42

## 2024-08-15 RX ADMIN — ATENOLOL 25 MG: 25 TABLET ORAL at 07:42

## 2024-08-15 RX ADMIN — TIOTROPIUM BROMIDE INHALATION SPRAY 2 PUFF: 3.12 SPRAY, METERED RESPIRATORY (INHALATION) at 06:46

## 2024-08-15 RX ADMIN — PROPOFOL 160 MCG/KG/MIN: 10 INJECTION, EMULSION INTRAVENOUS at 14:09

## 2024-08-15 RX ADMIN — LEVOTHYROXINE SODIUM 88 MCG: 88 TABLET ORAL at 07:43

## 2024-08-15 RX ADMIN — RIVAROXABAN 20 MG: 20 TABLET, FILM COATED ORAL at 21:37

## 2024-08-15 RX ADMIN — MYCOPHENOLATE MOFETIL 1500 MG: 250 CAPSULE ORAL at 21:41

## 2024-08-15 RX ADMIN — GUAIFENESIN 600 MG: 600 TABLET, EXTENDED RELEASE ORAL at 08:23

## 2024-08-15 RX ADMIN — MYCOPHENOLATE MOFETIL 1000 MG: 250 CAPSULE ORAL at 15:40

## 2024-08-15 RX ADMIN — PRAVASTATIN SODIUM 20 MG: 20 TABLET ORAL at 21:41

## 2024-08-15 RX ADMIN — POLYETHYLENE GLYCOL 3350, SODIUM SULFATE ANHYDROUS, SODIUM BICARBONATE, SODIUM CHLORIDE, POTASSIUM CHLORIDE 2000 ML: 236; 22.74; 6.74; 5.86; 2.97 POWDER, FOR SOLUTION ORAL at 06:02

## 2024-08-15 RX ADMIN — BUDESONIDE AND FORMOTEROL FUMARATE DIHYDRATE 2 PUFF: 160; 4.5 AEROSOL RESPIRATORY (INHALATION) at 20:07

## 2024-08-15 RX ADMIN — AZELASTINE HYDROCHLORIDE 2 SPRAY: 137 SPRAY, METERED NASAL at 21:42

## 2024-08-15 RX ADMIN — POTASSIUM CHLORIDE 40 MEQ: 750 TABLET, EXTENDED RELEASE ORAL at 15:40

## 2024-08-15 RX ADMIN — POTASSIUM CHLORIDE 40 MEQ: 750 TABLET, EXTENDED RELEASE ORAL at 08:23

## 2024-08-15 RX ADMIN — ACETAMINOPHEN 325MG 650 MG: 325 TABLET ORAL at 04:05

## 2024-08-15 RX ADMIN — ATENOLOL 25 MG: 25 TABLET ORAL at 15:40

## 2024-08-15 RX ADMIN — BUSPIRONE HYDROCHLORIDE 10 MG: 10 TABLET ORAL at 08:23

## 2024-08-15 RX ADMIN — ACETAMINOPHEN 325MG 650 MG: 325 TABLET ORAL at 15:40

## 2024-08-15 RX ADMIN — SODIUM CHLORIDE 30 ML/HR: 9 INJECTION, SOLUTION INTRAVENOUS at 13:18

## 2024-08-15 RX ADMIN — LIDOCAINE HYDROCHLORIDE 60 MG: 20 INJECTION, SOLUTION INFILTRATION; PERINEURAL at 14:09

## 2024-08-15 RX ADMIN — Medication 1000 MCG: at 08:23

## 2024-08-15 RX ADMIN — ALBUTEROL SULFATE 2.5 MG: 2.5 SOLUTION RESPIRATORY (INHALATION) at 06:46

## 2024-08-15 RX ADMIN — DULOXETINE HYDROCHLORIDE 60 MG: 60 CAPSULE, DELAYED RELEASE ORAL at 08:23

## 2024-08-15 RX ADMIN — ALBUTEROL SULFATE 2.5 MG: 2.5 SOLUTION RESPIRATORY (INHALATION) at 10:31

## 2024-08-15 RX ADMIN — TORSEMIDE 20 MG: 20 TABLET ORAL at 08:23

## 2024-08-15 NOTE — ANESTHESIA PREPROCEDURE EVALUATION
Anesthesia Evaluation     Patient summary reviewed and Nursing notes reviewed                Airway   Mallampati: II  Small opening  Dental    (+) upper dentures, lower dentures and edentulous    Pulmonary    (+) COPD, asthma,shortness of breath, sleep apnea on CPAP  Cardiovascular     ECG reviewed  Patient on routine beta blocker  Rhythm: irregular  Rate: normal    (+) hypertension, CAD, dysrhythmias Paroxysmal Atrial Fib, CHF , hyperlipidemia      Neuro/Psych  (+) TIA  GI/Hepatic/Renal/Endo    (+) morbid obesity, renal disease-, thyroid problem hypothyroidism    Musculoskeletal     Abdominal    Substance History - negative use     OB/GYN negative ob/gyn ROS         Other   arthritis,                   Anesthesia Plan    ASA 4     MAC     (edentulous)  intravenous induction     Anesthetic plan, risks, benefits, and alternatives have been provided, discussed and informed consent has been obtained with: patient.    CODE STATUS:    Medical Intervention Limits: No intubation (DNI)  Code Status (Patient has no pulse and is not breathing): No CPR (Do Not Attempt to Resuscitate)  Medical Interventions (Patient has pulse or is breathing): Limited Support

## 2024-08-15 NOTE — PROGRESS NOTES
"    DAILY PROGRESS NOTE  [facility]    Patient Identification:  Name: Olena Myers  Age: 88 y.o.  Sex: female  :  1935  MRN: [id number]         Primary Care Physician: Manda Keenan MD    Subjective:  Interval History: Feeling better overall.  Still with some shortness of breath worse with exertion.  She feels better at rest.  Denies any chest pain confusion nausea vomiting or fever    Objective: Pleasant and conversational.  Resting supine in bed with no respiratory distress.  No family at bedside.  Case was discussed in multidisciplinary rounds    Scheduled Meds:albuterol, 2.5 mg, Nebulization, 4x Daily - RT  atenolol, 25 mg, Oral, Q8H  azelastine, 2 spray, Each Nare, BID  budesonide-formoterol, 2 puff, Inhalation, BID - RT   And  tiotropium bromide monohydrate, 2 puff, Inhalation, Daily - RT  busPIRone, 10 mg, Oral, BID  digoxin, 125 mcg, Oral, Daily  DULoxetine, 60 mg, Oral, BID  guaiFENesin, 600 mg, Oral, Q12H  levothyroxine, 88 mcg, Oral, Q AM  montelukast, 10 mg, Oral, Nightly  mycophenolate, 1,000 mg, Oral, QAM  mycophenolate, 1,500 mg, Oral, Nightly  pantoprazole, 40 mg, Oral, BID AC  potassium chloride ER, 40 mEq, Oral, Q4H  pravastatin, 20 mg, Oral, Nightly  predniSONE, 5 mg, Oral, Daily  raloxifene, 60 mg, Oral, Nightly  sodium chloride, 10 mL, Intravenous, Q12H  torsemide, 20 mg, Oral, Daily  vitamin B-12, 1,000 mcg, Oral, Daily  vitamin D, 50,000 Units, Oral, Q7 Days      Continuous Infusions:     Vital signs in last 24 hours:  Temp:  [97.7 °F (36.5 °C)-98.4 °F (36.9 °C)] 98.2 °F (36.8 °C)  Heart Rate:  [71-81] 75  Resp:  [18] 18  BP: (111-117)/(50-97) 116/97    Intake/Output:    Intake/Output Summary (Last 24 hours) at 8/15/2024 1140  Last data filed at 2024 2158  Gross per 24 hour   Intake 320 ml   Output --   Net 320 ml       Exam:  /97 (BP Location: Left arm, Patient Position: Sitting)   Pulse 75   Temp 98.2 °F (36.8 °C) (Oral)   Resp 18   Ht 152.4 cm (60\")  "  Wt 64.6 kg (142 lb 8 oz)   SpO2 98%   BMI 27.83 kg/m²     General Appearance:    Alert, cooperative, alert and oriented x 3                         Throat:   Oral mucosa pink and moist                           Neck: Large diameter but no obvious JVD                         Lungs:    Slight diminished bases otherwise clear to auscultation bilaterally, respirations unlabored                          Heart:  Irregular rate and rhythm, S1 and S2 normal                  Abdomen:     Soft, nontender, bowel sounds active                 Extremities: Generalized weakness though moving all, no pitting edema                        Pulses:   Pulses palpable in lower extremities                               Data Review:  Labs in chart were reviewed.    Assessment:  Active Hospital Problems    Diagnosis  POA    **Exertional dyspnea [R06.09]  Yes    Abnormal computed tomography of gallbladder [R93.2]  Unknown    Hypothyroidism (acquired) [E03.9]  Yes    Diastolic CHF, chronic [I50.32]  Yes    Iron deficiency anemia [D50.9]  Unknown    COPD (chronic obstructive pulmonary disease) [J44.9]  Yes    Sleep apnea [G47.30]  Yes    Atrial fibrillation [I48.91]  Yes    Myasthenia gravis [G70.00]  Yes    Paroxysmal atrial fibrillation [I48.0]  Yes    CAD (coronary artery disease) [I25.10]  Yes    Essential hypertension [I10]  Yes      Resolved Hospital Problems   No resolved problems to display.       Plan:    Multifactorial dyspnea -anemia/PAF/CHF/obesity compounded by chronic hypoxic respiratory failure   -Fluid status reassuring/euvolemic   -Digoxin/atenolol per cards -RVR resolved   -No MG exacerbation per neuro   -Currently only on 2 L NC    GI to perform endoscopy today -AC on hold and hopefully can resume this evening pending their input and evaluation post EGD.  I discussed this with RN   -Heme positive stool   -Denies abdominal pain   -Hgb 7.5-ylpykeggutc-0.2-9.7    Gallbladder distention okay per surgery    Replace  K.    Hyponatremia stable with sodium 133-134     Leukocytosis reactive/resolved-chronic steroid    Hypothyroidism on levothyroxine        Generalized weakness with PT following -stated would benefit from skilled rehab.  CCP to help coordinate needs but could be ready in the next day or 2.  Anticoagulation complicates all    Input and assistance appreciated from all involved    Hernan Waldron MD  8/15/2024  11:40 EDT

## 2024-08-15 NOTE — PLAN OF CARE
Goal Outcome Evaluation:  Plan of Care Reviewed With: patient, family        Progress: improving  Outcome Evaluation: VSS. EGD and Colonoscopy done this afternoon. Pt tolerated well. Up in chair for dinner. Son at bedside. K+ replaced per protocol. Apap requested Q 4 hrs for shoulder pain along with ice pack. Safety maintained. D/C planned for tomorrow.

## 2024-08-15 NOTE — ANESTHESIA POSTPROCEDURE EVALUATION
Patient: Olena Myers    Procedure Summary       Date: 08/15/24 Room / Location: [facility] ENDOSCOPY 8 / [facility] ENDOSCOPY    Anesthesia Start: 1406 Anesthesia Stop: 1447    Procedures:       ESOPHAGOGASTRODUODENOSCOPY WITH COLD BIOPSIES (Esophagus)      COLONOSCOPY TO CECUM & T.I. Diagnosis:       Abnormal computed tomography of gallbladder      Iron deficiency anemia due to chronic blood loss      (Abnormal computed tomography of gallbladder [R93.2])      (Iron deficiency anemia due to chronic blood loss [D50.0])    Surgeons: Charlie Zacarias MD Provider: Genaro García MD    Anesthesia Type: MAC ASA Status: 4            Anesthesia Type: MAC    Vitals  Vitals Value Taken Time   /73 08/15/24 1443   Temp     Pulse 83 08/15/24 1452   Resp 16 08/15/24 1442   SpO2 81 % 08/15/24 1452   Vitals shown include unfiled device data.        Post Anesthesia Care and Evaluation    Patient location during evaluation: PACU  Patient participation: complete - patient participated  Level of consciousness: awake and alert  Pain management: adequate    Airway patency: patent  Anesthetic complications: No anesthetic complications    Cardiovascular status: acceptable  Respiratory status: acceptable  Hydration status: acceptable    Comments: --------------------            08/15/24               1442     --------------------   BP:       125/73     Pulse:      84       Resp:       16       Temp:                SpO2:      96%      --------------------

## 2024-08-15 NOTE — PROGRESS NOTES
[facility]  [phone]  Dr. Prashanth Lizarraga    Subjective:  LOS: 4    Chief Complaint: Shortness of breath      Follows Dr. Tirado for asthma.  Still with shortness of breath with exertion - ongoing for at least several months.    Objective   Vital Signs past 24hrs  Temp range: Temp (24hrs), Av.9 °F (36.6 °C), Min:97.3 °F (36.3 °C), Max:98.4 °F (36.9 °C)    BP range: BP: (102-126)/(50-63) 113/59  Pulse range: Heart Rate:  [71-87] 72  Resp rate range: Resp:  [16-20] 18  Device (Oxygen Therapy): nasal cannulaFlow (L/min):  [2] 2  Oxygen range:SpO2:  [97 %-100 %] 100 %   Mechanical Ventilator:     Physical Exam  Constitutional:       Appearance: She is obese.   Eyes:      Pupils: Pupils are equal, round, and reactive to light.   Cardiovascular:      Rate and Rhythm: Normal rate and regular rhythm.   Pulmonary:      Effort: Pulmonary effort is normal.      Breath sounds: No wheezing or rhonchi.   Abdominal:      General: Bowel sounds are normal.      Palpations: Abdomen is soft. There is no mass.      Tenderness: There is no abdominal tenderness.   Musculoskeletal:         General: No swelling.   Neurological:      Mental Status: She is alert.       Results Review:    I have reviewed the laboratory and imaging data since the last note by [facility] physician.  My annotations are noted in assessment and plan.      Result Review:  I have personally reviewed the results from last note by [facility] physician to 2024 22:49 EDT and agree with these findings:  [x]  Laboratory list / accordion  [x]  Microbiology  [x]  Radiology  []  EKG/Telemetry   []  Cardiology/Vascular   []  Pathology  []  Old records  []  Other:    Medication Review:  I have reviewed the current MAR.  My annotations are noted in assessment and plan.    albuterol, 2.5 mg, Nebulization, 4x Daily - RT  atenolol, 25 mg, Oral, Q8H  azelastine, 2 spray, Each Nare, BID  budesonide-formoterol, 2 puff, Inhalation, BID - RT   And  tiotropium bromide  monohydrate, 2 puff, Inhalation, Daily - RT  busPIRone, 10 mg, Oral, BID  digoxin, 125 mcg, Oral, Daily  DULoxetine, 60 mg, Oral, BID  guaiFENesin, 600 mg, Oral, Q12H  levothyroxine, 88 mcg, Oral, Q AM  montelukast, 10 mg, Oral, Nightly  mycophenolate, 1,000 mg, Oral, QAM  mycophenolate, 1,500 mg, Oral, Nightly  pantoprazole, 40 mg, Oral, BID AC  polyethylene glycol, 2,000 mL, Oral, Q8H  pravastatin, 20 mg, Oral, Nightly  predniSONE, 5 mg, Oral, Daily  raloxifene, 60 mg, Oral, Nightly  sodium chloride, 10 mL, Intravenous, Q12H  torsemide, 20 mg, Oral, Daily  vitamin B-12, 1,000 mcg, Oral, Daily  vitamin D, 50,000 Units, Oral, Q7 Days           Lines, Drains & Airways       Active LDAs       Name Placement date Placement time Site Days    Peripheral IV 08/10/24 1551 Anterior;Right Forearm 08/10/24  1551  Forearm  1    Single Lumen Implantable Port 01/08/19 Left Subclavian 01/08/19  0910  Subclavian  2043                  Isolation status: No active isolations    Dietary Orders (From admission, onward)       Start     Ordered    08/14/24 0841  Diet: Liquid; Clear Liquid; Fluid Consistency: Thin (IDDSI 0)  Diet Effective Now        References:    Diet Order Crosswalk   Question Answer Comment   Diets: Liquid    Liquid Diet: Clear Liquid    Fluid Consistency: Thin (IDDSI 0)        08/14/24 0840                    PCC Problems  Dyspnea, especially with exertion  Chronic hypoxia on 2 L oxygen  Asthma  SEDA on CPAP at home  Myasthenia gravis  Anemia  A-fib  RVR  Chronic anticoagulation  Chronic systemic steroid use  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  Probably multifactorial and not entirely from her asthma. Can investigate further outpatient.    A-fib with RVR on admission but now better controlled.  Note cardiology input.    Anemia and pending GI scopes.    Underlying  asthma and improved lung exam.  She is on chronic systemic steroids at home for management of her myasthenia gravis.      With underlying myasthenia gravis ABG looks good.  Note neurology input.    She will continue use of CPAP at home.    Low IgA and IgG levels likely due to concomitant use of mycophenolate.      Prashanth Lizarraga MD  08/14/24  22:49 EDT      Part of this note may be an electronic transcription/translation of spoken language to printed text using the Dragon Dictation System.

## 2024-08-15 NOTE — PROGRESS NOTES
"    Patient Name: Olena Myers  :1935  88 y.o.      Patient Care Team:  Manda Keenan MD as PCP - General (General Practice)  Olvin Hernandez MD as Consulting Physician (Cardiology)    Chief Complaint: follow up anemia , SOA    Interval History:    Resting in bed. Still with some dyspnea on exertion but comfortable at rest.     Objective   Vital Signs  Temp:  [97.7 °F (36.5 °C)-98.4 °F (36.9 °C)] 98.2 °F (36.8 °C)  Heart Rate:  [71-87] 75  Resp:  [16-18] 18  BP: (111-117)/(50-97) 116/97    Intake/Output Summary (Last 24 hours) at 8/15/2024 1116  Last data filed at 2024 2158  Gross per 24 hour   Intake 320 ml   Output --   Net 320 ml     Flowsheet Rows      Flowsheet Row First Filed Value   Admission Height 152.4 cm (60\") Documented at 08/10/2024 0918   Admission Weight 63.5 kg (140 lb) Documented at 08/10/2024 09            Physical Exam:   General Appearance:    Alert, cooperative, in no acute distress   Lungs:     Clear to auscultation.  Normal respiratory effort and rate.      Heart:    Regular rhythm and normal rate, normal S1 and S2, no murmurs, gallops or rubs.     Chest Wall:    No abnormalities observed   Abdomen:     Soft, nontender, positive bowel sounds.     Extremities:   no cyanosis, clubbing or edema.  No marked joint deformities.  Adequate musculoskeletal strength.       Results Review:    Results from last 7 days   Lab Units 08/15/24  0407   SODIUM mmol/L 134*   POTASSIUM mmol/L 3.2*   CHLORIDE mmol/L 97*   CO2 mmol/L 25.0   BUN mg/dL 17   CREATININE mg/dL 0.96   GLUCOSE mg/dL 83   CALCIUM mg/dL 8.8     Results from last 7 days   Lab Units 08/10/24  1131 08/10/24  0945   HSTROP T ng/L 38* 41*     Results from last 7 days   Lab Units 08/15/24  0407   WBC 10*3/mm3 7.93   HEMOGLOBIN g/dL 9.7*   HEMATOCRIT % 31.6*   PLATELETS 10*3/mm3 419     Results from last 7 days   Lab Units 08/15/24  0407 08/10/24  0945   INR  0.96 2.08*     Results from last 7 days   Lab Units 08/10/24  0945 "   MAGNESIUM mg/dL 2.3     Results from last 7 days   Lab Units 08/10/24  1131   CHOLESTEROL mg/dL 136   TRIGLYCERIDES mg/dL 168*   HDL CHOL mg/dL 53   LDL CHOL mg/dL 55               Medication Review:   albuterol, 2.5 mg, Nebulization, 4x Daily - RT  atenolol, 25 mg, Oral, Q8H  azelastine, 2 spray, Each Nare, BID  budesonide-formoterol, 2 puff, Inhalation, BID - RT   And  tiotropium bromide monohydrate, 2 puff, Inhalation, Daily - RT  busPIRone, 10 mg, Oral, BID  digoxin, 125 mcg, Oral, Daily  DULoxetine, 60 mg, Oral, BID  guaiFENesin, 600 mg, Oral, Q12H  levothyroxine, 88 mcg, Oral, Q AM  montelukast, 10 mg, Oral, Nightly  mycophenolate, 1,000 mg, Oral, QAM  mycophenolate, 1,500 mg, Oral, Nightly  pantoprazole, 40 mg, Oral, BID AC  potassium chloride ER, 40 mEq, Oral, Q4H  pravastatin, 20 mg, Oral, Nightly  predniSONE, 5 mg, Oral, Daily  raloxifene, 60 mg, Oral, Nightly  sodium chloride, 10 mL, Intravenous, Q12H  torsemide, 20 mg, Oral, Daily  vitamin B-12, 1,000 mcg, Oral, Daily  vitamin D, 50,000 Units, Oral, Q7 Days              Assessment & Plan   Shortness of breath, multifactorial  Chronic hypoxic respiratory failure  Worsening anemia with positive hemoccult stool  Paroxysmal atrial fibrillation, may be more persistent now. Rate controlled with atenolol and digoxin. Xarelto currently held due to #3.   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   Chronic diastolic heart failure on torsemide and appears compensated.     HR looks good on atenolol 25 mg TID.   For EGD and colonoscopy today.     MILA Smith  [facility]  08/15/24  11:16 EDT

## 2024-08-15 NOTE — PROGRESS NOTES
[facility]  [phone]  Dr. Prashanth Lizarraga    Subjective:  LOS: 5    Chief Complaint: Shortness of breath      Follows Dr. Tirado for asthma.  Still with shortness of breath with exertion - ongoing for at least several months. No change.    Objective   Vital Signs past 24hrs  Temp range: Temp (24hrs), Av.1 °F (36.7 °C), Min:97.7 °F (36.5 °C), Max:98.4 °F (36.9 °C)    BP range: BP: (104-117)/(50-97) 104/62  Pulse range: Heart Rate:  [71-84] 84  Resp rate range: Resp:  [17-18] 17  Device (Oxygen Therapy): nasal cannulaFlow (L/min):  [2] 2  Oxygen range:SpO2:  [93 %-100 %] 94 %   Mechanical Ventilator:     Physical Exam  Constitutional:       Appearance: She is obese.   Eyes:      Pupils: Pupils are equal, round, and reactive to light.   Cardiovascular:      Rate and Rhythm: Normal rate and regular rhythm.      Heart sounds: Murmur heard.   Pulmonary:      Effort: Pulmonary effort is normal.      Breath sounds: No wheezing or rhonchi.   Abdominal:      General: Bowel sounds are normal.      Palpations: Abdomen is soft. There is no mass.      Tenderness: There is no abdominal tenderness.   Musculoskeletal:         General: No swelling.   Neurological:      Mental Status: She is alert.       Results Review:    I have reviewed the laboratory and imaging data since the last note by [facility] physician.  My annotations are noted in assessment and plan.      Result Review:  I have personally reviewed the results from last note by [facility] physician to 8/15/2024 13:25 EDT and agree with these findings:  [x]  Laboratory list / accordion  [x]  Microbiology  [x]  Radiology  []  EKG/Telemetry   []  Cardiology/Vascular   []  Pathology  []  Old records  []  Other:    Medication Review:  I have reviewed the current MAR.  My annotations are noted in assessment and plan.    albuterol, 2.5 mg, Nebulization, 4x Daily - RT  atenolol, 25 mg, Oral, Q8H  azelastine, 2 spray, Each Nare, BID  budesonide-formoterol, 2 puff,  Inhalation, BID - RT   And  tiotropium bromide monohydrate, 2 puff, Inhalation, Daily - RT  busPIRone, 10 mg, Oral, BID  digoxin, 125 mcg, Oral, Daily  DULoxetine, 60 mg, Oral, BID  guaiFENesin, 600 mg, Oral, Q12H  levothyroxine, 88 mcg, Oral, Q AM  montelukast, 10 mg, Oral, Nightly  mycophenolate, 1,000 mg, Oral, QAM  mycophenolate, 1,500 mg, Oral, Nightly  pantoprazole, 40 mg, Oral, BID AC  potassium chloride ER, 40 mEq, Oral, Q4H  pravastatin, 20 mg, Oral, Nightly  predniSONE, 5 mg, Oral, Daily  raloxifene, 60 mg, Oral, Nightly  sodium chloride, 10 mL, Intravenous, Q12H  torsemide, 20 mg, Oral, Daily  vitamin B-12, 1,000 mcg, Oral, Daily  vitamin D, 50,000 Units, Oral, Q7 Days        sodium chloride, 30 mL/hr, Last Rate: 30 mL/hr (08/15/24 1318)      Lines, Drains & Airways       Active LDAs       Name Placement date Placement time Site Days    Peripheral IV 08/10/24 1551 Anterior;Right Forearm 08/10/24  1551  Forearm  1    Single Lumen Implantable Port 01/08/19 Left Subclavian 01/08/19  0910  Subclavian  2043                  Isolation status: No active isolations    Dietary Orders (From admission, onward)       Start     Ordered    08/14/24 0841  Diet: Liquid; Clear Liquid; Fluid Consistency: Thin (IDDSI 0)  Diet Effective Now        References:    Diet Order Crosswalk   Question Answer Comment   Diets: Liquid    Liquid Diet: Clear Liquid    Fluid Consistency: Thin (IDDSI 0)        08/14/24 0840                    PCC Problems  Dyspnea, especially with exertion  Chronic hypoxia on 2 L oxygen  Asthma  SEDA on CPAP at home  Myasthenia gravis  Anemia  A-fib  RVR  Chronic anticoagulation  Chronic systemic steroid use  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  Probably multifactorial and not entirely from her asthma. Can investigate further outpatient.    A-fib with RVR  on admission but now better controlled.  Note cardiology input.    Anemia and pending GI scopes.    Underlying asthma and improved lung exam.  She is on chronic systemic steroids at home for management of her myasthenia gravis.      She will continue use of CPAP at home.    Low IgA and IgG levels likely due to concomitant use of mycophenolate.      Prashanth Lizarraga MD  08/15/24  13:25 EDT      Part of this note may be an electronic transcription/translation of spoken language to printed text using the Dragon Dictation System.

## 2024-08-15 NOTE — PLAN OF CARE
Goal Outcome Evaluation:              Outcome Evaluation: VSS. ON BOWEL PREP OVERNIGHT, RESTED AT SHORT INTERVALS.  STOOL WATERY LIGHT BROWN. FOR EGD, COLONOSCOPY THIS AFTERNOON.

## 2024-08-16 LAB
HCT VFR BLD AUTO: 31.2 % (ref 34–46.6)
HGB BLD-MCNC: 9.7 G/DL (ref 12–15.9)

## 2024-08-16 PROCEDURE — 94761 N-INVAS EAR/PLS OXIMETRY MLT: CPT

## 2024-08-16 PROCEDURE — 63710000001 PREDNISONE PER 5 MG: Performed by: INTERNAL MEDICINE

## 2024-08-16 PROCEDURE — 97530 THERAPEUTIC ACTIVITIES: CPT

## 2024-08-16 PROCEDURE — 94799 UNLISTED PULMONARY SVC/PX: CPT

## 2024-08-16 PROCEDURE — 99232 SBSQ HOSP IP/OBS MODERATE 35: CPT | Performed by: INTERNAL MEDICINE

## 2024-08-16 PROCEDURE — 85018 HEMOGLOBIN: CPT | Performed by: INTERNAL MEDICINE

## 2024-08-16 PROCEDURE — 63710000001 MYCOPHENOLATE MOFETIL PER 250 MG: Performed by: INTERNAL MEDICINE

## 2024-08-16 PROCEDURE — 85014 HEMATOCRIT: CPT | Performed by: INTERNAL MEDICINE

## 2024-08-16 PROCEDURE — 94664 DEMO&/EVAL PT USE INHALER: CPT

## 2024-08-16 RX ADMIN — ACETAMINOPHEN 325MG 650 MG: 325 TABLET ORAL at 13:52

## 2024-08-16 RX ADMIN — Medication 10 ML: at 08:37

## 2024-08-16 RX ADMIN — RIVAROXABAN 20 MG: 20 TABLET, FILM COATED ORAL at 17:35

## 2024-08-16 RX ADMIN — BUSPIRONE HYDROCHLORIDE 10 MG: 10 TABLET ORAL at 20:32

## 2024-08-16 RX ADMIN — MYCOPHENOLATE MOFETIL 1000 MG: 250 CAPSULE ORAL at 12:31

## 2024-08-16 RX ADMIN — PANTOPRAZOLE SODIUM 40 MG: 40 TABLET, DELAYED RELEASE ORAL at 08:36

## 2024-08-16 RX ADMIN — ATENOLOL 25 MG: 25 TABLET ORAL at 05:52

## 2024-08-16 RX ADMIN — PREDNISONE 5 MG: 5 TABLET ORAL at 08:37

## 2024-08-16 RX ADMIN — BUDESONIDE AND FORMOTEROL FUMARATE DIHYDRATE 2 PUFF: 160; 4.5 AEROSOL RESPIRATORY (INHALATION) at 19:57

## 2024-08-16 RX ADMIN — ACETAMINOPHEN 325MG 650 MG: 325 TABLET ORAL at 22:24

## 2024-08-16 RX ADMIN — TIOTROPIUM BROMIDE INHALATION SPRAY 2 PUFF: 3.12 SPRAY, METERED RESPIRATORY (INHALATION) at 06:46

## 2024-08-16 RX ADMIN — ALBUTEROL SULFATE 2.5 MG: 2.5 SOLUTION RESPIRATORY (INHALATION) at 14:20

## 2024-08-16 RX ADMIN — ALBUTEROL SULFATE 2.5 MG: 2.5 SOLUTION RESPIRATORY (INHALATION) at 06:47

## 2024-08-16 RX ADMIN — PRAVASTATIN SODIUM 20 MG: 20 TABLET ORAL at 20:32

## 2024-08-16 RX ADMIN — ALBUTEROL SULFATE 2.5 MG: 2.5 SOLUTION RESPIRATORY (INHALATION) at 19:52

## 2024-08-16 RX ADMIN — ALBUTEROL SULFATE 2.5 MG: 2.5 SOLUTION RESPIRATORY (INHALATION) at 10:37

## 2024-08-16 RX ADMIN — LEVOTHYROXINE SODIUM 88 MCG: 88 TABLET ORAL at 05:52

## 2024-08-16 RX ADMIN — ATENOLOL 25 MG: 25 TABLET ORAL at 13:52

## 2024-08-16 RX ADMIN — ATENOLOL 25 MG: 25 TABLET ORAL at 20:34

## 2024-08-16 RX ADMIN — RALOXIFENE HYDROCHLORIDE 60 MG: 60 TABLET, FILM COATED ORAL at 20:32

## 2024-08-16 RX ADMIN — BUSPIRONE HYDROCHLORIDE 10 MG: 10 TABLET ORAL at 08:36

## 2024-08-16 RX ADMIN — PANTOPRAZOLE SODIUM 40 MG: 40 TABLET, DELAYED RELEASE ORAL at 17:35

## 2024-08-16 RX ADMIN — DIGOXIN 125 MCG: 125 TABLET ORAL at 12:31

## 2024-08-16 RX ADMIN — DULOXETINE HYDROCHLORIDE 60 MG: 60 CAPSULE, DELAYED RELEASE ORAL at 08:36

## 2024-08-16 RX ADMIN — ACETAMINOPHEN 325MG 650 MG: 325 TABLET ORAL at 18:44

## 2024-08-16 RX ADMIN — ACETAMINOPHEN 325MG 650 MG: 325 TABLET ORAL at 08:36

## 2024-08-16 RX ADMIN — AZELASTINE HYDROCHLORIDE 2 SPRAY: 137 SPRAY, METERED NASAL at 08:37

## 2024-08-16 RX ADMIN — Medication 10 ML: at 20:33

## 2024-08-16 RX ADMIN — MYCOPHENOLATE MOFETIL 1500 MG: 250 CAPSULE ORAL at 20:32

## 2024-08-16 RX ADMIN — ACETAMINOPHEN 325MG 650 MG: 325 TABLET ORAL at 03:27

## 2024-08-16 RX ADMIN — Medication 1000 MCG: at 08:36

## 2024-08-16 RX ADMIN — MONTELUKAST SODIUM 10 MG: 10 TABLET, FILM COATED ORAL at 20:31

## 2024-08-16 RX ADMIN — BUDESONIDE AND FORMOTEROL FUMARATE DIHYDRATE 2 PUFF: 160; 4.5 AEROSOL RESPIRATORY (INHALATION) at 06:47

## 2024-08-16 RX ADMIN — GUAIFENESIN 600 MG: 600 TABLET, EXTENDED RELEASE ORAL at 20:31

## 2024-08-16 RX ADMIN — GUAIFENESIN 600 MG: 600 TABLET, EXTENDED RELEASE ORAL at 08:37

## 2024-08-16 RX ADMIN — TORSEMIDE 20 MG: 20 TABLET ORAL at 08:37

## 2024-08-16 RX ADMIN — DULOXETINE HYDROCHLORIDE 60 MG: 60 CAPSULE, DELAYED RELEASE ORAL at 20:31

## 2024-08-16 NOTE — PROGRESS NOTES
DAILY PROGRESS NOTE  [facility]    Patient Identification:  Name: Olena Myers  Age: 88 y.o.  Sex: female  :  1935  MRN: [id number]         Primary Care Physician: Manda Keenan MD    Subjective:  Interval History: Patient this morning was not voicing anything new to me.  She denied chest pain and she felt her shortness of breath was at baseline.  No fevers no nausea vomiting no confusion.  She admits to baseline overall fatigue.  No new loss of consciousness and/or function    Objective: Sitting up in bedside chair conversational and pleasant.  Seem to be at baseline for me this morning.  No family present    Scheduled Meds:albuterol, 2.5 mg, Nebulization, 4x Daily - RT  atenolol, 25 mg, Oral, Q8H  azelastine, 2 spray, Each Nare, BID  budesonide-formoterol, 2 puff, Inhalation, BID - RT   And  tiotropium bromide monohydrate, 2 puff, Inhalation, Daily - RT  busPIRone, 10 mg, Oral, BID  digoxin, 125 mcg, Oral, Daily  DULoxetine, 60 mg, Oral, BID  guaiFENesin, 600 mg, Oral, Q12H  levothyroxine, 88 mcg, Oral, Q AM  montelukast, 10 mg, Oral, Nightly  mycophenolate, 1,000 mg, Oral, QAM  mycophenolate, 1,500 mg, Oral, Nightly  pantoprazole, 40 mg, Oral, BID AC  pravastatin, 20 mg, Oral, Nightly  predniSONE, 5 mg, Oral, Daily  raloxifene, 60 mg, Oral, Nightly  rivaroxaban, 20 mg, Oral, Daily With Dinner  sodium chloride, 10 mL, Intravenous, Q12H  torsemide, 20 mg, Oral, Daily  vitamin B-12, 1,000 mcg, Oral, Daily  vitamin D, 50,000 Units, Oral, Q7 Days      Continuous Infusions:sodium chloride, 30 mL/hr, Last Rate: 30 mL/hr (08/15/24 1318)        Vital signs in last 24 hours:  Temp:  [97.2 °F (36.2 °C)-98.2 °F (36.8 °C)] 97.5 °F (36.4 °C)  Heart Rate:  [65-97] 77  Resp:  [16-20] 18  BP: ()/(57-93) 125/61    Intake/Output:    Intake/Output Summary (Last 24 hours) at 2024 1234  Last data filed at 8/15/2024 2122  Gross per 24 hour   Intake 540 ml   Output --   Net 540 ml       Exam:  BP  "125/61 (BP Location: Left arm, Patient Position: Lying)   Pulse 77   Temp 97.5 °F (36.4 °C) (Oral)   Resp 18   Ht 152.4 cm (60\")   Wt 64.6 kg (142 lb 8 oz)   SpO2 92%   BMI 27.83 kg/m²     General Appearance:    Alert, cooperative, no distress, AAOx3                         Throat:   Oral mucosa pink and moist                         Lungs:    Clear to auscultation bilaterally, respirations unlabored                 Chest Wall:    No tenderness or deformity                          Heart:  Irregularly rate and rhythm, S1 and S2 normal                  Abdomen:     Soft, nontender, bowel sounds active                 Extremities: Moving all with baseline strength while sitting in chair no cyanosis or edema                  Neurologic:   CNII-XII intact, no focal deficits noted     Data Review:  Labs in chart were reviewed.    Assessment:  Active Hospital Problems    Diagnosis  POA    **Exertional dyspnea [R06.09]  Yes    Abnormal computed tomography of gallbladder [R93.2]  Unknown    Hypothyroidism (acquired) [E03.9]  Yes    Diastolic CHF, chronic [I50.32]  Yes    Iron deficiency anemia [D50.9]  Unknown    COPD (chronic obstructive pulmonary disease) [J44.9]  Yes    Sleep apnea [G47.30]  Yes    Atrial fibrillation [I48.91]  Yes    Myasthenia gravis [G70.00]  Yes    Paroxysmal atrial fibrillation [I48.0]  Yes    CAD (coronary artery disease) [I25.10]  Yes    Essential hypertension [I10]  Yes      Resolved Hospital Problems   No resolved problems to display.       Plan:    I was prepping this patient for discharge and then I reviewed the PT note and I notice 12 feet of distance as well as worsening dizziness and fatigue.  When I discussed things with the patient earlier she seemed fine with the idea of discharge but I am canceling that idea today      Multifactorial dyspnea   -I think her fluid status is reassuring/euvolemic-cardiology also okay with the idea of disposition from their perspective   -Resolved RVR " with digoxin and atenolol   -No MG exacerbation per neuro   -O2 requirements minimal at best 2L NC   -Obesity and deconditioning      Endoscopy for heme positive stools unremarkable and AC resumed and patient has tolerated AC to this point with no rebleeding.   -She did require transfusion earlier in the admission though hemoglobin seems to further make upward trends up to 9.7      Gallbladder distention okay per surgery    K replaced    Mild hyponatremia stable    Leukocytosis reactive/resolved due to chronic steroids    Hypothyroidism on levothyroxine        Disposition -previous plans for discharge aborted.  Would like CCP to reevaluate case so we can ensure safe discharge as this patient is high risk given anticoagulation and increased falls risk    Hernan Waldron MD  8/16/2024  12:34 EDT

## 2024-08-16 NOTE — DISCHARGE PLACEMENT REQUEST
"Olena Myers (88 y.o. Female)       Date of Birth   1935    Social Security Number       Address   [address]  [place] [address]    Home Phone   [phone]    MRN   [id number]       [facility]    Marital Status                               Admission Date   8/10/24    Admission Type   Emergency    Admitting Provider   Luz Taylor MD    Attending Provider   Hernan Waldron MD    Department, Room/Bed   [address], [address]       Discharge Date       Discharge Disposition       Discharge Destination                                 Attending Provider: Hernan Waldron MD    Allergies: Neomycin, Penicillins, Hydrocodone, Rosuvastatin    Isolation: None   Infection: None   Code Status: No CPR    Ht: 152.4 cm (60\")   Wt: 64.6 kg (142 lb 8 oz)    Admission Cmt: None   Principal Problem: Exertional dyspnea [R06.09]                   Active Insurance as of 8/10/2024       Primary Coverage       Payor Plan Insurance Group Employer/Plan Group    [employer] MEDICARE REPLACEMENT [employer] MED [employer]V GROUP [id number]       Payor Plan Address Payor Plan Phone Number Payor Plan Fax Number Effective Dates    [address] [address] [phone]  1/1/2013 - None Entered    [facility] [address]         Subscriber Name Subscriber Birth Date Member ID       OLENA MYERS 1935 [id number]                     Emergency Contacts        (Rel.) Home Phone Work Phone Mobile Phone    Scotty Myers (Son) [phone] -- [phone]    Olvin Myers (Son) [phone] -- [phone]    Teddy Myers (Son) [phone] -- [phone]    Nazanin Pierson (Sister) [phone] -- [phone]                " [Patient] : patient

## 2024-08-16 NOTE — PROGRESS NOTES
[facility]  Inpatient Progress Note    Reason for Follow Up: heme  Positive stools and anemia    Subjective     Interval History:   EGD and colonoscopy were normal    Current Facility-Administered Medications:     acetaminophen (TYLENOL) tablet 650 mg, 650 mg, Oral, Q4H PRN, 650 mg at 08/16/24 0836 **OR** acetaminophen (TYLENOL) 160 MG/5ML oral solution 650 mg, 650 mg, Oral, Q4H PRN **OR** acetaminophen (TYLENOL) suppository 650 mg, 650 mg, Rectal, Q4H PRN, Charlie Zacarias MD    albuterol (PROVENTIL) nebulizer solution 0.083% 2.5 mg/3mL, 2.5 mg, Nebulization, 4x Daily - RT, Charlie Zacarias MD, 2.5 mg at 08/16/24 1037    atenolol (TENORMIN) tablet 25 mg, 25 mg, Oral, Q8H, Charlie Zacarias MD, 25 mg at 08/16/24 0552    azelastine (ASTELIN) nasal spray 2 spray, 2 spray, Each Nare, BID, Charlie Zacarias MD, 2 spray at 08/16/24 0837    budesonide-formoterol (SYMBICORT) 160-4.5 MCG/ACT inhaler 2 puff, 2 puff, Inhalation, BID - RT, 2 puff at 08/16/24 0647 **AND** tiotropium (SPIRIVA RESPIMAT) 2.5 mcg/act aerosol solution inhaler, 2 puff, Inhalation, Daily - RT, Charlie Zacarias MD, 2 puff at 08/16/24 0646    busPIRone (BUSPAR) tablet 10 mg, 10 mg, Oral, BID, Charlie Zacarias MD, 10 mg at 08/16/24 0836    Calcium Replacement - Follow Nurse / BPA Driven Protocol, , Does not apply, PRN, Charlie Zacarias MD    digoxin (LANOXIN) tablet 125 mcg, 125 mcg, Oral, Daily, Charlie Zacarias MD, 125 mcg at 08/16/24 1231    DULoxetine (CYMBALTA) DR capsule 60 mg, 60 mg, Oral, BID, Charlie Zacarias MD, 60 mg at 08/16/24 0836    guaiFENesin (MUCINEX) 12 hr tablet 600 mg, 600 mg, Oral, Q12H, Charlie Zacarias MD, 600 mg at 08/16/24 0837    levothyroxine (SYNTHROID, LEVOTHROID) tablet 88 mcg, 88 mcg, Oral, Q AM, Charlie Zacarias MD, 88 mcg at 08/16/24 0552    Magnesium Standard Dose Replacement - Follow Nurse / BPA Driven Protocol, , Does not apply, PRN, Charlie Zacarias MD    montelukast (SINGULAIR) tablet 10 mg,  10 mg, Oral, Nightly, Charlie Zacarias MD, 10 mg at 08/15/24 2137    mycophenolate (CELLCEPT) capsule 1,000 mg, 1,000 mg, Oral, QAM, Charlie Zacarias MD, 1,000 mg at 08/16/24 1231    mycophenolate (CELLCEPT) capsule 1,500 mg, 1,500 mg, Oral, Nightly, Charlie Zacarias MD, 1,500 mg at 08/15/24 2141    nitroglycerin (NITROSTAT) SL tablet 0.4 mg, 0.4 mg, Sublingual, Q5 Min PRN, Charlie Zacarias MD    ondansetron (ZOFRAN) injection 4 mg, 4 mg, Intravenous, Q6H PRN, Charlie Zacarias MD    pantoprazole (PROTONIX) EC tablet 40 mg, 40 mg, Oral, BID AC, Charlie Zacarias MD, 40 mg at 08/16/24 0836    Phosphorus Replacement - Follow Nurse / BPA Driven Protocol, , Does not apply, PRDeann SCHAEFFER Brian M, MD    Potassium Replacement - Follow Nurse / BPA Driven Protocol, , Does not apply, Deann GERARDO Brian M, MD    pravastatin (PRAVACHOL) tablet 20 mg, 20 mg, Oral, Nightly, Charlie Zacarias MD, 20 mg at 08/15/24 2141    predniSONE (DELTASONE) tablet 5 mg, 5 mg, Oral, Daily, Charlie Zacarias MD, 5 mg at 08/16/24 0837    raloxifene (EVISTA) tablet 60 mg, 60 mg, Oral, Nightly, Charlie Zacarias MD, 60 mg at 08/15/24 2137    rivaroxaban (XARELTO) tablet 20 mg, 20 mg, Oral, Daily With Dinner, Hernan Waldron MD, 20 mg at 08/15/24 2137    [COMPLETED] Insert Peripheral IV, , , Once **AND** sodium chloride 0.9 % flush 10 mL, 10 mL, Intravenous, PRN, Charlie Zacarias MD    sodium chloride 0.9 % flush 10 mL, 10 mL, Intravenous, Q12H, Charlie Zacarias MD, 10 mL at 08/16/24 0837    sodium chloride 0.9 % flush 10 mL, 10 mL, Intravenous, PRN, Charlie Zacarias MD    sodium chloride 0.9 % infusion 40 mL, 40 mL, Intravenous, PRN, Charlie Zacarias MD    sodium chloride 0.9 % infusion, 30 mL/hr, Intravenous, Continuous PRN, Charlie Zacarias MD, Last Rate: 30 mL/hr at 08/15/24 1318, Restarted at 08/15/24 1406    torsemide (DEMADEX) tablet 20 mg, 20 mg, Oral, Daily, Charlie Zacarias MD, 20 mg at 08/16/24 0837    vitamin B-12 (CYANOCOBALAMIN) tablet  1,000 mcg, 1,000 mcg, Oral, Daily, Charlie Zacarias MD, 1,000 mcg at 08/16/24 0836    vitamin D (ERGOCALCIFEROL) capsule 50,000 Units, 50,000 Units, Oral, Q7 Days, Charlie Zacarias MD, 50,000 Units at 08/14/24 1020  Review of Systems:    There is weakness and fatigue all other systems reviewed and negative    Objective     Vital Signs  Temp:  [97.2 °F (36.2 °C)-98.2 °F (36.8 °C)] 97.5 °F (36.4 °C)  Heart Rate:  [65-97] 77  Resp:  [16-20] 18  BP: ()/(57-93) 125/61  Body mass index is 27.83 kg/m².    Intake/Output Summary (Last 24 hours) at 8/16/2024 1303  Last data filed at 8/15/2024 2122  Gross per 24 hour   Intake 540 ml   Output --   Net 540 ml     No intake/output data recorded.     Physical Exam:   General: patient awake, alert and cooperative   Eyes: Normal lids and lashes, no scleral icterus   Neck: supple, normal ROM   Skin: warm and dry, not jaundiced   Cardiovascular: regular rhythm and rate, no murmurs auscultated   Pulm: clear to auscultation bilaterally, regular and unlabored   Abdomen: soft, nontender, nondistended; normal bowel sounds   Extremities: no rash or edema   Psychiatric: Normal mood and behavior; memory intact     Results Review:     I reviewed the patient's new clinical results.    Results from last 7 days   Lab Units 08/16/24  0322 08/15/24  0407 08/13/24  0343 08/12/24  0458   WBC 10*3/mm3  --  7.93 10.14 10.42   HEMOGLOBIN g/dL 9.7* 9.7* 9.2* 7.2*   HEMATOCRIT % 31.2* 31.6* 28.4* 23.6*   PLATELETS 10*3/mm3  --  419 369 386     Results from last 7 days   Lab Units 08/15/24  1637 08/15/24  0407 08/13/24  0343 08/12/24  0458 08/11/24  1237 08/11/24  0303 08/10/24  0945   SODIUM mmol/L  --  134* 133* 133*  --  136 138   POTASSIUM mmol/L 4.0 3.2* 3.9 4.3   < > 3.3* 3.4*   CHLORIDE mmol/L  --  97* 99 102  --  97* 96*   CO2 mmol/L  --  25.0 23.0 22.1  --  28.0 27.0   BUN mg/dL  --  17 19 21  --  19 22   CREATININE mg/dL  --  0.96 0.91 0.92  --  0.88 0.96   CALCIUM mg/dL  --  8.8 8.2* 8.3*   "--  8.6 9.1   BILIRUBIN mg/dL  --  0.3  --   --   --  0.5 0.4   ALK PHOS U/L  --  68  --   --   --  69 73   ALT (SGPT) U/L  --  6  --   --   --  5 7   AST (SGOT) U/L  --  15  --   --   --  9 16   GLUCOSE mg/dL  --  83 92 91  --  119* 122*    < > = values in this interval not displayed.     Results from last 7 days   Lab Units 08/15/24  0407 08/10/24  0945   INR  0.96 2.08*     No results found for: \"LIPASE\"    Radiology:  US Gallbladder   Final Result      CT Abdomen Pelvis Without Contrast   Final Result           1. Distended gallbladder.       2. No urolithiasis or hydronephrosis identified). Assessment of the   pelvis is limited by hardware artifact).       3. Colonic diverticulosis. No acute inflammatory process of bowel is   identified.       4. Age-indeterminate L1 compression deformity, correlate clinically.               This report was finalized on 8/10/2024 1:43 PM by Dr. José Manuel Anaya M.D on Workstation: [employer]          XR Chest 1 View   Final Result   No focal pulmonary consolidation. Mild cardiomegaly.   Follow-up as clinical indications persist.       This report was finalized on 8/10/2024 10:51 AM by Dr. José Manuel Anaya M.D on Workstation: [employer]              Assessment & Plan     Active Hospital Problems    Diagnosis     **Exertional dyspnea     Abnormal computed tomography of gallbladder     Hypothyroidism (acquired)     Diastolic CHF, chronic     Iron deficiency anemia     COPD (chronic obstructive pulmonary disease)     Sleep apnea     Atrial fibrillation     Myasthenia gravis     Paroxysmal atrial fibrillation     CAD (coronary artery disease)     Essential hypertension        Assessment:  Congestive heart failure  COPD  A-fib on Xarelto  Hypertension  Anemia  Heme positive stools  Myasthenia gravis on immunosuppressive therapy  Chronic hypoxia on 2 L of oxygen  Obesity  Obstructive sleep apnea  Diarrhea  EGD and colonoscopy normal    Plan:  Consider capsule endoscopy as an " outpatient  Okay to restart anticoagulation  We will follow peripherally  I discussed the patients findings and my recommendations with patient and nursing staff.    Charlie Zacarias MD

## 2024-08-16 NOTE — PROGRESS NOTES
LOS: 6 days   Patient Care Team:  Manda Keenan MD as PCP - General (General Practice)  Olvin Hernandez MD as Consulting Physician (Cardiology)    Chief Complaint: Follow-up paroxysmal atrial fibrillation with RVR.    Interval History: Heart rate is controlled.  Shortness of breath is at baseline.  No chest pain.    Vital Signs:  Temp:  [97.2 °F (36.2 °C)-98.2 °F (36.8 °C)] 97.5 °F (36.4 °C)  Heart Rate:  [65-97] 77  Resp:  [16-20] 18  BP: ()/(57-93) 125/61    Intake/Output Summary (Last 24 hours) at 8/16/2024 1147  Last data filed at 8/15/2024 2122  Gross per 24 hour   Intake 540 ml   Output --   Net 540 ml       Physical Exam:   General Appearance:    No acute distress, alert and oriented x4, chronically ill-appearing.   Lungs:     Clear to auscultation bilaterally..    Heart:    Irregularly irregular rhythm with normal rate.  II/VI SM LLSB.   Abdomen:     Soft, nontender, nondistended.    Extremities:   Trace edema of the lower extremities.     Results Review:    Results from last 7 days   Lab Units 08/15/24  1637 08/15/24  0407   SODIUM mmol/L  --  134*   POTASSIUM mmol/L 4.0 3.2*   CHLORIDE mmol/L  --  97*   CO2 mmol/L  --  25.0   BUN mg/dL  --  17   CREATININE mg/dL  --  0.96   GLUCOSE mg/dL  --  83   CALCIUM mg/dL  --  8.8     Results from last 7 days   Lab Units 08/10/24  1131 08/10/24  0945   HSTROP T ng/L 38* 41*     Results from last 7 days   Lab Units 08/16/24  0322 08/15/24  0407   WBC 10*3/mm3  --  7.93   HEMOGLOBIN g/dL 9.7* 9.7*   HEMATOCRIT % 31.2* 31.6*   PLATELETS 10*3/mm3  --  419     Results from last 7 days   Lab Units 08/15/24  0407 08/10/24  0945   INR  0.96 2.08*     Results from last 7 days   Lab Units 08/10/24  1131   CHOLESTEROL mg/dL 136     Results from last 7 days   Lab Units 08/10/24  0945   MAGNESIUM mg/dL 2.3     Results from last 7 days   Lab Units 08/10/24  1131   CHOLESTEROL mg/dL 136   TRIGLYCERIDES mg/dL 168*   HDL CHOL mg/dL 53   LDL CHOL mg/dL 55       I reviewed  the patient's new clinical results.        Assessment:  1.  Shortness of breath, multifactorial  2.  Chronic hypoxic respiratory failure  3.  Worsening anemia with positive Hemoccult - no obvious bleeding source on bidirectional scopes on 8/15/2024  4.  Paroxysmal atrial fibrillation with RVR  5.  COPD, unclear as to exacerbation  6.  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  Coronary artery disease, status post prior BMS to the LAD  8.  Leukocytosis, likely reactive  9.  History of myasthenia gravis, on CellCept and prednisone  10.  Obstructive sleep apnea, noncompliant with CPAP    Plan:  -No obvious source of bleeding on the bidirectional scopes from 8/15/2024.  Resumed Xarelto.    -Heart rate has responded well to changes.  Continue atenolol 25 mg 3 times daily for now.  Continue digoxin 125 mcg/day.    -I also feel like she would be unlikely to maintain sinus rhythm in this setting.  She evidently developed pulmonary fibrosis and worsening COPD changes with amiodarone.      -Continue torsemide 20 mg/day.  Volume status appears stable.    -Okay to discharge home from a cardiac perspective.  She already has follow-up with Dr. Hernandez on 9/6/2024.    Alexis Klein MD  08/16/24  11:47 EDT

## 2024-08-16 NOTE — PLAN OF CARE
Goal Outcome Evaluation:  Plan of Care Reviewed With: patient           Outcome Evaluation: Upon entering room, pt. sitting up in chair, awake/alert, and agreeable to work with P.T. this date despite c/o fatigue/weakness.  This AM, pt. able to ambulate 12 feet, CGA x 1, with use of Rwx.  Pt. requires CGA x 1 for sit <-> stand transfers.  BLE ther. ex. program x 10 reps completed for general strengthening. Pt. limited in gait distance this AM due to c/o increasing dizziness and overall fatigue.  Verbal/tactile cues given for posture correction and Rwx guidance.  Will continue to progress functional mobility as tolerated.

## 2024-08-16 NOTE — CASE MANAGEMENT/SOCIAL WORK
Continued Stay Note  [facility]     Patient Name: Olena Myers  MRN: [id number]  Today's Date: 8/16/2024    Admit Date: 8/10/2024    Plan: Referral made to [facility] SNF, will need [facility] pre-cert   Discharge Plan       Row Name 08/16/24 1414       Plan    Plan Referral made to [facility], will need [facility] pre-cert    Patient/Family in Agreement with Plan yes    Plan Comments CCP met with the patient and her two sons at bedside with the patient's verbal permission regarding discharge planning. CCP explained that per therapy notes they are recommending she go to a SNF at discharge for some short-term rehab before she returns back to [facility]. She is agreeable and requested referrals made to facilities in [facility] including 1. [facility] and 2. [facility]. CCP made referrals and the patient will need [facility] pre-cert approval. CCP to follow. CD, CSW.                   Discharge Codes    No documentation.                 Expected Discharge Date and Time       Expected Discharge Date Expected Discharge Time    Aug 16, 2024

## 2024-08-16 NOTE — PLAN OF CARE
Goal Outcome Evaluation:  Plan of Care Reviewed With: patient           Outcome Evaluation: VSS, A/O x 4, 02 @ 2L NC. Tylenol given for back & shoulder pain along with ice pack- see MAR for dosing. Up to BSC twice through shift. Appeared to have slept in between care. Safety maintained. Will CTM.

## 2024-08-16 NOTE — THERAPY TREATMENT NOTE
Patient Name: Olena Myers  : 1935    MRN: [id number]                              Today's Date: 2024       Admit Date: 8/10/2024    Visit Dx:     ICD-10-CM ICD-9-CM   1. Exertional dyspnea  R06.09 786.09   2. Pain of upper abdomen associated with exertion and has complete resolution  R10.10 789.09   3. Chronic anemia  D64.9 285.9   4. Chronic respiratory failure with hypoxia  J96.11 518.83     799.02   5. Abnormal computed tomography of gallbladder  R93.2 793.3   6. Iron deficiency anemia due to chronic blood loss  D50.0 280.0     Patient Active Problem List   Diagnosis    Pre-operative cardiovascular examination    S/p reverse total shoulder arthroplasty    Myasthenia gravis    Paroxysmal atrial fibrillation    HX: long term anticoagulant use    Essential hypertension    CAD (coronary artery disease)    Rhinovirus    Atrial fibrillation    S/P reverse total shoulder arthroplasty, right    Acute UTI    COPD (chronic obstructive pulmonary disease)    Metabolic encephalopathy    Sleep apnea    Sepsis    Dyspnea    Chronic diastolic CHF (congestive heart failure)    Diastolic CHF, chronic    Heme positive stool    E coli bacteremia    Iron deficiency anemia    Current chronic use of systemic steroids    COPD exacerbation    Obesity (BMI 30-39.9)    COVID-19 virus infection    Chronic respiratory failure with hypoxia    Cytokine release syndrome, grade 1    Altered mental status    Anemia    KELLEE (acute kidney injury)    Hypothyroidism (acquired)    Exertional dyspnea    Abnormal computed tomography of gallbladder     Past Medical History:   Diagnosis Date    Anemia     IRON DEFICIENCY    Arthritis     Asthma     Atrial fibrillation     CAD (coronary artery disease)     HEART STENT    COPD (chronic obstructive pulmonary disease)     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    Frequent urinary tract infections     History of gastric ulcer     History of GI bleed     [place] (hard of hearing)     Hyperlipidemia      Hypertension     Hyponatremia     Lightheadedness     LVH (left ventricular hypertrophy)     MG (myasthenia gravis)     Mini stroke 10/2016, 3/2017    OA (osteoarthritis)     Osteoporosis     Sleep apnea     USES CPAP    SOB (shortness of breath)     WALKING     Past Surgical History:   Procedure Laterality Date    APPENDECTOMY      BRONCHOSCOPY N/A 3/31/2022    Procedure: BRONCHOSCOPY WITH BAL RIGHT LOWER LOBE;  Surgeon: Remy Tirado MD;  Location: [facility] ENDOSCOPY;  Service: Pulmonary;  Laterality: N/A;  COPD EXACERBATION      CARPAL TUNNEL RELEASE Bilateral     CATARACT EXTRACTION Bilateral     COLONOSCOPY N/A 8/15/2024    Procedure: COLONOSCOPY TO CECUM & T.I.;  Surgeon: Charlie Zacarias MD;  Location: [facility] ENDOSCOPY;  Service: Gastroenterology;  Laterality: N/A;  PRE- ANEMIA, DIARRHEA  POST-    CORONARY STENT PLACEMENT      ENDOSCOPY N/A 8/15/2024    Procedure: ESOPHAGOGASTRODUODENOSCOPY WITH COLD BIOPSIES;  Surgeon: Charlie Zacarias MD;  Location: [facility] ENDOSCOPY;  Service: Gastroenterology;  Laterality: N/A;  PRE- ANEMIA  POST- NORMAL    TOTAL HIP ARTHROPLASTY Bilateral     TOTAL SHOULDER ARTHROPLASTY W/ DISTAL CLAVICLE EXCISION Left 7/19/2016    Procedure: LT TOTAL SHOULDER REVERSE ARTHROPLASTY;  Surgeon: NAHOMI Solorzano MD;  Location: [facility];  Service:     TOTAL SHOULDER ARTHROPLASTY W/ DISTAL CLAVICLE EXCISION Right 1/8/2019    Procedure: RIGHT TOTAL SHOULDER REVERSE ARTHROPLASTY;  Surgeon: Jovanny Solorzano MD;  Location: [facility];  Service: Orthopedics      General Information       Row Name 08/16/24 0920          Physical Therapy Time and Intention    Document Type therapy note (daily note)  -MS     Mode of Treatment physical therapy;individual therapy  -MS       Row Name 08/16/24 0920          General Information    Patient Profile Reviewed yes  -MS     Existing Precautions/Restrictions fall;oxygen therapy device and L/min   Exit alarm  -MS     Barriers to Rehab none identified  -MS        Row Name 08/16/24 0920          Cognition    Orientation Status (Cognition) oriented x 3  -MS       Row Name 08/16/24 0920          Safety Issues, Functional Mobility    Comment, Safety Issues/Impairments (Mobility) Gait belt used for safety.  -MS               User Key  (r) = Recorded By, (t) = Taken By, (c) = Cosigned By      Initials Name Provider Type    Boris De Jesus, PT Physical Therapist                   Mobility       Row Name 08/16/24 0921          Bed Mobility    Comment, (Bed Mobility) Up in chair this AM.  -MS       Row Name 08/16/24 0921          Sit-Stand Transfer    Sit-Stand [place] (Transfers) contact guard  -MS     Assistive Device (Sit-Stand Transfers) walker, front-wheeled  -MS       Row Name 08/16/24 0921          Gait/Stairs (Locomotion)    [place] Level (Gait) contact guard  -MS     Assistive Device (Gait) walker, front-wheeled  -MS     Distance in Feet (Gait) 12  -MS     Deviations/Abnormal Patterns (Gait) michael decreased  -MS     Bilateral Gait Deviations forward flexed posture  -MS     Comment, (Gait/Stairs) Limited in gait distance due to c/o increasing dizziness when upright.  Verbal/tactile cues given for posture correction and Rwx guidance.  -MS               User Key  (r) = Recorded By, (t) = Taken By, (c) = Cosigned By      Initials Name Provider Type    Boris De Jesus PT Physical Therapist                   Obj/Interventions       Row Name 08/16/24 0922          Motor Skills    Therapeutic Exercise --  BLE ther. ex. program x 10 reps completed (Ankle pumps, Hip Flexion, LAQ's)  -MS               User Key  (r) = Recorded By, (t) = Taken By, (c) = Cosigned By      Initials Name Provider Type    Boris De Jesus, PT Physical Therapist                   Goals/Plan    No documentation.                  Clinical Impression       Row Name 08/16/24 0922          Pain    Pretreatment Pain Rating 0/10 - no pain  -MS     Posttreatment Pain Rating 0/10 - no  pain  -MS       Row Name 08/16/24 0922          Therapy Assessment/Plan (PT)    Therapy Frequency (PT) 6 times/wk  -MS       Row Name 08/16/24 0922          Positioning and Restraints    Pre-Treatment Position sitting in chair/recliner  -MS     Post Treatment Position chair  -MS     In Chair notified nsg;reclined;sitting;call light within reach;encouraged to call for assist;exit alarm on;with other staff  All lines intact. V.S.S.  -MS               User Key  (r) = Recorded By, (t) = Taken By, (c) = Cosigned By      Initials Name Provider Type    MS Yi Boris WRIGHT, PT Physical Therapist                   Outcome Measures       Row Name 08/16/24 0923          How much help from another person do you currently need...    Turning from your back to your side while in flat bed without using bedrails? 3  -MS     Moving from lying on back to sitting on the side of a flat bed without bedrails? 3  -MS     Moving to and from a bed to a chair (including a wheelchair)? 3  -MS     Standing up from a chair using your arms (e.g., wheelchair, bedside chair)? 3  -MS     Climbing 3-5 steps with a railing? 3  -MS     To walk in hospital room? 3  -MS     AM-PAC 6 Clicks Score (PT) 18  -MS     Highest Level of Mobility Goal 6 --> Walk 10 steps or more  -MS       Row Name 08/16/24 0923          Functional Assessment    Outcome Measure Options AM-PAC 6 Clicks Basic Mobility (PT)  -MS               User Key  (r) = Recorded By, (t) = Taken By, (c) = Cosigned By      Initials Name Provider Type    MS YiBoris, PT Physical Therapist                                 Physical Therapy Education       Title: PT OT SLP Therapies (Done)       Topic: Physical Therapy (Done)       Point: Mobility training (Done)       Learning Progress Summary             Patient Acceptance, E,D, VU,NR by MS at 8/16/2024 0923    Acceptance, E, VU by  at 8/14/2024 1538    Acceptance, E,D,TB, VU,DU by EB at 8/13/2024 1329    Acceptance, E,TB, VU,NR by CS at  8/11/2024 0919                         Point: Home exercise program (Done)       Learning Progress Summary             Patient Acceptance, E,D, VU,NR by MS at 8/16/2024 0923    Acceptance, E, VU by  at 8/14/2024 1538    Acceptance, E,D,TB, VU,DU by  at 8/13/2024 1329    Acceptance, E,TB, VU,NR by  at 8/11/2024 0919                         Point: Body mechanics (Done)       Learning Progress Summary             Patient Acceptance, E,D, VU,NR by MS at 8/16/2024 0923    Acceptance, E, VU by  at 8/14/2024 1538    Acceptance, E,D,TB, VU,DU by  at 8/13/2024 1329    Acceptance, E,TB, VU,NR by  at 8/11/2024 0919                         Point: Precautions (Done)       Learning Progress Summary             Patient Acceptance, E,D, VU,NR by MS at 8/16/2024 0923    Acceptance, E, VU by  at 8/14/2024 1538    Acceptance, E,D,TB, VU,DU by  at 8/13/2024 1329    Acceptance, E,TB, VU,NR by  at 8/11/2024 0919                                         User Key       Initials Effective Dates Name Provider Type Discipline    MS 06/16/21 -  Boris Yi, PT Physical Therapist PT     02/14/23 -  Nahomy Bennett PTA Physical Therapist Assistant PT     07/11/23 -  Eran Rodriguez, PT Physical Therapist PT     05/02/22 -  Nahed Jose, ABI Physical Therapist PT                  PT Recommendation and Plan     Plan of Care Reviewed With: patient  Outcome Evaluation: Upon entering room, pt. sitting up in chair, awake/alert, and agreeable to work with P.T. this date despite c/o fatigue/weakness.  This AM, pt. able to ambulate 12 feet, CGA x 1, with use of Rwx.  Pt. requires CGA x 1 for sit <-> stand transfers.  BLE ther. ex. program x 10 reps completed for general strengthening. Pt. limited in gait distance this AM due to c/o increasing dizziness and overall fatigue.  Verbal/tactile cues given for posture correction and Rwx guidance.  Will continue to progress functional mobility as tolerated.     Time Calculation:          PT Charges       Row Name 08/16/24 0926             Time Calculation    Start Time 0900  -MS      Stop Time 0915  -MS      Time Calculation (min) 15 min  -MS      PT Received On 08/16/24  -MS      PT - Next Appointment 08/17/24  -MS         Time Calculation- PT    Total Timed Code Minutes- PT 14 minute(s)  -MS                User Key  (r) = Recorded By, (t) = Taken By, (c) = Cosigned By      Initials Name Provider Type    Boris De Jesus, PT Physical Therapist                  Therapy Charges for Today       Code Description Service Date Service Provider Modifiers Qty    [id number]  PT THERAPEUTIC ACT EA 15 MIN 8/16/2024 Boris Yi, PT GP 1            PT G-Codes  Outcome Measure Options: AM-PAC 6 Clicks Basic Mobility (PT)  AM-PAC 6 Clicks Score (PT): 18       Boris Yi, PT  8/16/2024

## 2024-08-16 NOTE — PLAN OF CARE
Goal Outcome Evaluation:              Outcome Evaluation: Pt up to chair majority of shift, worked w/ PT. Up w/ Ax1 and c/o dizziness. Pt c/o of pain, Tylenol admin. Pt remains on 2L NC. Pt expresses no other needs at this time. call light within reach.

## 2024-08-17 ENCOUNTER — APPOINTMENT (OUTPATIENT)
Dept: GENERAL RADIOLOGY | Facility: HOSPITAL | Age: 89
End: 2024-08-17
Payer: MEDICARE

## 2024-08-17 PROBLEM — S32.010A CLOSED WEDGE COMPRESSION FRACTURE OF L1 VERTEBRA: Status: ACTIVE | Noted: 2024-08-17

## 2024-08-17 LAB
ANION GAP SERPL CALCULATED.3IONS-SCNC: 12.9 MMOL/L (ref 5–15)
BUN SERPL-MCNC: 19 MG/DL (ref 8–23)
BUN/CREAT SERPL: 21.1 (ref 7–25)
CALCIUM SPEC-SCNC: 9.1 MG/DL (ref 8.6–10.5)
CHLORIDE SERPL-SCNC: 98 MMOL/L (ref 98–107)
CO2 SERPL-SCNC: 23.1 MMOL/L (ref 22–29)
CREAT SERPL-MCNC: 0.9 MG/DL (ref 0.57–1)
CYTO UR: NORMAL
DEPRECATED RDW RBC AUTO: 50.8 FL (ref 37–54)
EGFRCR SERPLBLD CKD-EPI 2021: 62.4 ML/MIN/1.73
ERYTHROCYTE [DISTWIDTH] IN BLOOD BY AUTOMATED COUNT: 14.8 % (ref 12.3–15.4)
GLUCOSE SERPL-MCNC: 110 MG/DL (ref 65–99)
HCT VFR BLD AUTO: 29.3 % (ref 34–46.6)
HGB BLD-MCNC: 9.3 G/DL (ref 12–15.9)
LAB AP CASE REPORT: NORMAL
MCH RBC QN AUTO: 30.4 PG (ref 26.6–33)
MCHC RBC AUTO-ENTMCNC: 31.7 G/DL (ref 31.5–35.7)
MCV RBC AUTO: 95.8 FL (ref 79–97)
PATH REPORT.FINAL DX SPEC: NORMAL
PATH REPORT.GROSS SPEC: NORMAL
PLATELET # BLD AUTO: 354 10*3/MM3 (ref 140–450)
PMV BLD AUTO: 9.1 FL (ref 6–12)
POTASSIUM SERPL-SCNC: 3.8 MMOL/L (ref 3.5–5.2)
RBC # BLD AUTO: 3.06 10*6/MM3 (ref 3.77–5.28)
SODIUM SERPL-SCNC: 134 MMOL/L (ref 136–145)
WBC NRBC COR # BLD AUTO: 19.92 10*3/MM3 (ref 3.4–10.8)

## 2024-08-17 PROCEDURE — 94664 DEMO&/EVAL PT USE INHALER: CPT

## 2024-08-17 PROCEDURE — 99222 1ST HOSP IP/OBS MODERATE 55: CPT | Performed by: NEUROLOGICAL SURGERY

## 2024-08-17 PROCEDURE — L0464 TLSO 4MOD SACRO-SCAP PRE: HCPCS

## 2024-08-17 PROCEDURE — 72100 X-RAY EXAM L-S SPINE 2/3 VWS: CPT

## 2024-08-17 PROCEDURE — 80048 BASIC METABOLIC PNL TOTAL CA: CPT | Performed by: HOSPITALIST

## 2024-08-17 PROCEDURE — 85027 COMPLETE CBC AUTOMATED: CPT | Performed by: HOSPITALIST

## 2024-08-17 PROCEDURE — 94799 UNLISTED PULMONARY SVC/PX: CPT

## 2024-08-17 PROCEDURE — 94761 N-INVAS EAR/PLS OXIMETRY MLT: CPT

## 2024-08-17 PROCEDURE — 63710000001 MYCOPHENOLATE MOFETIL PER 250 MG: Performed by: INTERNAL MEDICINE

## 2024-08-17 PROCEDURE — 71046 X-RAY EXAM CHEST 2 VIEWS: CPT

## 2024-08-17 PROCEDURE — 25010000002 METHYLPREDNISOLONE PER 40 MG: Performed by: HOSPITALIST

## 2024-08-17 PROCEDURE — 63710000001 PREDNISONE PER 5 MG: Performed by: INTERNAL MEDICINE

## 2024-08-17 PROCEDURE — 25010000002 FUROSEMIDE PER 20 MG: Performed by: HOSPITALIST

## 2024-08-17 RX ORDER — FUROSEMIDE 10 MG/ML
40 INJECTION INTRAMUSCULAR; INTRAVENOUS ONCE
Status: COMPLETED | OUTPATIENT
Start: 2024-08-17 | End: 2024-08-17

## 2024-08-17 RX ORDER — POTASSIUM CHLORIDE 750 MG/1
40 TABLET, FILM COATED, EXTENDED RELEASE ORAL ONCE
Status: COMPLETED | OUTPATIENT
Start: 2024-08-17 | End: 2024-08-17

## 2024-08-17 RX ORDER — METHYLPREDNISOLONE SODIUM SUCCINATE 40 MG/ML
40 INJECTION, POWDER, LYOPHILIZED, FOR SOLUTION INTRAMUSCULAR; INTRAVENOUS EVERY 8 HOURS
Status: COMPLETED | OUTPATIENT
Start: 2024-08-17 | End: 2024-08-18

## 2024-08-17 RX ORDER — LIDOCAINE 4 G/G
2 PATCH TOPICAL
Status: DISCONTINUED | OUTPATIENT
Start: 2024-08-17 | End: 2024-08-20 | Stop reason: HOSPADM

## 2024-08-17 RX ADMIN — METHYLPREDNISOLONE SODIUM SUCCINATE 40 MG: 40 INJECTION, POWDER, FOR SOLUTION INTRAMUSCULAR; INTRAVENOUS at 11:44

## 2024-08-17 RX ADMIN — Medication 1000 MCG: at 08:52

## 2024-08-17 RX ADMIN — ALBUTEROL SULFATE 2.5 MG: 2.5 SOLUTION RESPIRATORY (INHALATION) at 06:41

## 2024-08-17 RX ADMIN — PANTOPRAZOLE SODIUM 40 MG: 40 TABLET, DELAYED RELEASE ORAL at 19:43

## 2024-08-17 RX ADMIN — ATENOLOL 25 MG: 25 TABLET ORAL at 21:31

## 2024-08-17 RX ADMIN — MYCOPHENOLATE MOFETIL 1500 MG: 250 CAPSULE ORAL at 21:30

## 2024-08-17 RX ADMIN — TIOTROPIUM BROMIDE INHALATION SPRAY 2 PUFF: 3.12 SPRAY, METERED RESPIRATORY (INHALATION) at 06:41

## 2024-08-17 RX ADMIN — PANTOPRAZOLE SODIUM 40 MG: 40 TABLET, DELAYED RELEASE ORAL at 04:46

## 2024-08-17 RX ADMIN — MYCOPHENOLATE MOFETIL 1000 MG: 250 CAPSULE ORAL at 11:45

## 2024-08-17 RX ADMIN — PRAVASTATIN SODIUM 20 MG: 20 TABLET ORAL at 21:31

## 2024-08-17 RX ADMIN — TORSEMIDE 20 MG: 20 TABLET ORAL at 08:53

## 2024-08-17 RX ADMIN — LEVOTHYROXINE SODIUM 88 MCG: 88 TABLET ORAL at 07:32

## 2024-08-17 RX ADMIN — BUDESONIDE AND FORMOTEROL FUMARATE DIHYDRATE 2 PUFF: 160; 4.5 AEROSOL RESPIRATORY (INHALATION) at 06:39

## 2024-08-17 RX ADMIN — FUROSEMIDE 40 MG: 10 INJECTION, SOLUTION INTRAMUSCULAR; INTRAVENOUS at 11:44

## 2024-08-17 RX ADMIN — DIGOXIN 125 MCG: 125 TABLET ORAL at 11:44

## 2024-08-17 RX ADMIN — GUAIFENESIN 600 MG: 600 TABLET, EXTENDED RELEASE ORAL at 08:52

## 2024-08-17 RX ADMIN — ALBUTEROL SULFATE 2.5 MG: 2.5 SOLUTION RESPIRATORY (INHALATION) at 21:41

## 2024-08-17 RX ADMIN — PREDNISONE 5 MG: 5 TABLET ORAL at 08:53

## 2024-08-17 RX ADMIN — MONTELUKAST SODIUM 10 MG: 10 TABLET, FILM COATED ORAL at 21:30

## 2024-08-17 RX ADMIN — ALBUTEROL SULFATE 2.5 MG: 2.5 SOLUTION RESPIRATORY (INHALATION) at 11:00

## 2024-08-17 RX ADMIN — ATENOLOL 25 MG: 25 TABLET ORAL at 07:32

## 2024-08-17 RX ADMIN — ATENOLOL 25 MG: 25 TABLET ORAL at 15:55

## 2024-08-17 RX ADMIN — Medication 10 ML: at 08:54

## 2024-08-17 RX ADMIN — POTASSIUM CHLORIDE 40 MEQ: 750 TABLET, EXTENDED RELEASE ORAL at 11:44

## 2024-08-17 RX ADMIN — ACETAMINOPHEN 325MG 650 MG: 325 TABLET ORAL at 23:30

## 2024-08-17 RX ADMIN — RIVAROXABAN 20 MG: 20 TABLET, FILM COATED ORAL at 19:43

## 2024-08-17 RX ADMIN — RALOXIFENE HYDROCHLORIDE 60 MG: 60 TABLET, FILM COATED ORAL at 21:30

## 2024-08-17 RX ADMIN — METHYLPREDNISOLONE SODIUM SUCCINATE 40 MG: 40 INJECTION, POWDER, FOR SOLUTION INTRAMUSCULAR; INTRAVENOUS at 19:59

## 2024-08-17 RX ADMIN — ACETAMINOPHEN 325MG 650 MG: 325 TABLET ORAL at 08:52

## 2024-08-17 RX ADMIN — BUDESONIDE AND FORMOTEROL FUMARATE DIHYDRATE 2 PUFF: 160; 4.5 AEROSOL RESPIRATORY (INHALATION) at 21:41

## 2024-08-17 RX ADMIN — DULOXETINE HYDROCHLORIDE 60 MG: 60 CAPSULE, DELAYED RELEASE ORAL at 08:53

## 2024-08-17 RX ADMIN — GUAIFENESIN 600 MG: 600 TABLET, EXTENDED RELEASE ORAL at 21:30

## 2024-08-17 RX ADMIN — ACETAMINOPHEN 325MG 650 MG: 325 TABLET ORAL at 13:01

## 2024-08-17 RX ADMIN — BUSPIRONE HYDROCHLORIDE 10 MG: 10 TABLET ORAL at 21:30

## 2024-08-17 RX ADMIN — BUSPIRONE HYDROCHLORIDE 10 MG: 10 TABLET ORAL at 08:52

## 2024-08-17 RX ADMIN — ACETAMINOPHEN 325MG 650 MG: 325 TABLET ORAL at 04:43

## 2024-08-17 RX ADMIN — LIDOCAINE 2 PATCH: 4 PATCH TOPICAL at 11:45

## 2024-08-17 RX ADMIN — Medication 10 ML: at 21:31

## 2024-08-17 RX ADMIN — ACETAMINOPHEN 325MG 650 MG: 325 TABLET ORAL at 19:42

## 2024-08-17 RX ADMIN — DULOXETINE HYDROCHLORIDE 60 MG: 60 CAPSULE, DELAYED RELEASE ORAL at 21:30

## 2024-08-17 NOTE — CONSULTS
NEUROSURGERY CONSULT      Olena Myers  1935  [id number]    Referring Provider: No referring provider defined for this encounter.  Reason for Consultation: Low back pain, age-indeterminate L1 fracture    Patient Care Team:  Manda Keenan MD as PCP - General (General Practice)  Olvin Hernandez MD as Consulting Physician (Cardiology)    Chief Complaint: Low back pain    Subjective .     History of Present Illness: Patient was admitted to the hospital for dyspnea on exertion, shortness of breath, abdominal discomfort.  She has a very complex medical history including myasthenia gravis on immunosuppressive therapy, TIA, hypertension, hyponatremia, chronic congestive heart failure, history of gastric ulcers with bleeding, COPD, asthma, chronic A-fib on anticoagulation.  CT of the abdomen pelvis was done on admission and suggested the possibility of a age-indeterminate L1 compression fracture.  She was admitted for medical evaluation.    She reports to me she suffered a fall 3 weeks ago and started having back pain following the fall.  She denies any new symptomatology in the lower extremities.  She states the back pain is kind of generalized in her low back.  She has a history of low back issues I can see from the chart but she states she was her status quo with this new back pain following the fall.    Review of Systems  Review of Systems   Constitutional:  Negative for activity change and fever.   Musculoskeletal:  Positive for back pain.   Neurological:  Positive for weakness. Negative for numbness.       History  Past Medical History:   Diagnosis Date    Anemia     IRON DEFICIENCY    Arthritis     Asthma     Atrial fibrillation     CAD (coronary artery disease)     HEART STENT    COPD (chronic obstructive pulmonary disease)     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    Frequent urinary tract infections     History of gastric ulcer     History of GI bleed     [place] (hard of hearing)     Hyperlipidemia      Hypertension     Hyponatremia     Lightheadedness     LVH (left ventricular hypertrophy)     MG (myasthenia gravis)     Mini stroke 10/2016, 3/2017    OA (osteoarthritis)     Osteoporosis     Sleep apnea     USES CPAP    SOB (shortness of breath)     WALKING   ,   Past Surgical History:   Procedure Laterality Date    APPENDECTOMY      BRONCHOSCOPY N/A 3/31/2022    Procedure: BRONCHOSCOPY WITH BAL RIGHT LOWER LOBE;  Surgeon: Remy Tirado MD;  Location: [facility] ENDOSCOPY;  Service: Pulmonary;  Laterality: N/A;  COPD EXACERBATION      CARPAL TUNNEL RELEASE Bilateral     CATARACT EXTRACTION Bilateral     COLONOSCOPY N/A 8/15/2024    Procedure: COLONOSCOPY TO CECUM & T.I.;  Surgeon: Charlie Zacarias MD;  Location: [facility] ENDOSCOPY;  Service: Gastroenterology;  Laterality: N/A;  PRE- ANEMIA, DIARRHEA  POST-    CORONARY STENT PLACEMENT      ENDOSCOPY N/A 8/15/2024    Procedure: ESOPHAGOGASTRODUODENOSCOPY WITH COLD BIOPSIES;  Surgeon: Charlie Zacarias MD;  Location: [facility] ENDOSCOPY;  Service: Gastroenterology;  Laterality: N/A;  PRE- ANEMIA  POST- NORMAL    TOTAL HIP ARTHROPLASTY Bilateral     TOTAL SHOULDER ARTHROPLASTY W/ DISTAL CLAVICLE EXCISION Left 7/19/2016    Procedure: LT TOTAL SHOULDER REVERSE ARTHROPLASTY;  Surgeon: NAHOMI Solorzano MD;  Location: [facility] OR [facility]SC;  Service:     TOTAL SHOULDER ARTHROPLASTY W/ DISTAL CLAVICLE EXCISION Right 1/8/2019    Procedure: RIGHT TOTAL SHOULDER REVERSE ARTHROPLASTY;  Surgeon: Jovanny Solorzano MD;  Location: [facility] OR [facility];  Service: Orthopedics   ,   Family History   Problem Relation Age of Onset    Heart disease Mother     Hyperlipidemia Mother     Stroke Mother     Diabetes Mother     Breast cancer Mother     Heart disease Father     Hyperlipidemia Father     Stroke Father     Malig Hyperthermia Neg Hx    ,   Social History     Socioeconomic History    Marital status:    Tobacco Use    Smoking status: Never    Smokeless tobacco: Never    Tobacco comments:      caffeine - coffee and coke caff. free, tea    Vaping Use    Vaping status: Never Used   Substance and Sexual Activity    Alcohol use: No    Drug use: No     Comment: caffine    Sexual activity: Defer     E-cigarette/Vaping    E-cigarette/Vaping Use Never User      E-cigarette/Vaping Substances    Nicotine No     THC No     CBD No     Flavoring No      E-cigarette/Vaping Devices    Disposable No     Pre-filled or Refillable Cartridge No     Refillable Tank No     Pre-filled Pod No          ,   Medications Prior to Admission   Medication Sig Dispense Refill Last Dose    acetaminophen (TYLENOL) 325 MG tablet Take 2 tablets by mouth Every 4 (Four) Hours As Needed for Mild Pain.   8/10/2024    albuterol (PROVENTIL) (2.5 MG/3ML) 0.083% nebulizer solution Take 2.5 mg by nebulization Every 4 (Four) Hours As Needed for Wheezing.   8/10/2024    albuterol sulfate  (90 Base) MCG/ACT inhaler Inhale 2 puffs Every 6 (Six) Hours As Needed for Wheezing or Shortness of Air.   8/10/2024    azelastine (ASTELIN) 0.1 % nasal spray 2 sprays into the nostril(s) as directed by provider 2 (Two) Times a Day. Use in each nostril as directed   8/10/2024    busPIRone (BUSPAR) 10 MG tablet Take 1 tablet by mouth 2 (Two) Times a Day.   8/9/2024    DULoxetine (CYMBALTA) 60 MG capsule Take 1 capsule by mouth 2 (Two) Times a Day.   8/9/2024    ferrous sulfate 325 (65 FE) MG tablet Take 1 tablet by mouth Daily With Breakfast.   8/9/2024    Fluticasone-Umeclidin-Vilant (Trelegy Ellipta) 100-62.5-25 MCG/INH inhaler Inhale 1 puff Daily.   8/10/2024    irbesartan (AVAPRO) 75 MG tablet Take 1 tablet by mouth Every Night.   8/9/2024    levothyroxine (SYNTHROID, LEVOTHROID) 88 MCG tablet Take 1 tablet by mouth Daily.   8/9/2024    montelukast (SINGULAIR) 10 MG tablet Take 1 tablet by mouth Every Night.   8/9/2024    mycophenolate (CELLCEPT) 500 MG tablet Take 2 tablets by mouth Every Morning.   8/9/2024    mycophenolate (CELLCEPT) 500 MG tablet Take 3  tablets by mouth Every Evening.   8/9/2024    pantoprazole (PROTONIX) 40 MG EC tablet Take 1 tablet by mouth Daily.   8/9/2024    pravastatin (PRAVACHOL) 20 MG tablet Take 1 tablet by mouth Every Night.   8/9/2024    predniSONE (DELTASONE) 10 MG tablet Take 0.5 tablets by mouth Daily.   8/9/2024    raloxifene (EVISTA) 60 MG tablet Take 1 tablet by mouth Every Night.   8/9/2024    rivaroxaban (XARELTO) 20 MG tablet Take 1 tablet by mouth Daily With Dinner.   8/9/2024    torsemide (DEMADEX) 20 MG tablet Take 1 tablet by mouth Daily.   8/9/2024    vitamin B-12 (CYANOCOBALAMIN) 1000 MCG tablet Take 1 tablet by mouth Daily.   8/9/2024    vitamin D (ERGOCALCIFEROL) 51683 UNITS capsule capsule Take 1 capsule by mouth Every 7 (Seven) Days. Takes on Wednesdays   Past Week    metoprolol succinate XL (TOPROL-XL) 25 MG 24 hr tablet Take 1 tablet by mouth 2 (Two) Times a Day. 60 tablet 0    , Scheduled Meds:  albuterol, 2.5 mg, Nebulization, 4x Daily - RT  atenolol, 25 mg, Oral, Q8H  azelastine, 2 spray, Each Nare, BID  budesonide-formoterol, 2 puff, Inhalation, BID - RT   And  tiotropium bromide monohydrate, 2 puff, Inhalation, Daily - RT  busPIRone, 10 mg, Oral, BID  digoxin, 125 mcg, Oral, Daily  DULoxetine, 60 mg, Oral, BID  furosemide, 40 mg, Intravenous, Once  guaiFENesin, 600 mg, Oral, Q12H  levothyroxine, 88 mcg, Oral, Q AM  Lidocaine, 2 patch, Transdermal, Q24H  methylPREDNISolone sodium succinate, 40 mg, Intravenous, Q8H  montelukast, 10 mg, Oral, Nightly  mycophenolate, 1,000 mg, Oral, QAM  mycophenolate, 1,500 mg, Oral, Nightly  pantoprazole, 40 mg, Oral, BID AC  potassium chloride, 40 mEq, Oral, Once  pravastatin, 20 mg, Oral, Nightly  raloxifene, 60 mg, Oral, Nightly  rivaroxaban, 20 mg, Oral, Daily With Dinner  sodium chloride, 10 mL, Intravenous, Q12H  torsemide, 20 mg, Oral, Daily  vitamin B-12, 1,000 mcg, Oral, Daily  vitamin D, 50,000 Units, Oral, Q7 Days    , Continuous Infusions:  sodium chloride, 30 mL/hr,  Last Rate: 30 mL/hr (08/15/24 1318)    , PRN Meds:    acetaminophen **OR** acetaminophen **OR** acetaminophen    Calcium Replacement - Follow Nurse / BPA Driven Protocol    Magnesium Standard Dose Replacement - Follow Nurse / BPA Driven Protocol    nitroglycerin    ondansetron    Phosphorus Replacement - Follow Nurse / BPA Driven Protocol    Potassium Replacement - Follow Nurse / BPA Driven Protocol    [COMPLETED] Insert Peripheral IV **AND** sodium chloride    sodium chloride    sodium chloride    sodium chloride, and Allergies:  Neomycin, Penicillins, Hydrocodone, and Rosuvastatin    Tobacco Use: Low Risk  (8/15/2024)    Patient History     Smoking Tobacco Use: Never     Smokeless Tobacco Use: Never     Passive Exposure: Not on file        Objective     Vital Signs   Temp:  [97.5 °F (36.4 °C)-98.1 °F (36.7 °C)] 97.7 °F (36.5 °C)  Heart Rate:  [73-92] 86  Resp:  [18] 18  BP: (103-141)/(37-68) 115/63  Body mass index is 27.69 kg/m².    Physical Exam    CONSTITUTIONAL: This 88 year old  female appears well developed, well-nourished and in no acute distress sitting up in the cardiac chair watching television.    HEAD & FACE: the head and face are symmetric, normocephalic and atraumatic.  She is wearing nasal cannula for oxygen    EYES: Inspection of the conjunctivae and lids reveals no swelling, erythema or discharge.  Pupils are round, equal and reactive to light and there is no scleral icterus.    EARS, NOSE, MOUTH & THROAT: On inspection, the ears, nose and oral cavity are within normal limits.    NECK: The neck is supple and symmetric. The trachea is midline with no masses.    PULMONARY: Respiratory effort is normal with no increased work of breathing or signs of respiratory distress.    CARDIOVASCULAR: Pedal pulses are +1/4 bilaterally. Examination of the extremities shows no edema or varicosities.    MUSCULOSKELETAL: Gait and station are not tested. The spine has normal alignment and range of  motion,    SKIN: The skin is warm, dry and intact    NEUROLOGIC:    Cranial Nerves 2 through 12 grossly intact  Motor exam 4/5 throughout the upper and lower extremities.   Sensory exam is normal to fine touch to confrontational testing bilaterally.  Reflexes on the right side demonstrates 1/4 Triceps Reflex, 1/4 Biceps Reflex, 1/4 Brachioradialis Reflex, 1/4 Knee Jerk Reflex, 0/4 Ankle Jerk Reflex and no ankle clonus on the right.   Reflexes on the left side demonstrates 1/4 Triceps Reflex, 1/4 Biceps Reflex, 1/4 Brachioradialis Reflex, 1/4 Knee Jerk Reflex, 0/4 Ankle Jerk Reflex and no ankle clonus on the left.  Superficial/Primitive Reflexes: Sandoval's, Babinski and clonus are all negative  No coordination deficit observed.  Cortical function is intact and without deficits. Speech is normal.    PSYCHIATRIC: Oriented to person, place and time. Patient's mood and affect are normal.      Results Review:   I reviewed the patient's new clinical results.    Images:    I personally reviewed the images from the following radiographic studies.    CT of the abdomen pelvis demonstrates degenerative changes throughout the lumbar spine with grade 1 spondylolisthesis chronically at L4-L5.  There is a L1 compression deformity with 55% percent loss of height which is new from study in 2021 but unknown age beyond 3 years.    Lab Results (last 24 hours)       Procedure Component Value Units Date/Time    Basic Metabolic Panel [[id number]]  (Abnormal) Collected: 08/17/24 0350    Specimen: Blood Updated: 08/17/24 0434     Glucose 110 mg/dL      BUN 19 mg/dL      Creatinine 0.90 mg/dL      Sodium 134 mmol/L      Potassium 3.8 mmol/L      Chloride 98 mmol/L      CO2 23.1 mmol/L      Calcium 9.1 mg/dL      BUN/Creatinine Ratio 21.1     Anion Gap 12.9 mmol/L      eGFR 62.4 mL/min/1.73     Narrative:      GFR Normal >60  Chronic Kidney Disease <60  Kidney Failure <15    The GFR formula is only valid for adults with stable renal function  between ages 18 and 70.    CBC (No Diff) [[id number]]  (Abnormal) Collected: 08/17/24 0350    Specimen: Blood Updated: 08/17/24 0415     WBC 19.92 10*3/mm3      RBC 3.06 10*6/mm3      Hemoglobin 9.3 g/dL      Hematocrit 29.3 %      MCV 95.8 fL      MCH 30.4 pg      MCHC 31.7 g/dL      RDW 14.8 %      RDW-SD 50.8 fl      MPV 9.1 fL      Platelets 354 10*3/mm3             Assessment & Plan       Exertional dyspnea    Myasthenia gravis    Paroxysmal atrial fibrillation    Essential hypertension    CAD (coronary artery disease)    Atrial fibrillation    COPD (chronic obstructive pulmonary disease)    Sleep apnea    Diastolic CHF, chronic    Iron deficiency anemia    Hypothyroidism (acquired)    Abnormal computed tomography of gallbladder    Closed wedge compression fracture of L1 vertebra      Patient has a L1 compression fracture that is described as age-indeterminate by the radiologist on the admission CT scan of the abdomen pelvis a week ago.  The patient's been mobilizing but complaining of low back pain since the fall she suffered 3 weeks ago.  She states the pain has basically been the same it has not been getting worse persists.  She denies any new symptoms in the lower extremities and therefore it does not appear based on the imaging nor her examination that there is a threat to the neurologic elements.    She is really not interested in pursuing an MRI which would definitively tell us whether the fracture is new or occurred sometime in the last 3 years since her last imaging in this region.  She also is not particularly interested in considering any operative intervention such as a kyphoplasty.  The only other alternative is to consider a brace assuming this is an acute to subacute L1 compression fracture but she is not particularly keen on using a brace either.  I told her they contacted me because of the concern for the fracture and the pattern of pain that she is having and that we really have to try  something.  The patient and I came to an agreement that it is unlikely that she would need surgery based on the results of the MRI and therefore we should forego the thought of surgery and the thought of MRI and at least give the brace at a trial to see if she can gain benefit and be able to use the brace for a period of 4 to 6 weeks.  It is a fairly safe assumption that the fracture occurred 3 weeks ago when she fell and therefore she should only need about 4 weeks in the brace to maximize the healing.  I contacted our brace rep who will come to fit the brace properly for her and we will get her up with physical therapy to see how she does in the brace.  We will also obtain upright radiographs in the brace so we can follow her with radiographs at a later date.    I discussed the patient's findings and my recommendations with patient and nursing staff    Steven Montalvo MD  08/17/24  10:21 EDT

## 2024-08-17 NOTE — PLAN OF CARE
Goal Outcome Evaluation:  Plan of Care Reviewed With: patient        Progress: improving  Outcome Evaluation: VSS, up in chair during evening hours, cont on O2 2L NC, dora po well, PRN Tylenol given for c/o chronic back pain, safety maintained, leg drsg d/i

## 2024-08-17 NOTE — PROGRESS NOTES
[facility]  [phone]  Dr. Prashanth Lizarraga    Subjective:  LOS: 6    Chief Complaint: Shortness of breath      Follows Dr. Tirado for asthma.  Still with shortness of breath with exertion - ongoing for at least several months. No change.    Objective   Vital Signs past 24hrs  Temp range: Temp (24hrs), Av.6 °F (36.4 °C), Min:97.2 °F (36.2 °C), Max:98.2 °F (36.8 °C)    BP range: BP: ()/(57-79) 112/68  Pulse range: Heart Rate:  [65-97] 78  Resp rate range: Resp:  [16-20] 18  Device (Oxygen Therapy): nasal cannulaFlow (L/min):  [2] 2  Oxygen range:SpO2:  [62 %-100 %] 98 %   Mechanical Ventilator:     Physical Exam  Constitutional:       Appearance: She is obese.   Eyes:      Pupils: Pupils are equal, round, and reactive to light.   Cardiovascular:      Rate and Rhythm: Normal rate and regular rhythm.      Heart sounds: Murmur heard.   Pulmonary:      Effort: Pulmonary effort is normal.      Breath sounds: No wheezing or rhonchi.   Abdominal:      General: Bowel sounds are normal.      Palpations: Abdomen is soft. There is no mass.      Tenderness: There is no abdominal tenderness.   Musculoskeletal:         General: No swelling.   Neurological:      Mental Status: She is alert.       Results Review:    I have reviewed the laboratory and imaging data since the last note by [facility] physician.  My annotations are noted in assessment and plan.      Result Review:  I have personally reviewed the results from last note by [facility] physician to 2024 21:01 EDT and agree with these findings:  [x]  Laboratory list / accordion  [x]  Microbiology  [x]  Radiology  []  EKG/Telemetry   []  Cardiology/Vascular   []  Pathology  []  Old records  []  Other:    Medication Review:  I have reviewed the current MAR.  My annotations are noted in assessment and plan.    albuterol, 2.5 mg, Nebulization, 4x Daily - RT  atenolol, 25 mg, Oral, Q8H  azelastine, 2 spray, Each Nare, BID  budesonide-formoterol, 2 puff, Inhalation,  BID - RT   And  tiotropium bromide monohydrate, 2 puff, Inhalation, Daily - RT  busPIRone, 10 mg, Oral, BID  digoxin, 125 mcg, Oral, Daily  DULoxetine, 60 mg, Oral, BID  guaiFENesin, 600 mg, Oral, Q12H  levothyroxine, 88 mcg, Oral, Q AM  montelukast, 10 mg, Oral, Nightly  mycophenolate, 1,000 mg, Oral, QAM  mycophenolate, 1,500 mg, Oral, Nightly  pantoprazole, 40 mg, Oral, BID AC  pravastatin, 20 mg, Oral, Nightly  predniSONE, 5 mg, Oral, Daily  raloxifene, 60 mg, Oral, Nightly  rivaroxaban, 20 mg, Oral, Daily With Dinner  sodium chloride, 10 mL, Intravenous, Q12H  torsemide, 20 mg, Oral, Daily  vitamin B-12, 1,000 mcg, Oral, Daily  vitamin D, 50,000 Units, Oral, Q7 Days        sodium chloride, 30 mL/hr, Last Rate: 30 mL/hr (08/15/24 1318)      Lines, Drains & Airways       Active LDAs       Name Placement date Placement time Site Days    Peripheral IV 08/10/24 1551 Anterior;Right Forearm 08/10/24  1551  Forearm  1    Single Lumen Implantable Port 01/08/19 Left Subclavian 01/08/19  0910  Subclavian  2043                  Isolation status: No active isolations    Dietary Orders (From admission, onward)       Start     Ordered    08/15/24 1633  Diet: Regular/House, Cardiac, Renal; Healthy Heart (2-3 Na+); Low Sodium (2-3g), Low Potassium, Low Phosphorus; Texture: Regular (IDDSI 7); Fluid Consistency: Thin (IDDSI 0)  Diet Effective Now        References:    Diet Order [place]   Question Answer Comment   Diets: Regular/House    Diets: Cardiac    Diets: Renal    Cardiac Diet: Healthy Heart (2-3 Na+)    Renal Diet: Low Sodium (2-3g)    Renal Diet: Low Potassium    Renal Diet: Low Phosphorus    Texture: Regular (IDDSI 7)    Fluid Consistency: Thin (IDDSI 0)        08/15/24 1633                    PCCM Problems  Dyspnea, especially with exertion  Chronic hypoxia on 2 L oxygen  Asthma  SEDA on CPAP at home  Myasthenia gravis  Anemia  A-fib  RVR  Chronic anticoagulation  Chronic systemic steroid use  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  Probably multifactorial and not entirely from her asthma. Can investigate further outpatient.    A-fib with RVR on admission but now better controlled.  Note cardiology input.    Anemia and EGD unremarkable. GI plans capsule stdy.    Underlying asthma and improved lung exam.  She is on chronic systemic steroids at home for management of her myasthenia gravis.      She will continue use of CPAP at home.    Low IgA and IgG levels likely due to concomitant use of mycophenolate.    No further acute pulmonary issues.  Will see outpatient and will sign off for now.      Prashanth Lizarraga MD  08/16/24  21:01 EDT      Part of this note may be an electronic transcription/translation of spoken language to printed text using the Dragon Dictation System.

## 2024-08-17 NOTE — SIGNIFICANT NOTE
08/17/24 1100   OTHER   Discipline physical therapist   Rehab Time/Intention   Session Not Performed other (see comments)  (Pt with new orders for TLSO brace when OOB. Spoke with RN, still awaiting brace. Will follow up tomorrow for PT as appropriate.)   Recommendation   PT - Next Appointment 08/18/24

## 2024-08-17 NOTE — PROGRESS NOTES
DAILY PROGRESS NOTE  [facility]    Patient Identification:  Name: Olena Myers  Age: 88 y.o.  Sex: female  :  1935  MRN: [id number]         Primary Care Physician: Manda Keenan MD    Subjective:  Interval History: Claims to have a bit more back pain today she states she feels it is coming on the lower end between her shoulder blades.  She still admits to some congestion and shortness of air.  Denies any chest pain    Objective: Sitting up in bedside chair conversational pleasant nontoxic.  No family present    Scheduled Meds:albuterol, 2.5 mg, Nebulization, 4x Daily - RT  atenolol, 25 mg, Oral, Q8H  azelastine, 2 spray, Each Nare, BID  budesonide-formoterol, 2 puff, Inhalation, BID - RT   And  tiotropium bromide monohydrate, 2 puff, Inhalation, Daily - RT  busPIRone, 10 mg, Oral, BID  digoxin, 125 mcg, Oral, Daily  DULoxetine, 60 mg, Oral, BID  furosemide, 40 mg, Intravenous, Once  guaiFENesin, 600 mg, Oral, Q12H  levothyroxine, 88 mcg, Oral, Q AM  Lidocaine, 2 patch, Transdermal, Q24H  methylPREDNISolone sodium succinate, 40 mg, Intravenous, Q8H  montelukast, 10 mg, Oral, Nightly  mycophenolate, 1,000 mg, Oral, QAM  mycophenolate, 1,500 mg, Oral, Nightly  pantoprazole, 40 mg, Oral, BID AC  potassium chloride, 40 mEq, Oral, Once  pravastatin, 20 mg, Oral, Nightly  raloxifene, 60 mg, Oral, Nightly  rivaroxaban, 20 mg, Oral, Daily With Dinner  sodium chloride, 10 mL, Intravenous, Q12H  torsemide, 20 mg, Oral, Daily  vitamin B-12, 1,000 mcg, Oral, Daily  vitamin D, 50,000 Units, Oral, Q7 Days      Continuous Infusions:sodium chloride, 30 mL/hr, Last Rate: 30 mL/hr (08/15/24 1318)        Vital signs in last 24 hours:  Temp:  [97.5 °F (36.4 °C)-98.1 °F (36.7 °C)] 97.7 °F (36.5 °C)  Heart Rate:  [73-92] 87  Resp:  [18] 18  BP: (103-141)/(37-68) 115/63    Intake/Output:    Intake/Output Summary (Last 24 hours) at 2024 1132  Last data filed at 2024 0015  Gross per 24 hour   Intake 480  "ml   Output --   Net 480 ml       Exam:  /63 (BP Location: Right arm, Patient Position: Sitting)   Pulse 87   Temp 97.7 °F (36.5 °C) (Oral)   Resp 18   Ht 152.4 cm (60\")   Wt 64.3 kg (141 lb 12.8 oz)   SpO2 99%   BMI 27.69 kg/m²     General Appearance:    Alert, cooperative, AAOx3                         Throat:   Oral mucosa pink and moist                           Neck:   Supple, no JVD                         Lungs:    Diminished bases with some intermittent coarse rhonchi o auscultation bilaterally, respirations unlabored with conversation and no increase in O2 requirement                          Heart:    Regular rate and rhythm, S1 and S2 normal                  Abdomen:     Soft, nontender, bowel sounds active                  Extremities: Moving all, no pitting edema                        Pulses:   Pulses palpable in lower extremities                  Neurologic:   CNII-XII intact, no focal deficits noted     Data Review:  Labs in chart were reviewed.    Assessment:  Active Hospital Problems    Diagnosis  POA    **Exertional dyspnea [R06.09]  Yes    Closed wedge compression fracture of L1 vertebra [S32.010A]  Yes    Abnormal computed tomography of gallbladder [R93.2]  Unknown    Hypothyroidism (acquired) [E03.9]  Yes    Diastolic CHF, chronic [I50.32]  Yes    Iron deficiency anemia [D50.9]  Unknown    COPD (chronic obstructive pulmonary disease) [J44.9]  Yes    Sleep apnea [G47.30]  Yes    Atrial fibrillation [I48.91]  Yes    Myasthenia gravis [G70.00]  Yes    Paroxysmal atrial fibrillation [I48.0]  Yes    CAD (coronary artery disease) [I25.10]  Yes    Essential hypertension [I10]  Yes      Resolved Hospital Problems   No resolved problems to display.       Plan:    High risk falls with poor PT notes in conjunction with need for AC may be canceled the idea of discharge yesterday.  CCP further coordinating discharge needs    Patient complaining more back pain today and review of previous " imaging shows his L1 VCF and will get VADIM to evaluate.  Supportive care and add Lidoderm    Multifactorial dyspnea   -She actually feels that she is breathing a bit worse today   -No major changes in O2 requirement   -Trial IV Lasix x 1   -compounded by obesity and deconditioning  -Resolved RVR    Heme positive stools with AC resumed post endoscopy and hemoglobin trending 9.7-9.3   -GI recommending capsule endoscopy as outpatient    Leukocytosis with no fever -likely due to steroids -add procalcitonin to a.m. labs    Gallbladder distention okay per surgery    Mild/stable hyponatremia    Hypothyroidism on levothyroxine      Disposition -awaiting CCP and insurance precertification for FLAKO -anticipate will remain in house through the weekend    Hernan Waldron MD  8/17/2024  11:32 EDT

## 2024-08-18 LAB
ANION GAP SERPL CALCULATED.3IONS-SCNC: 14 MMOL/L (ref 5–15)
BUN SERPL-MCNC: 21 MG/DL (ref 8–23)
BUN/CREAT SERPL: 18.6 (ref 7–25)
CALCIUM SPEC-SCNC: 9.2 MG/DL (ref 8.6–10.5)
CHLORIDE SERPL-SCNC: 95 MMOL/L (ref 98–107)
CO2 SERPL-SCNC: 24 MMOL/L (ref 22–29)
CREAT SERPL-MCNC: 1.13 MG/DL (ref 0.57–1)
DEPRECATED RDW RBC AUTO: 49.3 FL (ref 37–54)
EGFRCR SERPLBLD CKD-EPI 2021: 47.5 ML/MIN/1.73
ERYTHROCYTE [DISTWIDTH] IN BLOOD BY AUTOMATED COUNT: 14.5 % (ref 12.3–15.4)
GLUCOSE SERPL-MCNC: 127 MG/DL (ref 65–99)
HCT VFR BLD AUTO: 30.4 % (ref 34–46.6)
HGB BLD-MCNC: 9.6 G/DL (ref 12–15.9)
MCH RBC QN AUTO: 29.9 PG (ref 26.6–33)
MCHC RBC AUTO-ENTMCNC: 31.6 G/DL (ref 31.5–35.7)
MCV RBC AUTO: 94.7 FL (ref 79–97)
PLATELET # BLD AUTO: 470 10*3/MM3 (ref 140–450)
PMV BLD AUTO: 9.1 FL (ref 6–12)
POTASSIUM SERPL-SCNC: 4.9 MMOL/L (ref 3.5–5.2)
PROCALCITONIN SERPL-MCNC: 0.25 NG/ML (ref 0–0.25)
RBC # BLD AUTO: 3.21 10*6/MM3 (ref 3.77–5.28)
SODIUM SERPL-SCNC: 133 MMOL/L (ref 136–145)
URATE SERPL-MCNC: 10.6 MG/DL (ref 2.4–5.7)
WBC NRBC COR # BLD AUTO: 25.18 10*3/MM3 (ref 3.4–10.8)

## 2024-08-18 PROCEDURE — 94664 DEMO&/EVAL PT USE INHALER: CPT

## 2024-08-18 PROCEDURE — 94761 N-INVAS EAR/PLS OXIMETRY MLT: CPT

## 2024-08-18 PROCEDURE — 94799 UNLISTED PULMONARY SVC/PX: CPT

## 2024-08-18 PROCEDURE — 25010000002 METHYLPREDNISOLONE PER 40 MG: Performed by: HOSPITALIST

## 2024-08-18 PROCEDURE — 97116 GAIT TRAINING THERAPY: CPT

## 2024-08-18 PROCEDURE — 85027 COMPLETE CBC AUTOMATED: CPT | Performed by: HOSPITALIST

## 2024-08-18 PROCEDURE — 99232 SBSQ HOSP IP/OBS MODERATE 35: CPT | Performed by: NURSE PRACTITIONER

## 2024-08-18 PROCEDURE — 84550 ASSAY OF BLOOD/URIC ACID: CPT | Performed by: HOSPITALIST

## 2024-08-18 PROCEDURE — 80048 BASIC METABOLIC PNL TOTAL CA: CPT | Performed by: HOSPITALIST

## 2024-08-18 PROCEDURE — 25010000002 FUROSEMIDE PER 20 MG: Performed by: HOSPITALIST

## 2024-08-18 PROCEDURE — 84145 PROCALCITONIN (PCT): CPT | Performed by: HOSPITALIST

## 2024-08-18 PROCEDURE — 63710000001 MYCOPHENOLATE MOFETIL PER 250 MG: Performed by: INTERNAL MEDICINE

## 2024-08-18 PROCEDURE — 97530 THERAPEUTIC ACTIVITIES: CPT

## 2024-08-18 RX ORDER — FUROSEMIDE 10 MG/ML
40 INJECTION INTRAMUSCULAR; INTRAVENOUS
Status: COMPLETED | OUTPATIENT
Start: 2024-08-18 | End: 2024-08-18

## 2024-08-18 RX ADMIN — METHYLPREDNISOLONE SODIUM SUCCINATE 40 MG: 40 INJECTION, POWDER, FOR SOLUTION INTRAMUSCULAR; INTRAVENOUS at 05:56

## 2024-08-18 RX ADMIN — MYCOPHENOLATE MOFETIL 1000 MG: 250 CAPSULE ORAL at 13:36

## 2024-08-18 RX ADMIN — PANTOPRAZOLE SODIUM 40 MG: 40 TABLET, DELAYED RELEASE ORAL at 18:36

## 2024-08-18 RX ADMIN — TORSEMIDE 20 MG: 20 TABLET ORAL at 09:57

## 2024-08-18 RX ADMIN — ALBUTEROL SULFATE 2.5 MG: 2.5 SOLUTION RESPIRATORY (INHALATION) at 14:36

## 2024-08-18 RX ADMIN — ACETAMINOPHEN 325MG 650 MG: 325 TABLET ORAL at 18:39

## 2024-08-18 RX ADMIN — GUAIFENESIN 600 MG: 600 TABLET, EXTENDED RELEASE ORAL at 09:57

## 2024-08-18 RX ADMIN — ATENOLOL 25 MG: 25 TABLET ORAL at 05:56

## 2024-08-18 RX ADMIN — LEVOTHYROXINE SODIUM 88 MCG: 88 TABLET ORAL at 05:56

## 2024-08-18 RX ADMIN — BUSPIRONE HYDROCHLORIDE 10 MG: 10 TABLET ORAL at 09:58

## 2024-08-18 RX ADMIN — ACETAMINOPHEN 325MG 650 MG: 325 TABLET ORAL at 09:58

## 2024-08-18 RX ADMIN — Medication 10 ML: at 09:59

## 2024-08-18 RX ADMIN — BUDESONIDE AND FORMOTEROL FUMARATE DIHYDRATE 2 PUFF: 160; 4.5 AEROSOL RESPIRATORY (INHALATION) at 06:41

## 2024-08-18 RX ADMIN — ACETAMINOPHEN 325MG 650 MG: 325 TABLET ORAL at 06:01

## 2024-08-18 RX ADMIN — RIVAROXABAN 20 MG: 20 TABLET, FILM COATED ORAL at 18:36

## 2024-08-18 RX ADMIN — ATENOLOL 25 MG: 25 TABLET ORAL at 21:02

## 2024-08-18 RX ADMIN — BUSPIRONE HYDROCHLORIDE 10 MG: 10 TABLET ORAL at 20:56

## 2024-08-18 RX ADMIN — RALOXIFENE HYDROCHLORIDE 60 MG: 60 TABLET, FILM COATED ORAL at 20:57

## 2024-08-18 RX ADMIN — ATENOLOL 25 MG: 25 TABLET ORAL at 13:36

## 2024-08-18 RX ADMIN — LIDOCAINE 2 PATCH: 4 PATCH TOPICAL at 09:58

## 2024-08-18 RX ADMIN — ALBUTEROL SULFATE 2.5 MG: 2.5 SOLUTION RESPIRATORY (INHALATION) at 10:51

## 2024-08-18 RX ADMIN — GUAIFENESIN 600 MG: 600 TABLET, EXTENDED RELEASE ORAL at 20:56

## 2024-08-18 RX ADMIN — ALBUTEROL SULFATE 2.5 MG: 2.5 SOLUTION RESPIRATORY (INHALATION) at 06:42

## 2024-08-18 RX ADMIN — BUDESONIDE AND FORMOTEROL FUMARATE DIHYDRATE 2 PUFF: 160; 4.5 AEROSOL RESPIRATORY (INHALATION) at 19:55

## 2024-08-18 RX ADMIN — MYCOPHENOLATE MOFETIL 1500 MG: 250 CAPSULE ORAL at 20:56

## 2024-08-18 RX ADMIN — PRAVASTATIN SODIUM 20 MG: 20 TABLET ORAL at 20:55

## 2024-08-18 RX ADMIN — FUROSEMIDE 40 MG: 10 INJECTION, SOLUTION INTRAMUSCULAR; INTRAVENOUS at 13:37

## 2024-08-18 RX ADMIN — Medication 1000 MCG: at 09:57

## 2024-08-18 RX ADMIN — Medication 10 ML: at 20:57

## 2024-08-18 RX ADMIN — DIGOXIN 125 MCG: 125 TABLET ORAL at 13:36

## 2024-08-18 RX ADMIN — MONTELUKAST SODIUM 10 MG: 10 TABLET, FILM COATED ORAL at 20:55

## 2024-08-18 RX ADMIN — TIOTROPIUM BROMIDE INHALATION SPRAY 2 PUFF: 3.12 SPRAY, METERED RESPIRATORY (INHALATION) at 06:43

## 2024-08-18 RX ADMIN — DULOXETINE HYDROCHLORIDE 60 MG: 60 CAPSULE, DELAYED RELEASE ORAL at 09:58

## 2024-08-18 RX ADMIN — DULOXETINE HYDROCHLORIDE 60 MG: 60 CAPSULE, DELAYED RELEASE ORAL at 20:56

## 2024-08-18 RX ADMIN — ALBUTEROL SULFATE 2.5 MG: 2.5 SOLUTION RESPIRATORY (INHALATION) at 19:54

## 2024-08-18 RX ADMIN — PANTOPRAZOLE SODIUM 40 MG: 40 TABLET, DELAYED RELEASE ORAL at 05:57

## 2024-08-18 NOTE — PLAN OF CARE
Goal Outcome Evaluation:  Plan of Care Reviewed With: patient        Progress: improving  Outcome Evaluation: VSS, PRN Tylenol given for c/o back pain, purewick on during night to promote sleep after IV Lasix given, dora po well, drsg to leg d/i, safety maintained

## 2024-08-18 NOTE — PLAN OF CARE
Goal Outcome Evaluation:  Plan of Care Reviewed With: patient        Progress: improving  Outcome Evaluation: Patient was able to ambulate today with TLSO properly donned.  She transferred to standing a total of 5 reps so that TLSO could be fitted appropriately.  I implored her to keep brace on in sitting, but not sure if patient will be compliant.  She requires MIN A x 1 with the rolling walker to ambulate at this time.  We will continue to follow.

## 2024-08-18 NOTE — PROGRESS NOTES
[facility] THORACIC/LUMBAR NEUROSURGERY PROGRESS NOTE      CC: Low back pain      Subjective     Interval History: No significant events overnight. Patient reports back pain    ROS:  Constitutional: No fever, chills  MS: +back pain  Neuro: No numbness, tingling, or weakness,  no balance difficulties  : No difficulty voiding, no incontinence    Objective     Vital signs in last 24 hours:  Temp:  [97.5 °F (36.4 °C)-98.1 °F (36.7 °C)] 97.9 °F (36.6 °C)  Heart Rate:  [70-91] 70  Resp:  [18-22] 22  BP: (113-138)/(54-78) 122/78    Intake/Output this shift:  No intake/output data recorded.    LABS:  Results from last 7 days   Lab Units 08/17/24  0350 08/16/24  0322 08/15/24  0407 08/13/24  0343   WBC 10*3/mm3 19.92*  --  7.93 10.14   HEMOGLOBIN g/dL 9.3* 9.7* 9.7* 9.2*   HEMATOCRIT % 29.3* 31.2* 31.6* 28.4*   PLATELETS 10*3/mm3 354  --  419 369      Results from last 7 days   Lab Units 08/17/24  0350 08/15/24  1637 08/15/24  0407 08/13/24  0343   SODIUM mmol/L 134*  --  134* 133*   POTASSIUM mmol/L 3.8 4.0 3.2* 3.9   CHLORIDE mmol/L 98  --  97* 99   CO2 mmol/L 23.1  --  25.0 23.0   BUN mg/dL 19  --  17 19   CREATININE mg/dL 0.90  --  0.96 0.91   CALCIUM mg/dL 9.1  --  8.8 8.2*   BILIRUBIN mg/dL  --   --  0.3  --    ALK PHOS U/L  --   --  68  --    ALT (SGPT) U/L  --   --  6  --    AST (SGOT) U/L  --   --  15  --    GLUCOSE mg/dL 110*  --  83 92     8/12/24 Digoxin 0.90   8/12/24 IgG <300, IgM 48, IgA <50  8/11/24 Vitamin B12 >2000  8/11/24 Folate 12.10  8/11/24 Cdiff Normal   8/11/24 Occult Blood: Positive   8/11/24 TSH 1.080    IMAGING STUDIES:    Chest and lumbar spine radiograph     HISTORY: Cough     TECHNIQUE: Two PA and lateral radiographs; AP and lateral radiographs of  the lumbar spine     COMPARISON: Chest radiograph 8/10/2024     IMPRESSION:  FINDINGS AND IMPRESSION:  Bilateral shoulder arthroplasties are present. There appears to be a  small pleural effusion posteriorly. Severe kyphosis of the upper  thoracic  spine is present with what appears to be multilevel anterior  wedge compression deformities, not well evaluated indeterminate age.  Correlation with patient history and point tenderness in this area is  recommended to exclude acute etiology with follow-up MRI of the thoracic  spine if clinically indicated.  Moderate to extensive pulmonary opacification is scattered throughout  the bilateral lungs, left greater than right, and appears to have  worsened since 8/10/2024. No pneumothorax is seen. Left Port-A-Cath tip  terminates over the expected area of the right atrium.     Please note significant generalized bony demineralization as well as  underexposure significantly limits evaluation. For the purpose of this  dictation, last well-formed disc base referred to as L5-S1. Bilateral  total hip arthroplasties are incompletely visualized and cannot be  evaluated. The L5 vertebral body is essentially not able to be  visualized. Compression fracture of the L1 vertebral body is new since  2021 and given patient history, acute fracture cannot be excluded.  Further evaluation with MRI is recommended, especially given the  limitations of this examination        This report was finalized on 8/17/2024 7:20 PM by Dr. Darius Cristina M.D  on Workstation: [id number]    Narrative & Impression   CT ABDOMEN PELVIS WO CONTRAST-     INDICATIONS: Pain     TECHNIQUE: Radiation dose reduction techniques were utilized, including  automated exposure control and exposure modulation based on body size.  Unenhanced ABDOMEN AND PELVIS CT     COMPARISON: None available     FINDINGS:     Structures in the pelvis are partly obscured by attenuation artifact  from bilateral hip arthroplasty hardware.     The gallbladder is distended, but otherwise appears unremarkable (if  further imaging evaluation of the gallbladder is indicated, ultrasound  could be considered).     No urolithiasis or hydronephrosis is identified, but the distal ureters  and urinary  bladder are partly obscured by hardware artifact.     Left adrenal adenoma is apparent, stable.     Otherwise unremarkable appearance of the liver, spleen, adrenal glands,  pancreas, kidneys, bladder. Coarse calcifications at the uterus suggest  underlying fibroid disease.     No bowel obstruction or abnormal bowel thickening is identified. Colonic  diverticula are seen that do not appear inflamed. The appendix is not  identified, limiting the assessment, and requiring clinical correlation.     No free intraperitoneal gas or free fluid. Umbilical hernia of fat is  present.     Scattered small mesenteric and para-aortic lymph nodes are seen that are  not significant by size criteria.     Abdominal aorta is not aneurysmal. Aortic and other arterial  calcifications are present.     The lung bases are clear. Show mild atelectasis/scarring. The heart is  enlarged.     Degenerative changes are seen in the spine. L1 compression deformity,  with 55% loss of height anteriorly, is age-indeterminate, but new from  7/6/2021, correlate clinically.     IMPRESSION:        1. Distended gallbladder.     2. No urolithiasis or hydronephrosis identified). Assessment of the  pelvis is limited by hardware artifact).     3. Colonic diverticulosis. No acute inflammatory process of bowel is  identified.     4. Age-indeterminate L1 compression deformity, correlate clinically.     This report was finalized on 8/10/2024 1:43 PM by Dr. José Manuel Anaya M.D on Workstation: [id number]       I personally viewed and interpreted the patient's chart.    Meds reviewed/changed: Yes  Tenormin 25mg every 8 hours  Astelin BID  Symbicort BID  Buspar 10mg BID  Lanoxin 125mg daily  Cymbalta 60mg BID  Mucinex BID  Synthroid daily  Lidocaine BID  Singulair 10mg at night  Cellcept 1000mg am   Cellcept 1500mg at night  Protonix 40mg BID  14. Pravachol 20mg at night  15. Evista 60mg at night  16. Xarelto 20mg daily  17. Demadex 20mg daily  18. Vitamin B12 1000  mcg daily  19. Vitamin D 50,0000 units every 7 days   20. Tylenol 650mg every 4 hours prn-1 dose/12 hours    Physical Exam:    General:   Awake, alert.  Back:    TLSO brace off, patient in bed   Motor:    No fasciculations, rigidity, spasticity, or abnormal movements.  Reflexes:   no clonus  Sensation:   Normal to light touch nisha LEs  Station and Gait:             Per PT note, patient unable to ambulate 12 feet, CGA x 1 with use of walker. Patient requires CGA x 1 for sit to stand transfers. BLE therapy ex program x 10 reps completed for general strengthening. Patient limited in gait distance this am due to c/o dizziness and overall fatigue   Extremities:   Wearing SCD      Assessment & Plan     ASSESSMENT:      Exertional dyspnea    Myasthenia gravis    Paroxysmal atrial fibrillation    Essential hypertension    CAD (coronary artery disease)    Atrial fibrillation    COPD (chronic obstructive pulmonary disease)    Sleep apnea    Diastolic CHF, chronic    Iron deficiency anemia    Hypothyroidism (acquired)    Abnormal computed tomography of gallbladder    Closed wedge compression fracture of L1 vertebra    The patient is an 88 year old female with a history of myasthenia gravis on immunosuppressive therapy, TIA, HTN, hyponatremia, CHF, gastric ulcers with bleeding, COPD, Afib on anticoagulation who was admitted with dyspnea on exertion and abdominal pain. CT of the abdomen and pelvis showed an age indeterminate L1 compression fracture.     She was seen by Dr. Montalvo yesterday. She told him that she sustained a fall three weeks ago and had back pain since the fall. She has not had any new symptoms of BLE.     At this time, she does not want to move forward with a MRI and is not interested in having a kyphoplasty. She is in agreement with wearing a brace.     PLAN:   Patient to wear TLSO brace when sitting, standing, walking.   A TLSO brace has been ordered due to diagnosis of L1 VCF.  The purpose of the brace is to  support weak muscles, stabilize and restrict movement of the trunk to aid in healing and pain relief of L1 VCF.     VADIM will sign off for now. She will follow-up in our office in four weeks with lumbar xrays while wearing the TLSO brace.        I discussed the patient's findings and my recommendations with patient and Dr. Montalvo.          LOS: 8 days       MILA Schultz  8/18/2024  08:12 EDT

## 2024-08-18 NOTE — THERAPY TREATMENT NOTE
Acute Care - Physical Therapy Treatment Note  [facility]     Patient Name: Olena Myers  : 1935  MRN: [id number]  Today's Date: 2024      Visit Dx:     ICD-10-CM ICD-9-CM   1. Exertional dyspnea  R06.09 786.09   2. Pain of upper abdomen associated with exertion and has complete resolution  R10.10 789.09   3. Chronic anemia  D64.9 285.9   4. Chronic respiratory failure with hypoxia  J96.11 518.83     799.02   5. Abnormal computed tomography of gallbladder  R93.2 793.3   6. Iron deficiency anemia due to chronic blood loss  D50.0 280.0   7. Decreased mobility and endurance  Z74.09 780.99     Patient Active Problem List   Diagnosis    Pre-operative cardiovascular examination    S/p reverse total shoulder arthroplasty    Myasthenia gravis    Paroxysmal atrial fibrillation    HX: long term anticoagulant use    Essential hypertension    CAD (coronary artery disease)    Rhinovirus    Atrial fibrillation    S/P reverse total shoulder arthroplasty, right    Acute UTI    COPD (chronic obstructive pulmonary disease)    Metabolic encephalopathy    Sleep apnea    Sepsis    Dyspnea    Chronic diastolic CHF (congestive heart failure)    Diastolic CHF, chronic    Heme positive stool    E coli bacteremia    Iron deficiency anemia    Current chronic use of systemic steroids    COPD exacerbation    Obesity (BMI 30-39.9)    COVID-19 virus infection    Chronic respiratory failure with hypoxia    Cytokine release syndrome, grade 1    Altered mental status    Anemia    KELLEE (acute kidney injury)    Hypothyroidism (acquired)    Exertional dyspnea    Abnormal computed tomography of gallbladder    Closed wedge compression fracture of L1 vertebra     Past Medical History:   Diagnosis Date    Anemia     IRON DEFICIENCY    Arthritis     Asthma     Atrial fibrillation     CAD (coronary artery disease)     HEART STENT    COPD (chronic obstructive pulmonary disease)     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    Frequent urinary tract  "infections     History of gastric ulcer     History of GI bleed     [place] (hard of hearing)     Hyperlipidemia     Hypertension     Hyponatremia     Lightheadedness     LVH (left ventricular hypertrophy)     MG (myasthenia gravis)     Mini stroke 10/2016, 3/2017    OA (osteoarthritis)     Osteoporosis     Sleep apnea     USES CPAP    SOB (shortness of breath)     WALKING     Past Surgical History:   Procedure Laterality Date    APPENDECTOMY      BRONCHOSCOPY N/A 3/31/2022    Procedure: BRONCHOSCOPY WITH BAL RIGHT LOWER LOBE;  Surgeon: Remy Tirado MD;  Location: [facility] ENDOSCOPY;  Service: Pulmonary;  Laterality: N/A;  COPD EXACERBATION      CARPAL TUNNEL RELEASE Bilateral     CATARACT EXTRACTION Bilateral     COLONOSCOPY N/A 8/15/2024    Procedure: COLONOSCOPY TO CECUM & T.I.;  Surgeon: Charlie Zacarias MD;  Location: [facility] ENDOSCOPY;  Service: Gastroenterology;  Laterality: N/A;  PRE- ANEMIA, DIARRHEA  POST-    CORONARY STENT PLACEMENT      ENDOSCOPY N/A 8/15/2024    Procedure: ESOPHAGOGASTRODUODENOSCOPY WITH COLD BIOPSIES;  Surgeon: Charlie Zacarias MD;  Location: [facility] ENDOSCOPY;  Service: Gastroenterology;  Laterality: N/A;  PRE- ANEMIA  POST- NORMAL    TOTAL HIP ARTHROPLASTY Bilateral     TOTAL SHOULDER ARTHROPLASTY W/ DISTAL CLAVICLE EXCISION Left 7/19/2016    Procedure: LT TOTAL SHOULDER REVERSE ARTHROPLASTY;  Surgeon: NAHOMI Solorzano MD;  [place] [facility] [place] [facility]  Service:     TOTAL SHOULDER ARTHROPLASTY W/ DISTAL CLAVICLE EXCISION Right 1/8/2019    Procedure: RIGHT TOTAL SHOULDER REVERSE ARTHROPLASTY;  Surgeon: Jovanny Solorzano MD;  Location: [facility] [place] [facility];  Service: Orthopedics     PT Assessment (Last 12 Hours)       PT Evaluation and Treatment       Row Name 08/18/24 1059          Physical Therapy Time and Intention    Subjective Information complains of;weakness;fatigue;pain  \"I am not wearing this brace...\".  -JR     Document Type therapy note (daily note)  -JR     Mode of Treatment physical " therapy  -JR     Patient Effort good  -       Row Name 08/18/24 1051          General Information    Patient Profile Reviewed yes  -JR     Existing Precautions/Restrictions brace worn when out of bed;TLSO  Has TLSO brace that is to be worn when patient is out of bed.  -       Row Name 08/18/24 1051          Living Environment    Current Living Arrangements assisted living facility  -     People in Home facility resident  -[facility] Name 08/18/24 1051          Pain    Pretreatment Pain Rating 5/10  -JR     Posttreatment Pain Rating 5/10  -     Pain Location - back  Patient has a compression fracture at L1  -       Row Name 08/18/24 1051          Cognition    Orientation Status (Cognition) oriented x 3  -[facility] Name 08/18/24 1051          Bed Mobility    Comment, (Bed Mobility) Already seated in bedside chair/  -[facility] Name 08/18/24 1051          Sit-Stand Transfer    Sit-Stand [place] (Transfers) minimum assist (75% patient effort)  -     Assistive Device (Sit-Stand Transfers) walker, front-wheeled  -[facility] Name 08/18/24 1051          Gait/Stairs (Locomotion)    [place] Level (Gait) moderate assist (50% patient effort);minimum assist (75% patient effort)  -     Assistive Device (Gait) walker, front-wheeled  -     Distance in Feet (Gait) 12  -     Deviations/Abnormal Patterns (Gait) michael decreased;gait speed decreased;stride length decreased;weight shifting decreased  -     Comment, (Gait/Stairs) Ambulated to the front of the bed closest to door.  Sat for a few minutes and then ambulated back to the beside chair.  -       Row Name 08/18/24 1051          Safety Issues, Functional Mobility    Impairments Affecting Function (Mobility) balance;endurance/activity tolerance;strength  -     Comment, Safety Issues/Impairments (Mobility) Gait belt utilized throughout.  -       Row Name 08/18/24 1051          Balance    Static Sitting Balance independent  -      Dynamic Sitting Balance independent  -JR     Position, Sitting Balance sitting in chair  -JR     Sit to Stand Dynamic Balance minimal assist  -JR     Static Standing Balance minimal assist  -JR     Dynamic Standing Balance minimal assist  -JR     Position/Device Used, Standing Balance walker, front-wheeled  -JR     Balance Interventions sit to stand;standing;supported;static;dynamic  -JR     Comment, Balance Patient transferred to standing x 4 reps.  A considerable amount of time was spent, fitting patient's brace.  She can stand for a couple of minutes before beginning to have pain in her knees.  -JR       Row Name             Wound 08/10/24 0907 Right lower leg Skin Tear    Wound - Properties Group Placement Date: 08/10/24  -BS Placement Time: 0907  -BS Present on Original Admission: Y  -BS Side: Right  -BS Orientation: lower  -BS Location: leg  -BS Primary Wound Type: Skin tear  -BS    Retired Wound - Properties Group Placement Date: 08/10/24  -BS Placement Time: 0907  -BS Present on Original Admission: Y  -BS Side: Right  -BS Orientation: lower  -BS Location: leg  -BS Primary Wound Type: Skin tear  -BS    Retired Wound - Properties Group Date first assessed: 08/10/24  -BS Time first assessed: 0907  -BS Present on Original Admission: Y  -BS Side: Right  -BS Location: leg  -BS Primary Wound Type: Skin tear  -BS      Row Name 08/18/24 1051          Plan of Care Review    Plan of Care Reviewed With patient  -JR     Progress improving  -       Row Name 08/18/24 1051          Vital Signs    O2 Delivery Pre Treatment supplemental O2  -JR     O2 Delivery Intra Treatment supplemental O2  -JR     O2 Delivery Post Treatment supplemental O2  -       Row Name 08/18/24 1051          Positioning and Restraints    Pre-Treatment Position sitting in chair/recliner  -JR     Post Treatment Position chair  -JR     In Chair notified nsg;reclined;call light within reach;encouraged to call for assist  Loosened brace for patient.   -               User Key  (r) = Recorded By, (t) = Taken By, (c) = Cosigned By      Initials Name Provider Type    JR Carlin Joya PT Physical Therapist    Charlie Roberson RN Registered Nurse                    Physical Therapy Education       Title: PT OT SLP Therapies (Done)       Topic: Physical Therapy (Done)       Point: Mobility training (Done)       Learning Progress Summary             Patient Acceptance, E,D, VU,NR by JR at 8/18/2024 1058    Acceptance, E,D, VU,NR by MS at 8/16/2024 0923    Acceptance, E, VU by SM at 8/14/2024 1538    Acceptance, E,D,TB, VU,DU by EB at 8/13/2024 1329    Acceptance, E,TB, VU,NR by CS at 8/11/2024 0919                         Point: Home exercise program (Done)       Learning Progress Summary             Patient Acceptance, E,D, VU,NR by JR at 8/18/2024 1058    Acceptance, E,D, VU,NR by MS at 8/16/2024 0923    Acceptance, E, VU by SM at 8/14/2024 1538    Acceptance, E,D,TB, VU,DU by EB at 8/13/2024 1329    Acceptance, E,TB, VU,NR by CS at 8/11/2024 0919                         Point: Body mechanics (Done)       Learning Progress Summary             Patient Acceptance, E,D, VU,NR by JR at 8/18/2024 1058    Acceptance, E,D, VU,NR by MS at 8/16/2024 0923    Acceptance, E, VU by SM at 8/14/2024 1538    Acceptance, E,D,TB, VU,DU by EB at 8/13/2024 1329    Acceptance, E,TB, VU,NR by CS at 8/11/2024 0919                         Point: Precautions (Done)       Learning Progress Summary             Patient Acceptance, E,D, VU,NR by  at 8/18/2024 1058    Acceptance, E,D, VU,NR by MS at 8/16/2024 0923    Acceptance, E, VU by SM at 8/14/2024 1538    Acceptance, E,D,TB, VU,DU by EB at 8/13/2024 1329    Acceptance, E,TB, VU,NR by CS at 8/11/2024 0919                                         User Key       Initials Effective Dates Name Provider Type Discipline    MS 06/16/21 -  Boris Yi, PT Physical Therapist PT    JR 06/16/21 -  Carlin Joya, PT Physical Therapist PT     EB 02/14/23 -  Nahomy Bennett PTA Physical Therapist Assistant PT    CS 07/11/23 -  Eran Rodriguez, PT Physical Therapist PT     05/02/22 -  Nahed Jose, ABI Physical Therapist PT                  PT Recommendation and Plan     Plan of Care Reviewed With: patient  Progress: improving  Outcome Evaluation: Patient was able to ambulate today with TLSO properly donned.  She transferred to standing a total of 5 reps so that TLSO could be fitted appropriately.  I implored her to keep brace on in sitting, but not sure if patient will be compliant.  She requires MIN A x 1 with the rolling walker to ambulate at this time.  We will continue to follow.   Outcome Measures       Row Name 08/18/24 1100             How much help from another person do you currently need...    Turning from your back to your side while in flat bed without using bedrails? 3  -JR      Moving from lying on back to sitting on the side of a flat bed without bedrails? 3  -JR      Moving to and from a bed to a chair (including a wheelchair)? 2  -JR      Standing up from a chair using your arms (e.g., wheelchair, bedside chair)? 3  -JR      Climbing 3-5 steps with a railing? 2  -JR      To walk in hospital room? 3  -JR      AM-PAC 6 Clicks Score (PT) 16  -JR      Highest Level of Mobility Goal 5 --> Static standing  -JR                User Key  (r) = Recorded By, (t) = Taken By, (c) = Cosigned By      Initials Name Provider Type    Carlin Carmona, PT Physical Therapist                     Time Calculation:    PT Charges       Row Name 08/18/24 1101             Time Calculation    Start Time 1005  -JR      Stop Time 1030  -JR      Time Calculation (min) 25 min  -JR      PT Received On 08/18/24  -JR      PT - Next Appointment 08/19/24  -JR                User Key  (r) = Recorded By, (t) = Taken By, (c) = Cosigned By      Initials Name Provider Type    Carlin Carmona, PT Physical Therapist                  Therapy Charges for Today        Code Description Service Date Service Provider Modifiers Qty    14113072021 HC GAIT TRAINING EA 15 MIN 8/18/2024 Carlin Joya, PT GP 1    52446163110 HC PT THERAPEUTIC ACT EA 15 MIN 8/18/2024 Carlin Joya, PT GP 1            PT G-Codes  Outcome Measure Options: AM-PAC 6 Clicks Basic Mobility (PT)  AM-PAC 6 Clicks Score (PT): 16    Carlin Joya, PT  8/18/2024

## 2024-08-18 NOTE — PROGRESS NOTES
DAILY PROGRESS NOTE  [facility]    Patient Identification:  Name: Olena Myers  Age: 88 y.o.  Sex: female  :  1935  MRN: [id number]         Primary Care Physician: Manda Keenan MD    Subjective:  Interval History: Patient received IV Lasix unfortunately not till later towards the afternoon evening and has had good urine output.  She notes her breathing feels dramatically better today.  Denies any chest pain.  We discussed further IV diuresis and she would like to try given the improvement.  Denies any new fever nausea vomiting or confusion    Objective: Conversational and pleasant.  Clinically seems more relaxed.  Nontoxic.  No family present    Scheduled Meds:albuterol, 2.5 mg, Nebulization, 4x Daily - RT  atenolol, 25 mg, Oral, Q8H  azelastine, 2 spray, Each Nare, BID  budesonide-formoterol, 2 puff, Inhalation, BID - RT   And  tiotropium bromide monohydrate, 2 puff, Inhalation, Daily - RT  busPIRone, 10 mg, Oral, BID  digoxin, 125 mcg, Oral, Daily  DULoxetine, 60 mg, Oral, BID  guaiFENesin, 600 mg, Oral, Q12H  levothyroxine, 88 mcg, Oral, Q AM  Lidocaine, 2 patch, Transdermal, Q24H  montelukast, 10 mg, Oral, Nightly  mycophenolate, 1,000 mg, Oral, QAM  mycophenolate, 1,500 mg, Oral, Nightly  pantoprazole, 40 mg, Oral, BID AC  pravastatin, 20 mg, Oral, Nightly  raloxifene, 60 mg, Oral, Nightly  rivaroxaban, 20 mg, Oral, Daily With Dinner  sodium chloride, 10 mL, Intravenous, Q12H  torsemide, 20 mg, Oral, Daily  vitamin B-12, 1,000 mcg, Oral, Daily  vitamin D, 50,000 Units, Oral, Q7 Days      Continuous Infusions:sodium chloride, 30 mL/hr, Last Rate: 30 mL/hr (08/15/24 1318)        Vital signs in last 24 hours:  Temp:  [97.5 °F (36.4 °C)-98.1 °F (36.7 °C)] 97.9 °F (36.6 °C)  Heart Rate:  [70-91] 70  Resp:  [18-22] 22  BP: (113-138)/(54-78) 122/78    Intake/Output:    Intake/Output Summary (Last 24 hours) at 2024 1026  Last data filed at 2024 0545  Gross per 24 hour   Intake  "360 ml   Output 400 ml   Net -40 ml       Exam:  /78 (BP Location: Left arm, Patient Position: Lying)   Pulse 70   Temp 97.9 °F (36.6 °C) (Oral)   Resp 22   Ht 152.4 cm (60\")   Wt 64.3 kg (141 lb 12.8 oz)   SpO2 96%   BMI 27.69 kg/m²     General Appearance:    Alert, cooperative, AAOx3                         Throat:   Oral mucosa pink and moist                           Neck:   No JVD                         Lungs:    Diminished bases but better air entry to auscultation bilaterally, respirations unlabored                 Chest Wall:    No tenderness or deformity                          Heart:    Regular rate and rhythm, S1 and S2 normal                  Abdomen:     Soft, nontender, bowel sounds active                  Extremities: Seems euvolemic previously but can definitely appreciate improved volume status/increased wrinkling                        Pulses:   Pulses palpable in all extremities                  Neurologic:   CNII-XII intact     Data Review:  Labs in chart were reviewed.    Assessment:  Active Hospital Problems    Diagnosis  POA    **Exertional dyspnea [R06.09]  Yes    Closed wedge compression fracture of L1 vertebra [S32.010A]  Yes    Abnormal computed tomography of gallbladder [R93.2]  Unknown    Hypothyroidism (acquired) [E03.9]  Yes    Diastolic CHF, chronic [I50.32]  Yes    Iron deficiency anemia [D50.9]  Unknown    COPD (chronic obstructive pulmonary disease) [J44.9]  Yes    Sleep apnea [G47.30]  Yes    Atrial fibrillation [I48.91]  Yes    Myasthenia gravis [G70.00]  Yes    Paroxysmal atrial fibrillation [I48.0]  Yes    CAD (coronary artery disease) [I25.10]  Yes    Essential hypertension [I10]  Yes      Resolved Hospital Problems   No resolved problems to display.       Plan:    Patient responded excellently to 1 dose IV Lasix yesterday with good urine output she states that she is feeling and breathing much better today.  I will give 1 more dose IV Lasix today with lunch " today as she is already on p.o. torsemide.  Perhaps we need to focus on keeping this patient on the drier side.  I may consider increasing her torsemide in the next couple days up to 40 mg    Multifactorial dyspnea   -Volume status reassuring but improved with additional IV Lasix and will give additional doses stated above.  Uric elevated to 10.6/trend   -Pulmonary managing COPD will transition to p.o. steroids   -Deconditioning and obesity    Heme positive stools with AC resumed post endoscopy with no new findings   -Hgb 9.7-9.3-9.6   -Very reassuring the further upward trend with resumption of AC the other day    Leukocytosis secondary to steroids/afebrile.  Procalcitonin normal    Gallbladder distention okayed per surgery    Hypothyroidism levothyroxine    Mild hyponatremia secondary to diuretics    TLSO bracing per [facility]ADIM for L1 VCF with outpatient follow-up plan    Hernan Waldron MD  8/18/2024  10:26 EDT

## 2024-08-19 ENCOUNTER — TELEPHONE (OUTPATIENT)
Dept: NEUROSURGERY | Facility: CLINIC | Age: 89
End: 2024-08-19
Payer: MEDICARE

## 2024-08-19 DIAGNOSIS — S32.010S CLOSED WEDGE COMPRESSION FRACTURE OF L1 VERTEBRA, SEQUELA: Primary | ICD-10-CM

## 2024-08-19 LAB
ANION GAP SERPL CALCULATED.3IONS-SCNC: 13 MMOL/L (ref 5–15)
BUN SERPL-MCNC: 28 MG/DL (ref 8–23)
BUN/CREAT SERPL: 25.7 (ref 7–25)
CALCIUM SPEC-SCNC: 8.9 MG/DL (ref 8.6–10.5)
CHLORIDE SERPL-SCNC: 97 MMOL/L (ref 98–107)
CO2 SERPL-SCNC: 26 MMOL/L (ref 22–29)
CREAT SERPL-MCNC: 1.09 MG/DL (ref 0.57–1)
DEPRECATED RDW RBC AUTO: 49.6 FL (ref 37–54)
EGFRCR SERPLBLD CKD-EPI 2021: 49.6 ML/MIN/1.73
ERYTHROCYTE [DISTWIDTH] IN BLOOD BY AUTOMATED COUNT: 14.4 % (ref 12.3–15.4)
GLUCOSE SERPL-MCNC: 104 MG/DL (ref 65–99)
HCT VFR BLD AUTO: 26.9 % (ref 34–46.6)
HGB BLD-MCNC: 8.5 G/DL (ref 12–15.9)
MCH RBC QN AUTO: 29.8 PG (ref 26.6–33)
MCHC RBC AUTO-ENTMCNC: 31.6 G/DL (ref 31.5–35.7)
MCV RBC AUTO: 94.4 FL (ref 79–97)
PLATELET # BLD AUTO: 403 10*3/MM3 (ref 140–450)
PMV BLD AUTO: 9.3 FL (ref 6–12)
POTASSIUM SERPL-SCNC: 3.6 MMOL/L (ref 3.5–5.2)
POTASSIUM SERPL-SCNC: 5.4 MMOL/L (ref 3.5–5.2)
RBC # BLD AUTO: 2.85 10*6/MM3 (ref 3.77–5.28)
SODIUM SERPL-SCNC: 136 MMOL/L (ref 136–145)
URATE SERPL-MCNC: 10.8 MG/DL (ref 2.4–5.7)
WBC NRBC COR # BLD AUTO: 17.73 10*3/MM3 (ref 3.4–10.8)

## 2024-08-19 PROCEDURE — 94760 N-INVAS EAR/PLS OXIMETRY 1: CPT

## 2024-08-19 PROCEDURE — 94761 N-INVAS EAR/PLS OXIMETRY MLT: CPT

## 2024-08-19 PROCEDURE — 84550 ASSAY OF BLOOD/URIC ACID: CPT | Performed by: HOSPITALIST

## 2024-08-19 PROCEDURE — 94799 UNLISTED PULMONARY SVC/PX: CPT

## 2024-08-19 PROCEDURE — 63710000001 MYCOPHENOLATE MOFETIL PER 250 MG: Performed by: INTERNAL MEDICINE

## 2024-08-19 PROCEDURE — 94664 DEMO&/EVAL PT USE INHALER: CPT

## 2024-08-19 PROCEDURE — 80048 BASIC METABOLIC PNL TOTAL CA: CPT | Performed by: HOSPITALIST

## 2024-08-19 PROCEDURE — 84132 ASSAY OF SERUM POTASSIUM: CPT | Performed by: HOSPITALIST

## 2024-08-19 PROCEDURE — 85027 COMPLETE CBC AUTOMATED: CPT | Performed by: HOSPITALIST

## 2024-08-19 RX ORDER — POTASSIUM CHLORIDE 1.5 G/1.58G
40 POWDER, FOR SOLUTION ORAL EVERY 4 HOURS
Status: COMPLETED | OUTPATIENT
Start: 2024-08-19 | End: 2024-08-19

## 2024-08-19 RX ADMIN — MYCOPHENOLATE MOFETIL 1500 MG: 250 CAPSULE ORAL at 20:57

## 2024-08-19 RX ADMIN — ATENOLOL 25 MG: 25 TABLET ORAL at 22:29

## 2024-08-19 RX ADMIN — DULOXETINE HYDROCHLORIDE 60 MG: 60 CAPSULE, DELAYED RELEASE ORAL at 20:58

## 2024-08-19 RX ADMIN — PANTOPRAZOLE SODIUM 40 MG: 40 TABLET, DELAYED RELEASE ORAL at 08:32

## 2024-08-19 RX ADMIN — ACETAMINOPHEN 325MG 650 MG: 325 TABLET ORAL at 08:33

## 2024-08-19 RX ADMIN — ALBUTEROL SULFATE 2.5 MG: 2.5 SOLUTION RESPIRATORY (INHALATION) at 11:00

## 2024-08-19 RX ADMIN — RIVAROXABAN 15 MG: 15 TABLET, FILM COATED ORAL at 18:13

## 2024-08-19 RX ADMIN — MYCOPHENOLATE MOFETIL 1000 MG: 250 CAPSULE ORAL at 12:37

## 2024-08-19 RX ADMIN — TIOTROPIUM BROMIDE INHALATION SPRAY 2 PUFF: 3.12 SPRAY, METERED RESPIRATORY (INHALATION) at 07:39

## 2024-08-19 RX ADMIN — ACETAMINOPHEN 325MG 650 MG: 325 TABLET ORAL at 20:57

## 2024-08-19 RX ADMIN — Medication 1000 MCG: at 08:32

## 2024-08-19 RX ADMIN — BUSPIRONE HYDROCHLORIDE 10 MG: 10 TABLET ORAL at 08:32

## 2024-08-19 RX ADMIN — DIGOXIN 125 MCG: 125 TABLET ORAL at 12:37

## 2024-08-19 RX ADMIN — ALBUTEROL SULFATE 2.5 MG: 2.5 SOLUTION RESPIRATORY (INHALATION) at 19:42

## 2024-08-19 RX ADMIN — Medication 10 ML: at 20:58

## 2024-08-19 RX ADMIN — ALBUTEROL SULFATE 2.5 MG: 2.5 SOLUTION RESPIRATORY (INHALATION) at 15:01

## 2024-08-19 RX ADMIN — ATENOLOL 25 MG: 25 TABLET ORAL at 08:32

## 2024-08-19 RX ADMIN — BUDESONIDE AND FORMOTEROL FUMARATE DIHYDRATE 2 PUFF: 160; 4.5 AEROSOL RESPIRATORY (INHALATION) at 07:37

## 2024-08-19 RX ADMIN — TORSEMIDE 20 MG: 20 TABLET ORAL at 08:31

## 2024-08-19 RX ADMIN — ACETAMINOPHEN 325MG 650 MG: 325 TABLET ORAL at 02:29

## 2024-08-19 RX ADMIN — BUSPIRONE HYDROCHLORIDE 10 MG: 10 TABLET ORAL at 20:58

## 2024-08-19 RX ADMIN — AZELASTINE HYDROCHLORIDE 2 SPRAY: 137 SPRAY, METERED NASAL at 20:58

## 2024-08-19 RX ADMIN — PRAVASTATIN SODIUM 20 MG: 20 TABLET ORAL at 20:58

## 2024-08-19 RX ADMIN — POTASSIUM CHLORIDE 40 MEQ: 1.5 POWDER, FOR SOLUTION ORAL at 08:33

## 2024-08-19 RX ADMIN — GUAIFENESIN 600 MG: 600 TABLET, EXTENDED RELEASE ORAL at 20:58

## 2024-08-19 RX ADMIN — LIDOCAINE 2 PATCH: 4 PATCH TOPICAL at 08:31

## 2024-08-19 RX ADMIN — GUAIFENESIN 600 MG: 600 TABLET, EXTENDED RELEASE ORAL at 08:31

## 2024-08-19 RX ADMIN — DULOXETINE HYDROCHLORIDE 60 MG: 60 CAPSULE, DELAYED RELEASE ORAL at 08:31

## 2024-08-19 RX ADMIN — AZELASTINE HYDROCHLORIDE 2 SPRAY: 137 SPRAY, METERED NASAL at 08:51

## 2024-08-19 RX ADMIN — POTASSIUM CHLORIDE 40 MEQ: 1.5 POWDER, FOR SOLUTION ORAL at 12:38

## 2024-08-19 RX ADMIN — BUDESONIDE AND FORMOTEROL FUMARATE DIHYDRATE 2 PUFF: 160; 4.5 AEROSOL RESPIRATORY (INHALATION) at 19:42

## 2024-08-19 RX ADMIN — ATENOLOL 25 MG: 25 TABLET ORAL at 14:20

## 2024-08-19 RX ADMIN — LEVOTHYROXINE SODIUM 88 MCG: 88 TABLET ORAL at 06:11

## 2024-08-19 RX ADMIN — ACETAMINOPHEN 325MG 650 MG: 325 TABLET ORAL at 12:37

## 2024-08-19 RX ADMIN — Medication 10 ML: at 08:33

## 2024-08-19 RX ADMIN — ACETAMINOPHEN 325MG 650 MG: 325 TABLET ORAL at 17:14

## 2024-08-19 RX ADMIN — RALOXIFENE HYDROCHLORIDE 60 MG: 60 TABLET, FILM COATED ORAL at 20:58

## 2024-08-19 RX ADMIN — PANTOPRAZOLE SODIUM 40 MG: 40 TABLET, DELAYED RELEASE ORAL at 18:13

## 2024-08-19 RX ADMIN — MONTELUKAST SODIUM 10 MG: 10 TABLET, FILM COATED ORAL at 20:58

## 2024-08-19 RX ADMIN — ALBUTEROL SULFATE 2.5 MG: 2.5 SOLUTION RESPIRATORY (INHALATION) at 07:33

## 2024-08-19 NOTE — CASE MANAGEMENT/SOCIAL WORK
Post-Acute Authorization Submission      Post Acute Pre-Cert Documentation  Request Submitted by Facility - Type:: Hospital  Post-Acute Authorization Type Submitted:: SNF  Date Post Acute Pre-Cert Inititated per Facility: 08/19/24  Accepting Facility: [facility] Discharge Date Requested: 08/20/24  All Clinicals Submitted?: Yes  Had Accepting Facility at Time of Submission: Yes  Response Communicated to:: , Accepting Facility Liaison  Authorization Number:: PENDING [id number]              RAY Mendieta

## 2024-08-19 NOTE — PLAN OF CARE
Goal Outcome Evaluation:  Plan of Care Reviewed With: patient        Progress: no change  Outcome Evaluation: Pt encouraged to wear TLSO brace when transferring back to bed, pt refused. Continues on 2L NC. PRN Tylenol given for pain. Bed alarm in place.

## 2024-08-19 NOTE — CASE MANAGEMENT/SOCIAL WORK
Continued Stay Note  [facility]     Patient Name: Olena Myers  MRN: [id number]  Today's Date: 8/19/2024    Admit Date: 8/10/2024    Plan: DC to [place], will need transportation arranged   Discharge Plan       Row Name 08/19/24 1154       Plan    Plan DC to [place], will need transportation arranged    Plan Comments Pt has been accepted at [place]. Pt and son agreeable to go to [place]. Pre-cert has been initiated. Will need transportation arranged once pre-cert obtained.                   Discharge Codes    No documentation.                 Expected Discharge Date and Time       Expected Discharge Date Expected Discharge Time    Aug 21, 2024               Ruth Calvillo RN

## 2024-08-19 NOTE — TELEPHONE ENCOUNTER
----- Message from Mellisa Nunez sent at 8/18/2024  9:41 AM EDT -----  Regarding: hospital follow-up  Please schedule the patient in four weeks with lumbar xrays while wearing TLSO. L1 VCF    Thank you!

## 2024-08-19 NOTE — PROGRESS NOTES
DAILY PROGRESS NOTE  [facility]    Patient Identification:  Name: Olena Myers  Age: 88 y.o.  Sex: female  :  1935  MRN: [id number]         Primary Care Physician: Manda Keenan MD    Subjective:  Interval History: Was feeling more short of breath about the time of my exam she was stating that she is breathing better.  I can note a little bit of confusion which is understandable given her years of 88.  She is not complaining of any nausea vomiting fever and overall is breathing much better    Objective: Conversational pleasant.  Sitting in bedside chair    Scheduled Meds:albuterol, 2.5 mg, Nebulization, 4x Daily - RT  atenolol, 25 mg, Oral, Q8H  azelastine, 2 spray, Each Nare, BID  budesonide-formoterol, 2 puff, Inhalation, BID - RT   And  tiotropium bromide monohydrate, 2 puff, Inhalation, Daily - RT  busPIRone, 10 mg, Oral, BID  digoxin, 125 mcg, Oral, Daily  DULoxetine, 60 mg, Oral, BID  guaiFENesin, 600 mg, Oral, Q12H  levothyroxine, 88 mcg, Oral, Q AM  Lidocaine, 2 patch, Transdermal, Q24H  montelukast, 10 mg, Oral, Nightly  mycophenolate, 1,000 mg, Oral, QAM  mycophenolate, 1,500 mg, Oral, Nightly  pantoprazole, 40 mg, Oral, BID AC  potassium chloride, 40 mEq, Oral, Q4H  pravastatin, 20 mg, Oral, Nightly  raloxifene, 60 mg, Oral, Nightly  rivaroxaban, 15 mg, Oral, Daily With Dinner  sodium chloride, 10 mL, Intravenous, Q12H  torsemide, 20 mg, Oral, Daily  vitamin B-12, 1,000 mcg, Oral, Daily  vitamin D, 50,000 Units, Oral, Q7 Days      Continuous Infusions:sodium chloride, 30 mL/hr, Last Rate: 30 mL/hr (08/15/24 1318)        Vital signs in last 24 hours:  Temp:  [97.3 °F (36.3 °C)-98.8 °F (37.1 °C)] 98.8 °F (37.1 °C)  Heart Rate:  [71-85] 71  Resp:  [16-24] 16  BP: (118-153)/(57-84) 135/84    Intake/Output:    Intake/Output Summary (Last 24 hours) at 2024 1235  Last data filed at 2024 0854  Gross per 24 hour   Intake 500 ml   Output 800 ml   Net -300 ml       Exam:  BP  "135/84 (BP Location: Left arm, Patient Position: Lying)   Pulse 71   Temp 98.8 °F (37.1 °C) (Oral)   Resp 16   Ht 152.4 cm (60\")   Wt 64.3 kg (141 lb 12.8 oz)   SpO2 (!) 78%   BMI 27.69 kg/m²     General Appearance:    Alert, cooperative, nontoxic, clinically looks much better                         Lungs:   Diminished otherwise clear to auscultation bilaterally, respirations unlabored                          Heart:    Regular rate and rhythm, S1 and S2 normal                  Abdomen:     Soft, nontender, bowel sounds active                 Extremities: Overall volume status much improved, chronic stasis changes but more wrinkling noted                        Pulses:   Pulses palpable in all extremities                               Data Review:  Labs in chart were reviewed.    Assessment:  Active Hospital Problems    Diagnosis  POA    **Exertional dyspnea [R06.09]  Yes    Closed wedge compression fracture of L1 vertebra [S32.010A]  Yes    Abnormal computed tomography of gallbladder [R93.2]  Unknown    Hypothyroidism (acquired) [E03.9]  Yes    Diastolic CHF, chronic [I50.32]  Yes    Iron deficiency anemia [D50.9]  Unknown    COPD (chronic obstructive pulmonary disease) [J44.9]  Yes    Sleep apnea [G47.30]  Yes    Atrial fibrillation [I48.91]  Yes    Myasthenia gravis [G70.00]  Yes    Paroxysmal atrial fibrillation [I48.0]  Yes    CAD (coronary artery disease) [I25.10]  Yes    Essential hypertension [I10]  Yes      Resolved Hospital Problems   No resolved problems to display.       Plan:    Patient seems much better with additional diuresis.  Uric 10+ and creatinine 1.09    Due to generalized weakness, case discussed in multidisciplinary rounds and CCP coordinating SNF with son    Pulmonary dosing COPD with transition to p.o. steroids    Hemoglobin stabilized and improvement even with the resumption of AC with a slight drop from 9 6 down to 8 5    Reactive leukocytosis from steroids " improving/afebrile    TLSO bracing per VADIM for L1 VCF        Disposition -CCP coordinating SNF with pre-CERT pending and hopeful transfer in the next 24 to 48 hours    Hernan Waldron MD  8/19/2024  12:35 EDT

## 2024-08-19 NOTE — TELEPHONE ENCOUNTER
08/19/24 I WAS ASKED TO BOOK 4 WEEK HOSPITAL FOLLOW UP APPOINTMENT WITH XRAY WHILE PT IS FLORA TLSO BRACE.  PT IS SCHEDULED ON 09/19/24 AT 10:30 A.M AND XRAY (IN DEPT) ON SAME DAY AT 10:15 A.M.  PT IS CURENTLY INPT ROOM 435-1  I MAILED BOTH APPOINTMENT REMINDERS TO ADDRESS ON FILE.

## 2024-08-19 NOTE — CASE MANAGEMENT/SOCIAL WORK
Post-Acute Authorization Submission      Post Acute Pre-Cert Documentation  Request Submitted by Facility - Type:: Hospital  Post-Acute Authorization Type Submitted:: SNF  Date Post Acute Pre-Cert Inititated per Facility: 08/19/24  Date Post Acute Pre-Cert Completed: 08/19/24  Accepting Facility: [facility] Discharge Date Requested: 08/20/24  All Clinicals Submitted?: Yes  Had Accepting Facility at Time of Submission: Yes  Response Received from Insurance?: Approval  Response Communicated to:: , Accepting Facility Liaison, Accepting Facility Auth Department  Authorization Number:: [id number]  Post Acute Pre-Cert Initiated Comment: VALID TO ADMIT UPTO 8/23/2024.              Charbel Eli, PCT

## 2024-08-20 VITALS
RESPIRATION RATE: 20 BRPM | TEMPERATURE: 97.9 F | BODY MASS INDEX: 27.84 KG/M2 | OXYGEN SATURATION: 100 % | HEIGHT: 60 IN | SYSTOLIC BLOOD PRESSURE: 137 MMHG | HEART RATE: 72 BPM | DIASTOLIC BLOOD PRESSURE: 97 MMHG | WEIGHT: 141.8 LBS

## 2024-08-20 PROBLEM — I50.33 DIASTOLIC CHF, ACUTE ON CHRONIC: Status: ACTIVE | Noted: 2024-08-20

## 2024-08-20 LAB
DEPRECATED RDW RBC AUTO: 49.8 FL (ref 37–54)
ERYTHROCYTE [DISTWIDTH] IN BLOOD BY AUTOMATED COUNT: 14.5 % (ref 12.3–15.4)
HCT VFR BLD AUTO: 28.5 % (ref 34–46.6)
HGB BLD-MCNC: 8.9 G/DL (ref 12–15.9)
MCH RBC QN AUTO: 29.7 PG (ref 26.6–33)
MCHC RBC AUTO-ENTMCNC: 31.2 G/DL (ref 31.5–35.7)
MCV RBC AUTO: 95 FL (ref 79–97)
PLATELET # BLD AUTO: 379 10*3/MM3 (ref 140–450)
PMV BLD AUTO: 9.2 FL (ref 6–12)
RBC # BLD AUTO: 3 10*6/MM3 (ref 3.77–5.28)
WBC NRBC COR # BLD AUTO: 13.93 10*3/MM3 (ref 3.4–10.8)

## 2024-08-20 PROCEDURE — 94664 DEMO&/EVAL PT USE INHALER: CPT

## 2024-08-20 PROCEDURE — 97530 THERAPEUTIC ACTIVITIES: CPT

## 2024-08-20 PROCEDURE — 94799 UNLISTED PULMONARY SVC/PX: CPT

## 2024-08-20 PROCEDURE — 85027 COMPLETE CBC AUTOMATED: CPT | Performed by: HOSPITALIST

## 2024-08-20 PROCEDURE — 94761 N-INVAS EAR/PLS OXIMETRY MLT: CPT

## 2024-08-20 PROCEDURE — 63710000001 MYCOPHENOLATE MOFETIL PER 250 MG: Performed by: INTERNAL MEDICINE

## 2024-08-20 RX ORDER — ATENOLOL 25 MG/1
25 TABLET ORAL EVERY 8 HOURS
Start: 2024-08-20

## 2024-08-20 RX ORDER — LIDOCAINE 4 G/G
2 PATCH TOPICAL
Start: 2024-08-21

## 2024-08-20 RX ORDER — GUAIFENESIN 600 MG/1
600 TABLET, EXTENDED RELEASE ORAL EVERY 12 HOURS SCHEDULED
Start: 2024-08-20

## 2024-08-20 RX ORDER — TORSEMIDE 20 MG/1
40 TABLET ORAL DAILY
Start: 2024-08-20

## 2024-08-20 RX ORDER — DIGOXIN 125 MCG
125 TABLET ORAL
Start: 2024-08-20

## 2024-08-20 RX ADMIN — ATENOLOL 25 MG: 25 TABLET ORAL at 06:00

## 2024-08-20 RX ADMIN — DIGOXIN 125 MCG: 125 TABLET ORAL at 10:59

## 2024-08-20 RX ADMIN — AZELASTINE HYDROCHLORIDE 2 SPRAY: 137 SPRAY, METERED NASAL at 09:19

## 2024-08-20 RX ADMIN — ACETAMINOPHEN 325MG 650 MG: 325 TABLET ORAL at 01:25

## 2024-08-20 RX ADMIN — DULOXETINE HYDROCHLORIDE 60 MG: 60 CAPSULE, DELAYED RELEASE ORAL at 09:27

## 2024-08-20 RX ADMIN — Medication 1000 MCG: at 09:26

## 2024-08-20 RX ADMIN — BUSPIRONE HYDROCHLORIDE 10 MG: 10 TABLET ORAL at 09:27

## 2024-08-20 RX ADMIN — ACETAMINOPHEN 325MG 650 MG: 325 TABLET ORAL at 06:00

## 2024-08-20 RX ADMIN — ACETAMINOPHEN 325MG 650 MG: 325 TABLET ORAL at 10:20

## 2024-08-20 RX ADMIN — ALBUTEROL SULFATE 2.5 MG: 2.5 SOLUTION RESPIRATORY (INHALATION) at 08:08

## 2024-08-20 RX ADMIN — LEVOTHYROXINE SODIUM 88 MCG: 88 TABLET ORAL at 06:00

## 2024-08-20 RX ADMIN — TIOTROPIUM BROMIDE INHALATION SPRAY 2 PUFF: 3.12 SPRAY, METERED RESPIRATORY (INHALATION) at 08:10

## 2024-08-20 RX ADMIN — PANTOPRAZOLE SODIUM 40 MG: 40 TABLET, DELAYED RELEASE ORAL at 05:59

## 2024-08-20 RX ADMIN — BUDESONIDE AND FORMOTEROL FUMARATE DIHYDRATE 2 PUFF: 160; 4.5 AEROSOL RESPIRATORY (INHALATION) at 08:09

## 2024-08-20 RX ADMIN — MYCOPHENOLATE MOFETIL 1000 MG: 250 CAPSULE ORAL at 10:58

## 2024-08-20 RX ADMIN — LIDOCAINE 2 PATCH: 4 PATCH TOPICAL at 09:23

## 2024-08-20 RX ADMIN — GUAIFENESIN 600 MG: 600 TABLET, EXTENDED RELEASE ORAL at 09:22

## 2024-08-20 RX ADMIN — TORSEMIDE 20 MG: 20 TABLET ORAL at 09:22

## 2024-08-20 NOTE — THERAPY TREATMENT NOTE
Patient Name: Olena Myers  : 1935    MRN: [id number]                              Today's Date: 2024       Admit Date: 8/10/2024    Visit Dx:     ICD-10-CM ICD-9-CM   1. Exertional dyspnea  R06.09 786.09   2. Pain of upper abdomen associated with exertion and has complete resolution  R10.10 789.09   3. Chronic anemia  D64.9 285.9   4. Chronic respiratory failure with hypoxia  J96.11 518.83     799.02   5. Abnormal computed tomography of gallbladder  R93.2 793.3   6. Iron deficiency anemia due to chronic blood loss  D50.0 280.0   7. Decreased mobility and endurance  Z74.09 780.99     Patient Active Problem List   Diagnosis    Pre-operative cardiovascular examination    S/p reverse total shoulder arthroplasty    Myasthenia gravis    Paroxysmal atrial fibrillation    HX: long term anticoagulant use    Essential hypertension    CAD (coronary artery disease)    Rhinovirus    Atrial fibrillation    S/P reverse total shoulder arthroplasty, right    Acute UTI    COPD (chronic obstructive pulmonary disease)    Metabolic encephalopathy    Sleep apnea    Sepsis    Dyspnea    Chronic diastolic CHF (congestive heart failure)    Diastolic CHF, chronic    Heme positive stool    E coli bacteremia    Iron deficiency anemia    Current chronic use of systemic steroids    COPD exacerbation    Obesity (BMI 30-39.9)    COVID-19 virus infection    Chronic respiratory failure with hypoxia    Cytokine release syndrome, grade 1    Altered mental status    Anemia    KELLEE (acute kidney injury)    Hypothyroidism (acquired)    Exertional dyspnea    Abnormal computed tomography of gallbladder    Closed wedge compression fracture of L1 vertebra     Past Medical History:   Diagnosis Date    Anemia     IRON DEFICIENCY    Arthritis     Asthma     Atrial fibrillation     CAD (coronary artery disease)     HEART STENT    COPD (chronic obstructive pulmonary disease)     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    Frequent urinary tract  infections     History of gastric ulcer     History of GI bleed     [place] (hard of hearing)     Hyperlipidemia     Hypertension     Hyponatremia     Lightheadedness     LVH (left ventricular hypertrophy)     MG (myasthenia gravis)     Mini stroke 10/2016, 3/2017    OA (osteoarthritis)     Osteoporosis     Sleep apnea     USES CPAP    SOB (shortness of breath)     WALKING     Past Surgical History:   Procedure Laterality Date    APPENDECTOMY      BRONCHOSCOPY N/A 3/31/2022    Procedure: BRONCHOSCOPY WITH BAL RIGHT LOWER LOBE;  Surgeon: Remy Tirado MD;  Location: [facility] ENDOSCOPY;  Service: Pulmonary;  Laterality: N/A;  COPD EXACERBATION      CARPAL TUNNEL RELEASE Bilateral     CATARACT EXTRACTION Bilateral     COLONOSCOPY N/A 8/15/2024    Procedure: COLONOSCOPY TO CECUM & T.I.;  Surgeon: Charlie Zacarias MD;  Location: [facility] ENDOSCOPY;  Service: Gastroenterology;  Laterality: N/A;  PRE- ANEMIA, DIARRHEA  POST-    CORONARY STENT PLACEMENT      ENDOSCOPY N/A 8/15/2024    Procedure: ESOPHAGOGASTRODUODENOSCOPY WITH COLD BIOPSIES;  Surgeon: Charlie Zacarias MD;  Location: [facility] ENDOSCOPY;  Service: Gastroenterology;  Laterality: N/A;  PRE- ANEMIA  POST- NORMAL    TOTAL HIP ARTHROPLASTY Bilateral     TOTAL SHOULDER ARTHROPLASTY W/ DISTAL CLAVICLE EXCISION Left 7/19/2016    Procedure: LT TOTAL SHOULDER REVERSE ARTHROPLASTY;  Surgeon: NAHOMI Solorzano MD;  Location: [facility] OR [facility] [place];  Service:     TOTAL SHOULDER ARTHROPLASTY W/ DISTAL CLAVICLE EXCISION Right 1/8/2019    Procedure: RIGHT TOTAL SHOULDER REVERSE ARTHROPLASTY;  Surgeon: Jovanny Solorzano MD;  Location: [facility] OR [facility] [place];  Service: Orthopedics      General Information       Row Name 08/20/24 0801          Physical Therapy Time and Intention    Document Type therapy note (daily note)  -MS     Mode of Treatment physical therapy;individual therapy  -MS       Row Name 08/20/24 0801          General Information    Patient Profile Reviewed yes  -MS     Existing  Precautions/Restrictions oxygen therapy device and L/min   Exit alarm;  Has back brace but adamantly refuses to wear despite education  -MS     Barriers to Rehab none identified  -MS       Row Name 08/20/24 0801          Cognition    Orientation Status (Cognition) oriented to;person;place  -MS       Row Name 08/20/24 0801          Safety Issues, Functional Mobility    Comment, Safety Issues/Impairments (Mobility) Gait belt used for safety.  -MS               User Key  (r) = Recorded By, (t) = Taken By, (c) = Cosigned By      Initials Name Provider Type    MS YiBoris, PT Physical Therapist                   Mobility       Row Name 08/20/24 0802          Bed Mobility    Comment, (Bed Mobility) Pt. up in chair this AM.  -MS       Row Name 08/20/24 0802          Sit-Stand Transfer    Sit-Stand Willard (Transfers) contact guard  -MS     Assistive Device (Sit-Stand Transfers) walker, front-wheeled  -MS     Comment, (Sit-Stand Transfer) pt. performed sit <-> stand transfers x 3 reps for functional strength training.  -MS       Row Name 08/20/24 0802          Gait/Stairs (Locomotion)    Willard Level (Gait) minimum assist (75% patient effort)  -MS     Assistive Device (Gait) walker, front-wheeled  -MS     Distance in Feet (Gait) 15  -MS     Deviations/Abnormal Patterns (Gait) michael decreased;stride length decreased  -MS     Bilateral Gait Deviations forward flexed posture  -MS     Comment, (Gait/Stairs) Verbal/tactile cues given for posture correction and Rwx guidance.  Limited in gait distance due to fatigue and weakness.  -MS               User Key  (r) = Recorded By, (t) = Taken By, (c) = Cosigned By      Initials Name Provider Type    MS YiBoris, PT Physical Therapist                   Obj/Interventions       Row Name 08/20/24 0803          Motor Skills    Therapeutic Exercise --  BLE ther. ex. program x 10 reps completed (Ankle pumps, Hip Flexion, LAQ's)  -MS               User Key  (r)  = Recorded By, (t) = Taken By, (c) = Cosigned By      Initials Name Provider Type    MS Yi Boris WRIGHT, PT Physical Therapist                   Goals/Plan       Row Name 08/20/24 0804          Bed Mobility Goal 1 (PT)    Activity/Assistive Device (Bed Mobility Goal 1, PT) bed mobility activities, all  -MS     Sidney Level/Cues Needed (Bed Mobility Goal 1, PT) standby assist  -MS     Time Frame (Bed Mobility Goal 1, PT) long term goal (LTG);5 days  -MS       Row Name 08/20/24 0804          Transfer Goal 1 (PT)    Activity/Assistive Device (Transfer Goal 1, PT) transfers, all;walker, rolling  -MS     S[place]ney Level/Cues Needed (Transfer Goal 1, PT) standby assist  -MS     Time Frame (Transfer Goal 1, PT) long term goal (LTG);5 days  -MS       Row Name 08/20/24 0804          Gait Training Goal 1 (PT)    Activity/Assistive Device (Gait Training Goal 1, PT) gait (walking locomotion);walker, rolling  -MS     Sidney Level (Gait Training Goal 1, PT) contact guard required  -MS     Distance (Gait Training Goal 1, PT) 30 feet  -MS     Time Frame (Gait Training Goal 1, PT) long term goal (LTG);5 days  -MS       Row Name 08/20/24 0804          Therapy Assessment/Plan (PT)    Planned Therapy Interventions (PT) balance training;bed mobility training;gait training;home exercise program;patient/family education;postural re-education;transfer training;strengthening  -MS               User Key  (r) = Recorded By, (t) = Taken By, (c) = Cosigned By      Initials Name Provider Type    MS Yi Boris ADRIANA, PT Physical Therapist                   Clinical Impression       Row Name 08/20/24 0803          Pain    Pretreatment Pain Rating 0/10 - no pain  -MS     Posttreatment Pain Rating 0/10 - no pain  -MS       [facility] Name 08/20/24 0803          Vital Signs    Pre SpO2 (%) 98  -MS     O2 Delivery Pre Treatment supplemental O2  2 liters oxygen  -MS     Intra SpO2 (%) 98  -MS     O2 Delivery Intra Treatment supplemental O2   2 liters oxygen  -MS     Post SpO2 (%) 99  -MS     O2 Delivery Post Treatment supplemental O2  2 liters oxygen  -MS       Row Name 08/20/24 0803          Positioning and Restraints    Pre-Treatment Position sitting in chair/recliner  -MS     Post Treatment Position chair  -MS     In Chair notified nsg;reclined;sitting;call light within reach;encouraged to call for assist;exit alarm on  All lines intact. V.S.S.  -MS               User Key  (r) = Recorded By, (t) = Taken By, (c) = Cosigned By      Initials Name Provider Type    Boris De Jesus, PT Physical Therapist                   Outcome Measures       Row Name 08/20/24 0805 08/19/24 2055       How much help from another person do you currently need...    Turning from your back to your side while in flat bed without using bedrails? 3  -MS 3  -JK    Moving from lying on back to sitting on the side of a flat bed without bedrails? 3  -MS 3  -JK    Moving to and from a bed to a chair (including a wheelchair)? 3  -MS 2  -JK    Standing up from a chair using your arms (e.g., wheelchair, bedside chair)? 3  -MS 3  -JK    Climbing 3-5 steps with a railing? 2  -MS 2  -JK    To walk in hospital room? 3  -MS 3  -JK    AM-PAC 6 Clicks Score (PT) 17  -MS 16  -JK    Highest Level of Mobility Goal 5 --> Static standing  -MS 5 --> Static standing  -JK      Row Name 08/20/24 0805          Functional Assessment    Outcome Measure Options AM-PAC 6 Clicks Basic Mobility (PT)  -MS               User Key  (r) = Recorded By, (t) = Taken By, (c) = Cosigned By      Initials Name Provider Type    Raysa oSlorzano RN Registered Nurse    Boris De Jesus, PT Physical Therapist                                 Physical Therapy Education       Title: PT OT SLP Therapies (Done)       Topic: Physical Therapy (Done)       Point: Mobility training (Done)       Learning Progress Summary             Patient Acceptance, E,D, VU,NR by MS at 8/20/2024 0806    Acceptance, E,D, VU,NR by JR at  8/18/2024 1058    Acceptance, E,D, VU,NR by MS at 8/16/2024 0923    Acceptance, E, VU by SM at 8/14/2024 1538    Acceptance, E,D,TB, VU,DU by EB at 8/13/2024 1329    Acceptance, E,TB, VU,NR by CS at 8/11/2024 0919                         Point: Home exercise program (Done)       Learning Progress Summary             Patient Acceptance, E,D, VU,NR by MS at 8/20/2024 0806    Acceptance, E,D, VU,NR by JR at 8/18/2024 1058    Acceptance, E,D, VU,NR by MS at 8/16/2024 0923    Acceptance, E, VU by SM at 8/14/2024 1538    Acceptance, E,D,TB, VU,DU by EB at 8/13/2024 1329    Acceptance, E,TB, VU,NR by CS at 8/11/2024 0919                         Point: Body mechanics (Done)       Learning Progress Summary             Patient Acceptance, E,D, VU,NR by MS at 8/20/2024 0806    Acceptance, E,D, VU,NR by JR at 8/18/2024 1058    Acceptance, E,D, VU,NR by MS at 8/16/2024 0923    Acceptance, E, VU by SM at 8/14/2024 1538    Acceptance, E,D,TB, VU,DU by EB at 8/13/2024 1329    Acceptance, E,TB, VU,NR by CS at 8/11/2024 0919                         Point: Precautions (Done)       Learning Progress Summary             Patient Acceptance, E,D, VU,NR by MS at 8/20/2024 0806    Acceptance, E,D, VU,NR by JR at 8/18/2024 1058    Acceptance, E,D, VU,NR by MS at 8/16/2024 0923    Acceptance, E, VU by SM at 8/14/2024 1538    Acceptance, E,D,TB, VU,DU by EB at 8/13/2024 1329    Acceptance, E,TB, VU,NR by CS at 8/11/2024 0919                                         User Key       Initials Effective Dates Name Provider Type Discipline    MS 06/16/21 -  Boris Yi PT Physical Therapist PT     06/16/21 -  Carlin Joya, PT Physical Therapist PT    EB 02/14/23 -  Nahomy Bennett, PTA Physical Therapist Assistant PT    CS 07/11/23 -  Eran Rodriguez, PT Physical Therapist PT    SM 05/02/22 -  Nahed Jose, ABI Physical Therapist PT                  PT Recommendation and Plan  Planned Therapy Interventions (PT): balance training, bed  mobility training, gait training, home exercise program, patient/family education, postural re-education, transfer training, strengthening  Plan of Care Reviewed With: patient  Outcome Evaluation: Upon entering room, pt. sitting up in chair (reclined), awake/alert, and agreeable to work with P.T. this date despite c/o fatigue, weakness, and SOA.  This AM, pt. able to ambulate 15 feet, Min. assist x 1, with use of Rwx.  Pt. requires CGA x 1 for sit <-> stand transfers.  Pt. performed sit <-> stand transfers x 3 reps for functional strength training. BLE ther. ex. program x 10 reps completed for general strengthening. Verbal/tactile cues given during ambulation for posture correction and Rwx guidance.  Limited in gait distance this AM due to fatigue and weakness.  Pt. also c/o SOA throughout but oxygen saturations remained in the high 90 percentile throughout therapy session. Will continue to progress functional mobility as tolerated.     Time Calculation:         PT Charges       Row Name 08/20/24 0810             Time Calculation    Start Time 0740  -MS      Stop Time 0755  -MS      Time Calculation (min) 15 min  -MS      PT Received On 08/20/24  -MS      PT - Next Appointment 08/21/24  -MS      PT Goal Re-Cert Due Date 08/25/24  -MS         Time Calculation- PT    Total Timed Code Minutes- PT 14 minute(s)  -MS                User Key  (r) = Recorded By, (t) = Taken By, (c) = Cosigned By      Initials Name Provider Type    Boris De Jesus, PT Physical Therapist                  Therapy Charges for Today       Code Description Service Date Service Provider Modifiers Qty    [id number]  PT THERAPEUTIC ACT EA 15 MIN 8/20/2024 Boris Yi, PT GP 1            PT G-Codes  Outcome Measure Options: AM-PAC 6 Clicks Basic Mobility (PT)  AM-PAC 6 Clicks Score (PT): 17  PT Discharge Summary  Anticipated Discharge Disposition (PT): skilled nursing facility    Boris Yi PT  8/20/2024

## 2024-08-20 NOTE — DISCHARGE SUMMARY
Date of Discharge:  8/20/2024    PCP: Manda Keenan MD    Discharge Diagnosis:   Active Hospital Problems    Diagnosis  POA    **Diastolic CHF, acute on chronic [I50.33]  Unknown    Closed wedge compression fracture of L1 vertebra [S32.010A]  Yes    Abnormal computed tomography of gallbladder [R93.2]  Unknown    Exertional dyspnea [R06.09]  Yes    Hypothyroidism (acquired) [E03.9]  Yes    Diastolic CHF, chronic [I50.32]  Yes    Iron deficiency anemia [D50.9]  Unknown    COPD (chronic obstructive pulmonary disease) [J44.9]  Yes    Sleep apnea [G47.30]  Yes    Atrial fibrillation [I48.91]  Yes    Myasthenia gravis [G70.00]  Yes    Paroxysmal atrial fibrillation [I48.0]  Yes    CAD (coronary artery disease) [I25.10]  Yes    Essential hypertension [I10]  Yes      Resolved Hospital Problems   No resolved problems to display.     Procedure(s):  ESOPHAGOGASTRODUODENOSCOPY WITH COLD BIOPSIES  COLONOSCOPY TO CECUM & T.I.     Consults       Date and Time Order Name Status Description    8/17/2024  9:23 AM Inpatient Neurosurgery Consult Completed     8/13/2024 11:41 AM Inpatient Neurology Consult General Completed     8/13/2024 11:36 AM Inpatient General Surgery Consult Completed     8/12/2024  8:12 AM Inpatient Gastroenterology Consult Completed     8/11/2024 11:34 AM Inpatient Pulmonology Consult      8/10/2024  3:22 PM L[facility] (on-call MD unless specified) Completed     8/10/2024  2:42 PM [facility] (on-call MD unless specified) Details      8/10/2024  2:14 PM [facility] (on-call MD unless specified) Details            Hospital Course  88 y.o. female who presented with shortness of breath and her dyspnea is definitely multifactorial.  It is influenced by COPD but also influenced by acute on chronic diastolic CHF Compounded by deconditioning and obesity.  She was seen in consultation by cardiology as well as GI and general surgery neurosurgery and pulmonology.  To summarize this admission, she was treated with steroids per pulmonology as  well as nebulized meds to address her COPD.  She present with some RVR and cardiology addressed this.  They have discontinued Toprol and switched her over to 3 times daily atenolol with the use of digoxin.  Actually over the last couple days upholsterer with a couple extra doses of IV Lasix and it has made a tremendous improvement in regards to her shortness of breath symptoms and overall fluid balance.  She is normally maintained on torsemide 20 mg daily and at discharge I think it might be best to increase that to 40 mg.  I will defer to rehab MD to consider repeating her BMP in the next week or 2 and watch her fluid status and electrolytes closely.  She is already anticoagulated and along the way she was found to be heme positive.  GI performed endoscopy of upper and lower per Dr. Zacarias on 8/15/2024 and you can see reports for further clarification but ultimately no active source was found.  They are going to consider outpatient capsule endoscopy and I will defer to them to set that up accordingly.  At discharge her hemoglobin is stabilized and upward trended to 8.9.  With further diuresis her uric acid is trended up to 10.8 with a creatinine of 1.09 and by discharge.  Her potassium was elevated to 5.4 and that was secondary to protocol dosing giving her extra supplementation the day before.  Surgery evaluated for gallbladder distention and reassured that all was okay and no interventions were warranted.  Electrolyte imbalances pertaining to potassium and sodium and by discharge all is stabilized.  Her leukocytosis was reactive and resolving and this is influenced by steroid dosing, patient remains afebrile.  Hypothyroidism stable on levothyroxine.  At this point due to her generalized weakness and PT evaluations, CCP is coordinated discharge needs to SNF and patient is medically stable for transfer as of today.  Questions answered the patient the best my ability and she is thankful of the care she received  while here while minimal to the above plan.    Xarelto adjusted to 15 mg which is a more appropriate dose for this patient based on age and comorbidities.  This was done with the assistance of Pharm.D.    Temp:  [97.7 °F (36.5 °C)-98.1 °F (36.7 °C)] 97.9 °F (36.6 °C)  Heart Rate:  [64-85] 72  Resp:  [16-20] 20  BP: (105-165)/(47-97) 137/97  Body mass index is 27.69 kg/m².    Physical Exam  HENT:      Head: Normocephalic.      Nose: Nose normal.      Mouth/Throat:      Mouth: Mucous membranes are moist.      Pharynx: Oropharynx is clear.   Cardiovascular:      Rate and Rhythm: Normal rate and regular rhythm.   Pulmonary:      Effort: Pulmonary effort is normal. No respiratory distress.      Breath sounds: Normal breath sounds.   Abdominal:      General: Bowel sounds are normal. There is no distension.      Palpations: Abdomen is soft.      Tenderness: There is no abdominal tenderness.   Musculoskeletal:      Comments: Improved overall volume status   Neurological:      Mental Status: She is alert and oriented to person, place, and time. Mental status is at baseline.      Cranial Nerves: No cranial nerve deficit.      Motor: Weakness present.       Disposition: Skilled Nursing Facility (DC - External)       Discharge Medications        New Medications        Instructions Start Date   atenolol 25 MG tablet  Commonly known as: TENORMIN   25 mg, Oral, Every 8 Hours      digoxin 125 MCG tablet  Commonly known as: LANOXIN   125 mcg, Oral, Daily Digoxin      guaiFENesin 600 MG 12 hr tablet  Commonly known as: MUCINEX   600 mg, Oral, Every 12 Hours Scheduled      Lidocaine 4 %   2 patches, Transdermal, Every 24 Hours Scheduled, Remove & Discard patch within 12 hours or as directed by MD   Start Date: August 21, 2024            Changes to Medications        Instructions Start Date   torsemide 20 MG tablet  Commonly known as: DEMADEX  What changed: how much to take   40 mg, Oral, Daily             Continue These Medications         Instructions Start Date   acetaminophen 325 MG tablet  Commonly known as: TYLENOL   650 mg, Oral, Every 4 Hours PRN      albuterol (2.5 MG/3ML) 0.083% nebulizer solution  Commonly known as: PROVENTIL   2.5 mg, Nebulization, Every 4 Hours PRN      albuterol sulfate  (90 Base) MCG/ACT inhaler  Commonly known as: PROVENTIL HFA;VENTOLIN HFA;PROAIR HFA   2 puffs, Inhalation, Every 6 Hours PRN      azelastine 0.1 % nasal spray  Commonly known as: ASTELIN   2 sprays, Nasal, 2 Times Daily, Use in each nostril as directed       busPIRone 10 MG tablet  Commonly known as: BUSPAR   10 mg, Oral, 2 Times Daily      DULoxetine 60 MG capsule  Commonly known as: CYMBALTA   60 mg, Oral, 2 Times Daily      ferrous sulfate 325 (65 FE) MG tablet   325 mg, Oral, Daily With Breakfast      levothyroxine 88 MCG tablet  Commonly known as: SYNTHROID, LEVOTHROID   88 mcg, Oral, Daily      montelukast 10 MG tablet  Commonly known as: SINGULAIR   10 mg, Oral, Nightly      mycophenolate 500 MG tablet  Commonly known as: CELLCEPT   1,000 mg, Oral, Every Morning      mycophenolate 500 MG tablet  Commonly known as: CELLCEPT   1,500 mg, Oral, Every Evening      pantoprazole 40 MG EC tablet  Commonly known as: PROTONIX   40 mg, Oral, Daily      pravastatin 20 MG tablet  Commonly known as: PRAVACHOL   20 mg, Oral, Nightly      raloxifene 60 MG tablet  Commonly known as: EVISTA   60 mg, Oral, Nightly      rivaroxaban 20 MG tablet  Commonly known as: XARELTO   20 mg, Oral, Daily With Dinner      Trelegy Ellipta 100-62.5-25 MCG/INH inhaler  Generic drug: Fluticasone-Umeclidin-Vilant   1 puff, Inhalation, Daily      vitamin B-12 1000 MCG tablet  Commonly known as: CYANOCOBALAMIN   1,000 mcg, Oral, Daily      vitamin D 1.25 MG (65226 UT) capsule capsule  Commonly known as: ERGOCALCIFEROL   50,000 Units, Oral, Every 7 Days, Takes on Wednesdays             Stop These Medications      irbesartan 75 MG tablet  Commonly known as: AVAPRO      metoprolol succinate XL 25 MG 24 hr tablet  Commonly known as: TOPROL-XL     predniSONE 10 MG tablet  Commonly known as: DELTASONE               Additional Instructions for the Follow-ups that You Need to Schedule       Discharge Follow-up with PCP   As directed       Currently Documented PCP:    Manda Keenan MD    PCP Phone Number:    [phone]     Follow Up Details: PCP post rehab.  All consultants per their recommendations               Contact information for follow-up providers       Manda Keenan MD .    Specialty: Pediatrics  Why: PCP post rehab.  All consultants per their recommendations  Contact information:  [address] [facility] [id number] [phone]                       Contact information for after-discharge care       Destination       [facility] .    Service: Skilled Nursing  Contact information:  [address] [facility] [address] [phone]                     [facility]       [facility]    Service: [facility]  Contact information:  [address] [facility] [place], [address]  [facility] [address] [phone]                                  Future Appointments   Date Time Provider Department Center   9/19/2024 10:15 AM [facility] XR [facility]   9/19/2024 10:30 AM Steven Montalvo MD [facility]        Hernan Waldron MD  [facility]  08/20/24    Discharge time spent greater than 30 minutes.

## 2024-08-20 NOTE — CASE MANAGEMENT/SOCIAL WORK
Continued Stay Note  [facility]     Patient Name: Olena Myers  MRN: [id number]  Today's Date: 8/20/2024    Admit Date: 8/10/2024    Plan: DC to [place] via [facility] [employer] van   Discharge Plan       Row Name 08/20/24 0947       Plan    Plan DC to [place] via [facility] [employer] van    Plan Comments Pre-cert obtained to [place]. Pt to transport via [facility] van pu at 11:30am. Informed pt and son's Scotty and Ruddy. Contacted marilyn Madrigal/[facility] and informed of disposition. Packet to nurse.                   Discharge Codes    No documentation.                 Expected Discharge Date and Time       Expected Discharge Date Expected Discharge Time    Aug 20, 2024               Ruth Calvillo RN

## 2024-08-20 NOTE — PLAN OF CARE
Goal Outcome Evaluation:  Plan of Care Reviewed With: patient           Outcome Evaluation: Upon entering room, pt. sitting up in chair (reclined), awake/alert, and agreeable to work with P.T. this date despite c/o fatigue, weakness, and SOA.  This AM, pt. able to ambulate 15 feet, Min. assist x 1, with use of Rwx.  Pt. requires CGA x 1 for sit <-> stand transfers.  Pt. performed sit <-> stand transfers x 3 reps for functional strength training. BLE ther. ex. program x 10 reps completed for general strengthening. Verbal/tactile cues given during ambulation for posture correction and Rwx guidance.  Limited in gait distance this AM due to fatigue and weakness.  Pt. also c/o SOA throughout but oxygen saturations remained in the high 90 percentile throughout therapy session. Will continue to progress functional mobility as tolerated.

## 2024-08-20 NOTE — PLAN OF CARE
Goal Outcome Evaluation:  Plan of Care Reviewed With: patient        Progress: improving  Outcome Evaluation: VSS, cont O2 2L NC, dora po well, Tylenol given for c/o back pain, pt refused to wear brace, safety maintained, pt for poss d/c today

## 2024-08-20 NOTE — DISCHARGE PLACEMENT REQUEST
"Olena Myers (88 y.o. Female)       Date of Birth   1935    Social Security Number       Address   [address] [place] [address]    Home Phone   [phone]    MRN   [id number]       [facility]    Marital Status                               Admission Date   8/10/24    Admission Type   Emergency    Admitting Provider   Luz Taylor MD    Attending Provider   Hernan Waldron MD    Department, Room/Bed   [address], [address]/1       Discharge Date       Discharge Disposition   Skilled Nursing Facility (DC - External)    Discharge Destination                                 Attending Provider: Hernan Waldron MD    Allergies: Neomycin, Penicillins, Hydrocodone, Rosuvastatin    Isolation: None   Infection: None   Code Status: No CPR    Ht: 152.4 cm (60\")   Wt: 64.3 kg (141 lb 12.8 oz)    Admission Cmt: None   Principal Problem: Diastolic CHF, acute on chronic [I50.33]                   Active Insurance as of 8/10/2024       Primary Coverage       Payor Plan Insurance Group Employer/Plan Group    HUMANA MEDICARE REPLACEMENT [employer] MED ADV GROUP [id number]       Payor Plan Address Payor Plan Phone Number Payor Plan Fax Number Effective Dates    [address] [address] [phone]  1/1/2013 - None Entered    [facility] [address]         Subscriber Name Subscriber Birth Date Member ID       OLENA MYERS 1935 [id number]                     Emergency Contacts        (Rel.) Home Phone Work Phone Mobile Phone    Scotty Myers (Son) [phone] -- [phone]    Olvin Myers (Son) [phone] -- [phone]    Teddy Myers (Son) [phone] -- [phone]    IlfeldNazanin olea (Sister) [phone] -- [phone]                   Discharge Summary        Hernan Waldron MD at 08/20/24 0943          Date of Discharge:  8/20/2024    PCP: Manda Keenan MD    Discharge Diagnosis:   Active Hospital Problems    Diagnosis  POA    **Diastolic " CHF, acute on chronic [I50.33]  Unknown    Closed wedge compression fracture of L1 vertebra [S32.010A]  Yes    Abnormal computed tomography of gallbladder [R93.2]  Unknown    Exertional dyspnea [R06.09]  Yes    Hypothyroidism (acquired) [E03.9]  Yes    Diastolic CHF, chronic [I50.32]  Yes    Iron deficiency anemia [D50.9]  Unknown    COPD (chronic obstructive pulmonary disease) [J44.9]  Yes    Sleep apnea [G47.30]  Yes    Atrial fibrillation [I48.91]  Yes    Myasthenia gravis [G70.00]  Yes    Paroxysmal atrial fibrillation [I48.0]  Yes    CAD (coronary artery disease) [I25.10]  Yes    Essential hypertension [I10]  Yes      Resolved Hospital Problems   No resolved problems to display.     Procedure(s):  ESOPHAGOGASTRODUODENOSCOPY WITH COLD BIOPSIES  COLONOSCOPY TO CECUM & T.I.     Consults       Date and Time Order Name Status Description    8/17/2024  9:23 AM Inpatient Neurosurgery Consult Completed     8/13/2024 11:41 AM Inpatient Neurology Consult General Completed     8/13/2024 11:36 AM Inpatient General Surgery Consult Completed     8/12/2024  8:12 AM Inpatient Gastroenterology Consult Completed     8/11/2024 11:34 AM Inpatient Pulmonology Consult      8/10/2024  3:22 PM [facility] (on-call MD unless specified) Completed     8/10/2024  2:42 PM [facility] (on-call MD unless specified) Details      8/10/2024  2:14 PM [facility] (on-call MD unless specified) Details            Hospital Course  88 y.o. female who presented with shortness of breath and her dyspnea is definitely multifactorial.  It is influenced by COPD but also influenced by acute on chronic diastolic CHF Compounded by deconditioning and obesity.  She was seen in consultation by cardiology as well as GI and general surgery neurosurgery and pulmonology.  To summarize this admission, she was treated with steroids per pulmonology as well as nebulized meds to address her COPD.  She present with some RVR and cardiology addressed this.  They have discontinued Toprol and  switched her over to 3 times daily atenolol with the use of digoxin.  Actually over the last couple days upholsterer with a couple extra doses of IV Lasix and it has made a tremendous improvement in regards to her shortness of breath symptoms and overall fluid balance.  She is normally maintained on torsemide 20 mg daily and at discharge I think it might be best to increase that to 40 mg.  I will defer to rehab MD to consider repeating her BMP in the next week or 2 and watch her fluid status and electrolytes closely.  She is already anticoagulated and along the way she was found to be heme positive.  GI performed endoscopy of upper and lower per Dr. Zacarias on 8/15/2024 and you can see reports for further clarification but ultimately no active source was found.  They are going to consider outpatient capsule endoscopy and I will defer to them to set that up accordingly.  At discharge her hemoglobin is stabilized and upward trended to 8.9.  With further diuresis her uric acid is trended up to 10.8 with a creatinine of 1.09 and by discharge.  Her potassium was elevated to 5.4 and that was secondary to protocol dosing giving her extra supplementation the day before.  Surgery evaluated for gallbladder distention and reassured that all was okay and no interventions were warranted.  Electrolyte imbalances pertaining to potassium and sodium and by discharge all is stabilized.  Her leukocytosis was reactive and resolving and this is influenced by steroid dosing, patient remains afebrile.  Hypothyroidism stable on levothyroxine.  At this point due to her generalized weakness and PT evaluations, CCP is coordinated discharge needs to SNF and patient is medically stable for transfer as of today.  Questions answered the patient the best my ability and she is thankful of the care she received while here while minimal to the above plan.    Xarelto adjusted to 15 mg which is a more appropriate dose for this patient based on age and  comorbidities.  This was done with the assistance of Pharm.D.    Temp:  [97.7 °F (36.5 °C)-98.1 °F (36.7 °C)] 97.9 °F (36.6 °C)  Heart Rate:  [64-85] 72  Resp:  [16-20] 20  BP: (105-165)/(47-97) 137/97  Body mass index is 27.69 kg/m².    Physical Exam  HENT:      Head: Normocephalic.      Nose: Nose normal.      Mouth/Throat:      Mouth: Mucous membranes are moist.      Pharynx: Oropharynx is clear.   Cardiovascular:      Rate and Rhythm: Normal rate and regular rhythm.   Pulmonary:      Effort: Pulmonary effort is normal. No respiratory distress.      Breath sounds: Normal breath sounds.   Abdominal:      General: Bowel sounds are normal. There is no distension.      Palpations: Abdomen is soft.      Tenderness: There is no abdominal tenderness.   Musculoskeletal:      Comments: Improved overall volume status   Neurological:      Mental Status: She is alert and oriented to person, place, and time. Mental status is at baseline.      Cranial Nerves: No cranial nerve deficit.      Motor: Weakness present.       Disposition: Skilled Nursing Facility (DC - External)       Discharge Medications        New Medications        Instructions Start Date   atenolol 25 MG tablet  Commonly known as: TENORMIN   25 mg, Oral, Every 8 Hours      digoxin 125 MCG tablet  Commonly known as: LANOXIN   125 mcg, Oral, Daily Digoxin      guaiFENesin 600 MG 12 hr tablet  Commonly known as: MUCINEX   600 mg, Oral, Every 12 Hours Scheduled      Lidocaine 4 %   2 patches, Transdermal, Every 24 Hours Scheduled, Remove & Discard patch within 12 hours or as directed by MD   Start Date: August 21, 2024            Changes to Medications        Instructions Start Date   torsemide 20 MG tablet  Commonly known as: DEMADEX  What changed: how much to take   40 mg, Oral, Daily             Continue These Medications        Instructions Start Date   acetaminophen 325 MG tablet  Commonly known as: TYLENOL   650 mg, Oral, Every 4 Hours PRN      albuterol (2.5  MG/3ML) 0.083% nebulizer solution  Commonly known as: PROVENTIL   2.5 mg, Nebulization, Every 4 Hours PRN      albuterol sulfate  (90 Base) MCG/ACT inhaler  Commonly known as: PROVENTIL HFA;VENTOLIN HFA;PROAIR HFA   2 puffs, Inhalation, Every 6 Hours PRN      azelastine 0.1 % nasal spray  Commonly known as: ASTELIN   2 sprays, Nasal, 2 Times Daily, Use in each nostril as directed       busPIRone 10 MG tablet  Commonly known as: BUSPAR   10 mg, Oral, 2 Times Daily      DULoxetine 60 MG capsule  Commonly known as: CYMBALTA   60 mg, Oral, 2 Times Daily      ferrous sulfate 325 (65 FE) MG tablet   325 mg, Oral, Daily With Breakfast      levothyroxine 88 MCG tablet  Commonly known as: SYNTHROID, LEVOTHROID   88 mcg, Oral, Daily      montelukast 10 MG tablet  Commonly known as: SINGULAIR   10 mg, Oral, Nightly      mycophenolate 500 MG tablet  Commonly known as: CELLCEPT   1,000 mg, Oral, Every Morning      mycophenolate 500 MG tablet  Commonly known as: CELLCEPT   1,500 mg, Oral, Every Evening      pantoprazole 40 MG EC tablet  Commonly known as: PROTONIX   40 mg, Oral, Daily      pravastatin 20 MG tablet  Commonly known as: PRAVACHOL   20 mg, Oral, Nightly      raloxifene 60 MG tablet  Commonly known as: EVISTA   60 mg, Oral, Nightly      rivaroxaban 20 MG tablet  Commonly known as: XARELTO   20 mg, Oral, Daily With Dinner      Trelegy Ellipta 100-62.5-25 MCG/INH inhaler  Generic drug: Fluticasone-Umeclidin-Vilant   1 puff, Inhalation, Daily      vitamin B-12 1000 MCG tablet  Commonly known as: CYANOCOBALAMIN   1,000 mcg, Oral, Daily      vitamin D 1.25 MG (75564 UT) capsule capsule  Commonly known as: ERGOCALCIFEROL   50,000 Units, Oral, Every 7 Days, Takes on Wednesdays             Stop These Medications      irbesartan 75 MG tablet  Commonly known as: AVAPRO     metoprolol succinate XL 25 MG 24 hr tablet  Commonly known as: TOPROL-XL     predniSONE 10 MG tablet  Commonly known as: DELTASONE                Additional Instructions for the Follow-ups that You Need to Schedule       Discharge Follow-up with PCP   As directed       Currently Documented PCP:    Manda Keenan MD    PCP Phone Number:    [phone]     Follow Up Details: PCP post rehab.  All consultants per their recommendations               Contact information for follow-up providers       Manda Keenan MD .    Specialty: Pediatrics  Why: PCP post rehab.  All consultants per their recommendations  Contact information:  [address]  [facility] [address]  [phone]                       Contact information for after-discharge care       Destination       [facility] .    Service: Skilled Nursing  Contact information:  [address]  [facility] [address] [phone]                     [facility]       [facility]    Service: [facility] Services  Contact information:  [address]  [facility] [address] [phone]                                  Future Appointments   Date Time Provider Department Center   9/19/2024 10:15 AM [facility]O [facility] XR [facility]   9/19/2024 10:30 AM Steven Montalvo MD [facility]        Hernan Jones MD  [facility]  08/20/24    Discharge time spent greater than 30 minutes.      Electronically signed by Hernan Jones MD at 08/20/24 1049       Discharge Order (From admission, onward)       Start     Ordered    08/20/24 0941  Discharge patient  Once        Expected Discharge Date: 08/20/24   Discharge Disposition: Skilled Nursing Facility (DC - External)   Physician of Record for Attribution - Please select from Treatment Team: HERNAN JONES [[id number]]   Review needed by CMO to determine Physician of Record: No      Question Answer Comment   Physician of Record for Attribution - Please select from Treatment Team HERNAN JONES    Review needed by CMO to determine Physician of Record  No        08/20/24 0943

## 2024-08-20 NOTE — PLAN OF CARE
Goal Outcome Evaluation:   Pt had dressing change today, ambulated to bedside commode and chair today. Pt refuses to wear brace. Pain interventions given as ordered see Mar. Hopeful for Discharge tomorrow. Safety maintained

## 2024-08-20 NOTE — CASE MANAGEMENT/SOCIAL WORK
Case Management Discharge Note      Final Note: DC to [facility] via [facility]    Provided Post Acute Provider List?: N/A  Provided Post Acute Provider Quality & Resource List?: N/A    Selected Continued Care - Discharged on 8/20/2024 Admission date: 8/10/2024 - Discharge disposition: Skilled Nursing Facility (DC - External)      Destination Coordination complete.      Service Provider Selected Services Address Phone Fax Patient Preferred    [facility] [address] [facility] [address] [phone] --              Durable Medical Equipment    No services have been selected for the patient.                Dialysis/Infusion    No services have been selected for the patient.                Home Medical Care Coordination complete.      Service Provider Selected Services Address Phone Fax Patient Preferred    [facility] [address], [address], [facility] [address] [phone] --              Therapy    No services have been selected for the patient.                Community Resources    No services have been selected for the patient.                [facility]    No services have been selected for the patient.                    Transportation Services  W/C Van: [facility]    Final Discharge Disposition Code: 03 - skilled nursing facility (SNF)

## 2024-08-29 LAB
CYTO UR: NORMAL
LAB AP CASE REPORT: NORMAL
PATH REPORT.ADDENDUM SPEC: NORMAL
PATH REPORT.FINAL DX SPEC: NORMAL
PATH REPORT.GROSS SPEC: NORMAL

## 2024-09-09 ENCOUNTER — TELEPHONE (OUTPATIENT)
Dept: GASTROENTEROLOGY | Facility: CLINIC | Age: 89
End: 2024-09-09
Payer: MEDICARE

## 2024-09-09 NOTE — TELEPHONE ENCOUNTER
Pt reviewed results via [employer].     Sent pt [employer] msg advising of results and recommendations. Advised to call if any questions.

## (undated) DEVICE — OXYGEN CATHETER: Brand: AIRLIFE™

## (undated) DEVICE — GLV SURG BIOGEL LTX PF 7

## (undated) DEVICE — ADAPT CLN BIOGUARD AIR/H2O DISP

## (undated) DEVICE — CANN O2 ETCO2 FITS ALL CONN CO2 SMPL A/ 7IN DISP LF

## (undated) DEVICE — ARM SLING: Brand: DEROYAL

## (undated) DEVICE — SENSR O2 OXIMAX FNGR A/ 18IN NONSTR

## (undated) DEVICE — 2108 SERIES SAGITTAL BLADE (19.5 X 1.27 X 81.0MM)

## (undated) DEVICE — SUT MNCRYL 3/0 PS2 18IN MCP497G

## (undated) DEVICE — 3M™ STERI-STRIP™ REINFORCED ADHESIVE SKIN CLOSURES, R1547, 1/2 IN X 4 IN (12 MM X 100 MM), 6 STRIPS/ENVELOPE: Brand: 3M™ STERI-STRIP™

## (undated) DEVICE — SINGLE USE SUCTION VALVE MAJ-209: Brand: SINGLE USE SUCTION VALVE (STERILE)

## (undated) DEVICE — IRRIGATOR BULB ASEPTO 60CC STRL

## (undated) DEVICE — VITAL SIGNS™ JACKSON-REES CIRCUITS: Brand: VITAL SIGNS™

## (undated) DEVICE — DRSNG GZ PETROLTM XEROFORM CURAD 1X8IN STRL

## (undated) DEVICE — NDL HYPO PRECISIONGLIDE/REG 18G 11/2 PNK

## (undated) DEVICE — MAT FLR ABSORBENT LG 4FT 10 2.5FT

## (undated) DEVICE — ADAPT SWVL FIBROPTIC BRONCH

## (undated) DEVICE — SINGLE-USE BIOPSY FORCEPS: Brand: RADIAL JAW 4

## (undated) DEVICE — TUBING, SUCTION, 1/4" X 10', STRAIGHT: Brand: MEDLINE

## (undated) DEVICE — SUT NONABS MAXBRAID/PE NMBR2 HC5 38IN BLU 900334
Type: IMPLANTABLE DEVICE | Site: SHOULDER | Status: NON-FUNCTIONAL
Removed: 2019-01-08

## (undated) DEVICE — SKIN PREP TRAY W/CHG: Brand: MEDLINE INDUSTRIES, INC.

## (undated) DEVICE — SYR LUERLOK 30CC

## (undated) DEVICE — LN SMPL O2 NASL/ORL SMART/CAPNOLINE PLS A/

## (undated) DEVICE — GLV SURG BIOGEL LTX PF 8

## (undated) DEVICE — LN SMPL CO2 SHTRM SD STREAM W/M LUER

## (undated) DEVICE — MSK AIRWY LARYNG LMA PILOT SZ4

## (undated) DEVICE — BLCK/BITE BLOX W/DENTL/RIM W/STRAP 54F

## (undated) DEVICE — Device

## (undated) DEVICE — DRAPE,U/ SHT,SPLIT,PLAS,STERIL: Brand: MEDLINE

## (undated) DEVICE — ANTIBACTERIAL UNDYED BRAIDED (POLYGLACTIN 910), SYNTHETIC ABSORBABLE SUTURE: Brand: COATED VICRYL

## (undated) DEVICE — TRAP,MUCUS SPECIMEN, 80CC: Brand: MEDLINE

## (undated) DEVICE — PK SHLDR OPN 40

## (undated) DEVICE — ENCORE® LATEX ORTHO SIZE 7, STERILE LATEX POWDER-FREE SURGICAL GLOVE: Brand: ENCORE

## (undated) DEVICE — GLV SURG TRIUMPH CLASSIC PF LTX 7.5 STRL

## (undated) DEVICE — SINGLE USE BIOPSY VALVE MAJ-210: Brand: SINGLE USE BIOPSY VALVE (STERILE)

## (undated) DEVICE — KT ORCA ORCAPOD DISP STRL

## (undated) DEVICE — DRAPE,SHOULDER,BEACH ULTRAGARD: Brand: MEDLINE

## (undated) DEVICE — BNDG ELAS CO-FLEX SLF ADHR 4IN5YD LF STRL